# Patient Record
Sex: FEMALE | Race: WHITE | Employment: OTHER | URBAN - METROPOLITAN AREA
[De-identification: names, ages, dates, MRNs, and addresses within clinical notes are randomized per-mention and may not be internally consistent; named-entity substitution may affect disease eponyms.]

---

## 2017-01-05 PROBLEM — N30.00 ACUTE CYSTITIS: Status: ACTIVE | Noted: 2017-01-05

## 2017-01-12 PROBLEM — N30.00 ACUTE CYSTITIS: Status: RESOLVED | Noted: 2017-01-05 | Resolved: 2017-01-12

## 2017-09-21 PROBLEM — N39.46 MIXED STRESS AND URGE URINARY INCONTINENCE: Status: ACTIVE | Noted: 2017-09-21

## 2017-09-21 PROBLEM — M15.9 PRIMARY OSTEOARTHRITIS INVOLVING MULTIPLE JOINTS: Status: ACTIVE | Noted: 2017-09-21

## 2017-11-13 ENCOUNTER — CARE COORDINATION (OUTPATIENT)
Dept: CARE COORDINATION | Age: 82
End: 2017-11-13

## 2017-12-14 PROBLEM — H18.529 ANTERIOR BASEMENT MEMBRANE DYSTROPHY: Status: ACTIVE | Noted: 2017-12-04

## 2017-12-14 PROBLEM — Z96.1 PSEUDOPHAKIA, BOTH EYES: Status: ACTIVE | Noted: 2017-12-04

## 2017-12-14 PROBLEM — H18.519 FUCHS' ENDOTHELIAL DYSTROPHY: Status: ACTIVE | Noted: 2017-12-04

## 2018-01-28 PROBLEM — R09.02 HYPOXIA: Status: ACTIVE | Noted: 2018-01-28

## 2018-01-29 PROBLEM — J96.01 ACUTE RESPIRATORY FAILURE WITH HYPOXIA (HCC): Status: ACTIVE | Noted: 2018-01-28

## 2018-01-31 PROBLEM — J96.01 ACUTE RESPIRATORY FAILURE WITH HYPOXIA (HCC): Status: RESOLVED | Noted: 2018-01-28 | Resolved: 2018-01-31

## 2018-02-06 ENCOUNTER — CARE COORDINATION (OUTPATIENT)
Dept: CASE MANAGEMENT | Age: 83
End: 2018-02-06

## 2018-02-12 ENCOUNTER — CARE COORDINATION (OUTPATIENT)
Dept: CASE MANAGEMENT | Age: 83
End: 2018-02-12

## 2018-02-12 NOTE — CARE COORDINATION
[facility] Transitions Follow Up Call    2018    Patient: Jennifer Peralta  Patient : 1935   MRN: <[id number]>  Reason for Admission: There are no discharge diagnoses documented for the most recent discharge. Discharge Date: 18 RARS: Risk Score: 19.5           Care Transitions Subsequent and Final Call    Subsequent and Final Calls  Care Transitions Interventions  Other Interventions:          Attempted to contact Pt for f/u transitions call. Contact information left to  requesting call back at the earliest convenience. Received call back from [place] who stated she is doing great and back to work now. Stated she does not nee any more transitions calls. She thanked [facility] for call ing to check on her. Luis Blue RN BSN   Care Transitions Coordinator  [phone]       Follow Up  No future appointments.     Luis Blue RN

## 2018-02-17 PROBLEM — J06.9 URI (UPPER RESPIRATORY INFECTION): Status: ACTIVE | Noted: 2018-02-17

## 2018-02-17 PROBLEM — I50.41 ACUTE COMBINED SYSTOLIC AND DIASTOLIC CHF, NYHA CLASS 1 (HCC): Status: ACTIVE | Noted: 2018-02-17

## 2018-02-19 PROBLEM — J06.9 URI (UPPER RESPIRATORY INFECTION): Status: RESOLVED | Noted: 2018-02-17 | Resolved: 2018-02-19

## 2018-02-19 PROBLEM — I50.41 ACUTE COMBINED SYSTOLIC AND DIASTOLIC CHF, NYHA CLASS 1 (HCC): Status: RESOLVED | Noted: 2018-02-17 | Resolved: 2018-02-19

## 2018-02-23 PROBLEM — J98.01 ACUTE BRONCHOSPASM: Status: ACTIVE | Noted: 2018-02-23

## 2018-03-06 ENCOUNTER — CARE COORDINATION (OUTPATIENT)
Dept: CASE MANAGEMENT | Age: 83
End: 2018-03-06

## 2018-03-06 DIAGNOSIS — J98.01 ACUTE BRONCHOSPASM: Primary | ICD-10-CM

## 2018-03-06 PROCEDURE — 1111F DSCHRG MED/CURRENT MED MERGE: CPT | Performed by: FAMILY MEDICINE

## 2018-03-12 ENCOUNTER — TELEPHONE (OUTPATIENT)
Dept: FAMILY MEDICINE CLINIC | Age: 83
End: 2018-03-12
Payer: MEDICARE

## 2018-03-12 DIAGNOSIS — Z79.01 LONG-TERM (CURRENT) USE OF ANTICOAGULANTS: ICD-10-CM

## 2018-03-12 DIAGNOSIS — I48.20 CHRONIC ATRIAL FIBRILLATION (HCC): ICD-10-CM

## 2018-03-12 DIAGNOSIS — E66.01 MORBID OBESITY (HCC): ICD-10-CM

## 2018-03-12 DIAGNOSIS — I11.0 HYPERTENSIVE HEART DISEASE WITH CONGESTIVE HEART FAILURE (HCC): ICD-10-CM

## 2018-03-12 DIAGNOSIS — I25.10 ATHEROSCLEROSIS OF NATIVE CORONARY ARTERY OF NATIVE HEART WITHOUT ANGINA PECTORIS: ICD-10-CM

## 2018-03-12 DIAGNOSIS — J44.0 OBSTRUCTIVE CHRONIC BRONCHITIS WITH ACUTE BRONCHITIS (HCC): Primary | ICD-10-CM

## 2018-03-12 DIAGNOSIS — I27.20 PULMONARY HTN (HCC): ICD-10-CM

## 2018-03-12 DIAGNOSIS — N39.46 MIXED INCONTINENCE URGE AND STRESS (MALE)(FEMALE): ICD-10-CM

## 2018-03-12 DIAGNOSIS — J18.9 UNRESOLVED PNEUMONIA: ICD-10-CM

## 2018-03-12 DIAGNOSIS — Z79.52 LONG TERM CURRENT USE OF SYSTEMIC STEROIDS: ICD-10-CM

## 2018-03-12 DIAGNOSIS — I08.1 DISORDERS OF BOTH MITRAL AND TRICUSPID VALVES: ICD-10-CM

## 2018-03-12 DIAGNOSIS — J96.01 ACUTE RESPIRATORY FAILURE WITH HYPOXIA (HCC): ICD-10-CM

## 2018-03-12 DIAGNOSIS — J44.1 OBSTRUCTIVE CHRONIC BRONCHITIS WITH EXACERBATION (HCC): ICD-10-CM

## 2018-03-12 DIAGNOSIS — I50.30 DIASTOLIC HEART FAILURE, UNSPECIFIED HEART FAILURE CHRONICITY: ICD-10-CM

## 2018-03-12 DIAGNOSIS — M19.90 SENILE ARTHRITIS: ICD-10-CM

## 2018-03-12 DIAGNOSIS — F01.53 VASCULAR DEMENTIA WITH DEPRESSED MOOD: ICD-10-CM

## 2018-03-12 PROCEDURE — G0180 MD CERTIFICATION HHA PATIENT: HCPCS | Performed by: FAMILY MEDICINE

## 2018-03-15 ENCOUNTER — TELEPHONE (OUTPATIENT)
Dept: FAMILY MEDICINE CLINIC | Age: 83
End: 2018-03-15

## 2018-03-15 NOTE — TELEPHONE ENCOUNTER
Call patient with the following instructions. We will use 232 as her baseline weight. Weigh herself every morning  Take whole lasix (40 mg) if weight is 234 or more  Take 1/2 lasix (20mg) if weight is 233 or less. Call if weight is over 237.

## 2018-03-27 ENCOUNTER — CARE COORDINATION (OUTPATIENT)
Dept: CASE MANAGEMENT | Age: 83
End: 2018-03-27

## 2018-04-02 ENCOUNTER — NURSE ONLY (OUTPATIENT)
Dept: FAMILY MEDICINE CLINIC | Age: 83
End: 2018-04-02
Payer: MEDICARE

## 2018-04-02 ENCOUNTER — ANTI-COAG VISIT (OUTPATIENT)
Dept: FAMILY MEDICINE CLINIC | Age: 83
End: 2018-04-02

## 2018-04-02 DIAGNOSIS — Z79.01 ANTICOAGULATED ON COUMADIN: Primary | ICD-10-CM

## 2018-04-02 LAB
INTERNATIONAL NORMALIZATION RATIO, POC: 2.2
PROTHROMBIN TIME, POC: 26.2

## 2018-04-02 PROCEDURE — 85610 PROTHROMBIN TIME: CPT

## 2018-04-02 PROCEDURE — 93793 ANTICOAG MGMT PT WARFARIN: CPT | Performed by: COUNSELOR

## 2018-04-04 ENCOUNTER — CARE COORDINATION (OUTPATIENT)
Dept: CASE MANAGEMENT | Age: 83
End: 2018-04-04

## 2018-04-05 ENCOUNTER — OFFICE VISIT (OUTPATIENT)
Dept: FAMILY MEDICINE CLINIC | Age: 83
End: 2018-04-05
Payer: MEDICARE

## 2018-04-05 ENCOUNTER — HOSPITAL ENCOUNTER (OUTPATIENT)
Age: 83
Discharge: HOME | End: 2018-04-07
Payer: MEDICARE

## 2018-04-05 VITALS
TEMPERATURE: 97.8 F | RESPIRATION RATE: 18 BRPM | HEIGHT: 66 IN | SYSTOLIC BLOOD PRESSURE: 144 MMHG | DIASTOLIC BLOOD PRESSURE: 79 MMHG | BODY MASS INDEX: 38.73 KG/M2 | HEART RATE: 92 BPM | OXYGEN SATURATION: 96 % | WEIGHT: 241 LBS

## 2018-04-05 DIAGNOSIS — Z79.01 CHRONIC ANTICOAGULATION: ICD-10-CM

## 2018-04-05 DIAGNOSIS — R53.1 WEAKNESS GENERALIZED: ICD-10-CM

## 2018-04-05 DIAGNOSIS — I50.42 CHRONIC COMBINED SYSTOLIC AND DIASTOLIC CONGESTIVE HEART FAILURE (HCC): ICD-10-CM

## 2018-04-05 DIAGNOSIS — I10 ESSENTIAL HYPERTENSION: ICD-10-CM

## 2018-04-05 DIAGNOSIS — M15.9 PRIMARY OSTEOARTHRITIS INVOLVING MULTIPLE JOINTS: ICD-10-CM

## 2018-04-05 DIAGNOSIS — R30.0 DYSURIA: ICD-10-CM

## 2018-04-05 DIAGNOSIS — N39.46 MIXED STRESS AND URGE URINARY INCONTINENCE: ICD-10-CM

## 2018-04-05 DIAGNOSIS — M75.42 IMPINGEMENT SYNDROME OF LEFT SHOULDER: ICD-10-CM

## 2018-04-05 DIAGNOSIS — I48.20 CHRONIC ATRIAL FIBRILLATION (HCC): ICD-10-CM

## 2018-04-05 DIAGNOSIS — I25.10 CORONARY ARTERY DISEASE INVOLVING NATIVE CORONARY ARTERY OF NATIVE HEART WITHOUT ANGINA PECTORIS: Primary | ICD-10-CM

## 2018-04-05 DIAGNOSIS — R60.0 BILATERAL LEG EDEMA: ICD-10-CM

## 2018-04-05 DIAGNOSIS — E03.9 HYPOTHYROIDISM, UNSPECIFIED TYPE: ICD-10-CM

## 2018-04-05 DIAGNOSIS — I42.9 CARDIOMYOPATHY, UNSPECIFIED TYPE (HCC): ICD-10-CM

## 2018-04-05 LAB
ANION GAP SERPL CALCULATED.3IONS-SCNC: 15 MMOL/L (ref 7–16)
BACTERIA: NORMAL /HPF
BILIRUBIN URINE: NEGATIVE
BLOOD, URINE: NEGATIVE
BUN BLDV-MCNC: 17 MG/DL (ref 8–23)
CALCIUM SERPL-MCNC: 9.2 MG/DL (ref 8.6–10.2)
CHLORIDE BLD-SCNC: 102 MMOL/L (ref 98–107)
CLARITY: CLEAR
CO2: 27 MMOL/L (ref 22–29)
COLOR: YELLOW
CREAT SERPL-MCNC: 0.7 MG/DL (ref 0.5–1)
CRYSTALS, UA: NORMAL
GFR AFRICAN AMERICAN: >60
GFR NON-AFRICAN AMERICAN: >60 ML/MIN/1.73
GLUCOSE BLD-MCNC: 88 MG/DL (ref 74–109)
GLUCOSE URINE: NEGATIVE MG/DL
KETONES, URINE: NEGATIVE MG/DL
LEUKOCYTE ESTERASE, URINE: NEGATIVE
NITRITE, URINE: NEGATIVE
PH UA: 6.5 (ref 5–9)
POTASSIUM SERPL-SCNC: 4.5 MMOL/L (ref 3.5–5)
PRO-BNP: 1781 PG/ML (ref 0–450)
PROTEIN UA: NORMAL MG/DL
RBC UA: NORMAL /HPF (ref 0–2)
SODIUM BLD-SCNC: 144 MMOL/L (ref 132–146)
SPECIFIC GRAVITY UA: 1.01 (ref 1–1.03)
UROBILINOGEN, URINE: 0.2 E.U./DL
WBC UA: NORMAL /HPF (ref 0–5)

## 2018-04-05 PROCEDURE — G8399 PT W/DXA RESULTS DOCUMENT: HCPCS | Performed by: FAMILY MEDICINE

## 2018-04-05 PROCEDURE — G8417 CALC BMI ABV UP PARAM F/U: HCPCS | Performed by: FAMILY MEDICINE

## 2018-04-05 PROCEDURE — 36415 COLL VENOUS BLD VENIPUNCTURE: CPT | Performed by: FAMILY MEDICINE

## 2018-04-05 PROCEDURE — 4040F PNEUMOC VAC/ADMIN/RCVD: CPT | Performed by: FAMILY MEDICINE

## 2018-04-05 PROCEDURE — G8598 ASA/ANTIPLAT THER USED: HCPCS | Performed by: FAMILY MEDICINE

## 2018-04-05 PROCEDURE — 1090F PRES/ABSN URINE INCON ASSESS: CPT | Performed by: FAMILY MEDICINE

## 2018-04-05 PROCEDURE — 81001 URINALYSIS AUTO W/SCOPE: CPT

## 2018-04-05 PROCEDURE — 83880 ASSAY OF NATRIURETIC PEPTIDE: CPT

## 2018-04-05 PROCEDURE — 87088 URINE BACTERIA CULTURE: CPT

## 2018-04-05 PROCEDURE — 80048 BASIC METABOLIC PNL TOTAL CA: CPT

## 2018-04-05 PROCEDURE — 1036F TOBACCO NON-USER: CPT | Performed by: FAMILY MEDICINE

## 2018-04-05 PROCEDURE — 0509F URINE INCON PLAN DOCD: CPT | Performed by: FAMILY MEDICINE

## 2018-04-05 PROCEDURE — 99212 OFFICE O/P EST SF 10 MIN: CPT | Performed by: FAMILY MEDICINE

## 2018-04-05 PROCEDURE — G8427 DOCREV CUR MEDS BY ELIG CLIN: HCPCS | Performed by: FAMILY MEDICINE

## 2018-04-05 PROCEDURE — 1123F ACP DISCUSS/DSCN MKR DOCD: CPT | Performed by: FAMILY MEDICINE

## 2018-04-05 PROCEDURE — 99214 OFFICE O/P EST MOD 30 MIN: CPT | Performed by: FAMILY MEDICINE

## 2018-04-07 LAB — URINE CULTURE, ROUTINE: NORMAL

## 2018-04-09 ENCOUNTER — TELEPHONE (OUTPATIENT)
Dept: FAMILY MEDICINE CLINIC | Age: 83
End: 2018-04-09

## 2018-04-09 RX ORDER — FAMOTIDINE 20 MG/1
20 TABLET, FILM COATED ORAL DAILY
Qty: 90 TABLET | Refills: 3 | Status: SHIPPED | OUTPATIENT
Start: 2018-04-09 | End: 2018-05-24 | Stop reason: SDUPTHER

## 2018-04-10 ENCOUNTER — CARE COORDINATION (OUTPATIENT)
Dept: CASE MANAGEMENT | Age: 83
End: 2018-04-10

## 2018-04-27 DIAGNOSIS — E03.9 HYPOTHYROIDISM, UNSPECIFIED TYPE: Primary | ICD-10-CM

## 2018-04-27 RX ORDER — LEVOTHYROXINE SODIUM 0.12 MG/1
TABLET ORAL
Qty: 15 TABLET | Refills: 0 | Status: SHIPPED | OUTPATIENT
Start: 2018-04-27 | End: 2018-04-27 | Stop reason: CLARIF

## 2018-04-27 RX ORDER — LEVOTHYROXINE SODIUM 0.12 MG/1
TABLET ORAL
Qty: 15 TABLET | Refills: 0 | Status: SHIPPED | OUTPATIENT
Start: 2018-04-27 | End: 2018-05-24 | Stop reason: SDUPTHER

## 2018-04-30 ENCOUNTER — ANTI-COAG VISIT (OUTPATIENT)
Dept: FAMILY MEDICINE CLINIC | Age: 83
End: 2018-04-30
Payer: MEDICARE

## 2018-04-30 ENCOUNTER — NURSE ONLY (OUTPATIENT)
Dept: FAMILY MEDICINE CLINIC | Age: 83
End: 2018-04-30
Payer: MEDICARE

## 2018-04-30 DIAGNOSIS — I48.20 CHRONIC ATRIAL FIBRILLATION (HCC): ICD-10-CM

## 2018-04-30 DIAGNOSIS — I48.20 CHRONIC ATRIAL FIBRILLATION (HCC): Primary | ICD-10-CM

## 2018-04-30 DIAGNOSIS — Z79.01 ANTICOAGULATED ON COUMADIN: ICD-10-CM

## 2018-04-30 LAB
INTERNATIONAL NORMALIZATION RATIO, POC: 2.2
PROTHROMBIN TIME, POC: 26

## 2018-04-30 PROCEDURE — 85610 PROTHROMBIN TIME: CPT

## 2018-04-30 PROCEDURE — 93793 ANTICOAG MGMT PT WARFARIN: CPT | Performed by: FAMILY MEDICINE

## 2018-05-04 ENCOUNTER — TELEPHONE (OUTPATIENT)
Dept: FAMILY MEDICINE CLINIC | Age: 83
End: 2018-05-04

## 2018-05-04 DIAGNOSIS — M15.9 PRIMARY OSTEOARTHRITIS INVOLVING MULTIPLE JOINTS: Primary | ICD-10-CM

## 2018-05-10 ENCOUNTER — HOSPITAL ENCOUNTER (OUTPATIENT)
Age: 83
Discharge: HOME | End: 2018-05-12
Payer: MEDICARE

## 2018-05-10 ENCOUNTER — TELEPHONE (OUTPATIENT)
Dept: FAMILY MEDICINE CLINIC | Age: 83
End: 2018-05-10

## 2018-05-10 ENCOUNTER — OFFICE VISIT (OUTPATIENT)
Dept: FAMILY MEDICINE CLINIC | Age: 83
End: 2018-05-10
Payer: MEDICARE

## 2018-05-10 VITALS
OXYGEN SATURATION: 97 % | HEART RATE: 77 BPM | BODY MASS INDEX: 37.61 KG/M2 | RESPIRATION RATE: 22 BRPM | HEIGHT: 66 IN | TEMPERATURE: 98.7 F | DIASTOLIC BLOOD PRESSURE: 76 MMHG | WEIGHT: 234 LBS | SYSTOLIC BLOOD PRESSURE: 128 MMHG

## 2018-05-10 DIAGNOSIS — R60.0 LOWER EXTREMITY EDEMA: Primary | ICD-10-CM

## 2018-05-10 DIAGNOSIS — I50.32 CHRONIC DIASTOLIC HEART FAILURE (HCC): ICD-10-CM

## 2018-05-10 DIAGNOSIS — R60.0 LOWER EXTREMITY EDEMA: ICD-10-CM

## 2018-05-10 PROCEDURE — 1090F PRES/ABSN URINE INCON ASSESS: CPT | Performed by: NURSE PRACTITIONER

## 2018-05-10 PROCEDURE — 99214 OFFICE O/P EST MOD 30 MIN: CPT | Performed by: NURSE PRACTITIONER

## 2018-05-10 PROCEDURE — G8417 CALC BMI ABV UP PARAM F/U: HCPCS | Performed by: NURSE PRACTITIONER

## 2018-05-10 PROCEDURE — G8399 PT W/DXA RESULTS DOCUMENT: HCPCS | Performed by: NURSE PRACTITIONER

## 2018-05-10 PROCEDURE — G8427 DOCREV CUR MEDS BY ELIG CLIN: HCPCS | Performed by: NURSE PRACTITIONER

## 2018-05-10 PROCEDURE — 4040F PNEUMOC VAC/ADMIN/RCVD: CPT | Performed by: NURSE PRACTITIONER

## 2018-05-10 PROCEDURE — 83880 ASSAY OF NATRIURETIC PEPTIDE: CPT

## 2018-05-10 PROCEDURE — 80048 BASIC METABOLIC PNL TOTAL CA: CPT

## 2018-05-10 PROCEDURE — G8598 ASA/ANTIPLAT THER USED: HCPCS | Performed by: NURSE PRACTITIONER

## 2018-05-10 PROCEDURE — 36415 COLL VENOUS BLD VENIPUNCTURE: CPT | Performed by: NURSE PRACTITIONER

## 2018-05-10 PROCEDURE — 99212 OFFICE O/P EST SF 10 MIN: CPT | Performed by: NURSE PRACTITIONER

## 2018-05-10 PROCEDURE — 1036F TOBACCO NON-USER: CPT | Performed by: NURSE PRACTITIONER

## 2018-05-10 PROCEDURE — 1123F ACP DISCUSS/DSCN MKR DOCD: CPT | Performed by: NURSE PRACTITIONER

## 2018-05-10 RX ORDER — CHLORAL HYDRATE 500 MG
1000 CAPSULE ORAL DAILY
COMMUNITY
End: 2019-02-08

## 2018-05-10 ASSESSMENT — ENCOUNTER SYMPTOMS
VOMITING: 0
DIARRHEA: 0
WHEEZING: 0
BLURRED VISION: 0
CONSTIPATION: 0
NAUSEA: 0
DOUBLE VISION: 0
COUGH: 1
SHORTNESS OF BREATH: 1

## 2018-05-11 LAB
ANION GAP SERPL CALCULATED.3IONS-SCNC: 14 MMOL/L (ref 7–16)
BUN BLDV-MCNC: 14 MG/DL (ref 8–23)
CALCIUM SERPL-MCNC: 9.5 MG/DL (ref 8.6–10.2)
CHLORIDE BLD-SCNC: 101 MMOL/L (ref 98–107)
CO2: 27 MMOL/L (ref 22–29)
CREAT SERPL-MCNC: 0.8 MG/DL (ref 0.5–1)
GFR AFRICAN AMERICAN: >60
GFR NON-AFRICAN AMERICAN: >60 ML/MIN/1.73
GLUCOSE BLD-MCNC: 90 MG/DL (ref 74–109)
POTASSIUM SERPL-SCNC: 4.2 MMOL/L (ref 3.5–5)
PRO-BNP: 3106 PG/ML (ref 0–450)
SODIUM BLD-SCNC: 142 MMOL/L (ref 132–146)

## 2018-05-22 ENCOUNTER — OFFICE VISIT (OUTPATIENT)
Dept: CARDIOLOGY CLINIC | Age: 83
End: 2018-05-22
Payer: MEDICARE

## 2018-05-22 VITALS
SYSTOLIC BLOOD PRESSURE: 136 MMHG | WEIGHT: 235 LBS | BODY MASS INDEX: 37.77 KG/M2 | HEIGHT: 66 IN | DIASTOLIC BLOOD PRESSURE: 60 MMHG | HEART RATE: 78 BPM

## 2018-05-22 DIAGNOSIS — I50.32 CHRONIC DIASTOLIC CONGESTIVE HEART FAILURE (HCC): ICD-10-CM

## 2018-05-22 DIAGNOSIS — R07.89 OTHER CHEST PAIN: Primary | ICD-10-CM

## 2018-05-22 PROCEDURE — 1036F TOBACCO NON-USER: CPT | Performed by: NURSE PRACTITIONER

## 2018-05-22 PROCEDURE — 1090F PRES/ABSN URINE INCON ASSESS: CPT | Performed by: NURSE PRACTITIONER

## 2018-05-22 PROCEDURE — 4040F PNEUMOC VAC/ADMIN/RCVD: CPT | Performed by: NURSE PRACTITIONER

## 2018-05-22 PROCEDURE — 1123F ACP DISCUSS/DSCN MKR DOCD: CPT | Performed by: NURSE PRACTITIONER

## 2018-05-22 PROCEDURE — G8427 DOCREV CUR MEDS BY ELIG CLIN: HCPCS | Performed by: NURSE PRACTITIONER

## 2018-05-22 PROCEDURE — G8417 CALC BMI ABV UP PARAM F/U: HCPCS | Performed by: NURSE PRACTITIONER

## 2018-05-22 PROCEDURE — G8598 ASA/ANTIPLAT THER USED: HCPCS | Performed by: NURSE PRACTITIONER

## 2018-05-22 PROCEDURE — 93000 ELECTROCARDIOGRAM COMPLETE: CPT | Performed by: INTERNAL MEDICINE

## 2018-05-22 PROCEDURE — G8399 PT W/DXA RESULTS DOCUMENT: HCPCS | Performed by: NURSE PRACTITIONER

## 2018-05-22 PROCEDURE — 99213 OFFICE O/P EST LOW 20 MIN: CPT | Performed by: NURSE PRACTITIONER

## 2018-05-24 DIAGNOSIS — N39.46 MIXED STRESS AND URGE URINARY INCONTINENCE: ICD-10-CM

## 2018-05-24 DIAGNOSIS — J44.1 CHRONIC OBSTRUCTIVE PULMONARY DISEASE WITH ACUTE EXACERBATION (HCC): ICD-10-CM

## 2018-05-24 DIAGNOSIS — E03.9 HYPOTHYROIDISM, UNSPECIFIED TYPE: ICD-10-CM

## 2018-05-24 RX ORDER — WARFARIN SODIUM 7.5 MG/1
7.5 TABLET ORAL DAILY
Qty: 90 TABLET | Refills: 5 | Status: ON HOLD | OUTPATIENT
Start: 2018-05-24 | End: 2019-01-07

## 2018-05-24 RX ORDER — METOPROLOL TARTRATE 50 MG/1
TABLET, FILM COATED ORAL
Qty: 112 TABLET | Refills: 5 | Status: SHIPPED | OUTPATIENT
Start: 2018-05-24 | End: 2018-10-16 | Stop reason: SDUPTHER

## 2018-05-24 RX ORDER — LEVOTHYROXINE SODIUM 0.12 MG/1
TABLET ORAL
Qty: 28 TABLET | Refills: 5 | Status: SHIPPED | OUTPATIENT
Start: 2018-05-24 | End: 2018-11-13 | Stop reason: SDUPTHER

## 2018-05-24 RX ORDER — FAMOTIDINE 20 MG/1
20 TABLET, FILM COATED ORAL DAILY
Qty: 28 TABLET | Refills: 5 | Status: SHIPPED | OUTPATIENT
Start: 2018-05-24 | End: 2018-10-16 | Stop reason: SDUPTHER

## 2018-05-24 RX ORDER — FUROSEMIDE 40 MG/1
40 TABLET ORAL DAILY
Qty: 28 TABLET | Refills: 5 | Status: SHIPPED | OUTPATIENT
Start: 2018-05-24 | End: 2018-10-16 | Stop reason: SDUPTHER

## 2018-05-24 RX ORDER — ACETAMINOPHEN 160 MG
1 TABLET,DISINTEGRATING ORAL DAILY
Qty: 28 CAPSULE | Refills: 5 | Status: SHIPPED | OUTPATIENT
Start: 2018-05-24 | End: 2018-10-17

## 2018-05-24 RX ORDER — VITAMIN B COMPLEX
1 CAPSULE ORAL DAILY
Qty: 28 CAPSULE | Refills: 5 | Status: SHIPPED | OUTPATIENT
Start: 2018-05-24 | End: 2019-04-08 | Stop reason: SDUPTHER

## 2018-05-24 RX ORDER — FLUTICASONE PROPIONATE 50 MCG
1 SPRAY, SUSPENSION (ML) NASAL DAILY
Qty: 1 BOTTLE | Refills: 3 | Status: SHIPPED | OUTPATIENT
Start: 2018-05-24 | End: 2019-01-02

## 2018-05-24 RX ORDER — LISINOPRIL 40 MG/1
40 TABLET ORAL DAILY
Qty: 28 TABLET | Refills: 5 | Status: SHIPPED | OUTPATIENT
Start: 2018-05-24 | End: 2018-10-16 | Stop reason: SDUPTHER

## 2018-05-24 RX ORDER — ALBUTEROL SULFATE 90 UG/1
2 AEROSOL, METERED RESPIRATORY (INHALATION) EVERY 6 HOURS PRN
Qty: 1 INHALER | Refills: 3 | Status: SHIPPED | OUTPATIENT
Start: 2018-05-24 | End: 2019-04-08 | Stop reason: SDUPTHER

## 2018-05-29 ENCOUNTER — NURSE ONLY (OUTPATIENT)
Dept: FAMILY MEDICINE CLINIC | Age: 83
End: 2018-05-29
Payer: MEDICARE

## 2018-05-29 DIAGNOSIS — I48.20 CHRONIC ATRIAL FIBRILLATION (HCC): ICD-10-CM

## 2018-05-29 DIAGNOSIS — Z79.01 ANTICOAGULATED ON COUMADIN: ICD-10-CM

## 2018-05-29 LAB — INTERNATIONAL NORMALIZATION RATIO, POC: 2.3

## 2018-05-29 PROCEDURE — 85610 PROTHROMBIN TIME: CPT | Performed by: FAMILY MEDICINE

## 2018-05-29 PROCEDURE — 93793 ANTICOAG MGMT PT WARFARIN: CPT | Performed by: FAMILY MEDICINE

## 2018-06-14 ENCOUNTER — TELEPHONE (OUTPATIENT)
Dept: FAMILY MEDICINE CLINIC | Age: 83
End: 2018-06-14

## 2018-06-15 NOTE — TELEPHONE ENCOUNTER
Spoke to patient, she states she thinks she forgot to take the Lasix for a couple of days. She states she gained 3 pounds in one day. Pt took an extra half of lasix last night. She states her weight is back down this morning. Advised patient to bring in her medication bottles and pill packs to the office and we can help her get them organized. She states she is coming in for INR on Thursday and will bring them in at that time.

## 2018-06-28 ENCOUNTER — HOSPITAL ENCOUNTER (OUTPATIENT)
Age: 83
Discharge: HOME | End: 2018-06-30
Payer: MEDICARE

## 2018-06-28 ENCOUNTER — OFFICE VISIT (OUTPATIENT)
Dept: FAMILY MEDICINE CLINIC | Age: 83
End: 2018-06-28
Payer: MEDICARE

## 2018-06-28 VITALS
TEMPERATURE: 97.4 F | SYSTOLIC BLOOD PRESSURE: 103 MMHG | WEIGHT: 223.5 LBS | DIASTOLIC BLOOD PRESSURE: 69 MMHG | RESPIRATION RATE: 20 BRPM | HEART RATE: 82 BPM | BODY MASS INDEX: 36.07 KG/M2 | OXYGEN SATURATION: 95 %

## 2018-06-28 DIAGNOSIS — E03.9 HYPOTHYROIDISM, UNSPECIFIED TYPE: ICD-10-CM

## 2018-06-28 DIAGNOSIS — Z91.81 AT HIGH RISK FOR FALLS: ICD-10-CM

## 2018-06-28 DIAGNOSIS — I48.20 CHRONIC ATRIAL FIBRILLATION (HCC): Primary | ICD-10-CM

## 2018-06-28 DIAGNOSIS — I10 ESSENTIAL HYPERTENSION: ICD-10-CM

## 2018-06-28 DIAGNOSIS — Z23 NEED FOR SHINGLES VACCINE: ICD-10-CM

## 2018-06-28 LAB
ANION GAP SERPL CALCULATED.3IONS-SCNC: 17 MMOL/L (ref 7–16)
BUN BLDV-MCNC: 20 MG/DL (ref 8–23)
CALCIUM SERPL-MCNC: 9.6 MG/DL (ref 8.6–10.2)
CHLORIDE BLD-SCNC: 101 MMOL/L (ref 98–107)
CO2: 25 MMOL/L (ref 22–29)
CREAT SERPL-MCNC: 0.9 MG/DL (ref 0.5–1)
GFR AFRICAN AMERICAN: >60
GFR NON-AFRICAN AMERICAN: 60 ML/MIN/1.73
GLUCOSE BLD-MCNC: 78 MG/DL (ref 74–109)
INTERNATIONAL NORMALIZATION RATIO, POC: 3
POTASSIUM SERPL-SCNC: 4.5 MMOL/L (ref 3.5–5)
PROTHROMBIN TIME, POC: 36.4
SODIUM BLD-SCNC: 143 MMOL/L (ref 132–146)
TSH SERPL DL<=0.05 MIU/L-ACNC: 5.4 UIU/ML (ref 0.27–4.2)

## 2018-06-28 PROCEDURE — G8427 DOCREV CUR MEDS BY ELIG CLIN: HCPCS | Performed by: FAMILY MEDICINE

## 2018-06-28 PROCEDURE — 85610 PROTHROMBIN TIME: CPT | Performed by: FAMILY MEDICINE

## 2018-06-28 PROCEDURE — G8598 ASA/ANTIPLAT THER USED: HCPCS | Performed by: FAMILY MEDICINE

## 2018-06-28 PROCEDURE — 99213 OFFICE O/P EST LOW 20 MIN: CPT | Performed by: FAMILY MEDICINE

## 2018-06-28 PROCEDURE — 1036F TOBACCO NON-USER: CPT | Performed by: FAMILY MEDICINE

## 2018-06-28 PROCEDURE — 4040F PNEUMOC VAC/ADMIN/RCVD: CPT | Performed by: FAMILY MEDICINE

## 2018-06-28 PROCEDURE — 36415 COLL VENOUS BLD VENIPUNCTURE: CPT | Performed by: FAMILY MEDICINE

## 2018-06-28 PROCEDURE — 99212 OFFICE O/P EST SF 10 MIN: CPT | Performed by: FAMILY MEDICINE

## 2018-06-28 PROCEDURE — 1123F ACP DISCUSS/DSCN MKR DOCD: CPT | Performed by: FAMILY MEDICINE

## 2018-06-28 PROCEDURE — 80048 BASIC METABOLIC PNL TOTAL CA: CPT

## 2018-06-28 PROCEDURE — 1090F PRES/ABSN URINE INCON ASSESS: CPT | Performed by: FAMILY MEDICINE

## 2018-06-28 PROCEDURE — G8399 PT W/DXA RESULTS DOCUMENT: HCPCS | Performed by: FAMILY MEDICINE

## 2018-06-28 PROCEDURE — G8417 CALC BMI ABV UP PARAM F/U: HCPCS | Performed by: FAMILY MEDICINE

## 2018-06-28 PROCEDURE — 84443 ASSAY THYROID STIM HORMONE: CPT

## 2018-06-28 ASSESSMENT — ENCOUNTER SYMPTOMS
BACK PAIN: 1
COUGH: 0
NAUSEA: 0
SHORTNESS OF BREATH: 1
ABDOMINAL PAIN: 0
VOMITING: 0

## 2018-10-09 ENCOUNTER — ANTI-COAG VISIT (OUTPATIENT)
Dept: FAMILY MEDICINE CLINIC | Age: 83
End: 2018-10-09

## 2018-10-09 ENCOUNTER — OFFICE VISIT (OUTPATIENT)
Dept: FAMILY MEDICINE CLINIC | Age: 83
End: 2018-10-09
Payer: MEDICARE

## 2018-10-09 VITALS
DIASTOLIC BLOOD PRESSURE: 73 MMHG | OXYGEN SATURATION: 97 % | TEMPERATURE: 97.5 F | HEIGHT: 66 IN | HEART RATE: 56 BPM | WEIGHT: 238 LBS | BODY MASS INDEX: 38.25 KG/M2 | SYSTOLIC BLOOD PRESSURE: 126 MMHG

## 2018-10-09 DIAGNOSIS — F33.1 MODERATE EPISODE OF RECURRENT MAJOR DEPRESSIVE DISORDER (HCC): ICD-10-CM

## 2018-10-09 DIAGNOSIS — I48.20 CHRONIC ATRIAL FIBRILLATION (HCC): Primary | ICD-10-CM

## 2018-10-09 DIAGNOSIS — I27.20 PULMONARY HYPERTENSION (HCC): ICD-10-CM

## 2018-10-09 LAB
INTERNATIONAL NORMALIZATION RATIO, POC: 2.5
PROTHROMBIN TIME, POC: 30.4

## 2018-10-09 PROCEDURE — 99212 OFFICE O/P EST SF 10 MIN: CPT | Performed by: FAMILY MEDICINE

## 2018-10-09 PROCEDURE — 1036F TOBACCO NON-USER: CPT | Performed by: FAMILY MEDICINE

## 2018-10-09 PROCEDURE — 99213 OFFICE O/P EST LOW 20 MIN: CPT | Performed by: FAMILY MEDICINE

## 2018-10-09 PROCEDURE — G8427 DOCREV CUR MEDS BY ELIG CLIN: HCPCS | Performed by: FAMILY MEDICINE

## 2018-10-09 PROCEDURE — G8484 FLU IMMUNIZE NO ADMIN: HCPCS | Performed by: FAMILY MEDICINE

## 2018-10-09 PROCEDURE — G8417 CALC BMI ABV UP PARAM F/U: HCPCS | Performed by: FAMILY MEDICINE

## 2018-10-09 PROCEDURE — 1123F ACP DISCUSS/DSCN MKR DOCD: CPT | Performed by: FAMILY MEDICINE

## 2018-10-09 PROCEDURE — 85610 PROTHROMBIN TIME: CPT | Performed by: FAMILY MEDICINE

## 2018-10-09 PROCEDURE — G8598 ASA/ANTIPLAT THER USED: HCPCS | Performed by: FAMILY MEDICINE

## 2018-10-09 PROCEDURE — 4040F PNEUMOC VAC/ADMIN/RCVD: CPT | Performed by: FAMILY MEDICINE

## 2018-10-09 PROCEDURE — 93793 ANTICOAG MGMT PT WARFARIN: CPT | Performed by: FAMILY MEDICINE

## 2018-10-09 PROCEDURE — 1090F PRES/ABSN URINE INCON ASSESS: CPT | Performed by: FAMILY MEDICINE

## 2018-10-09 PROCEDURE — 1101F PT FALLS ASSESS-DOCD LE1/YR: CPT | Performed by: FAMILY MEDICINE

## 2018-10-09 PROCEDURE — G8399 PT W/DXA RESULTS DOCUMENT: HCPCS | Performed by: FAMILY MEDICINE

## 2018-10-09 NOTE — PROGRESS NOTES
Attending Physician Statement    S:   Chief Complaint   Patient presents with    Shortness of Breath     while walking, while laying down as well, been sleeping on chair at home   [facility] Visit for Anticoagulation Management      Dyspnea on exertion. Worse with lying down. Unable to use her nebulizer. Symptoms are not new   Depressed, willing to take medication for it  O: Blood pressure 126/73, pulse 56, temperature 97.5 °F (36.4 °C), temperature source Oral, height 5' 6\" (1.676 m), weight 238 lb (108 kg), SpO2 97 %, not currently breastfeeding. Exam:   Heart - irreg   Lungs - clear   depressed  A: Depression, pulmonary hypertension and COPD  P:  Home health to assist with nebulizer   lexapro   Follow-up as ordered    I have discussed the case, including pertinent history and exam findings with the resident. I agree with the documented assessment and plan.

## 2018-10-10 RX ORDER — ESCITALOPRAM OXALATE 10 MG/1
10 TABLET ORAL DAILY
Qty: 30 TABLET | Refills: 0 | Status: SHIPPED | OUTPATIENT
Start: 2018-10-10 | End: 2018-11-14 | Stop reason: SDUPTHER

## 2018-10-17 RX ORDER — CHOLECALCIFEROL (VITAMIN D3) 50 MCG
TABLET ORAL
Qty: 28 TABLET | Refills: 5 | Status: SHIPPED | OUTPATIENT
Start: 2018-10-17 | End: 2019-04-01

## 2018-10-17 RX ORDER — FUROSEMIDE 40 MG/1
TABLET ORAL
Qty: 28 TABLET | Refills: 5 | Status: ON HOLD | OUTPATIENT
Start: 2018-10-17 | End: 2019-01-07 | Stop reason: HOSPADM

## 2018-10-17 RX ORDER — METOPROLOL TARTRATE 50 MG/1
TABLET, FILM COATED ORAL
Qty: 112 TABLET | Refills: 5 | Status: SHIPPED | OUTPATIENT
Start: 2018-10-17 | End: 2019-02-08 | Stop reason: ALTCHOICE

## 2018-10-17 RX ORDER — FAMOTIDINE 20 MG/1
TABLET, FILM COATED ORAL
Qty: 28 TABLET | Refills: 5 | Status: SHIPPED | OUTPATIENT
Start: 2018-10-17 | End: 2019-04-01 | Stop reason: SDUPTHER

## 2018-10-17 RX ORDER — LISINOPRIL 40 MG/1
TABLET ORAL
Qty: 28 TABLET | Refills: 5 | Status: SHIPPED | OUTPATIENT
Start: 2018-10-17 | End: 2019-01-02

## 2018-10-30 ENCOUNTER — OFFICE VISIT (OUTPATIENT)
Dept: CARDIOLOGY CLINIC | Age: 83
End: 2018-10-30
Payer: MEDICARE

## 2018-10-30 VITALS
HEIGHT: 64 IN | DIASTOLIC BLOOD PRESSURE: 70 MMHG | BODY MASS INDEX: 41.48 KG/M2 | HEART RATE: 86 BPM | WEIGHT: 243 LBS | SYSTOLIC BLOOD PRESSURE: 122 MMHG

## 2018-10-30 DIAGNOSIS — I50.32 CHRONIC DIASTOLIC CONGESTIVE HEART FAILURE (HCC): ICD-10-CM

## 2018-10-30 DIAGNOSIS — I38 VHD (VALVULAR HEART DISEASE): ICD-10-CM

## 2018-10-30 DIAGNOSIS — I48.20 CHRONIC ATRIAL FIBRILLATION (HCC): ICD-10-CM

## 2018-10-30 DIAGNOSIS — R06.09 DOE (DYSPNEA ON EXERTION): ICD-10-CM

## 2018-10-30 DIAGNOSIS — I10 ESSENTIAL HYPERTENSION: ICD-10-CM

## 2018-10-30 DIAGNOSIS — I27.20 PULMONARY HYPERTENSION (HCC): ICD-10-CM

## 2018-10-30 DIAGNOSIS — I25.10 CORONARY ARTERY DISEASE INVOLVING NATIVE CORONARY ARTERY OF NATIVE HEART WITHOUT ANGINA PECTORIS: Primary | ICD-10-CM

## 2018-10-30 DIAGNOSIS — I34.0 NON-RHEUMATIC MITRAL REGURGITATION: ICD-10-CM

## 2018-10-30 DIAGNOSIS — E66.01 MORBID OBESITY WITH BMI OF 40.0-44.9, ADULT (HCC): ICD-10-CM

## 2018-10-30 PROCEDURE — G8399 PT W/DXA RESULTS DOCUMENT: HCPCS | Performed by: INTERNAL MEDICINE

## 2018-10-30 PROCEDURE — G8484 FLU IMMUNIZE NO ADMIN: HCPCS | Performed by: INTERNAL MEDICINE

## 2018-10-30 PROCEDURE — 1101F PT FALLS ASSESS-DOCD LE1/YR: CPT | Performed by: INTERNAL MEDICINE

## 2018-10-30 PROCEDURE — 1123F ACP DISCUSS/DSCN MKR DOCD: CPT | Performed by: INTERNAL MEDICINE

## 2018-10-30 PROCEDURE — 1090F PRES/ABSN URINE INCON ASSESS: CPT | Performed by: INTERNAL MEDICINE

## 2018-10-30 PROCEDURE — 1036F TOBACCO NON-USER: CPT | Performed by: INTERNAL MEDICINE

## 2018-10-30 PROCEDURE — 99214 OFFICE O/P EST MOD 30 MIN: CPT | Performed by: INTERNAL MEDICINE

## 2018-10-30 PROCEDURE — 4040F PNEUMOC VAC/ADMIN/RCVD: CPT | Performed by: INTERNAL MEDICINE

## 2018-10-30 PROCEDURE — G8417 CALC BMI ABV UP PARAM F/U: HCPCS | Performed by: INTERNAL MEDICINE

## 2018-10-30 PROCEDURE — G8427 DOCREV CUR MEDS BY ELIG CLIN: HCPCS | Performed by: INTERNAL MEDICINE

## 2018-10-30 PROCEDURE — G8598 ASA/ANTIPLAT THER USED: HCPCS | Performed by: INTERNAL MEDICINE

## 2018-10-30 PROCEDURE — 93000 ELECTROCARDIOGRAM COMPLETE: CPT | Performed by: INTERNAL MEDICINE

## 2018-10-30 NOTE — PROGRESS NOTES
OFFICE VISIT     PRIMARY CARE PHYSICIAN:      Tamela Ramsay MD       ALLERGIES / SENSITIVITIES:        Allergies   Allergen Reactions    Vicodin [Hydrocodone-Acetaminophen]     Codeine     Penicillins Hives          REVIEWED MEDICATIONS:        Current Outpatient Prescriptions:     famotidine (PEPCID) 20 MG tablet, TAKE ONE TABLET BY MOUTH EVERY DAY, Disp: 28 tablet, Rfl: 5    Cholecalciferol (VITAMIN D) 2000 units TABS tablet, TAKE ONE TABLET BY MOUTH EVERY DAY, Disp: 28 tablet, Rfl: 5    lisinopril (PRINIVIL;ZESTRIL) 40 MG tablet, TAKE ONE TABLET BY MOUTH EVERY DAY, Disp: 28 tablet, Rfl: 5    furosemide (LASIX) 40 MG tablet, TAKE ONE TABLET BY MOUTH EVERY DAY, Disp: 28 tablet, Rfl: 5    metoprolol tartrate (LOPRESSOR) 50 MG tablet, TAKE TWO TABLETS BY MOUTH EVERY MORNING AND TAKE TWO TABLETS BY MOUTH IN THE EVENING, Disp: 112 tablet, Rfl: 5    escitalopram (LEXAPRO) 10 MG tablet, Take 1 tablet by mouth daily Take 1/2 pill nightly for 1st week and then 1 pill nightly after the first week, Disp: 30 tablet, Rfl: 0    NONFORMULARY, , Disp: , Rfl:     warfarin (COUMADIN) 7.5 MG tablet, Take 1 tablet by mouth daily, Disp: 90 tablet, Rfl: 5    levothyroxine (SYNTHROID) 125 MCG tablet, Take 1 tablet by mouth  daily, Disp: 28 tablet, Rfl: 5    b complex vitamins capsule, Take 1 capsule by mouth daily, Disp: 28 capsule, Rfl: 5    albuterol sulfate  (90 Base) MCG/ACT inhaler, Inhale 2 puffs into the lungs every 6 hours as needed for Wheezing, Disp: 1 Inhaler, Rfl: 3    fluticasone (FLONASE) 50 MCG/ACT nasal spray, 1 spray by Nasal route daily, Disp: 1 Bottle, Rfl: 3    Omega-3 Fatty Acids (FISH OIL) 1000 MG CAPS, Take 1,000 mg by mouth 3 times daily, Disp: , Rfl:     Incontinence Supplies MISC, As needed, Disp: 100 each, Rfl: 5    Dextromethorphan-Guaifenesin (MUCINEX DM MAXIMUM STRENGTH)  MG TB12, Take 1 tablet by mouth every 12 hours as needed (cough and congestion), Disp: 28 tablet, Rfl: 0    levalbuterol (XOPENEX) 0.63 MG/3ML nebulization, Take 3 mLs by nebulization every 8 hours as needed for Wheezing or Shortness of Breath, Disp: 280 mL, Rfl: 3      S: REASON FOR VISIT:       Chief Complaint   Patient presents with    Coronary Artery Disease     6 month. Pt states she has had a 5lb weight gain over night, bilateral lower edema. She complains of SOB on slightest exertion. No other cardiac complaints today          History of Present Illness:       Office Visit for follow up of VHD, HTN, CAD,      No hospitalizations or surgeries since last visit   Artis any exertional chest pain   C/o edema, and short of breath - gained ? 5lbs over night   No palpitations, dizzy or syncope. Active at home   No orthopnea   Try to watch diet   Compliant with all medications       Past Medical History:   Diagnosis Date    Anticoagulated on Coumadin     on Coumadin for this Dr. Katie Castro controls    Atrial fibrillation ([facility] [address]     Basal cell carcinoma of neck     CAD (coronary artery disease)     History of luminal irregularities of the coronary artery, stable.  CHF (congestive heart failure) (HCC)     stage I diastolic dysfunction on 5/71/58    Depression 11/16/2010    DJD (degenerative joint disease)     SEVERE IN RIGHT HAND, IN MULTIPLE OTHER JOINTS    GERD (gastroesophageal reflux disease) 11/16/2010    History of cardiovascular stress test 03/18/2014    lexiscan    Hyperparathyroidism ([facility] [address] 11/16/2010    Had parathyroidectomy    Hypertension 11/16/2010    Hypothyroidism 11/16/2010    Mild mitral regurgitation 7/10/14    Osteoarthritis 11/16/2010    Pulmonary hypertension ([facility] [address] 7/10/14    mild    Renal calculi     x3    Tricuspid regurgitation 7/10/14    mild-moderate    Urinary incontinence 11/16/2010            Past Surgical History:   Procedure Laterality Date    CARDIAC CATHETERIZATION  3/22/14    CARPAL TUNNEL RELEASE      Right hand.     CATARACT REMOVAL  6/2005    right, bilateral

## 2018-10-31 ENCOUNTER — TELEPHONE (OUTPATIENT)
Dept: FAMILY MEDICINE CLINIC | Age: 83
End: 2018-10-31
Payer: MEDICARE

## 2018-10-31 DIAGNOSIS — I50.9 ACUTE HEART FAILURE, UNSPECIFIED HEART FAILURE TYPE (HCC): ICD-10-CM

## 2018-10-31 DIAGNOSIS — Z79.01 LONG TERM (CURRENT) USE OF ANTICOAGULANTS: ICD-10-CM

## 2018-10-31 DIAGNOSIS — I27.20 PROGRESSIVE PULMONARY HYPERTENSION (HCC): ICD-10-CM

## 2018-10-31 DIAGNOSIS — Z85.828 PERSONAL HISTORY OF OTHER MALIGNANT NEOPLASM OF SKIN: ICD-10-CM

## 2018-10-31 DIAGNOSIS — I11.0 HYPERTENSIVE HEART DISEASE WITH CONGESTIVE HEART FAILURE, UNSPECIFIED HEART FAILURE TYPE (HCC): ICD-10-CM

## 2018-10-31 DIAGNOSIS — I48.20 CHRONIC ATRIAL FIBRILLATION (HCC): ICD-10-CM

## 2018-10-31 DIAGNOSIS — M19.90 SENILE ARTHRITIS: ICD-10-CM

## 2018-10-31 DIAGNOSIS — F01.53 VASCULAR DEMENTIA WITH DEPRESSED MOOD: ICD-10-CM

## 2018-10-31 DIAGNOSIS — I25.110 ATHEROSCLEROSIS OF NATIVE CORONARY ARTERY WITH UNSTABLE ANGINA PECTORIS, UNSPECIFIED WHETHER NATIVE OR TRANSPLANTED HEART (HCC): Primary | ICD-10-CM

## 2018-10-31 DIAGNOSIS — I08.1 DISORDERS OF BOTH MITRAL AND TRICUSPID VALVES: ICD-10-CM

## 2018-10-31 PROCEDURE — G0180 MD CERTIFICATION HHA PATIENT: HCPCS | Performed by: FAMILY MEDICINE

## 2018-11-05 PROBLEM — J98.01 ACUTE BRONCHOSPASM: Status: RESOLVED | Noted: 2018-02-23 | Resolved: 2018-11-05

## 2018-11-05 PROBLEM — H18.529 ANTERIOR BASEMENT MEMBRANE DYSTROPHY: Status: RESOLVED | Noted: 2017-12-04 | Resolved: 2018-11-05

## 2018-11-13 DIAGNOSIS — E03.9 HYPOTHYROIDISM, UNSPECIFIED TYPE: ICD-10-CM

## 2018-11-13 RX ORDER — LEVOTHYROXINE SODIUM 0.12 MG/1
TABLET ORAL
Qty: 28 TABLET | Refills: 5 | Status: SHIPPED | OUTPATIENT
Start: 2018-11-13 | End: 2019-04-08 | Stop reason: SDUPTHER

## 2018-11-14 DIAGNOSIS — F33.1 MODERATE EPISODE OF RECURRENT MAJOR DEPRESSIVE DISORDER (HCC): ICD-10-CM

## 2018-11-14 RX ORDER — ESCITALOPRAM OXALATE 10 MG/1
10 TABLET ORAL DAILY
Qty: 30 TABLET | Refills: 0 | Status: SHIPPED | OUTPATIENT
Start: 2018-11-14 | End: 2019-01-02

## 2018-11-21 ENCOUNTER — HOSPITAL ENCOUNTER (EMERGENCY)
Age: 83
Discharge: HOME | End: 2018-11-21
Payer: MEDICARE

## 2018-11-21 VITALS
TEMPERATURE: 97.3 F | DIASTOLIC BLOOD PRESSURE: 74 MMHG | SYSTOLIC BLOOD PRESSURE: 131 MMHG | RESPIRATION RATE: 22 BRPM | WEIGHT: 240 LBS | HEART RATE: 91 BPM | BODY MASS INDEX: 41.2 KG/M2 | OXYGEN SATURATION: 94 %

## 2018-11-21 DIAGNOSIS — S81.811A LACERATION OF RIGHT LOWER LEG, INITIAL ENCOUNTER: Primary | ICD-10-CM

## 2018-11-21 PROCEDURE — 99213 OFFICE O/P EST LOW 20 MIN: CPT

## 2018-11-21 PROCEDURE — 12020 TX SUPFC WND DEHSN SMPL CLSR: CPT

## 2018-11-21 PROCEDURE — 6370000000 HC RX 637 (ALT 250 FOR IP): Performed by: PHYSICIAN ASSISTANT

## 2018-11-21 RX ADMIN — Medication 1 EACH: at 17:21

## 2018-11-21 NOTE — ED PROVIDER NOTES
discussed:  Infection    Alternatives discussed:  No treatment  Anesthesia (see MAR for exact dosages): Anesthesia method:  None  Laceration details:     Location: right lower shin. Length (cm):  1.5  Repair type:     Repair type:  Simple  Pre-procedure details:     Preparation:  Patient was prepped and draped in usual sterile fashion  Exploration:     Hemostasis achieved with:  Direct pressure    Wound exploration: wound explored through full range of motion and entire depth of wound probed and visualized      Wound extent: no areolar tissue violation noted, no fascia violation noted, no foreign bodies/material noted, no muscle damage noted, no nerve damage noted, no tendon damage noted, no underlying fracture noted and no vascular damage noted    Treatment:     Area cleansed with:  Saline    Amount of cleaning:  Standard    Irrigation solution:  Sterile saline  Skin repair:     Repair method:  Tissue adhesive and Steri-Strips    Number of Steri-Strips:  3  Approximation:     Approximation:  Close  Post-procedure details:     Dressing:  Non-adherent dressing    Patient tolerance of procedure: Tolerated well, no immediate complications          MDM       --------------------------------------------- PAST HISTORY ---------------------------------------------  Past Medical History:  has a past medical history of Anticoagulated on Coumadin; Atrial fibrillation ([facility] [address] Basal cell carcinoma of neck; CAD (coronary artery disease); CHF (congestive heart failure) ([facility] [address] Depression; DJD (degenerative joint disease); GERD (gastroesophageal reflux disease); History of cardiovascular stress test; Hyperparathyroidism ([address] Hypertension; Hypothyroidism; Mild mitral regurgitation; Osteoarthritis; Pulmonary hypertension ([address] Renal calculi; Tricuspid regurgitation; and Urinary incontinence. Past Surgical History:  has a past surgical history that includes Thyroidectomy (1960);  Kidney stone surgery (1972); Cholecystectomy (1972); Kidney stone surgery (1981); Cataract removal (6/2005); Incontinence surgery (5/2005); Thyroid surgery (5/2005); parathyroidectomy; Knee Arthroplasty (Bilateral, 9/2005); Total shoulder arthroplasty (Left, 2009); fracture surgery (Left); Carpal tunnel release; Finger surgery (7/2011); Total shoulder arthroplasty (Left); Inner ear surgery (2011); Cataract removal with implant (Bilateral); Colonoscopy; and Cardiac catheterization (3/22/14). Social History:  reports that she has never smoked. She has never used smokeless tobacco. She reports that she does not drink alcohol or use drugs. Family History: family history includes Arthritis in her mother; Cancer in her brother and son; Diabetes in her father; Heart Attack in her mother; Heart Disease in her brother, brother, father, mother, and sister; Heart Surgery in her brother; Mental Illness in her father. The patients home medications have been reviewed. Allergies: Vicodin [hydrocodone-acetaminophen]; Codeine; and Penicillins    -------------------------------------------------- RESULTS -------------------------------------------------  No results found for this visit on 11/21/18. No orders to display       ------------------------- NURSING NOTES AND VITALS REVIEWED ---------------------------   The nursing notes within the ED encounter and vital signs as below have been reviewed. /74   Pulse 91   Temp 97.3 °F (36.3 °C) (Oral)   Resp 22   Wt 240 lb (108.9 kg)   SpO2 94%   BMI 41.20 kg/m²   Oxygen Saturation Interpretation: Normal      ------------------------------------------ PROGRESS NOTES ------------------------------------------   I have spoken with the patient and discussed todays results, in addition to providing specific details for the plan of care and counseling regarding the diagnosis and prognosis.   Their questions are answered at this time and they are agreeable with the

## 2018-11-30 ENCOUNTER — HOSPITAL ENCOUNTER (OUTPATIENT)
Dept: CARDIOLOGY | Age: 83
Discharge: HOME | End: 2018-11-30
Payer: MEDICARE

## 2018-11-30 DIAGNOSIS — I50.32 CHRONIC DIASTOLIC CONGESTIVE HEART FAILURE (HCC): ICD-10-CM

## 2018-11-30 DIAGNOSIS — R06.09 DOE (DYSPNEA ON EXERTION): ICD-10-CM

## 2018-11-30 DIAGNOSIS — I27.20 PULMONARY HYPERTENSION (HCC): ICD-10-CM

## 2018-11-30 DIAGNOSIS — I38 VHD (VALVULAR HEART DISEASE): ICD-10-CM

## 2018-11-30 DIAGNOSIS — I48.20 CHRONIC ATRIAL FIBRILLATION (HCC): ICD-10-CM

## 2018-11-30 LAB
LV EF: 50 %
LVEF MODALITY: NORMAL

## 2018-11-30 PROCEDURE — 93306 TTE W/DOPPLER COMPLETE: CPT

## 2018-12-11 ENCOUNTER — CARE COORDINATION (OUTPATIENT)
Dept: CARE COORDINATION | Age: 83
End: 2018-12-11

## 2018-12-20 ENCOUNTER — OFFICE VISIT (OUTPATIENT)
Dept: FAMILY MEDICINE CLINIC | Age: 83
End: 2018-12-20
Payer: MEDICARE

## 2018-12-20 ENCOUNTER — CARE COORDINATION (OUTPATIENT)
Dept: CARE COORDINATION | Age: 83
End: 2018-12-20

## 2018-12-20 ENCOUNTER — HOSPITAL ENCOUNTER (OUTPATIENT)
Age: 83
Discharge: HOME | End: 2018-12-22
Payer: MEDICARE

## 2018-12-20 ENCOUNTER — ANTI-COAG VISIT (OUTPATIENT)
Dept: FAMILY MEDICINE CLINIC | Age: 83
End: 2018-12-20

## 2018-12-20 VITALS
WEIGHT: 237 LBS | RESPIRATION RATE: 18 BRPM | TEMPERATURE: 97.5 F | SYSTOLIC BLOOD PRESSURE: 110 MMHG | BODY MASS INDEX: 38.09 KG/M2 | DIASTOLIC BLOOD PRESSURE: 68 MMHG | HEART RATE: 86 BPM | HEIGHT: 66 IN | OXYGEN SATURATION: 95 %

## 2018-12-20 DIAGNOSIS — I42.9 CARDIOMYOPATHY, UNSPECIFIED TYPE (HCC): ICD-10-CM

## 2018-12-20 DIAGNOSIS — I10 ESSENTIAL HYPERTENSION: ICD-10-CM

## 2018-12-20 DIAGNOSIS — E03.9 HYPOTHYROIDISM, UNSPECIFIED TYPE: ICD-10-CM

## 2018-12-20 DIAGNOSIS — I48.20 CHRONIC ATRIAL FIBRILLATION (HCC): Primary | ICD-10-CM

## 2018-12-20 DIAGNOSIS — M15.9 PRIMARY OSTEOARTHRITIS INVOLVING MULTIPLE JOINTS: ICD-10-CM

## 2018-12-20 DIAGNOSIS — I50.32 CHRONIC DIASTOLIC CONGESTIVE HEART FAILURE (HCC): ICD-10-CM

## 2018-12-20 DIAGNOSIS — R42 DIZZINESS: ICD-10-CM

## 2018-12-20 LAB
ALBUMIN SERPL-MCNC: 4.3 G/DL (ref 3.5–5.2)
ALP BLD-CCNC: 79 U/L (ref 35–104)
ALT SERPL-CCNC: 13 U/L (ref 0–32)
ANION GAP SERPL CALCULATED.3IONS-SCNC: 12 MMOL/L (ref 7–16)
AST SERPL-CCNC: 27 U/L (ref 0–31)
BASOPHILS ABSOLUTE: 0.11 E9/L (ref 0–0.2)
BASOPHILS RELATIVE PERCENT: 3 % (ref 0–2)
BILIRUB SERPL-MCNC: 1.1 MG/DL (ref 0–1.2)
BUN BLDV-MCNC: 20 MG/DL (ref 8–23)
CALCIUM SERPL-MCNC: 9.4 MG/DL (ref 8.6–10.2)
CHLORIDE BLD-SCNC: 102 MMOL/L (ref 98–107)
CO2: 28 MMOL/L (ref 22–29)
CREAT SERPL-MCNC: 0.8 MG/DL (ref 0.5–1)
EOSINOPHILS ABSOLUTE: 0.14 E9/L (ref 0.05–0.5)
EOSINOPHILS RELATIVE PERCENT: 3.8 % (ref 0–6)
GFR AFRICAN AMERICAN: >60
GFR NON-AFRICAN AMERICAN: >60 ML/MIN/1.73
GLUCOSE BLD-MCNC: 87 MG/DL (ref 74–99)
HCT VFR BLD CALC: 42 % (ref 34–48)
HEMOGLOBIN: 13.2 G/DL (ref 11.5–15.5)
IMMATURE GRANULOCYTES #: 0 E9/L
IMMATURE GRANULOCYTES %: 0 % (ref 0–5)
INTERNATIONAL NORMALIZATION RATIO, POC: 2.2
LYMPHOCYTES ABSOLUTE: 0.86 E9/L (ref 1.5–4)
LYMPHOCYTES RELATIVE PERCENT: 23.6 % (ref 20–42)
MCH RBC QN AUTO: 29.5 PG (ref 26–35)
MCHC RBC AUTO-ENTMCNC: 31.4 % (ref 32–34.5)
MCV RBC AUTO: 93.8 FL (ref 80–99.9)
MONOCYTES ABSOLUTE: 0.42 E9/L (ref 0.1–0.95)
MONOCYTES RELATIVE PERCENT: 11.5 % (ref 2–12)
NEUTROPHILS ABSOLUTE: 2.12 E9/L (ref 1.8–7.3)
NEUTROPHILS RELATIVE PERCENT: 58.1 % (ref 43–80)
PDW BLD-RTO: 15 FL (ref 11.5–15)
PLATELET # BLD: 206 E9/L (ref 130–450)
PMV BLD AUTO: 10.7 FL (ref 7–12)
POTASSIUM SERPL-SCNC: 4.4 MMOL/L (ref 3.5–5)
PROTHROMBIN TIME, POC: 26.5
RBC # BLD: 4.48 E12/L (ref 3.5–5.5)
SODIUM BLD-SCNC: 142 MMOL/L (ref 132–146)
TOTAL PROTEIN: 6.6 G/DL (ref 6.4–8.3)
WBC # BLD: 3.7 E9/L (ref 4.5–11.5)

## 2018-12-20 PROCEDURE — G8417 CALC BMI ABV UP PARAM F/U: HCPCS | Performed by: FAMILY MEDICINE

## 2018-12-20 PROCEDURE — 99212 OFFICE O/P EST SF 10 MIN: CPT | Performed by: FAMILY MEDICINE

## 2018-12-20 PROCEDURE — 1090F PRES/ABSN URINE INCON ASSESS: CPT | Performed by: FAMILY MEDICINE

## 2018-12-20 PROCEDURE — G8427 DOCREV CUR MEDS BY ELIG CLIN: HCPCS | Performed by: FAMILY MEDICINE

## 2018-12-20 PROCEDURE — 1123F ACP DISCUSS/DSCN MKR DOCD: CPT | Performed by: FAMILY MEDICINE

## 2018-12-20 PROCEDURE — 4040F PNEUMOC VAC/ADMIN/RCVD: CPT | Performed by: FAMILY MEDICINE

## 2018-12-20 PROCEDURE — 85610 PROTHROMBIN TIME: CPT | Performed by: FAMILY MEDICINE

## 2018-12-20 PROCEDURE — 1036F TOBACCO NON-USER: CPT | Performed by: FAMILY MEDICINE

## 2018-12-20 PROCEDURE — 1101F PT FALLS ASSESS-DOCD LE1/YR: CPT | Performed by: FAMILY MEDICINE

## 2018-12-20 PROCEDURE — 85025 COMPLETE CBC W/AUTO DIFF WBC: CPT

## 2018-12-20 PROCEDURE — 36415 COLL VENOUS BLD VENIPUNCTURE: CPT | Performed by: FAMILY MEDICINE

## 2018-12-20 PROCEDURE — G8598 ASA/ANTIPLAT THER USED: HCPCS | Performed by: FAMILY MEDICINE

## 2018-12-20 PROCEDURE — 80053 COMPREHEN METABOLIC PANEL: CPT

## 2018-12-20 PROCEDURE — G8399 PT W/DXA RESULTS DOCUMENT: HCPCS | Performed by: FAMILY MEDICINE

## 2018-12-20 PROCEDURE — 99214 OFFICE O/P EST MOD 30 MIN: CPT | Performed by: FAMILY MEDICINE

## 2018-12-20 PROCEDURE — G8484 FLU IMMUNIZE NO ADMIN: HCPCS | Performed by: FAMILY MEDICINE

## 2018-12-20 RX ORDER — MECLIZINE HYDROCHLORIDE 25 MG/1
25 TABLET ORAL 3 TIMES DAILY PRN
Qty: 20 TABLET | Refills: 2 | Status: SHIPPED | OUTPATIENT
Start: 2018-12-20 | End: 2019-08-20 | Stop reason: SDUPTHER

## 2018-12-20 ASSESSMENT — PATIENT HEALTH QUESTIONNAIRE - PHQ9
SUM OF ALL RESPONSES TO PHQ QUESTIONS 1-9: 0
SUM OF ALL RESPONSES TO PHQ QUESTIONS 1-9: 0

## 2018-12-20 ASSESSMENT — ENCOUNTER SYMPTOMS: DYSPNEA ASSOCIATED WITH: MINIMAL EXERTION

## 2018-12-20 NOTE — CARE COORDINATION
stable  Patient-reported weight (lb):  237  Weight trend:  stable  Salt intake watch compared to last visit:  stable     ,   COPD Assessment    Does the patient understand envrionmental exposure?:  Yes  Is the patient able to verbalize Rescue vs. Long Acting medications?:  Yes  Does the patient have a nebulizer?:  Yes  Does the patient use a space with inhaled medications?:  No            Symptoms:      Symptom course:  stable  Breathlessness:  minimal exertion  Increase use of rapid acting/rescue inhaled medications?:  No  Change in chronic cough?:  No/At Baseline  Change in sputum?:  No/At Baseline  Self Monitoring - SaO2:  No      and   General Assessment    Do you have any symptoms that are causing concern?:  No

## 2019-01-02 ENCOUNTER — APPOINTMENT (OUTPATIENT)
Dept: GENERAL RADIOLOGY | Age: 84
DRG: 308 | End: 2019-01-02
Payer: MEDICARE

## 2019-01-02 ENCOUNTER — HOSPITAL ENCOUNTER (INPATIENT)
Age: 84
LOS: 5 days | Discharge: HOME | DRG: 308 | End: 2019-01-07
Attending: EMERGENCY MEDICINE | Admitting: HOSPITALIST
Payer: MEDICARE

## 2019-01-02 DIAGNOSIS — I48.91 ATRIAL FIBRILLATION WITH RVR (HCC): Primary | ICD-10-CM

## 2019-01-02 DIAGNOSIS — I50.9 CONGESTIVE HEART FAILURE, UNSPECIFIED HF CHRONICITY, UNSPECIFIED HEART FAILURE TYPE (HCC): ICD-10-CM

## 2019-01-02 DIAGNOSIS — J44.1 COPD EXACERBATION (HCC): ICD-10-CM

## 2019-01-02 LAB
ANION GAP SERPL CALCULATED.3IONS-SCNC: 12 MMOL/L (ref 7–16)
APTT: 33.6 SEC (ref 24.5–35.1)
BASOPHILS ABSOLUTE: 0.06 E9/L (ref 0–0.2)
BASOPHILS RELATIVE PERCENT: 0.9 % (ref 0–2)
BILIRUBIN URINE: NEGATIVE
BLOOD, URINE: NEGATIVE
BUN BLDV-MCNC: 11 MG/DL (ref 8–23)
CALCIUM SERPL-MCNC: 9.2 MG/DL (ref 8.6–10.2)
CHLORIDE BLD-SCNC: 100 MMOL/L (ref 98–107)
CLARITY: CLEAR
CO2: 30 MMOL/L (ref 22–29)
COLOR: NORMAL
CREAT SERPL-MCNC: 0.7 MG/DL (ref 0.5–1)
EKG ATRIAL RATE: 174 BPM
EKG Q-T INTERVAL: 358 MS
EKG QRS DURATION: 92 MS
EKG QTC CALCULATION (BAZETT): 514 MS
EKG R AXIS: 8 DEGREES
EKG T AXIS: -161 DEGREES
EKG VENTRICULAR RATE: 124 BPM
EOSINOPHILS ABSOLUTE: 0.18 E9/L (ref 0.05–0.5)
EOSINOPHILS RELATIVE PERCENT: 2.6 % (ref 0–6)
FILM ARRAY ADENOVIRUS: ABNORMAL
FILM ARRAY BORDETELLA PERTUSSIS: ABNORMAL
FILM ARRAY CHLAMYDOPHILIA PNEUMONIAE: ABNORMAL
FILM ARRAY CORONAVIRUS 229E: ABNORMAL
FILM ARRAY CORONAVIRUS HKU1: ABNORMAL
FILM ARRAY CORONAVIRUS OC43: ABNORMAL
FILM ARRAY INFLUENZA A VIRUS 09H1: ABNORMAL
FILM ARRAY INFLUENZA A VIRUS H1: ABNORMAL
FILM ARRAY INFLUENZA A VIRUS H3: ABNORMAL
FILM ARRAY INFLUENZA A VIRUS: ABNORMAL
FILM ARRAY INFLUENZA B: ABNORMAL
FILM ARRAY METAPNEUMOVIRUS: ABNORMAL
FILM ARRAY MYCOPLASMA PNEUMONIAE: ABNORMAL
FILM ARRAY PARAINFLUENZA VIRUS 1: ABNORMAL
FILM ARRAY PARAINFLUENZA VIRUS 2: ABNORMAL
FILM ARRAY PARAINFLUENZA VIRUS 3: ABNORMAL
FILM ARRAY PARAINFLUENZA VIRUS 4: ABNORMAL
FILM ARRAY RESPIRATORY SYNCITIAL VIRUS: ABNORMAL
FILM ARRAY RHINOVIRUS/ENTEROVIRUS: ABNORMAL
GFR AFRICAN AMERICAN: >60
GFR NON-AFRICAN AMERICAN: >60 ML/MIN/1.73
GLUCOSE BLD-MCNC: 109 MG/DL (ref 74–99)
GLUCOSE URINE: NEGATIVE MG/DL
HCT VFR BLD CALC: 39.8 % (ref 34–48)
HEMOGLOBIN: 12.8 G/DL (ref 11.5–15.5)
IMMATURE GRANULOCYTES #: 0.03 E9/L
IMMATURE GRANULOCYTES %: 0.4 % (ref 0–5)
INFLUENZA A BY PCR: NOT DETECTED
INFLUENZA B BY PCR: NOT DETECTED
INR BLD: 2.2
KETONES, URINE: NEGATIVE MG/DL
LACTIC ACID: 1 MMOL/L (ref 0.5–2.2)
LEUKOCYTE ESTERASE, URINE: NEGATIVE
LYMPHOCYTES ABSOLUTE: 1.08 E9/L (ref 1.5–4)
LYMPHOCYTES RELATIVE PERCENT: 15.4 % (ref 20–42)
MCH RBC QN AUTO: 29.4 PG (ref 26–35)
MCHC RBC AUTO-ENTMCNC: 32.2 % (ref 32–34.5)
MCV RBC AUTO: 91.3 FL (ref 80–99.9)
MONOCYTES ABSOLUTE: 0.8 E9/L (ref 0.1–0.95)
MONOCYTES RELATIVE PERCENT: 11.4 % (ref 2–12)
NEUTROPHILS ABSOLUTE: 4.88 E9/L (ref 1.8–7.3)
NEUTROPHILS RELATIVE PERCENT: 69.3 % (ref 43–80)
NITRITE, URINE: NEGATIVE
ORGANISM: ABNORMAL
PDW BLD-RTO: 14.1 FL (ref 11.5–15)
PH UA: 5 (ref 5–9)
PLATELET # BLD: 231 E9/L (ref 130–450)
PMV BLD AUTO: 10 FL (ref 7–12)
POTASSIUM SERPL-SCNC: 3.5 MMOL/L (ref 3.5–5)
PRO-BNP: 4994 PG/ML (ref 0–450)
PROTEIN UA: NEGATIVE MG/DL
PROTHROMBIN TIME: 25 SEC (ref 9.3–12.4)
RBC # BLD: 4.36 E12/L (ref 3.5–5.5)
SODIUM BLD-SCNC: 142 MMOL/L (ref 132–146)
SPECIFIC GRAVITY UA: <=1.005 (ref 1–1.03)
T4 FREE: 1.2 NG/DL (ref 0.93–1.7)
TROPONIN: <0.01 NG/ML (ref 0–0.03)
TSH SERPL DL<=0.05 MIU/L-ACNC: 2.74 UIU/ML (ref 0.27–4.2)
UROBILINOGEN, URINE: 0.2 E.U./DL
WBC # BLD: 7 E9/L (ref 4.5–11.5)

## 2019-01-02 PROCEDURE — 6370000000 HC RX 637 (ALT 250 FOR IP): Performed by: HOSPITALIST

## 2019-01-02 PROCEDURE — 6370000000 HC RX 637 (ALT 250 FOR IP): Performed by: INTERNAL MEDICINE

## 2019-01-02 PROCEDURE — 6360000002 HC RX W HCPCS: Performed by: HOSPITALIST

## 2019-01-02 PROCEDURE — 87581 M.PNEUMON DNA AMP PROBE: CPT

## 2019-01-02 PROCEDURE — 2500000003 HC RX 250 WO HCPCS: Performed by: EMERGENCY MEDICINE

## 2019-01-02 PROCEDURE — 83880 ASSAY OF NATRIURETIC PEPTIDE: CPT

## 2019-01-02 PROCEDURE — 84443 ASSAY THYROID STIM HORMONE: CPT

## 2019-01-02 PROCEDURE — 87040 BLOOD CULTURE FOR BACTERIA: CPT

## 2019-01-02 PROCEDURE — 87633 RESP VIRUS 12-25 TARGETS: CPT

## 2019-01-02 PROCEDURE — 99222 1ST HOSP IP/OBS MODERATE 55: CPT | Performed by: INTERNAL MEDICINE

## 2019-01-02 PROCEDURE — 84439 ASSAY OF FREE THYROXINE: CPT

## 2019-01-02 PROCEDURE — 85610 PROTHROMBIN TIME: CPT

## 2019-01-02 PROCEDURE — 96375 TX/PRO/DX INJ NEW DRUG ADDON: CPT

## 2019-01-02 PROCEDURE — 87502 INFLUENZA DNA AMP PROBE: CPT

## 2019-01-02 PROCEDURE — 96374 THER/PROPH/DIAG INJ IV PUSH: CPT

## 2019-01-02 PROCEDURE — 85025 COMPLETE CBC W/AUTO DIFF WBC: CPT

## 2019-01-02 PROCEDURE — 83605 ASSAY OF LACTIC ACID: CPT

## 2019-01-02 PROCEDURE — 81003 URINALYSIS AUTO W/O SCOPE: CPT

## 2019-01-02 PROCEDURE — 6360000002 HC RX W HCPCS: Performed by: EMERGENCY MEDICINE

## 2019-01-02 PROCEDURE — 99285 EMERGENCY DEPT VISIT HI MDM: CPT

## 2019-01-02 PROCEDURE — 84484 ASSAY OF TROPONIN QUANT: CPT

## 2019-01-02 PROCEDURE — 71046 X-RAY EXAM CHEST 2 VIEWS: CPT

## 2019-01-02 PROCEDURE — 87798 DETECT AGENT NOS DNA AMP: CPT

## 2019-01-02 PROCEDURE — 85730 THROMBOPLASTIN TIME PARTIAL: CPT

## 2019-01-02 PROCEDURE — 2060000000 HC ICU INTERMEDIATE R&B

## 2019-01-02 PROCEDURE — 2580000003 HC RX 258: Performed by: EMERGENCY MEDICINE

## 2019-01-02 PROCEDURE — 36415 COLL VENOUS BLD VENIPUNCTURE: CPT

## 2019-01-02 PROCEDURE — 94640 AIRWAY INHALATION TREATMENT: CPT

## 2019-01-02 PROCEDURE — 2580000003 HC RX 258: Performed by: HOSPITALIST

## 2019-01-02 PROCEDURE — 80048 BASIC METABOLIC PNL TOTAL CA: CPT

## 2019-01-02 PROCEDURE — 2700000000 HC OXYGEN THERAPY PER DAY

## 2019-01-02 PROCEDURE — 99223 1ST HOSP IP/OBS HIGH 75: CPT | Performed by: HOSPITALIST

## 2019-01-02 PROCEDURE — 87486 CHLMYD PNEUM DNA AMP PROBE: CPT

## 2019-01-02 PROCEDURE — 87088 URINE BACTERIA CULTURE: CPT

## 2019-01-02 PROCEDURE — 93005 ELECTROCARDIOGRAM TRACING: CPT | Performed by: EMERGENCY MEDICINE

## 2019-01-02 RX ORDER — POTASSIUM CHLORIDE 1.5 G/1.77G
40 POWDER, FOR SOLUTION ORAL PRN
Status: DISCONTINUED | OUTPATIENT
Start: 2019-01-02 | End: 2019-01-07 | Stop reason: HOSPADM

## 2019-01-02 RX ORDER — SODIUM CHLORIDE 0.9 % (FLUSH) 0.9 %
10 SYRINGE (ML) INJECTION PRN
Status: DISCONTINUED | OUTPATIENT
Start: 2019-01-02 | End: 2019-01-07 | Stop reason: HOSPADM

## 2019-01-02 RX ORDER — LISINOPRIL 20 MG/1
20 TABLET ORAL DAILY
Status: DISCONTINUED | OUTPATIENT
Start: 2019-01-02 | End: 2019-01-02

## 2019-01-02 RX ORDER — ALBUTEROL SULFATE 2.5 MG/3ML
2.5 SOLUTION RESPIRATORY (INHALATION) ONCE
Status: COMPLETED | OUTPATIENT
Start: 2019-01-02 | End: 2019-01-02

## 2019-01-02 RX ORDER — METOPROLOL TARTRATE 5 MG/5ML
5 INJECTION INTRAVENOUS ONCE
Status: COMPLETED | OUTPATIENT
Start: 2019-01-02 | End: 2019-01-02

## 2019-01-02 RX ORDER — LIDOCAINE HYDROCHLORIDE 10 MG/ML
5 INJECTION, SOLUTION EPIDURAL; INFILTRATION; INTRACAUDAL; PERINEURAL ONCE
Status: COMPLETED | OUTPATIENT
Start: 2019-01-02 | End: 2019-01-02

## 2019-01-02 RX ORDER — IPRATROPIUM BROMIDE AND ALBUTEROL SULFATE 2.5; .5 MG/3ML; MG/3ML
1 SOLUTION RESPIRATORY (INHALATION)
Status: DISCONTINUED | OUTPATIENT
Start: 2019-01-02 | End: 2019-01-07 | Stop reason: HOSPADM

## 2019-01-02 RX ORDER — POTASSIUM CHLORIDE 7.45 MG/ML
10 INJECTION INTRAVENOUS PRN
Status: DISCONTINUED | OUTPATIENT
Start: 2019-01-02 | End: 2019-01-07 | Stop reason: HOSPADM

## 2019-01-02 RX ORDER — SODIUM CHLORIDE 0.9 % (FLUSH) 0.9 %
10 SYRINGE (ML) INJECTION EVERY 12 HOURS SCHEDULED
Status: DISCONTINUED | OUTPATIENT
Start: 2019-01-02 | End: 2019-01-07 | Stop reason: HOSPADM

## 2019-01-02 RX ORDER — LISINOPRIL 10 MG/1
10 TABLET ORAL DAILY
Status: DISCONTINUED | OUTPATIENT
Start: 2019-01-03 | End: 2019-01-07 | Stop reason: HOSPADM

## 2019-01-02 RX ORDER — WARFARIN SODIUM 7.5 MG/1
7.5 TABLET ORAL DAILY
Status: DISCONTINUED | OUTPATIENT
Start: 2019-01-02 | End: 2019-01-06

## 2019-01-02 RX ORDER — POTASSIUM CHLORIDE 20 MEQ/1
40 TABLET, EXTENDED RELEASE ORAL PRN
Status: DISCONTINUED | OUTPATIENT
Start: 2019-01-02 | End: 2019-01-07 | Stop reason: HOSPADM

## 2019-01-02 RX ORDER — DILTIAZEM HYDROCHLORIDE 180 MG/1
180 CAPSULE, COATED, EXTENDED RELEASE ORAL DAILY
Status: DISCONTINUED | OUTPATIENT
Start: 2019-01-02 | End: 2019-01-07 | Stop reason: HOSPADM

## 2019-01-02 RX ORDER — ALBUTEROL SULFATE 90 UG/1
2 AEROSOL, METERED RESPIRATORY (INHALATION) EVERY 6 HOURS PRN
Status: DISCONTINUED | OUTPATIENT
Start: 2019-01-02 | End: 2019-01-07 | Stop reason: HOSPADM

## 2019-01-02 RX ORDER — FUROSEMIDE 10 MG/ML
40 INJECTION INTRAMUSCULAR; INTRAVENOUS 2 TIMES DAILY
Status: DISCONTINUED | OUTPATIENT
Start: 2019-01-02 | End: 2019-01-04

## 2019-01-02 RX ORDER — FAMOTIDINE 20 MG/1
20 TABLET, FILM COATED ORAL DAILY
Status: DISCONTINUED | OUTPATIENT
Start: 2019-01-02 | End: 2019-01-07 | Stop reason: HOSPADM

## 2019-01-02 RX ORDER — METOPROLOL TARTRATE 50 MG/1
100 TABLET, FILM COATED ORAL 2 TIMES DAILY
Status: DISCONTINUED | OUTPATIENT
Start: 2019-01-02 | End: 2019-01-07 | Stop reason: HOSPADM

## 2019-01-02 RX ORDER — CHLORAL HYDRATE 500 MG
1000 CAPSULE ORAL DAILY
Status: DISCONTINUED | OUTPATIENT
Start: 2019-01-02 | End: 2019-01-02 | Stop reason: RX

## 2019-01-02 RX ORDER — METHYLPREDNISOLONE SODIUM SUCCINATE 125 MG/2ML
125 INJECTION, POWDER, LYOPHILIZED, FOR SOLUTION INTRAMUSCULAR; INTRAVENOUS ONCE
Status: COMPLETED | OUTPATIENT
Start: 2019-01-02 | End: 2019-01-02

## 2019-01-02 RX ORDER — FUROSEMIDE 10 MG/ML
40 INJECTION INTRAMUSCULAR; INTRAVENOUS ONCE
Status: COMPLETED | OUTPATIENT
Start: 2019-01-02 | End: 2019-01-02

## 2019-01-02 RX ORDER — MAGNESIUM SULFATE 1 G/100ML
1 INJECTION INTRAVENOUS PRN
Status: DISCONTINUED | OUTPATIENT
Start: 2019-01-02 | End: 2019-01-07 | Stop reason: HOSPADM

## 2019-01-02 RX ORDER — LISINOPRIL 40 MG/1
20 TABLET ORAL DAILY
Status: ON HOLD | COMMUNITY
End: 2019-01-07 | Stop reason: HOSPADM

## 2019-01-02 RX ORDER — METHYLPREDNISOLONE SODIUM SUCCINATE 40 MG/ML
40 INJECTION, POWDER, LYOPHILIZED, FOR SOLUTION INTRAMUSCULAR; INTRAVENOUS EVERY 8 HOURS
Status: COMPLETED | OUTPATIENT
Start: 2019-01-02 | End: 2019-01-03

## 2019-01-02 RX ORDER — ONDANSETRON 2 MG/ML
4 INJECTION INTRAMUSCULAR; INTRAVENOUS EVERY 6 HOURS PRN
Status: DISCONTINUED | OUTPATIENT
Start: 2019-01-02 | End: 2019-01-07 | Stop reason: HOSPADM

## 2019-01-02 RX ORDER — LEVOTHYROXINE SODIUM 0.12 MG/1
125 TABLET ORAL DAILY
Status: DISCONTINUED | OUTPATIENT
Start: 2019-01-02 | End: 2019-01-07 | Stop reason: HOSPADM

## 2019-01-02 RX ORDER — FOLIC ACID/VIT B COMPLEX AND C 400 MCG
1 TABLET ORAL DAILY
Status: DISCONTINUED | OUTPATIENT
Start: 2019-01-02 | End: 2019-01-03 | Stop reason: SDUPTHER

## 2019-01-02 RX ADMIN — ALBUTEROL SULFATE 2.5 MG: 2.5 SOLUTION RESPIRATORY (INHALATION) at 06:32

## 2019-01-02 RX ADMIN — LIDOCAINE HYDROCHLORIDE 5 ML: 10 INJECTION, SOLUTION EPIDURAL; INFILTRATION; INTRACAUDAL; PERINEURAL at 10:39

## 2019-01-02 RX ADMIN — METOPROLOL TARTRATE 100 MG: 50 TABLET ORAL at 20:32

## 2019-01-02 RX ADMIN — LEVOTHYROXINE SODIUM 125 MCG: 0.12 TABLET ORAL at 14:24

## 2019-01-02 RX ADMIN — Medication 10 ML: at 17:59

## 2019-01-02 RX ADMIN — WARFARIN SODIUM 7.5 MG: 7.5 TABLET ORAL at 17:24

## 2019-01-02 RX ADMIN — IPRATROPIUM BROMIDE AND ALBUTEROL SULFATE 1 AMPULE: .5; 3 SOLUTION RESPIRATORY (INHALATION) at 17:55

## 2019-01-02 RX ADMIN — FUROSEMIDE 40 MG: 10 INJECTION, SOLUTION INTRAMUSCULAR; INTRAVENOUS at 18:05

## 2019-01-02 RX ADMIN — Medication 10 ML: at 20:33

## 2019-01-02 RX ADMIN — POTASSIUM CHLORIDE 40 MEQ: 20 TABLET, EXTENDED RELEASE ORAL at 17:24

## 2019-01-02 RX ADMIN — Medication 1 TABLET: at 14:28

## 2019-01-02 RX ADMIN — METOPROLOL TARTRATE 5 MG: 1 INJECTION, SOLUTION INTRAVENOUS at 08:20

## 2019-01-02 RX ADMIN — VITAMIN D, TAB 1000IU (100/BT) 2000 UNITS: 25 TAB at 14:24

## 2019-01-02 RX ADMIN — METHYLPREDNISOLONE SODIUM SUCCINATE 40 MG: 40 INJECTION, POWDER, FOR SOLUTION INTRAMUSCULAR; INTRAVENOUS at 17:59

## 2019-01-02 RX ADMIN — METOPROLOL TARTRATE 100 MG: 25 TABLET, FILM COATED ORAL at 12:44

## 2019-01-02 RX ADMIN — FAMOTIDINE 20 MG: 20 TABLET ORAL at 14:24

## 2019-01-02 RX ADMIN — DILTIAZEM HYDROCHLORIDE 10 MG/HR: 5 INJECTION INTRAVENOUS at 10:55

## 2019-01-02 RX ADMIN — LISINOPRIL 20 MG: 20 TABLET ORAL at 14:24

## 2019-01-02 RX ADMIN — DILTIAZEM HYDROCHLORIDE 180 MG: 180 CAPSULE, COATED, EXTENDED RELEASE ORAL at 16:36

## 2019-01-02 RX ADMIN — DILTIAZEM HYDROCHLORIDE 5 MG/HR: 5 INJECTION INTRAVENOUS at 10:17

## 2019-01-02 RX ADMIN — METHYLPREDNISOLONE SODIUM SUCCINATE 125 MG: 125 INJECTION, POWDER, FOR SOLUTION INTRAMUSCULAR; INTRAVENOUS at 07:10

## 2019-01-02 RX ADMIN — FUROSEMIDE 40 MG: 10 INJECTION, SOLUTION INTRAMUSCULAR; INTRAVENOUS at 08:20

## 2019-01-02 RX ADMIN — LIDOCAINE HYDROCHLORIDE 5 ML: 10 INJECTION, SOLUTION EPIDURAL; INFILTRATION; INTRACAUDAL; PERINEURAL at 07:20

## 2019-01-02 ASSESSMENT — ENCOUNTER SYMPTOMS
TROUBLE SWALLOWING: 0
DIARRHEA: 0
COUGH: 1
SINUS PRESSURE: 0
ABDOMINAL PAIN: 0
CHEST TIGHTNESS: 0
VOMITING: 0
SORE THROAT: 1
SINUS PAIN: 0
SHORTNESS OF BREATH: 1
NAUSEA: 0
RHINORRHEA: 1

## 2019-01-02 ASSESSMENT — PAIN SCALES - GENERAL
PAINLEVEL_OUTOF10: 0
PAINLEVEL_OUTOF10: 8
PAINLEVEL_OUTOF10: 0
PAINLEVEL_OUTOF10: 0

## 2019-01-02 ASSESSMENT — PAIN DESCRIPTION - PAIN TYPE: TYPE: ACUTE PAIN

## 2019-01-02 ASSESSMENT — PAIN DESCRIPTION - ONSET: ONSET: ON-GOING

## 2019-01-02 ASSESSMENT — PAIN DESCRIPTION - LOCATION: LOCATION: BACK;RIB CAGE

## 2019-01-02 ASSESSMENT — PAIN DESCRIPTION - FREQUENCY: FREQUENCY: CONTINUOUS

## 2019-01-02 ASSESSMENT — PAIN DESCRIPTION - DESCRIPTORS: DESCRIPTORS: ACHING

## 2019-01-03 ENCOUNTER — APPOINTMENT (OUTPATIENT)
Dept: CT IMAGING | Age: 84
DRG: 308 | End: 2019-01-03
Payer: MEDICARE

## 2019-01-03 LAB
ALBUMIN SERPL-MCNC: 3.7 G/DL (ref 3.5–5.2)
ALP BLD-CCNC: 89 U/L (ref 35–104)
ALT SERPL-CCNC: 19 U/L (ref 0–32)
ANION GAP SERPL CALCULATED.3IONS-SCNC: 10 MMOL/L (ref 7–16)
ANION GAP SERPL CALCULATED.3IONS-SCNC: 10 MMOL/L (ref 7–16)
AST SERPL-CCNC: 23 U/L (ref 0–31)
BASOPHILS ABSOLUTE: 0.01 E9/L (ref 0–0.2)
BASOPHILS RELATIVE PERCENT: 0.1 % (ref 0–2)
BILIRUB SERPL-MCNC: 0.8 MG/DL (ref 0–1.2)
BUN BLDV-MCNC: 16 MG/DL (ref 8–23)
BUN BLDV-MCNC: 17 MG/DL (ref 8–23)
CALCIUM SERPL-MCNC: 9 MG/DL (ref 8.6–10.2)
CALCIUM SERPL-MCNC: 9 MG/DL (ref 8.6–10.2)
CHLORIDE BLD-SCNC: 98 MMOL/L (ref 98–107)
CHLORIDE BLD-SCNC: 99 MMOL/L (ref 98–107)
CHOLESTEROL, TOTAL: 137 MG/DL (ref 0–199)
CO2: 31 MMOL/L (ref 22–29)
CO2: 32 MMOL/L (ref 22–29)
CREAT SERPL-MCNC: 0.7 MG/DL (ref 0.5–1)
CREAT SERPL-MCNC: 0.7 MG/DL (ref 0.5–1)
EOSINOPHILS ABSOLUTE: 0 E9/L (ref 0.05–0.5)
EOSINOPHILS RELATIVE PERCENT: 0 % (ref 0–6)
GFR AFRICAN AMERICAN: >60
GFR AFRICAN AMERICAN: >60
GFR NON-AFRICAN AMERICAN: >60 ML/MIN/1.73
GFR NON-AFRICAN AMERICAN: >60 ML/MIN/1.73
GLUCOSE BLD-MCNC: 167 MG/DL (ref 74–99)
GLUCOSE BLD-MCNC: 174 MG/DL (ref 74–99)
HCT VFR BLD CALC: 37.7 % (ref 34–48)
HCT VFR BLD CALC: 38.2 % (ref 34–48)
HDLC SERPL-MCNC: 51 MG/DL
HEMOGLOBIN: 12.4 G/DL (ref 11.5–15.5)
HEMOGLOBIN: 12.4 G/DL (ref 11.5–15.5)
IMMATURE GRANULOCYTES #: 0.04 E9/L
IMMATURE GRANULOCYTES %: 0.6 % (ref 0–5)
INR BLD: 2.2
LACTIC ACID: 0.9 MMOL/L (ref 0.5–2.2)
LDL CHOLESTEROL CALCULATED: 76 MG/DL (ref 0–99)
LYMPHOCYTES ABSOLUTE: 0.68 E9/L (ref 1.5–4)
LYMPHOCYTES RELATIVE PERCENT: 9.7 % (ref 20–42)
MAGNESIUM: 2 MG/DL (ref 1.6–2.6)
MCH RBC QN AUTO: 29 PG (ref 26–35)
MCH RBC QN AUTO: 29.5 PG (ref 26–35)
MCHC RBC AUTO-ENTMCNC: 32.5 % (ref 32–34.5)
MCHC RBC AUTO-ENTMCNC: 32.9 % (ref 32–34.5)
MCV RBC AUTO: 89.5 FL (ref 80–99.9)
MCV RBC AUTO: 89.5 FL (ref 80–99.9)
MONOCYTES ABSOLUTE: 0.31 E9/L (ref 0.1–0.95)
MONOCYTES RELATIVE PERCENT: 4.4 % (ref 2–12)
NEUTROPHILS ABSOLUTE: 6 E9/L (ref 1.8–7.3)
NEUTROPHILS RELATIVE PERCENT: 85.2 % (ref 43–80)
PDW BLD-RTO: 14 FL (ref 11.5–15)
PDW BLD-RTO: 14.1 FL (ref 11.5–15)
PLATELET # BLD: 212 E9/L (ref 130–450)
PLATELET # BLD: 231 E9/L (ref 130–450)
PMV BLD AUTO: 9.5 FL (ref 7–12)
PMV BLD AUTO: 9.7 FL (ref 7–12)
POTASSIUM SERPL-SCNC: 3.3 MMOL/L (ref 3.5–5)
POTASSIUM SERPL-SCNC: 3.6 MMOL/L (ref 3.5–5)
PROTHROMBIN TIME: 24.6 SEC (ref 9.3–12.4)
RBC # BLD: 4.21 E12/L (ref 3.5–5.5)
RBC # BLD: 4.27 E12/L (ref 3.5–5.5)
SODIUM BLD-SCNC: 139 MMOL/L (ref 132–146)
SODIUM BLD-SCNC: 141 MMOL/L (ref 132–146)
TOTAL PROTEIN: 6.7 G/DL (ref 6.4–8.3)
TRIGL SERPL-MCNC: 49 MG/DL (ref 0–149)
VLDLC SERPL CALC-MCNC: 10 MG/DL
WBC # BLD: 7 E9/L (ref 4.5–11.5)
WBC # BLD: 7 E9/L (ref 4.5–11.5)

## 2019-01-03 PROCEDURE — 85025 COMPLETE CBC W/AUTO DIFF WBC: CPT

## 2019-01-03 PROCEDURE — G8988 SELF CARE GOAL STATUS: HCPCS

## 2019-01-03 PROCEDURE — 85027 COMPLETE CBC AUTOMATED: CPT

## 2019-01-03 PROCEDURE — 83605 ASSAY OF LACTIC ACID: CPT

## 2019-01-03 PROCEDURE — 70450 CT HEAD/BRAIN W/O DYE: CPT

## 2019-01-03 PROCEDURE — 92610 EVALUATE SWALLOWING FUNCTION: CPT | Performed by: SPEECH-LANGUAGE PATHOLOGIST

## 2019-01-03 PROCEDURE — G8987 SELF CARE CURRENT STATUS: HCPCS

## 2019-01-03 PROCEDURE — 2700000000 HC OXYGEN THERAPY PER DAY

## 2019-01-03 PROCEDURE — 83735 ASSAY OF MAGNESIUM: CPT

## 2019-01-03 PROCEDURE — 36415 COLL VENOUS BLD VENIPUNCTURE: CPT

## 2019-01-03 PROCEDURE — 6370000000 HC RX 637 (ALT 250 FOR IP): Performed by: HOSPITALIST

## 2019-01-03 PROCEDURE — 80048 BASIC METABOLIC PNL TOTAL CA: CPT

## 2019-01-03 PROCEDURE — 80061 LIPID PANEL: CPT

## 2019-01-03 PROCEDURE — 97535 SELF CARE MNGMENT TRAINING: CPT

## 2019-01-03 PROCEDURE — 2060000000 HC ICU INTERMEDIATE R&B

## 2019-01-03 PROCEDURE — 99233 SBSQ HOSP IP/OBS HIGH 50: CPT | Performed by: HOSPITALIST

## 2019-01-03 PROCEDURE — 99233 SBSQ HOSP IP/OBS HIGH 50: CPT | Performed by: INTERNAL MEDICINE

## 2019-01-03 PROCEDURE — 6360000002 HC RX W HCPCS: Performed by: HOSPITALIST

## 2019-01-03 PROCEDURE — 92526 ORAL FUNCTION THERAPY: CPT | Performed by: SPEECH-LANGUAGE PATHOLOGIST

## 2019-01-03 PROCEDURE — 6370000000 HC RX 637 (ALT 250 FOR IP): Performed by: INTERNAL MEDICINE

## 2019-01-03 PROCEDURE — 80053 COMPREHEN METABOLIC PANEL: CPT

## 2019-01-03 PROCEDURE — 85610 PROTHROMBIN TIME: CPT

## 2019-01-03 PROCEDURE — 2580000003 HC RX 258: Performed by: HOSPITALIST

## 2019-01-03 PROCEDURE — 2580000003 HC RX 258: Performed by: NURSE PRACTITIONER

## 2019-01-03 PROCEDURE — 94640 AIRWAY INHALATION TREATMENT: CPT

## 2019-01-03 PROCEDURE — 97165 OT EVAL LOW COMPLEX 30 MIN: CPT

## 2019-01-03 RX ORDER — ERYTHROMYCIN 5 MG/G
OINTMENT OPHTHALMIC NIGHTLY
Status: DISCONTINUED | OUTPATIENT
Start: 2019-01-03 | End: 2019-01-07 | Stop reason: HOSPADM

## 2019-01-03 RX ORDER — POTASSIUM CHLORIDE 1.5 G/1.77G
40 POWDER, FOR SOLUTION ORAL ONCE
Status: COMPLETED | OUTPATIENT
Start: 2019-01-03 | End: 2019-01-03

## 2019-01-03 RX ORDER — ERYTHROMYCIN 5 MG/G
OINTMENT OPHTHALMIC NIGHTLY
Status: DISCONTINUED | OUTPATIENT
Start: 2019-01-03 | End: 2019-01-03 | Stop reason: CLARIF

## 2019-01-03 RX ORDER — VITAMIN C
1 TAB ORAL DAILY
Status: DISCONTINUED | OUTPATIENT
Start: 2019-01-03 | End: 2019-01-07 | Stop reason: HOSPADM

## 2019-01-03 RX ORDER — 0.9 % SODIUM CHLORIDE 0.9 %
500 INTRAVENOUS SOLUTION INTRAVENOUS ONCE
Status: COMPLETED | OUTPATIENT
Start: 2019-01-03 | End: 2019-01-03

## 2019-01-03 RX ADMIN — ERYTHROMYCIN: 5 OINTMENT OPHTHALMIC at 22:16

## 2019-01-03 RX ADMIN — WARFARIN SODIUM 7.5 MG: 7.5 TABLET ORAL at 18:08

## 2019-01-03 RX ADMIN — FUROSEMIDE 40 MG: 10 INJECTION, SOLUTION INTRAMUSCULAR; INTRAVENOUS at 09:41

## 2019-01-03 RX ADMIN — Medication 10 ML: at 22:17

## 2019-01-03 RX ADMIN — FUROSEMIDE 40 MG: 10 INJECTION, SOLUTION INTRAMUSCULAR; INTRAVENOUS at 22:16

## 2019-01-03 RX ADMIN — VITAMIN D, TAB 1000IU (100/BT) 2000 UNITS: 25 TAB at 09:41

## 2019-01-03 RX ADMIN — METOPROLOL TARTRATE 100 MG: 50 TABLET ORAL at 09:41

## 2019-01-03 RX ADMIN — POTASSIUM CHLORIDE 40 MEQ: 1.5 POWDER, FOR SOLUTION ORAL at 18:08

## 2019-01-03 RX ADMIN — FAMOTIDINE 20 MG: 20 TABLET ORAL at 09:41

## 2019-01-03 RX ADMIN — METOPROLOL TARTRATE 100 MG: 50 TABLET ORAL at 22:16

## 2019-01-03 RX ADMIN — SODIUM CHLORIDE 500 ML: 9 INJECTION, SOLUTION INTRAVENOUS at 05:35

## 2019-01-03 RX ADMIN — DILTIAZEM HYDROCHLORIDE 180 MG: 180 CAPSULE, COATED, EXTENDED RELEASE ORAL at 09:41

## 2019-01-03 RX ADMIN — IPRATROPIUM BROMIDE AND ALBUTEROL SULFATE 1 AMPULE: .5; 3 SOLUTION RESPIRATORY (INHALATION) at 05:52

## 2019-01-03 RX ADMIN — IPRATROPIUM BROMIDE AND ALBUTEROL SULFATE 1 AMPULE: .5; 3 SOLUTION RESPIRATORY (INHALATION) at 14:32

## 2019-01-03 RX ADMIN — LISINOPRIL 10 MG: 10 TABLET ORAL at 09:41

## 2019-01-03 RX ADMIN — METHYLPREDNISOLONE SODIUM SUCCINATE 40 MG: 40 INJECTION, POWDER, FOR SOLUTION INTRAMUSCULAR; INTRAVENOUS at 02:38

## 2019-01-03 RX ADMIN — METHYLPREDNISOLONE SODIUM SUCCINATE 40 MG: 40 INJECTION, POWDER, FOR SOLUTION INTRAMUSCULAR; INTRAVENOUS at 09:41

## 2019-01-03 RX ADMIN — IPRATROPIUM BROMIDE AND ALBUTEROL SULFATE 1 AMPULE: .5; 3 SOLUTION RESPIRATORY (INHALATION) at 18:13

## 2019-01-03 RX ADMIN — VITAMIN C 1 TABLET: TAB at 09:41

## 2019-01-03 RX ADMIN — LEVOTHYROXINE SODIUM 125 MCG: 0.12 TABLET ORAL at 09:40

## 2019-01-03 ASSESSMENT — PAIN SCALES - GENERAL
PAINLEVEL_OUTOF10: 0

## 2019-01-04 LAB
ANION GAP SERPL CALCULATED.3IONS-SCNC: 11 MMOL/L (ref 7–16)
BUN BLDV-MCNC: 24 MG/DL (ref 8–23)
CALCIUM SERPL-MCNC: 9.2 MG/DL (ref 8.6–10.2)
CHLORIDE BLD-SCNC: 97 MMOL/L (ref 98–107)
CO2: 31 MMOL/L (ref 22–29)
CREAT SERPL-MCNC: 0.8 MG/DL (ref 0.5–1)
GFR AFRICAN AMERICAN: >60
GFR NON-AFRICAN AMERICAN: >60 ML/MIN/1.73
GLUCOSE BLD-MCNC: 138 MG/DL (ref 74–99)
INR BLD: 1.9
POTASSIUM SERPL-SCNC: 3.9 MMOL/L (ref 3.5–5)
PRO-BNP: 3256 PG/ML (ref 0–450)
PROTHROMBIN TIME: 21.6 SEC (ref 9.3–12.4)
SODIUM BLD-SCNC: 139 MMOL/L (ref 132–146)
URINE CULTURE, ROUTINE: NORMAL

## 2019-01-04 PROCEDURE — 2060000000 HC ICU INTERMEDIATE R&B

## 2019-01-04 PROCEDURE — 83880 ASSAY OF NATRIURETIC PEPTIDE: CPT

## 2019-01-04 PROCEDURE — 2700000000 HC OXYGEN THERAPY PER DAY

## 2019-01-04 PROCEDURE — 6370000000 HC RX 637 (ALT 250 FOR IP): Performed by: HOSPITALIST

## 2019-01-04 PROCEDURE — 99233 SBSQ HOSP IP/OBS HIGH 50: CPT | Performed by: INTERNAL MEDICINE

## 2019-01-04 PROCEDURE — 92526 ORAL FUNCTION THERAPY: CPT | Performed by: SPEECH-LANGUAGE PATHOLOGIST

## 2019-01-04 PROCEDURE — 85610 PROTHROMBIN TIME: CPT

## 2019-01-04 PROCEDURE — 97161 PT EVAL LOW COMPLEX 20 MIN: CPT | Performed by: PHYSICAL THERAPIST

## 2019-01-04 PROCEDURE — 97116 GAIT TRAINING THERAPY: CPT | Performed by: PHYSICAL THERAPIST

## 2019-01-04 PROCEDURE — 2580000003 HC RX 258: Performed by: HOSPITALIST

## 2019-01-04 PROCEDURE — 36415 COLL VENOUS BLD VENIPUNCTURE: CPT

## 2019-01-04 PROCEDURE — 6360000002 HC RX W HCPCS: Performed by: HOSPITALIST

## 2019-01-04 PROCEDURE — 6370000000 HC RX 637 (ALT 250 FOR IP): Performed by: INTERNAL MEDICINE

## 2019-01-04 PROCEDURE — 97530 THERAPEUTIC ACTIVITIES: CPT

## 2019-01-04 PROCEDURE — 80048 BASIC METABOLIC PNL TOTAL CA: CPT

## 2019-01-04 PROCEDURE — 94640 AIRWAY INHALATION TREATMENT: CPT

## 2019-01-04 PROCEDURE — 99233 SBSQ HOSP IP/OBS HIGH 50: CPT | Performed by: HOSPITALIST

## 2019-01-04 RX ORDER — DOXYCYCLINE HYCLATE 100 MG/1
100 CAPSULE ORAL EVERY 12 HOURS SCHEDULED
Status: DISCONTINUED | OUTPATIENT
Start: 2019-01-04 | End: 2019-01-07 | Stop reason: HOSPADM

## 2019-01-04 RX ORDER — BENZONATATE 100 MG/1
100 CAPSULE ORAL 3 TIMES DAILY PRN
Status: DISCONTINUED | OUTPATIENT
Start: 2019-01-04 | End: 2019-01-07 | Stop reason: HOSPADM

## 2019-01-04 RX ORDER — ACETAMINOPHEN 500 MG
1000 TABLET ORAL EVERY 8 HOURS SCHEDULED
Status: DISCONTINUED | OUTPATIENT
Start: 2019-01-04 | End: 2019-01-07 | Stop reason: HOSPADM

## 2019-01-04 RX ORDER — FUROSEMIDE 10 MG/ML
40 INJECTION INTRAMUSCULAR; INTRAVENOUS 3 TIMES DAILY
Status: DISCONTINUED | OUTPATIENT
Start: 2019-01-04 | End: 2019-01-05

## 2019-01-04 RX ORDER — PREDNISONE 20 MG/1
40 TABLET ORAL DAILY
Status: DISCONTINUED | OUTPATIENT
Start: 2019-01-04 | End: 2019-01-07 | Stop reason: HOSPADM

## 2019-01-04 RX ORDER — GUAIFENESIN 600 MG/1
600 TABLET, EXTENDED RELEASE ORAL 2 TIMES DAILY
Status: DISCONTINUED | OUTPATIENT
Start: 2019-01-04 | End: 2019-01-07 | Stop reason: HOSPADM

## 2019-01-04 RX ADMIN — BENZONATATE 100 MG: 100 CAPSULE ORAL at 17:25

## 2019-01-04 RX ADMIN — Medication 10 ML: at 08:38

## 2019-01-04 RX ADMIN — IPRATROPIUM BROMIDE AND ALBUTEROL SULFATE 1 AMPULE: .5; 3 SOLUTION RESPIRATORY (INHALATION) at 17:59

## 2019-01-04 RX ADMIN — Medication 10 ML: at 23:14

## 2019-01-04 RX ADMIN — VITAMIN C 1 TABLET: TAB at 08:37

## 2019-01-04 RX ADMIN — METOPROLOL TARTRATE 100 MG: 50 TABLET ORAL at 08:37

## 2019-01-04 RX ADMIN — ACETAMINOPHEN 1000 MG: 500 TABLET, FILM COATED ORAL at 23:12

## 2019-01-04 RX ADMIN — METOPROLOL TARTRATE 100 MG: 50 TABLET ORAL at 23:12

## 2019-01-04 RX ADMIN — DOXYCYCLINE HYCLATE 100 MG: 100 CAPSULE ORAL at 14:10

## 2019-01-04 RX ADMIN — ACETAMINOPHEN 1000 MG: 500 TABLET, FILM COATED ORAL at 14:10

## 2019-01-04 RX ADMIN — PREDNISONE 40 MG: 20 TABLET ORAL at 14:10

## 2019-01-04 RX ADMIN — FAMOTIDINE 20 MG: 20 TABLET ORAL at 08:37

## 2019-01-04 RX ADMIN — GUAIFENESIN 600 MG: 600 TABLET, EXTENDED RELEASE ORAL at 14:10

## 2019-01-04 RX ADMIN — LEVOTHYROXINE SODIUM 125 MCG: 0.12 TABLET ORAL at 08:37

## 2019-01-04 RX ADMIN — FUROSEMIDE 40 MG: 10 INJECTION, SOLUTION INTRAMUSCULAR; INTRAVENOUS at 14:10

## 2019-01-04 RX ADMIN — VITAMIN D, TAB 1000IU (100/BT) 2000 UNITS: 25 TAB at 08:37

## 2019-01-04 RX ADMIN — WARFARIN SODIUM 7.5 MG: 7.5 TABLET ORAL at 17:25

## 2019-01-04 RX ADMIN — IPRATROPIUM BROMIDE AND ALBUTEROL SULFATE 1 AMPULE: .5; 3 SOLUTION RESPIRATORY (INHALATION) at 06:09

## 2019-01-04 RX ADMIN — DILTIAZEM HYDROCHLORIDE 180 MG: 180 CAPSULE, COATED, EXTENDED RELEASE ORAL at 08:38

## 2019-01-04 RX ADMIN — ERYTHROMYCIN: 5 OINTMENT OPHTHALMIC at 23:11

## 2019-01-04 RX ADMIN — GUAIFENESIN 600 MG: 600 TABLET, EXTENDED RELEASE ORAL at 23:12

## 2019-01-04 RX ADMIN — LISINOPRIL 10 MG: 10 TABLET ORAL at 08:37

## 2019-01-04 RX ADMIN — IPRATROPIUM BROMIDE AND ALBUTEROL SULFATE 1 AMPULE: .5; 3 SOLUTION RESPIRATORY (INHALATION) at 14:05

## 2019-01-04 RX ADMIN — FUROSEMIDE 40 MG: 10 INJECTION, SOLUTION INTRAMUSCULAR; INTRAVENOUS at 08:38

## 2019-01-04 RX ADMIN — FUROSEMIDE 40 MG: 10 INJECTION, SOLUTION INTRAMUSCULAR; INTRAVENOUS at 23:13

## 2019-01-04 RX ADMIN — IPRATROPIUM BROMIDE AND ALBUTEROL SULFATE 1 AMPULE: .5; 3 SOLUTION RESPIRATORY (INHALATION) at 09:38

## 2019-01-04 ASSESSMENT — PAIN DESCRIPTION - PAIN TYPE: TYPE: ACUTE PAIN

## 2019-01-04 ASSESSMENT — PAIN DESCRIPTION - DESCRIPTORS: DESCRIPTORS: ACHING;DISCOMFORT;DULL

## 2019-01-04 ASSESSMENT — PAIN SCALES - GENERAL
PAINLEVEL_OUTOF10: 0
PAINLEVEL_OUTOF10: 4
PAINLEVEL_OUTOF10: 0
PAINLEVEL_OUTOF10: 4
PAINLEVEL_OUTOF10: 0

## 2019-01-04 ASSESSMENT — PAIN DESCRIPTION - LOCATION: LOCATION: RIB CAGE

## 2019-01-05 PROBLEM — I50.33 ACUTE ON CHRONIC DIASTOLIC CONGESTIVE HEART FAILURE (HCC): Status: ACTIVE | Noted: 2018-05-22

## 2019-01-05 LAB
ANION GAP SERPL CALCULATED.3IONS-SCNC: 11 MMOL/L (ref 7–16)
BUN BLDV-MCNC: 24 MG/DL (ref 8–23)
CALCIUM SERPL-MCNC: 8.8 MG/DL (ref 8.6–10.2)
CHLORIDE BLD-SCNC: 96 MMOL/L (ref 98–107)
CO2: 33 MMOL/L (ref 22–29)
CREAT SERPL-MCNC: 0.8 MG/DL (ref 0.5–1)
GFR AFRICAN AMERICAN: >60
GFR NON-AFRICAN AMERICAN: >60 ML/MIN/1.73
GLUCOSE BLD-MCNC: 134 MG/DL (ref 74–99)
INR BLD: 2.5
POTASSIUM SERPL-SCNC: 4 MMOL/L (ref 3.5–5)
PRO-BNP: 2443 PG/ML (ref 0–450)
PROTHROMBIN TIME: 28.4 SEC (ref 9.3–12.4)
SODIUM BLD-SCNC: 140 MMOL/L (ref 132–146)

## 2019-01-05 PROCEDURE — 6370000000 HC RX 637 (ALT 250 FOR IP): Performed by: INTERNAL MEDICINE

## 2019-01-05 PROCEDURE — 99233 SBSQ HOSP IP/OBS HIGH 50: CPT | Performed by: HOSPITALIST

## 2019-01-05 PROCEDURE — 1200000000 HC SEMI PRIVATE

## 2019-01-05 PROCEDURE — 2580000003 HC RX 258: Performed by: HOSPITALIST

## 2019-01-05 PROCEDURE — 36415 COLL VENOUS BLD VENIPUNCTURE: CPT

## 2019-01-05 PROCEDURE — 99232 SBSQ HOSP IP/OBS MODERATE 35: CPT | Performed by: INTERNAL MEDICINE

## 2019-01-05 PROCEDURE — 6370000000 HC RX 637 (ALT 250 FOR IP): Performed by: HOSPITALIST

## 2019-01-05 PROCEDURE — 6360000002 HC RX W HCPCS: Performed by: HOSPITALIST

## 2019-01-05 PROCEDURE — 94640 AIRWAY INHALATION TREATMENT: CPT

## 2019-01-05 PROCEDURE — 85610 PROTHROMBIN TIME: CPT

## 2019-01-05 PROCEDURE — 83880 ASSAY OF NATRIURETIC PEPTIDE: CPT

## 2019-01-05 PROCEDURE — 80048 BASIC METABOLIC PNL TOTAL CA: CPT

## 2019-01-05 RX ORDER — FUROSEMIDE 40 MG/1
40 TABLET ORAL 2 TIMES DAILY
Status: DISCONTINUED | OUTPATIENT
Start: 2019-01-05 | End: 2019-01-07 | Stop reason: HOSPADM

## 2019-01-05 RX ADMIN — VITAMIN D, TAB 1000IU (100/BT) 2000 UNITS: 25 TAB at 09:08

## 2019-01-05 RX ADMIN — FUROSEMIDE 40 MG: 40 TABLET ORAL at 17:37

## 2019-01-05 RX ADMIN — METOPROLOL TARTRATE 100 MG: 50 TABLET ORAL at 20:13

## 2019-01-05 RX ADMIN — METOPROLOL TARTRATE 100 MG: 50 TABLET ORAL at 09:08

## 2019-01-05 RX ADMIN — DOXYCYCLINE HYCLATE 100 MG: 100 CAPSULE ORAL at 09:07

## 2019-01-05 RX ADMIN — GUAIFENESIN 600 MG: 600 TABLET, EXTENDED RELEASE ORAL at 20:13

## 2019-01-05 RX ADMIN — BENZONATATE 100 MG: 100 CAPSULE ORAL at 14:48

## 2019-01-05 RX ADMIN — DILTIAZEM HYDROCHLORIDE 180 MG: 180 CAPSULE, COATED, EXTENDED RELEASE ORAL at 09:08

## 2019-01-05 RX ADMIN — BENZONATATE 100 MG: 100 CAPSULE ORAL at 06:25

## 2019-01-05 RX ADMIN — LISINOPRIL 10 MG: 10 TABLET ORAL at 09:08

## 2019-01-05 RX ADMIN — ACETAMINOPHEN 1000 MG: 500 TABLET, FILM COATED ORAL at 06:22

## 2019-01-05 RX ADMIN — Medication 10 ML: at 20:13

## 2019-01-05 RX ADMIN — ACETAMINOPHEN 1000 MG: 500 TABLET, FILM COATED ORAL at 20:13

## 2019-01-05 RX ADMIN — IPRATROPIUM BROMIDE AND ALBUTEROL SULFATE 1 AMPULE: .5; 3 SOLUTION RESPIRATORY (INHALATION) at 06:02

## 2019-01-05 RX ADMIN — FAMOTIDINE 20 MG: 20 TABLET ORAL at 09:08

## 2019-01-05 RX ADMIN — PREDNISONE 40 MG: 20 TABLET ORAL at 09:07

## 2019-01-05 RX ADMIN — WARFARIN SODIUM 7.5 MG: 7.5 TABLET ORAL at 17:37

## 2019-01-05 RX ADMIN — GUAIFENESIN 600 MG: 600 TABLET, EXTENDED RELEASE ORAL at 09:08

## 2019-01-05 RX ADMIN — Medication 10 ML: at 09:08

## 2019-01-05 RX ADMIN — IPRATROPIUM BROMIDE AND ALBUTEROL SULFATE 1 AMPULE: .5; 3 SOLUTION RESPIRATORY (INHALATION) at 10:00

## 2019-01-05 RX ADMIN — DOXYCYCLINE HYCLATE 100 MG: 100 CAPSULE ORAL at 20:12

## 2019-01-05 RX ADMIN — ACETAMINOPHEN 1000 MG: 500 TABLET, FILM COATED ORAL at 14:48

## 2019-01-05 RX ADMIN — IPRATROPIUM BROMIDE AND ALBUTEROL SULFATE 1 AMPULE: .5; 3 SOLUTION RESPIRATORY (INHALATION) at 14:30

## 2019-01-05 RX ADMIN — IPRATROPIUM BROMIDE AND ALBUTEROL SULFATE 1 AMPULE: .5; 3 SOLUTION RESPIRATORY (INHALATION) at 18:25

## 2019-01-05 RX ADMIN — VITAMIN C 1 TABLET: TAB at 09:07

## 2019-01-05 RX ADMIN — LEVOTHYROXINE SODIUM 125 MCG: 0.12 TABLET ORAL at 09:07

## 2019-01-05 RX ADMIN — FUROSEMIDE 40 MG: 10 INJECTION, SOLUTION INTRAMUSCULAR; INTRAVENOUS at 09:07

## 2019-01-05 RX ADMIN — ERYTHROMYCIN: 5 OINTMENT OPHTHALMIC at 21:08

## 2019-01-05 ASSESSMENT — PAIN SCALES - GENERAL
PAINLEVEL_OUTOF10: 0
PAINLEVEL_OUTOF10: 3
PAINLEVEL_OUTOF10: 0

## 2019-01-06 ENCOUNTER — APPOINTMENT (OUTPATIENT)
Dept: GENERAL RADIOLOGY | Age: 84
DRG: 308 | End: 2019-01-06
Payer: MEDICARE

## 2019-01-06 LAB
ANION GAP SERPL CALCULATED.3IONS-SCNC: 11 MMOL/L (ref 7–16)
BUN BLDV-MCNC: 27 MG/DL (ref 8–23)
CALCIUM SERPL-MCNC: 8.8 MG/DL (ref 8.6–10.2)
CHLORIDE BLD-SCNC: 96 MMOL/L (ref 98–107)
CO2: 33 MMOL/L (ref 22–29)
CREAT SERPL-MCNC: 0.8 MG/DL (ref 0.5–1)
GFR AFRICAN AMERICAN: >60
GFR NON-AFRICAN AMERICAN: >60 ML/MIN/1.73
GLUCOSE BLD-MCNC: 90 MG/DL (ref 74–99)
INR BLD: 3.3
POTASSIUM SERPL-SCNC: 3.3 MMOL/L (ref 3.5–5)
PRO-BNP: 2832 PG/ML (ref 0–450)
PROCALCITONIN: 0.02 NG/ML (ref 0–0.08)
PROTHROMBIN TIME: 37.2 SEC (ref 9.3–12.4)
SODIUM BLD-SCNC: 140 MMOL/L (ref 132–146)

## 2019-01-06 PROCEDURE — 85610 PROTHROMBIN TIME: CPT

## 2019-01-06 PROCEDURE — 80048 BASIC METABOLIC PNL TOTAL CA: CPT

## 2019-01-06 PROCEDURE — 36415 COLL VENOUS BLD VENIPUNCTURE: CPT

## 2019-01-06 PROCEDURE — 6370000000 HC RX 637 (ALT 250 FOR IP): Performed by: HOSPITALIST

## 2019-01-06 PROCEDURE — 6370000000 HC RX 637 (ALT 250 FOR IP): Performed by: INTERNAL MEDICINE

## 2019-01-06 PROCEDURE — 71046 X-RAY EXAM CHEST 2 VIEWS: CPT

## 2019-01-06 PROCEDURE — 1200000000 HC SEMI PRIVATE

## 2019-01-06 PROCEDURE — 99233 SBSQ HOSP IP/OBS HIGH 50: CPT | Performed by: INTERNAL MEDICINE

## 2019-01-06 PROCEDURE — 83880 ASSAY OF NATRIURETIC PEPTIDE: CPT

## 2019-01-06 PROCEDURE — 2580000003 HC RX 258: Performed by: HOSPITALIST

## 2019-01-06 PROCEDURE — 94640 AIRWAY INHALATION TREATMENT: CPT

## 2019-01-06 PROCEDURE — 99232 SBSQ HOSP IP/OBS MODERATE 35: CPT | Performed by: INTERNAL MEDICINE

## 2019-01-06 PROCEDURE — 84145 PROCALCITONIN (PCT): CPT

## 2019-01-06 RX ORDER — POTASSIUM CHLORIDE 20 MEQ/1
20 TABLET, EXTENDED RELEASE ORAL
Status: DISCONTINUED | OUTPATIENT
Start: 2019-01-07 | End: 2019-01-07 | Stop reason: HOSPADM

## 2019-01-06 RX ORDER — WARFARIN SODIUM 5 MG/1
5 TABLET ORAL
Status: COMPLETED | OUTPATIENT
Start: 2019-01-06 | End: 2019-01-06

## 2019-01-06 RX ADMIN — DOXYCYCLINE HYCLATE 100 MG: 100 CAPSULE ORAL at 20:04

## 2019-01-06 RX ADMIN — POTASSIUM CHLORIDE 40 MEQ: 1.5 POWDER, FOR SOLUTION ORAL at 08:51

## 2019-01-06 RX ADMIN — GUAIFENESIN 600 MG: 600 TABLET, EXTENDED RELEASE ORAL at 08:52

## 2019-01-06 RX ADMIN — ACETAMINOPHEN 1000 MG: 500 TABLET, FILM COATED ORAL at 20:04

## 2019-01-06 RX ADMIN — DILTIAZEM HYDROCHLORIDE 180 MG: 180 CAPSULE, COATED, EXTENDED RELEASE ORAL at 08:58

## 2019-01-06 RX ADMIN — WARFARIN SODIUM 5 MG: 5 TABLET ORAL at 16:55

## 2019-01-06 RX ADMIN — ERYTHROMYCIN: 5 OINTMENT OPHTHALMIC at 20:04

## 2019-01-06 RX ADMIN — ACETAMINOPHEN 1000 MG: 500 TABLET, FILM COATED ORAL at 05:41

## 2019-01-06 RX ADMIN — FUROSEMIDE 40 MG: 40 TABLET ORAL at 16:55

## 2019-01-06 RX ADMIN — PREDNISONE 40 MG: 20 TABLET ORAL at 08:51

## 2019-01-06 RX ADMIN — BENZONATATE 100 MG: 100 CAPSULE ORAL at 05:41

## 2019-01-06 RX ADMIN — IPRATROPIUM BROMIDE AND ALBUTEROL SULFATE 1 AMPULE: .5; 3 SOLUTION RESPIRATORY (INHALATION) at 18:46

## 2019-01-06 RX ADMIN — GUAIFENESIN 600 MG: 600 TABLET, EXTENDED RELEASE ORAL at 20:04

## 2019-01-06 RX ADMIN — ACETAMINOPHEN 1000 MG: 500 TABLET, FILM COATED ORAL at 14:31

## 2019-01-06 RX ADMIN — LISINOPRIL 10 MG: 10 TABLET ORAL at 08:52

## 2019-01-06 RX ADMIN — IPRATROPIUM BROMIDE AND ALBUTEROL SULFATE 1 AMPULE: .5; 3 SOLUTION RESPIRATORY (INHALATION) at 09:38

## 2019-01-06 RX ADMIN — FUROSEMIDE 40 MG: 40 TABLET ORAL at 08:52

## 2019-01-06 RX ADMIN — VITAMIN D, TAB 1000IU (100/BT) 2000 UNITS: 25 TAB at 08:52

## 2019-01-06 RX ADMIN — Medication 10 ML: at 20:04

## 2019-01-06 RX ADMIN — LEVOTHYROXINE SODIUM 125 MCG: 0.12 TABLET ORAL at 08:51

## 2019-01-06 RX ADMIN — METOPROLOL TARTRATE 100 MG: 50 TABLET ORAL at 08:51

## 2019-01-06 RX ADMIN — Medication 10 ML: at 08:58

## 2019-01-06 RX ADMIN — IPRATROPIUM BROMIDE AND ALBUTEROL SULFATE 1 AMPULE: .5; 3 SOLUTION RESPIRATORY (INHALATION) at 05:57

## 2019-01-06 RX ADMIN — METOPROLOL TARTRATE 100 MG: 50 TABLET ORAL at 20:04

## 2019-01-06 RX ADMIN — IPRATROPIUM BROMIDE AND ALBUTEROL SULFATE 1 AMPULE: .5; 3 SOLUTION RESPIRATORY (INHALATION) at 13:11

## 2019-01-06 RX ADMIN — FAMOTIDINE 20 MG: 20 TABLET ORAL at 08:52

## 2019-01-06 RX ADMIN — VITAMIN C 1 TABLET: TAB at 08:51

## 2019-01-06 RX ADMIN — DOXYCYCLINE HYCLATE 100 MG: 100 CAPSULE ORAL at 08:58

## 2019-01-06 ASSESSMENT — PAIN DESCRIPTION - PAIN TYPE: TYPE: ACUTE PAIN

## 2019-01-06 ASSESSMENT — PAIN SCALES - GENERAL
PAINLEVEL_OUTOF10: 0
PAINLEVEL_OUTOF10: 6
PAINLEVEL_OUTOF10: 0
PAINLEVEL_OUTOF10: 0
PAINLEVEL_OUTOF10: 7

## 2019-01-06 ASSESSMENT — PAIN DESCRIPTION - DESCRIPTORS: DESCRIPTORS: ACHING

## 2019-01-06 ASSESSMENT — PAIN DESCRIPTION - LOCATION: LOCATION: BACK

## 2019-01-07 VITALS
WEIGHT: 227.3 LBS | TEMPERATURE: 96 F | OXYGEN SATURATION: 97 % | HEART RATE: 86 BPM | SYSTOLIC BLOOD PRESSURE: 170 MMHG | DIASTOLIC BLOOD PRESSURE: 91 MMHG | RESPIRATION RATE: 18 BRPM | HEIGHT: 66 IN | BODY MASS INDEX: 36.53 KG/M2

## 2019-01-07 LAB
ANION GAP SERPL CALCULATED.3IONS-SCNC: 8 MMOL/L (ref 7–16)
BLOOD CULTURE, ROUTINE: NORMAL
BUN BLDV-MCNC: 26 MG/DL (ref 8–23)
CALCIUM SERPL-MCNC: 9 MG/DL (ref 8.6–10.2)
CHLORIDE BLD-SCNC: 95 MMOL/L (ref 98–107)
CO2: 36 MMOL/L (ref 22–29)
CREAT SERPL-MCNC: 0.8 MG/DL (ref 0.5–1)
GFR AFRICAN AMERICAN: >60
GFR NON-AFRICAN AMERICAN: >60 ML/MIN/1.73
GLUCOSE BLD-MCNC: 87 MG/DL (ref 74–99)
INR BLD: 3.6
POTASSIUM SERPL-SCNC: 3.7 MMOL/L (ref 3.5–5)
PROTHROMBIN TIME: 40.8 SEC (ref 9.3–12.4)
SODIUM BLD-SCNC: 139 MMOL/L (ref 132–146)

## 2019-01-07 PROCEDURE — APPSS30 APP SPLIT SHARED TIME 16-30 MINUTES: Performed by: NURSE PRACTITIONER

## 2019-01-07 PROCEDURE — 6370000000 HC RX 637 (ALT 250 FOR IP): Performed by: HOSPITALIST

## 2019-01-07 PROCEDURE — 6370000000 HC RX 637 (ALT 250 FOR IP): Performed by: INTERNAL MEDICINE

## 2019-01-07 PROCEDURE — 99232 SBSQ HOSP IP/OBS MODERATE 35: CPT | Performed by: INTERNAL MEDICINE

## 2019-01-07 PROCEDURE — 85610 PROTHROMBIN TIME: CPT

## 2019-01-07 PROCEDURE — 36415 COLL VENOUS BLD VENIPUNCTURE: CPT

## 2019-01-07 PROCEDURE — 97116 GAIT TRAINING THERAPY: CPT

## 2019-01-07 PROCEDURE — 2580000003 HC RX 258: Performed by: HOSPITALIST

## 2019-01-07 PROCEDURE — 94640 AIRWAY INHALATION TREATMENT: CPT

## 2019-01-07 PROCEDURE — 99239 HOSP IP/OBS DSCHRG MGMT >30: CPT | Performed by: INTERNAL MEDICINE

## 2019-01-07 PROCEDURE — 92526 ORAL FUNCTION THERAPY: CPT | Performed by: SPEECH-LANGUAGE PATHOLOGIST

## 2019-01-07 PROCEDURE — 80048 BASIC METABOLIC PNL TOTAL CA: CPT

## 2019-01-07 RX ORDER — GUAIFENESIN 600 MG/1
600 TABLET, EXTENDED RELEASE ORAL 2 TIMES DAILY
Qty: 30 TABLET | Refills: 0 | Status: SHIPPED | OUTPATIENT
Start: 2019-01-07 | End: 2019-04-03

## 2019-01-07 RX ORDER — LISINOPRIL 10 MG/1
10 TABLET ORAL DAILY
Qty: 30 TABLET | Refills: 3 | Status: SHIPPED | OUTPATIENT
Start: 2019-01-08 | End: 2019-04-08 | Stop reason: SDUPTHER

## 2019-01-07 RX ORDER — WARFARIN SODIUM 7.5 MG/1
7.5 TABLET ORAL DAILY
Qty: 90 TABLET | Refills: 5 | Status: SHIPPED | OUTPATIENT
Start: 2019-01-09 | End: 2019-04-08 | Stop reason: SDUPTHER

## 2019-01-07 RX ORDER — DOXYCYCLINE HYCLATE 100 MG/1
100 CAPSULE ORAL EVERY 12 HOURS SCHEDULED
Qty: 14 CAPSULE | Refills: 0 | Status: SHIPPED | OUTPATIENT
Start: 2019-01-07 | End: 2019-01-14

## 2019-01-07 RX ORDER — ERYTHROMYCIN 5 MG/G
OINTMENT OPHTHALMIC
Qty: 1 TUBE | Refills: 0
Start: 2019-01-07 | End: 2019-01-17

## 2019-01-07 RX ORDER — DILTIAZEM HYDROCHLORIDE 180 MG/1
180 CAPSULE, COATED, EXTENDED RELEASE ORAL DAILY
Qty: 30 CAPSULE | Refills: 0 | Status: SHIPPED | OUTPATIENT
Start: 2019-01-08 | End: 2019-02-06 | Stop reason: SDUPTHER

## 2019-01-07 RX ORDER — POTASSIUM CHLORIDE 20 MEQ/1
20 TABLET, EXTENDED RELEASE ORAL DAILY
Qty: 30 TABLET | Refills: 0 | Status: SHIPPED | OUTPATIENT
Start: 2019-01-07 | End: 2019-01-16 | Stop reason: SDUPTHER

## 2019-01-07 RX ORDER — PREDNISONE 20 MG/1
40 TABLET ORAL DAILY
Qty: 10 TABLET | Refills: 0 | Status: SHIPPED | OUTPATIENT
Start: 2019-01-08 | End: 2019-01-13

## 2019-01-07 RX ORDER — BENZONATATE 100 MG/1
100 CAPSULE ORAL 3 TIMES DAILY PRN
Qty: 30 CAPSULE | Refills: 0 | Status: SHIPPED | OUTPATIENT
Start: 2019-01-07 | End: 2019-01-17

## 2019-01-07 RX ORDER — FUROSEMIDE 40 MG/1
40 TABLET ORAL 2 TIMES DAILY
Qty: 60 TABLET | Refills: 0 | Status: SHIPPED | OUTPATIENT
Start: 2019-01-07 | End: 2019-01-16 | Stop reason: SDUPTHER

## 2019-01-07 RX ADMIN — LISINOPRIL 10 MG: 10 TABLET ORAL at 08:18

## 2019-01-07 RX ADMIN — GUAIFENESIN 600 MG: 600 TABLET, EXTENDED RELEASE ORAL at 08:18

## 2019-01-07 RX ADMIN — DOXYCYCLINE HYCLATE 100 MG: 100 CAPSULE ORAL at 08:25

## 2019-01-07 RX ADMIN — IPRATROPIUM BROMIDE AND ALBUTEROL SULFATE 1 AMPULE: .5; 3 SOLUTION RESPIRATORY (INHALATION) at 06:49

## 2019-01-07 RX ADMIN — PREDNISONE 40 MG: 20 TABLET ORAL at 08:18

## 2019-01-07 RX ADMIN — FAMOTIDINE 20 MG: 20 TABLET ORAL at 08:17

## 2019-01-07 RX ADMIN — VITAMIN C 1 TABLET: TAB at 08:18

## 2019-01-07 RX ADMIN — ACETAMINOPHEN 1000 MG: 500 TABLET, FILM COATED ORAL at 08:25

## 2019-01-07 RX ADMIN — VITAMIN D, TAB 1000IU (100/BT) 2000 UNITS: 25 TAB at 08:25

## 2019-01-07 RX ADMIN — Medication 10 ML: at 08:26

## 2019-01-07 RX ADMIN — LEVOTHYROXINE SODIUM 125 MCG: 0.12 TABLET ORAL at 08:26

## 2019-01-07 RX ADMIN — METOPROLOL TARTRATE 100 MG: 50 TABLET ORAL at 08:18

## 2019-01-07 RX ADMIN — IPRATROPIUM BROMIDE AND ALBUTEROL SULFATE 1 AMPULE: .5; 3 SOLUTION RESPIRATORY (INHALATION) at 10:44

## 2019-01-07 RX ADMIN — ACETAMINOPHEN 1000 MG: 500 TABLET, FILM COATED ORAL at 13:40

## 2019-01-07 RX ADMIN — POTASSIUM CHLORIDE 20 MEQ: 20 TABLET, EXTENDED RELEASE ORAL at 08:19

## 2019-01-07 RX ADMIN — FUROSEMIDE 40 MG: 40 TABLET ORAL at 08:18

## 2019-01-07 RX ADMIN — DILTIAZEM HYDROCHLORIDE 180 MG: 180 CAPSULE, COATED, EXTENDED RELEASE ORAL at 08:26

## 2019-01-07 RX ADMIN — IPRATROPIUM BROMIDE AND ALBUTEROL SULFATE 1 AMPULE: .5; 3 SOLUTION RESPIRATORY (INHALATION) at 13:56

## 2019-01-07 ASSESSMENT — PAIN SCALES - GENERAL
PAINLEVEL_OUTOF10: 0
PAINLEVEL_OUTOF10: 5
PAINLEVEL_OUTOF10: 4

## 2019-01-08 ENCOUNTER — CARE COORDINATION (OUTPATIENT)
Dept: CASE MANAGEMENT | Age: 84
End: 2019-01-08

## 2019-01-08 DIAGNOSIS — I48.91 ATRIAL FIBRILLATION WITH RVR (HCC): Primary | ICD-10-CM

## 2019-01-08 PROCEDURE — 1111F DSCHRG MED/CURRENT MED MERGE: CPT | Performed by: FAMILY MEDICINE

## 2019-01-10 ENCOUNTER — ANTI-COAG VISIT (OUTPATIENT)
Dept: FAMILY MEDICINE CLINIC | Age: 84
End: 2019-01-10

## 2019-01-10 ENCOUNTER — OFFICE VISIT (OUTPATIENT)
Dept: FAMILY MEDICINE CLINIC | Age: 84
End: 2019-01-10
Payer: MEDICARE

## 2019-01-10 VITALS
OXYGEN SATURATION: 96 % | HEIGHT: 66 IN | DIASTOLIC BLOOD PRESSURE: 60 MMHG | TEMPERATURE: 97.8 F | BODY MASS INDEX: 32.78 KG/M2 | HEART RATE: 57 BPM | RESPIRATION RATE: 18 BRPM | WEIGHT: 204 LBS | SYSTOLIC BLOOD PRESSURE: 110 MMHG

## 2019-01-10 DIAGNOSIS — I48.91 ATRIAL FIBRILLATION WITH RVR (HCC): Primary | ICD-10-CM

## 2019-01-10 DIAGNOSIS — I42.9 CARDIOMYOPATHY, UNSPECIFIED TYPE (HCC): ICD-10-CM

## 2019-01-10 DIAGNOSIS — I27.20 PULMONARY HYPERTENSION (HCC): ICD-10-CM

## 2019-01-10 DIAGNOSIS — I50.43 ACUTE ON CHRONIC COMBINED SYSTOLIC AND DIASTOLIC HEART FAILURE (HCC): ICD-10-CM

## 2019-01-10 DIAGNOSIS — I50.32 CHRONIC DIASTOLIC (CONGESTIVE) HEART FAILURE (HCC): ICD-10-CM

## 2019-01-10 LAB
INTERNATIONAL NORMALIZATION RATIO, POC: 1.4
PROTHROMBIN TIME, POC: 49

## 2019-01-10 PROCEDURE — 99212 OFFICE O/P EST SF 10 MIN: CPT | Performed by: FAMILY MEDICINE

## 2019-01-10 PROCEDURE — 99496 TRANSJ CARE MGMT HIGH F2F 7D: CPT | Performed by: FAMILY MEDICINE

## 2019-01-10 PROCEDURE — 1111F DSCHRG MED/CURRENT MED MERGE: CPT | Performed by: FAMILY MEDICINE

## 2019-01-10 PROCEDURE — 85610 PROTHROMBIN TIME: CPT | Performed by: FAMILY MEDICINE

## 2019-01-11 ENCOUNTER — CARE COORDINATION (OUTPATIENT)
Dept: CARE COORDINATION | Age: 84
End: 2019-01-11

## 2019-01-11 ASSESSMENT — ENCOUNTER SYMPTOMS: DYSPNEA ASSOCIATED WITH: MINIMAL EXERTION

## 2019-01-13 ASSESSMENT — ENCOUNTER SYMPTOMS
DIARRHEA: 0
STRIDOR: 0
CONSTIPATION: 0
RHINORRHEA: 1
SINUS PAIN: 0
NAUSEA: 0
PHOTOPHOBIA: 0
SINUS PRESSURE: 0
SORE THROAT: 0
WHEEZING: 0
COUGH: 1
ABDOMINAL PAIN: 0
CHEST TIGHTNESS: 0
ABDOMINAL DISTENTION: 0
SHORTNESS OF BREATH: 1
VOMITING: 0

## 2019-01-16 ENCOUNTER — OFFICE VISIT (OUTPATIENT)
Dept: CARDIOLOGY CLINIC | Age: 84
End: 2019-01-16
Payer: MEDICARE

## 2019-01-16 ENCOUNTER — ANTI-COAG VISIT (OUTPATIENT)
Dept: CARDIOLOGY CLINIC | Age: 84
End: 2019-01-16
Payer: MEDICARE

## 2019-01-16 VITALS
SYSTOLIC BLOOD PRESSURE: 104 MMHG | HEART RATE: 73 BPM | HEIGHT: 66 IN | WEIGHT: 230 LBS | DIASTOLIC BLOOD PRESSURE: 50 MMHG | BODY MASS INDEX: 36.96 KG/M2

## 2019-01-16 DIAGNOSIS — I25.10 CORONARY ARTERY DISEASE INVOLVING NATIVE CORONARY ARTERY OF NATIVE HEART WITHOUT ANGINA PECTORIS: Primary | ICD-10-CM

## 2019-01-16 DIAGNOSIS — I48.20 CHRONIC ATRIAL FIBRILLATION (HCC): ICD-10-CM

## 2019-01-16 DIAGNOSIS — I48.20 CHRONIC ATRIAL FIBRILLATION (HCC): Primary | ICD-10-CM

## 2019-01-16 DIAGNOSIS — I50.30 (HFPEF) HEART FAILURE WITH PRESERVED EJECTION FRACTION (HCC): ICD-10-CM

## 2019-01-16 LAB
INTERNATIONAL NORMALIZATION RATIO, POC: 1.9
PROTHROMBIN TIME, POC: ABNORMAL

## 2019-01-16 PROCEDURE — 85610 PROTHROMBIN TIME: CPT | Performed by: INTERNAL MEDICINE

## 2019-01-16 PROCEDURE — 99214 OFFICE O/P EST MOD 30 MIN: CPT | Performed by: CLINICAL NURSE SPECIALIST

## 2019-01-16 PROCEDURE — 93000 ELECTROCARDIOGRAM COMPLETE: CPT | Performed by: INTERNAL MEDICINE

## 2019-01-16 RX ORDER — POTASSIUM CHLORIDE 20 MEQ/1
20 TABLET, EXTENDED RELEASE ORAL DAILY
Qty: 30 TABLET | Refills: 0 | Status: SHIPPED | OUTPATIENT
Start: 2019-01-16 | End: 2019-02-06 | Stop reason: SDUPTHER

## 2019-01-16 RX ORDER — FUROSEMIDE 40 MG/1
40 TABLET ORAL 2 TIMES DAILY
Qty: 60 TABLET | Refills: 3 | Status: SHIPPED | OUTPATIENT
Start: 2019-01-16 | End: 2019-04-08 | Stop reason: SDUPTHER

## 2019-02-05 ENCOUNTER — CARE COORDINATION (OUTPATIENT)
Dept: FAMILY MEDICINE CLINIC | Age: 84
End: 2019-02-05

## 2019-02-05 ASSESSMENT — ENCOUNTER SYMPTOMS: DYSPNEA ASSOCIATED WITH: EXERTION

## 2019-02-06 DIAGNOSIS — I50.30 (HFPEF) HEART FAILURE WITH PRESERVED EJECTION FRACTION (HCC): ICD-10-CM

## 2019-02-06 RX ORDER — DILTIAZEM HYDROCHLORIDE 180 MG/1
180 CAPSULE, COATED, EXTENDED RELEASE ORAL DAILY
Qty: 30 CAPSULE | Refills: 0 | Status: SHIPPED | OUTPATIENT
Start: 2019-02-06 | End: 2019-02-08 | Stop reason: ALTCHOICE

## 2019-02-06 RX ORDER — POTASSIUM CHLORIDE 20 MEQ/1
20 TABLET, EXTENDED RELEASE ORAL DAILY
Qty: 30 TABLET | Refills: 0 | Status: SHIPPED | OUTPATIENT
Start: 2019-02-06 | End: 2019-03-04 | Stop reason: SDUPTHER

## 2019-02-08 ENCOUNTER — OFFICE VISIT (OUTPATIENT)
Dept: CARDIOLOGY CLINIC | Age: 84
End: 2019-02-08
Payer: MEDICARE

## 2019-02-08 VITALS
HEIGHT: 66 IN | DIASTOLIC BLOOD PRESSURE: 70 MMHG | BODY MASS INDEX: 37.28 KG/M2 | HEART RATE: 104 BPM | SYSTOLIC BLOOD PRESSURE: 122 MMHG | WEIGHT: 232 LBS

## 2019-02-08 DIAGNOSIS — I50.32 CHRONIC DIASTOLIC (CONGESTIVE) HEART FAILURE (HCC): ICD-10-CM

## 2019-02-08 DIAGNOSIS — R42 VERTIGO: ICD-10-CM

## 2019-02-08 DIAGNOSIS — I42.8 NONISCHEMIC CARDIOMYOPATHY (HCC): Primary | ICD-10-CM

## 2019-02-08 DIAGNOSIS — I48.21 PERMANENT ATRIAL FIBRILLATION (HCC): ICD-10-CM

## 2019-02-08 DIAGNOSIS — I10 ESSENTIAL HYPERTENSION: ICD-10-CM

## 2019-02-08 DIAGNOSIS — E78.00 PURE HYPERCHOLESTEROLEMIA: ICD-10-CM

## 2019-02-08 DIAGNOSIS — J44.9 CHRONIC OBSTRUCTIVE PULMONARY DISEASE, UNSPECIFIED COPD TYPE (HCC): ICD-10-CM

## 2019-02-08 DIAGNOSIS — E66.8 MODERATE OBESITY: ICD-10-CM

## 2019-02-08 PROCEDURE — G8484 FLU IMMUNIZE NO ADMIN: HCPCS | Performed by: INTERNAL MEDICINE

## 2019-02-08 PROCEDURE — G8598 ASA/ANTIPLAT THER USED: HCPCS | Performed by: INTERNAL MEDICINE

## 2019-02-08 PROCEDURE — 1036F TOBACCO NON-USER: CPT | Performed by: INTERNAL MEDICINE

## 2019-02-08 PROCEDURE — G8417 CALC BMI ABV UP PARAM F/U: HCPCS | Performed by: INTERNAL MEDICINE

## 2019-02-08 PROCEDURE — 99214 OFFICE O/P EST MOD 30 MIN: CPT | Performed by: INTERNAL MEDICINE

## 2019-02-08 PROCEDURE — 1101F PT FALLS ASSESS-DOCD LE1/YR: CPT | Performed by: INTERNAL MEDICINE

## 2019-02-08 PROCEDURE — 1123F ACP DISCUSS/DSCN MKR DOCD: CPT | Performed by: INTERNAL MEDICINE

## 2019-02-08 PROCEDURE — G8427 DOCREV CUR MEDS BY ELIG CLIN: HCPCS | Performed by: INTERNAL MEDICINE

## 2019-02-08 PROCEDURE — 4040F PNEUMOC VAC/ADMIN/RCVD: CPT | Performed by: INTERNAL MEDICINE

## 2019-02-08 PROCEDURE — 3023F SPIROM DOC REV: CPT | Performed by: INTERNAL MEDICINE

## 2019-02-08 PROCEDURE — 93000 ELECTROCARDIOGRAM COMPLETE: CPT | Performed by: INTERNAL MEDICINE

## 2019-02-08 PROCEDURE — G8399 PT W/DXA RESULTS DOCUMENT: HCPCS | Performed by: INTERNAL MEDICINE

## 2019-02-08 PROCEDURE — G8926 SPIRO NO PERF OR DOC: HCPCS | Performed by: INTERNAL MEDICINE

## 2019-02-08 PROCEDURE — 1090F PRES/ABSN URINE INCON ASSESS: CPT | Performed by: INTERNAL MEDICINE

## 2019-02-08 RX ORDER — METOPROLOL SUCCINATE 50 MG/1
50 TABLET, EXTENDED RELEASE ORAL 2 TIMES DAILY
Qty: 180 TABLET | Refills: 3 | Status: SHIPPED | OUTPATIENT
Start: 2019-02-08 | End: 2019-03-18

## 2019-02-08 RX ORDER — METOPROLOL SUCCINATE 50 MG/1
50 TABLET, EXTENDED RELEASE ORAL 2 TIMES DAILY
Qty: 60 TABLET | Refills: 5 | Status: SHIPPED | OUTPATIENT
Start: 2019-02-08 | End: 2019-02-08 | Stop reason: SDUPTHER

## 2019-02-20 ENCOUNTER — TELEPHONE (OUTPATIENT)
Dept: OTHER | Age: 84
End: 2019-02-20

## 2019-02-25 ENCOUNTER — ANTI-COAG VISIT (OUTPATIENT)
Dept: FAMILY MEDICINE CLINIC | Age: 84
End: 2019-02-25

## 2019-02-25 ENCOUNTER — OFFICE VISIT (OUTPATIENT)
Dept: FAMILY MEDICINE CLINIC | Age: 84
End: 2019-02-25
Payer: MEDICARE

## 2019-02-25 VITALS
OXYGEN SATURATION: 96 % | BODY MASS INDEX: 37.93 KG/M2 | RESPIRATION RATE: 18 BRPM | HEART RATE: 107 BPM | HEIGHT: 66 IN | WEIGHT: 236 LBS | DIASTOLIC BLOOD PRESSURE: 72 MMHG | TEMPERATURE: 97.9 F | SYSTOLIC BLOOD PRESSURE: 138 MMHG

## 2019-02-25 DIAGNOSIS — M15.9 PRIMARY OSTEOARTHRITIS INVOLVING MULTIPLE JOINTS: ICD-10-CM

## 2019-02-25 DIAGNOSIS — I10 ESSENTIAL HYPERTENSION: ICD-10-CM

## 2019-02-25 DIAGNOSIS — J44.9 CHRONIC OBSTRUCTIVE PULMONARY DISEASE, UNSPECIFIED COPD TYPE (HCC): ICD-10-CM

## 2019-02-25 DIAGNOSIS — E03.9 HYPOTHYROIDISM, UNSPECIFIED TYPE: ICD-10-CM

## 2019-02-25 DIAGNOSIS — I48.21 PERMANENT ATRIAL FIBRILLATION (HCC): Primary | ICD-10-CM

## 2019-02-25 DIAGNOSIS — M70.61 GREATER TROCHANTERIC BURSITIS OF RIGHT HIP: ICD-10-CM

## 2019-02-25 DIAGNOSIS — M48.062 SPINAL STENOSIS OF LUMBAR REGION WITH NEUROGENIC CLAUDICATION: ICD-10-CM

## 2019-02-25 DIAGNOSIS — F33.1 MODERATE EPISODE OF RECURRENT MAJOR DEPRESSIVE DISORDER (HCC): ICD-10-CM

## 2019-02-25 LAB
INTERNATIONAL NORMALIZATION RATIO, POC: 4.4
PROTHROMBIN TIME, POC: 52.6

## 2019-02-25 PROCEDURE — G8417 CALC BMI ABV UP PARAM F/U: HCPCS | Performed by: FAMILY MEDICINE

## 2019-02-25 PROCEDURE — 99214 OFFICE O/P EST MOD 30 MIN: CPT | Performed by: FAMILY MEDICINE

## 2019-02-25 PROCEDURE — G8598 ASA/ANTIPLAT THER USED: HCPCS | Performed by: FAMILY MEDICINE

## 2019-02-25 PROCEDURE — 1123F ACP DISCUSS/DSCN MKR DOCD: CPT | Performed by: FAMILY MEDICINE

## 2019-02-25 PROCEDURE — G8926 SPIRO NO PERF OR DOC: HCPCS | Performed by: FAMILY MEDICINE

## 2019-02-25 PROCEDURE — G8427 DOCREV CUR MEDS BY ELIG CLIN: HCPCS | Performed by: FAMILY MEDICINE

## 2019-02-25 PROCEDURE — G8399 PT W/DXA RESULTS DOCUMENT: HCPCS | Performed by: FAMILY MEDICINE

## 2019-02-25 PROCEDURE — 4040F PNEUMOC VAC/ADMIN/RCVD: CPT | Performed by: FAMILY MEDICINE

## 2019-02-25 PROCEDURE — 99212 OFFICE O/P EST SF 10 MIN: CPT | Performed by: FAMILY MEDICINE

## 2019-02-25 PROCEDURE — 93793 ANTICOAG MGMT PT WARFARIN: CPT | Performed by: FAMILY MEDICINE

## 2019-02-25 PROCEDURE — 1101F PT FALLS ASSESS-DOCD LE1/YR: CPT | Performed by: FAMILY MEDICINE

## 2019-02-25 PROCEDURE — G8484 FLU IMMUNIZE NO ADMIN: HCPCS | Performed by: FAMILY MEDICINE

## 2019-02-25 PROCEDURE — 3023F SPIROM DOC REV: CPT | Performed by: FAMILY MEDICINE

## 2019-02-25 PROCEDURE — 1036F TOBACCO NON-USER: CPT | Performed by: FAMILY MEDICINE

## 2019-02-25 PROCEDURE — 1090F PRES/ABSN URINE INCON ASSESS: CPT | Performed by: FAMILY MEDICINE

## 2019-02-25 PROCEDURE — 85610 PROTHROMBIN TIME: CPT | Performed by: FAMILY MEDICINE

## 2019-02-25 RX ORDER — GABAPENTIN 100 MG/1
100 CAPSULE ORAL 3 TIMES DAILY
Qty: 90 CAPSULE | Refills: 2 | Status: SHIPPED | OUTPATIENT
Start: 2019-02-25 | End: 2019-04-03

## 2019-02-27 ENCOUNTER — CARE COORDINATION (OUTPATIENT)
Dept: FAMILY MEDICINE CLINIC | Age: 84
End: 2019-02-27

## 2019-02-28 ENCOUNTER — HOSPITAL ENCOUNTER (EMERGENCY)
Age: 84
Discharge: HOME | End: 2019-02-28
Attending: EMERGENCY MEDICINE
Payer: MEDICARE

## 2019-02-28 ENCOUNTER — APPOINTMENT (OUTPATIENT)
Dept: CT IMAGING | Age: 84
End: 2019-02-28
Payer: MEDICARE

## 2019-02-28 ENCOUNTER — APPOINTMENT (OUTPATIENT)
Dept: GENERAL RADIOLOGY | Age: 84
End: 2019-02-28
Payer: MEDICARE

## 2019-02-28 VITALS
WEIGHT: 236 LBS | OXYGEN SATURATION: 97 % | HEART RATE: 84 BPM | HEIGHT: 66 IN | SYSTOLIC BLOOD PRESSURE: 120 MMHG | RESPIRATION RATE: 14 BRPM | TEMPERATURE: 98 F | DIASTOLIC BLOOD PRESSURE: 80 MMHG | BODY MASS INDEX: 37.93 KG/M2

## 2019-02-28 DIAGNOSIS — S30.1XXA CONTUSION OF ABDOMINAL WALL, INITIAL ENCOUNTER: Primary | ICD-10-CM

## 2019-02-28 LAB
ALBUMIN SERPL-MCNC: 3.7 G/DL (ref 3.5–5.2)
ALP BLD-CCNC: 79 U/L (ref 35–104)
ALT SERPL-CCNC: 16 U/L (ref 0–32)
ANION GAP SERPL CALCULATED.3IONS-SCNC: 9 MMOL/L (ref 7–16)
AST SERPL-CCNC: 19 U/L (ref 0–31)
BASOPHILS ABSOLUTE: 0.08 E9/L (ref 0–0.2)
BASOPHILS RELATIVE PERCENT: 1.9 % (ref 0–2)
BILIRUB SERPL-MCNC: 0.8 MG/DL (ref 0–1.2)
BUN BLDV-MCNC: 23 MG/DL (ref 8–23)
CALCIUM SERPL-MCNC: 9 MG/DL (ref 8.6–10.2)
CHLORIDE BLD-SCNC: 106 MMOL/L (ref 98–107)
CO2: 28 MMOL/L (ref 22–29)
CREAT SERPL-MCNC: 0.9 MG/DL (ref 0.5–1)
EOSINOPHILS ABSOLUTE: 0.16 E9/L (ref 0.05–0.5)
EOSINOPHILS RELATIVE PERCENT: 3.8 % (ref 0–6)
GFR AFRICAN AMERICAN: >60
GFR NON-AFRICAN AMERICAN: 60 ML/MIN/1.73
GLUCOSE BLD-MCNC: 82 MG/DL (ref 74–99)
HCT VFR BLD CALC: 37.2 % (ref 34–48)
HEMOGLOBIN: 11.9 G/DL (ref 11.5–15.5)
IMMATURE GRANULOCYTES #: 0.01 E9/L
IMMATURE GRANULOCYTES %: 0.2 % (ref 0–5)
INR BLD: 2.3
LACTIC ACID: 0.6 MMOL/L (ref 0.5–2.2)
LYMPHOCYTES ABSOLUTE: 0.97 E9/L (ref 1.5–4)
LYMPHOCYTES RELATIVE PERCENT: 22.8 % (ref 20–42)
MCH RBC QN AUTO: 29.9 PG (ref 26–35)
MCHC RBC AUTO-ENTMCNC: 32 % (ref 32–34.5)
MCV RBC AUTO: 93.5 FL (ref 80–99.9)
MONOCYTES ABSOLUTE: 0.5 E9/L (ref 0.1–0.95)
MONOCYTES RELATIVE PERCENT: 11.8 % (ref 2–12)
NEUTROPHILS ABSOLUTE: 2.53 E9/L (ref 1.8–7.3)
NEUTROPHILS RELATIVE PERCENT: 59.5 % (ref 43–80)
PDW BLD-RTO: 16.4 FL (ref 11.5–15)
PLATELET # BLD: 161 E9/L (ref 130–450)
PMV BLD AUTO: 10.7 FL (ref 7–12)
POTASSIUM SERPL-SCNC: 4.1 MMOL/L (ref 3.5–5)
PROTHROMBIN TIME: 25.7 SEC (ref 9.3–12.4)
RBC # BLD: 3.98 E12/L (ref 3.5–5.5)
SODIUM BLD-SCNC: 143 MMOL/L (ref 132–146)
TOTAL PROTEIN: 6.1 G/DL (ref 6.4–8.3)
WBC # BLD: 4.3 E9/L (ref 4.5–11.5)

## 2019-02-28 PROCEDURE — 72125 CT NECK SPINE W/O DYE: CPT

## 2019-02-28 PROCEDURE — 85025 COMPLETE CBC W/AUTO DIFF WBC: CPT

## 2019-02-28 PROCEDURE — 2580000003 HC RX 258: Performed by: RADIOLOGY

## 2019-02-28 PROCEDURE — 80053 COMPREHEN METABOLIC PANEL: CPT

## 2019-02-28 PROCEDURE — 85610 PROTHROMBIN TIME: CPT

## 2019-02-28 PROCEDURE — 6360000004 HC RX CONTRAST MEDICATION: Performed by: RADIOLOGY

## 2019-02-28 PROCEDURE — 74177 CT ABD & PELVIS W/CONTRAST: CPT

## 2019-02-28 PROCEDURE — 36415 COLL VENOUS BLD VENIPUNCTURE: CPT

## 2019-02-28 PROCEDURE — 70450 CT HEAD/BRAIN W/O DYE: CPT

## 2019-02-28 PROCEDURE — 83605 ASSAY OF LACTIC ACID: CPT

## 2019-02-28 PROCEDURE — 99285 EMERGENCY DEPT VISIT HI MDM: CPT

## 2019-02-28 PROCEDURE — 73521 X-RAY EXAM HIPS BI 2 VIEWS: CPT

## 2019-02-28 PROCEDURE — 71260 CT THORAX DX C+: CPT

## 2019-02-28 PROCEDURE — 2580000003 HC RX 258: Performed by: NURSE PRACTITIONER

## 2019-02-28 RX ORDER — 0.9 % SODIUM CHLORIDE 0.9 %
250 INTRAVENOUS SOLUTION INTRAVENOUS ONCE
Status: COMPLETED | OUTPATIENT
Start: 2019-02-28 | End: 2019-02-28

## 2019-02-28 RX ORDER — SODIUM CHLORIDE 0.9 % (FLUSH) 0.9 %
10 SYRINGE (ML) INJECTION
Status: COMPLETED | OUTPATIENT
Start: 2019-02-28 | End: 2019-02-28

## 2019-02-28 RX ADMIN — SODIUM CHLORIDE 250 ML: 9 INJECTION, SOLUTION INTRAVENOUS at 11:55

## 2019-02-28 RX ADMIN — IOPAMIDOL 110 ML: 755 INJECTION, SOLUTION INTRAVENOUS at 13:28

## 2019-02-28 RX ADMIN — Medication 10 ML: at 13:29

## 2019-02-28 ASSESSMENT — PAIN DESCRIPTION - PAIN TYPE: TYPE: ACUTE PAIN

## 2019-02-28 ASSESSMENT — PAIN SCALES - GENERAL: PAINLEVEL_OUTOF10: 10

## 2019-02-28 ASSESSMENT — PAIN DESCRIPTION - DESCRIPTORS: DESCRIPTORS: ACHING

## 2019-02-28 ASSESSMENT — PAIN DESCRIPTION - LOCATION: LOCATION: FLANK

## 2019-02-28 ASSESSMENT — PAIN DESCRIPTION - ORIENTATION: ORIENTATION: LEFT

## 2019-03-01 ENCOUNTER — CARE COORDINATION (OUTPATIENT)
Dept: CARE COORDINATION | Age: 84
End: 2019-03-01

## 2019-03-01 ASSESSMENT — ENCOUNTER SYMPTOMS: DYSPNEA ASSOCIATED WITH: EXERTION

## 2019-03-04 DIAGNOSIS — I50.30 (HFPEF) HEART FAILURE WITH PRESERVED EJECTION FRACTION (HCC): ICD-10-CM

## 2019-03-05 ENCOUNTER — TELEPHONE (OUTPATIENT)
Dept: FAMILY MEDICINE CLINIC | Age: 84
End: 2019-03-05

## 2019-03-05 RX ORDER — POTASSIUM CHLORIDE 20 MEQ/1
TABLET, EXTENDED RELEASE ORAL
Qty: 30 TABLET | Refills: 0 | Status: SHIPPED | OUTPATIENT
Start: 2019-03-05 | End: 2019-04-01 | Stop reason: SDUPTHER

## 2019-03-05 RX ORDER — DILTIAZEM HYDROCHLORIDE 180 MG/1
CAPSULE, COATED, EXTENDED RELEASE ORAL
Qty: 30 CAPSULE | Refills: 0 | Status: SHIPPED | OUTPATIENT
Start: 2019-03-05 | End: 2019-03-18 | Stop reason: ALTCHOICE

## 2019-03-08 ENCOUNTER — ANTI-COAG VISIT (OUTPATIENT)
Dept: FAMILY MEDICINE CLINIC | Age: 84
End: 2019-03-08
Payer: MEDICARE

## 2019-03-08 ENCOUNTER — TELEPHONE (OUTPATIENT)
Dept: FAMILY MEDICINE CLINIC | Age: 84
End: 2019-03-08

## 2019-03-08 DIAGNOSIS — Z79.01 CHRONIC ANTICOAGULATION: ICD-10-CM

## 2019-03-08 LAB — INR BLD: 2.9

## 2019-03-08 PROCEDURE — 93793 ANTICOAG MGMT PT WARFARIN: CPT | Performed by: FAMILY MEDICINE

## 2019-03-08 RX ORDER — LIDOCAINE 50 MG/G
1 PATCH TOPICAL DAILY
Qty: 30 PATCH | Refills: 0 | Status: SHIPPED | OUTPATIENT
Start: 2019-03-08 | End: 2019-09-16 | Stop reason: ALTCHOICE

## 2019-03-15 ENCOUNTER — ANTI-COAG VISIT (OUTPATIENT)
Dept: FAMILY MEDICINE CLINIC | Age: 84
End: 2019-03-15

## 2019-03-15 LAB — INR BLD: 1.5

## 2019-03-15 NOTE — PROGRESS NOTES
Patient called with instruction to take coumadin 7.5 mg 6 days weekly and 3.75 mg (1/2 tab) on Friday. Understanding verbalized. Reports no missed doses in past week. Home care also notified of instruction and to repeat in 1 week.

## 2019-03-18 ENCOUNTER — OFFICE VISIT (OUTPATIENT)
Dept: CARDIOLOGY CLINIC | Age: 84
End: 2019-03-18
Payer: MEDICARE

## 2019-03-18 ENCOUNTER — OFFICE VISIT (OUTPATIENT)
Dept: FAMILY MEDICINE CLINIC | Age: 84
End: 2019-03-18
Payer: MEDICARE

## 2019-03-18 ENCOUNTER — CARE COORDINATION (OUTPATIENT)
Dept: CARE COORDINATION | Age: 84
End: 2019-03-18

## 2019-03-18 VITALS
BODY MASS INDEX: 38.73 KG/M2 | TEMPERATURE: 97.5 F | HEIGHT: 66 IN | HEART RATE: 119 BPM | RESPIRATION RATE: 12 BRPM | DIASTOLIC BLOOD PRESSURE: 60 MMHG | WEIGHT: 241 LBS | OXYGEN SATURATION: 98 % | SYSTOLIC BLOOD PRESSURE: 100 MMHG

## 2019-03-18 VITALS
DIASTOLIC BLOOD PRESSURE: 60 MMHG | SYSTOLIC BLOOD PRESSURE: 110 MMHG | WEIGHT: 240 LBS | BODY MASS INDEX: 38.74 KG/M2 | HEART RATE: 108 BPM

## 2019-03-18 DIAGNOSIS — M19.90 SENILE ARTHRITIS: ICD-10-CM

## 2019-03-18 DIAGNOSIS — I48.91 ATRIAL FIBRILLATION, UNSPECIFIED TYPE (HCC): ICD-10-CM

## 2019-03-18 DIAGNOSIS — I48.21 PERMANENT ATRIAL FIBRILLATION (HCC): ICD-10-CM

## 2019-03-18 DIAGNOSIS — I38 VHD (VALVULAR HEART DISEASE): ICD-10-CM

## 2019-03-18 DIAGNOSIS — Z91.81 AT MAXIMUM RISK FOR FALL: Primary | ICD-10-CM

## 2019-03-18 DIAGNOSIS — I42.8 NONISCHEMIC CARDIOMYOPATHY (HCC): Primary | ICD-10-CM

## 2019-03-18 DIAGNOSIS — M15.9 PRIMARY OSTEOARTHRITIS INVOLVING MULTIPLE JOINTS: ICD-10-CM

## 2019-03-18 LAB
INTERNATIONAL NORMALIZATION RATIO, POC: 1.8
PROTHROMBIN TIME, POC: 21.3

## 2019-03-18 PROCEDURE — 99213 OFFICE O/P EST LOW 20 MIN: CPT | Performed by: NURSE PRACTITIONER

## 2019-03-18 PROCEDURE — G8484 FLU IMMUNIZE NO ADMIN: HCPCS | Performed by: FAMILY MEDICINE

## 2019-03-18 PROCEDURE — 4040F PNEUMOC VAC/ADMIN/RCVD: CPT | Performed by: FAMILY MEDICINE

## 2019-03-18 PROCEDURE — 1036F TOBACCO NON-USER: CPT | Performed by: NURSE PRACTITIONER

## 2019-03-18 PROCEDURE — 4040F PNEUMOC VAC/ADMIN/RCVD: CPT | Performed by: NURSE PRACTITIONER

## 2019-03-18 PROCEDURE — 1123F ACP DISCUSS/DSCN MKR DOCD: CPT | Performed by: FAMILY MEDICINE

## 2019-03-18 PROCEDURE — 1090F PRES/ABSN URINE INCON ASSESS: CPT | Performed by: NURSE PRACTITIONER

## 2019-03-18 PROCEDURE — G8417 CALC BMI ABV UP PARAM F/U: HCPCS | Performed by: NURSE PRACTITIONER

## 2019-03-18 PROCEDURE — G8484 FLU IMMUNIZE NO ADMIN: HCPCS | Performed by: NURSE PRACTITIONER

## 2019-03-18 PROCEDURE — G8598 ASA/ANTIPLAT THER USED: HCPCS | Performed by: FAMILY MEDICINE

## 2019-03-18 PROCEDURE — 85610 PROTHROMBIN TIME: CPT | Performed by: FAMILY MEDICINE

## 2019-03-18 PROCEDURE — G8399 PT W/DXA RESULTS DOCUMENT: HCPCS | Performed by: NURSE PRACTITIONER

## 2019-03-18 PROCEDURE — G8598 ASA/ANTIPLAT THER USED: HCPCS | Performed by: NURSE PRACTITIONER

## 2019-03-18 PROCEDURE — 1090F PRES/ABSN URINE INCON ASSESS: CPT | Performed by: FAMILY MEDICINE

## 2019-03-18 PROCEDURE — G8399 PT W/DXA RESULTS DOCUMENT: HCPCS | Performed by: FAMILY MEDICINE

## 2019-03-18 PROCEDURE — 1123F ACP DISCUSS/DSCN MKR DOCD: CPT | Performed by: NURSE PRACTITIONER

## 2019-03-18 PROCEDURE — 1101F PT FALLS ASSESS-DOCD LE1/YR: CPT | Performed by: NURSE PRACTITIONER

## 2019-03-18 PROCEDURE — 99213 OFFICE O/P EST LOW 20 MIN: CPT | Performed by: FAMILY MEDICINE

## 2019-03-18 PROCEDURE — G8427 DOCREV CUR MEDS BY ELIG CLIN: HCPCS | Performed by: FAMILY MEDICINE

## 2019-03-18 PROCEDURE — 1036F TOBACCO NON-USER: CPT | Performed by: FAMILY MEDICINE

## 2019-03-18 PROCEDURE — 93000 ELECTROCARDIOGRAM COMPLETE: CPT | Performed by: INTERNAL MEDICINE

## 2019-03-18 PROCEDURE — G8427 DOCREV CUR MEDS BY ELIG CLIN: HCPCS | Performed by: NURSE PRACTITIONER

## 2019-03-18 PROCEDURE — 99212 OFFICE O/P EST SF 10 MIN: CPT | Performed by: FAMILY MEDICINE

## 2019-03-18 PROCEDURE — G8417 CALC BMI ABV UP PARAM F/U: HCPCS | Performed by: FAMILY MEDICINE

## 2019-03-18 RX ORDER — ACETAMINOPHEN 650 MG/1
650 SUPPOSITORY RECTAL EVERY 4 HOURS PRN
COMMUNITY
End: 2019-09-26 | Stop reason: ALTCHOICE

## 2019-03-18 RX ORDER — TRAMADOL HYDROCHLORIDE 50 MG/1
50 TABLET ORAL NIGHTLY PRN
Qty: 10 TABLET | Refills: 0 | Status: SHIPPED | OUTPATIENT
Start: 2019-03-18 | End: 2019-04-17

## 2019-03-18 RX ORDER — METOPROLOL SUCCINATE 50 MG/1
75 TABLET, EXTENDED RELEASE ORAL DAILY
Qty: 30 TABLET | Refills: 3 | Status: SHIPPED | OUTPATIENT
Start: 2019-03-18 | End: 2019-03-19

## 2019-03-18 RX ORDER — TRAMADOL HYDROCHLORIDE 50 MG/1
50 TABLET ORAL EVERY 6 HOURS PRN
COMMUNITY
End: 2019-03-18

## 2019-03-18 ASSESSMENT — ENCOUNTER SYMPTOMS: DYSPNEA ASSOCIATED WITH: MINIMAL EXERTION

## 2019-03-19 ENCOUNTER — TELEPHONE (OUTPATIENT)
Dept: CARDIOLOGY CLINIC | Age: 84
End: 2019-03-19

## 2019-03-19 RX ORDER — METOPROLOL SUCCINATE 50 MG/1
50 TABLET, EXTENDED RELEASE ORAL DAILY
Qty: 30 TABLET | Refills: 3 | Status: SHIPPED | OUTPATIENT
Start: 2019-03-19 | End: 2019-04-08 | Stop reason: SDUPTHER

## 2019-03-22 ENCOUNTER — ANTI-COAG VISIT (OUTPATIENT)
Dept: FAMILY MEDICINE CLINIC | Age: 84
End: 2019-03-22

## 2019-03-22 ENCOUNTER — TELEPHONE (OUTPATIENT)
Dept: FAMILY MEDICINE CLINIC | Age: 84
End: 2019-03-22

## 2019-03-22 LAB — INR BLD: 2.1

## 2019-03-29 ENCOUNTER — TELEPHONE (OUTPATIENT)
Dept: FAMILY MEDICINE CLINIC | Age: 84
End: 2019-03-29

## 2019-04-01 DIAGNOSIS — I50.30 (HFPEF) HEART FAILURE WITH PRESERVED EJECTION FRACTION (HCC): ICD-10-CM

## 2019-04-01 RX ORDER — POTASSIUM CHLORIDE 20 MEQ/1
TABLET, EXTENDED RELEASE ORAL
Qty: 30 TABLET | Refills: 0 | Status: SHIPPED | OUTPATIENT
Start: 2019-04-01 | End: 2019-04-08 | Stop reason: SDUPTHER

## 2019-04-01 RX ORDER — CHOLECALCIFEROL (VITAMIN D3) 50 MCG
TABLET ORAL
Qty: 28 TABLET | Refills: 5 | Status: SHIPPED | OUTPATIENT
Start: 2019-04-01 | End: 2019-04-08 | Stop reason: SDUPTHER

## 2019-04-01 RX ORDER — FAMOTIDINE 20 MG/1
TABLET, FILM COATED ORAL
Qty: 28 TABLET | Refills: 5 | Status: SHIPPED | OUTPATIENT
Start: 2019-04-01 | End: 2019-04-08 | Stop reason: SDUPTHER

## 2019-04-03 ENCOUNTER — OFFICE VISIT (OUTPATIENT)
Dept: CARDIOLOGY CLINIC | Age: 84
End: 2019-04-03
Payer: MEDICARE

## 2019-04-03 VITALS
WEIGHT: 236 LBS | BODY MASS INDEX: 37.93 KG/M2 | HEIGHT: 66 IN | DIASTOLIC BLOOD PRESSURE: 80 MMHG | SYSTOLIC BLOOD PRESSURE: 110 MMHG | HEART RATE: 80 BPM

## 2019-04-03 DIAGNOSIS — I42.8 NONISCHEMIC CARDIOMYOPATHY (HCC): Primary | ICD-10-CM

## 2019-04-03 PROCEDURE — G8399 PT W/DXA RESULTS DOCUMENT: HCPCS | Performed by: NURSE PRACTITIONER

## 2019-04-03 PROCEDURE — 1036F TOBACCO NON-USER: CPT | Performed by: NURSE PRACTITIONER

## 2019-04-03 PROCEDURE — 1090F PRES/ABSN URINE INCON ASSESS: CPT | Performed by: NURSE PRACTITIONER

## 2019-04-03 PROCEDURE — 1123F ACP DISCUSS/DSCN MKR DOCD: CPT | Performed by: NURSE PRACTITIONER

## 2019-04-03 PROCEDURE — 99212 OFFICE O/P EST SF 10 MIN: CPT | Performed by: NURSE PRACTITIONER

## 2019-04-03 PROCEDURE — 93000 ELECTROCARDIOGRAM COMPLETE: CPT | Performed by: NURSE PRACTITIONER

## 2019-04-03 PROCEDURE — 4040F PNEUMOC VAC/ADMIN/RCVD: CPT | Performed by: NURSE PRACTITIONER

## 2019-04-03 PROCEDURE — G8427 DOCREV CUR MEDS BY ELIG CLIN: HCPCS | Performed by: NURSE PRACTITIONER

## 2019-04-03 PROCEDURE — G8598 ASA/ANTIPLAT THER USED: HCPCS | Performed by: NURSE PRACTITIONER

## 2019-04-03 PROCEDURE — G8417 CALC BMI ABV UP PARAM F/U: HCPCS | Performed by: NURSE PRACTITIONER

## 2019-04-03 NOTE — PROGRESS NOTES
[facility] PHYSICIAN CARDIOLOGY   OFFICE VISIT        PRIMARY CARE PHYSICIAN:      Luis Welch MD         ALLERGIES / SENSITIVITIES:        Allergies   Allergen Reactions    Codeine Other (See Comments)     Hallucination    Penicillins Hives    Vicodin [Hydrocodone-Acetaminophen] Other (See Comments)     Hallucination            REVIEWED MEDICATIONS:       Current Outpatient Medications   Medication Sig Dispense Refill    famotidine (PEPCID) 20 MG tablet TAKE ONE TABLET BY MOUTH EVERY DAY 28 tablet 5    Cholecalciferol (VITAMIN D) 2000 units TABS tablet TAKE ONE TABLET BY MOUTH EVERY DAY 28 tablet 5    potassium chloride (KLOR-CON M) 20 MEQ extended release tablet TAKE ONE TABLET BY MOUTH EVERY DAY 30 tablet 0    metoprolol succinate (TOPROL XL) 50 MG extended release tablet Take 1 tablet by mouth daily (Patient taking differently: Take 50 mg by mouth 2 times daily ) 30 tablet 3    acetaminophen (TYLENOL) 650 MG suppository Place 650 mg rectally every 4 hours as needed for Fever      traMADol (ULTRAM) 50 MG tablet Take 1 tablet by mouth nightly as needed for Pain for up to 30 days. 10 tablet 0    lidocaine (LIDODERM) 5 % Place 1 patch onto the skin daily 12 hours on, 12 hours off.  30 patch 0    furosemide (LASIX) 40 MG tablet Take 1 tablet by mouth 2 times daily New higher frequency 60 tablet 3    lisinopril (PRINIVIL;ZESTRIL) 10 MG tablet Take 1 tablet by mouth daily New lower dose 30 tablet 3    warfarin (COUMADIN) 7.5 MG tablet Take 1 tablet by mouth daily 90 tablet 5    NONFORMULARY Take 2 capsules by mouth daily URIVAX :For over active bladder      meclizine (ANTIVERT) 25 MG tablet Take 1 tablet by mouth 3 times daily as needed for Dizziness 20 tablet 2    levothyroxine (SYNTHROID) 125 MCG tablet TAKE ONE TABLET BY MOUTH EVERY DAY 28 tablet 5    b complex vitamins capsule Take 1 capsule by mouth daily 28 capsule 5    albuterol sulfate  (90 Base) MCG/ACT inhaler Inhale 2 puffs into the lungs every 6 hours as needed for Wheezing 1 Inhaler 3     No current facility-administered medications for this visit. S: REASON FOR VISIT:      She is followed here by Dr. Fiorella Danielle and has a history of chronic atrial fibrillation and congestive heart failure and nonischemic cardiomyopathy. At her last office visit her heart rate was marginally controlled. She was asked to increase Toprol and she failed to do so. She denies any chest pain. She has chronic dyspnea with exertion and it is no worse than usual.  She cannot tolerate much activity due to chronic back pain. She denies any orthopnea, palpitations, dizziness presyncope and syncope. The swelling of her lower extremities is about. She's had a fall at [employer] last week he did not hit her head and had no injuries       REVIEW OF SYSTEMS:     Constitutional: Admits to fatigue  She denies fevers or chills. HEENT: Positive for dry eyes and difficulty hearing, as she has had problems with her hearing aids. She also complains of sinus drainage. Endocrine: Denies heat or cold intolerance or excessive thirst.  and has had a weight gain of 10 pounds which she thinks is due to eating more    Musculoskeletal: No arthralgias or myalgias   Heme/Lymph: Denies bleeding or unusual bruising. Heart: Lower extremity edema as above. Denies chest discomfort or palpitations. Lungs: Denies exertional dyspnea, orthopnea, PND or cough. GI: Denies abdominal pain, vomiting, diarrhea or dark/bloody stools. : Positive for urinary frequency and urgency. Denies hematuria. Psych: Denies mood changed, anxiety, depression. Neuro: Denies dizziness or syncope, numbness or tingling. CARDIOVASCULAR HISTORY:     1. Obesity   2. Hypertension   3.  Nonischemic Cardiomoypathy/CHF    A. 2-D echocardiogram August 14, 3396 stage I diastolic dysfunction EF 34% mild mitral regurgitation and mild tricuspid regurgitation and mild pulmonic cell carcinoma of neck   3. Depression   4. GERD   5. Hypothyroidism s/p thyroidectomy    6. Hyperparathyroidism s/p parathyroidectomy    7. History of kidney stones    8. Urinary incontinence    9. S/p bladder resuspension   10. S/p bilateral cataract surgery   11. S/p cholecystectomy   12. S/p removal of cyst from right index finger   13. S/p left shoulder replacement       O:  COMPLETE PHYSICAL EXAM:         /80   Pulse 80   Ht 5' 6\" (1.676 m)   Wt 236 lb (107 kg)   BMI 38.09 kg/m²       General:   Well-nourished well-developed female in no acute distress. Head & Neck:  No JVD. No carotid bruits. Thick neck. Mucous membranes moist.   Chest:   No deformities. Symmetrical and nontender. No respiratory distress   Lungs:   Clear to auscultation bilaterally. Heart:   Irregularly irregular rhythm. Grade 2/6 SM. Abdomen:  Soft without organomegaly or masses. Nontender. Extremities:  Mild nonpitting edema of lower extremities. Bilateral varicose veins. Neuro:   Alert & orient x 3, no focal deficits     REVIEW OF DIAGNOSTIC TESTS:      EKG atrial fibrillation with poor R wave progression and nonspecific ST changes, low voltage in precordial leads     ASSESSMENT / DIAGNOSIS:    1. Heart failure with fairly well preserved ejection fraction: She is compensated today   2. History of nonischemic cardiomyopathy with normalization of ejection fraction by echocardiogram in November 2018   4. Chronic atrial fibrillation    5. Chronic Coumadin therapy INR is followed by primary care. 6. Hypertension: well controlled   7. Hypothyroidism: on replacement    8. ? Memory loss    9. Mild mitral regurgitation and mild-to-moderate tricuspid regurgitation and moderate coronary hypertension by 2-D echocardiogram in November 2018 with borderline left ventricular systolic function   10. Obesity. TREATMENT PLAN:   1. Continue current medications 2. Return in 6 months to see Lina Melendez.         The patient's current medication list, allergies, problem list and results of all previously ordered tests were reviewed at today's visit    [address]  [facility] [id number] [phone]

## 2019-04-05 ENCOUNTER — ANTI-COAG VISIT (OUTPATIENT)
Dept: FAMILY MEDICINE CLINIC | Age: 84
End: 2019-04-05
Payer: MEDICARE

## 2019-04-05 DIAGNOSIS — Z79.01 CHRONIC ANTICOAGULATION: ICD-10-CM

## 2019-04-05 DIAGNOSIS — I38 VHD (VALVULAR HEART DISEASE): ICD-10-CM

## 2019-04-05 DIAGNOSIS — I36.1 NON-RHEUMATIC TRICUSPID VALVE INSUFFICIENCY: ICD-10-CM

## 2019-04-05 LAB — INR BLD: 2.4

## 2019-04-05 PROCEDURE — 93793 ANTICOAG MGMT PT WARFARIN: CPT | Performed by: FAMILY MEDICINE

## 2019-04-05 NOTE — PROGRESS NOTES
Message left on [facility] nurse line with coumadin instructions and next anti-coag date. I also left message on patient's voice mail with coumadin instructions and anti-coag date, requested a return call to confirm that she received instructions.

## 2019-04-08 ENCOUNTER — CARE COORDINATION (OUTPATIENT)
Dept: CARE COORDINATION | Age: 84
End: 2019-04-08

## 2019-04-08 DIAGNOSIS — I50.30 (HFPEF) HEART FAILURE WITH PRESERVED EJECTION FRACTION (HCC): ICD-10-CM

## 2019-04-08 DIAGNOSIS — I25.10 CORONARY ARTERY DISEASE INVOLVING NATIVE CORONARY ARTERY OF NATIVE HEART WITHOUT ANGINA PECTORIS: ICD-10-CM

## 2019-04-08 DIAGNOSIS — E03.9 HYPOTHYROIDISM, UNSPECIFIED TYPE: ICD-10-CM

## 2019-04-08 RX ORDER — METOPROLOL SUCCINATE 50 MG/1
50 TABLET, EXTENDED RELEASE ORAL 2 TIMES DAILY
Qty: 180 TABLET | Refills: 1 | Status: SHIPPED | OUTPATIENT
Start: 2019-04-08 | End: 2019-08-19 | Stop reason: SDUPTHER

## 2019-04-08 RX ORDER — FUROSEMIDE 40 MG/1
40 TABLET ORAL 2 TIMES DAILY
Qty: 180 TABLET | Refills: 0 | Status: SHIPPED | OUTPATIENT
Start: 2019-04-08 | End: 2019-05-27 | Stop reason: SDUPTHER

## 2019-04-08 RX ORDER — LISINOPRIL 10 MG/1
10 TABLET ORAL DAILY
Qty: 90 TABLET | Refills: 0 | Status: SHIPPED | OUTPATIENT
Start: 2019-04-08 | End: 2019-07-09 | Stop reason: SDUPTHER

## 2019-04-08 RX ORDER — FAMOTIDINE 20 MG/1
TABLET, FILM COATED ORAL
Qty: 90 TABLET | Refills: 0 | Status: SHIPPED | OUTPATIENT
Start: 2019-04-08 | End: 2019-07-09 | Stop reason: SDUPTHER

## 2019-04-08 RX ORDER — VITAMIN B COMPLEX
1 CAPSULE ORAL DAILY
Qty: 90 CAPSULE | Refills: 1 | Status: SHIPPED
Start: 2019-04-08 | End: 2020-02-25

## 2019-04-08 RX ORDER — ALBUTEROL SULFATE 90 UG/1
2 AEROSOL, METERED RESPIRATORY (INHALATION) EVERY 6 HOURS PRN
Qty: 1 INHALER | Refills: 3 | Status: SHIPPED | OUTPATIENT
Start: 2019-04-08 | End: 2019-10-18 | Stop reason: SDUPTHER

## 2019-04-08 RX ORDER — POTASSIUM CHLORIDE 20 MEQ/1
TABLET, EXTENDED RELEASE ORAL
Qty: 30 TABLET | Refills: 1 | Status: SHIPPED | OUTPATIENT
Start: 2019-04-08 | End: 2019-07-09 | Stop reason: SDUPTHER

## 2019-04-08 RX ORDER — LEVOTHYROXINE SODIUM 0.12 MG/1
TABLET ORAL
Qty: 90 TABLET | Refills: 1 | Status: SHIPPED | OUTPATIENT
Start: 2019-04-08 | End: 2019-08-20 | Stop reason: SDUPTHER

## 2019-04-08 RX ORDER — CHOLECALCIFEROL (VITAMIN D3) 50 MCG
TABLET ORAL
Qty: 90 TABLET | Refills: 1 | Status: SHIPPED | OUTPATIENT
Start: 2019-04-08 | End: 2020-01-20

## 2019-04-08 RX ORDER — WARFARIN SODIUM 7.5 MG/1
7.5 TABLET ORAL DAILY
Qty: 90 TABLET | Refills: 0 | Status: SHIPPED | OUTPATIENT
Start: 2019-04-08 | End: 2019-07-09 | Stop reason: SDUPTHER

## 2019-04-08 ASSESSMENT — ENCOUNTER SYMPTOMS: DYSPNEA ASSOCIATED WITH: EXERTION

## 2019-04-08 NOTE — TELEPHONE ENCOUNTER
Patient is switching to [facility] and is requesting that all her daily meds and her PRN inhaler be sent  With 90 day supplies to them.

## 2019-04-08 NOTE — CARE COORDINATION
myself as directed - Daily and write down weights    Patient Reported Weight 4/8/2019 228 lbs  Barriers: none  Plan for overcoming my barriers: N/A  Confidence: 9/10  Anticipated Goal Completion Date: 2/1/19 *new 5/1/19    Continue to monitor with care coordination              Prior to Admission medications    Medication Sig Start Date End Date Taking? Authorizing Provider   famotidine (PEPCID) 20 MG tablet TAKE ONE TABLET BY MOUTH EVERY DAY 4/1/19   Cuate Dai MD   Cholecalciferol (VITAMIN D) 2000 units TABS tablet TAKE ONE TABLET BY MOUTH EVERY DAY 4/1/19   Cuate Dai MD   potassium chloride (KLOR-CON M) 20 MEQ extended release tablet TAKE ONE TABLET BY MOUTH EVERY DAY 4/1/19   Cuate Dai MD   metoprolol succinate (TOPROL XL) 50 MG extended release tablet Take 1 tablet by mouth daily  Patient taking differently: Take 50 mg by mouth 2 times daily  3/19/19   Valarie Gant MD   acetaminophen (TYLENOL) 650 MG suppository Place 650 mg rectally every 4 hours as needed for Fever    Historical Provider, MD   traMADol (ULTRAM) 50 MG tablet Take 1 tablet by mouth nightly as needed for Pain for up to 30 days.  3/18/19 4/17/19  Ishmael Duarte MD   lidocaine (LIDODERM) 5 % Place 1 patch onto the skin daily 12 hours on, 12 hours off. 3/8/19   Cuate Dai MD   furosemide (LASIX) 40 MG tablet Take 1 tablet by mouth 2 times daily New higher frequency 1/16/19   Darwin Oates APRN - CNS   lisinopril (PRINIVIL;ZESTRIL) 10 MG tablet Take 1 tablet by mouth daily New lower dose 1/8/19   Paulette Kirkland MD   warfarin (COUMADIN) 7.5 MG tablet Take 1 tablet by mouth daily 1/9/19   Paulette Kirkland MD   NONFORMULARY Take 2 capsules by mouth daily URIVAX :For over active bladder    Historical Provider, MD   meclizine (ANTIVERT) 25 MG tablet Take 1 tablet by mouth 3 times daily as needed for Dizziness 12/20/18 12/20/19  Cuate Dai MD   levothyroxine (SYNTHROID) 125 MCG tablet TAKE ONE TABLET BY MOUTH EVERY

## 2019-04-08 NOTE — PROGRESS NOTES
Spoke with Mary Au at [facility], she confirmed that they received coumadin and INR instructions. Spoke with patient, she confirmed correct dose and next date for anti-coag visit. She will call and schedule lab appointment if Jeffrey Jaimes is unable to do the INR.

## 2019-05-09 ENCOUNTER — CARE COORDINATION (OUTPATIENT)
Dept: CARE COORDINATION | Age: 84
End: 2019-05-09

## 2019-05-09 ASSESSMENT — ENCOUNTER SYMPTOMS: DYSPNEA ASSOCIATED WITH: EXERTION

## 2019-05-09 NOTE — CARE COORDINATION
monitor with care coordination              Prior to Admission medications    Medication Sig Start Date End Date Taking?  Authorizing Provider   levothyroxine (SYNTHROID) 125 MCG tablet TAKE ONE TABLET BY MOUTH EVERY DAY 4/8/19   Ad Castellon MD   famotidine (PEPCID) 20 MG tablet TAKE ONE TABLET BY MOUTH EVERY DAY 4/8/19   Ad Castellon MD   furosemide (LASIX) 40 MG tablet Take 1 tablet by mouth 2 times daily New higher frequency 4/8/19   Ad Castellon MD   lisinopril (PRINIVIL;ZESTRIL) 10 MG tablet Take 1 tablet by mouth daily New lower dose 4/8/19   Ad Castellon MD   potassium chloride (KLOR-CON M) 20 MEQ extended release tablet TAKE ONE TABLET BY MOUTH EVERY DAY 4/8/19   Ad Castellon MD   Cholecalciferol (VITAMIN D) 2000 units TABS tablet TAKE ONE TABLET BY MOUTH EVERY DAY 4/8/19   Ad Castellon MD   b complex vitamins capsule Take 1 capsule by mouth daily 4/8/19   Ad Castellon MD   metoprolol succinate (TOPROL XL) 50 MG extended release tablet Take 1 tablet by mouth 2 times daily 4/8/19   Ad Castellon MD   warfarin (COUMADIN) 7.5 MG tablet Take 1 tablet by mouth daily 4/8/19   Ad Castellon MD   albuterol sulfate  (90 Base) MCG/ACT inhaler Inhale 2 puffs into the lungs every 6 hours as needed for Wheezing 4/8/19   Ad Castellon MD   acetaminophen (TYLENOL) 650 MG suppository Place 650 mg rectally every 4 hours as needed for Fever    Historical Provider, MD   lidocaine (LIDODERM) 5 % Place 1 patch onto the skin daily 12 hours on, 12 hours off. 3/8/19   Tom Quiroz MD   NONFORMULARY Take 2 capsules by mouth daily URIVAX :For over active bladder    Historical Provider, MD   meclizine (ANTIVERT) 25 MG tablet Take 1 tablet by mouth 3 times daily as needed for Dizziness 12/20/18 12/20/19  Tom Quiroz MD       Future Appointments   Date Time Provider Hernandez Mcintyre   6/3/2019  2:40 PM Tom Quiroz MD Planom[facility] guadalupe [facility]     ,   Congestive Heart Failure Assessment    Are you

## 2019-06-20 ENCOUNTER — TELEPHONE (OUTPATIENT)
Dept: FAMILY MEDICINE CLINIC | Age: 84
End: 2019-06-20

## 2019-06-20 ENCOUNTER — CARE COORDINATION (OUTPATIENT)
Dept: CARE COORDINATION | Age: 84
End: 2019-06-20

## 2019-06-20 ASSESSMENT — ENCOUNTER SYMPTOMS: DYSPNEA ASSOCIATED WITH: MINIMAL EXERTION

## 2019-06-20 NOTE — TELEPHONE ENCOUNTER
[facility] [place]        Female, 81 y.o., 1935        MRN:  <[id number]>      Weight:  236 lb (107 kg)      Phone:  [phone] (H)      PCP:  Ricardo Hicks MD          Pain Agreement:  Not on File              Allergies    Codeine    Penicillins    Vicodin [Hydrocodone-acetaminophen]        Health Maintenance:  Due        Coverage:  [facility] [address] [facility] MEDICARE COMPLETE      Next Appt:  None      Pt Comm Pref:  Mail [3] MyChart [2] Phone [4]                                                                                      Care Coordination (COPD/CHF follow up)                   You routed conversation to Ricardo Hicks MD 3 hours ago (11:58 AM)              Andrews Mcdaniels RN Sent to [facility] Practice Support A Pool 4 hours ago (11:19 AM)                  Spoke with [place] today for care coordination. She has cancelled her last 3 scheduled appointments and has not had an INR since 4/5/19. She also states she started taking Aloe Vesta and Ginseng tablets. Please have office staff call patient with any advice or instructions. Thank you. Routing comment                 Andrews Mcdaniels RN 4 hours ago (11:03 AM)                      Expand All Collapse All                      Expand widget buttonCollapse widget button        Show:Clear all      ManualTemplateCopied    Added by: Andrews Mcdaniels RN      [facility] for detailscustomization button                                                                                                                        Ambulatory Care Coordination Note    6/20/2019    CM Risk Score: 7    Wei Mortality Risk Score: 26         ACC: Sunshine Smalls, RN         Summary Note:     [facility] spoke with [place] for COPD/CHF follow up. She reports she is feeling OK, but a little fatigued. She is having shortness of breath with activity. She has not been using her rescue inhaler.  ACC will weight myself as directed - Daily and write down weights         Patient Reported Weight 5/9/2019 240 lbs    Barriers: none    Plan for overcoming my barriers: N/A    Confidence: 9/10    Anticipated Goal Completion Date: 2/1/19 *new 5/1/19         Continue to monitor with care coordination                                       Home Medications                                                                                                                                                                                                                                                                                                                                                                                                                                                                                                                                       No future appointments.     ,       Congestive Heart Failure Assessment        Are you currently restricting fluids?:  No Restriction    Do you understand a low sodium diet?:  Yes    Do you understand how to read food labels?:  Yes    How many restaurant meals do you eat per week?:  1-2    Do you salt your food before tasting it?:  No                      Symptoms:           CHF associated angina: Neg, CHF associated dyspnea on exertion: Pos, CHF associated fatigue: Pos, CHF associated leg swelling: Pos, CHF associated orthostatic hypotension: Neg, CHF associated PND: Neg, CHF associated shortness of breath: Neg, CHF associated weakness: Neg            Symptom course:  no change    Patient-reported weight (lb):  244    Weight trend:  stable    Salt intake watch compared to last visit:  stable              ,       COPD Assessment        Does the patient understand envrionmental exposure?:  Yes    Is the patient able to verbalize Rescue vs. Long Acting medications?:  Yes    Does the patient have a nebulizer?:  Yes    Does the patient use a space with inhaled

## 2019-06-20 NOTE — CARE COORDINATION
Ambulatory Care Coordination Note  6/20/2019  CM Risk Score: 7  Wei Mortality Risk Score: 26    ACC: Darin Rutherford, RN    Summary Note:   ACC spoke with [place] for COPD/CHF follow up. She reports she is feeling OK, but a little fatigued. She is having shortness of breath with activity. She has not been using her rescue inhaler. ACC discussed the use of rescue inhalers, she verbalized understanding. She states she is taking her medications as prescribed. She reports she started taking Aloe Vesta and Ginseng. She reports she is using her cane or rollator when ambulating. Noé Ng continue to come weekly to help with minor chores. She does not have a future appointment scheduled. She has not had an INR since 4/5/19. ACC discussed the importance of INR checks with her Coumadin dosing. PLAN  Send message to  to schedule appointment. Follow up in 1 month and review daily weights and appointments with next interaction. Care Coordination Interventions    Program Enrollment:  Complex Care  Referral from Primary Care Provider:  No  Suggested Interventions and Community Resources  Cardiac Rehab:  Not Started (Comment: never started)  Fall Risk Prevention: In [address]:  Completed (Comment: [facility] 3/2/19 - 4/30/19)  Medi Set or Pill Pack:  Completed  Senior Services:  Completed (Comment: Merlin Kingfisher)  Zone Management Tools:  Completed (Comment: COPD/CHF)  Other Services or Interventions:  script for wheeled walker         Goals Addressed                 This Visit's Progress     Medication Management        I will take my medication as directed. I will notify my provider of any problems with medications, like adverse effects or side effects. I will notify my provider for advice before I stop taking any of my medication.   I will notify my provider of over the counter medications I am taking    Barriers: lack of education  Plan for overcoming my barriers: medication education, care coordination  Confidence: 8/10  Anticipated Goal Completion Date: 9/20/19         COMPLETED: Reduce Falls    On track     I will reduce my risk of falls by the following: Remove rugs or use non slip rugs  Install grab bars in bathroom  Use walking aids like cane or walker    Barriers: none  Plan for overcoming my barriers: always use walker  Confidence: 9/10  Anticipated Goal Completion Date: 5/1/19       COMPLETED: Self Monitoring   On track     Daily Weights - I will weight myself as directed - Daily and write down weights    Patient Reported Weight 5/9/2019 240 lbs  Barriers: none  Plan for overcoming my barriers: N/A  Confidence: 9/10  Anticipated Goal Completion Date: 2/1/19 *new 5/1/19    Continue to monitor with care coordination              Prior to Admission medications    Medication Sig Start Date End Date Taking?  Authorizing Provider   furosemide (LASIX) 40 MG tablet TAKE 1 TABLET BY MOUTH  TWICE A DAY 5/28/19  Yes Jayme Youssef MD   levothyroxine (SYNTHROID) 125 MCG tablet TAKE ONE TABLET BY MOUTH EVERY DAY 4/8/19  Yes Briseyda Hurley MD   famotidine (PEPCID) 20 MG tablet TAKE ONE TABLET BY MOUTH EVERY DAY 4/8/19  Yes Briseyda Hurley MD   lisinopril (PRINIVIL;ZESTRIL) 10 MG tablet Take 1 tablet by mouth daily New lower dose 4/8/19  Yes Briseyda Hurley MD   potassium chloride (KLOR-CON M) 20 MEQ extended release tablet TAKE ONE TABLET BY MOUTH EVERY DAY 4/8/19  Yes Briseyda Hurley MD   Cholecalciferol (VITAMIN D) 2000 units TABS tablet TAKE ONE TABLET BY MOUTH EVERY DAY 4/8/19  Yes Briseyda Hurley MD   b complex vitamins capsule Take 1 capsule by mouth daily 4/8/19  Yes Briseyda Hurley MD   metoprolol succinate (TOPROL XL) 50 MG extended release tablet Take 1 tablet by mouth 2 times daily 4/8/19  Yes Briseyda Hurley MD   warfarin (COUMADIN) 7.5 MG tablet Take 1 tablet by mouth daily 4/8/19  Yes Briseyda Hurley MD   albuterol sulfate  (90 Base) MCG/ACT inhaler Inhale symptoms that are causing concern?:  No

## 2019-06-24 ENCOUNTER — TELEPHONE (OUTPATIENT)
Dept: FAMILY MEDICINE CLINIC | Age: 84
End: 2019-06-24

## 2019-06-24 NOTE — TELEPHONE ENCOUNTER
----- Message from Fly Dunbar RN sent at 6/24/2019 11:02 AM EDT -----  Does [place] need an INR drawn? I did not see an appointment scheduled for her.

## 2019-06-26 ENCOUNTER — NURSE ONLY (OUTPATIENT)
Dept: FAMILY MEDICINE CLINIC | Age: 84
End: 2019-06-26
Payer: MEDICARE

## 2019-06-26 ENCOUNTER — ANTI-COAG VISIT (OUTPATIENT)
Dept: FAMILY MEDICINE CLINIC | Age: 84
End: 2019-06-26
Payer: MEDICARE

## 2019-06-26 DIAGNOSIS — Z79.01 CHRONIC ANTICOAGULATION: ICD-10-CM

## 2019-06-26 DIAGNOSIS — I48.21 PERMANENT ATRIAL FIBRILLATION (HCC): ICD-10-CM

## 2019-06-26 LAB
INTERNATIONAL NORMALIZATION RATIO, POC: 2
PROTHROMBIN TIME, POC: 24.2

## 2019-06-26 PROCEDURE — 85610 PROTHROMBIN TIME: CPT | Performed by: FAMILY MEDICINE

## 2019-06-26 PROCEDURE — 93793 ANTICOAG MGMT PT WARFARIN: CPT | Performed by: FAMILY MEDICINE

## 2019-07-05 ENCOUNTER — ANTI-COAG VISIT (OUTPATIENT)
Dept: FAMILY MEDICINE CLINIC | Age: 84
End: 2019-07-05

## 2019-07-05 ENCOUNTER — OFFICE VISIT (OUTPATIENT)
Dept: FAMILY MEDICINE CLINIC | Age: 84
End: 2019-07-05
Payer: MEDICARE

## 2019-07-05 VITALS
SYSTOLIC BLOOD PRESSURE: 109 MMHG | BODY MASS INDEX: 39.7 KG/M2 | HEIGHT: 66 IN | OXYGEN SATURATION: 96 % | WEIGHT: 247 LBS | HEART RATE: 86 BPM | DIASTOLIC BLOOD PRESSURE: 69 MMHG | TEMPERATURE: 97.8 F

## 2019-07-05 DIAGNOSIS — I48.21 PERMANENT ATRIAL FIBRILLATION (HCC): ICD-10-CM

## 2019-07-05 DIAGNOSIS — R35.0 URINARY FREQUENCY: ICD-10-CM

## 2019-07-05 DIAGNOSIS — R53.83 FATIGUE, UNSPECIFIED TYPE: Primary | ICD-10-CM

## 2019-07-05 LAB
BILIRUBIN, POC: NEGATIVE
BLOOD URINE, POC: NORMAL
CLARITY, POC: NORMAL
COLOR, POC: NORMAL
GLUCOSE URINE, POC: NEGATIVE
INTERNATIONAL NORMALIZATION RATIO, POC: 2.6
KETONES, POC: NEGATIVE
LEUKOCYTE EST, POC: NORMAL
NITRITE, POC: NEGATIVE
PH, POC: 7
PROTEIN, POC: NORMAL
PROTHROMBIN TIME, POC: 30.8
SPECIFIC GRAVITY, POC: 1.01
UROBILINOGEN, POC: 1

## 2019-07-05 PROCEDURE — 1036F TOBACCO NON-USER: CPT | Performed by: FAMILY MEDICINE

## 2019-07-05 PROCEDURE — 1123F ACP DISCUSS/DSCN MKR DOCD: CPT | Performed by: FAMILY MEDICINE

## 2019-07-05 PROCEDURE — 93793 ANTICOAG MGMT PT WARFARIN: CPT | Performed by: FAMILY MEDICINE

## 2019-07-05 PROCEDURE — 99213 OFFICE O/P EST LOW 20 MIN: CPT | Performed by: FAMILY MEDICINE

## 2019-07-05 PROCEDURE — 1090F PRES/ABSN URINE INCON ASSESS: CPT | Performed by: FAMILY MEDICINE

## 2019-07-05 PROCEDURE — 85610 PROTHROMBIN TIME: CPT | Performed by: FAMILY MEDICINE

## 2019-07-05 PROCEDURE — 81002 URINALYSIS NONAUTO W/O SCOPE: CPT | Performed by: FAMILY MEDICINE

## 2019-07-05 PROCEDURE — G8598 ASA/ANTIPLAT THER USED: HCPCS | Performed by: FAMILY MEDICINE

## 2019-07-05 PROCEDURE — 4040F PNEUMOC VAC/ADMIN/RCVD: CPT | Performed by: FAMILY MEDICINE

## 2019-07-05 PROCEDURE — 99212 OFFICE O/P EST SF 10 MIN: CPT | Performed by: FAMILY MEDICINE

## 2019-07-05 PROCEDURE — G8427 DOCREV CUR MEDS BY ELIG CLIN: HCPCS | Performed by: FAMILY MEDICINE

## 2019-07-05 PROCEDURE — G8399 PT W/DXA RESULTS DOCUMENT: HCPCS | Performed by: FAMILY MEDICINE

## 2019-07-05 PROCEDURE — G8417 CALC BMI ABV UP PARAM F/U: HCPCS | Performed by: FAMILY MEDICINE

## 2019-07-05 RX ORDER — NEOMYCIN SULFATE, POLYMYXIN B SULFATE AND DEXAMETHASONE 3.5; 10000; 1 MG/ML; [USP'U]/ML; MG/ML
SUSPENSION/ DROPS OPHTHALMIC
Refills: 3 | COMMUNITY
Start: 2019-06-03 | End: 2020-01-20

## 2019-07-05 NOTE — PROGRESS NOTES
Patient is an 44-year-old female who presents for evaluation of increased urination and fatigue. Patient states that she is taking 10 mg of Lasix twice a day with the last dose being slightly before bed. She complains of multiple nighttime awakenings to urinate. She denies fevers chills abdominal pain nausea vomiting and back pain. Although she does endorse some pelvic pain throughout the day. Blood pressure 109/69, pulse 86, temperature 97.8 °F (36.6 °C), temperature source Oral, height 5' 6\" (1.676 m), weight 247 lb (112 kg), SpO2 96 %, not currently breastfeeding. HEENT WNL     Heart irreg irreg    No JVD    Lungs clear    abd non-tender  No CVA  Tenderness        No edema    Pulses intact     UA  To time the Lasix so it wont keep her up at night    Same Synthroid  Same Coumadin    If UA + would treat    Attending Physician Statement  I have discussed the case, including pertinent history and exam findings with the resident. I agree with the documented assessment and plan.
11/16/2010    DJD (degenerative joint disease)     SEVERE IN RIGHT HAND, IN MULTIPLE OTHER JOINTS    GERD (gastroesophageal reflux disease) 11/16/2010    History of cardiovascular stress test 03/18/2014    lexiscan    Hyperparathyroidism ([facility] [address] 11/16/2010    Had parathyroidectomy    Hypertension 11/16/2010    Hypothyroidism 11/16/2010    Mild mitral regurgitation 7/10/14    Osteoarthritis 11/16/2010    Pulmonary hypertension ([address] 7/10/14    mild    Renal calculi     x3    Tricuspid regurgitation 7/10/14    mild-moderate    Urinary incontinence 11/16/2010     Past Surgical History:   Procedure Laterality Date    CARDIAC CATHETERIZATION  3/22/14    CARPAL TUNNEL RELEASE      Right hand.  CATARACT REMOVAL  6/2005    right, bilateral cataract surgery.  CATARACT REMOVAL WITH IMPLANT Bilateral     CHOLECYSTECTOMY  1972    COLONOSCOPY      FINGER SURGERY  7/2011    S/P removal of cyst from right index finger.  FRACTURE SURGERY Left     arm fractured-casted, Bilateral feet fractures-casted.  INCONTINENCE SURGERY  5/2005    Urinary incontinence, S/P bladder suspension.  INNER EAR SURGERY  2011    tube place in right ear   [address]    KIDNEY STONE SURGERY  1981    KNEE ARTHROPLASTY Bilateral 9/2005    PARATHYROIDECTOMY      Three times    SHOULDER ARTHROPLASTY Left 2009    SHOULDER ARTHROPLASTY Left      shoulder total replacement.  THYROID SURGERY  5/2005    recurrent    THYROIDECTOMY  1960     Allergies :    Allergies   Allergen Reactions    Codeine Other (See Comments)     Hallucination    Penicillins Hives    Vicodin [Hydrocodone-Acetaminophen] Other (See Comments)     Hallucination       Medication List :    Current Outpatient Medications   Medication Sig Dispense Refill    neomycin-polymyxin-dexameth (MAXITROL) 3.5-83324-6.1 ophthalmic suspension INT 3 DROPS INTO BOTH EARS BID PRN  3    furosemide (LASIX) 40 MG tablet TAKE 1 TABLET BY MOUTH  TWICE A DAY

## 2019-07-09 DIAGNOSIS — I50.30 (HFPEF) HEART FAILURE WITH PRESERVED EJECTION FRACTION (HCC): ICD-10-CM

## 2019-07-09 RX ORDER — LISINOPRIL 10 MG/1
TABLET ORAL
Qty: 90 TABLET | Refills: 0 | Status: SHIPPED | OUTPATIENT
Start: 2019-07-09 | End: 2019-08-19 | Stop reason: SDUPTHER

## 2019-07-09 RX ORDER — FAMOTIDINE 20 MG/1
TABLET, FILM COATED ORAL
Qty: 90 TABLET | Refills: 0 | Status: SHIPPED | OUTPATIENT
Start: 2019-07-09 | End: 2019-08-19 | Stop reason: SDUPTHER

## 2019-07-09 RX ORDER — WARFARIN SODIUM 7.5 MG/1
7.5 TABLET ORAL DAILY
Qty: 90 TABLET | Refills: 0 | Status: SHIPPED | OUTPATIENT
Start: 2019-07-09 | End: 2019-08-19 | Stop reason: SDUPTHER

## 2019-07-09 RX ORDER — POTASSIUM CHLORIDE 20 MEQ/1
TABLET, EXTENDED RELEASE ORAL
Qty: 90 TABLET | Refills: 3 | Status: SHIPPED
Start: 2019-07-09 | End: 2020-02-25 | Stop reason: SDUPTHER

## 2019-07-17 ENCOUNTER — ANTI-COAG VISIT (OUTPATIENT)
Dept: FAMILY MEDICINE CLINIC | Age: 84
End: 2019-07-17

## 2019-07-17 ENCOUNTER — NURSE ONLY (OUTPATIENT)
Dept: FAMILY MEDICINE CLINIC | Age: 84
End: 2019-07-17
Payer: MEDICARE

## 2019-07-17 DIAGNOSIS — Z79.01 CHRONIC ANTICOAGULATION: ICD-10-CM

## 2019-07-17 DIAGNOSIS — I38 VHD (VALVULAR HEART DISEASE): ICD-10-CM

## 2019-07-17 LAB
INTERNATIONAL NORMALIZATION RATIO, POC: 3.2
PROTHROMBIN TIME, POC: 38.2

## 2019-07-17 PROCEDURE — 93793 ANTICOAG MGMT PT WARFARIN: CPT | Performed by: FAMILY MEDICINE

## 2019-07-17 PROCEDURE — 85610 PROTHROMBIN TIME: CPT | Performed by: FAMILY MEDICINE

## 2019-08-02 ENCOUNTER — CARE COORDINATION (OUTPATIENT)
Dept: CARE COORDINATION | Age: 84
End: 2019-08-02

## 2019-08-02 ASSESSMENT — ENCOUNTER SYMPTOMS: DYSPNEA ASSOCIATED WITH: EXERTION

## 2019-08-02 NOTE — CARE COORDINATION
Ambulatory Care Coordination Note  8/2/2019  CM Risk Score: 7  Charlson 10 Year Mortality Risk Score: 98%     ACC: Issa Rosario RN    Summary Note:   [facility] spoke with [place] for CHF/COPD follow up. She reports she is feeling well. She was without air conditioning for a a short time last month and she was fatigued. She admits to using her rescue inhaler. She states she is taking her medications as prescribed. She continues to weigh herself daily. She has been having her INR checked monthly. Future appointments were reviewed. PLAN  Follow up in 1 month and review daily weights and medications and assess care coordination needs with next interaction. Care Coordination Interventions    Program Enrollment:  Complex Care  Referral from Primary Care Provider:  No  Suggested Interventions and Community Resources  Cardiac Rehab:  Not Started (Comment: never started)  Fall Risk Prevention:  Completed  [address]:  Completed (Comment: [id number] 3/2/19 - 4/30/19)  Medi Set or Pill Pack:  Completed (Comment: prepackaged meds)  Senior Services:  Completed (Comment: [place])  Zone Management Tools:  Completed (Comment: COPD/CHF)  Other Services or Interventions:  script for wheeled walker         Goals Addressed                 This Visit's Progress     Medication Management   On track     I will take my medication as directed. I will notify my provider of any problems with medications, like adverse effects or side effects. I will notify my provider for advice before I stop taking any of my medication. I will notify my provider of over the counter medications I am taking    Barriers: lack of education  Plan for overcoming my barriers: medication education, care coordination  Confidence: 8/10  Anticipated Goal Completion Date: 9/20/19              Prior to Admission medications    Medication Sig Start Date End Date Taking?  Authorizing Provider   famotidine (PEPCID) 20 MG tablet TAKE 1 TABLET

## 2019-08-07 ENCOUNTER — ANTI-COAG VISIT (OUTPATIENT)
Dept: FAMILY MEDICINE CLINIC | Age: 84
End: 2019-08-07
Payer: MEDICARE

## 2019-08-07 ENCOUNTER — NURSE ONLY (OUTPATIENT)
Dept: FAMILY MEDICINE CLINIC | Age: 84
End: 2019-08-07
Payer: MEDICARE

## 2019-08-07 DIAGNOSIS — Z79.01 CHRONIC ANTICOAGULATION: ICD-10-CM

## 2019-08-07 DIAGNOSIS — Z79.01 CHRONIC ANTICOAGULATION: Primary | ICD-10-CM

## 2019-08-07 LAB
INTERNATIONAL NORMALIZATION RATIO, POC: 3.3
PROTHROMBIN TIME, POC: 39.1

## 2019-08-07 PROCEDURE — 85610 PROTHROMBIN TIME: CPT

## 2019-08-07 PROCEDURE — 93793 ANTICOAG MGMT PT WARFARIN: CPT | Performed by: FAMILY MEDICINE

## 2019-08-19 RX ORDER — LISINOPRIL 10 MG/1
TABLET ORAL
Qty: 90 TABLET | Refills: 0 | Status: SHIPPED | OUTPATIENT
Start: 2019-08-19 | End: 2019-08-20 | Stop reason: SDUPTHER

## 2019-08-19 RX ORDER — WARFARIN SODIUM 7.5 MG/1
7.5 TABLET ORAL DAILY
Qty: 90 TABLET | Refills: 0 | Status: SHIPPED | OUTPATIENT
Start: 2019-08-19 | End: 2019-10-16 | Stop reason: SDUPTHER

## 2019-08-19 RX ORDER — METOPROLOL SUCCINATE 50 MG/1
TABLET, EXTENDED RELEASE ORAL
Qty: 180 TABLET | Refills: 1 | Status: SHIPPED | OUTPATIENT
Start: 2019-08-19 | End: 2019-11-01 | Stop reason: SDUPTHER

## 2019-08-19 RX ORDER — FAMOTIDINE 20 MG/1
TABLET, FILM COATED ORAL
Qty: 90 TABLET | Refills: 0 | Status: SHIPPED | OUTPATIENT
Start: 2019-08-19 | End: 2019-10-09 | Stop reason: SDUPTHER

## 2019-08-20 ENCOUNTER — TELEPHONE (OUTPATIENT)
Dept: FAMILY MEDICINE CLINIC | Age: 84
End: 2019-08-20

## 2019-08-20 DIAGNOSIS — R42 DIZZINESS: ICD-10-CM

## 2019-08-20 DIAGNOSIS — E03.9 HYPOTHYROIDISM, UNSPECIFIED TYPE: ICD-10-CM

## 2019-08-20 RX ORDER — LISINOPRIL 10 MG/1
10 TABLET ORAL DAILY
Qty: 30 TABLET | Refills: 11 | Status: SHIPPED | OUTPATIENT
Start: 2019-08-20 | End: 2019-09-03 | Stop reason: SDUPTHER

## 2019-08-20 RX ORDER — MECLIZINE HYDROCHLORIDE 25 MG/1
25 TABLET ORAL 3 TIMES DAILY PRN
Qty: 20 TABLET | Refills: 2 | Status: SHIPPED | OUTPATIENT
Start: 2019-08-20 | End: 2019-09-03 | Stop reason: SDUPTHER

## 2019-08-20 RX ORDER — LEVOTHYROXINE SODIUM 0.12 MG/1
TABLET ORAL
Qty: 90 TABLET | Refills: 1 | Status: SHIPPED
Start: 2019-08-20 | End: 2020-06-30 | Stop reason: SDUPTHER

## 2019-08-21 ENCOUNTER — ANTI-COAG VISIT (OUTPATIENT)
Dept: FAMILY MEDICINE CLINIC | Age: 84
End: 2019-08-21

## 2019-08-21 ENCOUNTER — TELEPHONE (OUTPATIENT)
Dept: FAMILY MEDICINE CLINIC | Age: 84
End: 2019-08-21

## 2019-08-21 ENCOUNTER — NURSE ONLY (OUTPATIENT)
Dept: FAMILY MEDICINE CLINIC | Age: 84
End: 2019-08-21
Payer: MEDICARE

## 2019-08-21 DIAGNOSIS — Z79.01 CHRONIC ANTICOAGULATION: ICD-10-CM

## 2019-08-21 DIAGNOSIS — I38 VHD (VALVULAR HEART DISEASE): Primary | ICD-10-CM

## 2019-08-21 LAB
INTERNATIONAL NORMALIZATION RATIO, POC: 1.9
PROTHROMBIN TIME, POC: 23.2

## 2019-08-21 PROCEDURE — 85610 PROTHROMBIN TIME: CPT | Performed by: FAMILY MEDICINE

## 2019-08-21 PROCEDURE — 93793 ANTICOAG MGMT PT WARFARIN: CPT | Performed by: FAMILY MEDICINE

## 2019-09-03 ENCOUNTER — ANTI-COAG VISIT (OUTPATIENT)
Dept: FAMILY MEDICINE CLINIC | Age: 84
End: 2019-09-03
Payer: MEDICARE

## 2019-09-03 ENCOUNTER — NURSE ONLY (OUTPATIENT)
Dept: FAMILY MEDICINE CLINIC | Age: 84
End: 2019-09-03
Payer: MEDICARE

## 2019-09-03 DIAGNOSIS — Z79.01 CHRONIC ANTICOAGULATION: Primary | ICD-10-CM

## 2019-09-03 DIAGNOSIS — Z79.01 CHRONIC ANTICOAGULATION: ICD-10-CM

## 2019-09-03 DIAGNOSIS — R42 DIZZINESS: ICD-10-CM

## 2019-09-03 DIAGNOSIS — I48.91 ATRIAL FIBRILLATION WITH RVR (HCC): ICD-10-CM

## 2019-09-03 LAB
INTERNATIONAL NORMALIZATION RATIO, POC: 3
PROTHROMBIN TIME, POC: 36.3

## 2019-09-03 PROCEDURE — 85610 PROTHROMBIN TIME: CPT | Performed by: FAMILY MEDICINE

## 2019-09-03 PROCEDURE — 93793 ANTICOAG MGMT PT WARFARIN: CPT | Performed by: FAMILY MEDICINE

## 2019-09-03 RX ORDER — LISINOPRIL 10 MG/1
10 TABLET ORAL DAILY
Qty: 90 TABLET | Refills: 3 | Status: SHIPPED | OUTPATIENT
Start: 2019-09-03 | End: 2019-10-09 | Stop reason: SDUPTHER

## 2019-09-03 RX ORDER — MECLIZINE HYDROCHLORIDE 25 MG/1
25 TABLET ORAL 3 TIMES DAILY PRN
Qty: 100 TABLET | Refills: 5 | Status: SHIPPED | OUTPATIENT
Start: 2019-09-03 | End: 2020-01-20 | Stop reason: ALTCHOICE

## 2019-09-03 NOTE — PROGRESS NOTES
Spoke with patient and advised of same dose, she verbalized understanding. She is unable to come to the office in 2 week to have her inr checked because she is leaving this Thrusday  (9/5/19) to go to [place] and will not be returning until around 9/23/19. Please advise.

## 2019-09-03 NOTE — PROGRESS NOTES
Patient in office today for anti-coag management. POC INR obtained and anti-counter created and routed.

## 2019-09-16 ENCOUNTER — APPOINTMENT (OUTPATIENT)
Dept: GENERAL RADIOLOGY | Age: 84
DRG: 292 | End: 2019-09-16
Payer: MEDICARE

## 2019-09-16 ENCOUNTER — HOSPITAL ENCOUNTER (INPATIENT)
Age: 84
LOS: 4 days | Discharge: HOME HEALTH | DRG: 292 | End: 2019-09-20
Attending: EMERGENCY MEDICINE | Admitting: FAMILY MEDICINE
Payer: MEDICARE

## 2019-09-16 ENCOUNTER — OFFICE VISIT (OUTPATIENT)
Dept: FAMILY MEDICINE CLINIC | Age: 84
DRG: 292 | End: 2019-09-16
Payer: MEDICARE

## 2019-09-16 ENCOUNTER — ANTI-COAG VISIT (OUTPATIENT)
Dept: FAMILY MEDICINE CLINIC | Age: 84
End: 2019-09-16

## 2019-09-16 VITALS
BODY MASS INDEX: 41.48 KG/M2 | TEMPERATURE: 98 F | WEIGHT: 257 LBS | DIASTOLIC BLOOD PRESSURE: 76 MMHG | OXYGEN SATURATION: 96 % | SYSTOLIC BLOOD PRESSURE: 130 MMHG | HEART RATE: 100 BPM

## 2019-09-16 DIAGNOSIS — I42.8 NONISCHEMIC CARDIOMYOPATHY (HCC): ICD-10-CM

## 2019-09-16 DIAGNOSIS — I27.20 PULMONARY HYPERTENSION (HCC): ICD-10-CM

## 2019-09-16 DIAGNOSIS — I10 ESSENTIAL HYPERTENSION: ICD-10-CM

## 2019-09-16 DIAGNOSIS — M15.9 PRIMARY OSTEOARTHRITIS INVOLVING MULTIPLE JOINTS: ICD-10-CM

## 2019-09-16 DIAGNOSIS — I50.32 CHRONIC DIASTOLIC (CONGESTIVE) HEART FAILURE (HCC): ICD-10-CM

## 2019-09-16 DIAGNOSIS — K21.9 GASTROESOPHAGEAL REFLUX DISEASE, ESOPHAGITIS PRESENCE NOT SPECIFIED: Chronic | ICD-10-CM

## 2019-09-16 DIAGNOSIS — I48.21 PERMANENT ATRIAL FIBRILLATION (HCC): Primary | ICD-10-CM

## 2019-09-16 DIAGNOSIS — E78.00 PURE HYPERCHOLESTEROLEMIA: ICD-10-CM

## 2019-09-16 DIAGNOSIS — F33.1 MODERATE EPISODE OF RECURRENT MAJOR DEPRESSIVE DISORDER (HCC): ICD-10-CM

## 2019-09-16 DIAGNOSIS — I50.30 (HFPEF) HEART FAILURE WITH PRESERVED EJECTION FRACTION (HCC): ICD-10-CM

## 2019-09-16 DIAGNOSIS — J44.9 CHRONIC OBSTRUCTIVE PULMONARY DISEASE, UNSPECIFIED COPD TYPE (HCC): ICD-10-CM

## 2019-09-16 DIAGNOSIS — I50.9 CONGESTIVE HEART FAILURE, UNSPECIFIED HF CHRONICITY, UNSPECIFIED HEART FAILURE TYPE (HCC): Primary | ICD-10-CM

## 2019-09-16 DIAGNOSIS — E03.9 HYPOTHYROIDISM, UNSPECIFIED TYPE: ICD-10-CM

## 2019-09-16 DIAGNOSIS — E66.8 MODERATE OBESITY: ICD-10-CM

## 2019-09-16 DIAGNOSIS — I25.10 CORONARY ARTERY DISEASE INVOLVING NATIVE CORONARY ARTERY OF NATIVE HEART WITHOUT ANGINA PECTORIS: ICD-10-CM

## 2019-09-16 DIAGNOSIS — M48.062 SPINAL STENOSIS OF LUMBAR REGION WITH NEUROGENIC CLAUDICATION: ICD-10-CM

## 2019-09-16 PROBLEM — I50.33 ACUTE ON CHRONIC DIASTOLIC CONGESTIVE HEART FAILURE (HCC): Status: ACTIVE | Noted: 2019-09-16

## 2019-09-16 LAB
ALBUMIN SERPL-MCNC: 4.1 G/DL (ref 3.5–5.2)
ALP BLD-CCNC: 73 U/L (ref 35–104)
ALT SERPL-CCNC: 20 U/L (ref 0–32)
ANION GAP SERPL CALCULATED.3IONS-SCNC: 12 MMOL/L (ref 7–16)
AST SERPL-CCNC: 28 U/L (ref 0–31)
BACTERIA: ABNORMAL /HPF
BASOPHILS ABSOLUTE: 0.08 E9/L (ref 0–0.2)
BASOPHILS RELATIVE PERCENT: 1.5 % (ref 0–2)
BILIRUB SERPL-MCNC: 0.8 MG/DL (ref 0–1.2)
BILIRUBIN URINE: NEGATIVE
BLOOD, URINE: ABNORMAL
BUN BLDV-MCNC: 14 MG/DL (ref 8–23)
CALCIUM SERPL-MCNC: 9.2 MG/DL (ref 8.6–10.2)
CASTS: ABNORMAL /LPF
CHLORIDE BLD-SCNC: 106 MMOL/L (ref 98–107)
CLARITY: CLEAR
CO2: 27 MMOL/L (ref 22–29)
COLOR: YELLOW
CREAT SERPL-MCNC: 0.8 MG/DL (ref 0.5–1)
D DIMER: 241 NG/ML DDU
EOSINOPHILS ABSOLUTE: 0.22 E9/L (ref 0.05–0.5)
EOSINOPHILS RELATIVE PERCENT: 4.2 % (ref 0–6)
GFR AFRICAN AMERICAN: >60
GFR NON-AFRICAN AMERICAN: >60 ML/MIN/1.73
GLUCOSE BLD-MCNC: 88 MG/DL (ref 74–99)
GLUCOSE URINE: NEGATIVE MG/DL
HCT VFR BLD CALC: 38.5 % (ref 34–48)
HEMOGLOBIN: 12.2 G/DL (ref 11.5–15.5)
IMMATURE GRANULOCYTES #: 0.01 E9/L
IMMATURE GRANULOCYTES %: 0.2 % (ref 0–5)
INR BLD: 3.2
INTERNATIONAL NORMALIZATION RATIO, POC: 3.2
KETONES, URINE: NEGATIVE MG/DL
LEUKOCYTE ESTERASE, URINE: NEGATIVE
LYMPHOCYTES ABSOLUTE: 0.92 E9/L (ref 1.5–4)
LYMPHOCYTES RELATIVE PERCENT: 17.4 % (ref 20–42)
MCH RBC QN AUTO: 29.9 PG (ref 26–35)
MCHC RBC AUTO-ENTMCNC: 31.7 % (ref 32–34.5)
MCV RBC AUTO: 94.4 FL (ref 80–99.9)
MONOCYTES ABSOLUTE: 0.6 E9/L (ref 0.1–0.95)
MONOCYTES RELATIVE PERCENT: 11.3 % (ref 2–12)
NEUTROPHILS ABSOLUTE: 3.46 E9/L (ref 1.8–7.3)
NEUTROPHILS RELATIVE PERCENT: 65.4 % (ref 43–80)
NITRITE, URINE: NEGATIVE
PDW BLD-RTO: 15.4 FL (ref 11.5–15)
PH UA: 5 (ref 5–9)
PLATELET # BLD: 191 E9/L (ref 130–450)
PMV BLD AUTO: 10.5 FL (ref 7–12)
POTASSIUM REFLEX MAGNESIUM: 4.2 MMOL/L (ref 3.5–5)
PRO-BNP: 2470 PG/ML (ref 0–450)
PROTEIN UA: NEGATIVE MG/DL
PROTHROMBIN TIME, POC: 38
PROTHROMBIN TIME: 37.2 SEC (ref 9.3–12.4)
RBC # BLD: 4.08 E12/L (ref 3.5–5.5)
RBC UA: ABNORMAL /HPF (ref 0–2)
SODIUM BLD-SCNC: 145 MMOL/L (ref 132–146)
SPECIFIC GRAVITY UA: 1.01 (ref 1–1.03)
TOTAL PROTEIN: 7.5 G/DL (ref 6.4–8.3)
TROPONIN: <0.01 NG/ML (ref 0–0.03)
UROBILINOGEN, URINE: 0.2 E.U./DL
WBC # BLD: 5.3 E9/L (ref 4.5–11.5)
WBC UA: ABNORMAL /HPF (ref 0–5)

## 2019-09-16 PROCEDURE — 99214 OFFICE O/P EST MOD 30 MIN: CPT | Performed by: FAMILY MEDICINE

## 2019-09-16 PROCEDURE — 1123F ACP DISCUSS/DSCN MKR DOCD: CPT | Performed by: FAMILY MEDICINE

## 2019-09-16 PROCEDURE — 81001 URINALYSIS AUTO W/SCOPE: CPT

## 2019-09-16 PROCEDURE — 6360000002 HC RX W HCPCS: Performed by: FAMILY MEDICINE

## 2019-09-16 PROCEDURE — 1090F PRES/ABSN URINE INCON ASSESS: CPT | Performed by: FAMILY MEDICINE

## 2019-09-16 PROCEDURE — 94761 N-INVAS EAR/PLS OXIMETRY MLT: CPT

## 2019-09-16 PROCEDURE — 2580000003 HC RX 258: Performed by: FAMILY MEDICINE

## 2019-09-16 PROCEDURE — G8417 CALC BMI ABV UP PARAM F/U: HCPCS | Performed by: FAMILY MEDICINE

## 2019-09-16 PROCEDURE — G8926 SPIRO NO PERF OR DOC: HCPCS | Performed by: FAMILY MEDICINE

## 2019-09-16 PROCEDURE — 3023F SPIROM DOC REV: CPT | Performed by: FAMILY MEDICINE

## 2019-09-16 PROCEDURE — 1036F TOBACCO NON-USER: CPT | Performed by: FAMILY MEDICINE

## 2019-09-16 PROCEDURE — 85378 FIBRIN DEGRADE SEMIQUANT: CPT

## 2019-09-16 PROCEDURE — 99285 EMERGENCY DEPT VISIT HI MDM: CPT

## 2019-09-16 PROCEDURE — 2060000000 HC ICU INTERMEDIATE R&B

## 2019-09-16 PROCEDURE — 85025 COMPLETE CBC W/AUTO DIFF WBC: CPT

## 2019-09-16 PROCEDURE — 6370000000 HC RX 637 (ALT 250 FOR IP): Performed by: FAMILY MEDICINE

## 2019-09-16 PROCEDURE — 83880 ASSAY OF NATRIURETIC PEPTIDE: CPT

## 2019-09-16 PROCEDURE — 80053 COMPREHEN METABOLIC PANEL: CPT

## 2019-09-16 PROCEDURE — 99212 OFFICE O/P EST SF 10 MIN: CPT | Performed by: FAMILY MEDICINE

## 2019-09-16 PROCEDURE — 93005 ELECTROCARDIOGRAM TRACING: CPT | Performed by: NURSE PRACTITIONER

## 2019-09-16 PROCEDURE — 4040F PNEUMOC VAC/ADMIN/RCVD: CPT | Performed by: FAMILY MEDICINE

## 2019-09-16 PROCEDURE — G8598 ASA/ANTIPLAT THER USED: HCPCS | Performed by: FAMILY MEDICINE

## 2019-09-16 PROCEDURE — 84484 ASSAY OF TROPONIN QUANT: CPT

## 2019-09-16 PROCEDURE — 36415 COLL VENOUS BLD VENIPUNCTURE: CPT

## 2019-09-16 PROCEDURE — 71045 X-RAY EXAM CHEST 1 VIEW: CPT

## 2019-09-16 PROCEDURE — G8427 DOCREV CUR MEDS BY ELIG CLIN: HCPCS | Performed by: FAMILY MEDICINE

## 2019-09-16 PROCEDURE — G8399 PT W/DXA RESULTS DOCUMENT: HCPCS | Performed by: FAMILY MEDICINE

## 2019-09-16 PROCEDURE — 85610 PROTHROMBIN TIME: CPT

## 2019-09-16 PROCEDURE — 85610 PROTHROMBIN TIME: CPT | Performed by: FAMILY MEDICINE

## 2019-09-16 RX ORDER — ACETAMINOPHEN 325 MG/1
650 TABLET ORAL EVERY 4 HOURS PRN
Status: DISCONTINUED | OUTPATIENT
Start: 2019-09-16 | End: 2019-09-20 | Stop reason: HOSPADM

## 2019-09-16 RX ORDER — FUROSEMIDE 10 MG/ML
40 INJECTION INTRAMUSCULAR; INTRAVENOUS 2 TIMES DAILY
Status: DISCONTINUED | OUTPATIENT
Start: 2019-09-16 | End: 2019-09-18

## 2019-09-16 RX ORDER — METOPROLOL SUCCINATE 50 MG/1
50 TABLET, EXTENDED RELEASE ORAL 2 TIMES DAILY
Status: DISCONTINUED | OUTPATIENT
Start: 2019-09-16 | End: 2019-09-20 | Stop reason: HOSPADM

## 2019-09-16 RX ORDER — SODIUM CHLORIDE 0.9 % (FLUSH) 0.9 %
10 SYRINGE (ML) INJECTION EVERY 12 HOURS SCHEDULED
Status: DISCONTINUED | OUTPATIENT
Start: 2019-09-16 | End: 2019-09-20 | Stop reason: HOSPADM

## 2019-09-16 RX ORDER — WARFARIN SODIUM 7.5 MG/1
7.5 TABLET ORAL DAILY
Status: DISCONTINUED | OUTPATIENT
Start: 2019-09-17 | End: 2019-09-17

## 2019-09-16 RX ORDER — ONDANSETRON 2 MG/ML
4 INJECTION INTRAMUSCULAR; INTRAVENOUS EVERY 6 HOURS PRN
Status: DISCONTINUED | OUTPATIENT
Start: 2019-09-16 | End: 2019-09-20 | Stop reason: HOSPADM

## 2019-09-16 RX ORDER — SODIUM CHLORIDE 0.9 % (FLUSH) 0.9 %
10 SYRINGE (ML) INJECTION PRN
Status: DISCONTINUED | OUTPATIENT
Start: 2019-09-16 | End: 2019-09-20 | Stop reason: HOSPADM

## 2019-09-16 RX ORDER — FAMOTIDINE 20 MG/1
20 TABLET, FILM COATED ORAL DAILY
Status: DISCONTINUED | OUTPATIENT
Start: 2019-09-17 | End: 2019-09-20 | Stop reason: HOSPADM

## 2019-09-16 RX ORDER — VITAMIN C
1 TAB ORAL DAILY
Status: DISCONTINUED | OUTPATIENT
Start: 2019-09-16 | End: 2019-09-20 | Stop reason: HOSPADM

## 2019-09-16 RX ORDER — POTASSIUM CHLORIDE 20 MEQ/1
20 TABLET, EXTENDED RELEASE ORAL
Status: DISCONTINUED | OUTPATIENT
Start: 2019-09-17 | End: 2019-09-20 | Stop reason: HOSPADM

## 2019-09-16 RX ORDER — LEVOTHYROXINE SODIUM 0.12 MG/1
125 TABLET ORAL DAILY
Status: DISCONTINUED | OUTPATIENT
Start: 2019-09-17 | End: 2019-09-20 | Stop reason: HOSPADM

## 2019-09-16 RX ORDER — LISINOPRIL 10 MG/1
10 TABLET ORAL DAILY
Status: DISCONTINUED | OUTPATIENT
Start: 2019-09-17 | End: 2019-09-20 | Stop reason: HOSPADM

## 2019-09-16 RX ORDER — ALBUTEROL SULFATE 90 UG/1
2 AEROSOL, METERED RESPIRATORY (INHALATION) EVERY 6 HOURS PRN
Status: DISCONTINUED | OUTPATIENT
Start: 2019-09-16 | End: 2019-09-20 | Stop reason: HOSPADM

## 2019-09-16 RX ORDER — CHOLECALCIFEROL (VITAMIN D3) 50 MCG
2000 TABLET ORAL DAILY
Status: DISCONTINUED | OUTPATIENT
Start: 2019-09-16 | End: 2019-09-20 | Stop reason: HOSPADM

## 2019-09-16 RX ADMIN — Medication 10 ML: at 18:32

## 2019-09-16 RX ADMIN — METOPROLOL SUCCINATE 50 MG: 50 TABLET, EXTENDED RELEASE ORAL at 20:39

## 2019-09-16 RX ADMIN — ACETAMINOPHEN 650 MG: 325 TABLET, FILM COATED ORAL at 21:29

## 2019-09-16 RX ADMIN — Medication 2000 UNITS: at 21:26

## 2019-09-16 RX ADMIN — VITAMIN C 1 TABLET: TAB at 21:27

## 2019-09-16 RX ADMIN — FUROSEMIDE 40 MG: 10 INJECTION, SOLUTION INTRAMUSCULAR; INTRAVENOUS at 18:32

## 2019-09-16 RX ADMIN — Medication 10 ML: at 20:40

## 2019-09-16 ASSESSMENT — PAIN SCALES - GENERAL
PAINLEVEL_OUTOF10: 0

## 2019-09-16 NOTE — ED PROVIDER NOTES
D-Dimer, Quantitative   Result Value Ref Range    D-Dimer, Quant 241 ng/mL DDU   Urinalysis, reflex to microscopic   Result Value Ref Range    Color, UA Yellow Straw/Yellow    Clarity, UA Clear Clear    Glucose, Ur Negative Negative mg/dL    Bilirubin Urine Negative Negative    Ketones, Urine Negative Negative mg/dL    Specific Gravity, UA 1.010 1.005 - 1.030    Blood, Urine LARGE (A) Negative    pH, UA 5.0 5.0 - 9.0    Protein, UA Negative Negative mg/dL    Urobilinogen, Urine 0.2 <2.0 E.U./dL    Nitrite, Urine Negative Negative    Leukocyte Esterase, Urine Negative Negative   Protime-INR   Result Value Ref Range    Protime 37.2 (H) 9.3 - 12.4 sec    INR 3.2    Microscopic Urinalysis   Result Value Ref Range    Casts RARE /LPF    WBC, UA 0-1 0 - 5 /HPF    RBC, UA 10-20 (A) 0 - 2 /HPF    Bacteria, UA RARE (A) /HPF     Imaging: All Radiology results interpreted by Radiologist unless otherwise noted. XR CHEST PORTABLE   Final Result   Mild CHF. Prominence of superior mediastinum. Consider surveillance. CT CHEST W CONTRAST    (Results Pending)     EKG #1:  Interpreted by emergency department physician unless otherwise noted. Time:  1522      Rate: 99  Rhythm: Atrial fibrillation. Interpretation: atrial fibrillation, rate 99.     ED Course / Medical Decision Making     Medications   sodium chloride flush 0.9 % injection 10 mL (has no administration in time range)   sodium chloride flush 0.9 % injection 10 mL (has no administration in time range)   furosemide (LASIX) injection 40 mg (40 mg Intravenous Given 9/16/19 1832)   levothyroxine (SYNTHROID) tablet 125 mcg (has no administration in time range)   lisinopril (PRINIVIL;ZESTRIL) tablet 10 mg (has no administration in time range)   metoprolol succinate (TOPROL XL) extended release tablet 50 mg (has no administration in time range)   famotidine (PEPCID) tablet 20 mg (has no administration in time range)   vitamin D tablet 2,000 Units (has no administration in time range)   albuterol sulfate  (90 Base) MCG/ACT inhaler 2 puff (has no administration in time range)   vitamin B and C (TOTAL B-C) 1 tablet (has no administration in time range)   potassium chloride (KLOR-CON M) extended release tablet 20 mEq (has no administration in time range)   warfarin (COUMADIN) tablet 7.5 mg (has no administration in time range)   sodium chloride flush 0.9 % injection 10 mL (has no administration in time range)   sodium chloride flush 0.9 % injection 10 mL (10 mLs Intravenous Given 9/16/19 1832)   magnesium hydroxide (MILK OF MAGNESIA) 400 MG/5ML suspension 30 mL (has no administration in time range)   ondansetron (ZOFRAN) injection 4 mg (has no administration in time range)   acetaminophen (TYLENOL) tablet 650 mg (has no administration in time range)          Consultations:             IP CONSULT TO PRIMARY CARE PROVIDER  IP CONSULT TO PHARMACY    Procedures:   none    MDM: Patient was evaluated by Dr. Vibha Patterson. BNP 2470. Chest x-ray demonstrated mild CHF. Consult placed with Dr. Chanell Long who agreed to admit for further evaluation. She will be transferred to telemetry in stable condition. Counseling:   I have spoken with the patient and discussed todays results, in addition to providing specific details for the plan of care and counseling regarding the diagnosis and prognosis and are agreeable with the plan. Assessment      1. Congestive heart failure, unspecified HF chronicity, unspecified heart failure type [facility])      This patient's ED course included: a personal history and physicial examination  This patient has remained hemodynamically stable during their ED course. Plan   Admit to telemetry. Patient condition is stable. New Medications     Current Discharge Medication List        Electronically signed by ARIEL Hitchcock CNP   DD: 9/16/19  **This report was transcribed using voice recognition software.  Every effort was made to ensure

## 2019-09-16 NOTE — PROGRESS NOTES
[facility]  FAMILY MEDICINE RESIDENCY PROGRAM   OFFICE PROGRESS NOTE  DATE OF VISIT : 2019    Patient : Leopoldo Murphy   Sex : female   Age : 81 y.o.  : 1935   MRN : <[id number]>            Chief Complaint :   Chief Complaint   Patient presents with    Check-Up    Wheezing    Shortness of Breath    Fatigue       HPI:   Leopoldo Murphy comes to clinic today for     F/U of chronic problem(s) and new or recent complaint of increasing dyspnea     Chronic problems reviewed today include:     Hypertension, Hyperlipidemia, Congestive Heart Failure, Chronic kidney disease, Osteoarthritis and Depression    Current status of this/these condition(s):  unstable    Tolerating meds: No    Additional history: Mrs. Ramos Barrios just returned from a trip down [address] by car. Because of urinary incontinence, she stopped her Lasix while on the road and cut it in half while there. Shortly after starting, she became increasingly SOB and felt puffy. She came home yesterday and restarted her usual dose of Lasix, but is afraid to go home because she has been diaphoretic, dyspneic with minimal exertion, and orthopneic. She wanted to come to the ER last night, but once she got up, her breathing improved enough that she waited until today.      Patient Active Problem List   Diagnosis    GERD (gastroesophageal reflux disease)    Depression    Hypothyroidism    Nonischemic cardiomyopathy ([address]    Permanent atrial fibrillation (HCC)    Astigmatism    History of artificial lens replacement    Presbyopia    Tear film insufficiency    Essential hypertension    Pulmonary hypertension (HCC)    Moderate obesity    Tricuspid regurgitation    Vertigo    Greater trochanteric bursitis    Leg length discrepancy    Chronic anticoagulation    Chronic obstructive pulmonary disease (HCC)    Mixed stress and urge urinary incontinence    Primary osteoarthritis involving multiple joints    Fuchs' endothelial dystrophy    Pseudophakia, both eyes    Chronic diastolic (congestive) heart failure (HCC)    Pure hypercholesterolemia    Spinal stenosis of lumbar region with neurogenic claudication    VHD (valvular heart disease)       Past Medical History:   Diagnosis Date    Anticoagulated on Coumadin     on Coumadin for this Dr. Jennifer Trejo controls    Atrial fibrillation ([facility] [address]     Basal cell carcinoma of neck     CAD (coronary artery disease)     History of luminal irregularities of the coronary artery, stable.     CHF (congestive heart failure) ([facility])     stage I diastolic dysfunction on 0/12/94    Depression 11/16/2010    DJD (degenerative joint disease)     SEVERE IN RIGHT HAND, IN MULTIPLE OTHER JOINTS    GERD (gastroesophageal reflux disease) 11/16/2010    History of cardiovascular stress test 03/18/2014    lexiscan    Hyperparathyroidism ([facility] [address] 11/16/2010    Had parathyroidectomy    Hypertension 11/16/2010    Hypothyroidism 11/16/2010    Mild mitral regurgitation 7/10/14    Osteoarthritis 11/16/2010    Pulmonary hypertension ([facility] [address] 7/10/14    mild    Renal calculi     x3    Tricuspid regurgitation 7/10/14    mild-moderate    Urinary incontinence 11/16/2010       Current Outpatient Medications on File Prior to Visit   Medication Sig Dispense Refill    meclizine (ANTIVERT) 25 MG tablet Take 1 tablet by mouth 3 times daily as needed for Dizziness 100 tablet 5    lisinopril (PRINIVIL;ZESTRIL) 10 MG tablet Take 1 tablet by mouth daily 90 tablet 3    levothyroxine (SYNTHROID) 125 MCG tablet TAKE 1 TABLET BY MOUTH  EVERY DAY 90 tablet 1    warfarin (COUMADIN) 7.5 MG tablet TAKE 1 TABLET BY MOUTH  DAILY 90 tablet 0    metoprolol succinate (TOPROL XL) 50 MG extended release tablet TAKE 1 TABLET BY MOUTH TWO  TIMES DAILY 180 tablet 1    famotidine (PEPCID) 20 MG tablet TAKE 1 TABLET BY MOUTH  EVERY DAY 90 tablet 0    potassium chloride (KLOR-CON M) 20 MEQ extended release tablet TAKE 1 TABLET BY MOUTH  EVERY seen today for check-up, wheezing, shortness of breath and fatigue. Diagnoses and all orders for this visit:    Permanent atrial fibrillation ([address]  -     POCT INR    Essential hypertension    Pulmonary hypertension (HCC)    Chronic diastolic (congestive) heart failure (HCC)    Nonischemic cardiomyopathy (HCC)    Chronic obstructive pulmonary disease, unspecified COPD type ([address]    Gastroesophageal reflux disease, esophagitis presence not specified    Hypothyroidism, unspecified type    Primary osteoarthritis involving multiple joints    Pure hypercholesterolemia    Spinal stenosis of lumbar region with neurogenic claudication    Moderate obesity    Moderate episode of recurrent major depressive disorder ([address]        Additional Plan:  After a discussion of the alternatives, she decided to go to the ER for evaluation and possible admission. This is likely CHF from nonadherence to her diuretic, but her  has difficulty assisting her at home and she is frightened that she might not improve with home treatment. RTO in after hospital evaluation or sooner for any persistent, new, or worsening symptoms. Please see Patient Instructions for further counseling and information given. Advised to be adherent to the treatment plans discussed today, and please call with any questions or concerns, letting the office know of any reasons that the plans may not be followed. The risks of untreated conditions include worsening illness, injury, disability, and possibly, death. Please call if symptoms change in any way, worsen, or fail to completely resolve, as this could necessitate a change to treatment plans. Patient and/or caregiver expressed understanding. Indications and proper use of medication(s) reviewed. Potential side-effects and risks of medication(s) also explained. Patient and/or caregiver was instructed to call if any new symptoms develop prior to next visit.     Health risk factors discussed

## 2019-09-17 ENCOUNTER — APPOINTMENT (OUTPATIENT)
Dept: CT IMAGING | Age: 84
DRG: 292 | End: 2019-09-17
Payer: MEDICARE

## 2019-09-17 LAB
ANION GAP SERPL CALCULATED.3IONS-SCNC: 11 MMOL/L (ref 7–16)
BUN BLDV-MCNC: 15 MG/DL (ref 8–23)
CALCIUM SERPL-MCNC: 9.4 MG/DL (ref 8.6–10.2)
CHLORIDE BLD-SCNC: 105 MMOL/L (ref 98–107)
CO2: 27 MMOL/L (ref 22–29)
CREAT SERPL-MCNC: 0.8 MG/DL (ref 0.5–1)
EKG ATRIAL RATE: 63 BPM
EKG Q-T INTERVAL: 380 MS
EKG QRS DURATION: 98 MS
EKG QTC CALCULATION (BAZETT): 487 MS
EKG R AXIS: -13 DEGREES
EKG T AXIS: -36 DEGREES
EKG VENTRICULAR RATE: 99 BPM
FILM ARRAY ADENOVIRUS: NORMAL
FILM ARRAY BORDETELLA PERTUSSIS: NORMAL
FILM ARRAY CHLAMYDOPHILIA PNEUMONIAE: NORMAL
FILM ARRAY CORONAVIRUS 229E: NORMAL
FILM ARRAY CORONAVIRUS HKU1: NORMAL
FILM ARRAY CORONAVIRUS NL63: NORMAL
FILM ARRAY CORONAVIRUS OC43: NORMAL
FILM ARRAY INFLUENZA A VIRUS 09H1: NORMAL
FILM ARRAY INFLUENZA A VIRUS H1: NORMAL
FILM ARRAY INFLUENZA A VIRUS H3: NORMAL
FILM ARRAY INFLUENZA A VIRUS: NORMAL
FILM ARRAY INFLUENZA B: NORMAL
FILM ARRAY METAPNEUMOVIRUS: NORMAL
FILM ARRAY MYCOPLASMA PNEUMONIAE: NORMAL
FILM ARRAY PARAINFLUENZA VIRUS 1: NORMAL
FILM ARRAY PARAINFLUENZA VIRUS 2: NORMAL
FILM ARRAY PARAINFLUENZA VIRUS 3: NORMAL
FILM ARRAY PARAINFLUENZA VIRUS 4: NORMAL
FILM ARRAY RESPIRATORY SYNCITIAL VIRUS: NORMAL
FILM ARRAY RHINOVIRUS/ENTEROVIRUS: NORMAL
GFR AFRICAN AMERICAN: >60
GFR NON-AFRICAN AMERICAN: >60 ML/MIN/1.73
GLUCOSE BLD-MCNC: 93 MG/DL (ref 74–99)
HCT VFR BLD CALC: 37.3 % (ref 34–48)
HEMOGLOBIN: 11.9 G/DL (ref 11.5–15.5)
INR BLD: 3.3
MAGNESIUM: 2 MG/DL (ref 1.6–2.6)
MCH RBC QN AUTO: 29.9 PG (ref 26–35)
MCHC RBC AUTO-ENTMCNC: 31.9 % (ref 32–34.5)
MCV RBC AUTO: 93.7 FL (ref 80–99.9)
PDW BLD-RTO: 14.9 FL (ref 11.5–15)
PLATELET # BLD: 175 E9/L (ref 130–450)
PMV BLD AUTO: 10.2 FL (ref 7–12)
POTASSIUM SERPL-SCNC: 3.8 MMOL/L (ref 3.5–5)
PROTHROMBIN TIME: 37.7 SEC (ref 9.3–12.4)
RBC # BLD: 3.98 E12/L (ref 3.5–5.5)
SODIUM BLD-SCNC: 143 MMOL/L (ref 132–146)
TROPONIN: <0.01 NG/ML (ref 0–0.03)
WBC # BLD: 4.1 E9/L (ref 4.5–11.5)

## 2019-09-17 PROCEDURE — 71260 CT THORAX DX C+: CPT

## 2019-09-17 PROCEDURE — 87581 M.PNEUMON DNA AMP PROBE: CPT

## 2019-09-17 PROCEDURE — 93010 ELECTROCARDIOGRAM REPORT: CPT | Performed by: INTERNAL MEDICINE

## 2019-09-17 PROCEDURE — 6360000002 HC RX W HCPCS: Performed by: FAMILY MEDICINE

## 2019-09-17 PROCEDURE — 6360000004 HC RX CONTRAST MEDICATION: Performed by: RADIOLOGY

## 2019-09-17 PROCEDURE — 87486 CHLMYD PNEUM DNA AMP PROBE: CPT

## 2019-09-17 PROCEDURE — 6370000000 HC RX 637 (ALT 250 FOR IP): Performed by: FAMILY MEDICINE

## 2019-09-17 PROCEDURE — 85610 PROTHROMBIN TIME: CPT

## 2019-09-17 PROCEDURE — 2580000003 HC RX 258: Performed by: RADIOLOGY

## 2019-09-17 PROCEDURE — 2580000003 HC RX 258: Performed by: FAMILY MEDICINE

## 2019-09-17 PROCEDURE — 93005 ELECTROCARDIOGRAM TRACING: CPT | Performed by: FAMILY MEDICINE

## 2019-09-17 PROCEDURE — 83735 ASSAY OF MAGNESIUM: CPT

## 2019-09-17 PROCEDURE — 99222 1ST HOSP IP/OBS MODERATE 55: CPT | Performed by: INTERNAL MEDICINE

## 2019-09-17 PROCEDURE — 85027 COMPLETE CBC AUTOMATED: CPT

## 2019-09-17 PROCEDURE — 87633 RESP VIRUS 12-25 TARGETS: CPT

## 2019-09-17 PROCEDURE — 2060000000 HC ICU INTERMEDIATE R&B

## 2019-09-17 PROCEDURE — 87798 DETECT AGENT NOS DNA AMP: CPT

## 2019-09-17 PROCEDURE — 36415 COLL VENOUS BLD VENIPUNCTURE: CPT

## 2019-09-17 PROCEDURE — 99223 1ST HOSP IP/OBS HIGH 75: CPT | Performed by: FAMILY MEDICINE

## 2019-09-17 PROCEDURE — APPSS60 APP SPLIT SHARED TIME 46-60 MINUTES: Performed by: NURSE PRACTITIONER

## 2019-09-17 PROCEDURE — 80048 BASIC METABOLIC PNL TOTAL CA: CPT

## 2019-09-17 RX ORDER — WARFARIN SODIUM 2.5 MG/1
2.5 TABLET ORAL
Status: COMPLETED | OUTPATIENT
Start: 2019-09-17 | End: 2019-09-17

## 2019-09-17 RX ORDER — SODIUM CHLORIDE 0.9 % (FLUSH) 0.9 %
10 SYRINGE (ML) INJECTION
Status: COMPLETED | OUTPATIENT
Start: 2019-09-17 | End: 2019-09-17

## 2019-09-17 RX ADMIN — Medication 10 ML: at 20:16

## 2019-09-17 RX ADMIN — IOPAMIDOL 90 ML: 755 INJECTION, SOLUTION INTRAVENOUS at 13:33

## 2019-09-17 RX ADMIN — FAMOTIDINE 20 MG: 20 TABLET ORAL at 10:04

## 2019-09-17 RX ADMIN — Medication 10 ML: at 10:05

## 2019-09-17 RX ADMIN — METOPROLOL SUCCINATE 50 MG: 50 TABLET, EXTENDED RELEASE ORAL at 20:15

## 2019-09-17 RX ADMIN — Medication 2000 UNITS: at 10:05

## 2019-09-17 RX ADMIN — Medication 10 ML: at 13:33

## 2019-09-17 RX ADMIN — VITAMIN C 1 TABLET: TAB at 10:05

## 2019-09-17 RX ADMIN — LISINOPRIL 10 MG: 10 TABLET ORAL at 10:05

## 2019-09-17 RX ADMIN — LEVOTHYROXINE SODIUM 125 MCG: 125 TABLET ORAL at 06:41

## 2019-09-17 RX ADMIN — Medication 10 ML: at 18:18

## 2019-09-17 RX ADMIN — FUROSEMIDE 40 MG: 10 INJECTION, SOLUTION INTRAMUSCULAR; INTRAVENOUS at 10:04

## 2019-09-17 RX ADMIN — METOPROLOL SUCCINATE 50 MG: 50 TABLET, EXTENDED RELEASE ORAL at 10:04

## 2019-09-17 RX ADMIN — WARFARIN SODIUM 2.5 MG: 2.5 TABLET ORAL at 18:58

## 2019-09-17 RX ADMIN — ACETAMINOPHEN 650 MG: 325 TABLET, FILM COATED ORAL at 05:45

## 2019-09-17 RX ADMIN — Medication 10 ML: at 20:15

## 2019-09-17 RX ADMIN — FUROSEMIDE 40 MG: 10 INJECTION, SOLUTION INTRAMUSCULAR; INTRAVENOUS at 18:18

## 2019-09-17 RX ADMIN — Medication 10 ML: at 10:10

## 2019-09-17 RX ADMIN — POTASSIUM CHLORIDE 20 MEQ: 20 TABLET, EXTENDED RELEASE ORAL at 10:04

## 2019-09-17 ASSESSMENT — PAIN SCALES - GENERAL
PAINLEVEL_OUTOF10: 0

## 2019-09-17 NOTE — CARE COORDINATION
Spoke with Pt about Discharge Plan. Pt lives at  Home with  of 58 years. Pt was independent prior to admission. Pt has [employer] come clean every other week for a couple of hours. . Pt has used [facility] in the past and would like them again if needed. Pt has a walker, canes, and transport chair. Pt stated that her legs don't work well anymore. PCP is Dr. Irving Shen. [facility] in [place] but usually mails them in. No oxygen at home. Discharge Plan is to return home with  and [facility] if needed. Family to provide transportation.

## 2019-09-17 NOTE — CONSULTS
her to regain her breath. \"  Denies any chest pain currently. She is  on 3 L nasal cannula. PAST MEDICAL AND SURGICAL HISTORY:  1.  Morbid obesity:  BMI of 40 on 09/17/2019.  2.  Hypertension. 3.  Hyperlipidemia:  On statin therapy. 4.  History of nonischemic cardiomyopathy/CHF. a.  Echocardiogram, 08/14/2011, showing stage I diastolic dysfunction,  EF 65%, mild MR, mild TR, mild pulmonic regurgitation. b.  Echocardiogram, 03/18/2014, EF was 25-30% with severe global  hypokinesis, left-to-right interatrial shunt suggestive of possible PFO. Mild MR, mild TR, mild pulmonary hypertension. No evidence of  pericardial effusion. c.  Echocardiogram, 07/10/2014, EF had improved to 50% and mild left  atrial enlargement and mild mitral regurgitation and mild-to-moderate  TR.  d.  Echocardiogram, 11/30/2018 (interpreted by Dr. Reji Wright), showing  borderline global hypokinesis, EF 50%, indeterminate diastolic function,  increased left atrial volume index, intertrial septum was not well  visualized. The right atrium was enlarged. There was mild MR. No  mitral valve prolapse, mild AS, there was trace AR, mild-to-moderate TR  and moderate pulmonary hypertension, RVSP was 50 mmHg. There was no  pericardial effusion. e.  Cardiac catheterization, 03/22/2014, showing normal coronaries with  an ejection fraction of 30%. Full op report is unavailable. 5.  Permanent atrial fibrillation, on chronic Coumadin therapy. a. INR on 07/17/2019 was 3.2. INR on 08/07/2019 was 3.3. INR on  09/16/2019 was 3.2.  6.  Hypothyroidism, on replacement therapy. 7.  Short-term memory loss. 8.  Valvular heart disease:  Mild MR and mild-to-moderate TR on  echocardiogram, 11/22/2018.  9.  GERD. 10.  History of COPD with no home oxygen. 11.  Gait instability, uses a rollator. Denies any frequent falls. 12.  Degenerative disk disease. 13.  History of basal cell carcinoma on the right side of her neck,  status post excision.   14.

## 2019-09-18 ENCOUNTER — APPOINTMENT (OUTPATIENT)
Dept: GENERAL RADIOLOGY | Age: 84
DRG: 292 | End: 2019-09-18
Payer: MEDICARE

## 2019-09-18 LAB
ANION GAP SERPL CALCULATED.3IONS-SCNC: 12 MMOL/L (ref 7–16)
BUN BLDV-MCNC: 18 MG/DL (ref 8–23)
CALCIUM SERPL-MCNC: 9.2 MG/DL (ref 8.6–10.2)
CHLORIDE BLD-SCNC: 98 MMOL/L (ref 98–107)
CO2: 30 MMOL/L (ref 22–29)
CREAT SERPL-MCNC: 0.9 MG/DL (ref 0.5–1)
GFR AFRICAN AMERICAN: >60
GFR NON-AFRICAN AMERICAN: 60 ML/MIN/1.73
GLUCOSE BLD-MCNC: 98 MG/DL (ref 74–99)
INR BLD: 2.3
LV EF: 45 %
LVEF MODALITY: NORMAL
MAGNESIUM: 2 MG/DL (ref 1.6–2.6)
POTASSIUM SERPL-SCNC: 3.9 MMOL/L (ref 3.5–5)
PRO-BNP: 1814 PG/ML (ref 0–450)
PROTHROMBIN TIME: 25.7 SEC (ref 9.3–12.4)
SODIUM BLD-SCNC: 140 MMOL/L (ref 132–146)

## 2019-09-18 PROCEDURE — 99232 SBSQ HOSP IP/OBS MODERATE 35: CPT | Performed by: FAMILY MEDICINE

## 2019-09-18 PROCEDURE — 2060000000 HC ICU INTERMEDIATE R&B

## 2019-09-18 PROCEDURE — 83880 ASSAY OF NATRIURETIC PEPTIDE: CPT

## 2019-09-18 PROCEDURE — 97162 PT EVAL MOD COMPLEX 30 MIN: CPT

## 2019-09-18 PROCEDURE — 6360000002 HC RX W HCPCS: Performed by: FAMILY MEDICINE

## 2019-09-18 PROCEDURE — 36415 COLL VENOUS BLD VENIPUNCTURE: CPT

## 2019-09-18 PROCEDURE — 93306 TTE W/DOPPLER COMPLETE: CPT

## 2019-09-18 PROCEDURE — 99233 SBSQ HOSP IP/OBS HIGH 50: CPT | Performed by: INTERNAL MEDICINE

## 2019-09-18 PROCEDURE — 6370000000 HC RX 637 (ALT 250 FOR IP)

## 2019-09-18 PROCEDURE — 80048 BASIC METABOLIC PNL TOTAL CA: CPT

## 2019-09-18 PROCEDURE — 83735 ASSAY OF MAGNESIUM: CPT

## 2019-09-18 PROCEDURE — 2580000003 HC RX 258: Performed by: FAMILY MEDICINE

## 2019-09-18 PROCEDURE — 6370000000 HC RX 637 (ALT 250 FOR IP): Performed by: FAMILY MEDICINE

## 2019-09-18 PROCEDURE — 97530 THERAPEUTIC ACTIVITIES: CPT

## 2019-09-18 PROCEDURE — 85610 PROTHROMBIN TIME: CPT

## 2019-09-18 PROCEDURE — 71046 X-RAY EXAM CHEST 2 VIEWS: CPT

## 2019-09-18 RX ORDER — WARFARIN SODIUM 7.5 MG/1
7.5 TABLET ORAL
Status: COMPLETED | OUTPATIENT
Start: 2019-09-18 | End: 2019-09-18

## 2019-09-18 RX ORDER — FUROSEMIDE 10 MG/ML
40 INJECTION INTRAMUSCULAR; INTRAVENOUS DAILY
Status: DISCONTINUED | OUTPATIENT
Start: 2019-09-19 | End: 2019-09-20 | Stop reason: HOSPADM

## 2019-09-18 RX ADMIN — WARFARIN SODIUM 7.5 MG: 7.5 TABLET ORAL at 18:20

## 2019-09-18 RX ADMIN — Medication 10 ML: at 10:07

## 2019-09-18 RX ADMIN — LEVOTHYROXINE SODIUM 125 MCG: 125 TABLET ORAL at 06:38

## 2019-09-18 RX ADMIN — LISINOPRIL 10 MG: 10 TABLET ORAL at 10:07

## 2019-09-18 RX ADMIN — FUROSEMIDE 40 MG: 10 INJECTION, SOLUTION INTRAMUSCULAR; INTRAVENOUS at 10:07

## 2019-09-18 RX ADMIN — VITAMIN C 1 TABLET: TAB at 10:07

## 2019-09-18 RX ADMIN — Medication 10 ML: at 20:21

## 2019-09-18 RX ADMIN — Medication 10 ML: at 10:11

## 2019-09-18 RX ADMIN — FAMOTIDINE 20 MG: 20 TABLET ORAL at 10:07

## 2019-09-18 RX ADMIN — METOPROLOL SUCCINATE 50 MG: 50 TABLET, EXTENDED RELEASE ORAL at 10:07

## 2019-09-18 RX ADMIN — ACETAMINOPHEN 650 MG: 325 TABLET, FILM COATED ORAL at 00:20

## 2019-09-18 RX ADMIN — ACETAMINOPHEN 650 MG: 325 TABLET, FILM COATED ORAL at 20:20

## 2019-09-18 RX ADMIN — METOPROLOL SUCCINATE 50 MG: 50 TABLET, EXTENDED RELEASE ORAL at 20:20

## 2019-09-18 RX ADMIN — Medication 2000 UNITS: at 10:07

## 2019-09-18 RX ADMIN — POTASSIUM CHLORIDE 20 MEQ: 20 TABLET, EXTENDED RELEASE ORAL at 10:07

## 2019-09-18 ASSESSMENT — PAIN DESCRIPTION - DESCRIPTORS: DESCRIPTORS: ACHING;DISCOMFORT;SORE

## 2019-09-18 ASSESSMENT — PAIN SCALES - GENERAL
PAINLEVEL_OUTOF10: 0
PAINLEVEL_OUTOF10: 0
PAINLEVEL_OUTOF10: 5
PAINLEVEL_OUTOF10: 4
PAINLEVEL_OUTOF10: 4

## 2019-09-18 ASSESSMENT — PAIN DESCRIPTION - FREQUENCY: FREQUENCY: INTERMITTENT

## 2019-09-18 ASSESSMENT — PAIN - FUNCTIONAL ASSESSMENT: PAIN_FUNCTIONAL_ASSESSMENT: PREVENTS OR INTERFERES SOME ACTIVE ACTIVITIES AND ADLS

## 2019-09-18 ASSESSMENT — PAIN DESCRIPTION - LOCATION: LOCATION: GENERALIZED

## 2019-09-18 ASSESSMENT — PAIN DESCRIPTION - PAIN TYPE: TYPE: CHRONIC PAIN

## 2019-09-18 ASSESSMENT — PAIN DESCRIPTION - PROGRESSION: CLINICAL_PROGRESSION: NOT CHANGED

## 2019-09-19 LAB
ANION GAP SERPL CALCULATED.3IONS-SCNC: 11 MMOL/L (ref 7–16)
BUN BLDV-MCNC: 18 MG/DL (ref 8–23)
CALCIUM SERPL-MCNC: 9.2 MG/DL (ref 8.6–10.2)
CHLORIDE BLD-SCNC: 100 MMOL/L (ref 98–107)
CO2: 30 MMOL/L (ref 22–29)
CREAT SERPL-MCNC: 0.8 MG/DL (ref 0.5–1)
EKG ATRIAL RATE: 93 BPM
EKG Q-T INTERVAL: 436 MS
EKG QRS DURATION: 100 MS
EKG QTC CALCULATION (BAZETT): 490 MS
EKG R AXIS: 16 DEGREES
EKG T AXIS: -9 DEGREES
EKG VENTRICULAR RATE: 76 BPM
GFR AFRICAN AMERICAN: >60
GFR NON-AFRICAN AMERICAN: >60 ML/MIN/1.73
GLUCOSE BLD-MCNC: 96 MG/DL (ref 74–99)
INR BLD: 1.7
MAGNESIUM: 2 MG/DL (ref 1.6–2.6)
POTASSIUM SERPL-SCNC: 3.6 MMOL/L (ref 3.5–5)
PROTHROMBIN TIME: 19.4 SEC (ref 9.3–12.4)
SODIUM BLD-SCNC: 141 MMOL/L (ref 132–146)

## 2019-09-19 PROCEDURE — 99232 SBSQ HOSP IP/OBS MODERATE 35: CPT | Performed by: FAMILY MEDICINE

## 2019-09-19 PROCEDURE — 80048 BASIC METABOLIC PNL TOTAL CA: CPT

## 2019-09-19 PROCEDURE — 99233 SBSQ HOSP IP/OBS HIGH 50: CPT | Performed by: INTERNAL MEDICINE

## 2019-09-19 PROCEDURE — 85610 PROTHROMBIN TIME: CPT

## 2019-09-19 PROCEDURE — 6370000000 HC RX 637 (ALT 250 FOR IP): Performed by: STUDENT IN AN ORGANIZED HEALTH CARE EDUCATION/TRAINING PROGRAM

## 2019-09-19 PROCEDURE — 2580000003 HC RX 258: Performed by: FAMILY MEDICINE

## 2019-09-19 PROCEDURE — 6370000000 HC RX 637 (ALT 250 FOR IP): Performed by: INTERNAL MEDICINE

## 2019-09-19 PROCEDURE — 6360000002 HC RX W HCPCS: Performed by: STUDENT IN AN ORGANIZED HEALTH CARE EDUCATION/TRAINING PROGRAM

## 2019-09-19 PROCEDURE — 93010 ELECTROCARDIOGRAM REPORT: CPT | Performed by: FAMILY MEDICINE

## 2019-09-19 PROCEDURE — 97165 OT EVAL LOW COMPLEX 30 MIN: CPT

## 2019-09-19 PROCEDURE — 6370000000 HC RX 637 (ALT 250 FOR IP): Performed by: FAMILY MEDICINE

## 2019-09-19 PROCEDURE — 2060000000 HC ICU INTERMEDIATE R&B

## 2019-09-19 PROCEDURE — 83735 ASSAY OF MAGNESIUM: CPT

## 2019-09-19 PROCEDURE — 36415 COLL VENOUS BLD VENIPUNCTURE: CPT

## 2019-09-19 PROCEDURE — 6370000000 HC RX 637 (ALT 250 FOR IP)

## 2019-09-19 RX ORDER — SPIRONOLACTONE 25 MG/1
25 TABLET ORAL DAILY
Status: DISCONTINUED | OUTPATIENT
Start: 2019-09-19 | End: 2019-09-20 | Stop reason: HOSPADM

## 2019-09-19 RX ORDER — DOCUSATE SODIUM 100 MG/1
100 CAPSULE, LIQUID FILLED ORAL DAILY
Status: DISCONTINUED | OUTPATIENT
Start: 2019-09-19 | End: 2019-09-20 | Stop reason: HOSPADM

## 2019-09-19 RX ORDER — WARFARIN SODIUM 7.5 MG/1
7.5 TABLET ORAL
Status: COMPLETED | OUTPATIENT
Start: 2019-09-19 | End: 2019-09-19

## 2019-09-19 RX ADMIN — ACETAMINOPHEN 650 MG: 325 TABLET, FILM COATED ORAL at 20:50

## 2019-09-19 RX ADMIN — FUROSEMIDE 40 MG: 10 INJECTION, SOLUTION INTRAMUSCULAR; INTRAVENOUS at 09:42

## 2019-09-19 RX ADMIN — LISINOPRIL 10 MG: 10 TABLET ORAL at 09:41

## 2019-09-19 RX ADMIN — METOPROLOL SUCCINATE 50 MG: 50 TABLET, EXTENDED RELEASE ORAL at 20:50

## 2019-09-19 RX ADMIN — WARFARIN SODIUM 7.5 MG: 7.5 TABLET ORAL at 09:41

## 2019-09-19 RX ADMIN — DOCUSATE SODIUM 100 MG: 100 CAPSULE, LIQUID FILLED ORAL at 20:54

## 2019-09-19 RX ADMIN — Medication 2000 UNITS: at 09:41

## 2019-09-19 RX ADMIN — METOPROLOL SUCCINATE 50 MG: 50 TABLET, EXTENDED RELEASE ORAL at 09:41

## 2019-09-19 RX ADMIN — VITAMIN C 1 TABLET: TAB at 09:41

## 2019-09-19 RX ADMIN — ACETAMINOPHEN 650 MG: 325 TABLET, FILM COATED ORAL at 05:57

## 2019-09-19 RX ADMIN — SPIRONOLACTONE 25 MG: 25 TABLET ORAL at 09:41

## 2019-09-19 RX ADMIN — LEVOTHYROXINE SODIUM 125 MCG: 125 TABLET ORAL at 07:00

## 2019-09-19 RX ADMIN — FAMOTIDINE 20 MG: 20 TABLET ORAL at 09:41

## 2019-09-19 RX ADMIN — Medication 10 ML: at 09:42

## 2019-09-19 RX ADMIN — Medication 10 ML: at 20:50

## 2019-09-19 RX ADMIN — POTASSIUM CHLORIDE 20 MEQ: 20 TABLET, EXTENDED RELEASE ORAL at 09:41

## 2019-09-19 ASSESSMENT — PAIN DESCRIPTION - FREQUENCY: FREQUENCY: INTERMITTENT

## 2019-09-19 ASSESSMENT — PAIN SCALES - GENERAL
PAINLEVEL_OUTOF10: 3
PAINLEVEL_OUTOF10: 0

## 2019-09-19 ASSESSMENT — PAIN DESCRIPTION - ORIENTATION: ORIENTATION: LOWER;MID

## 2019-09-19 ASSESSMENT — PAIN DESCRIPTION - DIRECTION: RADIATING_TOWARDS: LEGS

## 2019-09-19 ASSESSMENT — PAIN - FUNCTIONAL ASSESSMENT: PAIN_FUNCTIONAL_ASSESSMENT: PREVENTS OR INTERFERES SOME ACTIVE ACTIVITIES AND ADLS

## 2019-09-19 ASSESSMENT — PAIN DESCRIPTION - ONSET: ONSET: GRADUAL

## 2019-09-19 ASSESSMENT — PAIN DESCRIPTION - PAIN TYPE: TYPE: CHRONIC PAIN

## 2019-09-19 ASSESSMENT — PAIN DESCRIPTION - DESCRIPTORS: DESCRIPTORS: ACHING;DISCOMFORT

## 2019-09-19 ASSESSMENT — PAIN DESCRIPTION - PROGRESSION: CLINICAL_PROGRESSION: NOT CHANGED

## 2019-09-19 ASSESSMENT — PAIN DESCRIPTION - LOCATION: LOCATION: GENERALIZED

## 2019-09-19 NOTE — PLAN OF CARE
Problem: Risk for Impaired Skin Integrity  Goal: Tissue integrity - skin and mucous membranes  Description  Structural intactness and normal physiological function of skin and  mucous membranes.   Outcome: Met This Shift     Problem: Falls - Risk of:  Goal: Will remain free from falls  Description  Will remain free from falls  Outcome: Met This Shift     Problem: Falls - Risk of:  Goal: Absence of physical injury  Description  Absence of physical injury  Outcome: Met This Shift     Problem: Breathing Pattern - Ineffective:  Goal: Ability to achieve and maintain a regular respiratory rate will improve  Description  Ability to achieve and maintain a regular respiratory rate will improve  Outcome: Met This Shift

## 2019-09-20 VITALS
RESPIRATION RATE: 18 BRPM | DIASTOLIC BLOOD PRESSURE: 64 MMHG | BODY MASS INDEX: 39.39 KG/M2 | HEART RATE: 71 BPM | SYSTOLIC BLOOD PRESSURE: 121 MMHG | OXYGEN SATURATION: 94 % | HEIGHT: 66 IN | WEIGHT: 245.1 LBS | TEMPERATURE: 96.9 F

## 2019-09-20 LAB
AMORPHOUS: ABNORMAL
ANION GAP SERPL CALCULATED.3IONS-SCNC: 15 MMOL/L (ref 7–16)
BACTERIA: ABNORMAL /HPF
BILIRUBIN URINE: ABNORMAL
BLOOD, URINE: ABNORMAL
BUN BLDV-MCNC: 20 MG/DL (ref 8–23)
CALCIUM SERPL-MCNC: 9.4 MG/DL (ref 8.6–10.2)
CHLORIDE BLD-SCNC: 101 MMOL/L (ref 98–107)
CLARITY: ABNORMAL
CO2: 26 MMOL/L (ref 22–29)
COLOR: ABNORMAL
CREAT SERPL-MCNC: 0.8 MG/DL (ref 0.5–1)
CRYSTALS, UA: ABNORMAL
GFR AFRICAN AMERICAN: >60
GFR NON-AFRICAN AMERICAN: >60 ML/MIN/1.73
GLUCOSE BLD-MCNC: 96 MG/DL (ref 74–99)
GLUCOSE URINE: NEGATIVE MG/DL
HCT VFR BLD CALC: 39.7 % (ref 34–48)
HEMOGLOBIN: 13.1 G/DL (ref 11.5–15.5)
INR BLD: 1.8
KETONES, URINE: NEGATIVE MG/DL
LEUKOCYTE ESTERASE, URINE: ABNORMAL
MAGNESIUM: 2.2 MG/DL (ref 1.6–2.6)
MCH RBC QN AUTO: 30.3 PG (ref 26–35)
MCHC RBC AUTO-ENTMCNC: 33 % (ref 32–34.5)
MCV RBC AUTO: 91.7 FL (ref 80–99.9)
NITRITE, URINE: POSITIVE
PDW BLD-RTO: 14.7 FL (ref 11.5–15)
PH UA: 7 (ref 5–9)
PLATELET # BLD: 208 E9/L (ref 130–450)
PMV BLD AUTO: 10 FL (ref 7–12)
POTASSIUM SERPL-SCNC: 3.9 MMOL/L (ref 3.5–5)
PRO-BNP: 1190 PG/ML (ref 0–450)
PROTEIN UA: >=300 MG/DL
PROTHROMBIN TIME: 20.4 SEC (ref 9.3–12.4)
RBC # BLD: 4.33 E12/L (ref 3.5–5.5)
RBC UA: >20 /HPF (ref 0–2)
SODIUM BLD-SCNC: 142 MMOL/L (ref 132–146)
SPECIFIC GRAVITY UA: 1.02 (ref 1–1.03)
UROBILINOGEN, URINE: 2 E.U./DL
WBC # BLD: 7.4 E9/L (ref 4.5–11.5)
WBC UA: ABNORMAL /HPF (ref 0–5)

## 2019-09-20 PROCEDURE — 85027 COMPLETE CBC AUTOMATED: CPT

## 2019-09-20 PROCEDURE — 6370000000 HC RX 637 (ALT 250 FOR IP): Performed by: INTERNAL MEDICINE

## 2019-09-20 PROCEDURE — 2580000003 HC RX 258: Performed by: FAMILY MEDICINE

## 2019-09-20 PROCEDURE — 83880 ASSAY OF NATRIURETIC PEPTIDE: CPT

## 2019-09-20 PROCEDURE — 81001 URINALYSIS AUTO W/SCOPE: CPT

## 2019-09-20 PROCEDURE — 6370000000 HC RX 637 (ALT 250 FOR IP)

## 2019-09-20 PROCEDURE — 36415 COLL VENOUS BLD VENIPUNCTURE: CPT

## 2019-09-20 PROCEDURE — 6360000002 HC RX W HCPCS: Performed by: STUDENT IN AN ORGANIZED HEALTH CARE EDUCATION/TRAINING PROGRAM

## 2019-09-20 PROCEDURE — 6370000000 HC RX 637 (ALT 250 FOR IP): Performed by: FAMILY MEDICINE

## 2019-09-20 PROCEDURE — 99232 SBSQ HOSP IP/OBS MODERATE 35: CPT | Performed by: FAMILY MEDICINE

## 2019-09-20 PROCEDURE — 83735 ASSAY OF MAGNESIUM: CPT

## 2019-09-20 PROCEDURE — 99233 SBSQ HOSP IP/OBS HIGH 50: CPT | Performed by: INTERNAL MEDICINE

## 2019-09-20 PROCEDURE — 80048 BASIC METABOLIC PNL TOTAL CA: CPT

## 2019-09-20 PROCEDURE — 85610 PROTHROMBIN TIME: CPT

## 2019-09-20 RX ORDER — SULFAMETHOXAZOLE AND TRIMETHOPRIM 800; 160 MG/1; MG/1
1 TABLET ORAL 2 TIMES DAILY
Qty: 10 TABLET | Refills: 0 | Status: SHIPPED | OUTPATIENT
Start: 2019-09-20 | End: 2019-09-25 | Stop reason: ALTCHOICE

## 2019-09-20 RX ORDER — WARFARIN SODIUM 7.5 MG/1
7.5 TABLET ORAL
Status: COMPLETED | OUTPATIENT
Start: 2019-09-20 | End: 2019-09-20

## 2019-09-20 RX ORDER — FUROSEMIDE 40 MG/1
40 TABLET ORAL DAILY
Qty: 180 TABLET | Refills: 3 | Status: SHIPPED | OUTPATIENT
Start: 2019-09-20 | End: 2019-10-04 | Stop reason: DRUGHIGH

## 2019-09-20 RX ADMIN — LISINOPRIL 10 MG: 10 TABLET ORAL at 09:07

## 2019-09-20 RX ADMIN — Medication 10 ML: at 09:07

## 2019-09-20 RX ADMIN — VITAMIN C 1 TABLET: TAB at 09:07

## 2019-09-20 RX ADMIN — WARFARIN SODIUM 7.5 MG: 7.5 TABLET ORAL at 12:38

## 2019-09-20 RX ADMIN — POTASSIUM CHLORIDE 20 MEQ: 20 TABLET, EXTENDED RELEASE ORAL at 09:06

## 2019-09-20 RX ADMIN — LEVOTHYROXINE SODIUM 125 MCG: 125 TABLET ORAL at 06:34

## 2019-09-20 RX ADMIN — SPIRONOLACTONE 25 MG: 25 TABLET ORAL at 09:07

## 2019-09-20 RX ADMIN — METOPROLOL SUCCINATE 50 MG: 50 TABLET, EXTENDED RELEASE ORAL at 09:07

## 2019-09-20 RX ADMIN — Medication 2000 UNITS: at 09:07

## 2019-09-20 RX ADMIN — FAMOTIDINE 20 MG: 20 TABLET ORAL at 09:06

## 2019-09-20 RX ADMIN — FUROSEMIDE 40 MG: 10 INJECTION, SOLUTION INTRAMUSCULAR; INTRAVENOUS at 09:07

## 2019-09-20 ASSESSMENT — PAIN SCALES - GENERAL: PAINLEVEL_OUTOF10: 0

## 2019-09-20 NOTE — PROGRESS NOTES
[address] [facility]  Family Medicine Attending    S: 81 y.o. female with CHF, atrial fibrillation on coumadin, HTN, obesity, hypothyroidism, and osteoarthritis who presets with one week of progressive dyspnea, WOO, and severe orthopnea. Symptoms began on recent trip to [place]. Drove there, and stopped her Lasix because she didn't want to have to urinate. She is incontinent and had to wear adult diaper, but this didn't hold her urine well. She took 1/2 dose of Lasix while there, and then stopped again for the drive home. She has been unable to sleep lying down and feels \"bloated and puffy\"  Today, c/o feeling tired and not sleeping well, but difficult to wake up this morning. Also diffuse soreness. O: VS- Blood pressure 132/62, pulse 80, temperature 97 °F (36.1 °C), temperature source Temporal, resp. rate 18, height 5' 6\" (1.676 m), weight 244 lb 6.4 oz (110.9 kg), SpO2 95 %, not currently breastfeeding. Exam is as noted by resident with the following changes, additions or corrections:  Awake, alert, in NAD. Lying down with head elevated only 20-30 degrees  Ht - irreg, but controlled rate. Soft systolic murmur heard today  Lungs- Clear  Ext - trace edema  CXR- some CHF and also widening of superior mediastinum  CT - CHF  Down 14 pounds    Impressions:   Principal Problem:    Acute on chronic diastolic congestive heart failure (HCC)  Active Problems:    Congestive heart failure (HCC)    Hypothyroidism    GERD (gastroesophageal reflux disease)    Nonischemic cardiomyopathy (HCC)    Permanent atrial fibrillation (HCC)    Essential hypertension    Pulmonary hypertension (HCC)    Chronic anticoagulation    Chronic obstructive pulmonary disease (HCC)    Chronic diastolic (congestive) heart failure ([facility] [address]    Pure hypercholesterolemia  Resolved Problems:    * No resolved hospital problems.  *      Plan:   Plan on BENI at discharge   Echo with 45% EF   Discharge soon     Attending Physician Statement  I have
[address] [facility]  Family Medicine Attending    S: 81 y.o. female with CHF, atrial fibrillation on coumadin, HTN, obesity, hypothyroidism, and osteoarthritis who presets with one week of progressive dyspnea, WOO, and severe orthopnea. Symptoms began on recent trip to [place]. Drove there, and stopped her Lasix because she didn't want to have to urinate. She is incontinent and had to wear adult diaper, but this didn't hold her urine well. She took 1/2 dose of Lasix while there, and then stopped again for the drive home. She has been unable to sleep lying down and feels \"bloated and puffy\"  Today, she feels better, although still SOB. O: VS- Blood pressure (!) 144/88, pulse 82, temperature 96.7 °F (35.9 °C), temperature source Temporal, resp. rate 18, height 5' 6\" (1.676 m), weight 250 lb 3.2 oz (113.5 kg), SpO2 98 %, not currently breastfeeding. Exam is as noted by resident with the following changes, additions or corrections:  Awake, alert, in NAD. Lying down with head elevated only 20-30 degrees  Ht - irreg, but controlled rate  Lungs- diffuse expiratory wheezing  Ext - trace edema  CXR- some CHF and also widening of superior mediastinum    Impressions:   Principal Problem:    Acute on chronic diastolic congestive heart failure (HCC)  Active Problems:    Congestive heart failure (HCC)    Hypothyroidism    GERD (gastroesophageal reflux disease)    Nonischemic cardiomyopathy (HCC)    Permanent atrial fibrillation (HCC)    Essential hypertension    Pulmonary hypertension (HCC)    Chronic anticoagulation    Chronic obstructive pulmonary disease (HCC)    Chronic diastolic (congestive) heart failure ([facility] [address]    Pure hypercholesterolemia  Resolved Problems:    * No resolved hospital problems.  *      Plan:   Diurese, consult cardiology - she does have [address] as well   Echo, viral panel   O2 as needed   Adjust coumadin   CT of chest     Attending Physician Statement  I have reviewed the chart and seen the
Contacted Clarissa from Echo for time of pt's echo. They are unsure if they will be able to see her today due to back up from the last few days. Pending discharge.
Messaged Dr. Apolonia Henderson. Patient states she hasn't had a BM 3 days. Requesting stool softener.     8:48 PM  Shy Dewey
Occupational Therapy  OCCUPATIONAL THERAPY INITIAL EVALUATION      Date:2019  Patient Name: Wilfrido Shore  MRN: [id number]  : 1935  Room: [address] [place] [address]        Evaluating OT: Cyndie Palma OTR/L [id number]    AM-PAC Daily Activity Raw Score:     Recommended Adaptive Equipment: TBD     Comments: Based on patient's functional performance as documented below and prior level of function, patient would benefit from continued skilled OT during/following hospital stay in an effort to increase functional safety, independence with ADLS/IADLS, and overall quality of life    Diagnosis:  CHF  PMHx:   Afib, basal, OA  Precautions:  Falls, O2, [place]     Social:    Pt lives with  in a 1 story home with ramp entry. 3 steps/2 rails to enter from side door. Patient reports needing increase assists at home recently - feeling weaker   Pt ambulated with [facility] in home and [employer] in community PTA.   States she works as a  at [employer].  Pt owns [facility], [employer], [employer], and transport chair      Driving: yes  Occupation:  at [employer]     Pain Level: no pain ;   Cognition:  Ox3, alert and conversing     Judgement/safety:  Fair +     Functional Assessment:   Initial Eval Status  Date:  Tx Session   Date  Short Term Goals  Treatment frequency: PRN   Feeding Independent      Grooming Set-up,seated   Mod I    UB Dressing SBA/set-up   Mod I   LB Dressing Mod A   Mod I    Bathing Mod A   Mod I    Toileting        Bed Mobility  Min A  Supine to sit   SBA  Sit-supine      Functional Transfers Min A  Sit-stand from bed  Mod I    Functional Mobility CGA  Side steps next to bed  Patient wanting to return to bed - feeling fatigued   Mod I    Balance Sitting:     Static:  Independent     Standing: Min/CGA      Activity Tolerance Fair -  Good with ADL activity    Visual/  Perceptual Glasses: reading                 Right  hand dominant    Strength ROM Additional Info:    RUE  3+/5  WFL good  and wfl
Pharmacy Consultation Note  (Anticoagulant Dosing and Monitoring)    Initial consult date: 9/16/19  Consulting physician: Dr Robert Ruby    Allergies:  Codeine; Penicillins; and Vicodin [hydrocodone-acetaminophen]    81 y.o. female      Ht Readings from Last 1 Encounters:   09/16/19 5' 6\" (1.676 m)     Wt Readings from Last 1 Encounters:   09/17/19 250 lb 3.2 oz (113.5 kg)         Warfarin Indication Target   INR [place]   Dose  (if applicable) Diet/Feeding Tube   Afib 2 - 3 7.5 mg daily Cardiac/Low Sodium Diet       Vitamin K or Blood product  Administration Date                 Warfarin drug-drug interactions  Start  Stop Home Med? Comments    Levothyroxine 125 mcg PO daily 9/17  Yes                          TSH:    Lab Results   Component Value Date    TSH 2.740 01/02/2019        Hepatic Function Panel:                            Lab Results   Component Value Date    ALKPHOS 73 09/16/2019    ALT 20 09/16/2019    AST 28 09/16/2019    PROT 7.5 09/16/2019    BILITOT 0.8 09/16/2019    BILIDIR 0.3 08/04/2014    IBILI 0.6 08/04/2014    LABALBU 4.1 09/16/2019    LABALBU 4.4 02/14/2012       Date Warfarin Dose INR Heparin or LMWH HBG/  HCT PLT Comment   9/16 -- 3.2 -- 12.2/38.5 191    9/17 2.5 mg 3.3 -- 11.9/37.3 175                                 Assessment:  · 82 yo F admitted 9/16 with SOB and an acute on chronic HF exacerbation   · Pt on chronic anticoagulation with Warfarin for Afib - home dose Warfarin 7.5 mg PO daily  · Pharmacy consulted to dose/manage Warfarin per Dr Robert Ruby  · INR today is 3.3; INR goal 2 - 3    Plan:  · Will order a lower than home dose of Warfarin 2.5 mg PO x1 tonight  · Daily PT/INR until the INR is stable within the therapeutic range    Will continue to follow. Thank you for the consult.     Jose Cordova, PharmD, BCPS, BCCCP  9/17/2019  1:01 PM  Pager: [phone]
0.9 % injection 10 mL  10 mL Intravenous PRN Isis Miles MD        furosemide (LASIX) injection 40 mg  40 mg Intravenous BID Isis Miles MD   40 mg at 09/17/19 1818    levothyroxine (SYNTHROID) tablet 125 mcg  125 mcg Oral Daily Isis Miles MD   125 mcg at 09/18/19 4605    lisinopril (PRINIVIL;ZESTRIL) tablet 10 mg  10 mg Oral Daily Isis Miles MD   10 mg at 09/17/19 1005    metoprolol succinate (TOPROL XL) extended release tablet 50 mg  50 mg Oral BID Isis Miles MD   50 mg at 09/17/19 2015    famotidine (PEPCID) tablet 20 mg  20 mg Oral Daily Isis Miles MD   20 mg at 09/17/19 1004    vitamin D tablet 2,000 Units  2,000 Units Oral Daily Isis Miles MD   2,000 Units at 09/17/19 1005    albuterol sulfate  (90 Base) MCG/ACT inhaler 2 puff  2 puff Inhalation Q6H PRN Isis Miles MD        vitamin B and C (TOTAL B-C) 1 tablet  1 tablet Oral Daily Isis Miles MD   1 tablet at 09/17/19 1005    potassium chloride (KLOR-CON M) extended release tablet 20 mEq  20 mEq Oral Daily with breakfast Isis Miles MD   20 mEq at 09/17/19 1004    sodium chloride flush 0.9 % injection 10 mL  10 mL Intravenous 2 times per day Isis Miles MD   10 mL at 09/17/19 2015    sodium chloride flush 0.9 % injection 10 mL  10 mL Intravenous PRN Isis Miles MD   10 mL at 09/17/19 1818    magnesium hydroxide (MILK OF MAGNESIA) 400 MG/5ML suspension 30 mL  30 mL Oral Daily PRN Isis Miles MD        ondansetron [facility]) injection 4 mg  4 mg Intravenous Q6H PRN Isis Miles MD        acetaminophen (TYLENOL) tablet 650 mg  650 mg Oral Q4H PRN Isis Miles MD   650 mg at 09/18/19 0020       Review of systems:   Heart: as above   Lungs: as above   Eyes: denies changes in vision or discharge. Ears: denies changes in hearing or pain. Nose: denies epistaxis or masses   Throat: denies sore throat or trouble swallowing.    Neuro: denies numbness,
Some wheezing. HEART: Irregularly irregular rhythm, rate controlled, systolic murmur noted, distal pulses intact. GENITOURINARY: Urinary cath in place  EXTREMITIES: Extremities normal, atraumatic, no cyanosis or clubbing. Trace edema bilaterally  NEUROLOGIC: Alert & Oriented x3    Labs:  Na/K/Cl/CO2:  143/3.8/105/27 ( 7330)  BUN/Cr/glu/ALT/AST/amyl/lip:  15/0.8/--/--/--/--/-- ( 2515)  WBC/Hgb/Hct/Plts:  4.1/11.9/37.3/175 ( 012)  estimated creatinine clearance is 68 mL/min (based on SCr of 0.8 mg/dL). Other pertinent labs as noted below    New Imaging:  Xr Chest Portable    Result Date: 2019  Patient MRN:  [id number] : 1935 Age: 81 years Gender: Female Order Date:  2019 3:45 PM EXAM: XR CHEST PORTABLE one image INDICATION:  shortness of breath shortness of breath COMPARISON: 2019 FINDINGS: There is cardiomegaly. There is prominence of the superior mediastinum. There is vascular congestion with the perihilar and bibasilar atelectasis and small pleural effusions. Mild CHF. Prominence of superior mediastinum. Consider surveillance. A/P:  Principal Problem:    Acute on chronic diastolic congestive heart failure (HCC)  Active Problems:    Hypothyroidism    GERD (gastroesophageal reflux disease)    Nonischemic cardiomyopathy (HCC)    Permanent atrial fibrillation (HCC)    Essential hypertension    Pulmonary hypertension (HCC)    Chronic anticoagulation    Chronic obstructive pulmonary disease (HCC)    Chronic diastolic (congestive) heart failure ([facility] [address]    Pure hypercholesterolemia  Resolved Problems:    * No resolved hospital problems.  *    Acute on chronic exacerbation of HFpEF  - Patient presented with increasing shortness of breath x2 weeks  - Patient admits to nonadherence with diuretic therapy at home  - Patient poorly compliant with heart failure diet  - BNP elevated in ER but around baseline, will monitor every other day  - Daily weights and strict I's and O's  - IV
on discharge     Attending Physician Statement  I have reviewed the chart and seen the patient with the resident(s). I personally reviewed images, EKG's and similar tests, if present. I personally reviewed and performed key elements of the history and exam.  I have reviewed and confirmed student and/or resident history and exam with changes as indicated above. I agree with the assessment, plan and orders as documented by the resident. Please refer to the resident and/or student note for additional information.       Carloz Ellis
the INR is stable within the therapeutic range    Will continue to follow.       Eliseo ChapmanD, BCPS 9/20/2019 9:59 AM
times per day Harlan Kenny MD   10 mL at 09/18/19 1011    sodium chloride flush 0.9 % injection 10 mL  10 mL Intravenous PRN Harlan Kenny MD        levothyroxine (SYNTHROID) tablet 125 mcg  125 mcg Oral Daily Harlan Kenny MD   125 mcg at 09/19/19 0700    lisinopril (PRINIVIL;ZESTRIL) tablet 10 mg  10 mg Oral Daily Harlan Kenny MD   10 mg at 09/18/19 1007    metoprolol succinate (TOPROL XL) extended release tablet 50 mg  50 mg Oral BID Harlan Kenny MD   50 mg at 09/18/19 2020    famotidine (PEPCID) tablet 20 mg  20 mg Oral Daily Harlan Kenny MD   20 mg at 09/18/19 1007    vitamin D tablet 2,000 Units  2,000 Units Oral Daily Harlan Kenny MD   2,000 Units at 09/18/19 1007    albuterol sulfate  (90 Base) MCG/ACT inhaler 2 puff  2 puff Inhalation Q6H PRN Harlan Kenny MD        vitamin B and C (TOTAL B-C) 1 tablet  1 tablet Oral Daily Harlan Kenny MD   1 tablet at 09/18/19 1007    potassium chloride (KLOR-CON M) extended release tablet 20 mEq  20 mEq Oral Daily with breakfast Harlan Kenny MD   20 mEq at 09/18/19 1007    sodium chloride flush 0.9 % injection 10 mL  10 mL Intravenous 2 times per day Harlan Kenny MD   10 mL at 09/18/19 2021    sodium chloride flush 0.9 % injection 10 mL  10 mL Intravenous PRN Harlan Kenny MD   10 mL at 09/17/19 1818    magnesium hydroxide (MILK OF MAGNESIA) 400 MG/5ML suspension 30 mL  30 mL Oral Daily PRN Harlan Kenny MD        ondansetron [facility]) injection 4 mg  4 mg Intravenous Q6H PRN Harlan Kenny MD        acetaminophen (TYLENOL) tablet 650 mg  650 mg Oral Q4H PRN Harlan Kenny MD   650 mg at 09/19/19 9781       Review of systems:   Heart: as above   Lungs: as above   Eyes: denies changes in vision or discharge. Ears: denies changes in hearing or pain. Nose: denies epistaxis or masses   Throat: denies sore throat or trouble swallowing. Neuro: denies numbness, tingling, tremors.    Skin:

## 2019-09-21 ENCOUNTER — CARE COORDINATION (OUTPATIENT)
Dept: CASE MANAGEMENT | Age: 84
End: 2019-09-21

## 2019-09-21 DIAGNOSIS — I50.9 CONGESTIVE HEART FAILURE, UNSPECIFIED HF CHRONICITY, UNSPECIFIED HEART FAILURE TYPE (HCC): Primary | ICD-10-CM

## 2019-09-21 PROCEDURE — 1111F DSCHRG MED/CURRENT MED MERGE: CPT | Performed by: FAMILY MEDICINE

## 2019-09-21 NOTE — CARE COORDINATION
chair, wheelchair ramp on front of home  Patient Home Equipment:  Nebulizer  Do you have support at home?:  Partner/Spouse/SO  Do you feel like you have everything you need to keep you well at home?:  Yes  Are you an active caregiver in your home?:  No  Care Transitions Interventions  No Identified Needs       Spoke with [place] for initial care transition call post hospital discharge. She reports that she is doing well and finally got a good nights sleep last night. She reports continued mild shortness of breath with activity but feels it is improving. She is ambulating with 2 canes and states she is just trying to make shorter trips around the house to help her SOB. [place] denies any edema and reports that she weighs herself daily, today her weight was 244lbs. She understands the importance of daily weights and to report a weight gain of 3 pounds over night or 5 pounds in a week to her physician. Patient instructed on low sodium diet, less than 2000 mg or 2g daily. Patient instructed to read labels and foods high in salt including but not limited to frozen meals, restaurant foods, fast foods, lunch meat, smoked meat, cheeses, canned foods. Instructed to rinse canned vegetables before eating. Patient verbalized understanding. Med review completed, 1111F entered. [place] obtained the Bactrim after discharge yesterday. Per chart review of Dr. Maty Santana's discharge summary 9/20/19 stating:   INSTRUCTIONS to follow: after discussing warfarin interaction with pharmacy, patient was instructed to take only half her warfarin dose while on Bactrim for UTI. Message also left at patient's home to let her know to come in on Monday, 9/23/19 for INR check. [facility] and [place] discussed the need to only take half of her Coumadin while on the Bactrim, she verbalized understanding. She stated understanding to have an INR drawn Monday.      [place] stated she has not heard from [facility], she is in agreement for [facility] to contact. Spoke with Mavis in scheduling at [facility], she stated she does not have [place] on the schedule. She advised CTN to speak with Daryle Shaw in intake and transferred CTN. Daryle Fort stated she never received a referral on this patient, per chart review again, CTN is unable to locate a note from IP staff stating that [facility] was notified. Patient name, , orders, and insurance provided to Daryle Fort. Updated [place] on the above, she denies any further needs at this time.        Follow Up  Future Appointments   Date Time Provider Hernandez Mcintyre   2019  9:00 AM Jake Hartman MD [facility]   2019 11:[facility]       Richad Mortimer, RN

## 2019-09-23 ENCOUNTER — CARE COORDINATION (OUTPATIENT)
Dept: CASE MANAGEMENT | Age: 84
End: 2019-09-23

## 2019-09-23 ENCOUNTER — TELEPHONE (OUTPATIENT)
Dept: FAMILY MEDICINE CLINIC | Age: 84
End: 2019-09-23

## 2019-09-23 NOTE — CARE COORDINATION
[facility] Transitions Follow Up Call    2019    Patient: Johnathan Zafar  Patient : 1935   MRN: [id number]  Reason for Admission: CHF,UTI  Discharge Date: 19 RARS: Readmission Risk Score: 12         Spoke with: Johnathan Zafar, patient    Care Transitions Subsequent and Final Call    Subsequent and Final Calls  Do you have any ongoing symptoms?:  No  Do you have any questions related to your medications?:  No  Do you currently have any active services?:  Yes  Are you currently active with any services?:  Home Health  Do you have any needs or concerns that I can assist you with?:  No  Identified Barriers:  None  Care Transitions Interventions  No Identified Needs  Other Interventions: -. Spoke with patient [place]  for final care transition call.  -Patient [facility] will be visiting at 2 pm today and will draw her INR. -CTN confirmed with patient that she is taking half her Coumadin dose as ordered while she is on the Bactrim. -Patient denies any needs, questions, or concerns at this time.  -Care transition to sign off and will route back to [address] [place] [address], as the patient is active with care coordination.      Follow Up  Future Appointments   Date Time Provider Hernandez Mcintyre   2019  9:00 AM Denisse Cespedes MD [facility]   2019 11:00 AM MD Dev Lucas [facility]       Gary Escalera RN

## 2019-09-24 ENCOUNTER — OFFICE VISIT (OUTPATIENT)
Dept: CARDIOLOGY CLINIC | Age: 84
End: 2019-09-24
Payer: MEDICARE

## 2019-09-24 VITALS
DIASTOLIC BLOOD PRESSURE: 70 MMHG | BODY MASS INDEX: 39.29 KG/M2 | HEART RATE: 82 BPM | HEIGHT: 66 IN | SYSTOLIC BLOOD PRESSURE: 112 MMHG | WEIGHT: 244.5 LBS

## 2019-09-24 DIAGNOSIS — I10 ESSENTIAL HYPERTENSION: ICD-10-CM

## 2019-09-24 DIAGNOSIS — Q21.12 PATENT FORAMEN OVALE: ICD-10-CM

## 2019-09-24 DIAGNOSIS — E66.8 MODERATE OBESITY: ICD-10-CM

## 2019-09-24 DIAGNOSIS — J44.9 CHRONIC OBSTRUCTIVE PULMONARY DISEASE, UNSPECIFIED COPD TYPE (HCC): ICD-10-CM

## 2019-09-24 DIAGNOSIS — E78.00 PURE HYPERCHOLESTEROLEMIA: ICD-10-CM

## 2019-09-24 DIAGNOSIS — I48.21 PERMANENT ATRIAL FIBRILLATION (HCC): ICD-10-CM

## 2019-09-24 DIAGNOSIS — I50.32 CHRONIC DIASTOLIC (CONGESTIVE) HEART FAILURE (HCC): ICD-10-CM

## 2019-09-24 DIAGNOSIS — I42.8 NONISCHEMIC CARDIOMYOPATHY (HCC): Primary | ICD-10-CM

## 2019-09-24 PROCEDURE — G8598 ASA/ANTIPLAT THER USED: HCPCS | Performed by: INTERNAL MEDICINE

## 2019-09-24 PROCEDURE — G8417 CALC BMI ABV UP PARAM F/U: HCPCS | Performed by: INTERNAL MEDICINE

## 2019-09-24 PROCEDURE — 4040F PNEUMOC VAC/ADMIN/RCVD: CPT | Performed by: INTERNAL MEDICINE

## 2019-09-24 PROCEDURE — 1111F DSCHRG MED/CURRENT MED MERGE: CPT | Performed by: INTERNAL MEDICINE

## 2019-09-24 PROCEDURE — 1123F ACP DISCUSS/DSCN MKR DOCD: CPT | Performed by: INTERNAL MEDICINE

## 2019-09-24 PROCEDURE — 93000 ELECTROCARDIOGRAM COMPLETE: CPT | Performed by: INTERNAL MEDICINE

## 2019-09-24 PROCEDURE — 99214 OFFICE O/P EST MOD 30 MIN: CPT | Performed by: INTERNAL MEDICINE

## 2019-09-24 PROCEDURE — G8427 DOCREV CUR MEDS BY ELIG CLIN: HCPCS | Performed by: INTERNAL MEDICINE

## 2019-09-24 PROCEDURE — 3023F SPIROM DOC REV: CPT | Performed by: INTERNAL MEDICINE

## 2019-09-24 PROCEDURE — G8399 PT W/DXA RESULTS DOCUMENT: HCPCS | Performed by: INTERNAL MEDICINE

## 2019-09-24 PROCEDURE — 1036F TOBACCO NON-USER: CPT | Performed by: INTERNAL MEDICINE

## 2019-09-24 PROCEDURE — G8926 SPIRO NO PERF OR DOC: HCPCS | Performed by: INTERNAL MEDICINE

## 2019-09-24 PROCEDURE — 1090F PRES/ABSN URINE INCON ASSESS: CPT | Performed by: INTERNAL MEDICINE

## 2019-09-24 NOTE — PROGRESS NOTES
OUTPATIENT CARDIOLOGY FOLLOW-UP    Name: Nicci Kat    Age: 81 y.o. Primary Care Physician: Alexis Baires MD    Date of Service: 9/24/2019    Chief Complaint: Nonischemic cardiomyopathy, permanent atrial fibrillation, chronic diastolic heart failure, chronic obstructive lung disease, patent foramen ovale, hypertension, moderate obesity    Interim History: Since her most recent outpatient assessment, the patient was hospitalized with decompensated diastolic heart failure in part related to her self regulation of her diuretic dosage to reduce the inconvenience while with family. During hospitalization, she was diuresed and has resumed her normal functional capabilities with no significant clinical evidence of volume overload at the time of her assessment. During hospitalization, an echocardiogram demonstrated left ventricular hypertrophy with mild left ventricular systolic dysfunction estimated ejection fraction of 45% with severe left atrial enlargement and mild aortic insufficiency and mitral regurgitation with moderate pulmonary hypertension right ventricular systolic pressures of approximately 55 mmHg as well as that of the incidental finding of a patent foramen ovale. At the time of assessment, in spite of her clinical euvolemic appearance, her weight remains the 10 pounds above that previously documented. She presently remains normotensive. She is experiencing no arrhythmia related symptoms in the face of her permanent atrial fibrillation and is noted no symptoms of a focal neurologic origin or bleeding in the face of chronic oral anticoagulation. Review of Systems: The remainder of a complete multisystem review including consitutional, central nervous, respiratory, circulatory, gastrointestinal, genitourinary, endocrinologic, hematologic, musculoskeletal and psychiatric are negative.     Past Medical History:  Past Medical History:   Diagnosis Date    Anticoagulated on Coumadin     on

## 2019-09-25 PROBLEM — I25.10 CORONARY ARTERIOSCLEROSIS IN NATIVE ARTERY: Status: ACTIVE | Noted: 2019-09-25

## 2019-09-25 RX ORDER — SPIRONOLACTONE 25 MG/1
25 TABLET ORAL DAILY
COMMUNITY
Start: 2019-09-19 | End: 2019-10-04 | Stop reason: ALTCHOICE

## 2019-09-26 ENCOUNTER — ANTI-COAG VISIT (OUTPATIENT)
Dept: FAMILY MEDICINE CLINIC | Age: 84
End: 2019-09-26

## 2019-09-26 ENCOUNTER — HOSPITAL ENCOUNTER (OUTPATIENT)
Age: 84
Discharge: HOME | End: 2019-09-28
Payer: MEDICARE

## 2019-09-26 ENCOUNTER — OFFICE VISIT (OUTPATIENT)
Dept: FAMILY MEDICINE CLINIC | Age: 84
End: 2019-09-26
Payer: MEDICARE

## 2019-09-26 VITALS
TEMPERATURE: 97.7 F | HEIGHT: 66 IN | RESPIRATION RATE: 18 BRPM | DIASTOLIC BLOOD PRESSURE: 87 MMHG | SYSTOLIC BLOOD PRESSURE: 117 MMHG | BODY MASS INDEX: 38.89 KG/M2 | OXYGEN SATURATION: 96 % | HEART RATE: 90 BPM | WEIGHT: 242 LBS

## 2019-09-26 DIAGNOSIS — E03.9 HYPOTHYROIDISM, UNSPECIFIED TYPE: ICD-10-CM

## 2019-09-26 DIAGNOSIS — I50.9 CONGESTIVE HEART FAILURE, UNSPECIFIED HF CHRONICITY, UNSPECIFIED HEART FAILURE TYPE (HCC): ICD-10-CM

## 2019-09-26 DIAGNOSIS — I10 ESSENTIAL HYPERTENSION: ICD-10-CM

## 2019-09-26 DIAGNOSIS — I48.21 PERMANENT ATRIAL FIBRILLATION (HCC): ICD-10-CM

## 2019-09-26 DIAGNOSIS — M48.062 SPINAL STENOSIS OF LUMBAR REGION WITH NEUROGENIC CLAUDICATION: ICD-10-CM

## 2019-09-26 DIAGNOSIS — I48.91 ATRIAL FIBRILLATION WITH RVR (HCC): Primary | ICD-10-CM

## 2019-09-26 DIAGNOSIS — M15.9 PRIMARY OSTEOARTHRITIS INVOLVING MULTIPLE JOINTS: ICD-10-CM

## 2019-09-26 DIAGNOSIS — E66.8 MODERATE OBESITY: ICD-10-CM

## 2019-09-26 LAB
ANION GAP SERPL CALCULATED.3IONS-SCNC: 17 MMOL/L (ref 7–16)
BUN BLDV-MCNC: 35 MG/DL (ref 8–23)
CALCIUM SERPL-MCNC: 10.4 MG/DL (ref 8.6–10.2)
CHLORIDE BLD-SCNC: 97 MMOL/L (ref 98–107)
CO2: 22 MMOL/L (ref 22–29)
CREAT SERPL-MCNC: 1.4 MG/DL (ref 0.5–1)
GFR AFRICAN AMERICAN: 43
GFR NON-AFRICAN AMERICAN: 36 ML/MIN/1.73
GLUCOSE BLD-MCNC: 85 MG/DL (ref 74–99)
INTERNATIONAL NORMALIZATION RATIO, POC: 1.8
MAGNESIUM: 2.4 MG/DL (ref 1.6–2.6)
PHOSPHORUS: 4.2 MG/DL (ref 2.5–4.5)
POTASSIUM SERPL-SCNC: 4.9 MMOL/L (ref 3.5–5)
PROTHROMBIN TIME, POC: 21.8
SODIUM BLD-SCNC: 136 MMOL/L (ref 132–146)

## 2019-09-26 PROCEDURE — 80048 BASIC METABOLIC PNL TOTAL CA: CPT

## 2019-09-26 PROCEDURE — 1123F ACP DISCUSS/DSCN MKR DOCD: CPT | Performed by: FAMILY MEDICINE

## 2019-09-26 PROCEDURE — G8598 ASA/ANTIPLAT THER USED: HCPCS | Performed by: FAMILY MEDICINE

## 2019-09-26 PROCEDURE — G8399 PT W/DXA RESULTS DOCUMENT: HCPCS | Performed by: FAMILY MEDICINE

## 2019-09-26 PROCEDURE — 83735 ASSAY OF MAGNESIUM: CPT

## 2019-09-26 PROCEDURE — 4040F PNEUMOC VAC/ADMIN/RCVD: CPT | Performed by: FAMILY MEDICINE

## 2019-09-26 PROCEDURE — G8417 CALC BMI ABV UP PARAM F/U: HCPCS | Performed by: FAMILY MEDICINE

## 2019-09-26 PROCEDURE — G8427 DOCREV CUR MEDS BY ELIG CLIN: HCPCS | Performed by: FAMILY MEDICINE

## 2019-09-26 PROCEDURE — 36415 COLL VENOUS BLD VENIPUNCTURE: CPT

## 2019-09-26 PROCEDURE — 85610 PROTHROMBIN TIME: CPT | Performed by: FAMILY MEDICINE

## 2019-09-26 PROCEDURE — 1090F PRES/ABSN URINE INCON ASSESS: CPT | Performed by: FAMILY MEDICINE

## 2019-09-26 PROCEDURE — 99212 OFFICE O/P EST SF 10 MIN: CPT | Performed by: FAMILY MEDICINE

## 2019-09-26 PROCEDURE — 84100 ASSAY OF PHOSPHORUS: CPT

## 2019-09-26 PROCEDURE — 1036F TOBACCO NON-USER: CPT | Performed by: FAMILY MEDICINE

## 2019-09-26 PROCEDURE — 36415 COLL VENOUS BLD VENIPUNCTURE: CPT | Performed by: FAMILY MEDICINE

## 2019-09-26 PROCEDURE — 99214 OFFICE O/P EST MOD 30 MIN: CPT | Performed by: FAMILY MEDICINE

## 2019-09-26 PROCEDURE — 1111F DSCHRG MED/CURRENT MED MERGE: CPT | Performed by: FAMILY MEDICINE

## 2019-09-26 RX ORDER — ACETAMINOPHEN 325 MG/1
650 TABLET ORAL EVERY 6 HOURS PRN
COMMUNITY
End: 2021-01-01

## 2019-09-26 NOTE — PROGRESS NOTES
Chronic diastolic (congestive) heart failure (HCC)    Pure hypercholesterolemia    Spinal stenosis of lumbar region with neurogenic claudication    VHD (valvular heart disease)    Acute on chronic diastolic congestive heart failure (HCC)    Patent foramen ovale    Coronary arteriosclerosis in native artery       Past Medical History:   Diagnosis Date    Anticoagulated on Coumadin     on Coumadin for this Dr. Lisa Collins controls    Atrial fibrillation ([facility]     Basal cell carcinoma of neck     CAD (coronary artery disease)     History of luminal irregularities of the coronary artery, stable.     [facility]congestive heart failure) ([facility])     stage I diastolic dysfunction on 1/98/14    Depression 11/16/2010    DJD (degenerative joint disease)     SEVERE IN RIGHT HAND, IN MULTIPLE OTHER JOINTS    GERD (gastroesophageal reflux disease) 11/16/2010    History of cardiovascular stress test 03/18/2014    lexiscan    Hyperparathyroidism ([facility] 11/16/2010    Had parathyroidectomy    Hypertension 11/16/2010    Hypothyroidism 11/16/2010    Mild mitral regurgitation 7/10/14    Osteoarthritis 11/16/2010    Pulmonary hypertension ([facility] 7/10/14    mild    Renal calculi     x3    Tricuspid regurgitation 7/10/14    mild-moderate    Urinary incontinence 11/16/2010       Current Outpatient Medications on File Prior to Visit   Medication Sig Dispense Refill    acetaminophen (TYLENOL) 325 MG tablet Take 650 mg by mouth every 6 hours as needed for Pain or Fever      spironolactone (ALDACTONE) 25 MG tablet Take 25 mg by mouth daily       furosemide (LASIX) 40 MG tablet Take 1 tablet by mouth daily (Patient taking differently: Take 40 mg by mouth 2 times daily ) 180 tablet 3    meclizine (ANTIVERT) 25 MG tablet Take 1 tablet by mouth 3 times daily as needed for Dizziness 100 tablet 5    lisinopril (PRINIVIL;ZESTRIL) 10 MG tablet Take 1 tablet by mouth daily 90 tablet 3    levothyroxine (SYNTHROID) 125 MCG tablet TAKE 1 TABLET BY MOUTH  EVERY DAY 90 tablet 1    warfarin (COUMADIN) 7.5 MG tablet TAKE 1 TABLET BY MOUTH  DAILY (Patient taking differently: Take 7.5 mg by mouth daily Indications: per chart review, instructed to take 1/2 while on the Bactrim x5 days ) 90 tablet 0    metoprolol succinate (TOPROL XL) 50 MG extended release tablet TAKE 1 TABLET BY MOUTH TWO  TIMES DAILY 180 tablet 1    famotidine (PEPCID) 20 MG tablet TAKE 1 TABLET BY MOUTH  EVERY DAY 90 tablet 0    potassium chloride (KLOR-CON M) 20 MEQ extended release tablet TAKE 1 TABLET BY MOUTH  EVERY DAY 90 tablet 3    neomycin-polymyxin-dexameth (MAXITROL) 3.5-39453-3.1 ophthalmic suspension INT 3 DROPS INTO BOTH EARS BID PRN  3    Cholecalciferol (VITAMIN D) 2000 units TABS tablet TAKE ONE TABLET BY MOUTH EVERY DAY 90 tablet 1    b complex vitamins capsule Take 1 capsule by mouth daily 90 capsule 1    albuterol sulfate  (90 Base) MCG/ACT inhaler Inhale 2 puffs into the lungs every 6 hours as needed for Wheezing 1 Inhaler 3     No current facility-administered medications on file prior to visit. Allergies   Allergen Reactions    Codeine Other (See Comments)     Hallucination    Penicillins Hives    Vicodin [Hydrocodone-Acetaminophen] Other (See Comments)     Hallucination         Family History   Problem Relation Age of Onset    Heart Disease Mother     Heart Attack Mother     Arthritis Mother     Heart Disease Father     Mental Illness Father     Diabetes Father     Heart Disease Brother     Cancer Brother     Cancer Son     Heart Disease Sister     Heart Disease Brother     Heart Surgery Brother        Past Surgical History:   Procedure Laterality Date    CARDIAC CATHETERIZATION  3/22/14    CARPAL TUNNEL RELEASE      Right hand.  CATARACT REMOVAL  6/2005    right, bilateral cataract surgery.     CATARACT REMOVAL WITH IMPLANT Bilateral     CHOLECYSTECTOMY  1972    COLONOSCOPY      FINGER SURGERY  7/2011    S/P removal of HF chronicity, unspecified heart failure type ([facility]  -     Magnesium; Future  -     Phosphorus; Future    Essential hypertension  -     Basic Metabolic Panel; Future    Moderate obesity    Permanent atrial fibrillation (HCC)    Hypothyroidism, unspecified type    Spinal stenosis of lumbar region with neurogenic claudication    Primary osteoarthritis involving multiple joints    Other orders  -     zoster recombinant adjuvanted vaccine [facility]) 50 MCG/0.5ML SUSR injection; Inject 0.5 mLs into the muscle once for 1 dose      Additional Plan:  Recommend shingles vaccine. Discussed diet and salt intake, activity. She will continue with all meds, and I will recheck lytes today    RTO in in 2 month(s) or sooner for any persistent, new, or worsening symptoms. Please see Patient Instructions for further counseling and information given. Advised to be adherent to the treatment plans discussed today, and please call with any questions or concerns, letting the office know of any reasons that the plans may not be followed. The risks of untreated conditions include worsening illness, injury, disability, and possibly, death. Please call if symptoms change in any way, worsen, or fail to completely resolve, as this could necessitate a change to treatment plans. Patient and/or caregiver expressed understanding. Indications and proper use of medication(s) reviewed. Potential side-effects and risks of medication(s) also explained. Patient and/or caregiver was instructed to call if any new symptoms develop prior to next visit. Health risk factors discussed and addressed. I have personally reviewed labs and/or imaging (if any).       Signed by : Christina Barragan M.D.

## 2019-09-27 ENCOUNTER — CARE COORDINATION (OUTPATIENT)
Dept: CARE COORDINATION | Age: 84
End: 2019-09-27

## 2019-09-27 DIAGNOSIS — I50.9 CONGESTIVE HEART FAILURE, UNSPECIFIED HF CHRONICITY, UNSPECIFIED HEART FAILURE TYPE (HCC): Primary | ICD-10-CM

## 2019-09-27 ASSESSMENT — ENCOUNTER SYMPTOMS: DYSPNEA ASSOCIATED WITH: MINIMAL EXERTION

## 2019-09-27 NOTE — CARE COORDINATION
medication. I will notify my provider of over the counter medications I am taking    Barriers: lack of education  Plan for overcoming my barriers: medication education, care coordination  Confidence: 8/10  Anticipated Goal Completion Date: 9/20/19 *NEW 11/27/19 9/27/19 medications reviewed and educated on AVS and following most current med list.            Prior to Admission medications    Medication Sig Start Date End Date Taking?  Authorizing Provider   acetaminophen (TYLENOL) 325 MG tablet Take 650 mg by mouth every 6 hours as needed for Pain or Fever   Yes Historical Provider, MD   furosemide (LASIX) 40 MG tablet Take 1 tablet by mouth daily  Patient taking differently: Take 40 mg by mouth 2 times daily  9/20/19  Yes Jackie Santana DO   meclizine (ANTIVERT) 25 MG tablet Take 1 tablet by mouth 3 times daily as needed for Dizziness 9/3/19 9/2/20 Yes Moraima Diallo MD   lisinopril (PRINIVIL;ZESTRIL) 10 MG tablet Take 1 tablet by mouth daily 9/3/19 9/2/20 Yes Moraima Diallo MD   levothyroxine (SYNTHROID) 125 MCG tablet TAKE 1 TABLET BY MOUTH  EVERY DAY 8/20/19  Yes Moraima Diallo MD   warfarin (COUMADIN) 7.5 MG tablet TAKE 1 TABLET BY MOUTH  DAILY  Patient taking differently: Take 7.5 mg by mouth daily Indications: per chart review, instructed to take 1/2 while on the Bactrim x5 days  8/19/19  Yes Moraima Diallo MD   metoprolol succinate (TOPROL XL) 50 MG extended release tablet TAKE 1 TABLET BY MOUTH TWO  TIMES DAILY 8/19/19  Yes Moraima Diallo MD   famotidine (PEPCID) 20 MG tablet TAKE 1 TABLET BY MOUTH  EVERY DAY 8/19/19  Yes Moraima Diallo MD   potassium chloride (KLOR-CON M) 20 MEQ extended release tablet TAKE 1 TABLET BY MOUTH  EVERY DAY 7/9/19  Yes Moraima Diallo MD   neomycin-polymyxin-dexameth (MAXITROL) 3.5-81034-4.1 ophthalmic suspension INT 3 DROPS INTO BOTH EARS BID PRN 6/3/19  Yes Historical Provider, MD   Cholecalciferol (VITAMIN D) 2000 units TABS tablet TAKE ONE TABLET BY MOUTH EVERY DAY

## 2019-09-27 NOTE — LETTER
September 27, 2019    Kale Rodrigues [phone] [address]      Dear [place]: It was a pleasure talking with you. I have included information about low salt diet that we talked about during our conversation. Please review, and if you have any questions or concerns please feel free to discuss with your physician at your next appointment, or you can contact me at [phone]. Keep up the good work and I look forward to talking with you soon! If you have any questions or concerns, please don't hesitate to call.       Sincerely,      Joseph Roldan RN  Care Coordinator  Office: [phone]  Cell:  [phone]

## 2019-09-27 NOTE — TELEPHONE ENCOUNTER
Please call [place]. Her labs indicate that she is getting a little too dry and it is affecting her kidneys. I would like her to take only one lasix daily. Also, she doesn't need to take spironolactone at all, but should get another BMP in one week when she gets her INR.

## 2019-10-03 ENCOUNTER — NURSE ONLY (OUTPATIENT)
Dept: FAMILY MEDICINE CLINIC | Age: 84
End: 2019-10-03
Payer: MEDICARE

## 2019-10-03 ENCOUNTER — HOSPITAL ENCOUNTER (OUTPATIENT)
Age: 84
Discharge: HOME | End: 2019-10-05
Payer: MEDICARE

## 2019-10-03 ENCOUNTER — ANTI-COAG VISIT (OUTPATIENT)
Dept: FAMILY MEDICINE CLINIC | Age: 84
End: 2019-10-03

## 2019-10-03 DIAGNOSIS — I48.21 PERMANENT ATRIAL FIBRILLATION (HCC): ICD-10-CM

## 2019-10-03 DIAGNOSIS — Z79.01 CHRONIC ANTICOAGULATION: ICD-10-CM

## 2019-10-03 DIAGNOSIS — I50.9 CONGESTIVE HEART FAILURE, UNSPECIFIED HF CHRONICITY, UNSPECIFIED HEART FAILURE TYPE (HCC): ICD-10-CM

## 2019-10-03 LAB
ANION GAP SERPL CALCULATED.3IONS-SCNC: 18 MMOL/L (ref 7–16)
BUN BLDV-MCNC: 26 MG/DL (ref 8–23)
CALCIUM SERPL-MCNC: 9.7 MG/DL (ref 8.6–10.2)
CHLORIDE BLD-SCNC: 106 MMOL/L (ref 98–107)
CO2: 19 MMOL/L (ref 22–29)
CREAT SERPL-MCNC: 1.1 MG/DL (ref 0.5–1)
GFR AFRICAN AMERICAN: 57
GFR NON-AFRICAN AMERICAN: 47 ML/MIN/1.73
GLUCOSE BLD-MCNC: 84 MG/DL (ref 74–99)
INTERNATIONAL NORMALIZATION RATIO, POC: 2.7
POTASSIUM SERPL-SCNC: 4.6 MMOL/L (ref 3.5–5)
PROTHROMBIN TIME, POC: 32.7
SODIUM BLD-SCNC: 143 MMOL/L (ref 132–146)

## 2019-10-03 PROCEDURE — 85610 PROTHROMBIN TIME: CPT | Performed by: FAMILY MEDICINE

## 2019-10-03 PROCEDURE — 93793 ANTICOAG MGMT PT WARFARIN: CPT | Performed by: FAMILY MEDICINE

## 2019-10-03 PROCEDURE — 36415 COLL VENOUS BLD VENIPUNCTURE: CPT

## 2019-10-03 PROCEDURE — 80048 BASIC METABOLIC PNL TOTAL CA: CPT

## 2019-10-03 PROCEDURE — 36415 COLL VENOUS BLD VENIPUNCTURE: CPT | Performed by: FAMILY MEDICINE

## 2019-10-04 DIAGNOSIS — I50.32 CHRONIC HEART FAILURE WITH PRESERVED EJECTION FRACTION (HCC): ICD-10-CM

## 2019-10-04 DIAGNOSIS — I50.9 CONGESTIVE HEART FAILURE, UNSPECIFIED HF CHRONICITY, UNSPECIFIED HEART FAILURE TYPE (HCC): Primary | ICD-10-CM

## 2019-10-04 DIAGNOSIS — I25.10 CORONARY ARTERY DISEASE INVOLVING NATIVE CORONARY ARTERY OF NATIVE HEART WITHOUT ANGINA PECTORIS: ICD-10-CM

## 2019-10-04 RX ORDER — FUROSEMIDE 40 MG/1
20 TABLET ORAL DAILY
Qty: 180 TABLET | Refills: 3 | Status: ON HOLD
Start: 2019-10-04 | End: 2019-12-21 | Stop reason: HOSPADM

## 2019-10-08 ENCOUNTER — NURSE ONLY (OUTPATIENT)
Dept: FAMILY MEDICINE CLINIC | Age: 84
End: 2019-10-08
Payer: MEDICARE

## 2019-10-08 ENCOUNTER — HOSPITAL ENCOUNTER (OUTPATIENT)
Age: 84
Discharge: HOME | End: 2019-10-10
Payer: MEDICARE

## 2019-10-08 DIAGNOSIS — I50.9 CONGESTIVE HEART FAILURE, UNSPECIFIED HF CHRONICITY, UNSPECIFIED HEART FAILURE TYPE (HCC): ICD-10-CM

## 2019-10-08 LAB
ANION GAP SERPL CALCULATED.3IONS-SCNC: 11 MMOL/L (ref 7–16)
BUN BLDV-MCNC: 29 MG/DL (ref 8–23)
CALCIUM SERPL-MCNC: 10.5 MG/DL (ref 8.6–10.2)
CHLORIDE BLD-SCNC: 105 MMOL/L (ref 98–107)
CO2: 26 MMOL/L (ref 22–29)
CREAT SERPL-MCNC: 0.9 MG/DL (ref 0.5–1)
GFR AFRICAN AMERICAN: >60
GFR NON-AFRICAN AMERICAN: 60 ML/MIN/1.73
GLUCOSE BLD-MCNC: 83 MG/DL (ref 74–99)
POTASSIUM SERPL-SCNC: 4.8 MMOL/L (ref 3.5–5)
SODIUM BLD-SCNC: 142 MMOL/L (ref 132–146)

## 2019-10-08 PROCEDURE — 80048 BASIC METABOLIC PNL TOTAL CA: CPT

## 2019-10-08 PROCEDURE — 36415 COLL VENOUS BLD VENIPUNCTURE: CPT

## 2019-10-09 RX ORDER — LISINOPRIL 10 MG/1
10 TABLET ORAL DAILY
Qty: 90 TABLET | Refills: 3 | Status: SHIPPED
Start: 2019-10-09 | End: 2020-05-04 | Stop reason: SDUPTHER

## 2019-10-09 RX ORDER — FAMOTIDINE 20 MG/1
TABLET, FILM COATED ORAL
Qty: 90 TABLET | Refills: 0 | Status: SHIPPED | OUTPATIENT
Start: 2019-10-09 | End: 2019-11-01 | Stop reason: SDUPTHER

## 2019-10-10 ENCOUNTER — TELEPHONE (OUTPATIENT)
Dept: FAMILY MEDICINE CLINIC | Age: 84
End: 2019-10-10

## 2019-10-10 RX ORDER — ESCITALOPRAM OXALATE 10 MG/1
10 TABLET ORAL DAILY
Qty: 30 TABLET | Refills: 3 | Status: SHIPPED | OUTPATIENT
Start: 2019-10-10 | End: 2020-01-20

## 2019-10-16 RX ORDER — WARFARIN SODIUM 7.5 MG/1
7.5 TABLET ORAL DAILY
Qty: 90 TABLET | Refills: 3 | Status: SHIPPED | OUTPATIENT
Start: 2019-10-16 | End: 2020-01-14

## 2019-10-17 ENCOUNTER — NURSE ONLY (OUTPATIENT)
Dept: FAMILY MEDICINE CLINIC | Age: 84
End: 2019-10-17
Payer: MEDICARE

## 2019-10-17 ENCOUNTER — ANTI-COAG VISIT (OUTPATIENT)
Dept: FAMILY MEDICINE CLINIC | Age: 84
End: 2019-10-17

## 2019-10-17 DIAGNOSIS — Z79.01 CHRONIC ANTICOAGULATION: ICD-10-CM

## 2019-10-17 DIAGNOSIS — Z79.01 CHRONIC ANTICOAGULATION: Primary | ICD-10-CM

## 2019-10-17 LAB
INTERNATIONAL NORMALIZATION RATIO, POC: 3.8
PROTHROMBIN TIME, POC: 45.8

## 2019-10-17 PROCEDURE — 99211 OFF/OP EST MAY X REQ PHY/QHP: CPT | Performed by: COUNSELOR

## 2019-10-17 PROCEDURE — 85610 PROTHROMBIN TIME: CPT | Performed by: FAMILY MEDICINE

## 2019-10-24 ENCOUNTER — ANTI-COAG VISIT (OUTPATIENT)
Dept: FAMILY MEDICINE CLINIC | Age: 84
End: 2019-10-24
Payer: MEDICARE

## 2019-10-24 ENCOUNTER — NURSE ONLY (OUTPATIENT)
Dept: FAMILY MEDICINE CLINIC | Age: 84
End: 2019-10-24
Payer: MEDICARE

## 2019-10-24 DIAGNOSIS — I48.21 PERMANENT ATRIAL FIBRILLATION (HCC): ICD-10-CM

## 2019-10-24 DIAGNOSIS — I48.91 ATRIAL FIBRILLATION WITH RVR (HCC): Primary | ICD-10-CM

## 2019-10-24 DIAGNOSIS — Z79.01 CHRONIC ANTICOAGULATION: ICD-10-CM

## 2019-10-24 LAB
INTERNATIONAL NORMALIZATION RATIO, POC: 3.7
PROTHROMBIN TIME, POC: 44

## 2019-10-24 PROCEDURE — 85610 PROTHROMBIN TIME: CPT | Performed by: FAMILY MEDICINE

## 2019-10-24 PROCEDURE — 93793 ANTICOAG MGMT PT WARFARIN: CPT | Performed by: FAMILY MEDICINE

## 2019-10-31 ENCOUNTER — ANTI-COAG VISIT (OUTPATIENT)
Dept: FAMILY MEDICINE CLINIC | Age: 84
End: 2019-10-31

## 2019-10-31 ENCOUNTER — OFFICE VISIT (OUTPATIENT)
Dept: FAMILY MEDICINE CLINIC | Age: 84
End: 2019-10-31
Payer: MEDICARE

## 2019-10-31 VITALS
BODY MASS INDEX: 39.37 KG/M2 | SYSTOLIC BLOOD PRESSURE: 122 MMHG | TEMPERATURE: 97.7 F | WEIGHT: 245 LBS | DIASTOLIC BLOOD PRESSURE: 74 MMHG | HEART RATE: 106 BPM | HEIGHT: 66 IN | OXYGEN SATURATION: 97 %

## 2019-10-31 DIAGNOSIS — F33.1 MODERATE EPISODE OF RECURRENT MAJOR DEPRESSIVE DISORDER (HCC): ICD-10-CM

## 2019-10-31 DIAGNOSIS — I50.32 CHRONIC DIASTOLIC (CONGESTIVE) HEART FAILURE (HCC): ICD-10-CM

## 2019-10-31 DIAGNOSIS — I27.20 PULMONARY HYPERTENSION (HCC): ICD-10-CM

## 2019-10-31 DIAGNOSIS — J44.9 CHRONIC OBSTRUCTIVE PULMONARY DISEASE, UNSPECIFIED COPD TYPE (HCC): ICD-10-CM

## 2019-10-31 DIAGNOSIS — I48.21 PERMANENT ATRIAL FIBRILLATION (HCC): ICD-10-CM

## 2019-10-31 DIAGNOSIS — I10 ESSENTIAL HYPERTENSION: ICD-10-CM

## 2019-10-31 DIAGNOSIS — I25.10 CORONARY ARTERIOSCLEROSIS IN NATIVE ARTERY: ICD-10-CM

## 2019-10-31 DIAGNOSIS — E03.9 HYPOTHYROIDISM, UNSPECIFIED TYPE: Primary | ICD-10-CM

## 2019-10-31 DIAGNOSIS — N39.46 MIXED STRESS AND URGE URINARY INCONTINENCE: ICD-10-CM

## 2019-10-31 DIAGNOSIS — Z79.01 CHRONIC ANTICOAGULATION: Primary | ICD-10-CM

## 2019-10-31 PROBLEM — I50.33 ACUTE ON CHRONIC DIASTOLIC CONGESTIVE HEART FAILURE (HCC): Status: RESOLVED | Noted: 2019-09-16 | Resolved: 2019-10-31

## 2019-10-31 LAB
INTERNATIONAL NORMALIZATION RATIO, POC: 1.9
PROTHROMBIN TIME, POC: 22.9

## 2019-10-31 PROCEDURE — 6360000002 HC RX W HCPCS

## 2019-10-31 PROCEDURE — 1036F TOBACCO NON-USER: CPT | Performed by: FAMILY MEDICINE

## 2019-10-31 PROCEDURE — G8482 FLU IMMUNIZE ORDER/ADMIN: HCPCS | Performed by: FAMILY MEDICINE

## 2019-10-31 PROCEDURE — G8598 ASA/ANTIPLAT THER USED: HCPCS | Performed by: FAMILY MEDICINE

## 2019-10-31 PROCEDURE — 99214 OFFICE O/P EST MOD 30 MIN: CPT | Performed by: FAMILY MEDICINE

## 2019-10-31 PROCEDURE — 85610 PROTHROMBIN TIME: CPT | Performed by: FAMILY MEDICINE

## 2019-10-31 PROCEDURE — 99212 OFFICE O/P EST SF 10 MIN: CPT | Performed by: FAMILY MEDICINE

## 2019-10-31 PROCEDURE — 1123F ACP DISCUSS/DSCN MKR DOCD: CPT | Performed by: FAMILY MEDICINE

## 2019-10-31 PROCEDURE — 90653 IIV ADJUVANT VACCINE IM: CPT

## 2019-10-31 PROCEDURE — G8926 SPIRO NO PERF OR DOC: HCPCS | Performed by: FAMILY MEDICINE

## 2019-10-31 PROCEDURE — 3023F SPIROM DOC REV: CPT | Performed by: FAMILY MEDICINE

## 2019-10-31 PROCEDURE — 1090F PRES/ABSN URINE INCON ASSESS: CPT | Performed by: FAMILY MEDICINE

## 2019-10-31 PROCEDURE — 4040F PNEUMOC VAC/ADMIN/RCVD: CPT | Performed by: FAMILY MEDICINE

## 2019-10-31 PROCEDURE — G0008 ADMIN INFLUENZA VIRUS VAC: HCPCS

## 2019-10-31 PROCEDURE — G8417 CALC BMI ABV UP PARAM F/U: HCPCS | Performed by: FAMILY MEDICINE

## 2019-10-31 PROCEDURE — G8427 DOCREV CUR MEDS BY ELIG CLIN: HCPCS | Performed by: FAMILY MEDICINE

## 2019-10-31 PROCEDURE — G8399 PT W/DXA RESULTS DOCUMENT: HCPCS | Performed by: FAMILY MEDICINE

## 2019-11-01 RX ORDER — METOPROLOL SUCCINATE 50 MG/1
TABLET, EXTENDED RELEASE ORAL
Qty: 180 TABLET | Refills: 1 | Status: ON HOLD | OUTPATIENT
Start: 2019-11-01 | End: 2019-12-21 | Stop reason: HOSPADM

## 2019-11-01 RX ORDER — FAMOTIDINE 20 MG/1
TABLET, FILM COATED ORAL
Qty: 90 TABLET | Refills: 0 | Status: SHIPPED | OUTPATIENT
Start: 2019-11-01 | End: 2020-01-20

## 2019-11-08 ENCOUNTER — NURSE ONLY (OUTPATIENT)
Dept: FAMILY MEDICINE CLINIC | Age: 84
End: 2019-11-08
Payer: MEDICARE

## 2019-11-08 ENCOUNTER — ANTI-COAG VISIT (OUTPATIENT)
Dept: FAMILY MEDICINE CLINIC | Age: 84
End: 2019-11-08

## 2019-11-08 DIAGNOSIS — Z79.01 CHRONIC ANTICOAGULATION: Primary | ICD-10-CM

## 2019-11-08 DIAGNOSIS — Z79.01 CHRONIC ANTICOAGULATION: ICD-10-CM

## 2019-11-08 LAB
INTERNATIONAL NORMALIZATION RATIO, POC: 1.5
PROTHROMBIN TIME, POC: 18.6

## 2019-11-08 PROCEDURE — 99211 OFF/OP EST MAY X REQ PHY/QHP: CPT | Performed by: COUNSELOR

## 2019-11-08 PROCEDURE — 85610 PROTHROMBIN TIME: CPT | Performed by: FAMILY MEDICINE

## 2019-11-18 ENCOUNTER — CARE COORDINATION (OUTPATIENT)
Dept: CARE COORDINATION | Age: 84
End: 2019-11-18

## 2019-11-18 SDOH — HEALTH STABILITY: PHYSICAL HEALTH: ON AVERAGE, HOW MANY MINUTES DO YOU ENGAGE IN EXERCISE AT THIS LEVEL?: 0 MIN

## 2019-11-18 SDOH — ECONOMIC STABILITY: FOOD INSECURITY: WITHIN THE PAST 12 MONTHS, YOU WORRIED THAT YOUR FOOD WOULD RUN OUT BEFORE YOU GOT MONEY TO BUY MORE.: SOMETIMES TRUE

## 2019-11-18 SDOH — HEALTH STABILITY: MENTAL HEALTH
STRESS IS WHEN SOMEONE FEELS TENSE, NERVOUS, ANXIOUS, OR CAN'T SLEEP AT NIGHT BECAUSE THEIR MIND IS TROUBLED. HOW STRESSED ARE YOU?: ONLY A LITTLE

## 2019-11-18 SDOH — SOCIAL STABILITY: SOCIAL NETWORK: IN A TYPICAL WEEK, HOW MANY TIMES DO YOU TALK ON THE PHONE WITH FAMILY, FRIENDS, OR NEIGHBORS?: ONCE A WEEK

## 2019-11-18 SDOH — SOCIAL STABILITY: SOCIAL NETWORK: ARE YOU MARRIED, WIDOWED, DIVORCED, SEPARATED, NEVER MARRIED, OR LIVING WITH A PARTNER?: MARRIED

## 2019-11-18 SDOH — SOCIAL STABILITY: SOCIAL NETWORK: HOW OFTEN DO YOU ATTENT MEETINGS OF THE CLUB OR ORGANIZATION YOU BELONG TO?: NEVER

## 2019-11-18 SDOH — HEALTH STABILITY: MENTAL HEALTH: HOW OFTEN DO YOU HAVE A DRINK CONTAINING ALCOHOL?: NEVER

## 2019-11-18 SDOH — ECONOMIC STABILITY: INCOME INSECURITY: HOW HARD IS IT FOR YOU TO PAY FOR THE VERY BASICS LIKE FOOD, HOUSING, MEDICAL CARE, AND HEATING?: SOMEWHAT HARD

## 2019-11-18 SDOH — SOCIAL STABILITY: SOCIAL NETWORK
DO YOU BELONG TO ANY CLUBS OR ORGANIZATIONS SUCH AS CHURCH GROUPS UNIONS, FRATERNAL OR ATHLETIC GROUPS, OR SCHOOL GROUPS?: NO

## 2019-11-18 SDOH — SOCIAL STABILITY: SOCIAL NETWORK: HOW OFTEN DO YOU GET TOGETHER WITH FRIENDS OR RELATIVES?: ONCE A WEEK

## 2019-11-18 SDOH — ECONOMIC STABILITY: TRANSPORTATION INSECURITY
IN THE PAST 12 MONTHS, HAS LACK OF TRANSPORTATION KEPT YOU FROM MEETINGS, WORK, OR FROM GETTING THINGS NEEDED FOR DAILY LIVING?: NO

## 2019-11-18 SDOH — ECONOMIC STABILITY: FOOD INSECURITY: WITHIN THE PAST 12 MONTHS, THE FOOD YOU BOUGHT JUST DIDN'T LAST AND YOU DIDN'T HAVE MONEY TO GET MORE.: SOMETIMES TRUE

## 2019-11-18 SDOH — SOCIAL STABILITY: SOCIAL NETWORK: HOW OFTEN DO YOU ATTEND CHURCH OR RELIGIOUS SERVICES?: 1 TO 4 TIMES PER YEAR

## 2019-11-18 SDOH — HEALTH STABILITY: PHYSICAL HEALTH: ON AVERAGE, HOW MANY DAYS PER WEEK DO YOU ENGAGE IN MODERATE TO STRENUOUS EXERCISE (LIKE A BRISK WALK)?: 0 DAYS

## 2019-11-18 SDOH — ECONOMIC STABILITY: TRANSPORTATION INSECURITY
IN THE PAST 12 MONTHS, HAS THE LACK OF TRANSPORTATION KEPT YOU FROM MEDICAL APPOINTMENTS OR FROM GETTING MEDICATIONS?: YES

## 2019-11-18 ASSESSMENT — ENCOUNTER SYMPTOMS: DYSPNEA ASSOCIATED WITH: MINIMAL EXERTION

## 2019-11-25 ENCOUNTER — OFFICE VISIT (OUTPATIENT)
Dept: FAMILY MEDICINE CLINIC | Age: 84
End: 2019-11-25
Payer: MEDICARE

## 2019-11-25 ENCOUNTER — ANTI-COAG VISIT (OUTPATIENT)
Dept: FAMILY MEDICINE CLINIC | Age: 84
End: 2019-11-25

## 2019-11-25 VITALS
DIASTOLIC BLOOD PRESSURE: 84 MMHG | WEIGHT: 248 LBS | BODY MASS INDEX: 39.86 KG/M2 | TEMPERATURE: 97.5 F | SYSTOLIC BLOOD PRESSURE: 127 MMHG | HEIGHT: 66 IN | OXYGEN SATURATION: 96 % | HEART RATE: 93 BPM

## 2019-11-25 DIAGNOSIS — M15.9 PRIMARY OSTEOARTHRITIS INVOLVING MULTIPLE JOINTS: ICD-10-CM

## 2019-11-25 DIAGNOSIS — I42.8 NONISCHEMIC CARDIOMYOPATHY (HCC): Primary | ICD-10-CM

## 2019-11-25 DIAGNOSIS — I48.21 PERMANENT ATRIAL FIBRILLATION (HCC): ICD-10-CM

## 2019-11-25 DIAGNOSIS — I50.32 CHRONIC DIASTOLIC (CONGESTIVE) HEART FAILURE (HCC): ICD-10-CM

## 2019-11-25 DIAGNOSIS — E66.8 MODERATE OBESITY: ICD-10-CM

## 2019-11-25 DIAGNOSIS — I36.1 NONRHEUMATIC TRICUSPID VALVE REGURGITATION: ICD-10-CM

## 2019-11-25 DIAGNOSIS — I27.20 PULMONARY HYPERTENSION (HCC): ICD-10-CM

## 2019-11-25 DIAGNOSIS — I10 ESSENTIAL HYPERTENSION: ICD-10-CM

## 2019-11-25 DIAGNOSIS — J44.9 CHRONIC OBSTRUCTIVE PULMONARY DISEASE, UNSPECIFIED COPD TYPE (HCC): ICD-10-CM

## 2019-11-25 DIAGNOSIS — F33.1 MODERATE EPISODE OF RECURRENT MAJOR DEPRESSIVE DISORDER (HCC): ICD-10-CM

## 2019-11-25 DIAGNOSIS — K21.9 GASTROESOPHAGEAL REFLUX DISEASE, ESOPHAGITIS PRESENCE NOT SPECIFIED: Chronic | ICD-10-CM

## 2019-11-25 DIAGNOSIS — E78.00 PURE HYPERCHOLESTEROLEMIA: ICD-10-CM

## 2019-11-25 DIAGNOSIS — Z79.01 CHRONIC ANTICOAGULATION: ICD-10-CM

## 2019-11-25 DIAGNOSIS — E03.9 HYPOTHYROIDISM, UNSPECIFIED TYPE: ICD-10-CM

## 2019-11-25 DIAGNOSIS — M48.062 SPINAL STENOSIS OF LUMBAR REGION WITH NEUROGENIC CLAUDICATION: ICD-10-CM

## 2019-11-25 LAB
INTERNATIONAL NORMALIZATION RATIO, POC: 2.6
PROTHROMBIN TIME, POC: 31.8

## 2019-11-25 PROCEDURE — 99213 OFFICE O/P EST LOW 20 MIN: CPT | Performed by: FAMILY MEDICINE

## 2019-11-25 PROCEDURE — 1123F ACP DISCUSS/DSCN MKR DOCD: CPT | Performed by: FAMILY MEDICINE

## 2019-11-25 PROCEDURE — 1036F TOBACCO NON-USER: CPT | Performed by: FAMILY MEDICINE

## 2019-11-25 PROCEDURE — G8598 ASA/ANTIPLAT THER USED: HCPCS | Performed by: FAMILY MEDICINE

## 2019-11-25 PROCEDURE — 85610 PROTHROMBIN TIME: CPT | Performed by: FAMILY MEDICINE

## 2019-11-25 PROCEDURE — 3023F SPIROM DOC REV: CPT | Performed by: FAMILY MEDICINE

## 2019-11-25 PROCEDURE — G8427 DOCREV CUR MEDS BY ELIG CLIN: HCPCS | Performed by: FAMILY MEDICINE

## 2019-11-25 PROCEDURE — G8926 SPIRO NO PERF OR DOC: HCPCS | Performed by: FAMILY MEDICINE

## 2019-11-25 PROCEDURE — G8482 FLU IMMUNIZE ORDER/ADMIN: HCPCS | Performed by: FAMILY MEDICINE

## 2019-11-25 PROCEDURE — 4040F PNEUMOC VAC/ADMIN/RCVD: CPT | Performed by: FAMILY MEDICINE

## 2019-11-25 PROCEDURE — 1090F PRES/ABSN URINE INCON ASSESS: CPT | Performed by: FAMILY MEDICINE

## 2019-11-25 PROCEDURE — G8399 PT W/DXA RESULTS DOCUMENT: HCPCS | Performed by: FAMILY MEDICINE

## 2019-11-25 PROCEDURE — 99212 OFFICE O/P EST SF 10 MIN: CPT | Performed by: FAMILY MEDICINE

## 2019-11-25 PROCEDURE — G8417 CALC BMI ABV UP PARAM F/U: HCPCS | Performed by: FAMILY MEDICINE

## 2019-12-10 ENCOUNTER — ANTI-COAG VISIT (OUTPATIENT)
Dept: FAMILY MEDICINE CLINIC | Age: 84
End: 2019-12-10

## 2019-12-10 ENCOUNTER — NURSE ONLY (OUTPATIENT)
Dept: FAMILY MEDICINE CLINIC | Age: 84
End: 2019-12-10
Payer: MEDICARE

## 2019-12-10 DIAGNOSIS — Z79.01 CHRONIC ANTICOAGULATION: ICD-10-CM

## 2019-12-10 DIAGNOSIS — I48.21 PERMANENT ATRIAL FIBRILLATION (HCC): ICD-10-CM

## 2019-12-10 LAB
INTERNATIONAL NORMALIZATION RATIO, POC: 2.4
PROTHROMBIN TIME, POC: 28.4

## 2019-12-10 PROCEDURE — 85610 PROTHROMBIN TIME: CPT | Performed by: FAMILY MEDICINE

## 2019-12-10 PROCEDURE — 93793 ANTICOAG MGMT PT WARFARIN: CPT | Performed by: FAMILY MEDICINE

## 2019-12-11 ENCOUNTER — HOSPITAL ENCOUNTER (EMERGENCY)
Age: 84
Discharge: LEFT BEFORE DISCHARGE | End: 2019-12-11
Attending: EMERGENCY MEDICINE
Payer: MEDICARE

## 2019-12-11 ENCOUNTER — APPOINTMENT (OUTPATIENT)
Dept: GENERAL RADIOLOGY | Age: 84
End: 2019-12-11
Payer: MEDICARE

## 2019-12-11 ENCOUNTER — HOSPITAL ENCOUNTER (INPATIENT)
Age: 84
LOS: 9 days | Discharge: NURSING, REHAB OR LONG-TERM CARE | DRG: 291 | End: 2019-12-21
Attending: EMERGENCY MEDICINE | Admitting: INTERNAL MEDICINE
Payer: MEDICARE

## 2019-12-11 ENCOUNTER — APPOINTMENT (OUTPATIENT)
Dept: CT IMAGING | Age: 84
DRG: 291 | End: 2019-12-11
Payer: MEDICARE

## 2019-12-11 VITALS
SYSTOLIC BLOOD PRESSURE: 141 MMHG | OXYGEN SATURATION: 97 % | DIASTOLIC BLOOD PRESSURE: 96 MMHG | TEMPERATURE: 98.7 F | RESPIRATION RATE: 20 BRPM | HEART RATE: 82 BPM

## 2019-12-11 DIAGNOSIS — S09.90XA CLOSED HEAD INJURY, INITIAL ENCOUNTER: ICD-10-CM

## 2019-12-11 DIAGNOSIS — R42 DIZZINESS: ICD-10-CM

## 2019-12-11 DIAGNOSIS — I50.9 ACUTE ON CHRONIC CONGESTIVE HEART FAILURE, UNSPECIFIED HEART FAILURE TYPE (HCC): Primary | ICD-10-CM

## 2019-12-11 DIAGNOSIS — S46.911A STRAIN OF RIGHT SHOULDER, INITIAL ENCOUNTER: ICD-10-CM

## 2019-12-11 DIAGNOSIS — R29.6 FALLS FREQUENTLY: ICD-10-CM

## 2019-12-11 LAB
ALBUMIN SERPL-MCNC: 3.8 G/DL (ref 3.5–5.2)
ALP BLD-CCNC: 72 U/L (ref 35–104)
ALT SERPL-CCNC: 14 U/L (ref 0–32)
ANION GAP SERPL CALCULATED.3IONS-SCNC: 10 MMOL/L (ref 7–16)
AST SERPL-CCNC: 22 U/L (ref 0–31)
BILIRUB SERPL-MCNC: 0.8 MG/DL (ref 0–1.2)
BUN BLDV-MCNC: 20 MG/DL (ref 8–23)
CALCIUM SERPL-MCNC: 9.4 MG/DL (ref 8.6–10.2)
CHLORIDE BLD-SCNC: 106 MMOL/L (ref 98–107)
CO2: 26 MMOL/L (ref 22–29)
CREAT SERPL-MCNC: 0.8 MG/DL (ref 0.5–1)
EKG ATRIAL RATE: 93 BPM
EKG Q-T INTERVAL: 384 MS
EKG QRS DURATION: 98 MS
EKG QTC CALCULATION (BAZETT): 440 MS
EKG R AXIS: 39 DEGREES
EKG T AXIS: -178 DEGREES
EKG VENTRICULAR RATE: 79 BPM
GFR AFRICAN AMERICAN: >60
GFR NON-AFRICAN AMERICAN: >60 ML/MIN/1.73
GLUCOSE BLD-MCNC: 126 MG/DL (ref 74–99)
HCT VFR BLD CALC: 39.4 % (ref 34–48)
HEMOGLOBIN: 12.1 G/DL (ref 11.5–15.5)
INR BLD: 2.3
MCH RBC QN AUTO: 29.4 PG (ref 26–35)
MCHC RBC AUTO-ENTMCNC: 30.7 % (ref 32–34.5)
MCV RBC AUTO: 95.9 FL (ref 80–99.9)
PDW BLD-RTO: 14.5 FL (ref 11.5–15)
PLATELET # BLD: 180 E9/L (ref 130–450)
PMV BLD AUTO: 9.9 FL (ref 7–12)
POTASSIUM SERPL-SCNC: 3.5 MMOL/L (ref 3.5–5)
PRO-BNP: 3800 PG/ML (ref 0–450)
PROTHROMBIN TIME: 25.9 SEC (ref 9.3–12.4)
RBC # BLD: 4.11 E12/L (ref 3.5–5.5)
SODIUM BLD-SCNC: 142 MMOL/L (ref 132–146)
TOTAL PROTEIN: 6.4 G/DL (ref 6.4–8.3)
TROPONIN: <0.01 NG/ML (ref 0–0.03)
WBC # BLD: 5.5 E9/L (ref 4.5–11.5)

## 2019-12-11 PROCEDURE — 6360000002 HC RX W HCPCS: Performed by: EMERGENCY MEDICINE

## 2019-12-11 PROCEDURE — 71046 X-RAY EXAM CHEST 2 VIEWS: CPT

## 2019-12-11 PROCEDURE — 2700000000 HC OXYGEN THERAPY PER DAY

## 2019-12-11 PROCEDURE — 36415 COLL VENOUS BLD VENIPUNCTURE: CPT

## 2019-12-11 PROCEDURE — 72128 CT CHEST SPINE W/O DYE: CPT

## 2019-12-11 PROCEDURE — 96374 THER/PROPH/DIAG INJ IV PUSH: CPT

## 2019-12-11 PROCEDURE — 85027 COMPLETE CBC AUTOMATED: CPT

## 2019-12-11 PROCEDURE — 83880 ASSAY OF NATRIURETIC PEPTIDE: CPT

## 2019-12-11 PROCEDURE — 4500000002 HC ER NO CHARGE

## 2019-12-11 PROCEDURE — 80053 COMPREHEN METABOLIC PANEL: CPT

## 2019-12-11 PROCEDURE — 93005 ELECTROCARDIOGRAM TRACING: CPT | Performed by: NURSE PRACTITIONER

## 2019-12-11 PROCEDURE — 70450 CT HEAD/BRAIN W/O DYE: CPT

## 2019-12-11 PROCEDURE — 96375 TX/PRO/DX INJ NEW DRUG ADDON: CPT

## 2019-12-11 PROCEDURE — 84484 ASSAY OF TROPONIN QUANT: CPT

## 2019-12-11 PROCEDURE — 99285 EMERGENCY DEPT VISIT HI MDM: CPT

## 2019-12-11 PROCEDURE — 72125 CT NECK SPINE W/O DYE: CPT

## 2019-12-11 PROCEDURE — 93010 ELECTROCARDIOGRAM REPORT: CPT | Performed by: INTERNAL MEDICINE

## 2019-12-11 PROCEDURE — 85610 PROTHROMBIN TIME: CPT

## 2019-12-11 RX ORDER — MORPHINE SULFATE 5 MG/ML
5 INJECTION, SOLUTION INTRAMUSCULAR; INTRAVENOUS ONCE
Status: COMPLETED | OUTPATIENT
Start: 2019-12-11 | End: 2019-12-11

## 2019-12-11 RX ORDER — ONDANSETRON 2 MG/ML
4 INJECTION INTRAMUSCULAR; INTRAVENOUS ONCE
Status: COMPLETED | OUTPATIENT
Start: 2019-12-11 | End: 2019-12-11

## 2019-12-11 RX ADMIN — ONDANSETRON 4 MG: 2 INJECTION INTRAMUSCULAR; INTRAVENOUS at 23:26

## 2019-12-11 RX ADMIN — MORPHINE SULFATE 5 MG: 5 INJECTION, SOLUTION INTRAMUSCULAR; INTRAVENOUS at 23:26

## 2019-12-11 ASSESSMENT — PAIN DESCRIPTION - LOCATION: LOCATION: BACK;SHOULDER

## 2019-12-11 ASSESSMENT — ENCOUNTER SYMPTOMS
VOMITING: 0
NAUSEA: 0
DIARRHEA: 0
BACK PAIN: 1
COUGH: 0
ABDOMINAL PAIN: 0
SHORTNESS OF BREATH: 0

## 2019-12-11 ASSESSMENT — PAIN DESCRIPTION - ORIENTATION: ORIENTATION: RIGHT

## 2019-12-11 ASSESSMENT — PAIN SCALES - GENERAL: PAINLEVEL_OUTOF10: 7

## 2019-12-12 ENCOUNTER — APPOINTMENT (OUTPATIENT)
Dept: GENERAL RADIOLOGY | Age: 84
DRG: 291 | End: 2019-12-12
Payer: MEDICARE

## 2019-12-12 PROBLEM — Z91.81 HISTORY OF RECENT FALL: Status: ACTIVE | Noted: 2019-12-12
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
## 2019-12-12 PROBLEM — I50.9 ACUTE ON CHRONIC HEART FAILURE (HCC): Status: ACTIVE | Noted: 2019-12-12
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
## 2019-12-12 PROBLEM — R42 DIZZINESS OF UNKNOWN CAUSE: Status: ACTIVE | Noted: 2019-12-12

## 2019-12-12 LAB
ANION GAP SERPL CALCULATED.3IONS-SCNC: 12 MMOL/L (ref 7–16)
BUN BLDV-MCNC: 17 MG/DL (ref 8–23)
CALCIUM SERPL-MCNC: 8.7 MG/DL (ref 8.6–10.2)
CHLORIDE BLD-SCNC: 105 MMOL/L (ref 98–107)
CO2: 28 MMOL/L (ref 22–29)
CREAT SERPL-MCNC: 0.8 MG/DL (ref 0.5–1)
EKG ATRIAL RATE: 86 BPM
EKG Q-T INTERVAL: 354 MS
EKG QRS DURATION: 96 MS
EKG QTC CALCULATION (BAZETT): 461 MS
EKG R AXIS: 9 DEGREES
EKG T AXIS: 173 DEGREES
EKG VENTRICULAR RATE: 102 BPM
GFR AFRICAN AMERICAN: >60
GFR NON-AFRICAN AMERICAN: >60 ML/MIN/1.73
GLUCOSE BLD-MCNC: 115 MG/DL (ref 74–99)
INR BLD: 2
POTASSIUM REFLEX MAGNESIUM: 3.7 MMOL/L (ref 3.5–5)
PROTHROMBIN TIME: 23.4 SEC (ref 9.3–12.4)
SODIUM BLD-SCNC: 145 MMOL/L (ref 132–146)
TSH SERPL DL<=0.05 MIU/L-ACNC: 11.22 UIU/ML (ref 0.27–4.2)

## 2019-12-12 PROCEDURE — 6370000000 HC RX 637 (ALT 250 FOR IP): Performed by: NURSE PRACTITIONER

## 2019-12-12 PROCEDURE — 6360000002 HC RX W HCPCS: Performed by: NURSE PRACTITIONER

## 2019-12-12 PROCEDURE — 6360000002 HC RX W HCPCS: Performed by: EMERGENCY MEDICINE

## 2019-12-12 PROCEDURE — 6370000000 HC RX 637 (ALT 250 FOR IP): Performed by: PHYSICIAN ASSISTANT

## 2019-12-12 PROCEDURE — 97530 THERAPEUTIC ACTIVITIES: CPT

## 2019-12-12 PROCEDURE — 99222 1ST HOSP IP/OBS MODERATE 55: CPT | Performed by: INTERNAL MEDICINE

## 2019-12-12 PROCEDURE — 36415 COLL VENOUS BLD VENIPUNCTURE: CPT

## 2019-12-12 PROCEDURE — 73030 X-RAY EXAM OF SHOULDER: CPT

## 2019-12-12 PROCEDURE — APPSS60 APP SPLIT SHARED TIME 46-60 MINUTES: Performed by: PHYSICIAN ASSISTANT

## 2019-12-12 PROCEDURE — 99223 1ST HOSP IP/OBS HIGH 75: CPT | Performed by: INTERNAL MEDICINE

## 2019-12-12 PROCEDURE — 1200000000 HC SEMI PRIVATE

## 2019-12-12 PROCEDURE — 93005 ELECTROCARDIOGRAM TRACING: CPT | Performed by: EMERGENCY MEDICINE

## 2019-12-12 PROCEDURE — 97530 THERAPEUTIC ACTIVITIES: CPT | Performed by: PHYSICAL THERAPIST

## 2019-12-12 PROCEDURE — 84443 ASSAY THYROID STIM HORMONE: CPT

## 2019-12-12 PROCEDURE — 80048 BASIC METABOLIC PNL TOTAL CA: CPT

## 2019-12-12 PROCEDURE — APPSS45 APP SPLIT SHARED TIME 31-45 MINUTES: Performed by: NURSE PRACTITIONER

## 2019-12-12 PROCEDURE — 96375 TX/PRO/DX INJ NEW DRUG ADDON: CPT

## 2019-12-12 PROCEDURE — 97166 OT EVAL MOD COMPLEX 45 MIN: CPT

## 2019-12-12 PROCEDURE — 51702 INSERT TEMP BLADDER CATH: CPT

## 2019-12-12 PROCEDURE — 93010 ELECTROCARDIOGRAM REPORT: CPT | Performed by: INTERNAL MEDICINE

## 2019-12-12 PROCEDURE — 2700000000 HC OXYGEN THERAPY PER DAY

## 2019-12-12 PROCEDURE — 2580000003 HC RX 258: Performed by: NURSE PRACTITIONER

## 2019-12-12 PROCEDURE — 85610 PROTHROMBIN TIME: CPT

## 2019-12-12 RX ORDER — SODIUM CHLORIDE 0.9 % (FLUSH) 0.9 %
10 SYRINGE (ML) INJECTION PRN
Status: DISCONTINUED | OUTPATIENT
Start: 2019-12-12 | End: 2019-12-21 | Stop reason: HOSPADM

## 2019-12-12 RX ORDER — ALBUTEROL SULFATE 2.5 MG/3ML
2.5 SOLUTION RESPIRATORY (INHALATION) EVERY 4 HOURS PRN
Status: DISCONTINUED | OUTPATIENT
Start: 2019-12-12 | End: 2019-12-21 | Stop reason: HOSPADM

## 2019-12-12 RX ORDER — MECLIZINE HCL 12.5 MG/1
25 TABLET ORAL 3 TIMES DAILY PRN
Status: DISCONTINUED | OUTPATIENT
Start: 2019-12-12 | End: 2019-12-21 | Stop reason: HOSPADM

## 2019-12-12 RX ORDER — POTASSIUM CHLORIDE 20 MEQ/1
20 TABLET, EXTENDED RELEASE ORAL DAILY
Status: DISCONTINUED | OUTPATIENT
Start: 2019-12-12 | End: 2019-12-21 | Stop reason: HOSPADM

## 2019-12-12 RX ORDER — WARFARIN SODIUM 7.5 MG/1
7.5 TABLET ORAL
Status: COMPLETED | OUTPATIENT
Start: 2019-12-12 | End: 2019-12-12

## 2019-12-12 RX ORDER — SODIUM CHLORIDE 0.9 % (FLUSH) 0.9 %
10 SYRINGE (ML) INJECTION EVERY 12 HOURS SCHEDULED
Status: DISCONTINUED | OUTPATIENT
Start: 2019-12-12 | End: 2019-12-21 | Stop reason: HOSPADM

## 2019-12-12 RX ORDER — ESCITALOPRAM OXALATE 10 MG/1
10 TABLET ORAL DAILY
Status: DISCONTINUED | OUTPATIENT
Start: 2019-12-12 | End: 2019-12-21 | Stop reason: HOSPADM

## 2019-12-12 RX ORDER — FUROSEMIDE 20 MG/1
20 TABLET ORAL DAILY
Status: DISCONTINUED | OUTPATIENT
Start: 2019-12-13 | End: 2019-12-13

## 2019-12-12 RX ORDER — FAMOTIDINE 20 MG/1
20 TABLET, FILM COATED ORAL DAILY
Status: DISCONTINUED | OUTPATIENT
Start: 2019-12-12 | End: 2019-12-21 | Stop reason: HOSPADM

## 2019-12-12 RX ORDER — METHYLPREDNISOLONE SODIUM SUCCINATE 40 MG/ML
40 INJECTION, POWDER, LYOPHILIZED, FOR SOLUTION INTRAMUSCULAR; INTRAVENOUS DAILY
Status: DISCONTINUED | OUTPATIENT
Start: 2019-12-12 | End: 2019-12-12

## 2019-12-12 RX ORDER — ALBUTEROL SULFATE 90 UG/1
2 AEROSOL, METERED RESPIRATORY (INHALATION) EVERY 6 HOURS PRN
Status: DISCONTINUED | OUTPATIENT
Start: 2019-12-12 | End: 2019-12-12

## 2019-12-12 RX ORDER — ONDANSETRON 2 MG/ML
4 INJECTION INTRAMUSCULAR; INTRAVENOUS EVERY 6 HOURS PRN
Status: DISCONTINUED | OUTPATIENT
Start: 2019-12-12 | End: 2019-12-21 | Stop reason: HOSPADM

## 2019-12-12 RX ORDER — MULTIVIT WITH MINERALS/LUTEIN
250 TABLET ORAL DAILY
COMMUNITY
End: 2020-10-27 | Stop reason: SDUPTHER

## 2019-12-12 RX ORDER — FUROSEMIDE 20 MG/1
20 TABLET ORAL DAILY
Status: DISCONTINUED | OUTPATIENT
Start: 2019-12-12 | End: 2019-12-12

## 2019-12-12 RX ORDER — FUROSEMIDE 10 MG/ML
40 INJECTION INTRAMUSCULAR; INTRAVENOUS ONCE
Status: COMPLETED | OUTPATIENT
Start: 2019-12-12 | End: 2019-12-12

## 2019-12-12 RX ORDER — LEVOTHYROXINE SODIUM 0.12 MG/1
125 TABLET ORAL DAILY
Status: DISCONTINUED | OUTPATIENT
Start: 2019-12-12 | End: 2019-12-21 | Stop reason: HOSPADM

## 2019-12-12 RX ORDER — METOPROLOL SUCCINATE 50 MG/1
50 TABLET, EXTENDED RELEASE ORAL DAILY
Status: DISCONTINUED | OUTPATIENT
Start: 2019-12-12 | End: 2019-12-14

## 2019-12-12 RX ORDER — LISINOPRIL 10 MG/1
10 TABLET ORAL DAILY
Status: DISCONTINUED | OUTPATIENT
Start: 2019-12-12 | End: 2019-12-21 | Stop reason: HOSPADM

## 2019-12-12 RX ADMIN — METOPROLOL SUCCINATE 50 MG: 50 TABLET, EXTENDED RELEASE ORAL at 08:41

## 2019-12-12 RX ADMIN — ENOXAPARIN SODIUM 40 MG: 40 INJECTION SUBCUTANEOUS at 08:40

## 2019-12-12 RX ADMIN — FUROSEMIDE 40 MG: 10 INJECTION, SOLUTION INTRAMUSCULAR; INTRAVENOUS at 00:22

## 2019-12-12 RX ADMIN — LISINOPRIL 10 MG: 10 TABLET ORAL at 08:40

## 2019-12-12 RX ADMIN — POTASSIUM CHLORIDE 20 MEQ: 20 TABLET, EXTENDED RELEASE ORAL at 10:25

## 2019-12-12 RX ADMIN — Medication 10 ML: at 20:16

## 2019-12-12 RX ADMIN — LEVOTHYROXINE SODIUM 125 MCG: 125 TABLET ORAL at 06:36

## 2019-12-12 RX ADMIN — WARFARIN SODIUM 7.5 MG: 7.5 TABLET ORAL at 20:13

## 2019-12-12 RX ADMIN — FUROSEMIDE 20 MG: 20 TABLET ORAL at 08:41

## 2019-12-12 RX ADMIN — Medication 10 ML: at 08:41

## 2019-12-12 RX ADMIN — ESCITALOPRAM OXALATE 10 MG: 10 TABLET ORAL at 08:40

## 2019-12-12 RX ADMIN — FAMOTIDINE 20 MG: 20 TABLET ORAL at 08:41

## 2019-12-12 ASSESSMENT — PAIN SCALES - GENERAL
PAINLEVEL_OUTOF10: 0
PAINLEVEL_OUTOF10: 0

## 2019-12-12 ASSESSMENT — PAIN DESCRIPTION - PROGRESSION: CLINICAL_PROGRESSION: RESOLVED

## 2019-12-13 LAB
ANION GAP SERPL CALCULATED.3IONS-SCNC: 11 MMOL/L (ref 7–16)
BASOPHILS ABSOLUTE: 0.06 E9/L (ref 0–0.2)
BASOPHILS RELATIVE PERCENT: 1.3 % (ref 0–2)
BUN BLDV-MCNC: 19 MG/DL (ref 8–23)
CALCIUM SERPL-MCNC: 9.2 MG/DL (ref 8.6–10.2)
CHLORIDE BLD-SCNC: 104 MMOL/L (ref 98–107)
CO2: 29 MMOL/L (ref 22–29)
CREAT SERPL-MCNC: 0.9 MG/DL (ref 0.5–1)
EOSINOPHILS ABSOLUTE: 0.46 E9/L (ref 0.05–0.5)
EOSINOPHILS RELATIVE PERCENT: 10.1 % (ref 0–6)
GFR AFRICAN AMERICAN: >60
GFR NON-AFRICAN AMERICAN: 60 ML/MIN/1.73
GLUCOSE BLD-MCNC: 90 MG/DL (ref 74–99)
HCT VFR BLD CALC: 38.2 % (ref 34–48)
HEMOGLOBIN: 11.9 G/DL (ref 11.5–15.5)
IMMATURE GRANULOCYTES #: 0.01 E9/L
IMMATURE GRANULOCYTES %: 0.2 % (ref 0–5)
INR BLD: 1.9
LYMPHOCYTES ABSOLUTE: 0.73 E9/L (ref 1.5–4)
LYMPHOCYTES RELATIVE PERCENT: 16.1 % (ref 20–42)
MAGNESIUM: 2 MG/DL (ref 1.6–2.6)
MCH RBC QN AUTO: 29.8 PG (ref 26–35)
MCHC RBC AUTO-ENTMCNC: 31.2 % (ref 32–34.5)
MCV RBC AUTO: 95.7 FL (ref 80–99.9)
MONOCYTES ABSOLUTE: 0.41 E9/L (ref 0.1–0.95)
MONOCYTES RELATIVE PERCENT: 9 % (ref 2–12)
NEUTROPHILS ABSOLUTE: 2.87 E9/L (ref 1.8–7.3)
NEUTROPHILS RELATIVE PERCENT: 63.3 % (ref 43–80)
PDW BLD-RTO: 14.5 FL (ref 11.5–15)
PLATELET # BLD: 183 E9/L (ref 130–450)
PMV BLD AUTO: 10.1 FL (ref 7–12)
POTASSIUM SERPL-SCNC: 4 MMOL/L (ref 3.5–5)
PROTHROMBIN TIME: 22.3 SEC (ref 9.3–12.4)
RBC # BLD: 3.99 E12/L (ref 3.5–5.5)
SODIUM BLD-SCNC: 144 MMOL/L (ref 132–146)
WBC # BLD: 4.5 E9/L (ref 4.5–11.5)

## 2019-12-13 PROCEDURE — 97110 THERAPEUTIC EXERCISES: CPT

## 2019-12-13 PROCEDURE — 85610 PROTHROMBIN TIME: CPT

## 2019-12-13 PROCEDURE — 83735 ASSAY OF MAGNESIUM: CPT

## 2019-12-13 PROCEDURE — 2700000000 HC OXYGEN THERAPY PER DAY

## 2019-12-13 PROCEDURE — 6370000000 HC RX 637 (ALT 250 FOR IP): Performed by: NURSE PRACTITIONER

## 2019-12-13 PROCEDURE — 1200000000 HC SEMI PRIVATE

## 2019-12-13 PROCEDURE — 97530 THERAPEUTIC ACTIVITIES: CPT

## 2019-12-13 PROCEDURE — 6360000002 HC RX W HCPCS: Performed by: INTERNAL MEDICINE

## 2019-12-13 PROCEDURE — 80048 BASIC METABOLIC PNL TOTAL CA: CPT

## 2019-12-13 PROCEDURE — 6370000000 HC RX 637 (ALT 250 FOR IP): Performed by: PHYSICIAN ASSISTANT

## 2019-12-13 PROCEDURE — 36415 COLL VENOUS BLD VENIPUNCTURE: CPT

## 2019-12-13 PROCEDURE — 2580000003 HC RX 258: Performed by: NURSE PRACTITIONER

## 2019-12-13 PROCEDURE — 99233 SBSQ HOSP IP/OBS HIGH 50: CPT | Performed by: INTERNAL MEDICINE

## 2019-12-13 PROCEDURE — 85025 COMPLETE CBC W/AUTO DIFF WBC: CPT

## 2019-12-13 RX ORDER — FUROSEMIDE 40 MG/1
40 TABLET ORAL DAILY
Status: DISCONTINUED | OUTPATIENT
Start: 2019-12-14 | End: 2019-12-17

## 2019-12-13 RX ORDER — WARFARIN SODIUM 7.5 MG/1
7.5 TABLET ORAL
Status: COMPLETED | OUTPATIENT
Start: 2019-12-13 | End: 2019-12-14

## 2019-12-13 RX ORDER — FUROSEMIDE 10 MG/ML
40 INJECTION INTRAMUSCULAR; INTRAVENOUS ONCE
Status: DISCONTINUED | OUTPATIENT
Start: 2019-12-13 | End: 2019-12-21 | Stop reason: HOSPADM

## 2019-12-13 RX ORDER — METHYLPREDNISOLONE SODIUM SUCCINATE 40 MG/ML
40 INJECTION, POWDER, LYOPHILIZED, FOR SOLUTION INTRAMUSCULAR; INTRAVENOUS EVERY 8 HOURS
Status: DISCONTINUED | OUTPATIENT
Start: 2019-12-13 | End: 2019-12-14

## 2019-12-13 RX ADMIN — ESCITALOPRAM OXALATE 10 MG: 10 TABLET ORAL at 09:09

## 2019-12-13 RX ADMIN — Medication 10 ML: at 21:05

## 2019-12-13 RX ADMIN — POTASSIUM CHLORIDE 20 MEQ: 20 TABLET, EXTENDED RELEASE ORAL at 09:09

## 2019-12-13 RX ADMIN — Medication 10 ML: at 10:25

## 2019-12-13 RX ADMIN — LISINOPRIL 10 MG: 10 TABLET ORAL at 09:09

## 2019-12-13 RX ADMIN — LEVOTHYROXINE SODIUM 125 MCG: 125 TABLET ORAL at 06:41

## 2019-12-13 RX ADMIN — FAMOTIDINE 20 MG: 20 TABLET ORAL at 09:09

## 2019-12-13 RX ADMIN — FUROSEMIDE 20 MG: 20 TABLET ORAL at 09:09

## 2019-12-13 RX ADMIN — METHYLPREDNISOLONE SODIUM SUCCINATE 40 MG: 40 INJECTION, POWDER, FOR SOLUTION INTRAMUSCULAR; INTRAVENOUS at 17:30

## 2019-12-13 RX ADMIN — METHYLPREDNISOLONE SODIUM SUCCINATE 40 MG: 40 INJECTION, POWDER, FOR SOLUTION INTRAMUSCULAR; INTRAVENOUS at 10:25

## 2019-12-13 RX ADMIN — METOPROLOL SUCCINATE 50 MG: 50 TABLET, EXTENDED RELEASE ORAL at 09:09

## 2019-12-13 ASSESSMENT — PAIN SCALES - GENERAL: PAINLEVEL_OUTOF10: 0

## 2019-12-14 LAB
ANION GAP SERPL CALCULATED.3IONS-SCNC: 12 MMOL/L (ref 7–16)
BUN BLDV-MCNC: 14 MG/DL (ref 8–23)
CALCIUM SERPL-MCNC: 9.1 MG/DL (ref 8.6–10.2)
CHLORIDE BLD-SCNC: 104 MMOL/L (ref 98–107)
CO2: 26 MMOL/L (ref 22–29)
CREAT SERPL-MCNC: 0.6 MG/DL (ref 0.5–1)
GFR AFRICAN AMERICAN: >60
GFR NON-AFRICAN AMERICAN: >60 ML/MIN/1.73
GLUCOSE BLD-MCNC: 162 MG/DL (ref 74–99)
INR BLD: 2
MAGNESIUM: 2.1 MG/DL (ref 1.6–2.6)
POTASSIUM SERPL-SCNC: 4 MMOL/L (ref 3.5–5)
PROTHROMBIN TIME: 22.5 SEC (ref 9.3–12.4)
SODIUM BLD-SCNC: 142 MMOL/L (ref 132–146)

## 2019-12-14 PROCEDURE — 6370000000 HC RX 637 (ALT 250 FOR IP): Performed by: PHYSICIAN ASSISTANT

## 2019-12-14 PROCEDURE — 6360000002 HC RX W HCPCS: Performed by: INTERNAL MEDICINE

## 2019-12-14 PROCEDURE — 36415 COLL VENOUS BLD VENIPUNCTURE: CPT

## 2019-12-14 PROCEDURE — 6370000000 HC RX 637 (ALT 250 FOR IP): Performed by: INTERNAL MEDICINE

## 2019-12-14 PROCEDURE — 99233 SBSQ HOSP IP/OBS HIGH 50: CPT | Performed by: INTERNAL MEDICINE

## 2019-12-14 PROCEDURE — 85610 PROTHROMBIN TIME: CPT

## 2019-12-14 PROCEDURE — 2580000003 HC RX 258: Performed by: NURSE PRACTITIONER

## 2019-12-14 PROCEDURE — 80048 BASIC METABOLIC PNL TOTAL CA: CPT

## 2019-12-14 PROCEDURE — 83735 ASSAY OF MAGNESIUM: CPT

## 2019-12-14 PROCEDURE — 99232 SBSQ HOSP IP/OBS MODERATE 35: CPT | Performed by: INTERNAL MEDICINE

## 2019-12-14 PROCEDURE — 2700000000 HC OXYGEN THERAPY PER DAY

## 2019-12-14 PROCEDURE — 1200000000 HC SEMI PRIVATE

## 2019-12-14 PROCEDURE — 6370000000 HC RX 637 (ALT 250 FOR IP): Performed by: NURSE PRACTITIONER

## 2019-12-14 RX ORDER — WARFARIN SODIUM 7.5 MG/1
7.5 TABLET ORAL DAILY
Status: DISCONTINUED | OUTPATIENT
Start: 2019-12-14 | End: 2019-12-21 | Stop reason: HOSPADM

## 2019-12-14 RX ORDER — METOPROLOL SUCCINATE 50 MG/1
50 TABLET, EXTENDED RELEASE ORAL 2 TIMES DAILY
Status: DISCONTINUED | OUTPATIENT
Start: 2019-12-14 | End: 2019-12-16

## 2019-12-14 RX ORDER — DOCUSATE SODIUM 100 MG/1
100 CAPSULE, LIQUID FILLED ORAL DAILY
Status: DISCONTINUED | OUTPATIENT
Start: 2019-12-14 | End: 2019-12-21 | Stop reason: HOSPADM

## 2019-12-14 RX ORDER — PREDNISONE 20 MG/1
40 TABLET ORAL DAILY
Status: DISCONTINUED | OUTPATIENT
Start: 2019-12-15 | End: 2019-12-21 | Stop reason: HOSPADM

## 2019-12-14 RX ADMIN — ESCITALOPRAM OXALATE 10 MG: 10 TABLET ORAL at 10:23

## 2019-12-14 RX ADMIN — Medication 10 ML: at 20:54

## 2019-12-14 RX ADMIN — METOPROLOL SUCCINATE 50 MG: 50 TABLET, EXTENDED RELEASE ORAL at 20:52

## 2019-12-14 RX ADMIN — Medication 10 ML: at 10:24

## 2019-12-14 RX ADMIN — LEVOTHYROXINE SODIUM 125 MCG: 125 TABLET ORAL at 06:56

## 2019-12-14 RX ADMIN — POTASSIUM CHLORIDE 20 MEQ: 20 TABLET, EXTENDED RELEASE ORAL at 10:23

## 2019-12-14 RX ADMIN — FAMOTIDINE 20 MG: 20 TABLET ORAL at 10:23

## 2019-12-14 RX ADMIN — LISINOPRIL 10 MG: 10 TABLET ORAL at 10:23

## 2019-12-14 RX ADMIN — METOPROLOL SUCCINATE 50 MG: 50 TABLET, EXTENDED RELEASE ORAL at 10:23

## 2019-12-14 RX ADMIN — FUROSEMIDE 40 MG: 40 TABLET ORAL at 10:23

## 2019-12-14 RX ADMIN — METHYLPREDNISOLONE SODIUM SUCCINATE 40 MG: 40 INJECTION, POWDER, FOR SOLUTION INTRAMUSCULAR; INTRAVENOUS at 10:23

## 2019-12-14 RX ADMIN — WARFARIN SODIUM 7.5 MG: 7.5 TABLET ORAL at 18:12

## 2019-12-14 RX ADMIN — DOCUSATE SODIUM 100 MG: 100 CAPSULE, LIQUID FILLED ORAL at 22:15

## 2019-12-14 RX ADMIN — WARFARIN SODIUM 7.5 MG: 7.5 TABLET ORAL at 01:45

## 2019-12-14 RX ADMIN — METHYLPREDNISOLONE SODIUM SUCCINATE 40 MG: 40 INJECTION, POWDER, FOR SOLUTION INTRAMUSCULAR; INTRAVENOUS at 01:17

## 2019-12-14 ASSESSMENT — PAIN SCALES - GENERAL
PAINLEVEL_OUTOF10: 0
PAINLEVEL_OUTOF10: 0

## 2019-12-15 LAB
ANION GAP SERPL CALCULATED.3IONS-SCNC: 12 MMOL/L (ref 7–16)
BUN BLDV-MCNC: 25 MG/DL (ref 8–23)
CALCIUM SERPL-MCNC: 9.3 MG/DL (ref 8.6–10.2)
CHLORIDE BLD-SCNC: 102 MMOL/L (ref 98–107)
CO2: 28 MMOL/L (ref 22–29)
CREAT SERPL-MCNC: 0.9 MG/DL (ref 0.5–1)
GFR AFRICAN AMERICAN: >60
GFR NON-AFRICAN AMERICAN: 60 ML/MIN/1.73
GLUCOSE BLD-MCNC: 104 MG/DL (ref 74–99)
INR BLD: 2.1
MAGNESIUM: 2.3 MG/DL (ref 1.6–2.6)
POTASSIUM SERPL-SCNC: 4.3 MMOL/L (ref 3.5–5)
PROTHROMBIN TIME: 23.6 SEC (ref 9.3–12.4)
SODIUM BLD-SCNC: 142 MMOL/L (ref 132–146)

## 2019-12-15 PROCEDURE — 6370000000 HC RX 637 (ALT 250 FOR IP): Performed by: INTERNAL MEDICINE

## 2019-12-15 PROCEDURE — 6360000002 HC RX W HCPCS: Performed by: INTERNAL MEDICINE

## 2019-12-15 PROCEDURE — 36415 COLL VENOUS BLD VENIPUNCTURE: CPT

## 2019-12-15 PROCEDURE — 99233 SBSQ HOSP IP/OBS HIGH 50: CPT | Performed by: INTERNAL MEDICINE

## 2019-12-15 PROCEDURE — 6370000000 HC RX 637 (ALT 250 FOR IP): Performed by: NURSE PRACTITIONER

## 2019-12-15 PROCEDURE — 85610 PROTHROMBIN TIME: CPT

## 2019-12-15 PROCEDURE — 2700000000 HC OXYGEN THERAPY PER DAY

## 2019-12-15 PROCEDURE — 6370000000 HC RX 637 (ALT 250 FOR IP): Performed by: PHYSICIAN ASSISTANT

## 2019-12-15 PROCEDURE — 94640 AIRWAY INHALATION TREATMENT: CPT

## 2019-12-15 PROCEDURE — 83735 ASSAY OF MAGNESIUM: CPT

## 2019-12-15 PROCEDURE — 80048 BASIC METABOLIC PNL TOTAL CA: CPT

## 2019-12-15 PROCEDURE — 2580000003 HC RX 258: Performed by: NURSE PRACTITIONER

## 2019-12-15 PROCEDURE — 1200000000 HC SEMI PRIVATE

## 2019-12-15 RX ORDER — ACETAMINOPHEN 325 MG/1
650 TABLET ORAL EVERY 4 HOURS PRN
Status: DISCONTINUED | OUTPATIENT
Start: 2019-12-15 | End: 2019-12-21 | Stop reason: HOSPADM

## 2019-12-15 RX ADMIN — ALBUTEROL SULFATE 2.5 MG: 2.5 SOLUTION RESPIRATORY (INHALATION) at 06:43

## 2019-12-15 RX ADMIN — WARFARIN SODIUM 7.5 MG: 7.5 TABLET ORAL at 18:50

## 2019-12-15 RX ADMIN — METOPROLOL SUCCINATE 50 MG: 50 TABLET, EXTENDED RELEASE ORAL at 10:12

## 2019-12-15 RX ADMIN — PREDNISONE 40 MG: 20 TABLET ORAL at 10:12

## 2019-12-15 RX ADMIN — LISINOPRIL 10 MG: 10 TABLET ORAL at 10:12

## 2019-12-15 RX ADMIN — FUROSEMIDE 40 MG: 40 TABLET ORAL at 10:12

## 2019-12-15 RX ADMIN — Medication 10 ML: at 10:15

## 2019-12-15 RX ADMIN — ACETAMINOPHEN 650 MG: 325 TABLET, FILM COATED ORAL at 01:22

## 2019-12-15 RX ADMIN — DOCUSATE SODIUM 100 MG: 100 CAPSULE, LIQUID FILLED ORAL at 10:13

## 2019-12-15 RX ADMIN — ESCITALOPRAM OXALATE 10 MG: 10 TABLET ORAL at 10:13

## 2019-12-15 RX ADMIN — LEVOTHYROXINE SODIUM 125 MCG: 125 TABLET ORAL at 06:06

## 2019-12-15 RX ADMIN — POTASSIUM CHLORIDE 20 MEQ: 20 TABLET, EXTENDED RELEASE ORAL at 10:13

## 2019-12-15 RX ADMIN — FAMOTIDINE 20 MG: 20 TABLET ORAL at 10:12

## 2019-12-15 RX ADMIN — METOPROLOL SUCCINATE 50 MG: 50 TABLET, EXTENDED RELEASE ORAL at 20:37

## 2019-12-15 RX ADMIN — Medication 10 ML: at 20:38

## 2019-12-15 ASSESSMENT — PAIN SCALES - GENERAL
PAINLEVEL_OUTOF10: 8
PAINLEVEL_OUTOF10: 0
PAINLEVEL_OUTOF10: 0
PAINLEVEL_OUTOF10: 5

## 2019-12-16 ENCOUNTER — APPOINTMENT (OUTPATIENT)
Dept: GENERAL RADIOLOGY | Age: 84
DRG: 291 | End: 2019-12-16
Payer: MEDICARE

## 2019-12-16 LAB
ANION GAP SERPL CALCULATED.3IONS-SCNC: 10 MMOL/L (ref 7–16)
BUN BLDV-MCNC: 24 MG/DL (ref 8–23)
CALCIUM SERPL-MCNC: 9 MG/DL (ref 8.6–10.2)
CHLORIDE BLD-SCNC: 101 MMOL/L (ref 98–107)
CO2: 29 MMOL/L (ref 22–29)
CREAT SERPL-MCNC: 0.8 MG/DL (ref 0.5–1)
GFR AFRICAN AMERICAN: >60
GFR NON-AFRICAN AMERICAN: >60 ML/MIN/1.73
GLUCOSE BLD-MCNC: 105 MG/DL (ref 74–99)
INR BLD: 2.2
MAGNESIUM: 2.2 MG/DL (ref 1.6–2.6)
POTASSIUM SERPL-SCNC: 4.4 MMOL/L (ref 3.5–5)
PRO-BNP: 3618 PG/ML (ref 0–450)
PROTHROMBIN TIME: 24.9 SEC (ref 9.3–12.4)
SODIUM BLD-SCNC: 140 MMOL/L (ref 132–146)

## 2019-12-16 PROCEDURE — 80048 BASIC METABOLIC PNL TOTAL CA: CPT

## 2019-12-16 PROCEDURE — APPSS30 APP SPLIT SHARED TIME 16-30 MINUTES: Performed by: NURSE PRACTITIONER

## 2019-12-16 PROCEDURE — 6370000000 HC RX 637 (ALT 250 FOR IP): Performed by: INTERNAL MEDICINE

## 2019-12-16 PROCEDURE — 85610 PROTHROMBIN TIME: CPT

## 2019-12-16 PROCEDURE — 97530 THERAPEUTIC ACTIVITIES: CPT

## 2019-12-16 PROCEDURE — 99232 SBSQ HOSP IP/OBS MODERATE 35: CPT | Performed by: INTERNAL MEDICINE

## 2019-12-16 PROCEDURE — 2580000003 HC RX 258: Performed by: NURSE PRACTITIONER

## 2019-12-16 PROCEDURE — 83735 ASSAY OF MAGNESIUM: CPT

## 2019-12-16 PROCEDURE — 6360000002 HC RX W HCPCS: Performed by: INTERNAL MEDICINE

## 2019-12-16 PROCEDURE — 6370000000 HC RX 637 (ALT 250 FOR IP): Performed by: NURSE PRACTITIONER

## 2019-12-16 PROCEDURE — 1200000000 HC SEMI PRIVATE

## 2019-12-16 PROCEDURE — 51798 US URINE CAPACITY MEASURE: CPT

## 2019-12-16 PROCEDURE — 71046 X-RAY EXAM CHEST 2 VIEWS: CPT

## 2019-12-16 PROCEDURE — 2700000000 HC OXYGEN THERAPY PER DAY

## 2019-12-16 PROCEDURE — 97110 THERAPEUTIC EXERCISES: CPT

## 2019-12-16 PROCEDURE — 83880 ASSAY OF NATRIURETIC PEPTIDE: CPT

## 2019-12-16 PROCEDURE — 6370000000 HC RX 637 (ALT 250 FOR IP): Performed by: PHYSICIAN ASSISTANT

## 2019-12-16 PROCEDURE — 99233 SBSQ HOSP IP/OBS HIGH 50: CPT | Performed by: INTERNAL MEDICINE

## 2019-12-16 PROCEDURE — 36415 COLL VENOUS BLD VENIPUNCTURE: CPT

## 2019-12-16 RX ORDER — METOPROLOL SUCCINATE 50 MG/1
50 TABLET, EXTENDED RELEASE ORAL DAILY
Status: DISCONTINUED | OUTPATIENT
Start: 2019-12-16 | End: 2019-12-21 | Stop reason: HOSPADM

## 2019-12-16 RX ORDER — POLYETHYLENE GLYCOL 3350 17 G/17G
17 POWDER, FOR SOLUTION ORAL ONCE
Status: COMPLETED | OUTPATIENT
Start: 2019-12-16 | End: 2019-12-16

## 2019-12-16 RX ORDER — FUROSEMIDE 10 MG/ML
20 INJECTION INTRAMUSCULAR; INTRAVENOUS
Status: COMPLETED | OUTPATIENT
Start: 2019-12-16 | End: 2019-12-17

## 2019-12-16 RX ADMIN — FAMOTIDINE 20 MG: 20 TABLET ORAL at 09:19

## 2019-12-16 RX ADMIN — POTASSIUM CHLORIDE 20 MEQ: 20 TABLET, EXTENDED RELEASE ORAL at 09:19

## 2019-12-16 RX ADMIN — ESCITALOPRAM OXALATE 10 MG: 10 TABLET ORAL at 09:19

## 2019-12-16 RX ADMIN — ACETAMINOPHEN 650 MG: 325 TABLET, FILM COATED ORAL at 06:30

## 2019-12-16 RX ADMIN — FUROSEMIDE 20 MG: 10 INJECTION, SOLUTION INTRAMUSCULAR; INTRAVENOUS at 20:12

## 2019-12-16 RX ADMIN — Medication 10 ML: at 09:19

## 2019-12-16 RX ADMIN — LEVOTHYROXINE SODIUM 125 MCG: 125 TABLET ORAL at 06:30

## 2019-12-16 RX ADMIN — Medication 10 ML: at 20:09

## 2019-12-16 RX ADMIN — DOCUSATE SODIUM 100 MG: 100 CAPSULE, LIQUID FILLED ORAL at 09:19

## 2019-12-16 RX ADMIN — FUROSEMIDE 40 MG: 40 TABLET ORAL at 09:19

## 2019-12-16 RX ADMIN — METOPROLOL SUCCINATE 50 MG: 50 TABLET, EXTENDED RELEASE ORAL at 18:50

## 2019-12-16 RX ADMIN — LISINOPRIL 10 MG: 10 TABLET ORAL at 09:19

## 2019-12-16 RX ADMIN — METOPROLOL SUCCINATE 50 MG: 50 TABLET, EXTENDED RELEASE ORAL at 09:19

## 2019-12-16 RX ADMIN — PREDNISONE 40 MG: 20 TABLET ORAL at 09:19

## 2019-12-16 RX ADMIN — WARFARIN SODIUM 7.5 MG: 7.5 TABLET ORAL at 18:29

## 2019-12-16 RX ADMIN — POLYETHYLENE GLYCOL 3350 17 G: 17 POWDER, FOR SOLUTION ORAL at 13:46

## 2019-12-16 ASSESSMENT — PAIN DESCRIPTION - ORIENTATION: ORIENTATION: LOWER

## 2019-12-16 ASSESSMENT — PAIN DESCRIPTION - LOCATION: LOCATION: BACK

## 2019-12-16 ASSESSMENT — PAIN - FUNCTIONAL ASSESSMENT: PAIN_FUNCTIONAL_ASSESSMENT: PREVENTS OR INTERFERES SOME ACTIVE ACTIVITIES AND ADLS

## 2019-12-16 ASSESSMENT — PAIN SCALES - GENERAL
PAINLEVEL_OUTOF10: 9
PAINLEVEL_OUTOF10: 6

## 2019-12-16 ASSESSMENT — PAIN DESCRIPTION - DESCRIPTORS: DESCRIPTORS: ACHING;DISCOMFORT

## 2019-12-17 LAB
ANION GAP SERPL CALCULATED.3IONS-SCNC: 13 MMOL/L (ref 7–16)
BUN BLDV-MCNC: 24 MG/DL (ref 8–23)
CALCIUM SERPL-MCNC: 9.1 MG/DL (ref 8.6–10.2)
CHLORIDE BLD-SCNC: 100 MMOL/L (ref 98–107)
CO2: 26 MMOL/L (ref 22–29)
CREAT SERPL-MCNC: 0.8 MG/DL (ref 0.5–1)
GFR AFRICAN AMERICAN: >60
GFR NON-AFRICAN AMERICAN: >60 ML/MIN/1.73
GLUCOSE BLD-MCNC: 83 MG/DL (ref 74–99)
INR BLD: 2.3
MAGNESIUM: 2.6 MG/DL (ref 1.6–2.6)
POTASSIUM SERPL-SCNC: 4.6 MMOL/L (ref 3.5–5)
PROTHROMBIN TIME: 26.7 SEC (ref 9.3–12.4)
SODIUM BLD-SCNC: 139 MMOL/L (ref 132–146)

## 2019-12-17 PROCEDURE — 99233 SBSQ HOSP IP/OBS HIGH 50: CPT | Performed by: INTERNAL MEDICINE

## 2019-12-17 PROCEDURE — 6370000000 HC RX 637 (ALT 250 FOR IP): Performed by: INTERNAL MEDICINE

## 2019-12-17 PROCEDURE — 6370000000 HC RX 637 (ALT 250 FOR IP): Performed by: NURSE PRACTITIONER

## 2019-12-17 PROCEDURE — 80048 BASIC METABOLIC PNL TOTAL CA: CPT

## 2019-12-17 PROCEDURE — 83735 ASSAY OF MAGNESIUM: CPT

## 2019-12-17 PROCEDURE — 97110 THERAPEUTIC EXERCISES: CPT

## 2019-12-17 PROCEDURE — 6370000000 HC RX 637 (ALT 250 FOR IP): Performed by: PHYSICIAN ASSISTANT

## 2019-12-17 PROCEDURE — 6360000002 HC RX W HCPCS: Performed by: INTERNAL MEDICINE

## 2019-12-17 PROCEDURE — 1200000000 HC SEMI PRIVATE

## 2019-12-17 PROCEDURE — 2580000003 HC RX 258: Performed by: NURSE PRACTITIONER

## 2019-12-17 PROCEDURE — 36415 COLL VENOUS BLD VENIPUNCTURE: CPT

## 2019-12-17 PROCEDURE — APPSS30 APP SPLIT SHARED TIME 16-30 MINUTES: Performed by: NURSE PRACTITIONER

## 2019-12-17 PROCEDURE — 99232 SBSQ HOSP IP/OBS MODERATE 35: CPT | Performed by: INTERNAL MEDICINE

## 2019-12-17 PROCEDURE — 97530 THERAPEUTIC ACTIVITIES: CPT

## 2019-12-17 PROCEDURE — 85610 PROTHROMBIN TIME: CPT

## 2019-12-17 RX ORDER — FUROSEMIDE 40 MG/1
40 TABLET ORAL DAILY
Status: DISCONTINUED | OUTPATIENT
Start: 2019-12-18 | End: 2019-12-21 | Stop reason: HOSPADM

## 2019-12-17 RX ADMIN — METOPROLOL SUCCINATE 50 MG: 50 TABLET, EXTENDED RELEASE ORAL at 08:44

## 2019-12-17 RX ADMIN — LISINOPRIL 10 MG: 10 TABLET ORAL at 08:45

## 2019-12-17 RX ADMIN — ESCITALOPRAM OXALATE 10 MG: 10 TABLET ORAL at 08:44

## 2019-12-17 RX ADMIN — ACETAMINOPHEN 650 MG: 325 TABLET, FILM COATED ORAL at 09:51

## 2019-12-17 RX ADMIN — WARFARIN SODIUM 7.5 MG: 7.5 TABLET ORAL at 17:28

## 2019-12-17 RX ADMIN — PREDNISONE 40 MG: 20 TABLET ORAL at 08:44

## 2019-12-17 RX ADMIN — FAMOTIDINE 20 MG: 20 TABLET ORAL at 08:44

## 2019-12-17 RX ADMIN — FUROSEMIDE 20 MG: 10 INJECTION, SOLUTION INTRAMUSCULAR; INTRAVENOUS at 08:44

## 2019-12-17 RX ADMIN — LEVOTHYROXINE SODIUM 125 MCG: 125 TABLET ORAL at 05:45

## 2019-12-17 RX ADMIN — POTASSIUM CHLORIDE 20 MEQ: 20 TABLET, EXTENDED RELEASE ORAL at 08:45

## 2019-12-17 RX ADMIN — Medication 10 ML: at 08:45

## 2019-12-17 ASSESSMENT — PAIN DESCRIPTION - PAIN TYPE: TYPE: ACUTE PAIN

## 2019-12-17 ASSESSMENT — PAIN DESCRIPTION - DESCRIPTORS: DESCRIPTORS: ACHING;DISCOMFORT

## 2019-12-17 ASSESSMENT — PAIN DESCRIPTION - ORIENTATION: ORIENTATION: LEFT

## 2019-12-17 ASSESSMENT — PAIN DESCRIPTION - LOCATION: LOCATION: LEG

## 2019-12-17 ASSESSMENT — PAIN SCALES - GENERAL
PAINLEVEL_OUTOF10: 0
PAINLEVEL_OUTOF10: 0
PAINLEVEL_OUTOF10: 8

## 2019-12-17 ASSESSMENT — PAIN - FUNCTIONAL ASSESSMENT: PAIN_FUNCTIONAL_ASSESSMENT: PREVENTS OR INTERFERES SOME ACTIVE ACTIVITIES AND ADLS

## 2019-12-18 LAB
ANION GAP SERPL CALCULATED.3IONS-SCNC: 11 MMOL/L (ref 7–16)
BUN BLDV-MCNC: 25 MG/DL (ref 8–23)
CALCIUM SERPL-MCNC: 9.1 MG/DL (ref 8.6–10.2)
CHLORIDE BLD-SCNC: 100 MMOL/L (ref 98–107)
CO2: 28 MMOL/L (ref 22–29)
CREAT SERPL-MCNC: 0.8 MG/DL (ref 0.5–1)
GFR AFRICAN AMERICAN: >60
GFR NON-AFRICAN AMERICAN: >60 ML/MIN/1.73
GLUCOSE BLD-MCNC: 89 MG/DL (ref 74–99)
HCT VFR BLD CALC: 40.3 % (ref 34–48)
HEMOGLOBIN: 13.1 G/DL (ref 11.5–15.5)
INR BLD: 2.3
MAGNESIUM: 2.5 MG/DL (ref 1.6–2.6)
MCH RBC QN AUTO: 30.5 PG (ref 26–35)
MCHC RBC AUTO-ENTMCNC: 32.5 % (ref 32–34.5)
MCV RBC AUTO: 93.7 FL (ref 80–99.9)
PDW BLD-RTO: 14.6 FL (ref 11.5–15)
PLATELET # BLD: 187 E9/L (ref 130–450)
PMV BLD AUTO: 10.6 FL (ref 7–12)
POTASSIUM SERPL-SCNC: 4 MMOL/L (ref 3.5–5)
PROTHROMBIN TIME: 26.1 SEC (ref 9.3–12.4)
RBC # BLD: 4.3 E12/L (ref 3.5–5.5)
SODIUM BLD-SCNC: 139 MMOL/L (ref 132–146)
WBC # BLD: 7 E9/L (ref 4.5–11.5)

## 2019-12-18 PROCEDURE — 80048 BASIC METABOLIC PNL TOTAL CA: CPT

## 2019-12-18 PROCEDURE — 36415 COLL VENOUS BLD VENIPUNCTURE: CPT

## 2019-12-18 PROCEDURE — 97530 THERAPEUTIC ACTIVITIES: CPT

## 2019-12-18 PROCEDURE — 6370000000 HC RX 637 (ALT 250 FOR IP): Performed by: PHYSICIAN ASSISTANT

## 2019-12-18 PROCEDURE — 99232 SBSQ HOSP IP/OBS MODERATE 35: CPT | Performed by: INTERNAL MEDICINE

## 2019-12-18 PROCEDURE — 1200000000 HC SEMI PRIVATE

## 2019-12-18 PROCEDURE — 2580000003 HC RX 258: Performed by: NURSE PRACTITIONER

## 2019-12-18 PROCEDURE — 85027 COMPLETE CBC AUTOMATED: CPT

## 2019-12-18 PROCEDURE — APPSS30 APP SPLIT SHARED TIME 16-30 MINUTES: Performed by: NURSE PRACTITIONER

## 2019-12-18 PROCEDURE — 6370000000 HC RX 637 (ALT 250 FOR IP): Performed by: NURSE PRACTITIONER

## 2019-12-18 PROCEDURE — 97110 THERAPEUTIC EXERCISES: CPT

## 2019-12-18 PROCEDURE — 6370000000 HC RX 637 (ALT 250 FOR IP): Performed by: INTERNAL MEDICINE

## 2019-12-18 PROCEDURE — 83735 ASSAY OF MAGNESIUM: CPT

## 2019-12-18 PROCEDURE — 99233 SBSQ HOSP IP/OBS HIGH 50: CPT | Performed by: INTERNAL MEDICINE

## 2019-12-18 PROCEDURE — 85610 PROTHROMBIN TIME: CPT

## 2019-12-18 RX ADMIN — DOCUSATE SODIUM 100 MG: 100 CAPSULE, LIQUID FILLED ORAL at 08:56

## 2019-12-18 RX ADMIN — LISINOPRIL 10 MG: 10 TABLET ORAL at 08:56

## 2019-12-18 RX ADMIN — ESCITALOPRAM OXALATE 10 MG: 10 TABLET ORAL at 08:55

## 2019-12-18 RX ADMIN — FAMOTIDINE 20 MG: 20 TABLET ORAL at 08:55

## 2019-12-18 RX ADMIN — FUROSEMIDE 40 MG: 40 TABLET ORAL at 08:56

## 2019-12-18 RX ADMIN — POTASSIUM CHLORIDE 20 MEQ: 20 TABLET, EXTENDED RELEASE ORAL at 08:56

## 2019-12-18 RX ADMIN — METOPROLOL SUCCINATE 50 MG: 50 TABLET, EXTENDED RELEASE ORAL at 08:56

## 2019-12-18 RX ADMIN — PREDNISONE 40 MG: 20 TABLET ORAL at 08:55

## 2019-12-18 RX ADMIN — Medication 10 ML: at 08:55

## 2019-12-18 RX ADMIN — WARFARIN SODIUM 7.5 MG: 7.5 TABLET ORAL at 17:50

## 2019-12-18 RX ADMIN — Medication 10 ML: at 22:25

## 2019-12-18 RX ADMIN — LEVOTHYROXINE SODIUM 125 MCG: 125 TABLET ORAL at 05:42

## 2019-12-18 ASSESSMENT — PAIN SCALES - GENERAL
PAINLEVEL_OUTOF10: 0
PAINLEVEL_OUTOF10: 0

## 2019-12-19 LAB
ANION GAP SERPL CALCULATED.3IONS-SCNC: 9 MMOL/L (ref 7–16)
BUN BLDV-MCNC: 29 MG/DL (ref 8–23)
CALCIUM SERPL-MCNC: 8.9 MG/DL (ref 8.6–10.2)
CHLORIDE BLD-SCNC: 102 MMOL/L (ref 98–107)
CO2: 30 MMOL/L (ref 22–29)
CREAT SERPL-MCNC: 1 MG/DL (ref 0.5–1)
GFR AFRICAN AMERICAN: >60
GFR NON-AFRICAN AMERICAN: 53 ML/MIN/1.73
GLUCOSE BLD-MCNC: 96 MG/DL (ref 74–99)
HCT VFR BLD CALC: 39.7 % (ref 34–48)
HEMOGLOBIN: 12.8 G/DL (ref 11.5–15.5)
INR BLD: 2.3
MAGNESIUM: 2.4 MG/DL (ref 1.6–2.6)
MCH RBC QN AUTO: 30.3 PG (ref 26–35)
MCHC RBC AUTO-ENTMCNC: 32.2 % (ref 32–34.5)
MCV RBC AUTO: 94.1 FL (ref 80–99.9)
PDW BLD-RTO: 14.5 FL (ref 11.5–15)
PLATELET # BLD: 189 E9/L (ref 130–450)
PMV BLD AUTO: 10.4 FL (ref 7–12)
POTASSIUM SERPL-SCNC: 4.2 MMOL/L (ref 3.5–5)
PROTHROMBIN TIME: 25.6 SEC (ref 9.3–12.4)
RBC # BLD: 4.22 E12/L (ref 3.5–5.5)
SODIUM BLD-SCNC: 141 MMOL/L (ref 132–146)
WBC # BLD: 6.9 E9/L (ref 4.5–11.5)

## 2019-12-19 PROCEDURE — 6370000000 HC RX 637 (ALT 250 FOR IP): Performed by: NURSE PRACTITIONER

## 2019-12-19 PROCEDURE — 85027 COMPLETE CBC AUTOMATED: CPT

## 2019-12-19 PROCEDURE — 97535 SELF CARE MNGMENT TRAINING: CPT

## 2019-12-19 PROCEDURE — 6370000000 HC RX 637 (ALT 250 FOR IP): Performed by: PHYSICIAN ASSISTANT

## 2019-12-19 PROCEDURE — 83735 ASSAY OF MAGNESIUM: CPT

## 2019-12-19 PROCEDURE — 97530 THERAPEUTIC ACTIVITIES: CPT

## 2019-12-19 PROCEDURE — 6370000000 HC RX 637 (ALT 250 FOR IP): Performed by: INTERNAL MEDICINE

## 2019-12-19 PROCEDURE — 80048 BASIC METABOLIC PNL TOTAL CA: CPT

## 2019-12-19 PROCEDURE — 2580000003 HC RX 258: Performed by: NURSE PRACTITIONER

## 2019-12-19 PROCEDURE — 99232 SBSQ HOSP IP/OBS MODERATE 35: CPT | Performed by: INTERNAL MEDICINE

## 2019-12-19 PROCEDURE — 85610 PROTHROMBIN TIME: CPT

## 2019-12-19 PROCEDURE — 1200000000 HC SEMI PRIVATE

## 2019-12-19 PROCEDURE — APPSS30 APP SPLIT SHARED TIME 16-30 MINUTES: Performed by: NURSE PRACTITIONER

## 2019-12-19 PROCEDURE — 36415 COLL VENOUS BLD VENIPUNCTURE: CPT

## 2019-12-19 PROCEDURE — 97110 THERAPEUTIC EXERCISES: CPT

## 2019-12-19 RX ADMIN — Medication 10 ML: at 08:57

## 2019-12-19 RX ADMIN — POTASSIUM CHLORIDE 20 MEQ: 20 TABLET, EXTENDED RELEASE ORAL at 08:56

## 2019-12-19 RX ADMIN — FAMOTIDINE 20 MG: 20 TABLET ORAL at 08:56

## 2019-12-19 RX ADMIN — DOCUSATE SODIUM 100 MG: 100 CAPSULE, LIQUID FILLED ORAL at 08:56

## 2019-12-19 RX ADMIN — FUROSEMIDE 40 MG: 40 TABLET ORAL at 09:02

## 2019-12-19 RX ADMIN — WARFARIN SODIUM 7.5 MG: 7.5 TABLET ORAL at 17:24

## 2019-12-19 RX ADMIN — ESCITALOPRAM OXALATE 10 MG: 10 TABLET ORAL at 08:56

## 2019-12-19 RX ADMIN — LISINOPRIL 10 MG: 10 TABLET ORAL at 08:56

## 2019-12-19 RX ADMIN — METOPROLOL SUCCINATE 50 MG: 50 TABLET, EXTENDED RELEASE ORAL at 08:56

## 2019-12-19 RX ADMIN — PREDNISONE 40 MG: 20 TABLET ORAL at 08:56

## 2019-12-19 RX ADMIN — Medication 10 ML: at 21:07

## 2019-12-19 RX ADMIN — LEVOTHYROXINE SODIUM 125 MCG: 125 TABLET ORAL at 05:57

## 2019-12-19 ASSESSMENT — PAIN SCALES - GENERAL
PAINLEVEL_OUTOF10: 0

## 2019-12-19 ASSESSMENT — PAIN DESCRIPTION - PROGRESSION: CLINICAL_PROGRESSION: RESOLVED

## 2019-12-20 LAB
ANION GAP SERPL CALCULATED.3IONS-SCNC: 11 MMOL/L (ref 7–16)
BUN BLDV-MCNC: 27 MG/DL (ref 8–23)
CALCIUM SERPL-MCNC: 9 MG/DL (ref 8.6–10.2)
CHLORIDE BLD-SCNC: 100 MMOL/L (ref 98–107)
CO2: 25 MMOL/L (ref 22–29)
CREAT SERPL-MCNC: 0.9 MG/DL (ref 0.5–1)
GFR AFRICAN AMERICAN: >60
GFR NON-AFRICAN AMERICAN: 60 ML/MIN/1.73
GLUCOSE BLD-MCNC: 87 MG/DL (ref 74–99)
HCT VFR BLD CALC: 42.1 % (ref 34–48)
HEMOGLOBIN: 13.6 G/DL (ref 11.5–15.5)
INR BLD: 2.4
MAGNESIUM: 2.4 MG/DL (ref 1.6–2.6)
MCH RBC QN AUTO: 30.2 PG (ref 26–35)
MCHC RBC AUTO-ENTMCNC: 32.3 % (ref 32–34.5)
MCV RBC AUTO: 93.6 FL (ref 80–99.9)
PDW BLD-RTO: 14.3 FL (ref 11.5–15)
PLATELET # BLD: 201 E9/L (ref 130–450)
PMV BLD AUTO: 10.4 FL (ref 7–12)
POTASSIUM SERPL-SCNC: 4.4 MMOL/L (ref 3.5–5)
PROTHROMBIN TIME: 27.6 SEC (ref 9.3–12.4)
RBC # BLD: 4.5 E12/L (ref 3.5–5.5)
SODIUM BLD-SCNC: 136 MMOL/L (ref 132–146)
WBC # BLD: 7.2 E9/L (ref 4.5–11.5)

## 2019-12-20 PROCEDURE — 6370000000 HC RX 637 (ALT 250 FOR IP): Performed by: PHYSICIAN ASSISTANT

## 2019-12-20 PROCEDURE — 80048 BASIC METABOLIC PNL TOTAL CA: CPT

## 2019-12-20 PROCEDURE — 6370000000 HC RX 637 (ALT 250 FOR IP): Performed by: NURSE PRACTITIONER

## 2019-12-20 PROCEDURE — 6370000000 HC RX 637 (ALT 250 FOR IP): Performed by: INTERNAL MEDICINE

## 2019-12-20 PROCEDURE — 51702 INSERT TEMP BLADDER CATH: CPT

## 2019-12-20 PROCEDURE — 97110 THERAPEUTIC EXERCISES: CPT

## 2019-12-20 PROCEDURE — 97530 THERAPEUTIC ACTIVITIES: CPT

## 2019-12-20 PROCEDURE — 2580000003 HC RX 258: Performed by: NURSE PRACTITIONER

## 2019-12-20 PROCEDURE — 36415 COLL VENOUS BLD VENIPUNCTURE: CPT

## 2019-12-20 PROCEDURE — APPSS30 APP SPLIT SHARED TIME 16-30 MINUTES: Performed by: NURSE PRACTITIONER

## 2019-12-20 PROCEDURE — 83735 ASSAY OF MAGNESIUM: CPT

## 2019-12-20 PROCEDURE — 85027 COMPLETE CBC AUTOMATED: CPT

## 2019-12-20 PROCEDURE — 99231 SBSQ HOSP IP/OBS SF/LOW 25: CPT | Performed by: INTERNAL MEDICINE

## 2019-12-20 PROCEDURE — 99232 SBSQ HOSP IP/OBS MODERATE 35: CPT | Performed by: INTERNAL MEDICINE

## 2019-12-20 PROCEDURE — 85610 PROTHROMBIN TIME: CPT

## 2019-12-20 PROCEDURE — 1200000000 HC SEMI PRIVATE

## 2019-12-20 RX ADMIN — LISINOPRIL 10 MG: 10 TABLET ORAL at 09:18

## 2019-12-20 RX ADMIN — Medication 10 ML: at 21:31

## 2019-12-20 RX ADMIN — ACETAMINOPHEN 650 MG: 325 TABLET, FILM COATED ORAL at 00:20

## 2019-12-20 RX ADMIN — WARFARIN SODIUM 7.5 MG: 7.5 TABLET ORAL at 18:19

## 2019-12-20 RX ADMIN — METOPROLOL SUCCINATE 50 MG: 50 TABLET, EXTENDED RELEASE ORAL at 09:18

## 2019-12-20 RX ADMIN — PREDNISONE 40 MG: 20 TABLET ORAL at 09:18

## 2019-12-20 RX ADMIN — FUROSEMIDE 40 MG: 40 TABLET ORAL at 09:18

## 2019-12-20 RX ADMIN — Medication 10 ML: at 09:19

## 2019-12-20 RX ADMIN — LEVOTHYROXINE SODIUM 125 MCG: 125 TABLET ORAL at 05:28

## 2019-12-20 RX ADMIN — ESCITALOPRAM OXALATE 10 MG: 10 TABLET ORAL at 09:18

## 2019-12-20 RX ADMIN — POTASSIUM CHLORIDE 20 MEQ: 20 TABLET, EXTENDED RELEASE ORAL at 09:18

## 2019-12-20 RX ADMIN — FAMOTIDINE 20 MG: 20 TABLET ORAL at 09:18

## 2019-12-20 ASSESSMENT — PAIN SCALES - GENERAL
PAINLEVEL_OUTOF10: 3
PAINLEVEL_OUTOF10: 0
PAINLEVEL_OUTOF10: 0

## 2019-12-21 VITALS
SYSTOLIC BLOOD PRESSURE: 168 MMHG | RESPIRATION RATE: 18 BRPM | HEART RATE: 81 BPM | WEIGHT: 249.3 LBS | DIASTOLIC BLOOD PRESSURE: 75 MMHG | BODY MASS INDEX: 40.07 KG/M2 | HEIGHT: 66 IN | OXYGEN SATURATION: 96 % | TEMPERATURE: 98 F

## 2019-12-21 LAB
ANION GAP SERPL CALCULATED.3IONS-SCNC: 10 MMOL/L (ref 7–16)
BUN BLDV-MCNC: 24 MG/DL (ref 8–23)
CALCIUM SERPL-MCNC: 8.9 MG/DL (ref 8.6–10.2)
CHLORIDE BLD-SCNC: 101 MMOL/L (ref 98–107)
CO2: 29 MMOL/L (ref 22–29)
CREAT SERPL-MCNC: 0.9 MG/DL (ref 0.5–1)
GFR AFRICAN AMERICAN: >60
GFR NON-AFRICAN AMERICAN: 60 ML/MIN/1.73
GLUCOSE BLD-MCNC: 83 MG/DL (ref 74–99)
HCT VFR BLD CALC: 40 % (ref 34–48)
HEMOGLOBIN: 12.8 G/DL (ref 11.5–15.5)
INR BLD: 2.6
MAGNESIUM: 2.4 MG/DL (ref 1.6–2.6)
MCH RBC QN AUTO: 30.2 PG (ref 26–35)
MCHC RBC AUTO-ENTMCNC: 32 % (ref 32–34.5)
MCV RBC AUTO: 94.3 FL (ref 80–99.9)
PDW BLD-RTO: 14.3 FL (ref 11.5–15)
PLATELET # BLD: 207 E9/L (ref 130–450)
PMV BLD AUTO: 10.7 FL (ref 7–12)
POTASSIUM SERPL-SCNC: 4.4 MMOL/L (ref 3.5–5)
PROTHROMBIN TIME: 29.5 SEC (ref 9.3–12.4)
RBC # BLD: 4.24 E12/L (ref 3.5–5.5)
SODIUM BLD-SCNC: 140 MMOL/L (ref 132–146)
WBC # BLD: 6.7 E9/L (ref 4.5–11.5)

## 2019-12-21 PROCEDURE — 85027 COMPLETE CBC AUTOMATED: CPT

## 2019-12-21 PROCEDURE — 85610 PROTHROMBIN TIME: CPT

## 2019-12-21 PROCEDURE — 80048 BASIC METABOLIC PNL TOTAL CA: CPT

## 2019-12-21 PROCEDURE — 6370000000 HC RX 637 (ALT 250 FOR IP): Performed by: NURSE PRACTITIONER

## 2019-12-21 PROCEDURE — 6370000000 HC RX 637 (ALT 250 FOR IP): Performed by: INTERNAL MEDICINE

## 2019-12-21 PROCEDURE — 83735 ASSAY OF MAGNESIUM: CPT

## 2019-12-21 PROCEDURE — APPSS45 APP SPLIT SHARED TIME 31-45 MINUTES: Performed by: NURSE PRACTITIONER

## 2019-12-21 PROCEDURE — 6370000000 HC RX 637 (ALT 250 FOR IP): Performed by: PHYSICIAN ASSISTANT

## 2019-12-21 PROCEDURE — 36415 COLL VENOUS BLD VENIPUNCTURE: CPT

## 2019-12-21 PROCEDURE — 99239 HOSP IP/OBS DSCHRG MGMT >30: CPT | Performed by: INTERNAL MEDICINE

## 2019-12-21 PROCEDURE — 2580000003 HC RX 258: Performed by: NURSE PRACTITIONER

## 2019-12-21 RX ORDER — METOPROLOL SUCCINATE 50 MG/1
50 TABLET, EXTENDED RELEASE ORAL DAILY
Qty: 30 TABLET | Refills: 0 | DISCHARGE
Start: 2019-12-22 | End: 2020-01-28 | Stop reason: SDUPTHER

## 2019-12-21 RX ORDER — PSEUDOEPHEDRINE HCL 30 MG
100 TABLET ORAL DAILY
DISCHARGE
Start: 2019-12-22 | End: 2020-01-20

## 2019-12-21 RX ORDER — FUROSEMIDE 40 MG/1
40 TABLET ORAL DAILY
Qty: 60 TABLET | Refills: 3 | DISCHARGE
Start: 2019-12-22 | End: 2020-05-04 | Stop reason: SDUPTHER

## 2019-12-21 RX ADMIN — POTASSIUM CHLORIDE 20 MEQ: 20 TABLET, EXTENDED RELEASE ORAL at 08:56

## 2019-12-21 RX ADMIN — LISINOPRIL 10 MG: 10 TABLET ORAL at 08:55

## 2019-12-21 RX ADMIN — PREDNISONE 40 MG: 20 TABLET ORAL at 08:55

## 2019-12-21 RX ADMIN — FAMOTIDINE 20 MG: 20 TABLET ORAL at 08:55

## 2019-12-21 RX ADMIN — Medication 10 ML: at 08:56

## 2019-12-21 RX ADMIN — METOPROLOL SUCCINATE 50 MG: 50 TABLET, EXTENDED RELEASE ORAL at 08:56

## 2019-12-21 RX ADMIN — FUROSEMIDE 40 MG: 40 TABLET ORAL at 08:56

## 2019-12-21 RX ADMIN — LEVOTHYROXINE SODIUM 125 MCG: 125 TABLET ORAL at 06:21

## 2019-12-21 RX ADMIN — ESCITALOPRAM OXALATE 10 MG: 10 TABLET ORAL at 08:55

## 2019-12-21 ASSESSMENT — PAIN SCALES - GENERAL: PAINLEVEL_OUTOF10: 0

## 2019-12-23 ENCOUNTER — CARE COORDINATION (OUTPATIENT)
Dept: CARE COORDINATION | Age: 84
End: 2019-12-23

## 2019-12-23 DIAGNOSIS — R00.1 BRADYCARDIA: Primary | ICD-10-CM

## 2019-12-27 ENCOUNTER — NURSE ONLY (OUTPATIENT)
Dept: CARDIOLOGY CLINIC | Age: 84
End: 2019-12-27

## 2020-01-03 ENCOUNTER — CARE COORDINATION (OUTPATIENT)
Dept: CARE COORDINATION | Age: 85
End: 2020-01-03

## 2020-01-06 ENCOUNTER — HOSPITAL ENCOUNTER (OUTPATIENT)
Dept: OTHER | Age: 85
Setting detail: THERAPIES SERIES
Discharge: HOME | End: 2020-01-06
Payer: MEDICARE

## 2020-01-06 VITALS
RESPIRATION RATE: 18 BRPM | HEART RATE: 87 BPM | SYSTOLIC BLOOD PRESSURE: 132 MMHG | DIASTOLIC BLOOD PRESSURE: 62 MMHG | WEIGHT: 246 LBS | OXYGEN SATURATION: 99 % | BODY MASS INDEX: 39.53 KG/M2 | HEIGHT: 66 IN

## 2020-01-06 PROCEDURE — 99204 OFFICE O/P NEW MOD 45 MIN: CPT

## 2020-01-06 NOTE — PLAN OF CARE
Problem:  Activity:  Goal: Capacity to carry out activities will improve  Description  Capacity to carry out activities will improve  Outcome: Ongoing  Goal: Will verbalize the importance of balancing activity with adequate rest periods  Description  Will verbalize the importance of balancing activity with adequate rest periods  Outcome: Ongoing     Problem: Cardiac:  Goal: Hemodynamic stability will improve  Description  Hemodynamic stability will improve  Outcome: Ongoing  Goal: Ability to maintain an adequate cardiac output will improve  Description  Ability to maintain an adequate cardiac output will improve  Outcome: Ongoing     Problem: Coping:  Goal: Verbalizations of decreased anxiety will decrease  Description  Verbalizations of decreased anxiety will decrease  Outcome: Ongoing     Problem: Fluid Volume:  Goal: Risk for excess fluid volume will decrease  Description  Risk for excess fluid volume will decrease  Outcome: Ongoing  Goal: Maintenance of adequate hydration will improve  Description  Maintenance of adequate hydration will improve  Outcome: Ongoing  Goal: Will show no signs and symptoms of electrolyte imbalance  Description  Will show no signs and symptoms of electrolyte imbalance  Outcome: Ongoing     Problem: Health Behavior:  Goal: Ability to manage health-related needs will improve  Description  Ability to manage health-related needs will improve  Outcome: Ongoing     Problem: Health Behavior:  Goal: Ability to manage health-related needs will improve  Description  Ability to manage health-related needs will improve  Outcome: Ongoing     Problem: Nutritional:  Goal: Maintenance of adequate nutrition will improve  Description  Maintenance of adequate nutrition will improve  Outcome: Ongoing     Problem: Physical Regulation:  Goal: Complications related to the disease process, condition or treatment will be avoided or minimized  Description  Complications related to the disease process,

## 2020-01-06 NOTE — PROGRESS NOTES
Vascular  Peripheral Vascular (WDL): Exceptions to WDL  RLE Edema: +1, Pitting  LLE Edema: Trace    Musculoskeletal  RL Extremity: Weakness, Unsteady  LL Extremity: Weakness, Unsteady
yes

## 2020-01-09 ENCOUNTER — CARE COORDINATION (OUTPATIENT)
Dept: CARE COORDINATION | Age: 85
End: 2020-01-09

## 2020-01-09 ENCOUNTER — TELEPHONE (OUTPATIENT)
Dept: CARDIOLOGY CLINIC | Age: 85
End: 2020-01-09

## 2020-01-09 NOTE — TELEPHONE ENCOUNTER
Patient was scheduled with Chanda Moulton on 1/9/20. However, this appointment was canceled. She was rescheduled with Chanda Moulton on 1/21/20 at 1:30 pm in [facility]. She has an appt with [facility]F [facility] in [facility] on 1/21/20 at 3:00 pm.     Left message with CHF Clinic in [facility] to change appointment to [facility] in [facility] since she is seeing Litseunyumiko Bhardwaj that day. This was OK with CHF at [facility] if [facility] [address] [facility] called back and stated the patient needed to be seen at [address] [place] [address] since she is established there. Spoke to Uri Rios at [address] [facility] in [facility]. Uri Rios will call the patient to change this appointment to a different day than when she is to see Chanda Moulton.

## 2020-01-14 ENCOUNTER — TELEPHONE (OUTPATIENT)
Dept: FAMILY MEDICINE CLINIC | Age: 85
End: 2020-01-14

## 2020-01-14 ENCOUNTER — TELEPHONE (OUTPATIENT)
Dept: CARDIOLOGY CLINIC | Age: 85
End: 2020-01-14

## 2020-01-14 RX ORDER — WARFARIN SODIUM 4 MG/1
8 TABLET ORAL DAILY
Qty: 60 TABLET | Refills: 5 | Status: SHIPPED | OUTPATIENT
Start: 2020-01-14 | End: 2020-05-04 | Stop reason: SDUPTHER

## 2020-01-14 NOTE — TELEPHONE ENCOUNTER
Patient's granddaughter called patient is home from SNF , was on 8 mg coumadin daily but only has 7.5 mg available at home. Home Health is coming tomorrow, please advise.

## 2020-01-15 ENCOUNTER — TELEPHONE (OUTPATIENT)
Dept: CARDIOLOGY | Age: 85
End: 2020-01-15

## 2020-01-15 NOTE — TELEPHONE ENCOUNTER
Called by monitor company to notify me of 9 beat NSVT on patient's event monitor. They were unable to contact patient. Baseline rhythm atrial fibrillation.

## 2020-01-16 ENCOUNTER — CARE COORDINATION (OUTPATIENT)
Dept: CARE COORDINATION | Age: 85
End: 2020-01-16

## 2020-01-16 NOTE — CARE COORDINATION
Received a voice message from Tio Samson at [facility] [address].   -Patient was discharged to home on 1/13/2020.   -Prescriptions were called to [facility] in  [place] [facility] with [facility] to follow.   -Follow up appointment scheduled with PCP; 1/20/2020.

## 2020-01-16 NOTE — CARE COORDINATION
[facility] Update Call    2020    Patient: Tracee Roe Patient : 1935   MRN: [id number]  Reason for Admission: Acute on Chronic Heart Failure  Discharge Date: 19 RARS: Readmission Risk Score: 21       Left voice message for [place] Reasons at [facility] requesting a call back with update. RN CTN contact information provided.

## 2020-01-20 ENCOUNTER — ANTI-COAG VISIT (OUTPATIENT)
Dept: FAMILY MEDICINE CLINIC | Age: 85
End: 2020-01-20

## 2020-01-20 ENCOUNTER — OFFICE VISIT (OUTPATIENT)
Dept: FAMILY MEDICINE CLINIC | Age: 85
End: 2020-01-20
Payer: MEDICARE

## 2020-01-20 ENCOUNTER — HOSPITAL ENCOUNTER (OUTPATIENT)
Age: 85
Discharge: HOME | End: 2020-01-22
Payer: MEDICARE

## 2020-01-20 VITALS
OXYGEN SATURATION: 97 % | DIASTOLIC BLOOD PRESSURE: 81 MMHG | HEART RATE: 98 BPM | TEMPERATURE: 97.9 F | HEIGHT: 66 IN | BODY MASS INDEX: 39.53 KG/M2 | WEIGHT: 246 LBS | SYSTOLIC BLOOD PRESSURE: 124 MMHG

## 2020-01-20 PROBLEM — I50.9 ACUTE ON CHRONIC HEART FAILURE (HCC): Status: RESOLVED | Noted: 2019-12-12 | Resolved: 2020-01-20

## 2020-01-20 PROBLEM — J96.01 ACUTE RESPIRATORY FAILURE WITH HYPOXIA (HCC): Status: RESOLVED | Noted: 2018-01-28 | Resolved: 2020-01-20

## 2020-01-20 LAB
ALBUMIN SERPL-MCNC: 4.2 G/DL (ref 3.5–5.2)
ALP BLD-CCNC: 83 U/L (ref 35–104)
ALT SERPL-CCNC: 11 U/L (ref 0–32)
ANION GAP SERPL CALCULATED.3IONS-SCNC: 13 MMOL/L (ref 7–16)
AST SERPL-CCNC: 22 U/L (ref 0–31)
BASOPHILS ABSOLUTE: 0.05 E9/L (ref 0–0.2)
BASOPHILS RELATIVE PERCENT: 0.9 % (ref 0–2)
BILIRUB SERPL-MCNC: 0.7 MG/DL (ref 0–1.2)
BUN BLDV-MCNC: 16 MG/DL (ref 8–23)
CALCIUM SERPL-MCNC: 9.7 MG/DL (ref 8.6–10.2)
CHLORIDE BLD-SCNC: 102 MMOL/L (ref 98–107)
CO2: 27 MMOL/L (ref 22–29)
CREAT SERPL-MCNC: 0.8 MG/DL (ref 0.5–1)
EOSINOPHILS ABSOLUTE: 0.08 E9/L (ref 0.05–0.5)
EOSINOPHILS RELATIVE PERCENT: 1.4 % (ref 0–6)
GFR AFRICAN AMERICAN: >60
GFR NON-AFRICAN AMERICAN: >60 ML/MIN/1.73
GLUCOSE BLD-MCNC: 86 MG/DL (ref 74–99)
HCT VFR BLD CALC: 38.4 % (ref 34–48)
HEMOGLOBIN: 11.8 G/DL (ref 11.5–15.5)
IMMATURE GRANULOCYTES #: 0.02 E9/L
IMMATURE GRANULOCYTES %: 0.4 % (ref 0–5)
INTERNATIONAL NORMALIZATION RATIO, POC: 2.7
LYMPHOCYTES ABSOLUTE: 0.95 E9/L (ref 1.5–4)
LYMPHOCYTES RELATIVE PERCENT: 17.2 % (ref 20–42)
MCH RBC QN AUTO: 29.1 PG (ref 26–35)
MCHC RBC AUTO-ENTMCNC: 30.7 % (ref 32–34.5)
MCV RBC AUTO: 94.6 FL (ref 80–99.9)
MONOCYTES ABSOLUTE: 0.61 E9/L (ref 0.1–0.95)
MONOCYTES RELATIVE PERCENT: 11 % (ref 2–12)
NEUTROPHILS ABSOLUTE: 3.82 E9/L (ref 1.8–7.3)
NEUTROPHILS RELATIVE PERCENT: 69.1 % (ref 43–80)
PDW BLD-RTO: 14.5 FL (ref 11.5–15)
PLATELET # BLD: 199 E9/L (ref 130–450)
PMV BLD AUTO: 11.3 FL (ref 7–12)
POTASSIUM SERPL-SCNC: 3.6 MMOL/L (ref 3.5–5)
PROTHROMBIN TIME, POC: 31.9
RBC # BLD: 4.06 E12/L (ref 3.5–5.5)
SODIUM BLD-SCNC: 142 MMOL/L (ref 132–146)
TOTAL PROTEIN: 7.1 G/DL (ref 6.4–8.3)
TSH SERPL DL<=0.05 MIU/L-ACNC: 3.84 UIU/ML (ref 0.27–4.2)
WBC # BLD: 5.5 E9/L (ref 4.5–11.5)

## 2020-01-20 PROCEDURE — 1111F DSCHRG MED/CURRENT MED MERGE: CPT | Performed by: FAMILY MEDICINE

## 2020-01-20 PROCEDURE — 84443 ASSAY THYROID STIM HORMONE: CPT

## 2020-01-20 PROCEDURE — 99212 OFFICE O/P EST SF 10 MIN: CPT | Performed by: FAMILY MEDICINE

## 2020-01-20 PROCEDURE — G8482 FLU IMMUNIZE ORDER/ADMIN: HCPCS | Performed by: FAMILY MEDICINE

## 2020-01-20 PROCEDURE — 1036F TOBACCO NON-USER: CPT | Performed by: FAMILY MEDICINE

## 2020-01-20 PROCEDURE — 1090F PRES/ABSN URINE INCON ASSESS: CPT | Performed by: FAMILY MEDICINE

## 2020-01-20 PROCEDURE — G8399 PT W/DXA RESULTS DOCUMENT: HCPCS | Performed by: FAMILY MEDICINE

## 2020-01-20 PROCEDURE — 99214 OFFICE O/P EST MOD 30 MIN: CPT | Performed by: FAMILY MEDICINE

## 2020-01-20 PROCEDURE — G8926 SPIRO NO PERF OR DOC: HCPCS | Performed by: FAMILY MEDICINE

## 2020-01-20 PROCEDURE — 85025 COMPLETE CBC W/AUTO DIFF WBC: CPT

## 2020-01-20 PROCEDURE — 80053 COMPREHEN METABOLIC PANEL: CPT

## 2020-01-20 PROCEDURE — 1123F ACP DISCUSS/DSCN MKR DOCD: CPT | Performed by: FAMILY MEDICINE

## 2020-01-20 PROCEDURE — 36415 COLL VENOUS BLD VENIPUNCTURE: CPT | Performed by: FAMILY MEDICINE

## 2020-01-20 PROCEDURE — G8417 CALC BMI ABV UP PARAM F/U: HCPCS | Performed by: FAMILY MEDICINE

## 2020-01-20 PROCEDURE — G8427 DOCREV CUR MEDS BY ELIG CLIN: HCPCS | Performed by: FAMILY MEDICINE

## 2020-01-20 PROCEDURE — 3023F SPIROM DOC REV: CPT | Performed by: FAMILY MEDICINE

## 2020-01-20 PROCEDURE — 85610 PROTHROMBIN TIME: CPT | Performed by: FAMILY MEDICINE

## 2020-01-20 PROCEDURE — 36415 COLL VENOUS BLD VENIPUNCTURE: CPT

## 2020-01-20 PROCEDURE — 4040F PNEUMOC VAC/ADMIN/RCVD: CPT | Performed by: FAMILY MEDICINE

## 2020-01-20 RX ORDER — ESCITALOPRAM OXALATE 5 MG/1
TABLET ORAL
COMMUNITY
Start: 2020-01-13 | End: 2020-01-20 | Stop reason: SDUPTHER

## 2020-01-20 RX ORDER — ESCITALOPRAM OXALATE 5 MG/1
TABLET ORAL
Qty: 30 TABLET | Refills: 5 | Status: SHIPPED
Start: 2020-01-20 | End: 2020-07-29

## 2020-01-21 ENCOUNTER — HOSPITAL ENCOUNTER (OUTPATIENT)
Dept: OTHER | Age: 85
Setting detail: THERAPIES SERIES
End: 2020-01-21
Payer: MEDICARE

## 2020-01-21 ENCOUNTER — TELEPHONE (OUTPATIENT)
Dept: OTHER | Age: 85
End: 2020-01-21

## 2020-01-28 ENCOUNTER — OFFICE VISIT (OUTPATIENT)
Dept: CARDIOLOGY CLINIC | Age: 85
End: 2020-01-28
Payer: MEDICARE

## 2020-01-28 VITALS
OXYGEN SATURATION: 93 % | HEART RATE: 123 BPM | HEIGHT: 66 IN | WEIGHT: 244.6 LBS | DIASTOLIC BLOOD PRESSURE: 66 MMHG | SYSTOLIC BLOOD PRESSURE: 132 MMHG | RESPIRATION RATE: 26 BRPM | BODY MASS INDEX: 39.31 KG/M2

## 2020-01-28 PROCEDURE — G8417 CALC BMI ABV UP PARAM F/U: HCPCS | Performed by: NURSE PRACTITIONER

## 2020-01-28 PROCEDURE — G8399 PT W/DXA RESULTS DOCUMENT: HCPCS | Performed by: NURSE PRACTITIONER

## 2020-01-28 PROCEDURE — 99214 OFFICE O/P EST MOD 30 MIN: CPT | Performed by: NURSE PRACTITIONER

## 2020-01-28 PROCEDURE — 1036F TOBACCO NON-USER: CPT | Performed by: NURSE PRACTITIONER

## 2020-01-28 PROCEDURE — G8427 DOCREV CUR MEDS BY ELIG CLIN: HCPCS | Performed by: NURSE PRACTITIONER

## 2020-01-28 PROCEDURE — G8926 SPIRO NO PERF OR DOC: HCPCS | Performed by: NURSE PRACTITIONER

## 2020-01-28 PROCEDURE — 3023F SPIROM DOC REV: CPT | Performed by: NURSE PRACTITIONER

## 2020-01-28 PROCEDURE — 93000 ELECTROCARDIOGRAM COMPLETE: CPT | Performed by: INTERNAL MEDICINE

## 2020-01-28 PROCEDURE — 1090F PRES/ABSN URINE INCON ASSESS: CPT | Performed by: NURSE PRACTITIONER

## 2020-01-28 PROCEDURE — 4040F PNEUMOC VAC/ADMIN/RCVD: CPT | Performed by: NURSE PRACTITIONER

## 2020-01-28 PROCEDURE — 1123F ACP DISCUSS/DSCN MKR DOCD: CPT | Performed by: NURSE PRACTITIONER

## 2020-01-28 PROCEDURE — G8482 FLU IMMUNIZE ORDER/ADMIN: HCPCS | Performed by: NURSE PRACTITIONER

## 2020-01-28 RX ORDER — METOPROLOL SUCCINATE 50 MG/1
75 TABLET, EXTENDED RELEASE ORAL DAILY
Qty: 30 TABLET | Refills: 0 | Status: SHIPPED | OUTPATIENT
Start: 2020-01-28 | End: 2020-05-04 | Stop reason: SDUPTHER

## 2020-01-28 RX ORDER — SPIRONOLACTONE 25 MG/1
25 TABLET ORAL DAILY
Qty: 90 TABLET | Refills: 1 | Status: SHIPPED | OUTPATIENT
Start: 2020-01-28 | End: 2020-05-04 | Stop reason: SDUPTHER

## 2020-01-28 NOTE — PROGRESS NOTES
[facility] Pul[facility]pert[facility]  Office Visit         Reason for Visit: Heart failure    Primary Cardiologist: Dr. Tom Garcia        History of Present Illness:     Ms. Reynold Woods is a 81year old  female with a PMHX of HFpEF (recent EF 45%), chronic atrial fibrillation, RV dysfunction, likely untreated RICARDA, hypertension, PFO, HLD, obesity. She was recently hospitalized for heart failure at the end of December and then was in SNF facility up until several weeks ago. She presents today in post acute heart failure hospitalization follow up. She has stable weights with good urinary response to oral lasix, however has noted increased WOO and orthopnea to where she has been sleeping the recliner. She presents today with family member who confirms that she has been complaint with her cardiac medications, her toprol was decreased during hospitalization due to nocturnal pauses. She was set up with 30 days event monitor after discharge, and just recently mailed device back ( report still pending). Today in office her rate is uncontrolled. She reports dyspnea with exertion, shortness of breath, or decline in overall functional capacity. She reports orthopnea, denies PND, nocturnal cough or hemoptysis. She denies abdominal distention or bloating, early satiety, anorexia/change in appetite, unintentional weight loss. She does lower extremity edema. She denies exertional lightheadedness. She denies palpitations, syncope or near syncope. Review of systems is negative for chest pain, pressure, discomfort. When ambulating on an incline, She does not leg claudication. History is negative for neurological symptoms including transient loss of vision, asymmetric weakness, aphasia, dysphasia, numbness, tingling.      Patient Active Problem List    Diagnosis Date Noted    Depression 11/16/2010     Priority: Medium    Hypothyroidism 11/16/2010     Priority: Medium    Closed head injury     Fall at home, initial encounter 12/12/2019    Dizziness of unknown cause 12/12/2019    Coronary arteriosclerosis in native artery 09/25/2019    Patent foramen ovale 09/24/2019    VHD (valvular heart disease) 03/18/2019    Spinal stenosis of lumbar region with neurogenic claudication 02/25/2019    Pure hypercholesterolemia 02/08/2019    Chronic diastolic (congestive) heart failure ([facility] 05/22/2018    Fuchs' endothelial dystrophy 12/04/2017    Pseudophakia, both eyes 12/04/2017    Mixed stress and urge urinary incontinence 09/21/2017    Primary osteoarthritis involving multiple joints 09/21/2017    Chronic obstructive pulmonary disease (HCC)     Chronic anticoagulation     Greater trochanteric bursitis 08/18/2016    Leg length discrepancy 08/18/2016    Dizziness     Tricuspid regurgitation 04/26/2016    Essential hypertension 01/05/2016    Pulmonary hypertension ([facility] 01/05/2016    Class 2 severe obesity due to excess calories with serious comorbidity and body mass index (BMI) of 39.0 to 39.9 in adult [facility]) 01/05/2016    Astigmatism 02/09/2015    History of artificial lens replacement 02/09/2015    Presbyopia 02/09/2015    Tear film insufficiency 02/09/2015    Nonischemic cardiomyopathy ([facility] 03/20/2014    Permanent atrial fibrillation 03/20/2014    GERD (gastroesophageal reflux disease) 11/16/2010     CARDIOVASCULAR HISTORY:    1. Morbid obesity  2. Hypertension  3. Nonischemic Cardiomoypathy  A. 2-D echocardiogram August 14, 9918 stage I diastolic dysfunction EF 23% mild mitral regurgitation and mild tricuspid regurgitation and mild pulmonic regurgitation  B. Lexiscan stress test March 18, 2014: EF 30% no evidence of stress-induced ischemia   C. Cardiac catheterization 2001-minimal plaque (full report not available)  D.  March 22, 2014 normal coronaries with EF 30% (full report not available)  E. Echo March 18, 2014: Left ventricular size is grossly normal.Severe global hypokinesis, EF finger.  FRACTURE SURGERY Left     arm fractured-casted, Bilateral feet fractures-casted.  INCONTINENCE SURGERY  5/2005    Urinary incontinence, S/P bladder suspension.  INNER EAR SURGERY  2011    tube place in right ear   [address]    KIDNEY STONE SURGERY  1981    KNEE ARTHROPLASTY Bilateral 9/2005    PARATHYROIDECTOMY      Three times    SHOULDER ARTHROPLASTY Left 2009    SHOULDER ARTHROPLASTY Left      shoulder total replacement.     THYROID SURGERY  5/2005    recurrent    THYROIDECTOMY  1960             Allergies   Allergen Reactions    Codeine Other (See Comments)     Hallucination    Penicillins Hives    Vicodin [Hydrocodone-Acetaminophen] Other (See Comments)     Hallucination      Adhesive Tape Rash         Outpatient Medications Marked as Taking for the 1/28/20 encounter (Office Visit) with ARIEL Dunn CNP   Medication Sig Dispense Refill    NONFORMULARY daily [place] [facility] Rubarb      metoprolol succinate (TOPROL XL) 50 MG extended release tablet Take 1.5 tablets by mouth daily 30 tablet 0    spironolactone (ALDACTONE) 25 MG tablet Take 1 tablet by mouth daily 90 tablet 1    escitalopram (LEXAPRO) 5 MG tablet TK 1 T PO QD 30 tablet 5    warfarin (COUMADIN) 4 MG tablet Take 2 tablets by mouth daily 60 tablet 5    furosemide (LASIX) 40 MG tablet Take 1 tablet by mouth daily 60 tablet 3    Ascorbic Acid (VITAMIN C) 250 MG tablet Take 250 mg by mouth daily      PROAIR  (90 Base) MCG/ACT inhaler USE 2 PUFFS EVERY 6 HOURS  AS NEEDED FOR WHEEZING 34 g 2    lisinopril (PRINIVIL;ZESTRIL) 10 MG tablet TAKE 1 TABLET BY MOUTH  DAILY 90 tablet 3    acetaminophen (TYLENOL) 325 MG tablet Take 650 mg by mouth every 6 hours as needed for Pain or Fever      levothyroxine (SYNTHROID) 125 MCG tablet TAKE 1 TABLET BY MOUTH  EVERY DAY 90 tablet 1    potassium chloride (KLOR-CON M) 20 MEQ extended release tablet TAKE 1 TABLET BY MOUTH  EVERY DAY 90 tablet 3    b intact. EXTREMITIES: no cyanosis, no clubbing. 1+ bilateral lower extremity edema. Warm and well perfused. All the following diagnostics were personally reviewed and interpreted by me. LAB DATA:       1/20/2020 15:50   Sodium 142   Potassium 3.6   Chloride 102   CO2 27   BUN 16   Creatinine 0.8   Anion Gap 13   GFR Non- >60   GFR African American >60   Glucose 86   Calcium 9.7   Total Protein 7.1   Albumin 4.2   Alk Phos 83   ALT 11   AST 22   Bilirubin 0.7   TSH 3.840   WBC 5.5   RBC 4.06   Hemoglobin Quant 11.8   Hematocrit 38.4   MCV 94.6   MCH 29.1   MCHC 30.7 (L)   MPV 11.3   RDW 14.5   Platelet Count 831       IMAGING:    CXR (12/16/2019)  Findings: The cardiomediastinal silhouette is stable in size and contours. There  is mild perivascular congestion. There are small bilateral pleural effusions. There is no evidence of pneumothorax. Impression:  Stable mild cardiomegaly with pulmonary vascular congestion and small bilateral pleural effusions. CARDIAC TESTING:    [facility] (3/22/2014)  normal coronaries with EF 30%    TTE (9/18/2019, Dr. Diane Delgado)   Summary   Ejection fraction is visually estimated at 45%. Overall ejection fraction mildly decreased. Mild left ventricular concentric hypertrophy noted. Normal right ventricle size with reduced function. TAPSE is 1.4 cm. Left atrial volume index of 57 ml per meters squared BSA. Mild aortic regurgitation is noted. Mild mitral regurgitation is present. Moderate tricuspid regurgitation. Pulmonary hypertension is moderate. RVSP is 54 mmHg. Patent foramen ovale with left-to-right shunt was visualized by color    EKG  Atrial fibrillation with RVR      ASSESSMENT:  1. Acute on chronic HFpEF  2. ACC stage C / NYHA class III  3. Hypervolemic  4. RV dysfunction ( TAPSE 1.4) with moderate TR and evidence for moderate pulmonary hypertension in the setting of left heart disease and untreated sleep apnea. 5. Hypertension  6. Chronic atrial fibrillation - RVR today   7. [address] with coumadin   8. NSVT  9. PFO  10. Questionable nocturnal pauses while admitted to the hospital - BB was decreased and she just finsihed 30 day event monitor with results pending. 11. HLD - on statin therapy   12. COPD  15. Obesity   14. Probable RICARDA - see attached sleepiness scale       PLAN:  1. Increase metoprolol succinate to 75 mg daily - take a dose of 25 mg when you get home today. 2. Increase lasix to 40 mg twice ( 6 hours apart from one another, approximately 9/10 AM and 3 PM) for the next two days     3. Take an extra dose of potassium for the next two days as well    4. Start spironolactone 25 mg daily     5. Go to HF infusion clinic at [facility] within one week - will check blood work at this time as well (BMP, BNP and iron studies). [phone]    6. Referral for sleep apnea     7. Weigh yourself daily    -No fluid restriction     -Diet should sodium restricted to 2 grams    -Again watch your daily weight trends and if you gain water weight please follow below instructions.    -If you gain 3-5 pounds in 2-3 days OR notice that you are retaining fluid in anyway just like you did before then take an extra dose of your water pill (furosemide/Lasix) every day until you lose the weight or feel better.    -If you notice that you have taken more than 3 extra doses in 1 week then please call and let us know. -If at any time you feel that you are retaining fluid, your medications are not working, or you feel ill in anyway, then please call us for either same day appointment or the next day, and for instructions. Our goal is to keep you out of the emergency room and the hospital and we have ways to do it.  You just need to call us in a timely manner.     -If you become sick for other reasons, and notice that you are not urinating as much, the urine is very dark, you have significant diarrhea or vomiting, then please DO NOT take your water pill and CALL US

## 2020-01-28 NOTE — PATIENT INSTRUCTIONS
1. Increase metoprolol succinate to 75 mg daily - take a dose of 25 mg when you get home today. 2. Increase lasix to 40 mg twice ( 6 hours apart from one another, approximately 9/10 AM and 3 PM) for the next two days. 3. Take an extra dose of potassium for the next two days as well    4. Start spironolactone 25 mg daily     5. Go to HF infusion clinic at [address] [place] [address] within one week - will check blood work at this time as well (BMP, BNP and iron studies). [phone]    6. Referral for sleep apnea     7. Weigh yourself daily    -No fluid restriction     -Diet should sodium restricted to 2 grams    -Again watch your daily weight trends and if you gain water weight please follow below instructions.    -If you gain 3-5 pounds in 2-3 days OR notice that you are retaining fluid in anyway just like you did before then take an extra dose of your water pill (furosemide/Lasix) every day until you lose the weight or feel better.    -If you notice that you have taken more than 3 extra doses in 1 week then please call and let us know. -If at any time you feel that you are retaining fluid, your medications are not working, or you feel ill in anyway, then please call us for either same day appointment or the next day, and for instructions. Our goal is to keep you out of the emergency room and the hospital and we have ways to do it. You just need to call us in a timely manner.     -If you become sick for other reasons, and notice that you are not urinating as much, the urine is very dark, you have significant diarrhea or vomiting, then please DO NOT take your water pill and CALL US immediately. 8. Return visit in two weeks    9. Return visit with Dr. Huber Chavez in 2 months.

## 2020-02-07 ENCOUNTER — TELEPHONE (OUTPATIENT)
Dept: SLEEP CENTER | Age: 85
End: 2020-02-07

## 2020-02-14 ENCOUNTER — TELEPHONE (OUTPATIENT)
Dept: FAMILY MEDICINE CLINIC | Age: 85
End: 2020-02-14

## 2020-02-18 ENCOUNTER — TELEPHONE (OUTPATIENT)
Dept: SLEEP CENTER | Age: 85
End: 2020-02-18

## 2020-02-18 ENCOUNTER — TELEPHONE (OUTPATIENT)
Dept: PHARMACY | Facility: CLINIC | Age: 85
End: 2020-02-18

## 2020-02-20 ENCOUNTER — ANTI-COAG VISIT (OUTPATIENT)
Dept: FAMILY MEDICINE CLINIC | Age: 85
End: 2020-02-20

## 2020-02-20 ENCOUNTER — NURSE ONLY (OUTPATIENT)
Dept: FAMILY MEDICINE CLINIC | Age: 85
End: 2020-02-20
Payer: MEDICARE

## 2020-02-20 ENCOUNTER — HOSPITAL ENCOUNTER (OUTPATIENT)
Age: 85
Discharge: HOME | End: 2020-02-22
Payer: MEDICARE

## 2020-02-20 LAB
ANION GAP SERPL CALCULATED.3IONS-SCNC: 17 MMOL/L (ref 7–16)
BUN BLDV-MCNC: 32 MG/DL (ref 8–23)
CALCIUM SERPL-MCNC: 10.4 MG/DL (ref 8.6–10.2)
CHLORIDE BLD-SCNC: 107 MMOL/L (ref 98–107)
CO2: 19 MMOL/L (ref 22–29)
CREAT SERPL-MCNC: 0.9 MG/DL (ref 0.5–1)
FERRITIN: 181 NG/ML
GFR AFRICAN AMERICAN: >60
GFR NON-AFRICAN AMERICAN: 60 ML/MIN/1.73
GLUCOSE BLD-MCNC: 104 MG/DL (ref 74–99)
INTERNATIONAL NORMALIZATION RATIO, POC: 2.5
IRON SATURATION: 23 % (ref 15–50)
IRON: 73 MCG/DL (ref 37–145)
POTASSIUM SERPL-SCNC: 4.8 MMOL/L (ref 3.5–5)
PRO-BNP: 1520 PG/ML (ref 0–450)
PROTHROMBIN TIME, POC: 30
SODIUM BLD-SCNC: 143 MMOL/L (ref 132–146)
TOTAL IRON BINDING CAPACITY: 323 MCG/DL (ref 250–450)
TRANSFERRIN: 264 MG/DL (ref 200–360)

## 2020-02-20 PROCEDURE — 83550 IRON BINDING TEST: CPT

## 2020-02-20 PROCEDURE — 83540 ASSAY OF IRON: CPT

## 2020-02-20 PROCEDURE — 36415 COLL VENOUS BLD VENIPUNCTURE: CPT

## 2020-02-20 PROCEDURE — 85610 PROTHROMBIN TIME: CPT | Performed by: FAMILY MEDICINE

## 2020-02-20 PROCEDURE — 84466 ASSAY OF TRANSFERRIN: CPT

## 2020-02-20 PROCEDURE — 80048 BASIC METABOLIC PNL TOTAL CA: CPT

## 2020-02-20 PROCEDURE — 83880 ASSAY OF NATRIURETIC PEPTIDE: CPT

## 2020-02-20 PROCEDURE — 93793 ANTICOAG MGMT PT WARFARIN: CPT | Performed by: FAMILY MEDICINE

## 2020-02-20 PROCEDURE — 82728 ASSAY OF FERRITIN: CPT

## 2020-02-20 PROCEDURE — 36415 COLL VENOUS BLD VENIPUNCTURE: CPT | Performed by: FAMILY MEDICINE

## 2020-02-20 NOTE — PROGRESS NOTES
Blood work drawn from left arm, tolerated well. Site clean band aid placed. INR obtained and encounter to Dr Ana Kirkland.

## 2020-02-24 ENCOUNTER — TELEPHONE (OUTPATIENT)
Dept: FAMILY MEDICINE CLINIC | Age: 85
End: 2020-02-24

## 2020-02-24 RX ORDER — FAMOTIDINE 20 MG/1
TABLET, FILM COATED ORAL
Qty: 90 TABLET | Refills: 3 | Status: SHIPPED
Start: 2020-02-24 | End: 2020-10-29 | Stop reason: SDUPTHER

## 2020-02-24 RX ORDER — LEVALBUTEROL INHALATION SOLUTION 0.63 MG/3ML
1 SOLUTION RESPIRATORY (INHALATION) EVERY 8 HOURS PRN
Qty: 864 ML | Refills: 3 | Status: SHIPPED
Start: 2020-02-24 | End: 2021-01-01 | Stop reason: SDUPTHER

## 2020-02-24 NOTE — TELEPHONE ENCOUNTER
Patient is requesting medication for  her nebulizer I do njot see anything on her active or Historical  list , please advise.

## 2020-02-25 ENCOUNTER — OFFICE VISIT (OUTPATIENT)
Dept: CARDIOLOGY CLINIC | Age: 85
End: 2020-02-25
Payer: MEDICARE

## 2020-02-25 VITALS
DIASTOLIC BLOOD PRESSURE: 50 MMHG | WEIGHT: 232.2 LBS | OXYGEN SATURATION: 95 % | BODY MASS INDEX: 37.32 KG/M2 | RESPIRATION RATE: 18 BRPM | HEART RATE: 80 BPM | HEIGHT: 66 IN | SYSTOLIC BLOOD PRESSURE: 90 MMHG

## 2020-02-25 PROCEDURE — 1090F PRES/ABSN URINE INCON ASSESS: CPT | Performed by: NURSE PRACTITIONER

## 2020-02-25 PROCEDURE — G8482 FLU IMMUNIZE ORDER/ADMIN: HCPCS | Performed by: NURSE PRACTITIONER

## 2020-02-25 PROCEDURE — G8427 DOCREV CUR MEDS BY ELIG CLIN: HCPCS | Performed by: NURSE PRACTITIONER

## 2020-02-25 PROCEDURE — G8417 CALC BMI ABV UP PARAM F/U: HCPCS | Performed by: NURSE PRACTITIONER

## 2020-02-25 PROCEDURE — 1123F ACP DISCUSS/DSCN MKR DOCD: CPT | Performed by: NURSE PRACTITIONER

## 2020-02-25 PROCEDURE — G8926 SPIRO NO PERF OR DOC: HCPCS | Performed by: NURSE PRACTITIONER

## 2020-02-25 PROCEDURE — 1036F TOBACCO NON-USER: CPT | Performed by: NURSE PRACTITIONER

## 2020-02-25 PROCEDURE — 99213 OFFICE O/P EST LOW 20 MIN: CPT | Performed by: NURSE PRACTITIONER

## 2020-02-25 PROCEDURE — G8399 PT W/DXA RESULTS DOCUMENT: HCPCS | Performed by: NURSE PRACTITIONER

## 2020-02-25 PROCEDURE — 4040F PNEUMOC VAC/ADMIN/RCVD: CPT | Performed by: NURSE PRACTITIONER

## 2020-02-25 PROCEDURE — 93000 ELECTROCARDIOGRAM COMPLETE: CPT | Performed by: INTERNAL MEDICINE

## 2020-02-25 PROCEDURE — 3023F SPIROM DOC REV: CPT | Performed by: NURSE PRACTITIONER

## 2020-02-25 RX ORDER — POTASSIUM CHLORIDE 20 MEQ/1
20 TABLET, EXTENDED RELEASE ORAL DAILY PRN
Qty: 90 TABLET | Refills: 3 | Status: SHIPPED | OUTPATIENT
Start: 2020-02-25 | End: 2020-05-04 | Stop reason: SDUPTHER

## 2020-02-25 RX ORDER — GUAIFENESIN 600 MG/1
1200 TABLET, EXTENDED RELEASE ORAL DAILY PRN
COMMUNITY
End: 2021-01-01

## 2020-02-25 NOTE — PATIENT INSTRUCTIONS
1. Stop taking potassium supplement every day - use only as needed if you take an extra dose of your water pill fro weight gain, shortness of breath or swelling. 2. Get blood work in the next 7 - 10 days ( BMP/BNP)    3. Rescheduled CHF clinic appointment    4. Reschedule sleep study    5. Weigh yourself daily    -Stay Hydrated    -Diet should sodium restricted to 2 grams    -Again watch your daily weight trends and if you gain water weight please follow below instructions.    -If you gain 3-5 pounds in 2-3 days OR notice that you are retaining fluid in anyway just like you did before then take an extra dose of your water pill (furosemide/Lasix) every day until you lose the weight or feel better.     -If you notice that you have taken more than 3 extra doses in 1 week then please call and let us know. -If at any time you feel that you are retaining fluid, your medications are not working, or you feel ill in anyway, then please call us for either same day appointment or the next day, and for instructions. Our goal is to keep you out of the emergency room and the hospital and we have ways to do it. You just need to call us in a timely manner.     -If you become sick for other reasons, and notice that you are not urinating as much, the urine is very dark, you have significant diarrhea or vomiting, then please DO NOT take your water pill and CALL US immediately. 6. Return visit with Dr. Pleas Heimlich as scheduled - sooner if needed.

## 2020-02-25 NOTE — PROGRESS NOTES
Advanced Heart Failure and Pulmonary Hypertension Clinic  Office Visit         Reason for Visit: Heart failure    Primary Cardiologist: Dr. Sandy Alcazar        History of Present Illness:     Ms. Leslie Hurtado is a 81year old  female with a PMHX of HFpEF (recent EF 45%), chronic atrial fibrillation, RV dysfunction, likely untreated RICARDA, hypertension, PFO, HLD, obesity. She presents today in close follow-up due to high risk for readmission, at her last office appointment her Lasix was doubled for several days and her Toprol was dose adjusted. Since her last office appointment she developed URI symptoms prompting presentation to local urgent care, there she was treated with steroids and antibiotics. Due to acute illness she unfortunately had to cancel her sleep study as well as HF infusion clinic evaluation. She did note excellent urinary response to oral Lasix and has lost 12 pounds since her last office visit. She does continue to have some dyspnea on exertion, shortness of breath however in the setting of ongoing cough and congestion. She also has body aches since the development of the above symptoms. However from a heart failure standpoint has improved since her last office appointment. She denies dyspnea with exertion, shortness of breath, or decline in overall functional capacity. She denies orthopnea, PND, nocturnal cough or hemoptysis. She denies abdominal distention or bloating, early satiety, anorexia/change in appetite, unintentional weight loss. She does lower extremity edema. She denies exertional lightheadedness. She denies palpitations, syncope or near syncope. Review of systems is negative for chest pain, pressure, discomfort. When ambulating on an incline, She does not leg claudication. History is negative for neurological symptoms including transient loss of vision, asymmetric weakness, aphasia, dysphasia, numbness, tingling.      Past medical, surgical, family and social pulmonic regurgitation  B. Lexiscan stress test March 18, 2014: EF 30% no evidence of stress-induced ischemia   C. Cardiac catheterization 2001-minimal plaque (full report not available)  D. March 22, 2014 normal coronaries with EF 30% (full report not available)  E. Echo March 18, 2014: Left ventricular size is grossly normal.Severe global hypokinesis, EF estimated about 25-30%. Left atrium is moderately enlarged. Left to right interatrial shunt suggestive of possible patent foramen ovale (PFO). No mitral valve prolapse. Mild mitral regurgitation. There is mild tricuspid regurgitation. Mild pulmonary HTN. No evidence of pericardial effusion. Pericardium appears normal.  E. Echo July 10, 2014: EF 50% and mild left atrial enlargement and mild mitral regurgitation and mild-to-moderate tricuspid regurgitation  F. Echo April 29, 2016: EF 55% and indeterminate diastolic dysfunction with mild mitral regurgitation and mild tricuspid regurgitation and mild aortic regurgitation and mild pulmonic right regurgitation  G. Echo November 30, 2018: Left ventricular size is grossly normal.  Borderline global hypokinesis, EF 50%.  Indeterminate diastolic function.  Left atrium is enlarged.  Increased LA volume index.  Interatrial septum not well visualized but appears intact.  Normal right ventricle structure and function.  Right atrium is Enlarged.  There is mild mitral regurgitation. No mitral valve prolapse. Mild aortic valve sclerosis. There is trace aortic regurgitation. There is mild to moderate tricuspid regurgitation.  Moderate pulmonary hypertension, RVSP 56mmHg.  Normal aortic root size. No evidence of pericardial effusion.  The inferior vena  cava is dilated with reduced respiratory variation.  No intracardiac mass. Compared to prior study from 4/2016 - TR is more. H. 2D echocardiogram September 2019: Summary: Ejection fraction is visually estimated at 45%. Overall ejection fraction mildly decreased.  Mild left ventricular concentric hypertrophy noted. Normal right ventricle size with reduced function. TAPSE is 1.4 cm. Left atrial volume index of 57 ml per meters squared BSA.nMild aortic regurgitation is noted. Mild mitral regurgitation is present. Moderate tricuspid regurgitation. Pulmonary hypertension is moderate. RVSP is 54 mmHg. Patent foramen ovale with left-to-right shunt was visualized by color Doppler. 4. Chronic atrial fibrillation  5. Chronic Coumadin therapy         PAST MEDICAL/SURGICAL HISTORY:  1. DJD/Osteoarthritis   2. Basal cell carcinoma of neck  3. Depression  4. GERD  5. Hypothyroidism s/p thyroidectomy   6. Hyperparathyroidism s/p parathyroidectomy   7. History of kidney stones   8. Urinary incontinence   9. S/p bladder resuspension  10. S/p bilateral cataract surgery  11. S/p cholecystectomy  12. S/p removal of cyst from right index finger  13. S/p left shoulder replacement   14. History of vertigo on chronic meclizine therapy. 15. Vitamin D deficiency.       Past Medical History:   Diagnosis Date    Anticoagulated on Coumadin     on Coumadin for this Dr. Marvin Long controls    Atrial fibrillation ([address]     Basal cell carcinoma of neck     CAD (coronary artery disease)     History of luminal irregularities of the coronary artery, stable.     CHF (congestive heart failure) ([facility])     stage I diastolic dysfunction on 0/95/52    Depression 11/16/2010    DJD (degenerative joint disease)     SEVERE IN RIGHT HAND, IN MULTIPLE OTHER JOINTS    GERD (gastroesophageal reflux disease) 11/16/2010    History of cardiovascular stress test 03/18/2014    lexiscan    Hyperparathyroidism ([address] 11/16/2010    Had parathyroidectomy    Hypertension 11/16/2010    Hypothyroidism 11/16/2010    Mild mitral regurgitation 7/10/14    Osteoarthritis 11/16/2010    Pulmonary hypertension ([address] 7/10/14    mild    Renal calculi     x3    Tricuspid regurgitation 7/10/14    mild-moderate    Urinary incontinence 11/16/2010 Past Surgical History:   Procedure Laterality Date    CARDIAC CATHETERIZATION  3/22/14    CARPAL TUNNEL RELEASE      Right hand.  CATARACT REMOVAL  6/2005    right, bilateral cataract surgery.  CATARACT REMOVAL WITH IMPLANT Bilateral     CHOLECYSTECTOMY  1972    COLONOSCOPY      FINGER SURGERY  7/2011    S/P removal of cyst from right index finger.  FRACTURE SURGERY Left     arm fractured-casted, Bilateral feet fractures-casted.  INCONTINENCE SURGERY  5/2005    Urinary incontinence, S/P bladder suspension.  INNER EAR SURGERY  2011    tube place in right ear   [address]    KIDNEY STONE SURGERY  1981    KNEE ARTHROPLASTY Bilateral 9/2005    PARATHYROIDECTOMY      Three times    SHOULDER ARTHROPLASTY Left 2009    SHOULDER ARTHROPLASTY Left      shoulder total replacement.     THYROID SURGERY  5/2005    recurrent    THYROIDECTOMY  1960       Allergies   Allergen Reactions    Codeine Other (See Comments)     Hallucination    Penicillins Hives    Vicodin [Hydrocodone-Acetaminophen] Other (See Comments)     Hallucination      Adhesive Tape Rash         Outpatient Medications Marked as Taking for the 2/25/20 encounter (Office Visit) with ARIEL Millan CNP   Medication Sig Dispense Refill    metoprolol succinate (TOPROL XL) 50 MG extended release tablet Take 1.5 tablets by mouth daily 30 tablet 0    spironolactone (ALDACTONE) 25 MG tablet Take 1 tablet by mouth daily 90 tablet 1    escitalopram (LEXAPRO) 5 MG tablet TK 1 T PO QD 30 tablet 5    warfarin (COUMADIN) 4 MG tablet Take 2 tablets by mouth daily (Patient taking differently: Take 8 mg by mouth daily Controlled by PCP) 60 tablet 5    furosemide (LASIX) 40 MG tablet Take 1 tablet by mouth daily 60 tablet 3    Ascorbic Acid (VITAMIN C) 250 MG tablet Take 250 mg by mouth daily      PROAIR  (90 Base) MCG/ACT inhaler USE 2 PUFFS EVERY 6 HOURS  AS NEEDED FOR WHEEZING 34 g 2    lisinopril (PRINIVIL;ZESTRIL) 10 MG tablet TAKE 1 TABLET BY MOUTH  DAILY 90 tablet 3    acetaminophen (TYLENOL) 325 MG tablet Take 650 mg by mouth every 6 hours as needed for Pain or Fever      levothyroxine (SYNTHROID) 125 MCG tablet TAKE 1 TABLET BY MOUTH  EVERY DAY 90 tablet 1    potassium chloride (KLOR-CON M) 20 MEQ extended release tablet TAKE 1 TABLET BY MOUTH  EVERY DAY 90 tablet 3       Review of Systems:   Cardiac: As per HPI  General: No fever, chills, rigors  Pulmonary: As per HPI  HEENT: No visual disturbances, difficult swallowing  GI: No nausea, vomiting, abdominal pain  : No dysuria or hematuria  Endocrine: No thyroid disease or diabetes  Musculoskeletal: [place] x 4, no focal motor deficits  Skin: Intact, no rashes  Neuro/Psych: No headache or seizures      Weights: Wt Readings from Last 3 Encounters:   02/25/20 232 lb 3.2 oz (105.3 kg)   01/28/20 244 lb 9.6 oz (110.9 kg)   01/20/20 246 lb (111.6 kg)         Physical Examination:     BP (!) 90/50   Pulse 80   Resp 18   Ht 5' 6\" (1.676 m)   Wt 232 lb 3.2 oz (105.3 kg)   SpO2 95%   BMI 37.48 kg/m²     CONSTITUTIONAL: Alert and oriented times 3, no acute distress and cooperative to examination with proper mood and affect. SKIN: Skin color, texture, turgor normal. No rashes or lesions. LYMPH: no cervical nodes, no inguinal nodes  HEENT: Head is normocephalic, atraumatic. EOMI, PERRLA. NECK: Supple, symmetrical, trachea midline, no adenopathy, thyroid symmetric, not enlarged and no tenderness, skin normal.  CHEST/LUNGS: chest symmetric with normal A/P diameter, normal respiratory rate and rhythm, lungs clear to auscultation without wheezes, rales or rhonchi. No accessory muscle use. Scars None   CARDIOVASCULAR: Heart sounds are normal.  Regular rate and rhythm without murmur, gallop or rub. Normal S1 and S2. . Carotid and femoral pulses 2+/4 and equal bilaterally. ABDOMEN: Normal shape. No and Laparoscopic scar(s) present. Normal bowel sounds.   No

## 2020-02-28 ENCOUNTER — TELEPHONE (OUTPATIENT)
Dept: OTHER | Age: 85
End: 2020-02-28

## 2020-02-28 NOTE — TELEPHONE ENCOUNTER
Pt called in to reschedule CHF appt. Last appt canceled due to hospitalization. appt scheduled for march 10th at 10am.  Pt states she feels \"pretty good\". She denies any shortness of breath or edema. She reports that her weight has been stable as well. I instructed her to call the clinic back if a sooner appt is needed.

## 2020-03-02 ENCOUNTER — TELEPHONE (OUTPATIENT)
Dept: SLEEP CENTER | Age: 85
End: 2020-03-02

## 2020-03-10 ENCOUNTER — HOSPITAL ENCOUNTER (OUTPATIENT)
Dept: SLEEP CENTER | Age: 85
Discharge: HOME | End: 2020-03-10
Payer: MEDICARE

## 2020-03-10 ENCOUNTER — HOSPITAL ENCOUNTER (OUTPATIENT)
Dept: OTHER | Age: 85
Setting detail: THERAPIES SERIES
Discharge: HOME | End: 2020-03-10
Payer: MEDICARE

## 2020-03-10 VITALS
BODY MASS INDEX: 37.77 KG/M2 | RESPIRATION RATE: 18 BRPM | OXYGEN SATURATION: 94 % | DIASTOLIC BLOOD PRESSURE: 68 MMHG | HEIGHT: 66 IN | SYSTOLIC BLOOD PRESSURE: 108 MMHG | WEIGHT: 235 LBS | HEART RATE: 92 BPM

## 2020-03-10 LAB
ANION GAP SERPL CALCULATED.3IONS-SCNC: 14 MMOL/L (ref 7–16)
BUN BLDV-MCNC: 36 MG/DL (ref 8–23)
CALCIUM SERPL-MCNC: 10.5 MG/DL (ref 8.6–10.2)
CHLORIDE BLD-SCNC: 102 MMOL/L (ref 98–107)
CO2: 24 MMOL/L (ref 22–29)
CREAT SERPL-MCNC: 0.9 MG/DL (ref 0.5–1)
GFR AFRICAN AMERICAN: >60
GFR NON-AFRICAN AMERICAN: 60 ML/MIN/1.73
GLUCOSE BLD-MCNC: 117 MG/DL (ref 74–99)
POTASSIUM SERPL-SCNC: 4 MMOL/L (ref 3.5–5)
PRO-BNP: 1266 PG/ML (ref 0–450)
SODIUM BLD-SCNC: 140 MMOL/L (ref 132–146)

## 2020-03-10 PROCEDURE — 95810 POLYSOM 6/> YRS 4/> PARAM: CPT

## 2020-03-10 PROCEDURE — 83880 ASSAY OF NATRIURETIC PEPTIDE: CPT

## 2020-03-10 PROCEDURE — 36415 COLL VENOUS BLD VENIPUNCTURE: CPT

## 2020-03-10 PROCEDURE — 99214 OFFICE O/P EST MOD 30 MIN: CPT

## 2020-03-10 PROCEDURE — 95810 POLYSOM 6/> YRS 4/> PARAM: CPT | Performed by: INTERNAL MEDICINE

## 2020-03-10 PROCEDURE — 80048 BASIC METABOLIC PNL TOTAL CA: CPT

## 2020-03-10 ASSESSMENT — EJECTION FRACTION
EF_VALUE: 45
EF_SOURCE: 2D ECHO

## 2020-03-10 NOTE — PLAN OF CARE
Problem:  Activity:  Goal: Capacity to carry out activities will improve  Description: Capacity to carry out activities will improve  Outcome: Ongoing  Goal: Will verbalize the importance of balancing activity with adequate rest periods  Description: Will verbalize the importance of balancing activity with adequate rest periods  Outcome: Ongoing     Problem: Cardiac:  Goal: Hemodynamic stability will improve  Description: Hemodynamic stability will improve  Outcome: Ongoing  Goal: Ability to maintain an adequate cardiac output will improve  Description: Ability to maintain an adequate cardiac output will improve  Outcome: Ongoing     Problem: Coping:  Goal: Verbalizations of decreased anxiety will decrease  Description: Verbalizations of decreased anxiety will decrease  Outcome: Ongoing     Problem: Fluid Volume:  Goal: Risk for excess fluid volume will decrease  Description: Risk for excess fluid volume will decrease  Outcome: Ongoing  Goal: Maintenance of adequate hydration will improve  Description: Maintenance of adequate hydration will improve  Outcome: Ongoing  Goal: Will show no signs and symptoms of electrolyte imbalance  Description: Will show no signs and symptoms of electrolyte imbalance  Outcome: Ongoing     Problem: Health Behavior:  Goal: Ability to manage health-related needs will improve  Description: Ability to manage health-related needs will improve  Outcome: Ongoing     Problem: Nutritional:  Goal: Maintenance of adequate nutrition will improve  Description: Maintenance of adequate nutrition will improve  Outcome: Ongoing     Problem: Physical Regulation:  Goal: Complications related to the disease process, condition or treatment will be avoided or minimized  Description: Complications related to the disease process, condition or treatment will be avoided or minimized  Outcome: Ongoing     Problem: Respiratory:  Goal: Ability to maintain adequate ventilation will improve  Description: Ability to

## 2020-03-10 NOTE — PROGRESS NOTES
tablet Take 1.5 tablets by mouth daily 1/28/20  Yes ARIEL Heath CNP   spironolactone (ALDACTONE) 25 MG tablet Take 1 tablet by mouth daily 1/28/20  Yes ARIEL Heath CNP   escitalopram (LEXAPRO) 5 MG tablet TK 1 T PO QD 1/20/20  Yes Cyndie Anders MD   warfarin (COUMADIN) 4 MG tablet Take 2 tablets by mouth daily  Patient taking differently: Take 8 mg by mouth daily Controlled by PCP 1/14/20  Yes Cyndie Anders MD   furosemide (LASIX) 40 MG tablet Take 1 tablet by mouth daily 12/22/19  Yes ARIEL Merritt CNP   Ascorbic Acid (VITAMIN C) 250 MG tablet Take 250 mg by mouth daily   Yes Historical Provider, MD   PROAIR  (90 Base) MCG/ACT inhaler USE 2 PUFFS EVERY 6 HOURS  AS NEEDED FOR WHEEZING 10/21/19  Yes Cyndie Anders MD   lisinopril (PRINIVIL;ZESTRIL) 10 MG tablet TAKE 1 TABLET BY MOUTH  DAILY 10/9/19 10/8/20 Yes Cyndie Anders MD   acetaminophen (TYLENOL) 325 MG tablet Take 650 mg by mouth every 6 hours as needed for Pain or Fever   Yes Historical Provider, MD   levothyroxine (SYNTHROID) 125 MCG tablet TAKE 1 TABLET BY MOUTH  EVERY DAY 8/20/19  Yes Cyndie Anders MD        /68   Pulse 92   Resp 18   Ht 5' 6\" (1.676 m)   Wt 235 lb (106.6 kg)   SpO2 94%   BMI 37.93 kg/m²   Wt Readings from Last 5 Encounters:   03/10/20 235 lb (106.6 kg)   02/25/20 232 lb 3.2 oz (105.3 kg)   01/28/20 244 lb 9.6 oz (110.9 kg)   01/20/20 246 lb (111.6 kg)   01/06/20 246 lb (111.6 kg)       No results found.     Lab Results   Component Value Date     02/20/2020    K 4.8 02/20/2020     02/20/2020    CO2 19 (L) 02/20/2020    CREATININE 0.9 02/20/2020    BUN 32 (H) 02/20/2020    PROT 7.1 01/20/2020    INR 2.5 02/20/2020    HCT 38.4 01/20/2020    HGB 11.8 01/20/2020    MG 2.4 12/21/2019     Lab Results   Component Value Date    LABALBU 4.2 01/20/2020    BILITOT 0.7 01/20/2020    ALKPHOS 83 01/20/2020    AST 22 01/20/2020    ALT 11 01/20/2020    BNP 1,607 (H) 03/17/2014    TSH

## 2020-03-12 ENCOUNTER — ANTI-COAG VISIT (OUTPATIENT)
Dept: FAMILY MEDICINE CLINIC | Age: 85
End: 2020-03-12

## 2020-03-12 ENCOUNTER — NURSE ONLY (OUTPATIENT)
Dept: FAMILY MEDICINE CLINIC | Age: 85
End: 2020-03-12
Payer: MEDICARE

## 2020-03-12 LAB
INTERNATIONAL NORMALIZATION RATIO, POC: 4.1
PROTHROMBIN TIME, POC: 4.9

## 2020-03-12 PROCEDURE — 93793 ANTICOAG MGMT PT WARFARIN: CPT | Performed by: FAMILY MEDICINE

## 2020-03-12 PROCEDURE — 85610 PROTHROMBIN TIME: CPT | Performed by: FAMILY MEDICINE

## 2020-03-16 ENCOUNTER — TELEPHONE (OUTPATIENT)
Dept: FAMILY MEDICINE CLINIC | Age: 85
End: 2020-03-16

## 2020-03-16 ENCOUNTER — TELEPHONE (OUTPATIENT)
Dept: OTHER | Age: 85
End: 2020-03-16

## 2020-03-16 NOTE — TELEPHONE ENCOUNTER
Spoke with patient, she will go to [facility] for her INR, please place order. SHe has already been rescheduled for May 14,2020.

## 2020-03-16 NOTE — TELEPHONE ENCOUNTER
I left message asking that she reschedule her upcoming appointment. She should still obtain her INR at a lab, and call if she is ill. I recommend rescheduling for 4-6 weeks from now.

## 2020-03-17 NOTE — PROGRESS NOTES
Letter mailed regarding sleep study results and further cardiac needs to be addressed at upcoming DR Pavel Vazquez apt.

## 2020-03-17 NOTE — PROGRESS NOTES
Sleep study reviewed  During testing one week ago patient was in afib with RVR. How is she feeling? Taking all of her medications as directed? Has appointment with Dr. Gamaliel Bourgeois at the end of the month, if has any worsening symptoms or signs of heart failure please let the office know. Thank you.

## 2020-03-17 NOTE — PROGRESS NOTES
[address] [place] [address]                               SLEEP STUDY REPORT    PATIENT NAME: Erma Gregory                  :        1935  MED REC NO:   [id number]                            ROOM:  ACCOUNT NO:   [de-identified]                           ADMIT DATE: 03/10/2020  PROVIDER:     Robles Friedman MD    DATE OF STUDY:  03/10/2020    FULL-NIGHT POLYSOMNOGRAM REPORT    LOCATION:  [place] [address]. REFERRING PROVIDER:  ARIEL Haro. AGE:  81 years. SEX:  Female. HEIGHT:  5 feet 6 inches. WEIGHT:  244 pounds. BMI:  39.4 kg/m2. NECK CIRCUMFERENCE:  14 inches. SYMPTOMS:  Excessive daytime sleepiness, difficulty with work due to  sleepiness, nocturnal choking/gasping, nocturia, and restless legs. The  [place] Sleepiness Scale was 10/24 (scores above or equal to 10 are  suggestive of hypersomnolence). INDICATIONS:  Suspected obstructive sleep apnea. MEDICAL HISTORY: HFpEF, RV dysfunction, moderate pulmonary  hypertension, chronic atrial fibrillation, hypertension, hyperlipidemia,  COPD, chronic pain, and GERD. MEDICATIONS:  Vitamin D, ProAir, vitamin B complex, Lexapro, Pepcid,  Lasix, Xopenex, Synthroid, lisinopril, meclizine, and metoprolol. DESCRIPTION:  This full-night polysomnogram consisted of EEG, EOG, EMG,  and two-lead ECG monitoring. Oronasal airflow (nasal pressure  transducer and thermistor), chest and abdominal efforts by respiratory  inductive plethysmography or polyvinylidene fluoride sensor, and pulse  oximetry were monitored as well. Hypopneas were scored as at least a  30% reduction in amplitude of the semiquantitative flow signal,  associated with a 4% or greater oxygen desaturation.   Respiratory  effort-related arousals (RERAs) were scored as at least a 30% reduction  in amplitude of the semiquantitative flow signal, associated with an EEG  microarousal.    FINDINGS:  SLEEP CONTINUITY AND SLEEP ARCHITECTURE:  Lights were turned off at  10:16 p.m. and lights were turned on at 4:46 a.m. Total recording time  was 390 minutes and total sleep time was 276 minutes. Sleep efficiency  was mildly reduced at 71%. Sleep onset latency was slightly increased  at 34 minutes and REM latency was increased to 149 minutes. There were  19 awakenings during the study. The duration of wakefulness after sleep  onset was 81 minutes. The amount of N1 sleep was 9% of total sleep time  or 26 minutes. The amount of N2 sleep was 59% of total sleep time or  163 minutes. REM sleep was 14% of total sleep time or 39 minutes. Slow-wave sleep was 18% of total sleep time or 49 minutes. The  microarousal index was slightly increased at 14; arousals are both  spontaneous in nature and related to respiratory events. RESPIRATORY MONITORING:  The study documented 5 obstructive apneas, 0  central apnea, 0 mixed apneas, 10 hypopneas, and 20 respiratory  effort-related arousals (RERAs) during the total recorded sleep time. The overall apnea-hypopnea index (AHI) was 3.3. The overall respiratory  disturbance index (RDI includes RERAs) was 9.4. The non-REM RDI was 9.6  and the REM RDI was 7.8. The patient slept in the supine position for  59% of total sleep time and the supine RDI was 6. The patient slept in  the left lateral position for 32% of total sleep time, and the left  lateral RDI was 14. The patient slept in the right lateral position for  80% of the total sleep time, and right lateral RDI was 13.6. The  average oxyhemoglobin saturation was 92% while awake and 93% while  asleep. The overall 4% oxygen desaturation index during sleep was 2.6. The lowest oxygen saturation during sleep was 88%, very briefly. Oxygen  saturations were less than or equal to 88% for less than 1 minute of the  total sleep time. Snoring was moderate-to-loud.     EEG:  No abnormal epileptiform activity was observed. ECG:  The electrocardiogram documented atrial fibrillation with rapid  ventricular response. The average heart rate during sleep was 103 beats  per minute. PERIODIC LIMB MOVEMENTS:  Rare periodic limb movements were noted. EMG/VIDEO MONITORING:  Loss of REM atonia, bruxism, and parasomnias were  not observed. IMPRESSION:  1. This study does not show evidence of significant sleep-disordered  breathing despite achieving adequate amounts of both supine and REM  sleep. 2.  Atrial fibrillation with rapid ventricular response was noted on ECG  monitoring. RECOMMENDATIONS:  1. Further evaluation of this patient's sleep complaints may be  considered, as this study does not show evidence of significant  sleep-disordered breathing. 2.  Further evaluation of the atrial fibrillation with rapid ventricular  response noted on this study may be indicated, as per the referring  provider. 3.  The patient should be counseled against driving while drowsy. 4.  The patient should be aware that weight gain may increase the risk  for obstructive sleep apnea.         Brianda Tovar MD    D: 03/16/2020 17:57:23       T: 03/17/2020 2:30:04     [email or web address]  Job#: [id number]     Doc#: [id number]    CC:

## 2020-03-25 ENCOUNTER — TELEPHONE (OUTPATIENT)
Dept: ADMINISTRATIVE | Age: 85
End: 2020-03-25

## 2020-03-25 NOTE — TELEPHONE ENCOUNTER
That medicine is for anxiety and depression. Started with 5 mg in January. I would continue her on that for now. She should take one daily.

## 2020-03-25 NOTE — TELEPHONE ENCOUNTER
Phoned patient she will start taking the medication   She will cut the medication in half and take a half QD  Will  the 5 mg at pharmacy.

## 2020-04-17 ENCOUNTER — TELEPHONE (OUTPATIENT)
Dept: OTHER | Age: 85
End: 2020-04-17

## 2020-04-17 NOTE — TELEPHONE ENCOUNTER
Spoke with patient for an update on how she is feeling since CHF clinic is temporarily closed due to current state of emergency. Per patient she is doing well. She denies any s/s of heart failure. She reports medication and dietary compliance. I instructed pt that once the clinic reopens we will call her to reschedule any missed appts and she stated understanding.

## 2020-04-24 ENCOUNTER — NURSE ONLY (OUTPATIENT)
Dept: FAMILY MEDICINE CLINIC | Age: 85
End: 2020-04-24
Payer: MEDICARE

## 2020-04-24 ENCOUNTER — ANTI-COAG VISIT (OUTPATIENT)
Dept: FAMILY MEDICINE CLINIC | Age: 85
End: 2020-04-24

## 2020-04-24 LAB
INTERNATIONAL NORMALIZATION RATIO, POC: 2.3
PROTHROMBIN TIME, POC: 27.6

## 2020-04-24 PROCEDURE — 93793 ANTICOAG MGMT PT WARFARIN: CPT | Performed by: FAMILY MEDICINE

## 2020-04-24 PROCEDURE — 85610 PROTHROMBIN TIME: CPT | Performed by: FAMILY MEDICINE

## 2020-04-24 RX ORDER — MECLIZINE HYDROCHLORIDE 25 MG/1
25 TABLET ORAL 3 TIMES DAILY PRN
Qty: 100 TABLET | Refills: 5 | Status: SHIPPED | OUTPATIENT
Start: 2020-04-24 | End: 2020-10-27 | Stop reason: SDUPTHER

## 2020-05-04 ENCOUNTER — VIRTUAL VISIT (OUTPATIENT)
Dept: FAMILY MEDICINE CLINIC | Age: 85
End: 2020-05-04
Payer: MEDICARE

## 2020-05-04 PROCEDURE — 99442 PR PHYS/QHP TELEPHONE EVALUATION 11-20 MIN: CPT | Performed by: FAMILY MEDICINE

## 2020-05-04 RX ORDER — POTASSIUM CHLORIDE 20 MEQ/1
20 TABLET, EXTENDED RELEASE ORAL DAILY PRN
Qty: 90 TABLET | Refills: 3 | Status: SHIPPED
Start: 2020-05-04 | End: 2020-06-22

## 2020-05-04 RX ORDER — SPIRONOLACTONE 25 MG/1
25 TABLET ORAL DAILY
Qty: 90 TABLET | Refills: 1 | Status: ON HOLD
Start: 2020-05-04 | End: 2020-05-11

## 2020-05-04 RX ORDER — WARFARIN SODIUM 4 MG/1
TABLET ORAL
Qty: 60 TABLET | Refills: 5 | Status: SHIPPED
Start: 2020-05-04 | End: 2020-09-30 | Stop reason: SDUPTHER

## 2020-05-04 RX ORDER — METOPROLOL SUCCINATE 50 MG/1
75 TABLET, EXTENDED RELEASE ORAL DAILY
Qty: 30 TABLET | Refills: 0 | Status: SHIPPED
Start: 2020-05-04 | End: 2020-06-23 | Stop reason: SDUPTHER

## 2020-05-04 RX ORDER — LISINOPRIL 10 MG/1
10 TABLET ORAL DAILY
Qty: 90 TABLET | Refills: 3 | Status: SHIPPED | OUTPATIENT
Start: 2020-05-04 | End: 2020-10-27 | Stop reason: SDUPTHER

## 2020-05-04 RX ORDER — FUROSEMIDE 40 MG/1
40 TABLET ORAL DAILY
Qty: 60 TABLET | Refills: 3 | Status: SHIPPED
Start: 2020-05-04 | End: 2020-09-14 | Stop reason: SDUPTHER

## 2020-05-06 ENCOUNTER — TELEPHONE (OUTPATIENT)
Dept: CARDIOLOGY CLINIC | Age: 85
End: 2020-05-06

## 2020-05-06 NOTE — TELEPHONE ENCOUNTER
We see that you have an appointment scheduled, given the current COVID-19 pandemic, Dr. Wali Tate would like to change that visit to a video visit. In order to schedule this appointment you will need a camera-enabled device (smart phone, tablet or computer) and reliable WiFi. Do you wish to schedule this appointment? Yes    We want to confirm that, for purposes of billing, this is a virtual visit with your provider for which we will submit a claim for reimbursement with your insurance company. You will be responsible for any copays, coinsurance amounts or other amounts not covered by your insurance company. If you do not accept this, unfortunately we will not be able to schedule a virtual visit with the provider. Do you accept?   yes

## 2020-05-10 ENCOUNTER — APPOINTMENT (OUTPATIENT)
Dept: GENERAL RADIOLOGY | Age: 85
End: 2020-05-10
Payer: MEDICARE

## 2020-05-10 ENCOUNTER — HOSPITAL ENCOUNTER (OUTPATIENT)
Age: 85
Setting detail: OBSERVATION
Discharge: HOME | End: 2020-05-12
Attending: EMERGENCY MEDICINE | Admitting: FAMILY MEDICINE
Payer: MEDICARE

## 2020-05-10 LAB
ANION GAP SERPL CALCULATED.3IONS-SCNC: 11 MMOL/L (ref 7–16)
BACTERIA: ABNORMAL /HPF
BASOPHILS ABSOLUTE: 0.09 E9/L (ref 0–0.2)
BASOPHILS RELATIVE PERCENT: 1.7 % (ref 0–2)
BILIRUBIN URINE: NEGATIVE
BLOOD, URINE: ABNORMAL
BUN BLDV-MCNC: 20 MG/DL (ref 8–23)
CALCIUM SERPL-MCNC: 10.4 MG/DL (ref 8.6–10.2)
CHLORIDE BLD-SCNC: 106 MMOL/L (ref 98–107)
CLARITY: CLEAR
CO2: 29 MMOL/L (ref 22–29)
COLOR: YELLOW
CREAT SERPL-MCNC: 0.9 MG/DL (ref 0.5–1)
EOSINOPHILS ABSOLUTE: 0.33 E9/L (ref 0.05–0.5)
EOSINOPHILS RELATIVE PERCENT: 6.3 % (ref 0–6)
GFR AFRICAN AMERICAN: >60
GFR NON-AFRICAN AMERICAN: 60 ML/MIN/1.73
GLUCOSE BLD-MCNC: 101 MG/DL (ref 74–99)
GLUCOSE URINE: NEGATIVE MG/DL
HCT VFR BLD CALC: 40.2 % (ref 34–48)
HEMOGLOBIN: 12.3 G/DL (ref 11.5–15.5)
IMMATURE GRANULOCYTES #: 0.02 E9/L
IMMATURE GRANULOCYTES %: 0.4 % (ref 0–5)
INR BLD: 2.8
KETONES, URINE: NEGATIVE MG/DL
LEUKOCYTE ESTERASE, URINE: ABNORMAL
LYMPHOCYTES ABSOLUTE: 0.95 E9/L (ref 1.5–4)
LYMPHOCYTES RELATIVE PERCENT: 18 % (ref 20–42)
MCH RBC QN AUTO: 29.1 PG (ref 26–35)
MCHC RBC AUTO-ENTMCNC: 30.6 % (ref 32–34.5)
MCV RBC AUTO: 95.3 FL (ref 80–99.9)
MONOCYTES ABSOLUTE: 0.62 E9/L (ref 0.1–0.95)
MONOCYTES RELATIVE PERCENT: 11.8 % (ref 2–12)
NEUTROPHILS ABSOLUTE: 3.26 E9/L (ref 1.8–7.3)
NEUTROPHILS RELATIVE PERCENT: 61.8 % (ref 43–80)
NITRITE, URINE: POSITIVE
PDW BLD-RTO: 16.1 FL (ref 11.5–15)
PH UA: 5.5 (ref 5–9)
PLATELET # BLD: 181 E9/L (ref 130–450)
PMV BLD AUTO: 10.5 FL (ref 7–12)
POTASSIUM REFLEX MAGNESIUM: 4 MMOL/L (ref 3.5–5)
PRO-BNP: 1954 PG/ML (ref 0–450)
PROTEIN UA: ABNORMAL MG/DL
PROTHROMBIN TIME: 31.7 SEC (ref 9.3–12.4)
RBC # BLD: 4.22 E12/L (ref 3.5–5.5)
RBC UA: ABNORMAL /HPF (ref 0–2)
SARS-COV-2, NAAT: NOT DETECTED
SODIUM BLD-SCNC: 146 MMOL/L (ref 132–146)
SPECIFIC GRAVITY UA: >=1.03 (ref 1–1.03)
TROPONIN: <0.01 NG/ML (ref 0–0.03)
UROBILINOGEN, URINE: 0.2 E.U./DL
WBC # BLD: 5.3 E9/L (ref 4.5–11.5)
WBC UA: ABNORMAL /HPF (ref 0–5)

## 2020-05-10 PROCEDURE — 80048 BASIC METABOLIC PNL TOTAL CA: CPT

## 2020-05-10 PROCEDURE — 94760 N-INVAS EAR/PLS OXIMETRY 1: CPT

## 2020-05-10 PROCEDURE — 71045 X-RAY EXAM CHEST 1 VIEW: CPT

## 2020-05-10 PROCEDURE — 87186 SC STD MICRODIL/AGAR DIL: CPT

## 2020-05-10 PROCEDURE — 83880 ASSAY OF NATRIURETIC PEPTIDE: CPT

## 2020-05-10 PROCEDURE — 99285 EMERGENCY DEPT VISIT HI MDM: CPT

## 2020-05-10 PROCEDURE — 93005 ELECTROCARDIOGRAM TRACING: CPT | Performed by: EMERGENCY MEDICINE

## 2020-05-10 PROCEDURE — U0002 COVID-19 LAB TEST NON-CDC: HCPCS

## 2020-05-10 PROCEDURE — 96375 TX/PRO/DX INJ NEW DRUG ADDON: CPT

## 2020-05-10 PROCEDURE — 96365 THER/PROPH/DIAG IV INF INIT: CPT

## 2020-05-10 PROCEDURE — 87088 URINE BACTERIA CULTURE: CPT

## 2020-05-10 PROCEDURE — 84484 ASSAY OF TROPONIN QUANT: CPT

## 2020-05-10 PROCEDURE — 6360000002 HC RX W HCPCS: Performed by: EMERGENCY MEDICINE

## 2020-05-10 PROCEDURE — 2580000003 HC RX 258: Performed by: EMERGENCY MEDICINE

## 2020-05-10 PROCEDURE — 85610 PROTHROMBIN TIME: CPT

## 2020-05-10 PROCEDURE — 81001 URINALYSIS AUTO W/SCOPE: CPT

## 2020-05-10 PROCEDURE — 85025 COMPLETE CBC W/AUTO DIFF WBC: CPT

## 2020-05-10 RX ORDER — IPRATROPIUM BROMIDE AND ALBUTEROL SULFATE 2.5; .5 MG/3ML; MG/3ML
1 SOLUTION RESPIRATORY (INHALATION)
Status: DISPENSED | OUTPATIENT
Start: 2020-05-10 | End: 2020-05-10

## 2020-05-10 RX ORDER — METHYLPREDNISOLONE SODIUM SUCCINATE 125 MG/2ML
125 INJECTION, POWDER, LYOPHILIZED, FOR SOLUTION INTRAMUSCULAR; INTRAVENOUS ONCE
Status: COMPLETED | OUTPATIENT
Start: 2020-05-10 | End: 2020-05-11

## 2020-05-10 RX ORDER — FUROSEMIDE 10 MG/ML
40 INJECTION INTRAMUSCULAR; INTRAVENOUS ONCE
Status: COMPLETED | OUTPATIENT
Start: 2020-05-10 | End: 2020-05-10

## 2020-05-10 RX ADMIN — CEFTRIAXONE SODIUM 1 G: 1 INJECTION, POWDER, FOR SOLUTION INTRAMUSCULAR; INTRAVENOUS at 22:57

## 2020-05-10 RX ADMIN — FUROSEMIDE 40 MG: 10 INJECTION, SOLUTION INTRAMUSCULAR; INTRAVENOUS at 22:56

## 2020-05-10 ASSESSMENT — ENCOUNTER SYMPTOMS
WHEEZING: 0
BACK PAIN: 0
SINUS PRESSURE: 0
DIARRHEA: 0
NAUSEA: 0
ABDOMINAL DISTENTION: 0
VOMITING: 0
SORE THROAT: 0
EYE DISCHARGE: 0
COUGH: 0
EYE PAIN: 0
SHORTNESS OF BREATH: 1
EYE REDNESS: 0

## 2020-05-11 ENCOUNTER — APPOINTMENT (OUTPATIENT)
Dept: CT IMAGING | Age: 85
End: 2020-05-11
Payer: MEDICARE

## 2020-05-11 PROBLEM — I50.9 ACUTE DECOMPENSATED HEART FAILURE (HCC): Status: ACTIVE | Noted: 2020-05-11

## 2020-05-11 LAB
ADENOVIRUS BY PCR: NOT DETECTED
ANION GAP SERPL CALCULATED.3IONS-SCNC: 15 MMOL/L (ref 7–16)
BORDETELLA PARAPERTUSSIS BY PCR: NOT DETECTED
BORDETELLA PERTUSSIS BY PCR: NOT DETECTED
BUN BLDV-MCNC: 20 MG/DL (ref 8–23)
C-REACTIVE PROTEIN: 0.2 MG/DL (ref 0–0.4)
CALCIUM IONIZED: 1.18 MMOL/L (ref 1.15–1.33)
CALCIUM SERPL-MCNC: 9.8 MG/DL (ref 8.6–10.2)
CHLAMYDOPHILIA PNEUMONIAE BY PCR: NOT DETECTED
CHLORIDE BLD-SCNC: 101 MMOL/L (ref 98–107)
CO2: 27 MMOL/L (ref 22–29)
CORONAVIRUS 229E BY PCR: NOT DETECTED
CORONAVIRUS HKU1 BY PCR: NOT DETECTED
CORONAVIRUS NL63 BY PCR: NOT DETECTED
CORONAVIRUS OC43 BY PCR: NOT DETECTED
CREAT SERPL-MCNC: 0.8 MG/DL (ref 0.5–1)
GFR AFRICAN AMERICAN: >60
GFR NON-AFRICAN AMERICAN: >60 ML/MIN/1.73
GLUCOSE BLD-MCNC: 143 MG/DL (ref 74–99)
HUMAN METAPNEUMOVIRUS BY PCR: NOT DETECTED
HUMAN RHINOVIRUS/ENTEROVIRUS BY PCR: NOT DETECTED
INFLUENZA A BY PCR: NOT DETECTED
INFLUENZA B BY PCR: NOT DETECTED
LV EF: 58 %
LVEF MODALITY: NORMAL
MAGNESIUM: 2.1 MG/DL (ref 1.6–2.6)
MYCOPLASMA PNEUMONIAE BY PCR: NOT DETECTED
PARAINFLUENZA VIRUS 1 BY PCR: NOT DETECTED
PARAINFLUENZA VIRUS 2 BY PCR: NOT DETECTED
PARAINFLUENZA VIRUS 3 BY PCR: NOT DETECTED
PARAINFLUENZA VIRUS 4 BY PCR: NOT DETECTED
POTASSIUM SERPL-SCNC: 4.2 MMOL/L (ref 3.5–5)
PROCALCITONIN: 0.03 NG/ML (ref 0–0.08)
RESPIRATORY SYNCYTIAL VIRUS BY PCR: NOT DETECTED
SEDIMENTATION RATE, ERYTHROCYTE: 16 MM/HR (ref 0–20)
SODIUM BLD-SCNC: 143 MMOL/L (ref 132–146)
TROPONIN: <0.01 NG/ML (ref 0–0.03)

## 2020-05-11 PROCEDURE — 6360000002 HC RX W HCPCS: Performed by: STUDENT IN AN ORGANIZED HEALTH CARE EDUCATION/TRAINING PROGRAM

## 2020-05-11 PROCEDURE — 6370000000 HC RX 637 (ALT 250 FOR IP): Performed by: STUDENT IN AN ORGANIZED HEALTH CARE EDUCATION/TRAINING PROGRAM

## 2020-05-11 PROCEDURE — 85651 RBC SED RATE NONAUTOMATED: CPT

## 2020-05-11 PROCEDURE — 84145 PROCALCITONIN (PCT): CPT

## 2020-05-11 PROCEDURE — 96366 THER/PROPH/DIAG IV INF ADDON: CPT

## 2020-05-11 PROCEDURE — 99222 1ST HOSP IP/OBS MODERATE 55: CPT | Performed by: FAMILY MEDICINE

## 2020-05-11 PROCEDURE — G0378 HOSPITAL OBSERVATION PER HR: HCPCS

## 2020-05-11 PROCEDURE — 2580000003 HC RX 258: Performed by: STUDENT IN AN ORGANIZED HEALTH CARE EDUCATION/TRAINING PROGRAM

## 2020-05-11 PROCEDURE — 80048 BASIC METABOLIC PNL TOTAL CA: CPT

## 2020-05-11 PROCEDURE — 84484 ASSAY OF TROPONIN QUANT: CPT

## 2020-05-11 PROCEDURE — 2140000000 HC CCU INTERMEDIATE R&B

## 2020-05-11 PROCEDURE — 2700000000 HC OXYGEN THERAPY PER DAY

## 2020-05-11 PROCEDURE — 96375 TX/PRO/DX INJ NEW DRUG ADDON: CPT

## 2020-05-11 PROCEDURE — 51702 INSERT TEMP BLADDER CATH: CPT

## 2020-05-11 PROCEDURE — 96376 TX/PRO/DX INJ SAME DRUG ADON: CPT

## 2020-05-11 PROCEDURE — 0100U HC RESPIRPTHGN MULT REV TRANS & AMP PRB TECH 21 TRGT: CPT

## 2020-05-11 PROCEDURE — 93306 TTE W/DOPPLER COMPLETE: CPT

## 2020-05-11 PROCEDURE — 71250 CT THORAX DX C-: CPT

## 2020-05-11 PROCEDURE — 83735 ASSAY OF MAGNESIUM: CPT

## 2020-05-11 PROCEDURE — 82330 ASSAY OF CALCIUM: CPT

## 2020-05-11 PROCEDURE — 94640 AIRWAY INHALATION TREATMENT: CPT

## 2020-05-11 PROCEDURE — 36415 COLL VENOUS BLD VENIPUNCTURE: CPT

## 2020-05-11 PROCEDURE — 86140 C-REACTIVE PROTEIN: CPT

## 2020-05-11 PROCEDURE — 6360000002 HC RX W HCPCS: Performed by: EMERGENCY MEDICINE

## 2020-05-11 PROCEDURE — 2500000003 HC RX 250 WO HCPCS: Performed by: STUDENT IN AN ORGANIZED HEALTH CARE EDUCATION/TRAINING PROGRAM

## 2020-05-11 RX ORDER — ERGOCALCIFEROL (VITAMIN D2) 50 MCG
2000 CAPSULE ORAL
COMMUNITY
End: 2020-10-27 | Stop reason: SDUPTHER

## 2020-05-11 RX ORDER — ACETAMINOPHEN 325 MG/1
650 TABLET ORAL EVERY 6 HOURS PRN
Status: DISCONTINUED | OUTPATIENT
Start: 2020-05-11 | End: 2020-05-12 | Stop reason: HOSPADM

## 2020-05-11 RX ORDER — WARFARIN SODIUM 4 MG/1
4 TABLET ORAL
Status: DISCONTINUED | OUTPATIENT
Start: 2020-05-12 | End: 2020-05-12 | Stop reason: HOSPADM

## 2020-05-11 RX ORDER — POLYETHYLENE GLYCOL 3350 17 G/17G
17 POWDER, FOR SOLUTION ORAL DAILY PRN
Status: DISCONTINUED | OUTPATIENT
Start: 2020-05-11 | End: 2020-05-12 | Stop reason: HOSPADM

## 2020-05-11 RX ORDER — POTASSIUM CHLORIDE 20 MEQ/1
20 TABLET, EXTENDED RELEASE ORAL DAILY
Status: DISCONTINUED | OUTPATIENT
Start: 2020-05-11 | End: 2020-05-12 | Stop reason: HOSPADM

## 2020-05-11 RX ORDER — FAMOTIDINE 20 MG/1
20 TABLET, FILM COATED ORAL DAILY
Status: DISCONTINUED | OUTPATIENT
Start: 2020-05-11 | End: 2020-05-12 | Stop reason: HOSPADM

## 2020-05-11 RX ORDER — SODIUM CHLORIDE 0.9 % (FLUSH) 0.9 %
10 SYRINGE (ML) INJECTION PRN
Status: DISCONTINUED | OUTPATIENT
Start: 2020-05-11 | End: 2020-05-12 | Stop reason: HOSPADM

## 2020-05-11 RX ORDER — LEVALBUTEROL INHALATION SOLUTION 0.63 MG/3ML
1 SOLUTION RESPIRATORY (INHALATION)
Status: DISCONTINUED | OUTPATIENT
Start: 2020-05-11 | End: 2020-05-12 | Stop reason: HOSPADM

## 2020-05-11 RX ORDER — ONDANSETRON 2 MG/ML
4 INJECTION INTRAMUSCULAR; INTRAVENOUS EVERY 6 HOURS PRN
Status: DISCONTINUED | OUTPATIENT
Start: 2020-05-11 | End: 2020-05-12 | Stop reason: HOSPADM

## 2020-05-11 RX ORDER — ACETAMINOPHEN 650 MG/1
650 SUPPOSITORY RECTAL EVERY 6 HOURS PRN
Status: DISCONTINUED | OUTPATIENT
Start: 2020-05-11 | End: 2020-05-12 | Stop reason: HOSPADM

## 2020-05-11 RX ORDER — IPRATROPIUM BROMIDE AND ALBUTEROL SULFATE 2.5; .5 MG/3ML; MG/3ML
1 SOLUTION RESPIRATORY (INHALATION)
Status: COMPLETED | OUTPATIENT
Start: 2020-05-11 | End: 2020-05-11

## 2020-05-11 RX ORDER — LISINOPRIL 10 MG/1
10 TABLET ORAL DAILY
Status: DISCONTINUED | OUTPATIENT
Start: 2020-05-11 | End: 2020-05-12 | Stop reason: HOSPADM

## 2020-05-11 RX ORDER — PROMETHAZINE HYDROCHLORIDE 25 MG/1
12.5 TABLET ORAL EVERY 6 HOURS PRN
Status: DISCONTINUED | OUTPATIENT
Start: 2020-05-11 | End: 2020-05-12 | Stop reason: HOSPADM

## 2020-05-11 RX ORDER — SODIUM CHLORIDE 0.9 % (FLUSH) 0.9 %
10 SYRINGE (ML) INJECTION EVERY 12 HOURS SCHEDULED
Status: DISCONTINUED | OUTPATIENT
Start: 2020-05-11 | End: 2020-05-12 | Stop reason: HOSPADM

## 2020-05-11 RX ORDER — BUMETANIDE 0.25 MG/ML
1 INJECTION, SOLUTION INTRAMUSCULAR; INTRAVENOUS 2 TIMES DAILY
Status: DISCONTINUED | OUTPATIENT
Start: 2020-05-11 | End: 2020-05-12

## 2020-05-11 RX ORDER — METHYLPREDNISOLONE SODIUM SUCCINATE 125 MG/2ML
40 INJECTION, POWDER, LYOPHILIZED, FOR SOLUTION INTRAMUSCULAR; INTRAVENOUS EVERY 12 HOURS
Status: DISCONTINUED | OUTPATIENT
Start: 2020-05-11 | End: 2020-05-12 | Stop reason: HOSPADM

## 2020-05-11 RX ORDER — THIAMINE MONONITRATE (VIT B1) 100 MG
100 TABLET ORAL DAILY
Status: ON HOLD | COMMUNITY
End: 2020-05-11

## 2020-05-11 RX ORDER — WARFARIN SODIUM 4 MG/1
8 TABLET ORAL
Status: DISCONTINUED | OUTPATIENT
Start: 2020-05-11 | End: 2020-05-12 | Stop reason: HOSPADM

## 2020-05-11 RX ORDER — BUMETANIDE 0.25 MG/ML
1 INJECTION, SOLUTION INTRAMUSCULAR; INTRAVENOUS 2 TIMES DAILY
Status: DISCONTINUED | OUTPATIENT
Start: 2020-05-11 | End: 2020-05-11 | Stop reason: CLARIF

## 2020-05-11 RX ORDER — LEVALBUTEROL INHALATION SOLUTION 0.63 MG/3ML
1 SOLUTION RESPIRATORY (INHALATION) EVERY 8 HOURS PRN
Status: DISCONTINUED | OUTPATIENT
Start: 2020-05-11 | End: 2020-05-11

## 2020-05-11 RX ORDER — SPIRONOLACTONE 25 MG/1
25 TABLET ORAL DAILY
Status: DISCONTINUED | OUTPATIENT
Start: 2020-05-11 | End: 2020-05-12 | Stop reason: HOSPADM

## 2020-05-11 RX ORDER — VITAMIN B COMPLEX
1 CAPSULE ORAL DAILY
COMMUNITY
End: 2020-10-27 | Stop reason: SDUPTHER

## 2020-05-11 RX ORDER — ARFORMOTEROL TARTRATE 15 UG/2ML
15 SOLUTION RESPIRATORY (INHALATION) 2 TIMES DAILY
Status: DISCONTINUED | OUTPATIENT
Start: 2020-05-11 | End: 2020-05-12 | Stop reason: HOSPADM

## 2020-05-11 RX ORDER — ASCORBIC ACID 500 MG
250 TABLET ORAL DAILY
Status: DISCONTINUED | OUTPATIENT
Start: 2020-05-11 | End: 2020-05-12 | Stop reason: HOSPADM

## 2020-05-11 RX ORDER — LEVOTHYROXINE SODIUM 0.12 MG/1
125 TABLET ORAL DAILY
Status: DISCONTINUED | OUTPATIENT
Start: 2020-05-11 | End: 2020-05-12 | Stop reason: HOSPADM

## 2020-05-11 RX ORDER — ESCITALOPRAM OXALATE 10 MG/1
5 TABLET ORAL NIGHTLY
Status: DISCONTINUED | OUTPATIENT
Start: 2020-05-11 | End: 2020-05-12 | Stop reason: HOSPADM

## 2020-05-11 RX ORDER — BUDESONIDE 0.5 MG/2ML
500 INHALANT ORAL 2 TIMES DAILY
Status: DISCONTINUED | OUTPATIENT
Start: 2020-05-11 | End: 2020-05-12 | Stop reason: HOSPADM

## 2020-05-11 RX ADMIN — BUMETANIDE 1 MG: 0.25 INJECTION INTRAMUSCULAR; INTRAVENOUS at 22:56

## 2020-05-11 RX ADMIN — METOPROLOL SUCCINATE 75 MG: 50 TABLET, EXTENDED RELEASE ORAL at 10:56

## 2020-05-11 RX ADMIN — LISINOPRIL 10 MG: 10 TABLET ORAL at 13:33

## 2020-05-11 RX ADMIN — Medication 250 MG: at 13:33

## 2020-05-11 RX ADMIN — IPRATROPIUM BROMIDE 0.5 MG: 0.5 SOLUTION RESPIRATORY (INHALATION) at 16:35

## 2020-05-11 RX ADMIN — LEVALBUTEROL HYDROCHLORIDE 0.63 MG: 0.63 SOLUTION RESPIRATORY (INHALATION) at 21:04

## 2020-05-11 RX ADMIN — FAMOTIDINE 20 MG: 20 TABLET, FILM COATED ORAL at 10:56

## 2020-05-11 RX ADMIN — LEVALBUTEROL HYDROCHLORIDE 0.63 MG: 0.63 SOLUTION RESPIRATORY (INHALATION) at 16:34

## 2020-05-11 RX ADMIN — ESCITALOPRAM 5 MG: 10 TABLET, FILM COATED ORAL at 21:27

## 2020-05-11 RX ADMIN — SPIRONOLACTONE 25 MG: 25 TABLET ORAL at 10:56

## 2020-05-11 RX ADMIN — SODIUM CHLORIDE, PRESERVATIVE FREE 10 ML: 5 INJECTION INTRAVENOUS at 10:56

## 2020-05-11 RX ADMIN — ARFORMOTEROL TARTRATE 15 MCG: 15 SOLUTION RESPIRATORY (INHALATION) at 21:06

## 2020-05-11 RX ADMIN — BUDESONIDE 500 MCG: 0.5 SUSPENSION RESPIRATORY (INHALATION) at 21:07

## 2020-05-11 RX ADMIN — SODIUM CHLORIDE, PRESERVATIVE FREE 10 ML: 5 INJECTION INTRAVENOUS at 23:27

## 2020-05-11 RX ADMIN — METHYLPREDNISOLONE SODIUM SUCCINATE 40 MG: 125 INJECTION, POWDER, FOR SOLUTION INTRAMUSCULAR; INTRAVENOUS at 23:25

## 2020-05-11 RX ADMIN — IPRATROPIUM BROMIDE 0.5 MG: 0.5 SOLUTION RESPIRATORY (INHALATION) at 21:05

## 2020-05-11 RX ADMIN — BUMETANIDE 1 MG: 0.25 INJECTION INTRAMUSCULAR; INTRAVENOUS at 02:56

## 2020-05-11 RX ADMIN — LEVOTHYROXINE SODIUM 125 MCG: 0.12 TABLET ORAL at 05:32

## 2020-05-11 RX ADMIN — SODIUM CHLORIDE, PRESERVATIVE FREE 10 ML: 5 INJECTION INTRAVENOUS at 21:28

## 2020-05-11 RX ADMIN — POTASSIUM CHLORIDE 20 MEQ: 1500 TABLET, EXTENDED RELEASE ORAL at 10:56

## 2020-05-11 RX ADMIN — METHYLPREDNISOLONE SODIUM SUCCINATE 125 MG: 125 INJECTION, POWDER, FOR SOLUTION INTRAMUSCULAR; INTRAVENOUS at 00:51

## 2020-05-11 RX ADMIN — IPRATROPIUM BROMIDE AND ALBUTEROL SULFATE 1 AMPULE: 2.5; .5 SOLUTION RESPIRATORY (INHALATION) at 01:38

## 2020-05-11 RX ADMIN — IPRATROPIUM BROMIDE AND ALBUTEROL SULFATE 1 AMPULE: 2.5; .5 SOLUTION RESPIRATORY (INHALATION) at 01:18

## 2020-05-11 RX ADMIN — SODIUM CHLORIDE, PRESERVATIVE FREE 10 ML: 5 INJECTION INTRAVENOUS at 02:56

## 2020-05-11 RX ADMIN — METHYLPREDNISOLONE SODIUM SUCCINATE 40 MG: 125 INJECTION, POWDER, FOR SOLUTION INTRAMUSCULAR; INTRAVENOUS at 13:37

## 2020-05-11 RX ADMIN — CEFTRIAXONE SODIUM 1 G: 1 INJECTION, POWDER, FOR SOLUTION INTRAMUSCULAR; INTRAVENOUS at 21:27

## 2020-05-11 RX ADMIN — WARFARIN SODIUM 8 MG: 4 TABLET ORAL at 17:42

## 2020-05-11 RX ADMIN — IPRATROPIUM BROMIDE AND ALBUTEROL SULFATE 1 AMPULE: 2.5; .5 SOLUTION RESPIRATORY (INHALATION) at 01:28

## 2020-05-11 RX ADMIN — BUMETANIDE 1 MG: 0.25 INJECTION INTRAMUSCULAR; INTRAVENOUS at 10:56

## 2020-05-11 ASSESSMENT — ENCOUNTER SYMPTOMS
DIARRHEA: 0
SORE THROAT: 0
VOMITING: 0
RHINORRHEA: 0
COUGH: 1
BLOOD IN STOOL: 0
PHOTOPHOBIA: 0
COLOR CHANGE: 0
CHEST TIGHTNESS: 0
CONSTIPATION: 0
SHORTNESS OF BREATH: 1
ABDOMINAL PAIN: 0
WHEEZING: 1
NAUSEA: 0

## 2020-05-11 ASSESSMENT — VISUAL ACUITY: OU: 1

## 2020-05-11 ASSESSMENT — PAIN SCALES - GENERAL
PAINLEVEL_OUTOF10: 0

## 2020-05-11 NOTE — ED NOTES
Pt walked to bathroom and back. Pt was SOB and wheezing, pt's pulse ox dropped to 92% on RA while walking.      General Amy RN  05/10/20 9411

## 2020-05-11 NOTE — H&P
[address] [facility]  Department of Family Medicine  Family Medicine Residency Program  History and Physical    Patient:  Nikolai Maria 81 y.o. female MRN: [id number]     Date of Service: 5/11/2020      Chief complaint: sob    HPI  The patient is a 81 y.o. female pmhx afib CHF, HTN, Hypothyroid, Pulm HTN. Patient has had the shortness of breath for several days. On 5/4/2020, patient contacted her PCP. At the time she was told to double her Lasix, as well as double her potassium for 3 days. Patient followed the instructions, denies any weight change. However she states that her shortness of breath is still in place, she has a cough, nonproductive. Denies any fevers, chills. She does endorse severe wheezing, and some shortness of breath. She is not oxygen requiring at home, and does not wear CPAP. Denies any sick contacts. States that she has been doing her nebulizers at home 3 times a day however is not helping her at all. Patient also denies any urinary symptoms at this time. Patient follows with Dr. Uri Garcia          ED Course:   Rocephin 1x   Lasix x1  duoneb x3  Steroids bolus  Patient was walked with pulse oximeter, saturating 92% on room air, however had a stop in the middle of walking secondary to increase respiratory efforts and audible wheezing. Past Medical Hx:       Diagnosis Date    Anticoagulated on Coumadin     on Coumadin for this Dr. Joya Osgood controls    Atrial fibrillation ([facility]     Basal cell carcinoma of neck     CAD (coronary artery disease)     History of luminal irregularities of the coronary artery, stable.     CHF (congestive heart failure) (HCC)     stage I diastolic dysfunction on 5/84/85    Depression 11/16/2010    DJD (degenerative joint disease)     SEVERE IN RIGHT HAND, IN MULTIPLE OTHER JOINTS    GERD (gastroesophageal reflux disease) 11/16/2010    History of cardiovascular stress test 03/18/2014    lexiscan    Hyperparathyroidism ([facility] 11/16/2010    Had parathyroidectomy    Hypertension 11/16/2010    Hypothyroidism 11/16/2010    Mild mitral regurgitation 7/10/14    Osteoarthritis 11/16/2010    Pulmonary hypertension ([address] 7/10/14    mild    Renal calculi     x3    Tricuspid regurgitation 7/10/14    mild-moderate    Urinary incontinence 11/16/2010       Past Surgical Hx:        Procedure Laterality Date    CARDIAC CATHETERIZATION  3/22/14    CARPAL TUNNEL RELEASE      Right hand.  CATARACT REMOVAL  6/2005    right, bilateral cataract surgery.  CATARACT REMOVAL WITH IMPLANT Bilateral     CHOLECYSTECTOMY  1972    COLONOSCOPY      FINGER SURGERY  7/2011    S/P removal of cyst from right index finger.  FRACTURE SURGERY Left     arm fractured-casted, Bilateral feet fractures-casted.  INCONTINENCE SURGERY  5/2005    Urinary incontinence, S/P bladder suspension.  INNER EAR SURGERY  2011    tube place in right ear   [address]    KIDNEY STONE SURGERY  1981    KNEE ARTHROPLASTY Bilateral 9/2005    PARATHYROIDECTOMY      Three times    SHOULDER ARTHROPLASTY Left 2009    SHOULDER ARTHROPLASTY Left      shoulder total replacement.  THYROID SURGERY  5/2005    recurrent    THYROIDECTOMY  1960       Medications Prior to Admission:    Prior to Admission medications    Medication Sig Start Date End Date Taking?  Authorizing Provider   lisinopril (PRINIVIL;ZESTRIL) 10 MG tablet Take 1 tablet by mouth daily 5/4/20 5/4/21  Ed Espana MD   potassium chloride (KLOR-CON M) 20 MEQ extended release tablet Take 1 tablet by mouth daily as needed (only take a dose if you use extra diuretic ( lasix / furosemide)) 5/4/20   Ed Espana MD   metoprolol succinate (TOPROL XL) 50 MG extended release tablet Take 1.5 tablets by mouth daily 5/4/20   Ed Espana MD   spironolactone (ALDACTONE) 25 MG tablet Take 1 tablet by mouth daily 5/4/20   Ed Espana MD   warfarin (COUMADIN) 4 MG tablet Take 4 mg on Tuesday and Friday, take 8 mg pain, rhinorrhea and sore throat. Eyes: Negative for photophobia and visual disturbance. Respiratory: Positive for cough, shortness of breath and wheezing. Negative for chest tightness. Cardiovascular: Positive for leg swelling. Negative for chest pain and palpitations. No WOO, no PND, no orthopnea. Gastrointestinal: Negative for abdominal pain, blood in stool, constipation, diarrhea, nausea and vomiting. Endocrine: Negative for cold intolerance, heat intolerance, polydipsia, polyphagia and polyuria. Genitourinary: Negative for difficulty urinating, dysuria, frequency, hematuria and urgency. Musculoskeletal: Negative for arthralgias and myalgias. No changes in ROM of joints. Skin: Negative for color change, rash and wound. Neurological: Negative for dizziness, syncope, facial asymmetry, speech difficulty, weakness, numbness and headaches. Hematological: Negative for adenopathy. Does not bruise/bleed easily. Psychiatric/Behavioral: Negative for dysphoric mood, sleep disturbance and suicidal ideas. The patient is not nervous/anxious. Vitals: BP 94/71   Pulse 95   Temp 97 °F (36.1 °C)   Resp 18   Ht 5' 6\" (1.676 m)   Wt 250 lb (113.4 kg)   SpO2 100%   BMI 40.35 kg/m²   Physical Exam  Constitutional:       General: She is in acute distress. Appearance: Normal appearance. She is obese. HENT:      Head: Normocephalic and atraumatic. Eyes:      General: Vision grossly intact. No visual field deficit or scleral icterus. Right eye: No discharge. Left eye: No discharge. Extraocular Movements: Extraocular movements intact. Conjunctiva/sclera: Conjunctivae normal.      Pupils: Pupils are equal, round, and reactive to light. Neck:      Musculoskeletal: Full passive range of motion without pain, normal range of motion and neck supple. Thyroid: No thyromegaly. Vascular: No carotid bruit or JVD.       Trachea: Trachea normal. Cardiovascular:      Rate and Rhythm: Normal rate. Rhythm regularly irregular. Heart sounds: S1 normal and S2 normal. No murmur. No friction rub. No gallop. Comments: No carotid/femoral/abd bruits  Pulmonary:      Effort: Pulmonary effort is normal. Tachypnea and prolonged expiration present. Breath sounds: Decreased air movement present. Examination of the right-upper field reveals wheezing. Examination of the left-upper field reveals wheezing. Examination of the right-middle field reveals wheezing. Examination of the left-middle field reveals wheezing. Examination of the left-lower field reveals decreased breath sounds. Decreased breath sounds and wheezing present. No rhonchi or rales. Chest:      Chest wall: No deformity, tenderness or crepitus. Comments: Full and symmetric excursion  Abdominal:      General: Abdomen is flat. Bowel sounds are normal. There is no distension. Palpations: Abdomen is soft. There is no hepatomegaly, splenomegaly or mass. Tenderness: There is no abdominal tenderness. Genitourinary:     Comments: Deferred  Musculoskeletal:      Right lower le+ Pitting Edema present. Left lower le+ Pitting Edema present. Comments: No joint swelling, no muscle tenderness. ROM normal to all major joints of extremities. Lymphadenopathy:      Cervical: No cervical adenopathy. Comments: No LAD appreciated. Skin:     General: Skin is warm. Capillary Refill: Capillary refill takes less than 2 seconds. Coloration: Skin is not pale. Findings: No laceration, lesion, rash or wound. Neurological:      General: No focal deficit present. Mental Status: She is alert and oriented to person, place, and time. Cranial Nerves: Cranial nerves are intact. Sensory: Sensation is intact. Motor: Motor function is intact. Coordination: Romberg sign negative.  Finger-Nose-Finger Test and Heel to [facility] nahtalie Test normal.      Gait:

## 2020-05-12 VITALS
TEMPERATURE: 97.7 F | WEIGHT: 235.6 LBS | SYSTOLIC BLOOD PRESSURE: 142 MMHG | RESPIRATION RATE: 24 BRPM | DIASTOLIC BLOOD PRESSURE: 65 MMHG | OXYGEN SATURATION: 96 % | HEART RATE: 67 BPM | HEIGHT: 66 IN | BODY MASS INDEX: 37.86 KG/M2

## 2020-05-12 LAB
ANION GAP SERPL CALCULATED.3IONS-SCNC: 10 MMOL/L (ref 7–16)
ANISOCYTOSIS: ABNORMAL
BASOPHILS ABSOLUTE: 0.01 E9/L (ref 0–0.2)
BASOPHILS RELATIVE PERCENT: 0.1 % (ref 0–2)
BUN BLDV-MCNC: 28 MG/DL (ref 8–23)
CALCIUM SERPL-MCNC: 9.6 MG/DL (ref 8.6–10.2)
CHLORIDE BLD-SCNC: 99 MMOL/L (ref 98–107)
CHOLESTEROL, TOTAL: 151 MG/DL (ref 0–199)
CO2: 33 MMOL/L (ref 22–29)
CREAT SERPL-MCNC: 1 MG/DL (ref 0.5–1)
EKG ATRIAL RATE: 97 BPM
EKG Q-T INTERVAL: 388 MS
EKG QRS DURATION: 98 MS
EKG QTC CALCULATION (BAZETT): 472 MS
EKG R AXIS: 42 DEGREES
EKG T AXIS: 83 DEGREES
EKG VENTRICULAR RATE: 89 BPM
EOSINOPHILS ABSOLUTE: 0 E9/L (ref 0.05–0.5)
EOSINOPHILS RELATIVE PERCENT: 0 % (ref 0–6)
GFR AFRICAN AMERICAN: >60
GFR NON-AFRICAN AMERICAN: 53 ML/MIN/1.73
GLUCOSE BLD-MCNC: 165 MG/DL (ref 74–99)
HCT VFR BLD CALC: 38.5 % (ref 34–48)
HDLC SERPL-MCNC: 65 MG/DL
HEMOGLOBIN: 12.4 G/DL (ref 11.5–15.5)
IMMATURE GRANULOCYTES #: 0.07 E9/L
IMMATURE GRANULOCYTES %: 0.7 % (ref 0–5)
INR BLD: 2.4
LDL CHOLESTEROL CALCULATED: 77 MG/DL (ref 0–99)
LYMPHOCYTES ABSOLUTE: 0.47 E9/L (ref 1.5–4)
LYMPHOCYTES RELATIVE PERCENT: 4.8 % (ref 20–42)
MAGNESIUM: 2.1 MG/DL (ref 1.6–2.6)
MCH RBC QN AUTO: 29.4 PG (ref 26–35)
MCHC RBC AUTO-ENTMCNC: 32.2 % (ref 32–34.5)
MCV RBC AUTO: 91.2 FL (ref 80–99.9)
MONOCYTES ABSOLUTE: 0.4 E9/L (ref 0.1–0.95)
MONOCYTES RELATIVE PERCENT: 4.1 % (ref 2–12)
NEUTROPHILS ABSOLUTE: 8.8 E9/L (ref 1.8–7.3)
NEUTROPHILS RELATIVE PERCENT: 90.3 % (ref 43–80)
OVALOCYTES: ABNORMAL
PDW BLD-RTO: 15.6 FL (ref 11.5–15)
PLATELET # BLD: 179 E9/L (ref 130–450)
PMV BLD AUTO: 10.5 FL (ref 7–12)
POIKILOCYTES: ABNORMAL
POTASSIUM SERPL-SCNC: 3.7 MMOL/L (ref 3.5–5)
PRO-BNP: 2329 PG/ML (ref 0–450)
PROTHROMBIN TIME: 28 SEC (ref 9.3–12.4)
RBC # BLD: 4.22 E12/L (ref 3.5–5.5)
SODIUM BLD-SCNC: 142 MMOL/L (ref 132–146)
TRIGL SERPL-MCNC: 46 MG/DL (ref 0–149)
VLDLC SERPL CALC-MCNC: 9 MG/DL
WBC # BLD: 9.8 E9/L (ref 4.5–11.5)

## 2020-05-12 PROCEDURE — 80061 LIPID PANEL: CPT

## 2020-05-12 PROCEDURE — 6370000000 HC RX 637 (ALT 250 FOR IP): Performed by: STUDENT IN AN ORGANIZED HEALTH CARE EDUCATION/TRAINING PROGRAM

## 2020-05-12 PROCEDURE — 80048 BASIC METABOLIC PNL TOTAL CA: CPT

## 2020-05-12 PROCEDURE — 83880 ASSAY OF NATRIURETIC PEPTIDE: CPT

## 2020-05-12 PROCEDURE — 99217 PR OBSERVATION CARE DISCHARGE MANAGEMENT: CPT | Performed by: FAMILY MEDICINE

## 2020-05-12 PROCEDURE — 2500000003 HC RX 250 WO HCPCS: Performed by: STUDENT IN AN ORGANIZED HEALTH CARE EDUCATION/TRAINING PROGRAM

## 2020-05-12 PROCEDURE — 85610 PROTHROMBIN TIME: CPT

## 2020-05-12 PROCEDURE — G0378 HOSPITAL OBSERVATION PER HR: HCPCS

## 2020-05-12 PROCEDURE — 36415 COLL VENOUS BLD VENIPUNCTURE: CPT

## 2020-05-12 PROCEDURE — 85025 COMPLETE CBC W/AUTO DIFF WBC: CPT

## 2020-05-12 PROCEDURE — 94640 AIRWAY INHALATION TREATMENT: CPT

## 2020-05-12 PROCEDURE — 83735 ASSAY OF MAGNESIUM: CPT

## 2020-05-12 PROCEDURE — 96376 TX/PRO/DX INJ SAME DRUG ADON: CPT

## 2020-05-12 PROCEDURE — 6360000002 HC RX W HCPCS: Performed by: STUDENT IN AN ORGANIZED HEALTH CARE EDUCATION/TRAINING PROGRAM

## 2020-05-12 PROCEDURE — 2580000003 HC RX 258: Performed by: STUDENT IN AN ORGANIZED HEALTH CARE EDUCATION/TRAINING PROGRAM

## 2020-05-12 PROCEDURE — 93010 ELECTROCARDIOGRAM REPORT: CPT | Performed by: INTERNAL MEDICINE

## 2020-05-12 RX ORDER — FUROSEMIDE 40 MG/1
40 TABLET ORAL DAILY
Status: DISCONTINUED | OUTPATIENT
Start: 2020-05-13 | End: 2020-05-12 | Stop reason: HOSPADM

## 2020-05-12 RX ORDER — SPIRONOLACTONE 25 MG/1
25 TABLET ORAL DAILY
Qty: 90 TABLET | Refills: 1 | Status: SHIPPED | OUTPATIENT
Start: 2020-05-12 | End: 2020-10-27

## 2020-05-12 RX ORDER — PREDNISONE 20 MG/1
40 TABLET ORAL DAILY
Qty: 10 TABLET | Refills: 0 | Status: SHIPPED | OUTPATIENT
Start: 2020-05-12 | End: 2020-05-13 | Stop reason: SDUPTHER

## 2020-05-12 RX ORDER — CEPHALEXIN 250 MG/1
250 CAPSULE ORAL EVERY 6 HOURS SCHEDULED
Status: DISCONTINUED | OUTPATIENT
Start: 2020-05-12 | End: 2020-05-12 | Stop reason: HOSPADM

## 2020-05-12 RX ORDER — CEPHALEXIN 250 MG/1
250 CAPSULE ORAL 4 TIMES DAILY
Qty: 20 CAPSULE | Refills: 0 | Status: SHIPPED | OUTPATIENT
Start: 2020-05-12 | End: 2020-05-13

## 2020-05-12 RX ORDER — METOPROLOL SUCCINATE 25 MG/1
75 TABLET, EXTENDED RELEASE ORAL DAILY
Qty: 30 TABLET | Refills: 3 | Status: CANCELLED | OUTPATIENT
Start: 2020-05-13

## 2020-05-12 RX ADMIN — METHYLPREDNISOLONE SODIUM SUCCINATE 40 MG: 125 INJECTION, POWDER, FOR SOLUTION INTRAMUSCULAR; INTRAVENOUS at 13:18

## 2020-05-12 RX ADMIN — ARFORMOTEROL TARTRATE 15 MCG: 15 SOLUTION RESPIRATORY (INHALATION) at 09:00

## 2020-05-12 RX ADMIN — LEVALBUTEROL HYDROCHLORIDE 0.63 MG: 0.63 SOLUTION RESPIRATORY (INHALATION) at 09:01

## 2020-05-12 RX ADMIN — FAMOTIDINE 20 MG: 20 TABLET, FILM COATED ORAL at 09:19

## 2020-05-12 RX ADMIN — LEVALBUTEROL HYDROCHLORIDE 0.63 MG: 0.63 SOLUTION RESPIRATORY (INHALATION) at 16:30

## 2020-05-12 RX ADMIN — LISINOPRIL 10 MG: 10 TABLET ORAL at 09:18

## 2020-05-12 RX ADMIN — BUDESONIDE 500 MCG: 0.5 SUSPENSION RESPIRATORY (INHALATION) at 09:00

## 2020-05-12 RX ADMIN — IPRATROPIUM BROMIDE 0.5 MG: 0.5 SOLUTION RESPIRATORY (INHALATION) at 09:01

## 2020-05-12 RX ADMIN — CEPHALEXIN 250 MG: 250 CAPSULE ORAL at 14:41

## 2020-05-12 RX ADMIN — LEVOTHYROXINE SODIUM 125 MCG: 0.12 TABLET ORAL at 06:13

## 2020-05-12 RX ADMIN — Medication 250 MG: at 09:19

## 2020-05-12 RX ADMIN — SODIUM CHLORIDE, PRESERVATIVE FREE 10 ML: 5 INJECTION INTRAVENOUS at 09:20

## 2020-05-12 RX ADMIN — POTASSIUM CHLORIDE 20 MEQ: 1500 TABLET, EXTENDED RELEASE ORAL at 09:20

## 2020-05-12 RX ADMIN — SPIRONOLACTONE 25 MG: 25 TABLET ORAL at 09:19

## 2020-05-12 RX ADMIN — WARFARIN SODIUM 4 MG: 4 TABLET ORAL at 17:53

## 2020-05-12 RX ADMIN — IPRATROPIUM BROMIDE 0.5 MG: 0.5 SOLUTION RESPIRATORY (INHALATION) at 12:31

## 2020-05-12 RX ADMIN — METOPROLOL SUCCINATE 75 MG: 50 TABLET, EXTENDED RELEASE ORAL at 09:20

## 2020-05-12 RX ADMIN — LEVALBUTEROL HYDROCHLORIDE 0.63 MG: 0.63 SOLUTION RESPIRATORY (INHALATION) at 12:31

## 2020-05-12 RX ADMIN — BUMETANIDE 1 MG: 0.25 INJECTION INTRAMUSCULAR; INTRAVENOUS at 09:19

## 2020-05-12 RX ADMIN — IPRATROPIUM BROMIDE 0.5 MG: 0.5 SOLUTION RESPIRATORY (INHALATION) at 16:30

## 2020-05-12 NOTE — PLAN OF CARE
Problem: OXYGENATION/RESPIRATORY FUNCTION  Goal: Patient will maintain patent airway  5/11/2020 2204 by Beto Landeros RN  Outcome: Met This Shift  5/11/2020 0932 by José Luis Rodríguez RN  Outcome: Ongoing  Goal: Patient will achieve/maintain normal respiratory rate/effort  Description: Respiratory rate and effort will be within normal limits for the patient  Outcome: Met This Shift     Problem: HEMODYNAMIC STATUS  Goal: Patient has stable vital signs and fluid balance  Outcome: Met This Shift     Problem: FLUID AND ELECTROLYTE IMBALANCE  Goal: Fluid and electrolyte balance are achieved/maintained  Outcome: Met This Shift     Problem: ACTIVITY INTOLERANCE/IMPAIRED MOBILITY  Goal: Mobility/activity is maintained at optimum level for patient  Outcome: Met This Shift

## 2020-05-12 NOTE — PROGRESS NOTES
[facility] Inpatient   Resident Progress Note    S:  Hospital day: 1   Brief Synopsis: Darcie Reyes is a 81 y.o. female who presented with increased SOB un remitted by increased home lasix. Echo with EF 55-60% improved from prior. Acute events over night: none      Patient was seen and examined at bedside. Feels her breathing is improved, on room air. No fevers, chills, chest pain, shortness of breath, abdominal pain. Allergy: Codeine; Penicillins; Vicodin [hydrocodone-acetaminophen]; and Adhesive tape      Scheduled Medications:    vitamin C, 250 mg, Oral, Daily    escitalopram, 5 mg, Oral, Nightly    famotidine, 20 mg, Oral, Daily    levothyroxine, 125 mcg, Oral, Daily    lisinopril, 10 mg, Oral, Daily    metoprolol succinate, 75 mg, Oral, Daily    potassium chloride, 20 mEq, Oral, Daily    spironolactone, 25 mg, Oral, Daily    warfarin, 4 mg, Oral, Once per day on Tue Fri    sodium chloride flush, 10 mL, Intravenous, 2 times per day    bumetanide, 1 mg, Intravenous, BID    warfarin, 8 mg, Oral, Once per day on Sun Mon Wed Thu Sat    methylPREDNISolone, 40 mg, Intravenous, Q12H    Arformoterol Tartrate, 15 mcg, Nebulization, BID    budesonide, 500 mcg, Nebulization, BID    levalbuterol, 1 ampule, Nebulization, Q4H While awake    ipratropium, 0.5 mg, Nebulization, 4x daily    cefTRIAXone (ROCEPHIN) IV, 1 g, Intravenous, Q24H     PRN:   sodium chloride flush, acetaminophen **OR** acetaminophen, polyethylene glycol, promethazine **OR** ondansetron, perflutren lipid microspheres      Infusions:         I reviewed the patient's past medical and surgical history, Medications and Allergies. O:  BP (!) 140/80   Pulse 100 Comment:   Temp 97.9 °F (36.6 °C) (Oral)   Resp 19   Ht 5' 6\" (1.676 m)   Wt 248 lb 7.3 oz (112.7 kg)   SpO2 92%   BMI 40.10 kg/m²   24 hour I&O: I/O last 3 completed shifts:   In: 770 [P.O.:720; IV Piggyback:50]  Out: 4000
CLINICAL PHARMACY NOTE: MEDS TO [address] Select Patient?: Yes  Total # of Prescriptions Filled: 3   The following medications were delivered to the patient:  · ALDACTONE 25 MG   · PREDNISONE 20 MG  · KEFLEX 250 MG   Total # of Interventions Completed: 3  Time Spent (min): 30    Additional Documentation:
assessment, plan and orders as documented by the resident. Please refer to the resident and/or student note for additional information.       Reese Clement

## 2020-05-12 NOTE — DISCHARGE SUMMARY
Behavior normal.     Disposition: home       Medication List      START taking these medications    cephALEXin 250 MG capsule  Commonly known as:  KEFLEX  Take 1 capsule by mouth 4 times daily for 5 days     predniSONE 20 MG tablet  Commonly known as:  DELTASONE  Take 2 tablets by mouth daily for 5 days        CONTINUE taking these medications    acetaminophen 325 MG tablet  Commonly known as:  TYLENOL     b complex vitamins capsule     escitalopram 5 MG tablet  Commonly known as:  LEXAPRO  TK 1 T PO QD     famotidine 20 MG tablet  Commonly known as:  PEPCID  Take one tablet by mouth daily     furosemide 40 MG tablet  Commonly known as:  LASIX  Take 1 tablet by mouth daily     guaiFENesin 600 MG extended release tablet  Commonly known as:  MUCINEX     levalbuterol 0.63 MG/3ML nebulization  Commonly known as:  XOPENEX  Take 3 mLs by nebulization every 8 hours as needed for Wheezing DX: COPD J44.9     levothyroxine 125 MCG tablet  Commonly known as:  SYNTHROID  TAKE 1 TABLET BY MOUTH  EVERY DAY     lisinopril 10 MG tablet  Commonly known as:  PRINIVIL;ZESTRIL  Take 1 tablet by mouth daily     meclizine 25 MG tablet  Commonly known as: Antivert  Take 1 tablet by mouth 3 times daily as needed for Dizziness     metoprolol succinate 50 MG extended release tablet  Commonly known as:  TOPROL XL  Take 1.5 tablets by mouth daily     potassium chloride 20 MEQ extended release tablet  Commonly known as:  KLOR-CON M  Take 1 tablet by mouth daily as needed (only take a dose if you use extra diuretic ( lasix / furosemide))     ProAir  (90 Base) MCG/ACT inhaler  Generic drug:  albuterol sulfate HFA  USE 2 PUFFS EVERY 6 HOURS  AS NEEDED FOR WHEEZING     spironolactone 25 MG tablet  Commonly known as:  ALDACTONE  Take 1 tablet by mouth daily     vitamin C 250 MG tablet     Vitamin D (Ergocalciferol) 50 MCG (2000 UT) Caps     warfarin 4 MG tablet  Commonly known as:  Coumadin  Take as directed.  If you are unsure how to take

## 2020-05-13 ENCOUNTER — VIRTUAL VISIT (OUTPATIENT)
Dept: CARDIOLOGY CLINIC | Age: 85
End: 2020-05-13
Payer: MEDICARE

## 2020-05-13 ENCOUNTER — CARE COORDINATION (OUTPATIENT)
Dept: CASE MANAGEMENT | Age: 85
End: 2020-05-13

## 2020-05-13 VITALS — WEIGHT: 235 LBS | HEIGHT: 66 IN | BODY MASS INDEX: 37.77 KG/M2

## 2020-05-13 LAB
ORGANISM: ABNORMAL
URINE CULTURE, ROUTINE: ABNORMAL

## 2020-05-13 PROCEDURE — G8926 SPIRO NO PERF OR DOC: HCPCS | Performed by: INTERNAL MEDICINE

## 2020-05-13 PROCEDURE — 3023F SPIROM DOC REV: CPT | Performed by: INTERNAL MEDICINE

## 2020-05-13 PROCEDURE — 1111F DSCHRG MED/CURRENT MED MERGE: CPT | Performed by: INTERNAL MEDICINE

## 2020-05-13 PROCEDURE — 99215 OFFICE O/P EST HI 40 MIN: CPT | Performed by: INTERNAL MEDICINE

## 2020-05-13 PROCEDURE — 4040F PNEUMOC VAC/ADMIN/RCVD: CPT | Performed by: INTERNAL MEDICINE

## 2020-05-13 PROCEDURE — 1036F TOBACCO NON-USER: CPT | Performed by: INTERNAL MEDICINE

## 2020-05-13 PROCEDURE — 1090F PRES/ABSN URINE INCON ASSESS: CPT | Performed by: INTERNAL MEDICINE

## 2020-05-13 PROCEDURE — G8399 PT W/DXA RESULTS DOCUMENT: HCPCS | Performed by: INTERNAL MEDICINE

## 2020-05-13 PROCEDURE — 1123F ACP DISCUSS/DSCN MKR DOCD: CPT | Performed by: INTERNAL MEDICINE

## 2020-05-13 PROCEDURE — G8427 DOCREV CUR MEDS BY ELIG CLIN: HCPCS | Performed by: INTERNAL MEDICINE

## 2020-05-13 PROCEDURE — G8417 CALC BMI ABV UP PARAM F/U: HCPCS | Performed by: INTERNAL MEDICINE

## 2020-05-13 RX ORDER — SPIRONOLACTONE 25 MG/1
25 TABLET ORAL DAILY
Qty: 90 TABLET | Refills: 1 | Status: CANCELLED | OUTPATIENT
Start: 2020-05-13

## 2020-05-13 RX ORDER — SPIRONOLACTONE 25 MG/1
12.5 TABLET ORAL DAILY
Qty: 30 TABLET | Refills: 3 | Status: SHIPPED
Start: 2020-05-13 | End: 2020-10-23 | Stop reason: SDUPTHER

## 2020-05-13 RX ORDER — PREDNISONE 20 MG/1
40 TABLET ORAL DAILY
Qty: 10 TABLET | Refills: 0 | Status: SHIPPED | OUTPATIENT
Start: 2020-05-13 | End: 2020-05-18

## 2020-05-13 NOTE — CARE COORDINATION
Patient contacted regarding recent discharge and COVID-19 risk   Care Transition Nurse/ Ambulatory Care Manager contacted the patient by telephone to perform post discharge assessment. Verified name and  with patient as identifiers. Patient has following risk factors of: heart failure. CTN/ACM reviewed discharge instructions, medical action plan and red flags related to discharge diagnosis. Reviewed and educated them on any new and changed medications related to discharge diagnosis. Advised obtaining a 90-day supply of all daily and as-needed medications. Education provided regarding infection prevention, and signs and symptoms of COVID-19 and when to seek medical attention with patient who verbalized understanding. Discussed exposure protocols and quarantine from [address] you at higher risk for severe illness  and given an opportunity for questions and concerns. The patient agrees to contact the COVID-19 hotline [phone] or PCP office for questions related to their healthcare. CTN/ACM provided contact information for future reference. From CDC: Are you at higher risk for severe illness?  Wash your hands often.  Avoid close contact (6 feet, which is about two arm lengths) with people who are sick.  Put distance between yourself and other people if COVID-19 is spreading in your community.  Clean and disinfect frequently touched surfaces.  Avoid all cruise travel and non-essential air travel.  Call your healthcare professional if you have concerns about COVID-19 and your underlying condition or if you are sick. For more information on steps you can take to protect yourself, see CDC's How to Alexi for follow-up call in 7-14 days based on severity of symptoms and risk factors. Patient has all her medication. She denies loop program. Denies concerns or issues.  She will continue to f/u with PCP

## 2020-05-13 NOTE — PROGRESS NOTES
member of club or organization: No     Attends meetings of clubs or organizations: Never     Relationship status:     Intimate partner violence     Fear of current or ex partner: Not on file     Emotionally abused: Not on file     Physically abused: Not on file     Forced sexual activity: Not on file   Other Topics Concern    Not on file   Social History Narrative    1 cup coffee daily       Allergies: Allergies   Allergen Reactions    Codeine Other (See Comments)     Hallucination    Penicillins Hives    Vicodin [Hydrocodone-Acetaminophen] Other (See Comments)     Hallucination      Adhesive Tape Rash       Current Medications:  Current Outpatient Medications   Medication Sig Dispense Refill    spironolactone (ALDACTONE) 25 MG tablet Take 0.5 tablets by mouth daily 30 tablet 3    predniSONE (DELTASONE) 20 MG tablet Take 2 tablets by mouth daily for 5 days 10 tablet 0    spironolactone (ALDACTONE) 25 MG tablet Take 1 tablet by mouth daily 90 tablet 1    Vitamin D, Ergocalciferol, 50 MCG (2000 UT) CAPS Take 2,000 Units by mouth      b complex vitamins capsule Take 1 capsule by mouth daily      lisinopril (PRINIVIL;ZESTRIL) 10 MG tablet Take 1 tablet by mouth daily 90 tablet 3    potassium chloride (KLOR-CON M) 20 MEQ extended release tablet Take 1 tablet by mouth daily as needed (only take a dose if you use extra diuretic ( lasix / furosemide)) 90 tablet 3    metoprolol succinate (TOPROL XL) 50 MG extended release tablet Take 1.5 tablets by mouth daily 30 tablet 0    warfarin (COUMADIN) 4 MG tablet Take 4 mg on Tuesday and Friday, take 8 mg on all other days.  60 tablet 5    furosemide (LASIX) 40 MG tablet Take 1 tablet by mouth daily 60 tablet 3    meclizine (ANTIVERT) 25 MG tablet Take 1 tablet by mouth 3 times daily as needed for Dizziness 100 tablet 5    guaiFENesin (MUCINEX) 600 MG extended release tablet Take 1,200 mg by mouth 2 times daily as needed       famotidine (PEPCID) 20 MG 05/12/2020     Lab Results   Component Value Date    TSH 3.840 01/20/2020     No components found for: CHLPL  Lab Results   Component Value Date    TRIG 46 05/12/2020    TRIG 49 01/03/2019    TRIG 68 09/21/2017     Lab Results   Component Value Date    HDL 65 05/12/2020    HDL 51 01/03/2019    HDL 90 09/21/2017     Lab Results   Component Value Date    LDLCALC 77 05/12/2020    [address] 76 01/03/2019    LDLCALC 97 09/21/2017       Cardiac Tests:  ECG: A resting electrocardiogram of May, 2020 demonstrated evidence of atrial fibrillation with a mean ventricular response of approximately 90 bpm with nonspecific ST changes  Chest X-ray: A chest x-ray of May, 2020 demonstrated evidence of borderline cardiomegaly with interstitial infiltrates increased from that previously documented  Last Echocardiogram: An echocardiogram of May, 2020 demonstrates evidence of a normal-sized left ventricular chamber with normal left ventricular systolic function severe left atrial enlargement and no significant valvular pathology. Mild to moderate pulmonary hypertension is present with right ventricular systolic pressure is approximately 45 to 50 mmHg and a patent foramen ovale with bidirectional shunting is present      ASSESSMENT / PLAN: On a clinical basis, the patient presents with recent acute superimposed upon chronic diastolic heart failure likely of a multifactorial nature potentially associated with that of exacerbation of her chronic obstructive lung disease. Presently, I extensively discussed with she and her family recommendations from a cardiovascular standpoint including continuation of recommended 40 mg Lasix dosing along with that of spironolactone 12.5 mg daily with a new prescription for the latter forwarded to her pharmacy. Continued careful monitoring of her volume status as well as that of renal function and electrolytes will be necessary.   I additionally have recommended she or her family contact your office in

## 2020-05-14 ENCOUNTER — TELEPHONE (OUTPATIENT)
Dept: FAMILY MEDICINE CLINIC | Age: 85
End: 2020-05-14

## 2020-05-20 ENCOUNTER — CARE COORDINATION (OUTPATIENT)
Dept: CASE MANAGEMENT | Age: 85
End: 2020-05-20

## 2020-05-28 ENCOUNTER — TELEPHONE (OUTPATIENT)
Dept: FAMILY MEDICINE CLINIC | Age: 85
End: 2020-05-28

## 2020-06-17 ENCOUNTER — ANTI-COAG VISIT (OUTPATIENT)
Dept: FAMILY MEDICINE CLINIC | Age: 85
End: 2020-06-17

## 2020-06-17 LAB — INR BLD: 2.7

## 2020-06-23 RX ORDER — METOPROLOL SUCCINATE 50 MG/1
75 TABLET, EXTENDED RELEASE ORAL DAILY
Qty: 90 TABLET | Refills: 2 | Status: SHIPPED | OUTPATIENT
Start: 2020-06-23 | End: 2020-10-27 | Stop reason: SDUPTHER

## 2020-06-30 RX ORDER — LEVOTHYROXINE SODIUM 0.12 MG/1
125 TABLET ORAL DAILY
Qty: 90 TABLET | Refills: 1 | Status: SHIPPED | OUTPATIENT
Start: 2020-06-30 | End: 2020-10-27 | Stop reason: SDUPTHER

## 2020-07-15 ENCOUNTER — NURSE ONLY (OUTPATIENT)
Dept: FAMILY MEDICINE CLINIC | Age: 85
End: 2020-07-15
Payer: MEDICARE

## 2020-07-15 ENCOUNTER — ANTI-COAG VISIT (OUTPATIENT)
Dept: FAMILY MEDICINE CLINIC | Age: 85
End: 2020-07-15

## 2020-07-15 LAB
INTERNATIONAL NORMALIZATION RATIO, POC: 4.5
PROTHROMBIN TIME, POC: 53.5

## 2020-07-15 PROCEDURE — 93793 ANTICOAG MGMT PT WARFARIN: CPT | Performed by: FAMILY MEDICINE

## 2020-07-15 NOTE — PROGRESS NOTES
Patient advised of coumadin instructions, she correctly read them back and scheduled lab visit for 7/22/2020.

## 2020-07-22 ENCOUNTER — TELEPHONE (OUTPATIENT)
Dept: ADMINISTRATIVE | Age: 85
End: 2020-07-22

## 2020-07-22 NOTE — TELEPHONE ENCOUNTER
Pt left message, she is unable to do a VV for appt tomorrow w/Dr Teresita Maxwell (7/23) -- called pt back, lvm indicating there is no appt scheduled for tomorrow. Let us know if she needs to schedule an appt.

## 2020-07-29 RX ORDER — ESCITALOPRAM OXALATE 5 MG/1
TABLET ORAL
Qty: 30 TABLET | Refills: 5 | Status: SHIPPED | OUTPATIENT
Start: 2020-07-29 | End: 2020-10-27 | Stop reason: SDUPTHER

## 2020-07-30 ENCOUNTER — NURSE ONLY (OUTPATIENT)
Dept: FAMILY MEDICINE CLINIC | Age: 85
End: 2020-07-30
Payer: MEDICARE

## 2020-07-30 ENCOUNTER — ANTI-COAG VISIT (OUTPATIENT)
Dept: FAMILY MEDICINE CLINIC | Age: 85
End: 2020-07-30

## 2020-07-30 DIAGNOSIS — Z86.79 HISTORY OF ATRIAL FIBRILLATION: Primary | Chronic | ICD-10-CM

## 2020-07-30 LAB
INTERNATIONAL NORMALIZATION RATIO, POC: 2.8
PROTHROMBIN TIME, POC: 33.9

## 2020-07-30 PROCEDURE — 85610 PROTHROMBIN TIME: CPT | Performed by: FAMILY MEDICINE

## 2020-07-30 PROCEDURE — 93793 ANTICOAG MGMT PT WARFARIN: CPT | Performed by: FAMILY MEDICINE

## 2020-08-13 ENCOUNTER — HOSPITAL ENCOUNTER (EMERGENCY)
Age: 85
Discharge: HOME | End: 2020-08-13
Attending: EMERGENCY MEDICINE
Payer: MEDICARE

## 2020-08-13 ENCOUNTER — APPOINTMENT (OUTPATIENT)
Dept: CT IMAGING | Age: 85
End: 2020-08-13
Payer: MEDICARE

## 2020-08-13 VITALS
BODY MASS INDEX: 38.9 KG/M2 | SYSTOLIC BLOOD PRESSURE: 138 MMHG | OXYGEN SATURATION: 96 % | TEMPERATURE: 98.8 F | DIASTOLIC BLOOD PRESSURE: 88 MMHG | RESPIRATION RATE: 16 BRPM | WEIGHT: 241 LBS | HEART RATE: 94 BPM

## 2020-08-13 LAB
ALBUMIN SERPL-MCNC: 4 G/DL (ref 3.5–5.2)
ALP BLD-CCNC: 78 U/L (ref 35–104)
ALT SERPL-CCNC: 15 U/L (ref 0–32)
ANION GAP SERPL CALCULATED.3IONS-SCNC: 12 MMOL/L (ref 7–16)
AST SERPL-CCNC: 25 U/L (ref 0–31)
BACTERIA: ABNORMAL /HPF
BASOPHILS ABSOLUTE: 0.07 E9/L (ref 0–0.2)
BASOPHILS RELATIVE PERCENT: 1.3 % (ref 0–2)
BILIRUB SERPL-MCNC: 0.6 MG/DL (ref 0–1.2)
BILIRUBIN URINE: NEGATIVE
BLOOD, URINE: ABNORMAL
BUN BLDV-MCNC: 28 MG/DL (ref 8–23)
CALCIUM SERPL-MCNC: 9.5 MG/DL (ref 8.6–10.2)
CHLORIDE BLD-SCNC: 101 MMOL/L (ref 98–107)
CLARITY: ABNORMAL
CO2: 26 MMOL/L (ref 22–29)
COLOR: YELLOW
CREAT SERPL-MCNC: 0.9 MG/DL (ref 0.5–1)
EOSINOPHILS ABSOLUTE: 0.14 E9/L (ref 0.05–0.5)
EOSINOPHILS RELATIVE PERCENT: 2.5 % (ref 0–6)
GFR AFRICAN AMERICAN: >60
GFR NON-AFRICAN AMERICAN: 60 ML/MIN/1.73
GLUCOSE BLD-MCNC: 118 MG/DL (ref 74–99)
GLUCOSE URINE: NEGATIVE MG/DL
HCT VFR BLD CALC: 40.3 % (ref 34–48)
HEMOGLOBIN: 12.9 G/DL (ref 11.5–15.5)
IMMATURE GRANULOCYTES #: 0.01 E9/L
IMMATURE GRANULOCYTES %: 0.2 % (ref 0–5)
KETONES, URINE: NEGATIVE MG/DL
LACTIC ACID: 0.9 MMOL/L (ref 0.5–2.2)
LEUKOCYTE ESTERASE, URINE: ABNORMAL
LIPASE: 29 U/L (ref 13–60)
LYMPHOCYTES ABSOLUTE: 0.96 E9/L (ref 1.5–4)
LYMPHOCYTES RELATIVE PERCENT: 17.3 % (ref 20–42)
MCH RBC QN AUTO: 30.2 PG (ref 26–35)
MCHC RBC AUTO-ENTMCNC: 32 % (ref 32–34.5)
MCV RBC AUTO: 94.4 FL (ref 80–99.9)
MONOCYTES ABSOLUTE: 0.57 E9/L (ref 0.1–0.95)
MONOCYTES RELATIVE PERCENT: 10.3 % (ref 2–12)
NEUTROPHILS ABSOLUTE: 3.79 E9/L (ref 1.8–7.3)
NEUTROPHILS RELATIVE PERCENT: 68.4 % (ref 43–80)
NITRITE, URINE: NEGATIVE
PDW BLD-RTO: 15 FL (ref 11.5–15)
PH UA: 5 (ref 5–9)
PLATELET # BLD: 171 E9/L (ref 130–450)
PMV BLD AUTO: 10.3 FL (ref 7–12)
POTASSIUM SERPL-SCNC: 4.1 MMOL/L (ref 3.5–5)
PROTEIN UA: ABNORMAL MG/DL
RBC # BLD: 4.27 E12/L (ref 3.5–5.5)
RBC UA: ABNORMAL /HPF (ref 0–2)
SODIUM BLD-SCNC: 139 MMOL/L (ref 132–146)
SPECIFIC GRAVITY UA: >=1.03 (ref 1–1.03)
TOTAL PROTEIN: 7.2 G/DL (ref 6.4–8.3)
UROBILINOGEN, URINE: 0.2 E.U./DL
WBC # BLD: 5.5 E9/L (ref 4.5–11.5)
WBC UA: ABNORMAL /HPF (ref 0–5)

## 2020-08-13 PROCEDURE — 80053 COMPREHEN METABOLIC PANEL: CPT

## 2020-08-13 PROCEDURE — 74176 CT ABD & PELVIS W/O CONTRAST: CPT

## 2020-08-13 PROCEDURE — 2580000003 HC RX 258: Performed by: EMERGENCY MEDICINE

## 2020-08-13 PROCEDURE — 96375 TX/PRO/DX INJ NEW DRUG ADDON: CPT

## 2020-08-13 PROCEDURE — 6360000002 HC RX W HCPCS: Performed by: EMERGENCY MEDICINE

## 2020-08-13 PROCEDURE — P9612 CATHETERIZE FOR URINE SPEC: HCPCS

## 2020-08-13 PROCEDURE — 83690 ASSAY OF LIPASE: CPT

## 2020-08-13 PROCEDURE — 87088 URINE BACTERIA CULTURE: CPT

## 2020-08-13 PROCEDURE — 83605 ASSAY OF LACTIC ACID: CPT

## 2020-08-13 PROCEDURE — 81001 URINALYSIS AUTO W/SCOPE: CPT

## 2020-08-13 PROCEDURE — 96374 THER/PROPH/DIAG INJ IV PUSH: CPT

## 2020-08-13 PROCEDURE — 85025 COMPLETE CBC W/AUTO DIFF WBC: CPT

## 2020-08-13 PROCEDURE — 99283 EMERGENCY DEPT VISIT LOW MDM: CPT

## 2020-08-13 RX ORDER — KETOROLAC TROMETHAMINE 15 MG/ML
15 INJECTION, SOLUTION INTRAMUSCULAR; INTRAVENOUS ONCE
Status: COMPLETED | OUTPATIENT
Start: 2020-08-13 | End: 2020-08-13

## 2020-08-13 RX ORDER — CEFDINIR 300 MG/1
300 CAPSULE ORAL 2 TIMES DAILY
Qty: 14 CAPSULE | Refills: 0 | Status: SHIPPED | OUTPATIENT
Start: 2020-08-13 | End: 2020-08-20

## 2020-08-13 RX ORDER — TRAMADOL HYDROCHLORIDE 50 MG/1
50 TABLET ORAL EVERY 6 HOURS PRN
Qty: 12 TABLET | Refills: 0 | Status: SHIPPED | OUTPATIENT
Start: 2020-08-13 | End: 2020-08-16

## 2020-08-13 RX ORDER — ONDANSETRON 2 MG/ML
4 INJECTION INTRAMUSCULAR; INTRAVENOUS ONCE
Status: COMPLETED | OUTPATIENT
Start: 2020-08-13 | End: 2020-08-13

## 2020-08-13 RX ORDER — FENTANYL CITRATE 50 UG/ML
50 INJECTION, SOLUTION INTRAMUSCULAR; INTRAVENOUS ONCE
Status: COMPLETED | OUTPATIENT
Start: 2020-08-13 | End: 2020-08-13

## 2020-08-13 RX ORDER — ONDANSETRON 4 MG/1
4 TABLET, ORALLY DISINTEGRATING ORAL EVERY 8 HOURS PRN
Qty: 10 TABLET | Refills: 0 | Status: SHIPPED | OUTPATIENT
Start: 2020-08-13 | End: 2020-10-27

## 2020-08-13 RX ADMIN — WATER 2 G: 1 INJECTION INTRAMUSCULAR; INTRAVENOUS; SUBCUTANEOUS at 04:59

## 2020-08-13 RX ADMIN — KETOROLAC TROMETHAMINE 15 MG: 15 INJECTION, SOLUTION INTRAMUSCULAR; INTRAVENOUS at 03:00

## 2020-08-13 RX ADMIN — FENTANYL CITRATE 50 MCG: 50 INJECTION, SOLUTION INTRAMUSCULAR; INTRAVENOUS at 02:59

## 2020-08-13 RX ADMIN — ONDANSETRON HYDROCHLORIDE 4 MG: 2 SOLUTION INTRAMUSCULAR; INTRAVENOUS at 03:00

## 2020-08-13 ASSESSMENT — PAIN SCALES - GENERAL
PAINLEVEL_OUTOF10: 4
PAINLEVEL_OUTOF10: 7
PAINLEVEL_OUTOF10: 7

## 2020-08-13 ASSESSMENT — ENCOUNTER SYMPTOMS
EYE PAIN: 0
EYE DISCHARGE: 0
VOMITING: 0
ABDOMINAL PAIN: 1
WHEEZING: 0
DIARRHEA: 0
SINUS PRESSURE: 0
SORE THROAT: 0
COUGH: 0
EYE REDNESS: 0
NAUSEA: 1
ABDOMINAL DISTENTION: 0
SHORTNESS OF BREATH: 0

## 2020-08-13 NOTE — ED PROVIDER NOTES
Patient is an 82 y/o female who presents to the ED via EMS with abdominal pain. Patient states she had onset of left flank pain approximately 2.5 hours prior to arrival. The pain has been constant and radiates into her left lower abdomen. It is stabbing and currently 8/10. She is nauseated but denies vomiting. She denies any dysuria or gross hematuria. She states the pain is identical to when she had a kidney stone in the past.           Review of Systems   Constitutional: Negative for chills and fever. HENT: Negative for ear pain, sinus pressure and sore throat. Eyes: Negative for pain, discharge and redness. Respiratory: Negative for cough, shortness of breath and wheezing. Cardiovascular: Negative for chest pain. Gastrointestinal: Positive for abdominal pain and nausea. Negative for abdominal distention, diarrhea and vomiting. Genitourinary: Positive for flank pain. Negative for dysuria and frequency. Musculoskeletal: Negative for arthralgias. Skin: Negative for rash and wound. Neurological: Negative for weakness and headaches. Hematological: Negative for adenopathy. All other systems reviewed and are negative. Physical Exam  Vitals signs and nursing note reviewed. Constitutional:       General: She is not in acute distress. Appearance: Normal appearance. HENT:      Head: Normocephalic and atraumatic. Right Ear: External ear normal.      Left Ear: External ear normal.      Nose: Nose normal.      Mouth/Throat:      Mouth: Mucous membranes are moist.   Eyes:      Conjunctiva/sclera: Conjunctivae normal.      Pupils: Pupils are equal, round, and reactive to light. Neck:      Musculoskeletal: Normal range of motion and neck supple. Cardiovascular:      Rate and Rhythm: Normal rate. Rhythm irregularly irregular. Heart sounds: No murmur. Pulmonary:      Effort: Pulmonary effort is normal. No respiratory distress. Breath sounds: Normal breath sounds.  No stridor. No wheezing, rhonchi or rales. Abdominal:      General: Bowel sounds are normal. There is no distension. Palpations: Abdomen is soft. Tenderness: There is abdominal tenderness (Diffuse). There is no guarding. Musculoskeletal: Normal range of motion. General: No swelling. Skin:     General: Skin is warm and dry. Findings: No rash. Neurological:      Mental Status: She is alert and oriented to person, place, and time. Procedures     MDM              --------------------------------------------- PAST HISTORY ---------------------------------------------  Past Medical History:  has a past medical history of Anticoagulated on Coumadin, Atrial fibrillation ([address] Basal cell carcinoma of neck, CAD (coronary artery disease), CHF (congestive heart failure) ([address] Depression, DJD (degenerative joint disease), GERD (gastroesophageal reflux disease), History of cardiovascular stress test, Hyperparathyroidism ([address] Hypertension, Hypothyroidism, Mild mitral regurgitation, Osteoarthritis, Pulmonary hypertension ([address] Renal calculi, Tricuspid regurgitation, and Urinary incontinence. Past Surgical History:  has a past surgical history that includes Thyroidectomy (1960); Kidney stone surgery (1972); Cholecystectomy (1972); Kidney stone surgery (1981); Cataract removal (6/2005); Incontinence surgery (5/2005); Thyroid surgery (5/2005); parathyroidectomy; Knee Arthroplasty (Bilateral, 9/2005); Total shoulder arthroplasty (Left, 2009); fracture surgery (Left); Carpal tunnel release; Finger surgery (7/2011); Total shoulder arthroplasty (Left); Inner ear surgery (2011); Cataract removal with implant (Bilateral); Colonoscopy; and Cardiac catheterization (3/22/14). Social History:  reports that she has never smoked. She has never used smokeless tobacco. She reports that she does not drink alcohol or use drugs.     Family History: family history includes Arthritis in her mother; Cancer in her brother and son; Diabetes in her father; Heart Attack in her mother; Heart Disease in her brother, brother, father, mother, and sister; Heart Surgery in her brother; Mental Illness in her father. The patients home medications have been reviewed.     Allergies: Codeine; Penicillins; Vicodin [hydrocodone-acetaminophen]; and Adhesive tape    -------------------------------------------------- RESULTS -------------------------------------------------  Labs:  Results for orders placed or performed during the hospital encounter of 08/13/20   Urinalysis   Result Value Ref Range    Color, UA Yellow Straw/Yellow    Clarity, UA SLCLOUDY Clear    Glucose, Ur Negative Negative mg/dL    Bilirubin Urine Negative Negative    Ketones, Urine Negative Negative mg/dL    Specific Gravity, UA >=1.030 1.005 - 1.030    Blood, Urine MODERATE (A) Negative    pH, UA 5.0 5.0 - 9.0    Protein, UA TRACE Negative mg/dL    Urobilinogen, Urine 0.2 <2.0 E.U./dL    Nitrite, Urine Negative Negative    Leukocyte Esterase, Urine TRACE (A) Negative   CBC auto differential   Result Value Ref Range    WBC 5.5 4.5 - 11.5 E9/L    RBC 4.27 3.50 - 5.50 E12/L    Hemoglobin 12.9 11.5 - 15.5 g/dL    Hematocrit 40.3 34.0 - 48.0 %    MCV 94.4 80.0 - 99.9 fL    MCH 30.2 26.0 - 35.0 pg    MCHC 32.0 32.0 - 34.5 %    RDW 15.0 11.5 - 15.0 fL    Platelets 284 451 - 199 E9/L    MPV 10.3 7.0 - 12.0 fL    Neutrophils % 68.4 43.0 - 80.0 %    Immature Granulocytes % 0.2 0.0 - 5.0 %    Lymphocytes % 17.3 (L) 20.0 - 42.0 %    Monocytes % 10.3 2.0 - 12.0 %    Eosinophils % 2.5 0.0 - 6.0 %    Basophils % 1.3 0.0 - 2.0 %    Neutrophils Absolute 3.79 1.80 - 7.30 E9/L    Immature Granulocytes # 0.01 E9/L    Lymphocytes Absolute 0.96 (L) 1.50 - 4.00 E9/L    Monocytes Absolute 0.57 0.10 - 0.95 E9/L    Eosinophils Absolute 0.14 0.05 - 0.50 E9/L    Basophils Absolute 0.07 0.00 - 0.20 E9/L   Comprehensive Metabolic Panel   Result Value Ref Range    Sodium 139 132 - 146 mmol/L Potassium 4.1 3.5 - 5.0 mmol/L    Chloride 101 98 - 107 mmol/L    CO2 26 22 - 29 mmol/L    Anion Gap 12 7 - 16 mmol/L    Glucose 118 (H) 74 - 99 mg/dL    BUN 28 (H) 8 - 23 mg/dL    CREATININE 0.9 0.5 - 1.0 mg/dL    GFR Non-African American 60 >=60 mL/min/1.73    GFR African American >60     Calcium 9.5 8.6 - 10.2 mg/dL    Total Protein 7.2 6.4 - 8.3 g/dL    Alb 4.0 3.5 - 5.2 g/dL    Total Bilirubin 0.6 0.0 - 1.2 mg/dL    Alkaline Phosphatase 78 35 - 104 U/L    ALT 15 0 - 32 U/L    AST 25 0 - 31 U/L   Lipase   Result Value Ref Range    Lipase 29 13 - 60 U/L   Lactic Acid, Plasma   Result Value Ref Range    Lactic Acid 0.9 0.5 - 2.2 mmol/L   Microscopic Urinalysis   Result Value Ref Range    WBC, UA 5-10 (A) 0 - 5 /HPF    RBC, UA 2-5 0 - 2 /HPF    Bacteria, UA RARE (A) None Seen /HPF       Radiology:  CT ABDOMEN PELVIS WO CONTRAST Additional Contrast? None   Final Result   1. 5 mm calculus in the left ureter with mild hydroureteronephrosis. 2. Multiple additional nonobstructing calculi bilaterally. 3. Cardiomegaly and bibasilar ground-glass opacities suggest pulmonary edema. ------------------------- NURSING NOTES AND VITALS REVIEWED ---------------------------  Date / Time Roomed:  8/13/2020  2:28 AM  ED Bed Assignment:  03/03    The nursing notes within the ED encounter and vital signs as below have been reviewed. BP (!) 146/92   Pulse 108   Temp 98.1 °F (36.7 °C) (Oral)   Resp 16   Wt 241 lb (109.3 kg)   SpO2 93%   BMI 38.90 kg/m²   Oxygen Saturation Interpretation: Normal      ------------------------------------------ PROGRESS NOTES ------------------------------------------  I have spoken with the patient and discussed todays results, in addition to providing specific details for the plan of care and counseling regarding the diagnosis and prognosis. Their questions are answered at this time and they are agreeable with the plan.  I discussed at length with them reasons for immediate

## 2020-08-13 NOTE — ED NOTES
Bed: 03  Expected date:   Expected time:   Means of arrival:   Comments:  Bernice Gong RN  08/13/20 1919

## 2020-08-14 ENCOUNTER — CARE COORDINATION (OUTPATIENT)
Dept: CARE COORDINATION | Age: 85
End: 2020-08-14

## 2020-08-14 NOTE — CARE COORDINATION
Patient contacted regarding recent discharge and COVID-19 risk. Discussed COVID-19 related testing which was not done at this time. Test results were not done. Patient informed of results, if available? N/A     Care Transition Nurse/ Ambulatory Care Manager contacted the patient by telephone to perform post discharge assessment. Verified name and  with patient as identifiers. Patient has following risk factors of: heart failure and COPD. CTN/ACM reviewed discharge instructions, medical action plan and red flags related to discharge diagnosis. Reviewed and educated them on any new and changed medications related to discharge diagnosis. Advised obtaining a 90-day supply of all daily and as-needed medications. [place] reports she is taking her Omnicef, tramadol and ondansetron as prescribed. MyChart and LOOP program were explained and she declined. ACM left voicemail for Dr Agrawal's office with contact information to schedule follow up appointment. Education provided regarding infection prevention, and signs and symptoms of COVID-19 and when to seek medical attention with patient who verbalized understanding. Discussed exposure protocols and quarantine from [address] you at higher risk for severe illness  and given an opportunity for questions and concerns. The patient agrees to contact the COVID-19 hotline [phone] or PCP office for questions related to their healthcare. CTN/ACM provided contact information for future reference. From CDC: Are you at higher risk for severe illness?  Wash your hands often.  Avoid close contact (6 feet, which is about two arm lengths) with people who are sick.  Put distance between yourself and other people if COVID-19 is spreading in your community.  Clean and disinfect frequently touched surfaces.  Avoid all cruise travel and non-essential air travel.    Call your healthcare professional if you have concerns about COVID-19 and your

## 2020-08-14 NOTE — CARE COORDINATION
AMRITA invited Kellen Keller to care coordination and she is in agreement. She was active with care coordination in the past.  She admits that she has retired. She states she has difficulty ambulating and uses assistive devices. She was in ED 8/13/20 for a kidney stone and is currently on antibiotic therapy. AMRITA attempted to assist with scheduling a follow up visit with Dr Emily Madrid via 3 way call. Due to long wait time AMRITA left detailed voicemail for office to contact Kellen Keller for follow visit. Mary Hall requested that we end our call and complete enrollment at another time. PLAN  Complete enrollment with next interaction. Continue care coordination.

## 2020-08-15 LAB
ORGANISM: ABNORMAL
URINE CULTURE, ROUTINE: ABNORMAL

## 2020-09-14 RX ORDER — FUROSEMIDE 40 MG/1
40 TABLET ORAL DAILY
Qty: 60 TABLET | Refills: 3 | Status: SHIPPED | OUTPATIENT
Start: 2020-09-14 | End: 2020-10-27 | Stop reason: SDUPTHER

## 2020-09-15 ENCOUNTER — TELEPHONE (OUTPATIENT)
Dept: FAMILY MEDICINE CLINIC | Age: 85
End: 2020-09-15

## 2020-09-15 NOTE — TELEPHONE ENCOUNTER
LSW received call from patient. Reported spouse is currently hospitalized at [facility], [place], [place]. Had gone to [facility] 8/15 and 8/16 to celebrate their 63rd wedding anniversary and visit spouse's college friend; spouse fell on 16th when stepping up on the curb at the hotel. Taken to nearest hospital, has a fractured pelvis and tailbone. Discharge plan is for skilled nursing facility (SNF); the hospitial  is trying to get spouse into [facility] but insurance is stalling. Patient wanted to call spouse's insurance company but needs LSW assistance in locating customer service number since doesn't have insurance card (or doesn't remember where put spouse's wallet if she brought it home). Patient also concerned about transferring spouse to SNF if can't take ambulance. Alternate plan is for son in law to drive patient's Marielena Camps to transport. LSW offered to first facilitate conference call with patient to hospital  to confirm where at in the process of approval for SNF. LSW and patient spoke with hospital , Aisha Riddle; confirmed just waiting for insurance approval; had to send in more clinical notes today. Not sure if spouse will qualify for ambulance, ambulette is option but costly. LSW also made conference call with patient to [facility], spoke with Sam Howard in admissions. Confirmed just waiting for insurance company; hospital sent more clinical notes for Sam Howard to send into [employer], normally takes a day or two; hope to have answer by 17th. Will call patient as soon as insurance approves of spouse's admission. Patient reported spouse has said a couple of times that assisted living is sounding better each time something needs done around home. Would consider alternative living but has financial issue with second mortgage.   LSW referred patient to [facility] [place] [address] for assistance with assisted living or other resources. Patient and spouse still receiving home delivered meals from [employer] through [employer], but over income for [employer]. Patient then reported having some problems of her own with getting dizzy when getting up, could be new meds taking for depression, also needs help setting up meds and getting incontinence supplies; has call into daughter who is at work until [address] connected patient with care coordination by contacting RN Care Manager who had attempted to call spouse to enroll in program and will call shortly. RN Care Manager is also going to make referral to care coordination . Patient to reach out to RN Care Manager or PCP if needed. Patient very appreciative of [facility] assistance.

## 2020-09-16 ENCOUNTER — CARE COORDINATION (OUTPATIENT)
Dept: CARE COORDINATION | Age: 85
End: 2020-09-16

## 2020-09-16 ASSESSMENT — ENCOUNTER SYMPTOMS: DYSPNEA ASSOCIATED WITH: MINIMAL EXERTION

## 2020-09-16 NOTE — CARE COORDINATION
Ambulatory Care Coordination Note  9/16/2020  CM Risk Score: 11  Charlson 10 Year Mortality Risk Score: 98%     ACC: Destini Garcia, WALTER    Summary Note:   [facility] spoke with [place] for COPD/CHF follow up and to complete enrollment. She states her  is currently hospitalized with a broken pelvis from a fall. She is living alone but reports she does have a life alert button on her at all times. She states her daughter checks in on her daily. She uses her cane or walker for ambulation, M discussed fall prevention and safety practices. She admits difficulty with daily tasks. She receives prepared meals. Medications were reviewed at length and she admits she has difficulty keeping \"all the pills straight\". She reports she is having difficulty with her medications because of child proof lids. [facility] discussed pre-packaged medications, but [place] states she did not have success with this in the past because her lasix and coumadin doses can change between deliveries. She states she recently had lasix delivered to her home from [email or web address] in [place]. She has difficulty with transportation, she no longer drives and her  is hospitalized. She states she did have [address] in the past for INR draws and help with her medications. [place] states she weighs herself daily and she was 242 lbs on 9/10/20 and has gained steadily to 248 lbs this morning. She reports shortness of breath with activity and she cannot lay flat to sleep. She denies any increase in swelling in her legs. She reports she is sometimes dizzy and has some relief with meclizine, but she feels her dizziness has been more frequent. She denies any wheezing or increase in cough. She denies using her nebulizer but reports she has used her \"puffer\". She does not have a future appointment scheduled and states she does not know when she will have transportation.  [facility] discussed that she needs to have an INR drawn, her last one was 7/30/20, and her last office visit was 5/4/2020. PLAN  LSW referral.  Notify PCP of 6 lb weight gain and need for INR. Continue to follow for care coordination. Care Coordination Interventions    Program Enrollment:  Complex Care  Referral from Primary Care Provider:  No  Suggested Interventions and Community Resources  Fall Risk Prevention: In Process  Disease Specific Clinic:  Not Started (Comment: CHF Clinic)  Home Care Waiver:  Completed (Comment: [employer])  [address]: In Process (Comment: request for med compliance and INR checks)  Meals on Wheels:  Completed (Comment: [employer])  Senior Services:  Not Started (Comment: DHEO needs referred)  Social Work:  In Process (Comment: referral made)  Other Services:  Completed (Comment: [employer])  Transportation Support:  Not Started  Zone Management Tools: In Process         Goals Addressed                 This Visit's Progress     Conditions and Symptoms        I will schedule office visits, as directed by my provider. I will keep my appointment or reschedule if I have to cancel. I will notify my provider of any barriers to my plan of care. I will follow my Zone Management tool to seek urgent or emergent care. I will notify my provider of any symptoms that indicate a worsening of my condition. Barriers: impairment:  hearing, lack of support, overwhelmed by complexity of regimen, and stress  Plan for overcoming my barriers: COPD/CHF zones handouts, care coordination  Confidence: 7/10  Anticipated Goal Completion Date: 12/16/20         Medication Management        I will take my medication as directed. I will notify my provider of any problems with medications, like adverse effects or side effects. I will notify my provider for advice before I stop taking any of my medication.     Barriers: impairment:  hearing, lack of support, and overwhelmed by complexity of regimen  Plan for overcoming my barriers: Care Coordination, LSW referral  Confidence: 8/10  Anticipated Goal Completion Date: 12/16/20            Prior to Admission medications    Medication Sig Start Date End Date Taking? Authorizing Provider   furosemide (LASIX) 40 MG tablet Take 1 tablet by mouth daily 9/14/20  Yes Cindi Pearl MD   ondansetron (ZOFRAN ODT) 4 MG disintegrating tablet Take 1 tablet by mouth every 8 hours as needed for Nausea or Vomiting 8/13/20 8/13/21 Yes Ted Vargas DO   escitalopram (LEXAPRO) 5 MG tablet TAKE 1 TABLET BY MOUTH EVERY DAY 7/29/20  Yes Cindi Pearl MD   levothyroxine (SYNTHROID) 125 MCG tablet Take 1 tablet by mouth Daily 6/30/20  Yes Yamilet Mcgarry DO   metoprolol succinate (TOPROL XL) 50 MG extended release tablet Take 1.5 tablets by mouth daily 6/23/20  Yes aYmilet Mcgarry DO   potassium chloride (KLOR-CON M) 20 MEQ extended release tablet Take 1 tablet by mouth daily 6/22/20  Yes Yamilet Mcgarry DO   spironolactone (ALDACTONE) 25 MG tablet Take 0.5 tablets by mouth daily 5/13/20  Yes Lien Aranda MD   Vitamin D, Ergocalciferol, 50 MCG (2000 UT) CAPS Take 2,000 Units by mouth   Yes Historical Provider, MD   b complex vitamins capsule Take 1 capsule by mouth daily   Yes Historical Provider, MD   lisinopril (PRINIVIL;ZESTRIL) 10 MG tablet Take 1 tablet by mouth daily 5/4/20 5/4/21 Yes Cindi Pearl MD   warfarin (COUMADIN) 4 MG tablet Take 4 mg on Tuesday and Friday, take 8 mg on all other days.  5/4/20  Yes Cindi Pearl MD   meclizine (ANTIVERT) 25 MG tablet Take 1 tablet by mouth 3 times daily as needed for Dizziness 4/24/20 4/24/21 Yes Cindi Pearl MD   famotidine (PEPCID) 20 MG tablet Take one tablet by mouth daily 2/24/20  Yes Cindi Pearl MD   levalbuterol Judith [place]ond) 0.63 MG/3ML nebulization Take 3 mLs by nebulization every 8 hours as needed for Wheezing DX: COPD J44.9 2/24/20  Yes Cindi Pearl MD   Ascorbic Acid (VITAMIN C) 250 MG tablet Take 250 mg by mouth daily   Yes Historical Provider, symptoms that are causing concern?:  No

## 2020-09-23 ENCOUNTER — TELEPHONE (OUTPATIENT)
Dept: FAMILY MEDICINE CLINIC | Age: 85
End: 2020-09-23

## 2020-09-23 NOTE — TELEPHONE ENCOUNTER
LSW called [address] to inquire if they would also provide home care for patient while providing home care for patient's spouse. PCP reported [address] had called C inquiring if PCP would be following patient's spouse upon discharge from facility and write order for his needed home care. LSW discussed patient and RN ACM request for home care for skilled nursing for med management and INR draws, plus PT/OT. PCP will order [address] if they will accept patient. LSW spoke to Vijay Dias [phone], business liaison for [facility]. Clarified patient's spouse was discharged 9/19 from hospital to [facility]; plan is to discharge home 9/26 with home care for nursing, PT/OT SNF with home care. LSW expressed concerns over only 1 week at SNF since patient was having own difficulty with ADL's. Vijay Dias reported after reading spouse fell at a hotel, Vijay Dias called patient to inquire if fall was under spouse's own insurance or if accident claim was filed against hotel insurance. Spouse denied any accident claim filed against [employer]; also reported someone had been staying at the home, (believes it was son-in-law), who worked in [place] so would be able to continue to stay with them and just leave for work from their home. Vijay Dias stated she would speak with SNF regarding LSW concern over spouse's short stay and a safety plan for home due to patient's own difficulty and need for home care. Vijay Dias advised [facility] has provided home care for couples; advised order for patient to be faxed to main intake office [phone].

## 2020-09-23 NOTE — TELEPHONE ENCOUNTER
Please fax home health care order to [facility] [phone], for skilled nursing for med compliance and INR, and PT/OT. Demi plans to provide home health care for spouse upon spouse's discharge form [facility] on 9/26. LSW also provided status update to RN AMRITA, who also made referral to Care Coordination . If home situation warrants in home assessment, PCP could place order for [address]  consult for home assessment and community resources.

## 2020-09-24 ENCOUNTER — CARE COORDINATION (OUTPATIENT)
Dept: CARE COORDINATION | Age: 85
End: 2020-09-24

## 2020-09-24 ASSESSMENT — ENCOUNTER SYMPTOMS: DYSPNEA ASSOCIATED WITH: EXERTION

## 2020-09-24 NOTE — CARE COORDINATION
Ambulatory Care Coordination Note  9/24/2020  CM Risk Score: 7  Charlson 10 Year Mortality Risk Score: 98%     ACC: Mary Rivera, RN    Summary Note:   [facility] spoke with [place] for COPD/CHF follow up. She reports today's weight 241.5 down from 248 on 9/16/20. She denies any increased shortness of breath, and states her breathing has been good. She states she has her medications \"set up\" and is taking them as prescribed. She expressed interest in having all of her medications delivered to her home. She reports she is using her walker or cane at all times. She admits to difficulty with daily tasks. [facility] updated her that a social work referral has been made for her and she should be contacted soon for assessment. [facility] advised her that she needs to schedule an appointment for her INR draw and to be assessed for home care, she states she will call today to schedule. PLAN  Review medications and daily weights with next interaction. Continue to follow for care coordination. Care Coordination Interventions    Program Enrollment:  Complex Care  Referral from Primary Care Provider:  No  Suggested Interventions and Community Resources  Fall Risk Prevention:  Completed  Disease Specific Clinic:  Not Started (Comment: CHF Clinic)  Home Care Waiver:  Completed (Comment: [employer])  [address]: In Process (Comment: request for med compliance and INR checks)  Meals on Wheels:  Completed (Comment: [employer])  Senior Services:  Not Started (Comment: DHEO needs referred)  Social Work:  Completed (Comment: referral made)  Other Services:  Completed (Comment: [employer])  Transportation Support: In Process  Zone Management Tools: In Process (Comment: COPD/CHF)         Goals Addressed                 This Visit's Progress     Conditions and Symptoms   On track     I will schedule office visits, as directed by my provider. I will keep my appointment or reschedule if I have to cancel.   I will notify my provider of any barriers to my plan of care. I will follow my Zone Management tool to seek urgent or emergent care. I will notify my provider of any symptoms that indicate a worsening of my condition. Barriers: impairment:  hearing, lack of support, overwhelmed by complexity of regimen, and stress  Plan for overcoming my barriers: COPD/CHF zones handouts, care coordination  Confidence: 7/10  Anticipated Goal Completion Date: 12/16/20         Medication Management   On track     I will take my medication as directed. I will notify my provider of any problems with medications, like adverse effects or side effects. I will notify my provider for advice before I stop taking any of my medication. Barriers: impairment:  hearing, lack of support, and overwhelmed by complexity of regimen  Plan for overcoming my barriers: Care Coordination, LSW referral  Confidence: 8/10  Anticipated Goal Completion Date: 12/16/20            Prior to Admission medications    Medication Sig Start Date End Date Taking?  Authorizing Provider   furosemide (LASIX) 40 MG tablet Take 1 tablet by mouth daily 9/14/20   Tee Ron MD   ondansetron (ZOFRAN ODT) 4 MG disintegrating tablet Take 1 tablet by mouth every 8 hours as needed for Nausea or Vomiting 8/13/20 8/13/21  Ted Vargas DO   escitalopram (LEXAPRO) 5 MG tablet TAKE 1 TABLET BY MOUTH EVERY DAY 7/29/20   Tee Ron MD   levothyroxine (SYNTHROID) 125 MCG tablet Take 1 tablet by mouth Daily 6/30/20   Kenyetta Goddard DO   metoprolol succinate (TOPROL XL) 50 MG extended release tablet Take 1.5 tablets by mouth daily 6/23/20   Kenyetta Goddard DO   potassium chloride (KLOR-CON M) 20 MEQ extended release tablet Take 1 tablet by mouth daily 6/22/20   Kenyetta Goddard DO   spironolactone (ALDACTONE) 25 MG tablet Take 0.5 tablets by mouth daily 5/13/20   Juan Murphy MD   spironolactone (ALDACTONE) 25 MG tablet Take 1 tablet by mouth daily  Patient not taking: Reported on 9/16/2020 5/12/20   Mena Pena MD   Vitamin D, Ergocalciferol, 50 MCG (2000 UT) CAPS Take 2,000 Units by mouth    Historical Provider, MD   b complex vitamins capsule Take 1 capsule by mouth daily    Historical Provider, MD   lisinopril (PRINIVIL;ZESTRIL) 10 MG tablet Take 1 tablet by mouth daily 5/4/20 5/4/21  Bill Griggs MD   warfarin (COUMADIN) 4 MG tablet Take 4 mg on Tuesday and Friday, take 8 mg on all other days.  5/4/20   Bill Griggs MD   meclizine (ANTIVERT) 25 MG tablet Take 1 tablet by mouth 3 times daily as needed for Dizziness 4/24/20 4/24/21  Bill Griggs MD   guaiFENesin [facility]) 600 MG extended release tablet Take 1,200 mg by mouth 2 times daily as needed     Historical Provider, MD   famotidine (PEPCID) 20 MG tablet Take one tablet by mouth daily 2/24/20   Bill Griggs MD   levalbuterol Niesha Mention) 0.63 MG/3ML nebulization Take 3 mLs by nebulization every 8 hours as needed for Wheezing DX: COPD J44.9 2/24/20   Bill Griggs MD   Ascorbic Acid (VITAMIN C) 250 MG tablet Take 250 mg by mouth daily    Historical Provider, MD   PROAIR  (90 Base) MCG/ACT inhaler USE 2 PUFFS EVERY 6 HOURS  AS NEEDED FOR WHEEZING 10/21/19   Bill Griggs MD   acetaminophen (TYLENOL) 325 MG tablet Take 650 mg by mouth every 6 hours as needed for Pain or Fever    Historical Provider, MD       Future Appointments   Date Time Provider Hernandez Mcintyre   9/30/2020  9:00 AM SE [facility] Ginny Sauce [facility]   10/19/2020  3:20 PM MD Ginny Edmond [facility]     ,   Congestive Heart Failure Assessment    Are you currently restricting fluids?:  No Restriction  Do you understand a low sodium diet?:  Yes  Do you understand how to read food labels?:  Yes  How many restaurant meals do you eat per week?:  1-2  Do you salt your food before tasting it?:  No     Other symptoms causing concern      Symptoms:   CHF associated angina: Neg, CHF associated dyspnea on exertion: Pos, CHF associated fatigue: Neg, CHF associated leg swelling: Neg, CHF associated orthostatic hypotension: Neg, CHF associated PND: Neg, CHF associated shortness of breath: Neg, CHF associated weakness: Pos      Symptom course:  stable  Patient-reported weight (lb):  241.5  Weight trend:  decreasing steadily  Salt intake watch compared to last visit:  stable     ,   COPD Assessment    Does the patient understand envrionmental exposure?:  Yes  Is the patient able to verbalize Rescue vs. Long Acting medications?:  Yes  Does the patient have a nebulizer?:  Yes  Does the patient use a space with inhaled medications?:  No     No patient-reported symptoms         Symptoms:      Symptom course:  stable  Breathlessness:  exertion  Increase use of rapid acting/rescue inhaled medications?:  No  Change in chronic cough?:  No/At Baseline  Change in sputum?:  No/At Baseline  Self Monitoring - SaO2:  No      and   General Assessment    Do you have any symptoms that are causing concern?:  No

## 2020-09-24 NOTE — TELEPHONE ENCOUNTER
Home Health orders require a face to face visit to document need for HHC. I haven't seen patient since May. She would need a visit with me or another physician to document need for [facility] before I can provide an order.

## 2020-09-24 NOTE — TELEPHONE ENCOUNTER
Patient scheduled for face to face  9/30/2020 @ 11 am. She has an appointment in [facility] in the afternoon.

## 2020-09-24 NOTE — TELEPHONE ENCOUNTER
Please schedule with me for face-to face. I could do this at 11 AM or 1 PM on 9/30 if that is when she is coming for INR.

## 2020-09-25 ENCOUNTER — CARE COORDINATION (OUTPATIENT)
Dept: CARE COORDINATION | Age: 85
End: 2020-09-25

## 2020-09-25 NOTE — CARE COORDINATION
Tc to pt for follow up on social work referral. Pt states she does need assistance with transportation to appts because her daughter works 6a-2p and while she has tried to change things to fit her schedule, it is difficult. Pt states for her appts on September 30th, her daughter is going to take the day off to provide transport. Writer provided number for [facility] ([phone]), for their senior transportation and also advised open hours from 8a-5p, M-F. Pt plans to call today to find out their process and how to schedule. Writer will send flyer in the mail so that pt has it for reference. Writer did assessment and pt states she needs assistance with making her bed. States she has a dog that sleeps with her every night and it's can be difficult to make it daily. Pt then stated she has an aide through [employer] that comes to her home every other week for homemaking services but she feels she isn't there long enough. Writer asked if this was a service she pays for or if there is another program that provides the service, which she confirmed she does not pay for the aide services. Pt also states she is having trouble with general cleaning, but that her daughter comes and does a lot of cleaning when she gets off work. Also states she has had some falls and feels she has injured her ribs when she falls. She admits her home is cluttered and she is working on that. Pt states she has had an assessment from the area on aging, however is agreeable to another referral to be sure. Writer to send flyer for transportation to pt and make referral to area on aging.      Reji Bacon  MSW Student

## 2020-09-29 NOTE — CARE COORDINATION
TC to [facility], spoke with [facility] in Intake. States pt had an assessment in February for Passport, however she was not interested at that time because of the amount she would have to pay. [facility] states she is likely getting services from [employer] through [employer] or [employer], but she can't confirm that because [employer] it themselves. [facility] took referral for personal care and homemaker services.  will likely call pt within the week for an over-the-phone assessment. Writer to inform pt.

## 2020-09-29 NOTE — CARE COORDINATION
TC to pt to inform her of referral to [facility]. Writer advised pt that they will call within the week to do an assessment over the phone and to be expecting their call. Pt verbalized understanding.      Vesna Hollis  MSW Student

## 2020-09-30 ENCOUNTER — OFFICE VISIT (OUTPATIENT)
Dept: FAMILY MEDICINE CLINIC | Age: 85
End: 2020-09-30
Payer: MEDICARE

## 2020-09-30 ENCOUNTER — ANTI-COAG VISIT (OUTPATIENT)
Dept: FAMILY MEDICINE CLINIC | Age: 85
End: 2020-09-30

## 2020-09-30 ENCOUNTER — TELEPHONE (OUTPATIENT)
Dept: FAMILY MEDICINE CLINIC | Age: 85
End: 2020-09-30

## 2020-09-30 VITALS
OXYGEN SATURATION: 98 % | BODY MASS INDEX: 38.57 KG/M2 | TEMPERATURE: 97.9 F | WEIGHT: 240 LBS | SYSTOLIC BLOOD PRESSURE: 127 MMHG | HEART RATE: 80 BPM | RESPIRATION RATE: 20 BRPM | DIASTOLIC BLOOD PRESSURE: 82 MMHG | HEIGHT: 66 IN

## 2020-09-30 PROBLEM — R54 AGE-RELATED PHYSICAL DEBILITY: Chronic | Status: ACTIVE | Noted: 2020-09-30
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
## 2020-09-30 PROBLEM — I50.9 ACUTE DECOMPENSATED HEART FAILURE (HCC): Status: RESOLVED | Noted: 2020-05-11 | Resolved: 2020-09-30

## 2020-09-30 PROBLEM — R29.6 FREQUENT FALLS: Chronic | Status: ACTIVE | Noted: 2020-09-30
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
## 2020-09-30 LAB
INR BLD: 1.5
INTERNATIONAL NORMALIZATION RATIO, POC: 1.5
PROTHROMBIN TIME, POC: 17.7

## 2020-09-30 PROCEDURE — 99214 OFFICE O/P EST MOD 30 MIN: CPT | Performed by: FAMILY MEDICINE

## 2020-09-30 PROCEDURE — G8926 SPIRO NO PERF OR DOC: HCPCS | Performed by: FAMILY MEDICINE

## 2020-09-30 PROCEDURE — G8427 DOCREV CUR MEDS BY ELIG CLIN: HCPCS | Performed by: FAMILY MEDICINE

## 2020-09-30 PROCEDURE — 1036F TOBACCO NON-USER: CPT | Performed by: FAMILY MEDICINE

## 2020-09-30 PROCEDURE — 99212 OFFICE O/P EST SF 10 MIN: CPT | Performed by: FAMILY MEDICINE

## 2020-09-30 PROCEDURE — 85610 PROTHROMBIN TIME: CPT | Performed by: FAMILY MEDICINE

## 2020-09-30 PROCEDURE — 1090F PRES/ABSN URINE INCON ASSESS: CPT | Performed by: FAMILY MEDICINE

## 2020-09-30 PROCEDURE — 1123F ACP DISCUSS/DSCN MKR DOCD: CPT | Performed by: FAMILY MEDICINE

## 2020-09-30 PROCEDURE — 4040F PNEUMOC VAC/ADMIN/RCVD: CPT | Performed by: FAMILY MEDICINE

## 2020-09-30 PROCEDURE — 3023F SPIROM DOC REV: CPT | Performed by: FAMILY MEDICINE

## 2020-09-30 PROCEDURE — G8399 PT W/DXA RESULTS DOCUMENT: HCPCS | Performed by: FAMILY MEDICINE

## 2020-09-30 PROCEDURE — G8417 CALC BMI ABV UP PARAM F/U: HCPCS | Performed by: FAMILY MEDICINE

## 2020-09-30 RX ORDER — WARFARIN SODIUM 4 MG/1
TABLET ORAL
Qty: 60 TABLET | Refills: 5 | Status: SHIPPED | OUTPATIENT
Start: 2020-09-30 | End: 2020-10-27 | Stop reason: SDUPTHER

## 2020-09-30 RX ORDER — METOPROLOL SUCCINATE 50 MG/1
TABLET, EXTENDED RELEASE ORAL
COMMUNITY
Start: 2020-06-23 | End: 2020-09-30

## 2020-09-30 NOTE — TELEPHONE ENCOUNTER
Lorna Rivera from [facility] called in and is needing an order for [place]'s inr, she states that it be wrote out as inr twice weekly.   Thank you

## 2020-09-30 NOTE — PATIENT INSTRUCTIONS
Patient Education        Preventing Falls: Care Instructions  Your Care Instructions     Getting around your home safely can be a challenge if you have injuries or health problems that make it easy for you to fall. Loose rugs and furniture in walkways are among the dangers for many older people who have problems walking or who have poor eyesight. People who have conditions such as arthritis, osteoporosis, or dementia also have to be careful not to fall. You can make your home safer with a few simple measures. Follow-up care is a key part of your treatment and safety. Be sure to make and go to all appointments, and call your doctor if you are having problems. It's also a good idea to know your test results and keep a list of the medicines you take. How can you care for yourself at home? Taking care of yourself  · You may get dizzy if you do not drink enough water. To prevent dehydration, drink plenty of fluids, enough so that your urine is light yellow or clear like water. Choose water and other caffeine-free clear liquids. If you have kidney, heart, or liver disease and have to limit fluids, talk with your doctor before you increase the amount of fluids you drink. · Exercise regularly to improve your strength, muscle tone, and balance. Walk if you can. Swimming may be a good choice if you cannot walk easily. · Have your vision and hearing checked each year or any time you notice a change. If you have trouble seeing and hearing, you might not be able to avoid objects and could lose your balance. · Know the side effects of the medicines you take. Ask your doctor or pharmacist whether the medicines you take can affect your balance. Sleeping pills or sedatives can affect your balance. · Limit the amount of alcohol you drink. Alcohol can impair your balance and other senses. · Ask your doctor whether calluses or corns on your feet need to be removed.  If you wear loose-fitting shoes because of calluses or corns, you can lose your balance and fall. · Talk to your doctor if you have numbness in your feet. Preventing falls at home  · Remove raised doorway thresholds, throw rugs, and clutter. Repair loose carpet or raised areas in the floor. · Move furniture and electrical cords to keep them out of walking paths. · Use nonskid floor wax, and wipe up spills right away, especially on ceramic tile floors. · If you use a walker or cane, put rubber tips on it. If you use crutches, clean the bottoms of them regularly with an abrasive pad, such as steel wool. · Keep your house well lit, especially [place], and outside walkways. Use night-lights in areas such as hallways and bathrooms. Add extra light switches or use remote switches (such as switches that go on or off when you clap your hands) to make it easier to turn lights on if you have to get up during the night. · Install sturdy handrails on stairways. · Move items in your cabinets so that the things you use a lot are on the lower shelves (about waist level). · Keep a cordless phone and a flashlight with new batteries by your bed. If possible, put a phone in each of the main rooms of your house, or carry a cell phone in case you fall and cannot reach a phone. Or, you can wear a device around your neck or wrist. You push a button that sends a signal for help. · Wear low-heeled shoes that fit well and give your feet good support. Use footwear with nonskid soles. Check the heels and soles of your shoes for wear. Repair or replace worn heels or soles. · Do not wear socks without shoes on wood floors. · Walk on the grass when the sidewalks are slippery. If you live in an area that gets snow and ice in the winter, sprinkle salt on slippery steps and sidewalks. Preventing falls in the bath  · Install grab bars and nonskid mats inside and outside your shower or tub and near the toilet and sinks. · Use shower chairs and bath benches.   · Use a hand-held shower head that will allow you to sit while showering. · Get into a tub or shower by putting the weaker leg in first. Get out of a tub or shower with your strong side first.  · Repair loose toilet seats and consider installing a raised toilet seat to make getting on and off the toilet easier. · Keep your bathroom door unlocked while you are in the shower. Where can you learn more? Go to [email or web address].[email or web address]. org and sign in to your [employer] account. Enter [phone] in the [employer] box to learn more about \"Preventing Falls: Care Instructions. \"     If you do not have an account, please click on the \"Sign Up Now\" link. Current as of: August 7, 2019               Content Version: 12.5  © 4048-9773 Healthwise, Incorporated. Care instructions adapted under license by [facility] ([facility]). If you have questions about a medical condition or this instruction, always ask your healthcare professional. [place] [address] any warranty or liability for your use of this information. Continue all medications as prescribed. Warfarin dosing:    Take two tablets today (9/30). Take 4 mg (one tablet) on Monday, Wednesday, and Friday. Take 8 mg (two tablets) on all other days. Make sure to take your medicine every day. Recheck INR in 2 weeks.

## 2020-09-30 NOTE — PROGRESS NOTES
[facility]  [facility]IDENCY PROGRAM   OFFICE PROGRESS NOTE  DATE OF VISIT : 2020    Patient : Kayla Correia   Sex : female   Age : 81 y.o.  : 1935   MRN : [id number]            Chief Complaint :   Chief Complaint   Patient presents with    Hypertension    COPD    Atrial Fibrillation    Fall     2020 left rib pain        HPI:   Kayla Correia comes to clinic today for     F/U of chronic problem(s) and new or recent complaint of recurrent falls, debility     Chronic problems reviewed today include:     Hypertension, COPD, Congestive Heart Failure and Osteoarthritis    Current status of this/these condition(s):  unstable    Tolerating meds: Yes    Additional history: [place] comes in today for a follow-up visit for her chronic problems and a new issue that is worsening debility over a long period of time. She has had recurrent falls, most recently last week when she fell and landed on her left hip and side. She did not seek care at that time but has a great deal of pain in her left hip flank and lower left rib cage. This is limited her ability to get in and out of a chair and really to move around at all. Her other medical problems are reasonably stable at this time, but it is unclear whether she is taking her medications accurately. She does use a pillbox for her medications but when I reviewed the list that she brought in on a paper, warfarin was not on it. She admits that she may have missed some doses of warfarin. Her  also fell and broke his pelvis recently. He was in the hospital for a while and then had an BENI stay of about a week. He is now home with her, but is also unable to get up and down easily. He is currently receiving physical therapy 3 times a week at home, and it has been suggested that [place] would benefit from similar treatment. She has no change in her other symptoms.   She continues to have some shortness of breath and occasional chest pains she also does have urinary incontinence, but no real problems with bowel movements. She gets dizzy at times with changes in position, and has a lot of pain in her hips and knees. She has been unable to perform most activities that are required to manage at home, although she does have assistance from a daughter. However, her daughter works during the day, and then must care for both [place] and her  through the evening. [place] states that her daughter is exhausted. During the day, [place] and her  sit in chairs. They have food available in the refrigerator, but neither of them is willing to get out of their chair to walk to the kitchen to get their food. They do get up to go to the bathroom. So they are not eating a proper diet, waiting for the daughter to come home to serve them. They continue to lead live in an older farmhouse, although they realize that they should be moving into assisted living or something similar.      Patient Active Problem List   Diagnosis    Hypertension    GERD (gastroesophageal reflux disease)    Depression    History of atrial fibrillation    Depression    Hypothyroidism    Nonischemic cardiomyopathy (HCC)    Permanent atrial fibrillation    Astigmatism    History of artificial lens replacement    Presbyopia    Tear film insufficiency    Essential hypertension    Pulmonary hypertension (HCC)    Class 2 severe obesity due to excess calories with serious comorbidity and body mass index (BMI) of 39.0 to 39.9 in adult [facility])    Tricuspid regurgitation    Dizziness    Greater trochanteric bursitis    Leg length discrepancy    Chronic anticoagulation    Chronic obstructive pulmonary disease (HCC)    Mixed stress and urge urinary incontinence    Primary osteoarthritis involving multiple joints    Fuchs' endothelial dystrophy    Pseudophakia, both eyes    Chronic diastolic (congestive) heart failure ([facility] [address]    Pure 3/22/14    CARPAL TUNNEL RELEASE      Right hand.  CATARACT REMOVAL  6/2005    right, bilateral cataract surgery.  CATARACT REMOVAL WITH IMPLANT Bilateral     CHOLECYSTECTOMY  1972    COLONOSCOPY      FINGER SURGERY  7/2011    S/P removal of cyst from right index finger.  FRACTURE SURGERY Left     arm fractured-casted, Bilateral feet fractures-casted.  INCONTINENCE SURGERY  5/2005    Urinary incontinence, S/P bladder suspension.  INNER EAR SURGERY  2011    tube place in right ear   [address]    KIDNEY STONE SURGERY  1981    KNEE ARTHROPLASTY Bilateral 9/2005    PARATHYROIDECTOMY      Three times    SHOULDER ARTHROPLASTY Left 2009    SHOULDER ARTHROPLASTY Left      shoulder total replacement.  THYROID SURGERY  5/2005    recurrent    THYROIDECTOMY  1960       Social History     Tobacco Use    Smoking status: Never Smoker    Smokeless tobacco: Never Used   Substance Use Topics    Alcohol use: No     Frequency: Never     Binge frequency: Never    Drug use: No       Review of Systems - Negative except as per HPI    Objective:    VS:  Blood pressure 127/82, pulse 80, temperature 97.9 °F (36.6 °C), temperature source Temporal, resp. rate 20, height 5' 6\" (1.676 m), weight 240 lb (108.9 kg), SpO2 98 %, not currently breastfeeding. General Appearance: Well developed, awake, alert, oriented, no acute distress. In a wheelchair. HEENT: Normocephalic,atraumatic. PERRL, EOM's intact, EAC without erythema or swelling, no pallor or icterus. Neck: Supple, symmetrical, trachea midline. No JVD. Thyroid smooth, not enlarged. Chest wall/Lung: Clear to auscultation bilaterally,  respirations unlabored. No rhonchi/wheezing/rales  Heart[de-identified]  Irregular rate and rhythm, S1and S2 normal, GII/VI ASHLEY. Extremities: There is a healing bruise on her left leg just distal to the knee and on the medial aspect. She has trace pitting edema bilaterally.   Skin: Large ecchymosis in the left flank and just above the left pelvic rim. This is quite tender to touch. Smaller bruise noted over the lower left rib cage in the posterior axillary line also very tender to touch. Musculoskeletal: She was able to rise from the wheelchair with assistance but could stand for only a few minutes. Neurologic:    Alert, oriented. Some confusion in regards to her medication. Psychiatric: Normal affect, slightly depressed mood. Most recent labs and imaging reviewed. Findings include:     INR equals 1.5 today    [place] was seen today for hypertension, copd, atrial fibrillation and fall. Diagnoses and all orders for this visit:    Nonischemic cardiomyopathy ([facility] [address]    Permanent atrial fibrillation    Essential hypertension    Pulmonary hypertension (HCC)    Chronic diastolic (congestive) heart failure (HCC)    Chronic obstructive pulmonary disease, unspecified COPD type ([facility] [address]    Hypothyroidism, unspecified type    Class 2 severe obesity due to excess calories with serious comorbidity and body mass index (BMI) of 39.0 to 39.9 in [facility])    Age-related physical debility    Frequent falls        Additional Plan:  I discussed her home situation, including recommendations for decreasing falls. I rewrote her instructions for warfarin and provided those to her. I did review all of her other medicines and no changes were made to those. She will use ice on her bruises. I spoke with her and granddaughter regarding the possibility of changing her living situation. I encouraged her to push herself to get out of her chair and use walker and cane to get to the kitchen and eat her meals as prepared. I feel that she will benefit greatly from home nursing/PT/OT care to evaluate living conditions, monitor meds, work of strengthening and balance, check INR's, and assure that she is doing the best possible with her current situation.   I will also request social work evaluation to help with available resources or perhaps to set up some type of financial consultation for possible, including changing living arrangements. More than 50% of the face-to-face time with the patient was spent doing counseling and care coordination. The total visit time was 30 minutes. Counseling and care coordination included: exercise, treatment options, medications and side effects, methods to improve adherence and living with chronic disease/pain      RTO in in 3 month(s) or sooner for any persistent, new, or worsening symptoms. Please see Patient Instructions for further counseling and information given. Advised to be adherent to the treatment plans discussed today, and please call with any questions or concerns, letting the office know of any reasons that the plans may not be followed. The risks of untreated conditions include worsening illness, injury, disability, and possibly, death. Please call if symptoms change in any way, worsen, or fail to completely resolve, as this could necessitate a change to treatment plans. Patient and/or caregiver expressed understanding. Indications and proper use of medication(s) reviewed. Potential side-effects and risks of medication(s) also explained. Patient and/or caregiver was instructed to call if any new symptoms develop prior to next visit. Health risk factors discussed and addressed. I have personally reviewed labs and/or imaging (if any).       Signed by : Asad Gotti M.D.

## 2020-10-02 ENCOUNTER — CARE COORDINATION (OUTPATIENT)
Dept: CARE COORDINATION | Age: 85
End: 2020-10-02

## 2020-10-02 NOTE — CARE COORDINATION
Ambulatory Care Coordination Note  10/2/2020  CM Risk Score: 7  Charlson 10 Year Mortality Risk Score: 98%     ACC: Reggie Tapia RN    Summary Note:   ACTERE spoke with [place] for COPD/CHF follow up. She reports that she is every fatigued today. She reports today's weight 247 lb up from 240 lb on 9/30/20. She denies any swelling in her legs, shortness of breath or chest pain. She reports she had an appointment with Dr Vania Cary and she was given samples of Myrbetriq 50 mg nightly to try. She states she is taking her medications as prescribed. She requested that we end our call because she was \"very tired\". PLAN  Notify PCP of 7 lb weight gain. Review daily weights and medicaitons with next interaction. Continue to follow for care coordination. Care Coordination Interventions    Program Enrollment:  Complex Care  Referral from Primary Care Provider:  No  Suggested Interventions and Community Resources  Fall Risk Prevention:  Completed  Disease Specific Clinic:  Not Started (Comment: CHF Clinic)  Home Care Waiver:  Completed (Comment: [employer])  [address]: In Process (Comment: request for med compliance and INR checks)  Meals on Wheels:  Completed (Comment: [employer])  Senior Services:  Not Started (Comment: DHEO needs referred)  Social Work:  Completed (Comment: referral made)  Other Services:  Completed (Comment: [employer])  Transportation Support: In Process  Zone Management Tools: In Process (Comment: COPD/CHF)         Goals Addressed                 This Visit's Progress     Conditions and Symptoms   On track     I will schedule office visits, as directed by my provider. I will keep my appointment or reschedule if I have to cancel. I will notify my provider of any barriers to my plan of care. I will follow my Zone Management tool to seek urgent or emergent care. I will notify my provider of any symptoms that indicate a worsening of my condition.     Barriers: impairment:  hearing, lack of support, overwhelmed by complexity of regimen, and stress  Plan for overcoming my barriers: COPD/CHF zones handouts, care coordination  Confidence: 7/10  Anticipated Goal Completion Date: 12/16/20         Medication Management   On track     I will take my medication as directed. I will notify my provider of any problems with medications, like adverse effects or side effects. I will notify my provider for advice before I stop taking any of my medication. Barriers: impairment:  hearing, lack of support, and overwhelmed by complexity of regimen  Plan for overcoming my barriers: Care Coordination, LSW referral  Confidence: 8/10  Anticipated Goal Completion Date: 12/16/20            Prior to Admission medications    Medication Sig Start Date End Date Taking? Authorizing Provider   warfarin (COUMADIN) 4 MG tablet Take 4 mg Monday, Wednesday, and Friday. Take 8 mg on all other days.  9/30/20   Brent Monzon MD   furosemide (LASIX) 40 MG tablet Take 1 tablet by mouth daily 9/14/20   Brent Monzon MD   ondansetron (ZOFRAN ODT) 4 MG disintegrating tablet Take 1 tablet by mouth every 8 hours as needed for Nausea or Vomiting 8/13/20 8/13/21  Ted Vargas,    escitalopram (LEXAPRO) 5 MG tablet TAKE 1 TABLET BY MOUTH EVERY DAY 7/29/20   Brent Monzon MD   levothyroxine (SYNTHROID) 125 MCG tablet Take 1 tablet by mouth Daily 6/30/20   Natalia Marian, DO   metoprolol succinate (TOPROL XL) 50 MG extended release tablet Take 1.5 tablets by mouth daily 6/23/20   Natalia Punch, DO   potassium chloride (KLOR-CON M) 20 MEQ extended release tablet Take 1 tablet by mouth daily 6/22/20   Natalia Punch, DO   spironolactone (ALDACTONE) 25 MG tablet Take 0.5 tablets by mouth daily 5/13/20   Cyndi Hobbs MD   spironolactone (ALDACTONE) 25 MG tablet Take 1 tablet by mouth daily 5/12/20   Surendra Vargas MD   Vitamin D, Ergocalciferol, 50 MCG (2000 UT) CAPS Take 2,000 Units by mouth    Historical Provider, MD   b complex vitamins capsule Take 1 capsule by mouth daily    Historical Provider, MD   lisinopril (PRINIVIL;ZESTRIL) 10 MG tablet Take 1 tablet by mouth daily 5/4/20 5/4/21  Chevy Ansari MD   meclizine (ANTIVERT) 25 MG tablet Take 1 tablet by mouth 3 times daily as needed for Dizziness 4/24/20 4/24/21  Chevy Ansari MD   guaiFENesin (MUCINEX) 600 MG extended release tablet Take 1,200 mg by mouth 2 times daily as needed     Historical Provider, MD   famotidine (PEPCID) 20 MG tablet Take one tablet by mouth daily 2/24/20   Chevy Ansari MD   levalbuterol [facility]) 0.63 MG/3ML nebulization Take 3 mLs by nebulization every 8 hours as needed for Wheezing DX: COPD J44.9 2/24/20   Chevy Ansari MD   Ascorbic Acid (VITAMIN C) 250 MG tablet Take 250 mg by mouth daily    Historical Provider, MD   PROAIR  (90 Base) MCG/ACT inhaler USE 2 PUFFS EVERY 6 HOURS  AS NEEDED FOR WHEEZING 10/21/19   Chevy Ansari MD   acetaminophen (TYLENOL) 325 MG tablet Take 650 mg by mouth every 6 hours as needed for Pain or Fever    Historical Provider, MD       Future Appointments   Date Time Provider Hernandez Mcintyre   1/4/2021  2:00 PM MD Bladimir Cruz [facility]     ,   Congestive Heart Failure Assessment    Are you currently restricting fluids?:  No Restriction  Do you understand a low sodium diet?:  Yes  Do you understand how to read food labels?:  Yes  How many restaurant meals do you eat per week?:  1-2  Do you salt your food before tasting it?:  No     Increase in weights (more than 3lbs overnight or 5lbs in one week)      Symptoms:   CHF associated angina: Neg, CHF associated dyspnea on exertion: Pos, CHF associated fatigue: Pos, CHF associated leg swelling: Neg, CHF associated orthostatic hypotension: Neg, CHF associated PND: Neg, CHF associated shortness of breath: Neg, CHF associated weakness: Pos      Symptom course:  stable  Patient-reported weight (lb):  247  Weight trend:  increasing

## 2020-10-05 ENCOUNTER — ANTI-COAG VISIT (OUTPATIENT)
Dept: FAMILY MEDICINE CLINIC | Age: 85
End: 2020-10-05
Payer: MEDICARE

## 2020-10-05 LAB — INR BLD: 2

## 2020-10-05 PROCEDURE — 93793 ANTICOAG MGMT PT WARFARIN: CPT | Performed by: FAMILY MEDICINE

## 2020-10-05 NOTE — PROGRESS NOTES
Left message on [id number] mail with corrected dosing and next INR Date . Called patient  To make sure she had the correct dosing. She was able to correctly read back  8 mg coumadin all days except Monday, Wednesdays and fridays when she takes 4 mg of coumadin . Patient advised that Jeffrey Jaimes should recheck INR 10/19/2020. Patient verbalized understanding.

## 2020-10-06 ENCOUNTER — TELEPHONE (OUTPATIENT)
Dept: FAMILY MEDICINE CLINIC | Age: 85
End: 2020-10-06

## 2020-10-06 NOTE — TELEPHONE ENCOUNTER
Pt called in stating she was 250 lb this morning before going to the bathroom them at 11 am she was 249 lb. Per pt she was told to call if she gained any weight. Pt took one Lasix 20 mg today already. Pt also stated she saw Dr. Yoselin Person after her visit with Dr. Abdirizak Connlely and was put on a new medicine but she was not sure what it was.

## 2020-10-07 NOTE — TELEPHONE ENCOUNTER
I called her back. She had not taken any additional Lasix yet; she takes 40 mg once daily. She has had mildly increased dyspnea on exertion but not at rest, and mildly increased swelling. I advised an office visit, but she has limited transportation and is unable to come in. We discussed seeking care urgently in the ED if need be for worsening or new symptoms, and she expressed understanding. In the meantime, we will give a trial of increasing Lasix, which she has done before. She will take an additional Lasix today with a potassium (she only takes this when she takes extra Lasix, she says). She will take that now, and she will call with any change in symptoms. She will call us tomorrow with her weight and to let us know how she is feeling. We may need to give a higher dose for a couple of days. We discussed the importance of trying to avoid excess sodium in the diet; she eats mostly prepared meals that may contain some sodium, such as meatloaf. She expressed understanding. She has urinary urgency, takes Myrbetriq now; we discussed timed voiding on Lasix and taking the second dose at around 2 PM to avoid needing to use the restroom as much overnight. I asked her to please call with any questions, concerns, or symptoms and to seek care urgently if need be. She agreed. Please call her on Thursday, 10/8, to find out how she is feeling. We will likely need to check an INR if possible as well. Thank you!

## 2020-10-07 NOTE — TELEPHONE ENCOUNTER
Pt was called this morning and according to pt at 9 am she was 251 lb. Pt informed me that she took Lasix 40 mg yesterday not 20 mg. Pt was also confused on her coumadin orders so I advised her of  the instructions that were given to her on 10-5-20. I asked pt if she is experiencing any new symptoms what so ever and she stated that she always has SOB but she thinks it is worse now. Pt daughter is the one that brings her to her appointments and it is hard for pt to go any where since she does not drive any more. Pt daughter works till 3 pm everyday therefore coming back in for an appointment is very difficult also since they live kind of far.

## 2020-10-08 NOTE — CARE COORDINATION
TC to pt for final outreach. Pt states she received the information for [facility] [employer] transportation and states that they are \"in a real bind\". Writer asked pt what she meant and she stated that she called the transportation office and either they are available when she isn't or they aren't when she is, however she plans to keep trying to schedule when it's convenient. Writer confirmed it isn't that she couldn't contact anyone at the office, but that the schedules didn't align. Pt confirmed [facility] contacted her for an assessment, however she states that she is meeting with an  to discuss her options (writer assuming this is related to assisted living and her home, which was mentioned in another provider's note) and will reach out to [facility] once that meeting has happened. Pt has received resources needed and there are no further concerns at this time. Outreach complete.      Yovani Jorgensen  MSW Student

## 2020-10-12 ENCOUNTER — TELEPHONE (OUTPATIENT)
Dept: FAMILY MEDICINE CLINIC | Age: 85
End: 2020-10-12

## 2020-10-12 NOTE — TELEPHONE ENCOUNTER
[facility] [facility] called   Re anti coag results    2.2  inr  Patient reports to take   Coumadin 4 mg Monday, Wednesday and fridays   Coumadin 8  Mg all other days    [facility] care nurse Tracy Cook [phone]  Please advise

## 2020-10-13 ENCOUNTER — ANTI-COAG VISIT (OUTPATIENT)
Dept: FAMILY MEDICINE CLINIC | Age: 85
End: 2020-10-13
Payer: MEDICARE

## 2020-10-13 LAB — INR BLD: 2.2

## 2020-10-13 PROCEDURE — 93793 ANTICOAG MGMT PT WARFARIN: CPT | Performed by: FAMILY MEDICINE

## 2020-10-19 ENCOUNTER — ANTI-COAG VISIT (OUTPATIENT)
Dept: FAMILY MEDICINE CLINIC | Age: 85
End: 2020-10-19
Payer: MEDICARE

## 2020-10-19 ENCOUNTER — NURSE ONLY (OUTPATIENT)
Dept: FAMILY MEDICINE CLINIC | Age: 85
End: 2020-10-19
Payer: MEDICARE

## 2020-10-19 LAB
INTERNATIONAL NORMALIZATION RATIO, POC: 2.2
PROTHROMBIN TIME, POC: 26.9

## 2020-10-19 PROCEDURE — 93793 ANTICOAG MGMT PT WARFARIN: CPT | Performed by: FAMILY MEDICINE

## 2020-10-19 PROCEDURE — 85610 PROTHROMBIN TIME: CPT

## 2020-10-20 ENCOUNTER — CARE COORDINATION (OUTPATIENT)
Dept: CARE COORDINATION | Age: 85
End: 2020-10-20

## 2020-10-20 ASSESSMENT — ENCOUNTER SYMPTOMS: DYSPNEA ASSOCIATED WITH: MINIMAL EXERTION

## 2020-10-20 NOTE — CARE COORDINATION
Ambulatory Care Coordination Note  10/20/2020  CM Risk Score: 7  Charlson 10 Year Mortality Risk Score: 98%     ACC: Olive Raza, RN    Summary Note:   AMRITA spoke with [place] for COPD/CHF follow up. She reports she has been gaining weight slowly. She states she took 40 mg of Lasix today. She reports that her legs are swollen and she \"feels puffy\". She denies any increased shortness of breath. She admits she has difficulty with keeping her medications organized. ACM asked if home health care was assisting with her meds and she said no. She is having her INR done and she was having physical therapy which she was told will be ending and she does not feel that she is ready to be released. ACTERE discussed prepackaged medications and she is agreeable. She states she had [facility] in the past.   She states she is meeting with a  this week to [facility] about options\" for their home and future residence. She admits after her last office visit she was very fatigued and does not feel she can \"come all the way in for just an INR\" if her home health care is discontinued. Future appointments were reviewed. PLAN  Make referral to [facility]. Notify PCP of weight gain and concerns about discontinuation of home health care. Review daily weights and medications with next interaction. Continue to follow for care coordination. Care Coordination Interventions    Program Enrollment:  Complex Care  Referral from Primary Care Provider:  No  Suggested Interventions and Community Resources  Fall Risk Prevention:  Completed  Disease Specific Clinic:  Not Started (Comment: CHF Clinic)  Home Care Waiver:  Completed (Comment: [employer])  [address]:  Completed (Comment: request for med compliance and INR checks)  Meals on Wheels:  Completed ([facility] [place]  Medi Set or Pill Pack:   In Process (Comment: referral to [facility])  Occupational Therapy:  Completed  Physical Therapy: TAKE 1 TABLET BY MOUTH EVERY DAY 7/29/20   Jered Hylton MD   levothyroxine (SYNTHROID) 125 MCG tablet Take 1 tablet by mouth Daily 6/30/20   Marge Avila DO   metoprolol succinate (TOPROL XL) 50 MG extended release tablet Take 1.5 tablets by mouth daily 6/23/20   Marge Avila DO   potassium chloride (KLOR-CON M) 20 MEQ extended release tablet Take 1 tablet by mouth daily 6/22/20   Marge Avila DO   spironolactone (ALDACTONE) 25 MG tablet Take 0.5 tablets by mouth daily 5/13/20   Ulysses Aguirre MD   spironolactone (ALDACTONE) 25 MG tablet Take 1 tablet by mouth daily 5/12/20   Edward Magdaleno MD   Vitamin D, Ergocalciferol, 50 MCG (2000 UT) CAPS Take 2,000 Units by mouth    Historical Provider, MD   b complex vitamins capsule Take 1 capsule by mouth daily    Historical Provider, MD   lisinopril (PRINIVIL;ZESTRIL) 10 MG tablet Take 1 tablet by mouth daily 5/4/20 5/4/21  Jered Hylton MD   meclizine (ANTIVERT) 25 MG tablet Take 1 tablet by mouth 3 times daily as needed for Dizziness 4/24/20 4/24/21  Jered Hylton MD   guaiFENesin (MUCINEX) 600 MG extended release tablet Take 1,200 mg by mouth 2 times daily as needed     Historical Provider, MD   famotidine (PEPCID) 20 MG tablet Take one tablet by mouth daily 2/24/20   Jered Hylton MD   levalbuterol [facility]) 0.63 MG/3ML nebulization Take 3 mLs by nebulization every 8 hours as needed for Wheezing DX: COPD J44.9 2/24/20   Jered Hylton MD   Ascorbic Acid (VITAMIN C) 250 MG tablet Take 250 mg by mouth daily    Historical Provider, MD   PROAIR  (90 Base) MCG/ACT inhaler USE 2 PUFFS EVERY 6 HOURS  AS NEEDED FOR WHEEZING 10/21/19   Jered Hylton MD   acetaminophen (TYLENOL) 325 MG tablet Take 650 mg by mouth every 6 hours as needed for Pain or Fever    Historical Provider, MD       Future Appointments   Date Time Provider Hernandez Mcintyre   1/4/2021  2:00 PM MD Katlin Zuñiga [facility]     ,   Congestive Heart Failure Assessment    Are

## 2020-10-23 RX ORDER — SPIRONOLACTONE 25 MG/1
12.5 TABLET ORAL DAILY
Qty: 30 TABLET | Refills: 3 | Status: SHIPPED | OUTPATIENT
Start: 2020-10-23 | End: 2020-10-27 | Stop reason: SDUPTHER

## 2020-10-27 RX ORDER — MECLIZINE HYDROCHLORIDE 25 MG/1
25 TABLET ORAL 3 TIMES DAILY PRN
Qty: 100 TABLET | Refills: 5 | Status: SHIPPED
Start: 2020-10-27 | End: 2021-02-10 | Stop reason: SDUPTHER

## 2020-10-27 RX ORDER — WARFARIN SODIUM 4 MG/1
TABLET ORAL
Qty: 60 TABLET | Refills: 5 | Status: SHIPPED
Start: 2020-10-27 | End: 2021-01-04 | Stop reason: SDUPTHER

## 2020-10-27 RX ORDER — LEVOTHYROXINE SODIUM 0.12 MG/1
125 TABLET ORAL
Qty: 28 TABLET | Refills: 5 | Status: SHIPPED
Start: 2020-10-27 | End: 2021-03-04 | Stop reason: SDUPTHER

## 2020-10-27 RX ORDER — ESCITALOPRAM OXALATE 5 MG/1
5 TABLET ORAL EVERY EVENING
Qty: 28 TABLET | Refills: 5 | Status: SHIPPED
Start: 2020-10-27 | End: 2020-12-14

## 2020-10-27 RX ORDER — POTASSIUM CHLORIDE 20 MEQ/1
20 TABLET, EXTENDED RELEASE ORAL
Qty: 28 TABLET | Refills: 5 | Status: SHIPPED
Start: 2020-10-27 | End: 2021-01-01

## 2020-10-27 RX ORDER — VITAMIN B COMPLEX
1 CAPSULE ORAL
Qty: 28 CAPSULE | Refills: 5 | Status: SHIPPED
Start: 2020-10-27 | End: 2021-01-01

## 2020-10-27 RX ORDER — FUROSEMIDE 40 MG/1
40 TABLET ORAL
Qty: 28 TABLET | Refills: 5 | Status: SHIPPED
Start: 2020-10-27 | End: 2021-01-01 | Stop reason: SDUPTHER

## 2020-10-27 RX ORDER — ERGOCALCIFEROL (VITAMIN D2) 50 MCG
2000 CAPSULE ORAL
Qty: 28 CAPSULE | Refills: 5 | Status: SHIPPED
Start: 2020-10-27 | End: 2021-01-01

## 2020-10-27 RX ORDER — METOPROLOL SUCCINATE 50 MG/1
75 TABLET, EXTENDED RELEASE ORAL EVERY EVENING
Qty: 42 TABLET | Refills: 5 | Status: SHIPPED
Start: 2020-10-27 | End: 2021-03-16

## 2020-10-27 RX ORDER — SPIRONOLACTONE 25 MG/1
12.5 TABLET ORAL EVERY EVENING
Qty: 28 TABLET | Refills: 5 | Status: SHIPPED
Start: 2020-10-27 | End: 2020-10-28 | Stop reason: SDUPTHER

## 2020-10-27 RX ORDER — LISINOPRIL 10 MG/1
10 TABLET ORAL EVERY EVENING
Qty: 28 TABLET | Refills: 5 | Status: SHIPPED
Start: 2020-10-27 | End: 2021-03-16

## 2020-10-27 RX ORDER — MULTIVIT WITH MINERALS/LUTEIN
250 TABLET ORAL
Qty: 28 TABLET | Refills: 5 | Status: SHIPPED
Start: 2020-10-27 | End: 2021-01-01

## 2020-10-28 RX ORDER — SPIRONOLACTONE 25 MG/1
12.5 TABLET ORAL EVERY EVENING
Qty: 45 TABLET | Refills: 5 | Status: SHIPPED
Start: 2020-10-28 | End: 2021-01-01 | Stop reason: SDUPTHER

## 2020-10-29 RX ORDER — FAMOTIDINE 20 MG/1
TABLET, FILM COATED ORAL
Qty: 90 TABLET | Refills: 3 | Status: SHIPPED
Start: 2020-10-29 | End: 2020-12-14 | Stop reason: SDUPTHER

## 2020-10-29 NOTE — TELEPHONE ENCOUNTER
Last Appointment:  9/30/2020  Future Appointments   Date Time Provider Hernandez Mcintyre   1/4/2021  2:00 PM [facility]

## 2020-11-03 PROBLEM — R42 DIZZINESS: Status: RESOLVED | Noted: 2020-11-03 | Resolved: 2020-11-03

## 2020-11-04 ENCOUNTER — CARE COORDINATION (OUTPATIENT)
Dept: CARE COORDINATION | Age: 85
End: 2020-11-04

## 2020-11-04 ENCOUNTER — TELEPHONE (OUTPATIENT)
Dept: FAMILY MEDICINE CLINIC | Age: 85
End: 2020-11-04

## 2020-11-04 ENCOUNTER — ANTI-COAG VISIT (OUTPATIENT)
Dept: FAMILY MEDICINE CLINIC | Age: 85
End: 2020-11-04
Payer: MEDICARE

## 2020-11-04 LAB — INR BLD: 1.7

## 2020-11-04 PROCEDURE — 93793 ANTICOAG MGMT PT WARFARIN: CPT | Performed by: FAMILY MEDICINE

## 2020-11-04 ASSESSMENT — ENCOUNTER SYMPTOMS: DYSPNEA ASSOCIATED WITH: MINIMAL EXERTION

## 2020-11-04 NOTE — CARE COORDINATION
Therapy:  Completed  Senior Services: In Process (Comment: DHEO awaiting assessment)  Social Work:  Completed  Other Services:  Completed (Comment: Life Alert)  Transportation Support:  Completed  Zone Management Tools:  Completed (Comment: COPD/CHF)         Goals Addressed                 This Visit's Progress     Conditions and Symptoms   On track     I will schedule office visits, as directed by my provider. I will keep my appointment or reschedule if I have to cancel. I will notify my provider of any barriers to my plan of care. I will follow my Zone Management tool to seek urgent or emergent care. I will notify my provider of any symptoms that indicate a worsening of my condition. Barriers: impairment:  hearing, lack of support, overwhelmed by complexity of regimen, and stress  Plan for overcoming my barriers: COPD/CHF zones handouts, care coordination  Confidence: 7/10  Anticipated Goal Completion Date: 12/16/20         Medication Management   Improving     I will take my medication as directed. I will notify my provider of any problems with medications, like adverse effects or side effects. I will notify my provider for advice before I stop taking any of my medication. Barriers: impairment:  hearing, lack of support, and overwhelmed by complexity of regimen  Plan for overcoming my barriers: Care Coordination, W referral  Confidence: 8/10  Anticipated Goal Completion Date: 12/16/20            Prior to Admission medications    Medication Sig Start Date End Date Taking?  Authorizing Provider   famotidine (PEPCID) 20 MG tablet Take one tablet by mouth daily 10/29/20   Kyle Jansen MD   spironolactone (ALDACTONE) 25 MG tablet Take 0.5 tablets by mouth every evening 10/28/20   Gabriel Roland MD   levothyroxine (SYNTHROID) 125 MCG tablet Take 1 tablet by mouth every morning (before breakfast) 10/27/20   Kyle Jansen MD   potassium chloride (KLOR-CON M) 20 MEQ extended release tablet Take 1 tablet by mouth every morning (before breakfast) 10/27/20   Sarina Villafana MD   metoprolol succinate (TOPROL XL) 50 MG extended release tablet Take 1.5 tablets by mouth every evening 10/27/20   Sarina Villafana MD   furosemide (LASIX) 40 MG tablet Take 1 tablet by mouth every morning (before breakfast) 10/27/20   Sarina Villafana MD   lisinopril (PRINIVIL;ZESTRIL) 10 MG tablet Take 1 tablet by mouth every evening 10/27/20 4/13/21  Sarina Villafana MD   escitalopram (LEXAPRO) 5 MG tablet Take 1 tablet by mouth every evening 10/27/20   Sarina Villafana MD   Vitamin D, Ergocalciferol, 50 MCG (2000 UT) CAPS Take 2,000 Units by mouth every morning (before breakfast) 10/27/20   Sarina Villafana MD   b complex vitamins capsule Take 1 capsule by mouth every morning (before breakfast) 10/27/20   Sarina Villafana MD   Ascorbic Acid (VITAMIN C) 250 MG tablet Take 1 tablet by mouth every morning (before breakfast) 10/27/20   Sarina Villafana MD   warfarin (COUMADIN) 4 MG tablet Take 4 mg Monday, Wednesday, and Friday. Take 8 mg on all other days.  10/27/20   Sarina Villafana MD   meclizine (ANTIVERT) 25 MG tablet Take 1 tablet by mouth 3 times daily as needed for Dizziness 10/27/20 10/27/21  Sarina Villafana MD   guaiFENesin (MUCINEX) 600 MG extended release tablet Take 1,200 mg by mouth 2 times daily as needed     Historical Provider, MD   levalbuterol Patrici Roys) 0.63 MG/3ML nebulization Take 3 mLs by nebulization every 8 hours as needed for Wheezing DX: COPD J44.9 2/24/20   Sarina Villafana MD   PROAIR  (85 Base) MCG/ACT inhaler USE 2 PUFFS EVERY 6 HOURS  AS NEEDED FOR WHEEZING 10/21/19   Sarina Villafana MD   acetaminophen (TYLENOL) 325 MG tablet Take 650 mg by mouth every 6 hours as needed for Pain or Fever    Historical Provider, MD       Future Appointments   Date Time Provider Hernandez Mcintyre   11/11/2020 10:00 AM [place]   1/4/2021  2:00 PM MD Romeo [facility]     , Congestive Heart Failure Assessment    Are you currently restricting fluids?:  No Restriction  Do you understand a low sodium diet?:  Yes  Do you understand how to read food labels?:  Yes  How many restaurant meals do you eat per week?:  0  Do you salt your food before tasting it?:  No         Symptoms:   CHF associated angina: Neg, CHF associated dyspnea on exertion: Pos, CHF associated fatigue: Neg, CHF associated leg swelling: Neg, CHF associated orthostatic hypotension: Neg, CHF associated PND: Neg, CHF associated shortness of breath: Neg, CHF associated weakness: Pos      Symptom course:  stable  Patient-reported weight (lb):  248  Weight trend:  increasing steadily  Salt intake watch compared to last visit:  stable     ,   COPD Assessment    Does the patient understand envrionmental exposure?:  Yes  Is the patient able to verbalize Rescue vs. Long Acting medications?:  Yes  Does the patient have a nebulizer?:  Yes  Does the patient use a space with inhaled medications?:  No            Symptoms:      Symptom course:  stable  Breathlessness:  minimal exertion  Increase use of rapid acting/rescue inhaled medications?:  No  Change in chronic cough?:  No/At Baseline  Change in sputum?:  No/At Baseline  Self Monitoring - SaO2:  No      and   General Assessment    Do you have any symptoms that are causing concern?:  Yes  Progression since Onset:  Gradually Improving  Reported Symptoms:  Pain (Comment: back and neck)

## 2020-11-04 NOTE — TELEPHONE ENCOUNTER
Patient was discharged today for [place] [address], they are trying to obtain an at home INR  Self check machine for her to use. I had  To schedule her for a lab visit as they are no longer going out for inr checks and the hemosense will not be there  For 11/11.

## 2020-11-04 NOTE — PROGRESS NOTES
Patient advised, she write down instructions and correctly read it back. She is discharged from home care so schedule lab appointment for 11/11/2020.

## 2020-11-04 NOTE — TELEPHONE ENCOUNTER
Patient [place] [facility] [place]. Thank you so much!

## 2020-11-04 NOTE — TELEPHONE ENCOUNTER
Received a note from the care coordinator as well. Ms. Bishop Woodruff could possibly have labs drawn at the [place] site. I placed an order for PT-INR standing in Epic, which should be able to be accessed at any lab. Please be sure to have the INR drawn as advised. Thank you!

## 2020-11-05 NOTE — CARE COORDINATION
Spoke with [place] to notify her of the INR standing order and that she can go to [facility] on 11/11/20 to have an INR drawn. She verbalized understanding.

## 2020-11-17 ENCOUNTER — TELEPHONE (OUTPATIENT)
Dept: FAMILY MEDICINE CLINIC | Age: 85
End: 2020-11-17

## 2020-11-17 NOTE — TELEPHONE ENCOUNTER
Elizabeth Montiel from [employer] called in and is asking if you would like them to get an INR from [place] this week, she states that they had to go back with nursing for [place] do her medication compliance. Please call her at [phone] if you would like the INR.   Thank you

## 2020-11-17 NOTE — TELEPHONE ENCOUNTER
Message left on [facility] voicemail that it is ok to reinstate nursing visits for medication compliance and INR checks. Requested a return call for any question and concerns.

## 2020-11-17 NOTE — TELEPHONE ENCOUNTER
Patient called left message on nurse line requesting an order Reji Almonte a nurse to come from [facility] to check her INR. Patient states that she has not been feeling well enough to leave the house to go to the lab for a lab draw., please advise.

## 2020-11-20 ENCOUNTER — ANTI-COAG VISIT (OUTPATIENT)
Dept: FAMILY MEDICINE CLINIC | Age: 85
End: 2020-11-20
Payer: MEDICARE

## 2020-11-20 LAB — INR BLD: 1.7

## 2020-11-20 PROCEDURE — 93793 ANTICOAG MGMT PT WARFARIN: CPT | Performed by: FAMILY MEDICINE

## 2020-12-02 ENCOUNTER — ANTI-COAG VISIT (OUTPATIENT)
Dept: FAMILY MEDICINE CLINIC | Age: 85
End: 2020-12-02

## 2020-12-02 LAB — INR BLD: 2.5

## 2020-12-02 NOTE — PROGRESS NOTES
Home care notified to continue 4 mg on Wed and 8 mg all other days. Repeat INR in 2 weeks.  Understanding verbalized

## 2020-12-08 ENCOUNTER — CARE COORDINATION (OUTPATIENT)
Dept: CARE COORDINATION | Age: 85
End: 2020-12-08

## 2020-12-08 ASSESSMENT — ENCOUNTER SYMPTOMS: DYSPNEA ASSOCIATED WITH: MINIMAL EXERTION

## 2020-12-08 NOTE — CARE COORDINATION
Ambulatory Care Coordination Note  12/8/2020  CM Risk Score: 7  Charlson 10 Year Mortality Risk Score: 98%     ACC: Tory Talley RN     Summary Note:   AMRITA spoke with [place] for CHF/COPD follow up. She is complaining of blurred vision and eye discomfort. She states about a week ago she was attempting to apply eye drops in the middle of the night and did not verify what drops she had, and she put contact solution in her eyes. She states since then she has had the blurred vision and eye discomfort. She states her eyes feel \"grainy\". TERE discussed her contacting her opthalmologist for an appointment, she states she has gone to [facility] in the past and will call to schedule. She reports her last INR 2.5 and that she is taking an increased dose of coumadin and only has one tablet left and she is to take 2 this evening. HAOTERE contacted [facility] via 3 way call with [place] and she was instructed by the pharmacy to contact her local pharmacy [facility] for an interim prescription. [facility] then contacted [facility] via 3 way call with [place] and she was told she has a prescription for coumadin 4 mg tablets and it will be delivered to her today. [place] reports a nurse has been taking her INR, but an INR machine was brought to her house by another nurse and she does not feel confident in using it. She states she has not had any home physical therapy. [facility] discussed contacting [place]'s daughter Bernice Savage to discuss her transportation and she is in agreement. Future appointments were reviewed. PLAN  Contact Nicolasa regarding [place]'s appointments and transportation. Review daily weights and medications with next interaction. Continue to follow for care coordination.         Care Coordination Interventions    Program Enrollment:  Complex Care  Referral from Primary Care Provider:  No  Suggested Interventions and Community Resources  Fall Risk Prevention:  Completed  Disease Specific Robb Maldonado MD   acetaminophen (TYLENOL) 325 MG tablet Take 650 mg by mouth every 6 hours as needed for Pain or Fever    Historical Provider, MD       Future Appointments   Date Time Provider Hernandez Miguelina   1/4/2021  2:00 PM MD Clot Felix [facility]     ,   Congestive Heart Failure Assessment    Are you currently restricting fluids?:  No Restriction  Do you understand a low sodium diet?:  Yes  Do you understand how to read food labels?:  Yes  How many restaurant meals do you eat per week?:  0  Do you salt your food before tasting it?:  No         Symptoms:         ,   COPD Assessment    Does the patient understand envrionmental exposure?:  Yes  Is the patient able to verbalize Rescue vs. Long Acting medications?:  Yes  Does the patient have a nebulizer?:  Yes  Does the patient use a space with inhaled medications?:  No            Symptoms:      Symptom course:  stable  Breathlessness:  minimal exertion  Increase use of rapid acting/rescue inhaled medications?:  No  Change in chronic cough?:  No/At Baseline  Change in sputum?:  No/At Baseline  Self Monitoring - SaO2:  No      and   General Assessment    Do you have any symptoms that are causing concern?:  No

## 2020-12-14 ENCOUNTER — OFFICE VISIT (OUTPATIENT)
Dept: FAMILY MEDICINE CLINIC | Age: 85
End: 2020-12-14
Payer: MEDICARE

## 2020-12-14 VITALS
TEMPERATURE: 97.7 F | OXYGEN SATURATION: 97 % | SYSTOLIC BLOOD PRESSURE: 130 MMHG | DIASTOLIC BLOOD PRESSURE: 73 MMHG | WEIGHT: 242 LBS | HEIGHT: 66 IN | RESPIRATION RATE: 20 BRPM | HEART RATE: 90 BPM | BODY MASS INDEX: 38.89 KG/M2

## 2020-12-14 PROCEDURE — 1090F PRES/ABSN URINE INCON ASSESS: CPT | Performed by: FAMILY MEDICINE

## 2020-12-14 PROCEDURE — 99214 OFFICE O/P EST MOD 30 MIN: CPT | Performed by: FAMILY MEDICINE

## 2020-12-14 PROCEDURE — G0008 ADMIN INFLUENZA VIRUS VAC: HCPCS

## 2020-12-14 PROCEDURE — 99212 OFFICE O/P EST SF 10 MIN: CPT | Performed by: FAMILY MEDICINE

## 2020-12-14 PROCEDURE — G8484 FLU IMMUNIZE NO ADMIN: HCPCS | Performed by: FAMILY MEDICINE

## 2020-12-14 PROCEDURE — 1123F ACP DISCUSS/DSCN MKR DOCD: CPT | Performed by: FAMILY MEDICINE

## 2020-12-14 PROCEDURE — 90694 VACC AIIV4 NO PRSRV 0.5ML IM: CPT

## 2020-12-14 PROCEDURE — G8427 DOCREV CUR MEDS BY ELIG CLIN: HCPCS | Performed by: FAMILY MEDICINE

## 2020-12-14 PROCEDURE — 4040F PNEUMOC VAC/ADMIN/RCVD: CPT | Performed by: FAMILY MEDICINE

## 2020-12-14 PROCEDURE — 1036F TOBACCO NON-USER: CPT | Performed by: FAMILY MEDICINE

## 2020-12-14 PROCEDURE — G8417 CALC BMI ABV UP PARAM F/U: HCPCS | Performed by: FAMILY MEDICINE

## 2020-12-14 PROCEDURE — G8399 PT W/DXA RESULTS DOCUMENT: HCPCS | Performed by: FAMILY MEDICINE

## 2020-12-14 PROCEDURE — 6360000002 HC RX W HCPCS

## 2020-12-14 RX ORDER — PREDNISOLONE ACETATE 10 MG/ML
SUSPENSION/ DROPS OPHTHALMIC
COMMUNITY
Start: 2020-12-11 | End: 2021-01-01

## 2020-12-14 RX ORDER — ESCITALOPRAM OXALATE 10 MG/1
10 TABLET ORAL DAILY
Qty: 30 TABLET | Refills: 3 | Status: SHIPPED
Start: 2020-12-14 | End: 2021-01-01

## 2020-12-14 RX ORDER — FAMOTIDINE 20 MG/1
TABLET, FILM COATED ORAL
Qty: 90 TABLET | Refills: 3 | Status: SHIPPED
Start: 2020-12-14 | End: 2021-01-01 | Stop reason: SDUPTHER

## 2020-12-14 NOTE — PROGRESS NOTES
Vaccine Information Sheet, \"Influenza - Inactivated\"  given to [place], or parent/legal guardian of  [place] and verbalized understanding. Patient responses:    Have you ever had a reaction to a flu vaccine? No  Do you have any current illness? No  Have you ever had Guillian Norton Syndrome? No  Do you have a serious allergy to any of the follow: Neomycin, Polymyxin, Thimerosal, eggs or egg products? No    Flu vaccine given per order. Please see immunization tab. Risks and benefits explained. Current VIS given.

## 2020-12-14 NOTE — PROGRESS NOTES
[facility]  FAMILY MEDICINE RESIDENCY PROGRAM   OFFICE PROGRESS NOTE  DATE OF VISIT : 2020    Patient : Sabas Valle   Sex : female   Age : 81 y.o.  : 1935   MRN : [id number]            Chief Complaint :   Chief Complaint   Patient presents with    Hearing Problem    Depression     wants to adjust medication     Loss of Vision       HPI:   Sabas Valle comes to clinic today for     F/U of chronic problem(s) and new or recent complaint of Worsening fatigue and some dyspnea on exertion. Chronic problems reviewed today include:     Hypertension, Obesity, Coronary artery disease, Congestive Heart Failure, Osteoarthritis, Hypothyroidism, Depression and Age-related debility    Current status of this/these condition(s):  stable    Tolerating meds: Yes    Additional history: Patient comes in today for a review of her chronic health problems. In addition to the ones noted above, she notes some increased problems with insomnia, anxiety, and depression. She is also having increasing hearing difficulties and troubles as well with her eyes. He does have an appointment with her eye doctor tomorrow, and is planning on going to get a new hearing aid since the one that she has is broken. Many of her complaints seem to be related to some social difficulties. Her and her  still live [place] an old farm house which is very difficult for them to take care of or even to negotiate without falling. She states that her  refuses to leave even though she would prefer to live closer to help and with less to keep up. Their daughter is currently living with them, but works a full-time job, and [place] is concerned that the daughter is getting burned out. Her  has multiple medical problems, but refuses to consider moving. He also has refused to take adequate care of himself or to make compromises in the care at home.   [place] tells me that she is exhausted and out of breath by the time she gets up and dressed and to the kitchen. She may then sit there without feeling the energy to get up and get herself a cup of coffee. Both she and her  will refuse to get up and get meals from the kitchen, even though they have these delivered. They both wait for their daughter to get home to clean the house and to feed them. Her  still drives, although [place] has serious concerns about this. Both she and her  are very hard of hearing. [place]'s hearing aid is broken, and her  refuses to wear his. They have spoken to  and social workers about their living situation, but finances and [place]'s unwillingness to move have made it nearly impossible for them to improve their situation. [place] notes that she does have increasing difficulty with memory. She also complains of being moderately depressed and has ongoing anxiety as well. It is difficult to say whether this anxiety and depression may be the cause of her worsening memory, or whether she actually has some cognition problems unrelated to her emotional ones. She does not note chest pain or palpitations, but does get short of breath very easily with activities and does have chronic urinary incontinence. She does see urology for that, and believes that the most recent medication showed some improvement. Her other symptoms are as noted above.   Patient Active Problem List   Diagnosis    GERD (gastroesophageal reflux disease)    History of atrial fibrillation    Depression    Hypothyroidism    Nonischemic cardiomyopathy (HCC)    Permanent atrial fibrillation (HCC)    Astigmatism    History of artificial lens replacement    Presbyopia    Tear film insufficiency    Essential hypertension    Pulmonary hypertension (HCC)    Class 2 severe obesity due to excess calories with serious comorbidity and body mass index (BMI) of 39.0 to 39.9 in adult [facility])    Tricuspid regurgitation    Greater trochanteric bursitis    Leg length discrepancy    Chronic anticoagulation    Chronic obstructive pulmonary disease (HCC)    Mixed stress and urge urinary incontinence    Primary osteoarthritis involving multiple joints    Fuchs' endothelial dystrophy    Pseudophakia, both eyes    Chronic diastolic (congestive) heart failure (HCC)    Pure hypercholesterolemia    Spinal stenosis of lumbar region with neurogenic claudication    VHD (valvular heart disease)    Patent foramen ovale    Coronary arteriosclerosis in native artery    Dizziness of unknown cause    Age-related physical debility    Frequent falls       Past Medical History:   Diagnosis Date    Anticoagulated on Coumadin     on Coumadin for this Dr. Cruz Quiroz controls    Atrial fibrillation ([facility] [address]     Basal cell carcinoma of neck     CAD (coronary artery disease)     History of luminal irregularities of the coronary artery, stable.     CHF (congestive heart failure) ([facility])     stage I diastolic dysfunction on 3/40/23    Depression 11/16/2010    DJD (degenerative joint disease)     SEVERE IN RIGHT HAND, IN MULTIPLE OTHER JOINTS    GERD (gastroesophageal reflux disease) 11/16/2010    History of cardiovascular stress test 03/18/2014    lexiscan    Hyperparathyroidism ([facility] [address] 11/16/2010    Had parathyroidectomy    Hypertension 11/16/2010    Hypothyroidism 11/16/2010    Mild mitral regurgitation 7/10/14    Osteoarthritis 11/16/2010    Pulmonary hypertension ([address] 7/10/14    mild    Renal calculi     x3    Tricuspid regurgitation 7/10/14    mild-moderate    Urinary incontinence 11/16/2010       Current Outpatient Medications on File Prior to Visit   Medication Sig Dispense Refill    prednisoLONE acetate (PRED FORTE) 1 % ophthalmic suspension INSTILL 1 DROP IN BOTH EYES FOUR TIMES DAILY      spironolactone (ALDACTONE) 25 MG tablet Take 0.5 tablets by mouth every evening 45 tablet 5    levothyroxine (SYNTHROID) 125 MCG tablet Take 1 tablet by mouth every morning (before breakfast) 28 tablet 5    potassium chloride (KLOR-CON M) 20 MEQ extended release tablet Take 1 tablet by mouth every morning (before breakfast) 28 tablet 5    metoprolol succinate (TOPROL XL) 50 MG extended release tablet Take 1.5 tablets by mouth every evening 42 tablet 5    lisinopril (PRINIVIL;ZESTRIL) 10 MG tablet Take 1 tablet by mouth every evening 28 tablet 5    Vitamin D, Ergocalciferol, 50 MCG (2000 UT) CAPS Take 2,000 Units by mouth every morning (before breakfast) 28 capsule 5    b complex vitamins capsule Take 1 capsule by mouth every morning (before breakfast) 28 capsule 5    Ascorbic Acid (VITAMIN C) 250 MG tablet Take 1 tablet by mouth every morning (before breakfast) 28 tablet 5    warfarin (COUMADIN) 4 MG tablet Take 4 mg Monday, Wednesday, and Friday. Take 8 mg on all other days. 60 tablet 5    meclizine (ANTIVERT) 25 MG tablet Take 1 tablet by mouth 3 times daily as needed for Dizziness 100 tablet 5    guaiFENesin (MUCINEX) 600 MG extended release tablet Take 1,200 mg by mouth 2 times daily as needed       levalbuterol (XOPENEX) 0.63 MG/3ML nebulization Take 3 mLs by nebulization every 8 hours as needed for Wheezing DX: COPD J44.9 864 mL 3    PROAIR  (90 Base) MCG/ACT inhaler USE 2 PUFFS EVERY 6 HOURS  AS NEEDED FOR WHEEZING 34 g 2    acetaminophen (TYLENOL) 325 MG tablet Take 650 mg by mouth every 6 hours as needed for Pain or Fever      furosemide (LASIX) 40 MG tablet Take 1 tablet by mouth every morning (before breakfast) (Patient not taking: Reported on 12/14/2020) 28 tablet 5     No current facility-administered medications on file prior to visit.         Allergies   Allergen Reactions    Codeine Other (See Comments)     Hallucination    Penicillins Hives    Vicodin [Hydrocodone-Acetaminophen] Other (See Comments)     Hallucination      Adhesive Tape Rash       Family History   Problem Relation Age of Onset    Heart Disease Mother    Ivy Heart Attack Mother     Arthritis Mother     Heart Disease Father     Mental Illness Father     Diabetes Father     Heart Disease Brother     Cancer Brother     Cancer Son     Heart Disease Sister     Heart Disease Brother     Heart Surgery Brother        Past Surgical History:   Procedure Laterality Date    CARDIAC CATHETERIZATION  3/22/14    CARPAL TUNNEL RELEASE      Right hand.  CATARACT REMOVAL  6/2005    right, bilateral cataract surgery.  CATARACT REMOVAL WITH IMPLANT Bilateral     CHOLECYSTECTOMY  1972    COLONOSCOPY      FINGER SURGERY  7/2011    S/P removal of cyst from right index finger.  FRACTURE SURGERY Left     arm fractured-casted, Bilateral feet fractures-casted.  INCONTINENCE SURGERY  5/2005    Urinary incontinence, S/P bladder suspension.  INNER EAR SURGERY  2011    tube place in right ear   [address]    KIDNEY STONE SURGERY  1981    KNEE ARTHROPLASTY Bilateral 9/2005    PARATHYROIDECTOMY      Three times    SHOULDER ARTHROPLASTY Left 2009    SHOULDER ARTHROPLASTY Left      shoulder total replacement.  THYROID SURGERY  5/2005    recurrent    THYROIDECTOMY  1960       Social History     Tobacco Use    Smoking status: Never Smoker    Smokeless tobacco: Never Used   Substance Use Topics    Alcohol use: No     Frequency: Never     Binge frequency: Never    Drug use: No       Review of Systems - Negative except as per HPI    Objective:    VS:  Blood pressure 130/73, pulse 90, temperature 97.7 °F (36.5 °C), temperature source Temporal, resp. rate 20, height 5' 6\" (1.676 m), weight 242 lb (109.8 kg), SpO2 97 %, not currently breastfeeding. General Appearance: Well developed, awake, alert, oriented, no acute distress  HEENT: Normocephalic,atraumatic. Neck: Supple, symmetrical, trachea midline. No JVD. Thyroid smooth, not enlarged. Chest wall/Lung: Clear to auscultation bilaterally,  respirations unlabored.  No rhonchi/wheezing/rales  Heart[de-identified]  Irregular rate and rhythm, S1and S2 normal, no murmur, rub or gallop. Extremities:  Extremities normal, atraumatic, no cyanosis. no edema. Neurologic:    Alert, oriented. Noted to be in a wheelchair, but able to stand and take several steps. Psychiatric: has a normal mood and affect. Behavior is normal.     Most recent labs and imaging reviewed. Findings include:     INR is are reviewed. [place] was seen today for hearing problem, depression and loss of vision. Diagnoses and all orders for this visit:    Need for influenza vaccination  -     INFLUENZA, QUADV, ADJUVANTED, 72 YRS =, IM, PF, PREFILL SYR, 0.5ML (FLUAD)    Permanent atrial fibrillation (HCC)    Hypothyroidism, unspecified type    Chronic anticoagulation    Essential hypertension    Chronic diastolic (congestive) heart failure (HCC)    Class 2 severe obesity due to excess calories with serious comorbidity and body mass index (BMI) of 39.0 to 39.9 in adult [facility])    Age-related physical debility    Primary osteoarthritis involving multiple joints    Other orders  -     famotidine (PEPCID) 20 MG tablet; Take one tablet by mouth daily  -     escitalopram (LEXAPRO) 10 MG tablet; Take 1 tablet by mouth daily        Additional Plan: She will remain on the same dose of her Coumadin as detailed in her anticoagulation flowsheet. I am going to refill her famotidine as she is noticed some increased heartburn since stopping it. I will increase her escitalopram to 10 mg dose daily. She did get the flu shot today. I spent a great deal of time today talking to her about her various problems. She will follow up with the eye doctor and get her hearing aids fixed. She will also follow-up with urology and perhaps continue on the most recent medication which seems to have helped. I encouraged her to push herself to at least get up and get herself meals.   I explained that she may have to do activities in short spurts to allow for her fatigue and dyspnea to resolve. I suggested that she enlist the aid of her daughter to push her  to take care of himself. I have no real answers in regards to her housing situation. I would consider performing a memory function testing on her at the next visit. It is difficult to say whether this is true memory loss, or whether it is result of her worsening depression and anxiety. RTO in in 4 month(s) or sooner for any persistent, new, or worsening symptoms. Please see Patient Instructions for further counseling and information given. Advised to be adherent to the treatment plans discussed today, and please call with any questions or concerns, letting the office know of any reasons that the plans may not be followed. The risks of untreated conditions include worsening illness, injury, disability, and possibly, death. Please call if symptoms change in any way, worsen, or fail to completely resolve, as this could necessitate a change to treatment plans. Patient and/or caregiver expressed understanding. Indications and proper use of medication(s) reviewed. Potential side-effects and risks of medication(s) also explained. Patient and/or caregiver was instructed to call if any new symptoms develop prior to next visit. Health risk factors discussed and addressed. I have personally reviewed labs and/or imaging (if any).       Signed by : Helga Hazel M.D.

## 2020-12-30 ENCOUNTER — TELEPHONE (OUTPATIENT)
Dept: FAMILY MEDICINE CLINIC | Age: 85
End: 2020-12-30

## 2020-12-30 NOTE — TELEPHONE ENCOUNTER
If she is more swollen with her weight gain, she can take her Lasix twice a day for 2-3 days and continue to weigh herself. If she is having more trouble with breathing or continue to gain weight, she needs to go to ER. She can take coumadin at the same dosing schedule and call with her INR next week. She should be watching her diet, especially salty foods or added salt. Try not to cheat over the holidays.

## 2020-12-30 NOTE — TELEPHONE ENCOUNTER
[place] called in and is stating that she took her INR yesterday and it is at a 3.2, I do not see that we have received that notification as of yet. She also said that she has gained 10 lbs since Christmas. She states that she will recheck her INR on Monday, also.   Please advise  Thank you

## 2020-12-30 NOTE — TELEPHONE ENCOUNTER
Pt called back and I informed her of doctors note and instructions  Pt verbalized understanding and will call us with weight and INR next week

## 2021-01-01 ENCOUNTER — TELEPHONE (OUTPATIENT)
Dept: FAMILY MEDICINE CLINIC | Age: 86
End: 2021-01-01

## 2021-01-01 ENCOUNTER — CARE COORDINATION (OUTPATIENT)
Dept: CASE MANAGEMENT | Age: 86
End: 2021-01-01

## 2021-01-01 ENCOUNTER — ANTI-COAG VISIT (OUTPATIENT)
Dept: FAMILY MEDICINE CLINIC | Age: 86
End: 2021-01-01

## 2021-01-01 ENCOUNTER — CARE COORDINATION (OUTPATIENT)
Dept: CARE COORDINATION | Age: 86
End: 2021-01-01

## 2021-01-01 ENCOUNTER — ANTI-COAG VISIT (OUTPATIENT)
Dept: FAMILY MEDICINE CLINIC | Age: 86
End: 2021-01-01
Payer: MEDICARE

## 2021-01-01 ENCOUNTER — HOSPITAL ENCOUNTER (INPATIENT)
Age: 86
LOS: 3 days | Discharge: OTHER INSTITUTION | DRG: 292 | End: 2021-07-09
Attending: EMERGENCY MEDICINE | Admitting: FAMILY MEDICINE
Payer: MEDICARE

## 2021-01-01 ENCOUNTER — NURSE ONLY (OUTPATIENT)
Dept: FAMILY MEDICINE CLINIC | Age: 86
End: 2021-01-01
Payer: MEDICARE

## 2021-01-01 ENCOUNTER — APPOINTMENT (OUTPATIENT)
Dept: GENERAL RADIOLOGY | Age: 86
End: 2021-01-01
Payer: MEDICARE

## 2021-01-01 ENCOUNTER — OFFICE VISIT (OUTPATIENT)
Dept: FAMILY MEDICINE CLINIC | Age: 86
End: 2021-01-01
Payer: MEDICARE

## 2021-01-01 ENCOUNTER — APPOINTMENT (OUTPATIENT)
Dept: GENERAL RADIOLOGY | Age: 86
DRG: 292 | End: 2021-01-01
Payer: MEDICARE

## 2021-01-01 ENCOUNTER — APPOINTMENT (OUTPATIENT)
Dept: CT IMAGING | Age: 86
End: 2021-01-01
Payer: MEDICARE

## 2021-01-01 ENCOUNTER — OFFICE VISIT (OUTPATIENT)
Dept: CARDIOLOGY CLINIC | Age: 86
End: 2021-01-01
Payer: MEDICARE

## 2021-01-01 ENCOUNTER — TELEPHONE (OUTPATIENT)
Dept: CARDIOLOGY CLINIC | Age: 86
End: 2021-01-01

## 2021-01-01 ENCOUNTER — HOSPITAL ENCOUNTER (OUTPATIENT)
Age: 86
Setting detail: OBSERVATION
Discharge: HOME | End: 2021-11-29
Attending: EMERGENCY MEDICINE
Payer: MEDICARE

## 2021-01-01 ENCOUNTER — NURSE ONLY (OUTPATIENT)
Dept: CARDIOLOGY CLINIC | Age: 86
End: 2021-01-01

## 2021-01-01 VITALS
OXYGEN SATURATION: 98 % | BODY MASS INDEX: 38.63 KG/M2 | HEIGHT: 66 IN | SYSTOLIC BLOOD PRESSURE: 100 MMHG | HEART RATE: 75 BPM | DIASTOLIC BLOOD PRESSURE: 60 MMHG | RESPIRATION RATE: 16 BRPM | WEIGHT: 240.4 LBS

## 2021-01-01 VITALS
DIASTOLIC BLOOD PRESSURE: 77 MMHG | TEMPERATURE: 97.7 F | RESPIRATION RATE: 18 BRPM | HEART RATE: 59 BPM | OXYGEN SATURATION: 98 % | HEIGHT: 66 IN | WEIGHT: 249 LBS | SYSTOLIC BLOOD PRESSURE: 118 MMHG | BODY MASS INDEX: 40.02 KG/M2

## 2021-01-01 VITALS
BODY MASS INDEX: 41.51 KG/M2 | SYSTOLIC BLOOD PRESSURE: 128 MMHG | RESPIRATION RATE: 18 BRPM | TEMPERATURE: 96.8 F | HEART RATE: 107 BPM | DIASTOLIC BLOOD PRESSURE: 90 MMHG | WEIGHT: 258.3 LBS | OXYGEN SATURATION: 95 % | HEIGHT: 66 IN

## 2021-01-01 VITALS
BODY MASS INDEX: 39.22 KG/M2 | HEART RATE: 74 BPM | DIASTOLIC BLOOD PRESSURE: 75 MMHG | OXYGEN SATURATION: 95 % | WEIGHT: 243 LBS | SYSTOLIC BLOOD PRESSURE: 127 MMHG | TEMPERATURE: 97.2 F

## 2021-01-01 VITALS
DIASTOLIC BLOOD PRESSURE: 68 MMHG | OXYGEN SATURATION: 94 % | HEART RATE: 68 BPM | WEIGHT: 245.2 LBS | HEIGHT: 66 IN | TEMPERATURE: 97.6 F | SYSTOLIC BLOOD PRESSURE: 107 MMHG | BODY MASS INDEX: 39.41 KG/M2

## 2021-01-01 VITALS
OXYGEN SATURATION: 95 % | TEMPERATURE: 98 F | SYSTOLIC BLOOD PRESSURE: 164 MMHG | HEART RATE: 89 BPM | DIASTOLIC BLOOD PRESSURE: 97 MMHG | RESPIRATION RATE: 20 BRPM

## 2021-01-01 DIAGNOSIS — R26.2 AMBULATORY DYSFUNCTION: ICD-10-CM

## 2021-01-01 DIAGNOSIS — I48.21 PERMANENT ATRIAL FIBRILLATION (HCC): ICD-10-CM

## 2021-01-01 DIAGNOSIS — R29.6 FREQUENT FALLS: Chronic | ICD-10-CM

## 2021-01-01 DIAGNOSIS — E03.9 HYPOTHYROIDISM, UNSPECIFIED TYPE: ICD-10-CM

## 2021-01-01 DIAGNOSIS — G30.9 ALZHEIMER'S DEMENTIA WITHOUT BEHAVIORAL DISTURBANCE, UNSPECIFIED TIMING OF DEMENTIA ONSET: Primary | ICD-10-CM

## 2021-01-01 DIAGNOSIS — F33.1 MODERATE EPISODE OF RECURRENT MAJOR DEPRESSIVE DISORDER (HCC): ICD-10-CM

## 2021-01-01 DIAGNOSIS — E78.00 PURE HYPERCHOLESTEROLEMIA: ICD-10-CM

## 2021-01-01 DIAGNOSIS — R29.6 FREQUENT FALLS: Primary | Chronic | ICD-10-CM

## 2021-01-01 DIAGNOSIS — I10 ESSENTIAL HYPERTENSION: ICD-10-CM

## 2021-01-01 DIAGNOSIS — F02.80 ALZHEIMER'S DEMENTIA WITHOUT BEHAVIORAL DISTURBANCE, UNSPECIFIED TIMING OF DEMENTIA ONSET: Primary | ICD-10-CM

## 2021-01-01 DIAGNOSIS — Z79.01 CHRONIC ANTICOAGULATION: ICD-10-CM

## 2021-01-01 DIAGNOSIS — I50.32 CHRONIC DIASTOLIC (CONGESTIVE) HEART FAILURE (HCC): ICD-10-CM

## 2021-01-01 DIAGNOSIS — I48.21 PERMANENT ATRIAL FIBRILLATION (HCC): Primary | ICD-10-CM

## 2021-01-01 DIAGNOSIS — G30.9 ALZHEIMER'S DEMENTIA WITHOUT BEHAVIORAL DISTURBANCE, UNSPECIFIED TIMING OF DEMENTIA ONSET: ICD-10-CM

## 2021-01-01 DIAGNOSIS — I50.30 HEART FAILURE WITH PRESERVED EJECTION FRACTION, UNSPECIFIED HF CHRONICITY (HCC): ICD-10-CM

## 2021-01-01 DIAGNOSIS — I50.9 CONGESTIVE HEART FAILURE, UNSPECIFIED HF CHRONICITY, UNSPECIFIED HEART FAILURE TYPE (HCC): Primary | ICD-10-CM

## 2021-01-01 DIAGNOSIS — N39.46 MIXED STRESS AND URGE URINARY INCONTINENCE: ICD-10-CM

## 2021-01-01 DIAGNOSIS — R06.02 SHORTNESS OF BREATH: ICD-10-CM

## 2021-01-01 DIAGNOSIS — R07.89 CHEST WALL PAIN: ICD-10-CM

## 2021-01-01 DIAGNOSIS — I25.10 CORONARY ARTERIOSCLEROSIS IN NATIVE ARTERY: ICD-10-CM

## 2021-01-01 DIAGNOSIS — S09.90XA INJURY OF HEAD, INITIAL ENCOUNTER: Primary | ICD-10-CM

## 2021-01-01 DIAGNOSIS — M25.561 ACUTE PAIN OF BOTH KNEES: ICD-10-CM

## 2021-01-01 DIAGNOSIS — F02.80 ALZHEIMER'S DEMENTIA WITHOUT BEHAVIORAL DISTURBANCE, UNSPECIFIED TIMING OF DEMENTIA ONSET: ICD-10-CM

## 2021-01-01 DIAGNOSIS — I50.30 HEART FAILURE WITH PRESERVED EJECTION FRACTION, UNSPECIFIED HF CHRONICITY (HCC): Primary | ICD-10-CM

## 2021-01-01 DIAGNOSIS — E66.01 CLASS 2 SEVERE OBESITY DUE TO EXCESS CALORIES WITH SERIOUS COMORBIDITY AND BODY MASS INDEX (BMI) OF 39.0 TO 39.9 IN ADULT (HCC): ICD-10-CM

## 2021-01-01 DIAGNOSIS — M25.562 ACUTE PAIN OF BOTH KNEES: ICD-10-CM

## 2021-01-01 DIAGNOSIS — M48.062 SPINAL STENOSIS OF LUMBAR REGION WITH NEUROGENIC CLAUDICATION: ICD-10-CM

## 2021-01-01 LAB
ALBUMIN SERPL-MCNC: 4 G/DL (ref 3.5–5.2)
ALBUMIN SERPL-MCNC: 4 G/DL (ref 3.5–5.2)
ALBUMIN SERPL-MCNC: 4.1 G/DL (ref 3.5–5.2)
ALP BLD-CCNC: 77 U/L (ref 35–104)
ALP BLD-CCNC: 80 U/L (ref 35–104)
ALP BLD-CCNC: 81 U/L (ref 35–104)
ALT SERPL-CCNC: 13 U/L (ref 0–32)
ALT SERPL-CCNC: 16 U/L (ref 0–32)
ALT SERPL-CCNC: 19 U/L (ref 0–32)
ANION GAP SERPL CALCULATED.3IONS-SCNC: 11 MMOL/L (ref 7–16)
ANION GAP SERPL CALCULATED.3IONS-SCNC: 12 MMOL/L (ref 7–16)
ANION GAP SERPL CALCULATED.3IONS-SCNC: 12 MMOL/L (ref 7–16)
ANION GAP SERPL CALCULATED.3IONS-SCNC: 13 MMOL/L (ref 7–16)
ANION GAP SERPL CALCULATED.3IONS-SCNC: 15 MMOL/L (ref 7–16)
ANION GAP SERPL CALCULATED.3IONS-SCNC: 16 MMOL/L (ref 7–16)
ANION GAP SERPL CALCULATED.3IONS-SCNC: 4 MMOL/L (ref 7–16)
ANION GAP SERPL CALCULATED.3IONS-SCNC: 8 MMOL/L (ref 7–16)
AST SERPL-CCNC: 25 U/L (ref 0–31)
AST SERPL-CCNC: 25 U/L (ref 0–31)
AST SERPL-CCNC: 26 U/L (ref 0–31)
BASOPHILS ABSOLUTE: 0.06 E9/L (ref 0–0.2)
BASOPHILS ABSOLUTE: 0.07 E9/L (ref 0–0.2)
BASOPHILS ABSOLUTE: 0.07 E9/L (ref 0–0.2)
BASOPHILS ABSOLUTE: 0.08 E9/L (ref 0–0.2)
BASOPHILS RELATIVE PERCENT: 1.2 % (ref 0–2)
BASOPHILS RELATIVE PERCENT: 1.4 % (ref 0–2)
BASOPHILS RELATIVE PERCENT: 1.4 % (ref 0–2)
BASOPHILS RELATIVE PERCENT: 1.5 % (ref 0–2)
BASOPHILS RELATIVE PERCENT: 1.6 % (ref 0–2)
BASOPHILS RELATIVE PERCENT: 1.7 % (ref 0–2)
BILIRUB SERPL-MCNC: 0.6 MG/DL (ref 0–1.2)
BILIRUB SERPL-MCNC: 0.7 MG/DL (ref 0–1.2)
BILIRUB SERPL-MCNC: 1 MG/DL (ref 0–1.2)
BUN BLDV-MCNC: 12 MG/DL (ref 6–23)
BUN BLDV-MCNC: 13 MG/DL (ref 6–23)
BUN BLDV-MCNC: 14 MG/DL (ref 6–23)
BUN BLDV-MCNC: 15 MG/DL (ref 6–23)
BUN BLDV-MCNC: 17 MG/DL (ref 6–23)
BUN BLDV-MCNC: 24 MG/DL (ref 6–23)
BUN BLDV-MCNC: 28 MG/DL (ref 6–23)
BUN BLDV-MCNC: 33 MG/DL (ref 6–23)
CALCIUM SERPL-MCNC: 8.5 MG/DL (ref 8.6–10.2)
CALCIUM SERPL-MCNC: 8.8 MG/DL (ref 8.6–10.2)
CALCIUM SERPL-MCNC: 8.9 MG/DL (ref 8.6–10.2)
CALCIUM SERPL-MCNC: 9.1 MG/DL (ref 8.6–10.2)
CALCIUM SERPL-MCNC: 9.4 MG/DL (ref 8.6–10.2)
CALCIUM SERPL-MCNC: 9.4 MG/DL (ref 8.6–10.2)
CALCIUM SERPL-MCNC: 9.7 MG/DL (ref 8.6–10.2)
CALCIUM SERPL-MCNC: 9.9 MG/DL (ref 8.6–10.2)
CHLORIDE BLD-SCNC: 100 MMOL/L (ref 98–107)
CHLORIDE BLD-SCNC: 101 MMOL/L (ref 98–107)
CHLORIDE BLD-SCNC: 101 MMOL/L (ref 98–107)
CHLORIDE BLD-SCNC: 102 MMOL/L (ref 98–107)
CHLORIDE BLD-SCNC: 102 MMOL/L (ref 98–107)
CHLORIDE BLD-SCNC: 103 MMOL/L (ref 98–107)
CHLORIDE BLD-SCNC: 105 MMOL/L (ref 98–107)
CHLORIDE BLD-SCNC: 107 MMOL/L (ref 98–107)
CHOLESTEROL, TOTAL: 165 MG/DL (ref 0–199)
CO2: 22 MMOL/L (ref 22–29)
CO2: 23 MMOL/L (ref 22–29)
CO2: 25 MMOL/L (ref 22–29)
CO2: 28 MMOL/L (ref 22–29)
CO2: 29 MMOL/L (ref 22–29)
CO2: 31 MMOL/L (ref 22–29)
CO2: 32 MMOL/L (ref 22–29)
CO2: 34 MMOL/L (ref 22–29)
CREAT SERPL-MCNC: 0.7 MG/DL (ref 0.5–1)
CREAT SERPL-MCNC: 0.8 MG/DL (ref 0.5–1)
CREAT SERPL-MCNC: 0.9 MG/DL (ref 0.5–1)
CREAT SERPL-MCNC: 1 MG/DL (ref 0.5–1)
EKG ATRIAL RATE: 101 BPM
EKG ATRIAL RATE: 104 BPM
EKG Q-T INTERVAL: 384 MS
EKG Q-T INTERVAL: 388 MS
EKG QRS DURATION: 100 MS
EKG QRS DURATION: 102 MS
EKG QTC CALCULATION (BAZETT): 485 MS
EKG QTC CALCULATION (BAZETT): 495 MS
EKG R AXIS: 20 DEGREES
EKG R AXIS: 27 DEGREES
EKG T AXIS: -62 DEGREES
EKG T AXIS: 48 DEGREES
EKG VENTRICULAR RATE: 100 BPM
EKG VENTRICULAR RATE: 94 BPM
EOSINOPHILS ABSOLUTE: 0.15 E9/L (ref 0.05–0.5)
EOSINOPHILS ABSOLUTE: 0.22 E9/L (ref 0.05–0.5)
EOSINOPHILS ABSOLUTE: 0.25 E9/L (ref 0.05–0.5)
EOSINOPHILS ABSOLUTE: 0.26 E9/L (ref 0.05–0.5)
EOSINOPHILS ABSOLUTE: 0.29 E9/L (ref 0.05–0.5)
EOSINOPHILS ABSOLUTE: 0.29 E9/L (ref 0.05–0.5)
EOSINOPHILS RELATIVE PERCENT: 2.6 % (ref 0–6)
EOSINOPHILS RELATIVE PERCENT: 3.9 % (ref 0–6)
EOSINOPHILS RELATIVE PERCENT: 5 % (ref 0–6)
EOSINOPHILS RELATIVE PERCENT: 5.9 % (ref 0–6)
EOSINOPHILS RELATIVE PERCENT: 6 % (ref 0–6)
EOSINOPHILS RELATIVE PERCENT: 6.4 % (ref 0–6)
GFR AFRICAN AMERICAN: >60
GFR NON-AFRICAN AMERICAN: 53 ML/MIN/1.73
GFR NON-AFRICAN AMERICAN: 59 ML/MIN/1.73
GFR NON-AFRICAN AMERICAN: >60 ML/MIN/1.73
GLUCOSE BLD-MCNC: 140 MG/DL (ref 74–99)
GLUCOSE BLD-MCNC: 140 MG/DL (ref 74–99)
GLUCOSE BLD-MCNC: 66 MG/DL (ref 74–99)
GLUCOSE BLD-MCNC: 91 MG/DL (ref 74–99)
GLUCOSE BLD-MCNC: 92 MG/DL (ref 74–99)
GLUCOSE BLD-MCNC: 95 MG/DL (ref 74–99)
GLUCOSE BLD-MCNC: 95 MG/DL (ref 74–99)
GLUCOSE BLD-MCNC: 99 MG/DL (ref 74–99)
HCT VFR BLD CALC: 34.2 % (ref 34–48)
HCT VFR BLD CALC: 36.3 % (ref 34–48)
HCT VFR BLD CALC: 36.7 % (ref 34–48)
HCT VFR BLD CALC: 38.2 % (ref 34–48)
HCT VFR BLD CALC: 41.3 % (ref 34–48)
HCT VFR BLD CALC: 42.6 % (ref 34–48)
HDLC SERPL-MCNC: 56 MG/DL
HEMOGLOBIN: 11 G/DL (ref 11.5–15.5)
HEMOGLOBIN: 11.8 G/DL (ref 11.5–15.5)
HEMOGLOBIN: 11.9 G/DL (ref 11.5–15.5)
HEMOGLOBIN: 12.2 G/DL (ref 11.5–15.5)
HEMOGLOBIN: 13 G/DL (ref 11.5–15.5)
HEMOGLOBIN: 13.7 G/DL (ref 11.5–15.5)
IMMATURE GRANULOCYTES #: 0.01 E9/L
IMMATURE GRANULOCYTES #: 0.02 E9/L
IMMATURE GRANULOCYTES %: 0.2 % (ref 0–5)
IMMATURE GRANULOCYTES %: 0.3 % (ref 0–5)
IMMATURE GRANULOCYTES %: 0.4 % (ref 0–5)
INR BLD: 1.1
INR BLD: 1.3
INR BLD: 1.4
INR BLD: 1.6
INR BLD: 1.7
INR BLD: 1.7
INR BLD: 1.8
INR BLD: 1.9
INR BLD: 1.9
INR BLD: 2
INR BLD: 2.2
INR BLD: 2.4
INR BLD: 2.5
INR BLD: 2.6
INR BLD: 2.6
INR BLD: 2.8
INR BLD: 3
INR BLD: 3
INR BLD: 3.1
INR BLD: 3.2
INR BLD: 3.4
INR BLD: 4.1
INTERNATIONAL NORMALIZATION RATIO, POC: 1.2
INTERNATIONAL NORMALIZATION RATIO, POC: 1.3
INTERNATIONAL NORMALIZATION RATIO, POC: 2.1
INTERNATIONAL NORMALIZATION RATIO, POC: 2.1
INTERNATIONAL NORMALIZATION RATIO, POC: 2.6
LDL CHOLESTEROL CALCULATED: 93 MG/DL (ref 0–99)
LYMPHOCYTES ABSOLUTE: 0.64 E9/L (ref 1.5–4)
LYMPHOCYTES ABSOLUTE: 0.66 E9/L (ref 1.5–4)
LYMPHOCYTES ABSOLUTE: 0.88 E9/L (ref 1.5–4)
LYMPHOCYTES ABSOLUTE: 0.9 E9/L (ref 1.5–4)
LYMPHOCYTES ABSOLUTE: 0.96 E9/L (ref 1.5–4)
LYMPHOCYTES ABSOLUTE: 1.01 E9/L (ref 1.5–4)
LYMPHOCYTES RELATIVE PERCENT: 13.7 % (ref 20–42)
LYMPHOCYTES RELATIVE PERCENT: 15.1 % (ref 20–42)
LYMPHOCYTES RELATIVE PERCENT: 15.8 % (ref 20–42)
LYMPHOCYTES RELATIVE PERCENT: 16.3 % (ref 20–42)
LYMPHOCYTES RELATIVE PERCENT: 19.6 % (ref 20–42)
LYMPHOCYTES RELATIVE PERCENT: 19.7 % (ref 20–42)
MAGNESIUM: 2 MG/DL (ref 1.6–2.6)
MAGNESIUM: 2.2 MG/DL (ref 1.6–2.6)
MCH RBC QN AUTO: 29.4 PG (ref 26–35)
MCH RBC QN AUTO: 29.5 PG (ref 26–35)
MCH RBC QN AUTO: 29.6 PG (ref 26–35)
MCH RBC QN AUTO: 29.8 PG (ref 26–35)
MCH RBC QN AUTO: 29.9 PG (ref 26–35)
MCH RBC QN AUTO: 29.9 PG (ref 26–35)
MCHC RBC AUTO-ENTMCNC: 31.5 % (ref 32–34.5)
MCHC RBC AUTO-ENTMCNC: 31.9 % (ref 32–34.5)
MCHC RBC AUTO-ENTMCNC: 32.2 % (ref 32–34.5)
MCHC RBC AUTO-ENTMCNC: 32.2 % (ref 32–34.5)
MCHC RBC AUTO-ENTMCNC: 32.4 % (ref 32–34.5)
MCHC RBC AUTO-ENTMCNC: 32.5 % (ref 32–34.5)
MCV RBC AUTO: 91.6 FL (ref 80–99.9)
MCV RBC AUTO: 91.8 FL (ref 80–99.9)
MCV RBC AUTO: 91.9 FL (ref 80–99.9)
MCV RBC AUTO: 92.1 FL (ref 80–99.9)
MCV RBC AUTO: 93.4 FL (ref 80–99.9)
MCV RBC AUTO: 93.6 FL (ref 80–99.9)
MONOCYTES ABSOLUTE: 0.44 E9/L (ref 0.1–0.95)
MONOCYTES ABSOLUTE: 0.46 E9/L (ref 0.1–0.95)
MONOCYTES ABSOLUTE: 0.49 E9/L (ref 0.1–0.95)
MONOCYTES ABSOLUTE: 0.59 E9/L (ref 0.1–0.95)
MONOCYTES ABSOLUTE: 0.6 E9/L (ref 0.1–0.95)
MONOCYTES ABSOLUTE: 0.66 E9/L (ref 0.1–0.95)
MONOCYTES RELATIVE PERCENT: 10.4 % (ref 2–12)
MONOCYTES RELATIVE PERCENT: 11.6 % (ref 2–12)
MONOCYTES RELATIVE PERCENT: 11.7 % (ref 2–12)
MONOCYTES RELATIVE PERCENT: 13.5 % (ref 2–12)
MONOCYTES RELATIVE PERCENT: 8.4 % (ref 2–12)
MONOCYTES RELATIVE PERCENT: 9.1 % (ref 2–12)
NEUTROPHILS ABSOLUTE: 2.51 E9/L (ref 1.8–7.3)
NEUTROPHILS ABSOLUTE: 2.87 E9/L (ref 1.8–7.3)
NEUTROPHILS ABSOLUTE: 3.21 E9/L (ref 1.8–7.3)
NEUTROPHILS ABSOLUTE: 3.35 E9/L (ref 1.8–7.3)
NEUTROPHILS ABSOLUTE: 3.89 E9/L (ref 1.8–7.3)
NEUTROPHILS ABSOLUTE: 4.22 E9/L (ref 1.8–7.3)
NEUTROPHILS RELATIVE PERCENT: 58.9 % (ref 43–80)
NEUTROPHILS RELATIVE PERCENT: 62.2 % (ref 43–80)
NEUTROPHILS RELATIVE PERCENT: 63.8 % (ref 43–80)
NEUTROPHILS RELATIVE PERCENT: 68.3 % (ref 43–80)
NEUTROPHILS RELATIVE PERCENT: 69.3 % (ref 43–80)
NEUTROPHILS RELATIVE PERCENT: 72.5 % (ref 43–80)
PDW BLD-RTO: 14.1 FL (ref 11.5–15)
PDW BLD-RTO: 14.4 FL (ref 11.5–15)
PDW BLD-RTO: 15 FL (ref 11.5–15)
PDW BLD-RTO: 15 FL (ref 11.5–15)
PDW BLD-RTO: 15.1 FL (ref 11.5–15)
PDW BLD-RTO: 15.1 FL (ref 11.5–15)
PLATELET # BLD: 153 E9/L (ref 130–450)
PLATELET # BLD: 153 E9/L (ref 130–450)
PLATELET # BLD: 166 E9/L (ref 130–450)
PLATELET # BLD: 171 E9/L (ref 130–450)
PLATELET # BLD: 197 E9/L (ref 130–450)
PLATELET # BLD: 225 E9/L (ref 130–450)
PMV BLD AUTO: 10.2 FL (ref 7–12)
PMV BLD AUTO: 10.2 FL (ref 7–12)
PMV BLD AUTO: 10.4 FL (ref 7–12)
PMV BLD AUTO: 11 FL (ref 7–12)
PMV BLD AUTO: 9.8 FL (ref 7–12)
PMV BLD AUTO: 9.9 FL (ref 7–12)
POTASSIUM REFLEX MAGNESIUM: 2.9 MMOL/L (ref 3.5–5)
POTASSIUM REFLEX MAGNESIUM: 3.9 MMOL/L (ref 3.5–5)
POTASSIUM REFLEX MAGNESIUM: 4 MMOL/L (ref 3.5–5)
POTASSIUM SERPL-SCNC: 3.3 MMOL/L (ref 3.5–5)
POTASSIUM SERPL-SCNC: 3.8 MMOL/L (ref 3.5–5)
POTASSIUM SERPL-SCNC: 4.1 MMOL/L (ref 3.5–5)
POTASSIUM SERPL-SCNC: 4.5 MMOL/L (ref 3.5–5)
POTASSIUM SERPL-SCNC: 5.2 MMOL/L (ref 3.5–5)
PRO-BNP: 1995 PG/ML (ref 0–450)
PRO-BNP: 2764 PG/ML (ref 0–450)
PROTHROMBIN TIME, POC: 14.6
PROTHROMBIN TIME, POC: 15
PROTHROMBIN TIME, POC: 25.4
PROTHROMBIN TIME, POC: 25.6
PROTHROMBIN TIME, POC: 30.8
PROTHROMBIN TIME: 24.4 SEC (ref 9.3–12.4)
PROTHROMBIN TIME: 28 SEC (ref 9.3–12.4)
PROTHROMBIN TIME: 30.8 SEC (ref 9.3–12.4)
PROTHROMBIN TIME: 32.3 SEC (ref 9.3–12.4)
PROTHROMBIN TIME: 33.8 SEC (ref 9.3–12.4)
PROTHROMBIN TIME: 37.5 SEC (ref 9.3–12.4)
RBC # BLD: 3.72 E12/L (ref 3.5–5.5)
RBC # BLD: 3.94 E12/L (ref 3.5–5.5)
RBC # BLD: 4 E12/L (ref 3.5–5.5)
RBC # BLD: 4.08 E12/L (ref 3.5–5.5)
RBC # BLD: 4.42 E12/L (ref 3.5–5.5)
RBC # BLD: 4.65 E12/L (ref 3.5–5.5)
SARS-COV-2, NAAT: NOT DETECTED
SODIUM BLD-SCNC: 138 MMOL/L (ref 132–146)
SODIUM BLD-SCNC: 140 MMOL/L (ref 132–146)
SODIUM BLD-SCNC: 141 MMOL/L (ref 132–146)
SODIUM BLD-SCNC: 142 MMOL/L (ref 132–146)
SODIUM BLD-SCNC: 143 MMOL/L (ref 132–146)
SODIUM BLD-SCNC: 144 MMOL/L (ref 132–146)
TOTAL PROTEIN: 6.5 G/DL (ref 6.4–8.3)
TOTAL PROTEIN: 6.9 G/DL (ref 6.4–8.3)
TOTAL PROTEIN: 7.4 G/DL (ref 6.4–8.3)
TRIGL SERPL-MCNC: 80 MG/DL (ref 0–149)
TROPONIN, HIGH SENSITIVITY: 17 NG/L (ref 0–9)
TROPONIN, HIGH SENSITIVITY: 21 NG/L (ref 0–9)
TSH SERPL DL<=0.05 MIU/L-ACNC: 1.68 UIU/ML (ref 0.27–4.2)
TSH SERPL DL<=0.05 MIU/L-ACNC: 2.63 UIU/ML (ref 0.27–4.2)
TSH SERPL DL<=0.05 MIU/L-ACNC: 3.64 UIU/ML (ref 0.27–4.2)
VLDLC SERPL CALC-MCNC: 16 MG/DL
WBC # BLD: 3.9 E9/L (ref 4.5–11.5)
WBC # BLD: 4.8 E9/L (ref 4.5–11.5)
WBC # BLD: 4.9 E9/L (ref 4.5–11.5)
WBC # BLD: 5.2 E9/L (ref 4.5–11.5)
WBC # BLD: 5.7 E9/L (ref 4.5–11.5)
WBC # BLD: 5.8 E9/L (ref 4.5–11.5)

## 2021-01-01 PROCEDURE — 4040F PNEUMOC VAC/ADMIN/RCVD: CPT | Performed by: FAMILY MEDICINE

## 2021-01-01 PROCEDURE — 1036F TOBACCO NON-USER: CPT | Performed by: FAMILY MEDICINE

## 2021-01-01 PROCEDURE — 93793 ANTICOAG MGMT PT WARFARIN: CPT | Performed by: FAMILY MEDICINE

## 2021-01-01 PROCEDURE — 85610 PROTHROMBIN TIME: CPT | Performed by: FAMILY MEDICINE

## 2021-01-01 PROCEDURE — 97535 SELF CARE MNGMENT TRAINING: CPT

## 2021-01-01 PROCEDURE — 97530 THERAPEUTIC ACTIVITIES: CPT

## 2021-01-01 PROCEDURE — 96374 THER/PROPH/DIAG INJ IV PUSH: CPT

## 2021-01-01 PROCEDURE — 84443 ASSAY THYROID STIM HORMONE: CPT

## 2021-01-01 PROCEDURE — 2060000000 HC ICU INTERMEDIATE R&B

## 2021-01-01 PROCEDURE — 73564 X-RAY EXAM KNEE 4 OR MORE: CPT

## 2021-01-01 PROCEDURE — 85610 PROTHROMBIN TIME: CPT

## 2021-01-01 PROCEDURE — 93005 ELECTROCARDIOGRAM TRACING: CPT | Performed by: EMERGENCY MEDICINE

## 2021-01-01 PROCEDURE — 97161 PT EVAL LOW COMPLEX 20 MIN: CPT | Performed by: PHYSICAL THERAPIST

## 2021-01-01 PROCEDURE — 71046 X-RAY EXAM CHEST 2 VIEWS: CPT

## 2021-01-01 PROCEDURE — 99232 SBSQ HOSP IP/OBS MODERATE 35: CPT | Performed by: FAMILY MEDICINE

## 2021-01-01 PROCEDURE — 36415 COLL VENOUS BLD VENIPUNCTURE: CPT

## 2021-01-01 PROCEDURE — 80053 COMPREHEN METABOLIC PANEL: CPT

## 2021-01-01 PROCEDURE — 85025 COMPLETE CBC W/AUTO DIFF WBC: CPT

## 2021-01-01 PROCEDURE — 96375 TX/PRO/DX INJ NEW DRUG ADDON: CPT

## 2021-01-01 PROCEDURE — 99223 1ST HOSP IP/OBS HIGH 75: CPT | Performed by: FAMILY MEDICINE

## 2021-01-01 PROCEDURE — G8399 PT W/DXA RESULTS DOCUMENT: HCPCS | Performed by: FAMILY MEDICINE

## 2021-01-01 PROCEDURE — 1111F DSCHRG MED/CURRENT MED MERGE: CPT | Performed by: NURSE PRACTITIONER

## 2021-01-01 PROCEDURE — 6370000000 HC RX 637 (ALT 250 FOR IP): Performed by: FAMILY MEDICINE

## 2021-01-01 PROCEDURE — 0509F URINE INCON PLAN DOCD: CPT | Performed by: FAMILY MEDICINE

## 2021-01-01 PROCEDURE — G8417 CALC BMI ABV UP PARAM F/U: HCPCS | Performed by: NURSE PRACTITIONER

## 2021-01-01 PROCEDURE — 99284 EMERGENCY DEPT VISIT MOD MDM: CPT

## 2021-01-01 PROCEDURE — 94640 AIRWAY INHALATION TREATMENT: CPT

## 2021-01-01 PROCEDURE — 80048 BASIC METABOLIC PNL TOTAL CA: CPT

## 2021-01-01 PROCEDURE — 6370000000 HC RX 637 (ALT 250 FOR IP): Performed by: STUDENT IN AN ORGANIZED HEALTH CARE EDUCATION/TRAINING PROGRAM

## 2021-01-01 PROCEDURE — 1090F PRES/ABSN URINE INCON ASSESS: CPT | Performed by: FAMILY MEDICINE

## 2021-01-01 PROCEDURE — 6360000002 HC RX W HCPCS: Performed by: FAMILY MEDICINE

## 2021-01-01 PROCEDURE — 2580000003 HC RX 258: Performed by: FAMILY MEDICINE

## 2021-01-01 PROCEDURE — 6360000002 HC RX W HCPCS: Performed by: EMERGENCY MEDICINE

## 2021-01-01 PROCEDURE — 99213 OFFICE O/P EST LOW 20 MIN: CPT | Performed by: NURSE PRACTITIONER

## 2021-01-01 PROCEDURE — 1090F PRES/ABSN URINE INCON ASSESS: CPT | Performed by: NURSE PRACTITIONER

## 2021-01-01 PROCEDURE — 72131 CT LUMBAR SPINE W/O DYE: CPT

## 2021-01-01 PROCEDURE — 83735 ASSAY OF MAGNESIUM: CPT

## 2021-01-01 PROCEDURE — G0378 HOSPITAL OBSERVATION PER HR: HCPCS

## 2021-01-01 PROCEDURE — 1036F TOBACCO NON-USER: CPT | Performed by: NURSE PRACTITIONER

## 2021-01-01 PROCEDURE — 96376 TX/PRO/DX INJ SAME DRUG ADON: CPT

## 2021-01-01 PROCEDURE — 99212 OFFICE O/P EST SF 10 MIN: CPT | Performed by: FAMILY MEDICINE

## 2021-01-01 PROCEDURE — 93010 ELECTROCARDIOGRAM REPORT: CPT | Performed by: INTERNAL MEDICINE

## 2021-01-01 PROCEDURE — 2580000003 HC RX 258: Performed by: STUDENT IN AN ORGANIZED HEALTH CARE EDUCATION/TRAINING PROGRAM

## 2021-01-01 PROCEDURE — 36415 COLL VENOUS BLD VENIPUNCTURE: CPT | Performed by: FAMILY MEDICINE

## 2021-01-01 PROCEDURE — G8428 CUR MEDS NOT DOCUMENT: HCPCS | Performed by: FAMILY MEDICINE

## 2021-01-01 PROCEDURE — 72125 CT NECK SPINE W/O DYE: CPT

## 2021-01-01 PROCEDURE — 93005 ELECTROCARDIOGRAM TRACING: CPT | Performed by: NURSE PRACTITIONER

## 2021-01-01 PROCEDURE — 71250 CT THORAX DX C-: CPT

## 2021-01-01 PROCEDURE — 93000 ELECTROCARDIOGRAM COMPLETE: CPT | Performed by: INTERNAL MEDICINE

## 2021-01-01 PROCEDURE — 6360000002 HC RX W HCPCS: Performed by: INTERNAL MEDICINE

## 2021-01-01 PROCEDURE — 84484 ASSAY OF TROPONIN QUANT: CPT

## 2021-01-01 PROCEDURE — 70450 CT HEAD/BRAIN W/O DYE: CPT

## 2021-01-01 PROCEDURE — 1123F ACP DISCUSS/DSCN MKR DOCD: CPT | Performed by: FAMILY MEDICINE

## 2021-01-01 PROCEDURE — 99239 HOSP IP/OBS DSCHRG MGMT >30: CPT | Performed by: FAMILY MEDICINE

## 2021-01-01 PROCEDURE — G8427 DOCREV CUR MEDS BY ELIG CLIN: HCPCS | Performed by: NURSE PRACTITIONER

## 2021-01-01 PROCEDURE — 1123F ACP DISCUSS/DSCN MKR DOCD: CPT | Performed by: NURSE PRACTITIONER

## 2021-01-01 PROCEDURE — 2580000003 HC RX 258: Performed by: EMERGENCY MEDICINE

## 2021-01-01 PROCEDURE — 99236 HOSP IP/OBS SAME DATE HI 85: CPT | Performed by: FAMILY MEDICINE

## 2021-01-01 PROCEDURE — 4040F PNEUMOC VAC/ADMIN/RCVD: CPT | Performed by: NURSE PRACTITIONER

## 2021-01-01 PROCEDURE — 99214 OFFICE O/P EST MOD 30 MIN: CPT | Performed by: FAMILY MEDICINE

## 2021-01-01 PROCEDURE — 97165 OT EVAL LOW COMPLEX 30 MIN: CPT

## 2021-01-01 PROCEDURE — 97166 OT EVAL MOD COMPLEX 45 MIN: CPT

## 2021-01-01 PROCEDURE — 87635 SARS-COV-2 COVID-19 AMP PRB: CPT

## 2021-01-01 PROCEDURE — 83880 ASSAY OF NATRIURETIC PEPTIDE: CPT

## 2021-01-01 PROCEDURE — G8417 CALC BMI ABV UP PARAM F/U: HCPCS | Performed by: FAMILY MEDICINE

## 2021-01-01 PROCEDURE — 97161 PT EVAL LOW COMPLEX 20 MIN: CPT

## 2021-01-01 PROCEDURE — G8484 FLU IMMUNIZE NO ADMIN: HCPCS | Performed by: FAMILY MEDICINE

## 2021-01-01 PROCEDURE — APPSS45 APP SPLIT SHARED TIME 31-45 MINUTES: Performed by: NURSE PRACTITIONER

## 2021-01-01 PROCEDURE — G8427 DOCREV CUR MEDS BY ELIG CLIN: HCPCS | Performed by: FAMILY MEDICINE

## 2021-01-01 PROCEDURE — 99223 1ST HOSP IP/OBS HIGH 75: CPT | Performed by: INTERNAL MEDICINE

## 2021-01-01 PROCEDURE — G8399 PT W/DXA RESULTS DOCUMENT: HCPCS | Performed by: NURSE PRACTITIONER

## 2021-01-01 RX ORDER — GUAIFENESIN 400 MG/1
400 TABLET ORAL 3 TIMES DAILY
Status: DISCONTINUED | OUTPATIENT
Start: 2021-01-01 | End: 2021-01-01 | Stop reason: HOSPADM

## 2021-01-01 RX ORDER — POTASSIUM CHLORIDE 20 MEQ/1
20 TABLET, EXTENDED RELEASE ORAL 2 TIMES DAILY WITH MEALS
Status: DISCONTINUED | OUTPATIENT
Start: 2021-01-01 | End: 2021-01-01

## 2021-01-01 RX ORDER — LEVALBUTEROL INHALATION SOLUTION 0.63 MG/3ML
1 SOLUTION RESPIRATORY (INHALATION) EVERY 8 HOURS PRN
Qty: 864 ML | Refills: 3 | Status: SHIPPED | OUTPATIENT
Start: 2021-01-01 | End: 2021-01-01

## 2021-01-01 RX ORDER — POTASSIUM CHLORIDE 20 MEQ/1
40 TABLET, EXTENDED RELEASE ORAL
Status: DISCONTINUED | OUTPATIENT
Start: 2021-01-01 | End: 2021-01-01 | Stop reason: HOSPADM

## 2021-01-01 RX ORDER — VITAMIN B COMPLEX
1 CAPSULE ORAL DAILY
COMMUNITY
End: 2021-01-01

## 2021-01-01 RX ORDER — ESCITALOPRAM OXALATE 10 MG/1
10 TABLET ORAL DAILY
COMMUNITY
End: 2021-01-01

## 2021-01-01 RX ORDER — LISINOPRIL 5 MG/1
5 TABLET ORAL DAILY
COMMUNITY
End: 2021-01-01 | Stop reason: HOSPADM

## 2021-01-01 RX ORDER — FAMOTIDINE 20 MG/1
20 TABLET, FILM COATED ORAL DAILY
Status: DISCONTINUED | OUTPATIENT
Start: 2021-01-01 | End: 2021-01-01 | Stop reason: HOSPADM

## 2021-01-01 RX ORDER — MULTIVIT WITH MINERALS/LUTEIN
TABLET ORAL
Qty: 30 TABLET | Refills: 5 | Status: SHIPPED
Start: 2021-01-01 | End: 2022-01-01

## 2021-01-01 RX ORDER — ESCITALOPRAM OXALATE 10 MG/1
10 TABLET ORAL DAILY
Status: DISCONTINUED | OUTPATIENT
Start: 2021-01-01 | End: 2021-01-01 | Stop reason: HOSPADM

## 2021-01-01 RX ORDER — FUROSEMIDE 40 MG/1
40 TABLET ORAL
Status: DISCONTINUED | OUTPATIENT
Start: 2021-01-01 | End: 2021-01-01

## 2021-01-01 RX ORDER — MULTIVITAMIN/IRON/FOLIC ACID 18MG-0.4MG
TABLET ORAL
COMMUNITY
Start: 2021-01-01 | End: 2021-01-01 | Stop reason: SDUPTHER

## 2021-01-01 RX ORDER — NEBULIZER ACCESSORIES
1 KIT MISCELLANEOUS DAILY PRN
Qty: 1 KIT | Refills: 0 | DISCHARGE
Start: 2021-01-01 | End: 2021-01-01

## 2021-01-01 RX ORDER — BISACODYL 10 MG
10 SUPPOSITORY, RECTAL RECTAL DAILY PRN
COMMUNITY
End: 2021-01-01 | Stop reason: SDUPTHER

## 2021-01-01 RX ORDER — MULTIVITAMIN/IRON/FOLIC ACID 18MG-0.4MG
TABLET ORAL
Qty: 30 TABLET | Refills: 5 | Status: SHIPPED
Start: 2021-01-01 | End: 2022-01-01

## 2021-01-01 RX ORDER — LEVOTHYROXINE SODIUM 0.12 MG/1
125 TABLET ORAL
Qty: 24 TABLET | Refills: 2 | Status: SHIPPED | OUTPATIENT
Start: 2021-01-01 | Stop reason: SDUPTHER

## 2021-01-01 RX ORDER — GUAIFENESIN 600 MG/1
1200 TABLET, EXTENDED RELEASE ORAL DAILY PRN
Status: DISCONTINUED | OUTPATIENT
Start: 2021-01-01 | End: 2021-01-01 | Stop reason: CLARIF

## 2021-01-01 RX ORDER — ACETAMINOPHEN 325 MG/1
650 TABLET ORAL EVERY 6 HOURS PRN
Qty: 120 TABLET | Refills: 5 | Status: SHIPPED
Start: 2021-01-01 | End: 2022-01-01

## 2021-01-01 RX ORDER — SPIRONOLACTONE 25 MG/1
25 TABLET ORAL DAILY
Qty: 90 TABLET | Refills: 1 | Status: SHIPPED
Start: 2021-01-01 | End: 2021-01-01

## 2021-01-01 RX ORDER — WARFARIN SODIUM 4 MG/1
TABLET ORAL
Qty: 60 TABLET | Refills: 5 | Status: ON HOLD
Start: 2021-01-01 | End: 2021-01-01 | Stop reason: HOSPADM

## 2021-01-01 RX ORDER — ACETAMINOPHEN 325 MG/1
650 TABLET ORAL ONCE
Status: DISCONTINUED | OUTPATIENT
Start: 2021-01-01 | End: 2021-01-01 | Stop reason: HOSPADM

## 2021-01-01 RX ORDER — FUROSEMIDE 40 MG/1
40 TABLET ORAL DAILY
COMMUNITY
End: 2021-01-01 | Stop reason: SDUPTHER

## 2021-01-01 RX ORDER — ONDANSETRON 4 MG/1
4 TABLET, ORALLY DISINTEGRATING ORAL EVERY 8 HOURS PRN
Status: DISCONTINUED | OUTPATIENT
Start: 2021-01-01 | End: 2021-01-01 | Stop reason: HOSPADM

## 2021-01-01 RX ORDER — PREDNISOLONE ACETATE 10 MG/ML
1 SUSPENSION/ DROPS OPHTHALMIC EVERY 6 HOURS SCHEDULED
Status: DISCONTINUED | OUTPATIENT
Start: 2021-01-01 | End: 2021-01-01 | Stop reason: HOSPADM

## 2021-01-01 RX ORDER — SPIRONOLACTONE 25 MG/1
25 TABLET ORAL DAILY
Status: DISCONTINUED | OUTPATIENT
Start: 2021-01-01 | End: 2021-01-01 | Stop reason: HOSPADM

## 2021-01-01 RX ORDER — ACETAMINOPHEN 325 MG/1
650 TABLET ORAL EVERY 6 HOURS PRN
Status: DISCONTINUED | OUTPATIENT
Start: 2021-01-01 | End: 2021-01-01 | Stop reason: HOSPADM

## 2021-01-01 RX ORDER — SODIUM CHLORIDE 9 MG/ML
25 INJECTION, SOLUTION INTRAVENOUS PRN
Status: DISCONTINUED | OUTPATIENT
Start: 2021-01-01 | End: 2021-01-01 | Stop reason: HOSPADM

## 2021-01-01 RX ORDER — LISINOPRIL 10 MG/1
10 TABLET ORAL EVERY EVENING
Status: DISCONTINUED | OUTPATIENT
Start: 2021-01-01 | End: 2021-01-01 | Stop reason: HOSPADM

## 2021-01-01 RX ORDER — METOPROLOL SUCCINATE 25 MG/1
TABLET, EXTENDED RELEASE ORAL
COMMUNITY
Start: 2021-05-05 | End: 2021-01-01

## 2021-01-01 RX ORDER — CHOLECALCIFEROL (VITAMIN D3) 125 MCG
2000 CAPSULE ORAL DAILY
COMMUNITY
Start: 2021-01-01 | End: 2021-01-01

## 2021-01-01 RX ORDER — WARFARIN SODIUM 5 MG/1
10 TABLET ORAL NIGHTLY
Qty: 30 TABLET | Refills: 5 | Status: SHIPPED
Start: 2021-01-01 | End: 2022-01-01

## 2021-01-01 RX ORDER — FAMOTIDINE 20 MG/1
10 TABLET, FILM COATED ORAL DAILY
Status: DISCONTINUED | OUTPATIENT
Start: 2021-01-01 | End: 2021-01-01 | Stop reason: HOSPADM

## 2021-01-01 RX ORDER — WARFARIN SODIUM 5 MG/1
TABLET ORAL
Qty: 60 TABLET | Refills: 2 | Status: SHIPPED
Start: 2021-01-01 | End: 2021-01-01

## 2021-01-01 RX ORDER — WARFARIN SODIUM 4 MG/1
8 TABLET ORAL DAILY
Status: DISCONTINUED | OUTPATIENT
Start: 2021-01-01 | End: 2021-01-01

## 2021-01-01 RX ORDER — SODIUM CHLORIDE 0.9 % (FLUSH) 0.9 %
5-40 SYRINGE (ML) INJECTION EVERY 12 HOURS SCHEDULED
Status: DISCONTINUED | OUTPATIENT
Start: 2021-01-01 | End: 2021-01-01 | Stop reason: HOSPADM

## 2021-01-01 RX ORDER — ONDANSETRON 2 MG/ML
4 INJECTION INTRAMUSCULAR; INTRAVENOUS EVERY 6 HOURS PRN
Status: DISCONTINUED | OUTPATIENT
Start: 2021-01-01 | End: 2021-01-01 | Stop reason: HOSPADM

## 2021-01-01 RX ORDER — PREDNISOLONE ACETATE 10 MG/ML
1 SUSPENSION/ DROPS OPHTHALMIC
Status: DISCONTINUED | OUTPATIENT
Start: 2021-01-01 | End: 2021-01-01 | Stop reason: HOSPADM

## 2021-01-01 RX ORDER — FUROSEMIDE 40 MG/1
40 TABLET ORAL
Qty: 28 TABLET | Refills: 5 | Status: SHIPPED
Start: 2021-01-01 | End: 2021-01-01

## 2021-01-01 RX ORDER — VITAMIN B COMPLEX
TABLET ORAL
COMMUNITY
Start: 2021-01-01 | End: 2021-01-01

## 2021-01-01 RX ORDER — GUAIFENESIN 600 MG/1
1200 TABLET, EXTENDED RELEASE ORAL DAILY PRN
COMMUNITY
End: 2022-01-01 | Stop reason: ALTCHOICE

## 2021-01-01 RX ORDER — WARFARIN SODIUM 5 MG/1
10 TABLET ORAL NIGHTLY
COMMUNITY
End: 2021-01-01 | Stop reason: SDUPTHER

## 2021-01-01 RX ORDER — FAMOTIDINE 20 MG/1
TABLET, FILM COATED ORAL
Qty: 90 TABLET | Refills: 3 | Status: SHIPPED
Start: 2021-01-01 | End: 2021-01-01

## 2021-01-01 RX ORDER — SPIRONOLACTONE 25 MG/1
25 TABLET ORAL DAILY
Qty: 30 TABLET | Refills: 6 | Status: SHIPPED
Start: 2021-01-01 | End: 2021-01-01 | Stop reason: SDUPTHER

## 2021-01-01 RX ORDER — MULTIVIT WITH MINERALS/LUTEIN
TABLET ORAL
COMMUNITY
Start: 2021-01-01 | End: 2021-01-01

## 2021-01-01 RX ORDER — ESCITALOPRAM OXALATE 10 MG/1
10 TABLET ORAL DAILY
COMMUNITY
End: 2021-01-01 | Stop reason: SDUPTHER

## 2021-01-01 RX ORDER — SPIRONOLACTONE 25 MG/1
25 TABLET ORAL DAILY
Qty: 30 TABLET | Refills: 6 | Status: SHIPPED
Start: 2021-01-01 | End: 2022-01-01 | Stop reason: ALTCHOICE

## 2021-01-01 RX ORDER — POTASSIUM CHLORIDE 20 MEQ/1
20 TABLET, EXTENDED RELEASE ORAL ONCE
Status: COMPLETED | OUTPATIENT
Start: 2021-01-01 | End: 2021-01-01

## 2021-01-01 RX ORDER — FUROSEMIDE 40 MG/1
40 TABLET ORAL DAILY
Status: DISCONTINUED | OUTPATIENT
Start: 2021-01-01 | End: 2021-01-01 | Stop reason: HOSPADM

## 2021-01-01 RX ORDER — ASCORBIC ACID 500 MG
250 TABLET ORAL
Status: DISCONTINUED | OUTPATIENT
Start: 2021-01-01 | End: 2021-01-01 | Stop reason: HOSPADM

## 2021-01-01 RX ORDER — SODIUM CHLORIDE 0.9 % (FLUSH) 0.9 %
5-40 SYRINGE (ML) INJECTION PRN
Status: DISCONTINUED | OUTPATIENT
Start: 2021-01-01 | End: 2021-01-01 | Stop reason: HOSPADM

## 2021-01-01 RX ORDER — METOPROLOL SUCCINATE 50 MG/1
50 TABLET, EXTENDED RELEASE ORAL NIGHTLY
Qty: 30 TABLET | Refills: 5 | Status: ON HOLD
Start: 2021-01-01 | End: 2022-01-01 | Stop reason: HOSPADM

## 2021-01-01 RX ORDER — ACETAMINOPHEN 325 MG/1
650 TABLET ORAL EVERY 6 HOURS PRN
Qty: 120 TABLET | DISCHARGE
Start: 2021-01-01 | End: 2021-01-01 | Stop reason: SDUPTHER

## 2021-01-01 RX ORDER — METOPROLOL SUCCINATE 25 MG/1
75 TABLET, EXTENDED RELEASE ORAL EVERY EVENING
Status: DISCONTINUED | OUTPATIENT
Start: 2021-01-01 | End: 2021-01-01 | Stop reason: HOSPADM

## 2021-01-01 RX ORDER — IPRATROPIUM BROMIDE AND ALBUTEROL SULFATE 2.5; .5 MG/3ML; MG/3ML
1 SOLUTION RESPIRATORY (INHALATION)
Status: DISCONTINUED | OUTPATIENT
Start: 2021-01-01 | End: 2021-01-01 | Stop reason: HOSPADM

## 2021-01-01 RX ORDER — POLYETHYLENE GLYCOL 3350 17 G/17G
17 POWDER, FOR SOLUTION ORAL DAILY PRN
Status: DISCONTINUED | OUTPATIENT
Start: 2021-01-01 | End: 2021-01-01 | Stop reason: HOSPADM

## 2021-01-01 RX ORDER — NITROFURANTOIN 25; 75 MG/1; MG/1
100 CAPSULE ORAL 2 TIMES DAILY
Qty: 14 CAPSULE | Refills: 0 | Status: SHIPPED | OUTPATIENT
Start: 2021-01-01 | End: 2021-01-01

## 2021-01-01 RX ORDER — METOPROLOL SUCCINATE 50 MG/1
50 TABLET, EXTENDED RELEASE ORAL NIGHTLY
COMMUNITY
End: 2021-01-01 | Stop reason: SDUPTHER

## 2021-01-01 RX ORDER — WARFARIN SODIUM 4 MG/1
TABLET ORAL
Qty: 35 TABLET | Refills: 2 | Status: SHIPPED | OUTPATIENT
Start: 2021-01-01 | End: 2021-01-01

## 2021-01-01 RX ORDER — METOPROLOL SUCCINATE 50 MG/1
TABLET, EXTENDED RELEASE ORAL
Qty: 42 TABLET | Refills: 3 | OUTPATIENT
Start: 2021-01-01

## 2021-01-01 RX ORDER — LEVOTHYROXINE SODIUM 0.12 MG/1
125 TABLET ORAL
Qty: 30 TABLET | Refills: 5 | Status: SHIPPED
Start: 2021-01-01 | End: 2021-01-01

## 2021-01-01 RX ORDER — MULTIVIT WITH MINERALS/LUTEIN
TABLET ORAL
COMMUNITY
Start: 2021-01-01 | End: 2021-01-01 | Stop reason: SDUPTHER

## 2021-01-01 RX ORDER — WARFARIN SODIUM 4 MG/1
8 TABLET ORAL
Status: COMPLETED | OUTPATIENT
Start: 2021-01-01 | End: 2021-01-01

## 2021-01-01 RX ORDER — CHOLECALCIFEROL (VITAMIN D3) 50 MCG
2000 TABLET ORAL
Status: DISCONTINUED | OUTPATIENT
Start: 2021-01-01 | End: 2021-01-01 | Stop reason: HOSPADM

## 2021-01-01 RX ORDER — SPIRONOLACTONE 25 MG/1
12.5 TABLET ORAL EVERY EVENING
Status: DISCONTINUED | OUTPATIENT
Start: 2021-01-01 | End: 2021-01-01 | Stop reason: HOSPADM

## 2021-01-01 RX ORDER — LISINOPRIL 5 MG/1
5 TABLET ORAL DAILY
Qty: 90 TABLET | Refills: 1 | Status: SHIPPED
Start: 2021-01-01 | End: 2021-01-01 | Stop reason: SDUPTHER

## 2021-01-01 RX ORDER — WARFARIN SODIUM 5 MG/1
10 TABLET ORAL DAILY
Qty: 60 TABLET | Refills: 3 | Status: SHIPPED
Start: 2021-01-01 | End: 2021-01-01

## 2021-01-01 RX ORDER — LISINOPRIL 10 MG/1
5 TABLET ORAL DAILY
Status: DISCONTINUED | OUTPATIENT
Start: 2021-01-01 | End: 2021-01-01 | Stop reason: HOSPADM

## 2021-01-01 RX ORDER — ALBUTEROL SULFATE 90 UG/1
2 AEROSOL, METERED RESPIRATORY (INHALATION) EVERY 6 HOURS PRN
Qty: 18 G | Refills: 5 | Status: SHIPPED
Start: 2021-01-01 | End: 2022-01-01 | Stop reason: ALTCHOICE

## 2021-01-01 RX ORDER — METOPROLOL SUCCINATE 25 MG/1
75 TABLET, EXTENDED RELEASE ORAL DAILY
Status: DISCONTINUED | OUTPATIENT
Start: 2021-01-01 | End: 2021-01-01 | Stop reason: HOSPADM

## 2021-01-01 RX ORDER — ALBUTEROL SULFATE 2.5 MG/3ML
2.5 SOLUTION RESPIRATORY (INHALATION) EVERY 6 HOURS PRN
COMMUNITY
End: 2021-01-01 | Stop reason: SDUPTHER

## 2021-01-01 RX ORDER — SODIUM CHLORIDE 9 MG/ML
INJECTION, SOLUTION INTRAVENOUS CONTINUOUS
Status: DISCONTINUED | OUTPATIENT
Start: 2021-01-01 | End: 2021-01-01 | Stop reason: HOSPADM

## 2021-01-01 RX ORDER — MECLIZINE HYDROCHLORIDE 25 MG/1
25 TABLET ORAL 3 TIMES DAILY PRN
Qty: 30 TABLET | Refills: 2 | Status: SHIPPED
Start: 2021-01-01 | End: 2022-01-01

## 2021-01-01 RX ORDER — FAMOTIDINE 20 MG/1
20 TABLET, FILM COATED ORAL 2 TIMES DAILY
Qty: 60 TABLET | Refills: 5 | Status: SHIPPED | OUTPATIENT
Start: 2021-01-01

## 2021-01-01 RX ORDER — MECLIZINE HCL 12.5 MG/1
25 TABLET ORAL 3 TIMES DAILY PRN
Status: DISCONTINUED | OUTPATIENT
Start: 2021-01-01 | End: 2021-01-01 | Stop reason: HOSPADM

## 2021-01-01 RX ORDER — ESCITALOPRAM OXALATE 10 MG/1
10 TABLET ORAL DAILY
Qty: 30 TABLET | Refills: 5 | Status: SHIPPED | OUTPATIENT
Start: 2021-01-01

## 2021-01-01 RX ORDER — POTASSIUM CHLORIDE 20 MEQ/1
20 TABLET, EXTENDED RELEASE ORAL
Status: DISCONTINUED | OUTPATIENT
Start: 2021-01-01 | End: 2021-01-01

## 2021-01-01 RX ORDER — SPIRONOLACTONE 25 MG/1
12.5 TABLET ORAL EVERY EVENING
Qty: 45 TABLET | Refills: 5 | Status: SHIPPED | OUTPATIENT
Start: 2021-01-01 | End: 2021-01-01 | Stop reason: DRUGHIGH

## 2021-01-01 RX ORDER — WARFARIN SODIUM 10 MG/1
10 TABLET ORAL DAILY
Status: DISCONTINUED | OUTPATIENT
Start: 2021-01-01 | End: 2021-01-01 | Stop reason: HOSPADM

## 2021-01-01 RX ORDER — LEVOTHYROXINE SODIUM 0.12 MG/1
125 TABLET ORAL DAILY
COMMUNITY
End: 2021-01-01 | Stop reason: SDUPTHER

## 2021-01-01 RX ORDER — POTASSIUM CHLORIDE 7.45 MG/ML
10 INJECTION INTRAVENOUS
Status: CANCELLED | OUTPATIENT
Start: 2021-01-01

## 2021-01-01 RX ORDER — POTASSIUM CHLORIDE 7.45 MG/ML
10 INJECTION INTRAVENOUS
Status: DISCONTINUED | OUTPATIENT
Start: 2021-01-01 | End: 2021-01-01

## 2021-01-01 RX ORDER — LISINOPRIL 5 MG/1
5 TABLET ORAL DAILY
Qty: 90 TABLET | Refills: 1 | Status: ON HOLD
Start: 2021-01-01 | End: 2022-01-01 | Stop reason: HOSPADM

## 2021-01-01 RX ORDER — ALBUTEROL SULFATE 2.5 MG/3ML
2.5 SOLUTION RESPIRATORY (INHALATION) EVERY 6 HOURS PRN
Qty: 120 EACH | Refills: 5 | Status: SHIPPED | OUTPATIENT
Start: 2021-01-01

## 2021-01-01 RX ORDER — BISACODYL 10 MG
10 SUPPOSITORY, RECTAL RECTAL DAILY PRN
Qty: 30 SUPPOSITORY | Refills: 2 | Status: SHIPPED
Start: 2021-01-01 | End: 2022-01-01

## 2021-01-01 RX ORDER — LEVOTHYROXINE SODIUM 0.12 MG/1
125 TABLET ORAL DAILY
Qty: 30 TABLET | Refills: 5 | Status: SHIPPED
Start: 2021-01-01 | End: 2022-01-01

## 2021-01-01 RX ORDER — METOCLOPRAMIDE HYDROCHLORIDE 5 MG/ML
10 INJECTION INTRAMUSCULAR; INTRAVENOUS ONCE
Status: COMPLETED | OUTPATIENT
Start: 2021-01-01 | End: 2021-01-01

## 2021-01-01 RX ORDER — FAMOTIDINE 20 MG/1
20 TABLET, FILM COATED ORAL 2 TIMES DAILY
COMMUNITY
End: 2021-01-01 | Stop reason: SDUPTHER

## 2021-01-01 RX ORDER — METOPROLOL SUCCINATE 50 MG/1
TABLET, EXTENDED RELEASE ORAL
Qty: 42 TABLET | Refills: 3 | Status: SHIPPED | OUTPATIENT
Start: 2021-01-01 | End: 2021-01-01 | Stop reason: DRUGHIGH

## 2021-01-01 RX ORDER — ESCITALOPRAM OXALATE 10 MG/1
TABLET ORAL
Qty: 28 TABLET | Refills: 3 | Status: SHIPPED
Start: 2021-01-01 | End: 2021-01-01

## 2021-01-01 RX ORDER — FUROSEMIDE 10 MG/ML
20 INJECTION INTRAMUSCULAR; INTRAVENOUS ONCE
Status: COMPLETED | OUTPATIENT
Start: 2021-01-01 | End: 2021-01-01

## 2021-01-01 RX ORDER — FENTANYL CITRATE 50 UG/ML
25 INJECTION, SOLUTION INTRAMUSCULAR; INTRAVENOUS ONCE
Status: COMPLETED | OUTPATIENT
Start: 2021-01-01 | End: 2021-01-01

## 2021-01-01 RX ORDER — BISACODYL 10 MG
10 SUPPOSITORY, RECTAL RECTAL DAILY PRN
Status: DISCONTINUED | OUTPATIENT
Start: 2021-01-01 | End: 2021-01-01 | Stop reason: HOSPADM

## 2021-01-01 RX ORDER — FUROSEMIDE 10 MG/ML
40 INJECTION INTRAMUSCULAR; INTRAVENOUS ONCE
Status: COMPLETED | OUTPATIENT
Start: 2021-01-01 | End: 2021-01-01

## 2021-01-01 RX ORDER — ALBUTEROL SULFATE 2.5 MG/3ML
2.5 SOLUTION RESPIRATORY (INHALATION) EVERY 6 HOURS PRN
Status: DISCONTINUED | OUTPATIENT
Start: 2021-01-01 | End: 2021-01-01 | Stop reason: HOSPADM

## 2021-01-01 RX ORDER — POTASSIUM CHLORIDE 20 MEQ/1
40 TABLET, EXTENDED RELEASE ORAL DAILY
Qty: 180 TABLET | Refills: 1 | DISCHARGE
Start: 2021-01-01 | End: 2021-01-01 | Stop reason: ALTCHOICE

## 2021-01-01 RX ORDER — MECLIZINE HYDROCHLORIDE 25 MG/1
25 TABLET ORAL 3 TIMES DAILY PRN
COMMUNITY
End: 2021-01-01 | Stop reason: SDUPTHER

## 2021-01-01 RX ORDER — FUROSEMIDE 10 MG/ML
40 INJECTION INTRAMUSCULAR; INTRAVENOUS 2 TIMES DAILY
Status: DISCONTINUED | OUTPATIENT
Start: 2021-01-01 | End: 2021-01-01

## 2021-01-01 RX ORDER — WARFARIN SODIUM 4 MG/1
TABLET ORAL
Qty: 35 TABLET | Refills: 0 | DISCHARGE
Start: 2021-01-01 | End: 2021-01-01 | Stop reason: SDUPTHER

## 2021-01-01 RX ORDER — FUROSEMIDE 40 MG/1
40 TABLET ORAL DAILY
Qty: 60 TABLET | Refills: 2 | Status: ON HOLD
Start: 2021-01-01 | End: 2022-01-01 | Stop reason: HOSPADM

## 2021-01-01 RX ORDER — POTASSIUM CHLORIDE 20 MEQ/1
TABLET, EXTENDED RELEASE ORAL
Qty: 28 TABLET | Refills: 5 | Status: ON HOLD
Start: 2021-01-01 | End: 2021-01-01 | Stop reason: HOSPADM

## 2021-01-01 RX ORDER — ESCITALOPRAM OXALATE 10 MG/1
10 TABLET ORAL DAILY
Status: DISCONTINUED | OUTPATIENT
Start: 2021-01-01 | End: 2021-01-01

## 2021-01-01 RX ORDER — WARFARIN SODIUM 4 MG/1
4 TABLET ORAL
Status: DISCONTINUED | OUTPATIENT
Start: 2021-01-01 | End: 2021-01-01

## 2021-01-01 RX ORDER — ALBUTEROL SULFATE 90 UG/1
2 AEROSOL, METERED RESPIRATORY (INHALATION) EVERY 6 HOURS PRN
COMMUNITY
End: 2021-01-01 | Stop reason: SDUPTHER

## 2021-01-01 RX ORDER — FUROSEMIDE 20 MG/1
40 TABLET ORAL
Status: DISCONTINUED | OUTPATIENT
Start: 2021-01-01 | End: 2021-01-01 | Stop reason: HOSPADM

## 2021-01-01 RX ORDER — LEVOTHYROXINE SODIUM 0.12 MG/1
125 TABLET ORAL
Status: DISCONTINUED | OUTPATIENT
Start: 2021-01-01 | End: 2021-01-01 | Stop reason: HOSPADM

## 2021-01-01 RX ORDER — ACETAMINOPHEN 650 MG/1
650 SUPPOSITORY RECTAL EVERY 6 HOURS PRN
Status: DISCONTINUED | OUTPATIENT
Start: 2021-01-01 | End: 2021-01-01 | Stop reason: HOSPADM

## 2021-01-01 RX ORDER — LEVALBUTEROL INHALATION SOLUTION 0.63 MG/3ML
1 SOLUTION RESPIRATORY (INHALATION) EVERY 8 HOURS PRN
COMMUNITY
End: 2021-01-01

## 2021-01-01 RX ADMIN — PREDNISOLONE ACETATE 1 DROP: 10 SUSPENSION/ DROPS OPHTHALMIC at 09:50

## 2021-01-01 RX ADMIN — GUAIFENESIN 400 MG: 400 TABLET ORAL at 09:55

## 2021-01-01 RX ADMIN — POTASSIUM CHLORIDE 40 MEQ: 1500 TABLET, EXTENDED RELEASE ORAL at 18:06

## 2021-01-01 RX ADMIN — PREDNISOLONE ACETATE 1 DROP: 10 SUSPENSION/ DROPS OPHTHALMIC at 17:06

## 2021-01-01 RX ADMIN — SPIRONOLACTONE 25 MG: 25 TABLET ORAL at 13:26

## 2021-01-01 RX ADMIN — WARFARIN SODIUM 8 MG: 4 TABLET ORAL at 17:05

## 2021-01-01 RX ADMIN — PREDNISOLONE ACETATE 1 DROP: 10 SUSPENSION/ DROPS OPHTHALMIC at 21:08

## 2021-01-01 RX ADMIN — ACETAMINOPHEN 650 MG: 325 TABLET ORAL at 01:06

## 2021-01-01 RX ADMIN — PREDNISOLONE ACETATE 1 DROP: 10 SUSPENSION/ DROPS OPHTHALMIC at 09:56

## 2021-01-01 RX ADMIN — PREDNISOLONE ACETATE 1 DROP: 10 SUSPENSION/ DROPS OPHTHALMIC at 06:39

## 2021-01-01 RX ADMIN — Medication 2000 UNITS: at 05:10

## 2021-01-01 RX ADMIN — METOPROLOL SUCCINATE 75 MG: 25 TABLET, EXTENDED RELEASE ORAL at 21:03

## 2021-01-01 RX ADMIN — GUAIFENESIN 400 MG: 400 TABLET ORAL at 21:08

## 2021-01-01 RX ADMIN — IPRATROPIUM BROMIDE AND ALBUTEROL SULFATE 1 AMPULE: .5; 3 SOLUTION RESPIRATORY (INHALATION) at 13:02

## 2021-01-01 RX ADMIN — ESCITALOPRAM 10 MG: 10 TABLET, FILM COATED ORAL at 09:33

## 2021-01-01 RX ADMIN — FUROSEMIDE 20 MG: 10 INJECTION, SOLUTION INTRAMUSCULAR; INTRAVENOUS at 21:36

## 2021-01-01 RX ADMIN — SPIRONOLACTONE 12.5 MG: 25 TABLET ORAL at 18:06

## 2021-01-01 RX ADMIN — Medication 10 ML: at 09:32

## 2021-01-01 RX ADMIN — POTASSIUM CHLORIDE 20 MEQ: 1500 TABLET, EXTENDED RELEASE ORAL at 11:16

## 2021-01-01 RX ADMIN — PREDNISOLONE ACETATE 1 DROP: 10 SUSPENSION/ DROPS OPHTHALMIC at 17:01

## 2021-01-01 RX ADMIN — Medication 2000 UNITS: at 09:33

## 2021-01-01 RX ADMIN — SPIRONOLACTONE 12.5 MG: 25 TABLET ORAL at 17:04

## 2021-01-01 RX ADMIN — FUROSEMIDE 40 MG: 10 INJECTION, SOLUTION INTRAMUSCULAR; INTRAVENOUS at 06:39

## 2021-01-01 RX ADMIN — LISINOPRIL 10 MG: 10 TABLET ORAL at 17:05

## 2021-01-01 RX ADMIN — POTASSIUM CHLORIDE 40 MEQ: 1500 TABLET, EXTENDED RELEASE ORAL at 17:02

## 2021-01-01 RX ADMIN — PREDNISOLONE ACETATE 1 DROP: 10 SUSPENSION/ DROPS OPHTHALMIC at 21:04

## 2021-01-01 RX ADMIN — Medication 10 ML: at 09:56

## 2021-01-01 RX ADMIN — METOCLOPRAMIDE HYDROCHLORIDE 10 MG: 5 INJECTION INTRAMUSCULAR; INTRAVENOUS at 01:42

## 2021-01-01 RX ADMIN — Medication 10 ML: at 21:09

## 2021-01-01 RX ADMIN — LISINOPRIL 10 MG: 10 TABLET ORAL at 18:06

## 2021-01-01 RX ADMIN — GUAIFENESIN 400 MG: 400 TABLET ORAL at 21:03

## 2021-01-01 RX ADMIN — PREDNISOLONE ACETATE 1 DROP: 10 SUSPENSION/ DROPS OPHTHALMIC at 13:07

## 2021-01-01 RX ADMIN — PREDNISOLONE ACETATE 1 DROP: 10 SUSPENSION/ DROPS OPHTHALMIC at 18:09

## 2021-01-01 RX ADMIN — LEVOTHYROXINE SODIUM 125 MCG: 0.03 TABLET ORAL at 06:22

## 2021-01-01 RX ADMIN — FENTANYL CITRATE 25 MCG: 50 INJECTION INTRAMUSCULAR; INTRAVENOUS at 01:44

## 2021-01-01 RX ADMIN — GUAIFENESIN 400 MG: 400 TABLET ORAL at 14:33

## 2021-01-01 RX ADMIN — GUAIFENESIN 400 MG: 400 TABLET ORAL at 09:50

## 2021-01-01 RX ADMIN — Medication 40 ML: at 21:14

## 2021-01-01 RX ADMIN — FUROSEMIDE 40 MG: 10 INJECTION, SOLUTION INTRAVENOUS at 05:10

## 2021-01-01 RX ADMIN — GUAIFENESIN 400 MG: 400 TABLET ORAL at 13:05

## 2021-01-01 RX ADMIN — POTASSIUM CHLORIDE 40 MEQ: 1500 TABLET, EXTENDED RELEASE ORAL at 17:05

## 2021-01-01 RX ADMIN — IPRATROPIUM BROMIDE AND ALBUTEROL SULFATE 1 AMPULE: .5; 3 SOLUTION RESPIRATORY (INHALATION) at 19:17

## 2021-01-01 RX ADMIN — Medication 2000 UNITS: at 06:39

## 2021-01-01 RX ADMIN — LEVOTHYROXINE SODIUM 125 MCG: 0.12 TABLET ORAL at 06:39

## 2021-01-01 RX ADMIN — FAMOTIDINE 10 MG: 20 TABLET ORAL at 13:26

## 2021-01-01 RX ADMIN — PREDNISOLONE ACETATE 1 DROP: 10 SUSPENSION/ DROPS OPHTHALMIC at 14:33

## 2021-01-01 RX ADMIN — POTASSIUM CHLORIDE 40 MEQ: 1500 TABLET, EXTENDED RELEASE ORAL at 08:00

## 2021-01-01 RX ADMIN — FUROSEMIDE 40 MG: 10 INJECTION, SOLUTION INTRAMUSCULAR; INTRAVENOUS at 06:22

## 2021-01-01 RX ADMIN — LEVOTHYROXINE SODIUM 125 MCG: 0.03 TABLET ORAL at 05:10

## 2021-01-01 RX ADMIN — IPRATROPIUM BROMIDE AND ALBUTEROL SULFATE 1 AMPULE: .5; 3 SOLUTION RESPIRATORY (INHALATION) at 16:16

## 2021-01-01 RX ADMIN — SODIUM CHLORIDE: 9 INJECTION, SOLUTION INTRAVENOUS at 01:46

## 2021-01-01 RX ADMIN — Medication 2000 UNITS: at 06:22

## 2021-01-01 RX ADMIN — FAMOTIDINE 20 MG: 20 TABLET, FILM COATED ORAL at 09:33

## 2021-01-01 RX ADMIN — IPRATROPIUM BROMIDE AND ALBUTEROL SULFATE 1 AMPULE: .5; 3 SOLUTION RESPIRATORY (INHALATION) at 20:23

## 2021-01-01 RX ADMIN — FUROSEMIDE 40 MG: 10 INJECTION, SOLUTION INTRAMUSCULAR; INTRAVENOUS at 18:06

## 2021-01-01 RX ADMIN — SODIUM CHLORIDE, PRESERVATIVE FREE 10 ML: 5 INJECTION INTRAVENOUS at 13:27

## 2021-01-01 RX ADMIN — DICLOFENAC SODIUM 2 G: 10 GEL TOPICAL at 21:03

## 2021-01-01 RX ADMIN — IPRATROPIUM BROMIDE AND ALBUTEROL SULFATE 1 AMPULE: .5; 3 SOLUTION RESPIRATORY (INHALATION) at 15:55

## 2021-01-01 RX ADMIN — Medication 5 ML: at 23:21

## 2021-01-01 RX ADMIN — WARFARIN SODIUM 8 MG: 4 TABLET ORAL at 17:02

## 2021-01-01 RX ADMIN — FAMOTIDINE 20 MG: 20 TABLET, FILM COATED ORAL at 09:51

## 2021-01-01 RX ADMIN — PREDNISOLONE ACETATE 1 DROP: 10 SUSPENSION/ DROPS OPHTHALMIC at 09:32

## 2021-01-01 RX ADMIN — ACETAMINOPHEN 650 MG: 325 TABLET ORAL at 00:30

## 2021-01-01 RX ADMIN — FAMOTIDINE 20 MG: 20 TABLET, FILM COATED ORAL at 09:55

## 2021-01-01 RX ADMIN — LISINOPRIL 10 MG: 10 TABLET ORAL at 17:02

## 2021-01-01 RX ADMIN — LISINOPRIL 10 MG: 10 TABLET ORAL at 01:06

## 2021-01-01 RX ADMIN — POTASSIUM CHLORIDE 40 MEQ: 1500 TABLET, EXTENDED RELEASE ORAL at 12:05

## 2021-01-01 RX ADMIN — POTASSIUM CHLORIDE 20 MEQ: 1500 TABLET, EXTENDED RELEASE ORAL at 09:32

## 2021-01-01 RX ADMIN — POTASSIUM CHLORIDE 40 MEQ: 1500 TABLET, EXTENDED RELEASE ORAL at 09:55

## 2021-01-01 RX ADMIN — DICLOFENAC SODIUM 2 G: 10 GEL TOPICAL at 00:30

## 2021-01-01 RX ADMIN — Medication 10 ML: at 09:50

## 2021-01-01 RX ADMIN — METOPROLOL SUCCINATE 75 MG: 25 TABLET, EXTENDED RELEASE ORAL at 21:08

## 2021-01-01 RX ADMIN — SPIRONOLACTONE 12.5 MG: 25 TABLET ORAL at 17:03

## 2021-01-01 RX ADMIN — GUAIFENESIN 400 MG: 400 TABLET ORAL at 09:32

## 2021-01-01 RX ADMIN — ACETAMINOPHEN 650 MG: 325 TABLET ORAL at 00:38

## 2021-01-01 RX ADMIN — LISINOPRIL 5 MG: 10 TABLET ORAL at 13:27

## 2021-01-01 RX ADMIN — ESCITALOPRAM 10 MG: 10 TABLET, FILM COATED ORAL at 13:26

## 2021-01-01 RX ADMIN — SODIUM CHLORIDE: 9 INJECTION, SOLUTION INTRAVENOUS at 13:28

## 2021-01-01 RX ADMIN — FUROSEMIDE 40 MG: 10 INJECTION, SOLUTION INTRAMUSCULAR; INTRAVENOUS at 17:01

## 2021-01-01 RX ADMIN — PREDNISOLONE ACETATE 1 DROP: 10 SUSPENSION/ DROPS OPHTHALMIC at 13:00

## 2021-01-01 RX ADMIN — PREDNISOLONE ACETATE 1 DROP: 10 SUSPENSION/ DROPS OPHTHALMIC at 06:22

## 2021-01-01 RX ADMIN — ONDANSETRON 4 MG: 2 INJECTION INTRAMUSCULAR; INTRAVENOUS at 01:44

## 2021-01-01 SDOH — ECONOMIC STABILITY: FOOD INSECURITY: WITHIN THE PAST 12 MONTHS, THE FOOD YOU BOUGHT JUST DIDN'T LAST AND YOU DIDN'T HAVE MONEY TO GET MORE.: NEVER TRUE

## 2021-01-01 SDOH — HEALTH STABILITY: PHYSICAL HEALTH: ON AVERAGE, HOW MANY DAYS PER WEEK DO YOU ENGAGE IN MODERATE TO STRENUOUS EXERCISE (LIKE A BRISK WALK)?: 0 DAYS

## 2021-01-01 SDOH — HEALTH STABILITY: PHYSICAL HEALTH: ON AVERAGE, HOW MANY MINUTES DO YOU ENGAGE IN EXERCISE AT THIS LEVEL?: 0 MIN

## 2021-01-01 SDOH — ECONOMIC STABILITY: FOOD INSECURITY: WITHIN THE PAST 12 MONTHS, YOU WORRIED THAT YOUR FOOD WOULD RUN OUT BEFORE YOU GOT MONEY TO BUY MORE.: NEVER TRUE

## 2021-01-01 ASSESSMENT — SOCIAL DETERMINANTS OF HEALTH (SDOH)
IN A TYPICAL WEEK, HOW MANY TIMES DO YOU TALK ON THE PHONE WITH FAMILY, FRIENDS, OR NEIGHBORS?: MORE THAN THREE TIMES A WEEK
DO YOU BELONG TO ANY CLUBS OR ORGANIZATIONS SUCH AS CHURCH GROUPS UNIONS, FRATERNAL OR ATHLETIC GROUPS, OR SCHOOL GROUPS?: NO
HOW HARD IS IT FOR YOU TO PAY FOR THE VERY BASICS LIKE FOOD, HOUSING, MEDICAL CARE, AND HEATING?: SOMEWHAT HARD
HOW OFTEN DO YOU ATTENT MEETINGS OF THE CLUB OR ORGANIZATION YOU BELONG TO?: NEVER
HOW OFTEN DO YOU GET TOGETHER WITH FRIENDS OR RELATIVES?: ONCE A WEEK
HOW OFTEN DO YOU ATTEND CHURCH OR RELIGIOUS SERVICES?: 1 TO 4 TIMES PER YEAR

## 2021-01-01 ASSESSMENT — PAIN SCALES - GENERAL
PAINLEVEL_OUTOF10: 5
PAINLEVEL_OUTOF10: 0
PAINLEVEL_OUTOF10: 10
PAINLEVEL_OUTOF10: 0
PAINLEVEL_OUTOF10: 8
PAINLEVEL_OUTOF10: 4

## 2021-01-01 ASSESSMENT — ENCOUNTER SYMPTOMS
DYSPNEA ASSOCIATED WITH: MINIMAL EXERTION
DYSPNEA ASSOCIATED WITH: MINIMAL EXERTION

## 2021-01-01 ASSESSMENT — LIFESTYLE VARIABLES: HOW OFTEN DO YOU HAVE A DRINK CONTAINING ALCOHOL: NEVER

## 2021-01-04 ENCOUNTER — ANTI-COAG VISIT (OUTPATIENT)
Dept: FAMILY MEDICINE CLINIC | Age: 86
End: 2021-01-04

## 2021-01-04 ENCOUNTER — OFFICE VISIT (OUTPATIENT)
Dept: FAMILY MEDICINE CLINIC | Age: 86
End: 2021-01-04
Payer: MEDICARE

## 2021-01-04 VITALS
TEMPERATURE: 97.8 F | SYSTOLIC BLOOD PRESSURE: 148 MMHG | OXYGEN SATURATION: 96 % | DIASTOLIC BLOOD PRESSURE: 93 MMHG | BODY MASS INDEX: 40.34 KG/M2 | RESPIRATION RATE: 22 BRPM | HEART RATE: 95 BPM | HEIGHT: 66 IN | WEIGHT: 251 LBS

## 2021-01-04 DIAGNOSIS — J44.9 CHRONIC OBSTRUCTIVE PULMONARY DISEASE, UNSPECIFIED COPD TYPE (HCC): ICD-10-CM

## 2021-01-04 DIAGNOSIS — E66.01 CLASS 2 SEVERE OBESITY DUE TO EXCESS CALORIES WITH SERIOUS COMORBIDITY AND BODY MASS INDEX (BMI) OF 39.0 TO 39.9 IN ADULT (HCC): ICD-10-CM

## 2021-01-04 DIAGNOSIS — E78.00 PURE HYPERCHOLESTEROLEMIA: ICD-10-CM

## 2021-01-04 DIAGNOSIS — I10 ESSENTIAL HYPERTENSION: ICD-10-CM

## 2021-01-04 DIAGNOSIS — I48.21 PERMANENT ATRIAL FIBRILLATION (HCC): Primary | ICD-10-CM

## 2021-01-04 DIAGNOSIS — I50.32 CHRONIC DIASTOLIC (CONGESTIVE) HEART FAILURE (HCC): ICD-10-CM

## 2021-01-04 DIAGNOSIS — I42.8 NONISCHEMIC CARDIOMYOPATHY (HCC): ICD-10-CM

## 2021-01-04 DIAGNOSIS — E03.9 HYPOTHYROIDISM, UNSPECIFIED TYPE: ICD-10-CM

## 2021-01-04 DIAGNOSIS — M15.9 PRIMARY OSTEOARTHRITIS INVOLVING MULTIPLE JOINTS: ICD-10-CM

## 2021-01-04 LAB
BASOPHILS ABSOLUTE: 0.07 E9/L (ref 0–0.2)
BASOPHILS RELATIVE PERCENT: 1.4 % (ref 0–2)
EOSINOPHILS ABSOLUTE: 0.16 E9/L (ref 0.05–0.5)
EOSINOPHILS RELATIVE PERCENT: 3.1 % (ref 0–6)
HCT VFR BLD CALC: 41.5 % (ref 34–48)
HEMOGLOBIN: 13.1 G/DL (ref 11.5–15.5)
IMMATURE GRANULOCYTES #: 0 E9/L
IMMATURE GRANULOCYTES %: 0 % (ref 0–5)
INTERNATIONAL NORMALIZATION RATIO, POC: 3.4
LYMPHOCYTES ABSOLUTE: 0.96 E9/L (ref 1.5–4)
LYMPHOCYTES RELATIVE PERCENT: 18.6 % (ref 20–42)
MCH RBC QN AUTO: 29.4 PG (ref 26–35)
MCHC RBC AUTO-ENTMCNC: 31.6 % (ref 32–34.5)
MCV RBC AUTO: 93.3 FL (ref 80–99.9)
MONOCYTES ABSOLUTE: 0.46 E9/L (ref 0.1–0.95)
MONOCYTES RELATIVE PERCENT: 8.9 % (ref 2–12)
NEUTROPHILS ABSOLUTE: 3.5 E9/L (ref 1.8–7.3)
NEUTROPHILS RELATIVE PERCENT: 68 % (ref 43–80)
PDW BLD-RTO: 15 FL (ref 11.5–15)
PLATELET # BLD: 198 E9/L (ref 130–450)
PMV BLD AUTO: 10.7 FL (ref 7–12)
PROTHROMBIN TIME, POC: 40.9
RBC # BLD: 4.45 E12/L (ref 3.5–5.5)
WBC # BLD: 5.2 E9/L (ref 4.5–11.5)

## 2021-01-04 PROCEDURE — G8484 FLU IMMUNIZE NO ADMIN: HCPCS | Performed by: FAMILY MEDICINE

## 2021-01-04 PROCEDURE — G8417 CALC BMI ABV UP PARAM F/U: HCPCS | Performed by: FAMILY MEDICINE

## 2021-01-04 PROCEDURE — 1036F TOBACCO NON-USER: CPT | Performed by: FAMILY MEDICINE

## 2021-01-04 PROCEDURE — G8427 DOCREV CUR MEDS BY ELIG CLIN: HCPCS | Performed by: FAMILY MEDICINE

## 2021-01-04 PROCEDURE — 1123F ACP DISCUSS/DSCN MKR DOCD: CPT | Performed by: FAMILY MEDICINE

## 2021-01-04 PROCEDURE — 36415 COLL VENOUS BLD VENIPUNCTURE: CPT | Performed by: FAMILY MEDICINE

## 2021-01-04 PROCEDURE — 1090F PRES/ABSN URINE INCON ASSESS: CPT | Performed by: FAMILY MEDICINE

## 2021-01-04 PROCEDURE — G8926 SPIRO NO PERF OR DOC: HCPCS | Performed by: FAMILY MEDICINE

## 2021-01-04 PROCEDURE — 99214 OFFICE O/P EST MOD 30 MIN: CPT | Performed by: FAMILY MEDICINE

## 2021-01-04 PROCEDURE — 3023F SPIROM DOC REV: CPT | Performed by: FAMILY MEDICINE

## 2021-01-04 PROCEDURE — 99212 OFFICE O/P EST SF 10 MIN: CPT | Performed by: FAMILY MEDICINE

## 2021-01-04 PROCEDURE — G8399 PT W/DXA RESULTS DOCUMENT: HCPCS | Performed by: FAMILY MEDICINE

## 2021-01-04 PROCEDURE — 85610 PROTHROMBIN TIME: CPT | Performed by: FAMILY MEDICINE

## 2021-01-04 PROCEDURE — 4040F PNEUMOC VAC/ADMIN/RCVD: CPT | Performed by: FAMILY MEDICINE

## 2021-01-04 RX ORDER — WARFARIN SODIUM 4 MG/1
TABLET ORAL
Qty: 60 TABLET | Refills: 5 | Status: SHIPPED
Start: 2021-01-04 | End: 2021-01-25 | Stop reason: DRUGHIGH

## 2021-01-05 LAB
ALBUMIN SERPL-MCNC: 4.3 G/DL (ref 3.5–5.2)
ALP BLD-CCNC: 85 U/L (ref 35–104)
ALT SERPL-CCNC: 16 U/L (ref 0–32)
ANION GAP SERPL CALCULATED.3IONS-SCNC: 11 MMOL/L (ref 7–16)
AST SERPL-CCNC: 27 U/L (ref 0–31)
BILIRUB SERPL-MCNC: 0.7 MG/DL (ref 0–1.2)
BUN BLDV-MCNC: 20 MG/DL (ref 8–23)
CALCIUM SERPL-MCNC: 9.3 MG/DL (ref 8.6–10.2)
CHLORIDE BLD-SCNC: 104 MMOL/L (ref 98–107)
CO2: 27 MMOL/L (ref 22–29)
CREAT SERPL-MCNC: 1 MG/DL (ref 0.5–1)
GFR AFRICAN AMERICAN: >60
GFR NON-AFRICAN AMERICAN: 53 ML/MIN/1.73
GLUCOSE BLD-MCNC: 86 MG/DL (ref 74–99)
POTASSIUM SERPL-SCNC: 4.4 MMOL/L (ref 3.5–5)
SODIUM BLD-SCNC: 142 MMOL/L (ref 132–146)
TOTAL PROTEIN: 7.6 G/DL (ref 6.4–8.3)
TSH SERPL DL<=0.05 MIU/L-ACNC: 5.61 UIU/ML (ref 0.27–4.2)

## 2021-01-11 ENCOUNTER — ANTI-COAG VISIT (OUTPATIENT)
Dept: FAMILY MEDICINE CLINIC | Age: 86
End: 2021-01-11

## 2021-01-11 ENCOUNTER — OFFICE VISIT (OUTPATIENT)
Dept: FAMILY MEDICINE CLINIC | Age: 86
End: 2021-01-11
Payer: MEDICARE

## 2021-01-11 VITALS
BODY MASS INDEX: 40.51 KG/M2 | SYSTOLIC BLOOD PRESSURE: 128 MMHG | DIASTOLIC BLOOD PRESSURE: 75 MMHG | TEMPERATURE: 97.9 F | HEART RATE: 92 BPM | OXYGEN SATURATION: 97 % | HEIGHT: 66 IN

## 2021-01-11 DIAGNOSIS — S80.02XA CONTUSION OF LEFT KNEE AND LOWER LEG, INITIAL ENCOUNTER: ICD-10-CM

## 2021-01-11 DIAGNOSIS — S80.12XA CONTUSION OF LEFT KNEE AND LOWER LEG, INITIAL ENCOUNTER: ICD-10-CM

## 2021-01-11 DIAGNOSIS — I48.21 PERMANENT ATRIAL FIBRILLATION (HCC): Primary | ICD-10-CM

## 2021-01-11 DIAGNOSIS — S93.521A SPRAIN OF METATARSOPHALANGEAL JOINT OF RIGHT GREAT TOE, INITIAL ENCOUNTER: ICD-10-CM

## 2021-01-11 LAB
INTERNATIONAL NORMALIZATION RATIO, POC: 2.2
PROTHROMBIN TIME, POC: 26.3

## 2021-01-11 PROCEDURE — 99212 OFFICE O/P EST SF 10 MIN: CPT | Performed by: FAMILY MEDICINE

## 2021-01-11 PROCEDURE — 99214 OFFICE O/P EST MOD 30 MIN: CPT | Performed by: FAMILY MEDICINE

## 2021-01-11 PROCEDURE — 4040F PNEUMOC VAC/ADMIN/RCVD: CPT | Performed by: FAMILY MEDICINE

## 2021-01-11 PROCEDURE — 1090F PRES/ABSN URINE INCON ASSESS: CPT | Performed by: FAMILY MEDICINE

## 2021-01-11 PROCEDURE — G8417 CALC BMI ABV UP PARAM F/U: HCPCS | Performed by: FAMILY MEDICINE

## 2021-01-11 PROCEDURE — 85610 PROTHROMBIN TIME: CPT | Performed by: FAMILY MEDICINE

## 2021-01-11 PROCEDURE — G8399 PT W/DXA RESULTS DOCUMENT: HCPCS | Performed by: FAMILY MEDICINE

## 2021-01-11 PROCEDURE — 1036F TOBACCO NON-USER: CPT | Performed by: FAMILY MEDICINE

## 2021-01-11 PROCEDURE — G8484 FLU IMMUNIZE NO ADMIN: HCPCS | Performed by: FAMILY MEDICINE

## 2021-01-11 PROCEDURE — 1123F ACP DISCUSS/DSCN MKR DOCD: CPT | Performed by: FAMILY MEDICINE

## 2021-01-11 PROCEDURE — G8427 DOCREV CUR MEDS BY ELIG CLIN: HCPCS | Performed by: FAMILY MEDICINE

## 2021-01-11 NOTE — PROGRESS NOTES
[facility]  FAMILY MEDICINE RESIDENCY PROGRAM   OFFICE PROGRESS NOTE  DATE OF VISIT : 1/11/2021         Chief Complaint :   Chief Complaint   Patient presents with    Fall     pt fell last night going up the stairs    Other     pt states she cannot barely see or hear       HPI:   [place] comes to clinic today for evaluation of pain secondary to a fall. Additional history: [place] tells me that she fell yesterday evening. She was entering the house, and tripped on 1 step. She landed half on the steps and half on the porch. She has 3 steps to get into the house I was trying to assist her  and caring in packages. He was unable to lift her up, but a neighbor came over and helped her to get up and get back into the house. She had a great deal of pain and swelling, but was able to sleep with the help of some Tylenol. She did wake up in the middle the night in pain again. She tells me that she was able to get up and get herself into the bathroom to urinate several times through the night. Today she complains of pain in the left knee area as well as left lower leg, and finds that she has difficulty moving her left leg because of some weakness. She states that the left hip will not do what she wants it to do. The swelling has decreased but is still present. She also describes pain in the right foot, located mostly over the right great toe. She tells me that she is able to stand and take a few steps with a great deal of pain. She walks on her lateral right foot because it hurts too much to bear weight on the great toe, and her left leg is difficult for her to lift. Objective:    VS:  Blood pressure 128/75, pulse 92, temperature 97.9 °F (36.6 °C), temperature source Temporal, height 5' 6\" (1.676 m), SpO2 97 %, not currently breastfeeding. General Appearance: Well developed, awake, alert, oriented, no acute distress.   Sitting in wheelchair in the examination room  Extremities:   Right lower extremity is normal except for the foot. She has mild ecchymosis over the medial aspect of the right foot and plantar arch, with marked tenderness with palpation over the M TP joint of the great toe. She does have range of motion of the right great toe but this is quite painful. The left leg shows erythema and discoloration from mid tibia to the knee. There is significant swelling just distal and lateral to the knee bruising, and abrasion. There is no obvious bleeding or broken skin. She is markedly tender over the proximal tibia the knee especially laterally and posteriorly. She is able to elevate the entire left leg without significant pain she has no real tenderness in the left groin or lateral hip and I am able to internally and externally rotate the hip without increasing her pain. Range of motion of the left knee is limited by pain but I am able to put her to at least 45 degrees of flexion with no difficulty in 90 degrees of flexion with slight pain she is able to fully extend the left knee. She has rather diffuse tenderness along the tibia, but no point tenderness. She was able to stand while she put her coat on. Neurologic:    Alert, oriented. No focal neurologic deficits noted. Psychiatric: has a normal mood and affect. Behavior is normal.     I independently reviewed the following information:  none    1. Permanent atrial fibrillation (HCC)  -     POCT INR  2. Contusion of left knee and lower leg, initial encounter  3. Sprain of metatarsophalangeal joint of right great toe, initial encounter      Additional Plan: While I think it is unlikely that she sustained any fractures, I told her that I could not tell for sure without x-rays. She is reluctant to get x-rays, and would like to wait another day or 2. She is going to use Tylenol for pain relief and try ice as well.   I told her that she should get up out of bed and walk with a walker for short distance. If she is unable to do this, she will contact me. Her INR today was 2.2, and I am going to have her continue the dose that she is supposed to be on. I reviewed this with her, and printed out the correct dosing schedule for her. I have talked with her and her  on multiple occasions about making changes to their living arrangements. They have a difficult time accepting this, both because of long-term custom as well as financial reasons. I have warned them that they may have little choice if they continue to have these falls. I have sent them both to physical therapy in an attempt to strengthen the muscles, and have suggested that they do at least some walking for short distances in their house with assistance. RTO in in 3 month(s) or sooner for any persistent, new, or worsening symptoms. Please see Patient Instructions for further counseling and information given. Advised to be adherent to the treatment plans discussed today, and please call with any questions or concerns, letting the office know of any reasons that the plans may not be followed. The risks of untreated conditions include worsening illness, injury, disability, and possibly, death. Please call if symptoms change in any way, worsen, or fail to completely resolve, as this could necessitate a change to treatment plans. Patient and/or caregiver expressed understanding. Indications and proper use of medication(s) reviewed. Potential side-effects and risks of medication(s) also explained. Patient and/or caregiver was instructed to call if any new symptoms develop prior to next visit. Health risk factors discussed and addressed.     Signed by : Nadir Huang M.D.

## 2021-01-11 NOTE — PROGRESS NOTES
[facility]  FAMILY MEDICINE RESIDENCY PROGRAM   OFFICE PROGRESS NOTE  DATE OF VISIT : 1/11/2021         Chief Complaint : No chief complaint on file. HPI:   [place] comes to clinic today for     {Blank multiple:51947::\"F/U of chronic problem(s)\",\"new or recent complaint of ***\"}     Chronic problems reviewed today include:     {Chronic disease RK:11317}    Current status of this/these condition(s):  {stable/unstable:54444}    Tolerating meds: {YES / LO:39538}    Additional history: ***     Objective:    VS:  not currently breastfeeding. General Appearance: Well developed, awake, alert, oriented, no acute distress  HEENT: Normocephalic,atraumatic. PERRL, EOM's intact, EAC without erythema or swelling, no pallor or icterus. Neck: Supple, symmetrical, trachea midline. No JVD. Thyroid smooth, not enlarged. Chest wall/Lung: Clear to auscultation bilaterally,  respirations unlabored. No rhonchi/wheezing/rales  Heart::  {Blank single:19197::\"Irregular\",\"Regular\"} rate and rhythm, S1and S2 normal, {Blank single:23837::\"+murmur\",\"+***murmur\",\"+ASHLEY\",\"GII/VI ASHLEY\",\"no murmur\",\"no murmur, rub or gallop\"}. Abdomen: Soft, nontender. Normal BS, no hepatosplenomegaly or mass  Extremities:  Extremities normal, atraumatic, no cyanosis. *** edema. Skin: Skin color, texture, turgor normal, no rashes or lesions  Musculoskeletal: ROM grossly normal in all joints of extremities, no obvious joint swelling. Neurologic:    Alert, oriented. Normal gait and coordination   No focal neurologic deficits noted. Psychiatric: has a normal mood and affect. Behavior is normal.     I independently reviewed the following information:  {Outside review:15323}    {There are no diagnoses linked to this encounter.  (Refresh or delete this SmartLink)}    Additional Plan:  ***    {Time Documentation Optional:762813008} RTO in {follow up:40292::\"prn\"} or sooner for any persistent, new, or worsening symptoms. Please see Patient Instructions for further counseling and information given. Advised to be adherent to the treatment plans discussed today, and please call with any questions or concerns, letting the office know of any reasons that the plans may not be followed. The risks of untreated conditions include worsening illness, injury, disability, and possibly, death. Please call if symptoms change in any way, worsen, or fail to completely resolve, as this could necessitate a change to treatment plans. Patient and/or caregiver expressed understanding. Indications and proper use of medication(s) reviewed. Potential side-effects and risks of medication(s) also explained. Patient and/or caregiver was instructed to call if any new symptoms develop prior to next visit. Health risk factors discussed and addressed.     Signed by : Manolo Braga M.D.

## 2021-01-19 ENCOUNTER — ANTI-COAG VISIT (OUTPATIENT)
Dept: FAMILY MEDICINE CLINIC | Age: 86
End: 2021-01-19
Payer: MEDICARE

## 2021-01-19 ENCOUNTER — TELEPHONE (OUTPATIENT)
Dept: FAMILY MEDICINE CLINIC | Age: 86
End: 2021-01-19

## 2021-01-19 DIAGNOSIS — I48.21 PERMANENT ATRIAL FIBRILLATION (HCC): ICD-10-CM

## 2021-01-19 LAB — INR BLD: 1.3

## 2021-01-19 PROCEDURE — 93793 ANTICOAG MGMT PT WARFARIN: CPT | Performed by: FAMILY MEDICINE

## 2021-01-19 NOTE — TELEPHONE ENCOUNTER
Nurse Practitioner had a visit with patient in her home today. Patient had a fall 1/15/21 and was in [facility] ER . Left leg is very swollen with unilateral redness and a hematoma on the shin. . I made a follow-up appointment for 1/25/21. Patient reported to Deborah Alvarez increased SOB with walking and that she did start her Lasix. Deborah Alvarez is putting in a Social needs referral. She sees patient yearly.

## 2021-01-25 ENCOUNTER — OFFICE VISIT (OUTPATIENT)
Dept: FAMILY MEDICINE CLINIC | Age: 86
End: 2021-01-25
Payer: MEDICARE

## 2021-01-25 ENCOUNTER — IMMUNIZATION (OUTPATIENT)
Dept: PRIMARY CARE CLINIC | Age: 86
End: 2021-01-25
Payer: MEDICARE

## 2021-01-25 VITALS
WEIGHT: 248 LBS | HEART RATE: 82 BPM | HEIGHT: 66 IN | RESPIRATION RATE: 18 BRPM | DIASTOLIC BLOOD PRESSURE: 77 MMHG | OXYGEN SATURATION: 97 % | SYSTOLIC BLOOD PRESSURE: 152 MMHG | BODY MASS INDEX: 39.86 KG/M2 | TEMPERATURE: 98.2 F

## 2021-01-25 DIAGNOSIS — I48.21 PERMANENT ATRIAL FIBRILLATION (HCC): Primary | ICD-10-CM

## 2021-01-25 DIAGNOSIS — E03.9 HYPOTHYROIDISM, UNSPECIFIED TYPE: ICD-10-CM

## 2021-01-25 DIAGNOSIS — Z79.01 CHRONIC ANTICOAGULATION: ICD-10-CM

## 2021-01-25 DIAGNOSIS — I50.32 CHRONIC DIASTOLIC (CONGESTIVE) HEART FAILURE (HCC): ICD-10-CM

## 2021-01-25 DIAGNOSIS — E78.00 PURE HYPERCHOLESTEROLEMIA: ICD-10-CM

## 2021-01-25 DIAGNOSIS — I42.8 NONISCHEMIC CARDIOMYOPATHY (HCC): ICD-10-CM

## 2021-01-25 DIAGNOSIS — J44.9 CHRONIC OBSTRUCTIVE PULMONARY DISEASE, UNSPECIFIED COPD TYPE (HCC): ICD-10-CM

## 2021-01-25 DIAGNOSIS — R29.6 FREQUENT FALLS: Chronic | ICD-10-CM

## 2021-01-25 DIAGNOSIS — I10 ESSENTIAL HYPERTENSION: ICD-10-CM

## 2021-01-25 PROCEDURE — 91300 COVID-19, PFIZER VACCINE 30MCG/0.3ML DOSE: CPT | Performed by: PHYSICIAN ASSISTANT

## 2021-01-25 PROCEDURE — G8926 SPIRO NO PERF OR DOC: HCPCS | Performed by: FAMILY MEDICINE

## 2021-01-25 PROCEDURE — G8417 CALC BMI ABV UP PARAM F/U: HCPCS | Performed by: FAMILY MEDICINE

## 2021-01-25 PROCEDURE — G8427 DOCREV CUR MEDS BY ELIG CLIN: HCPCS | Performed by: FAMILY MEDICINE

## 2021-01-25 PROCEDURE — 99212 OFFICE O/P EST SF 10 MIN: CPT | Performed by: FAMILY MEDICINE

## 2021-01-25 PROCEDURE — 1123F ACP DISCUSS/DSCN MKR DOCD: CPT | Performed by: FAMILY MEDICINE

## 2021-01-25 PROCEDURE — 99213 OFFICE O/P EST LOW 20 MIN: CPT | Performed by: FAMILY MEDICINE

## 2021-01-25 PROCEDURE — 1090F PRES/ABSN URINE INCON ASSESS: CPT | Performed by: FAMILY MEDICINE

## 2021-01-25 PROCEDURE — G8484 FLU IMMUNIZE NO ADMIN: HCPCS | Performed by: FAMILY MEDICINE

## 2021-01-25 PROCEDURE — 1036F TOBACCO NON-USER: CPT | Performed by: FAMILY MEDICINE

## 2021-01-25 PROCEDURE — 0001A COVID-19, PFIZER VACCINE 30MCG/0.3ML DOSE: CPT | Performed by: PHYSICIAN ASSISTANT

## 2021-01-25 PROCEDURE — G8399 PT W/DXA RESULTS DOCUMENT: HCPCS | Performed by: FAMILY MEDICINE

## 2021-01-25 PROCEDURE — 4040F PNEUMOC VAC/ADMIN/RCVD: CPT | Performed by: FAMILY MEDICINE

## 2021-01-25 PROCEDURE — 3023F SPIROM DOC REV: CPT | Performed by: FAMILY MEDICINE

## 2021-01-25 RX ORDER — WARFARIN SODIUM 4 MG/1
TABLET ORAL
Qty: 60 TABLET | Refills: 5 | Status: SHIPPED
Start: 2021-01-25 | End: 2021-01-01 | Stop reason: SDUPTHER

## 2021-01-25 NOTE — PROGRESS NOTES
GUANAKO RAMIREZ [facility]  FAMILY MEDICINE RESIDENCY PROGRAM   OFFICE PROGRESS NOTE  DATE OF VISIT : 2021    Patient : Mercedes Loza   Sex : female   Age : 81 y.o.  : 1935   MRN : [id number]            Chief Complaint :   Chief Complaint   Patient presents with    ED Follow-up    Mass     left leg from fall       HPI:   Mercedes Loza comes to clinic today for follow-up of her recent fall. She apparently did fall one other time with similar type of injuries. She does find that her swelling of her left leg is markedly improved, but notes some erythema in the distal left leg. This is mildly warm but not particularly tender. However, she does have some tenderness and swelling of the posterior leg bilaterally. She has had no increase in her shortness of breath, palpitations, or chest pain. She notes that she does get out of breath with fairly short distances, but this is not changed significantly recently, nor does she have any acute chest pain.       Patient Active Problem List   Diagnosis    GERD (gastroesophageal reflux disease)    History of atrial fibrillation    Depression    Hypothyroidism    Nonischemic cardiomyopathy (HCC)    Permanent atrial fibrillation (HCC)    Astigmatism    History of artificial lens replacement    Presbyopia    Tear film insufficiency    Essential hypertension    Pulmonary hypertension (HCC)    Class 2 severe obesity due to excess calories with serious comorbidity and body mass index (BMI) of 39.0 to 39.9 in adult [facility])    Tricuspid regurgitation    Greater trochanteric bursitis    Leg length discrepancy    Chronic anticoagulation    Chronic obstructive pulmonary disease (HCC)    Mixed stress and urge urinary incontinence    Primary osteoarthritis involving multiple joints    Fuchs' endothelial dystrophy    Pseudophakia, both eyes    Chronic diastolic (congestive) heart failure ([address]    Pure hypercholesterolemia    Spinal stenosis of lumbar region with neurogenic claudication    VHD (valvular heart disease)    Patent foramen ovale    Coronary arteriosclerosis in native artery    Dizziness of unknown cause    Age-related physical debility    Frequent falls       Past Medical History:   Diagnosis Date    Anticoagulated on Coumadin     on Coumadin for this Dr. Kenyon Cunha controls    Atrial fibrillation [facility] [facility]     Basal cell carcinoma of neck     CAD (coronary artery disease)     History of luminal irregularities of the coronary artery, stable.     CHF (congestive heart failure) (HCC)     stage I diastolic dysfunction on 4/38/56    Depression 11/16/2010    DJD (degenerative joint disease)     SEVERE IN RIGHT HAND, IN MULTIPLE OTHER JOINTS    GERD (gastroesophageal reflux disease) 11/16/2010    History of cardiovascular stress test 03/18/2014    lexiscan    Hyperparathyroidism ([facility] [address] 11/16/2010    Had parathyroidectomy    Hypertension 11/16/2010    Hypothyroidism 11/16/2010    Mild mitral regurgitation 7/10/14    Osteoarthritis 11/16/2010    Pulmonary hypertension ([facility] [address] [facility] 7/10/14    mild    Renal calculi     x3    Tricuspid regurgitation 7/10/14    mild-moderate    Urinary incontinence 11/16/2010       Current Outpatient Medications on File Prior to Visit   Medication Sig Dispense Refill    prednisoLONE acetate (PRED FORTE) 1 % ophthalmic suspension INSTILL 1 DROP IN BOTH EYES FOUR TIMES DAILY      famotidine (PEPCID) 20 MG tablet Take one tablet by mouth daily 90 tablet 3    escitalopram (LEXAPRO) 10 MG tablet Take 1 tablet by mouth daily 30 tablet 3    spironolactone (ALDACTONE) 25 MG tablet Take 0.5 tablets by mouth every evening 45 tablet 5    levothyroxine (SYNTHROID) 125 MCG tablet Take 1 tablet by mouth every morning (before breakfast) 28 tablet 5    potassium chloride (KLOR-CON M) 20 MEQ extended release tablet Take 1 tablet by mouth every morning (before breakfast) 28 tablet 5    metoprolol succinate (TOPROL XL) 50 MG extended release tablet Take 1.5 tablets by mouth every evening 42 tablet 5    furosemide (LASIX) 40 MG tablet Take 1 tablet by mouth every morning (before breakfast) 28 tablet 5    lisinopril (PRINIVIL;ZESTRIL) 10 MG tablet Take 1 tablet by mouth every evening 28 tablet 5    meclizine (ANTIVERT) 25 MG tablet Take 1 tablet by mouth 3 times daily as needed for Dizziness 100 tablet 5    guaiFENesin (MUCINEX) 600 MG extended release tablet Take 1,200 mg by mouth 2 times daily as needed       PROAIR  (90 Base) MCG/ACT inhaler USE 2 PUFFS EVERY 6 HOURS  AS NEEDED FOR WHEEZING 34 g 2    acetaminophen (TYLENOL) 325 MG tablet Take 650 mg by mouth every 6 hours as needed for Pain or Fever      Vitamin D, Ergocalciferol, 50 MCG (2000 UT) CAPS Take 2,000 Units by mouth every morning (before breakfast) (Patient not taking: Reported on 1/25/2021) 28 capsule 5    b complex vitamins capsule Take 1 capsule by mouth every morning (before breakfast) (Patient not taking: Reported on 1/25/2021) 28 capsule 5    Ascorbic Acid (VITAMIN C) 250 MG tablet Take 1 tablet by mouth every morning (before breakfast) (Patient not taking: Reported on 1/25/2021) 28 tablet 5    levalbuterol (XOPENEX) 0.63 MG/3ML nebulization Take 3 mLs by nebulization every 8 hours as needed for Wheezing DX: COPD J44.9 864 mL 3     No current facility-administered medications on file prior to visit.         Allergies   Allergen Reactions    Codeine Other (See Comments)     Hallucination    Penicillins Hives    Vicodin [Hydrocodone-Acetaminophen] Other (See Comments)     Hallucination      Adhesive Tape Rash       Family History   Problem Relation Age of Onset    Heart Disease Mother     Heart Attack Mother     Arthritis Mother     Heart Disease Father     Mental Illness Father     Diabetes Father     Heart Disease Brother     Cancer Brother     Cancer Son     Heart Disease Sister     Heart Disease Brother     Heart Surgery Brother Past Surgical History:   Procedure Laterality Date    CARDIAC CATHETERIZATION  3/22/14    CARPAL TUNNEL RELEASE      Right hand.  CATARACT REMOVAL  6/2005    right, bilateral cataract surgery.  CATARACT REMOVAL WITH IMPLANT Bilateral     CHOLECYSTECTOMY  1972    COLONOSCOPY      FINGER SURGERY  7/2011    S/P removal of cyst from right index finger.  FRACTURE SURGERY Left     arm fractured-casted, Bilateral feet fractures-casted.  INCONTINENCE SURGERY  5/2005    Urinary incontinence, S/P bladder suspension.  INNER EAR SURGERY  2011    tube place in right ear   [address]    KIDNEY STONE SURGERY  1981    KNEE ARTHROPLASTY Bilateral 9/2005    PARATHYROIDECTOMY      Three times    SHOULDER ARTHROPLASTY Left 2009    SHOULDER ARTHROPLASTY Left      shoulder total replacement.  THYROID SURGERY  5/2005    recurrent    THYROIDECTOMY  1960       Social History     Tobacco Use    Smoking status: Never Smoker    Smokeless tobacco: Never Used   Substance Use Topics    Alcohol use: No     Frequency: Never     Binge frequency: Never    Drug use: No       ROS: Negative except as stated in HPI. Objective:    VS:  Blood pressure (!) 152/77, pulse 82, temperature 98.2 °F (36.8 °C), temperature source Temporal, resp. rate 18, height 5' 6\" (1.676 m), weight 248 lb (112.5 kg), SpO2 97 %, not currently breastfeeding. Physical Exam  Constitutional:       General: She is not in acute distress. Appearance: She is obese. Cardiovascular:      Rate and Rhythm: Rhythm irregular. Heart sounds: Murmur present. Pulmonary:      Effort: Pulmonary effort is normal.      Breath sounds: Normal breath sounds. No rales. Musculoskeletal:      Comments: Both lower extremities show tense swelling with only 1+ pitting edema. Left lower extremity shows an isolated area of swelling laterally and distally to the patella. This is not erythematous and only mildly tender.   It is smaller than previously noted hematoma. Both lower extremities demonstrate some swelling posteriorly as well as marked tenderness along the popliteal area and calf. It is difficult to say that this tenderness is any worse than palpation of anywhere in the lower legs. Neurological:      Mental Status: She is alert. Most recent labs and imaging reviewed. Findings include:     INR is 1.8 today    [place] was seen today for ed follow-up and mass. Diagnoses and all orders for this visit:    Permanent atrial fibrillation ([facility]    Essential hypertension    Nonischemic cardiomyopathy (HCC)    Chronic diastolic (congestive) heart failure (HCC)    Chronic obstructive pulmonary disease, unspecified COPD type ([facility]    Hypothyroidism, unspecified type    Frequent falls    Chronic anticoagulation    Pure hypercholesterolemia    Other orders  -     warfarin (COUMADIN) 4 MG tablet; Take 4 mg Wednesday and Saturday. Take 8 mg on all other days. Additional Plan: I think that her hematoma is slowly resolving, and I am therefore not going to make any attempt to open this up. My concern is that she may have developed DVT in 1 or both legs. With her frequent falls, edema, and sedentary lifestyle, she would certainly be at high risk. She is on Coumadin, and a but has been subtherapeutic frequently. I attempted to get her to tell me her Coumadin dosing today. Once again, she gave me the wrong dose. She continues to use a dosing schedule that we changed more than a month ago. We have made repeated attempts to clarify this for her. She has been able to repeat back the correct dosing schedule when she is in the office. We also given her numerous written instructions. She states that she loses these by the time she gets home.   I have made multiple suggestions for ways for her to remember, including posting the instructions on the refrigerator, having someone place the tablets in a pillbox for her, and having someone else administer her medications. Her daughter does come and check on her and could easily do this. Today I did order a new prescription of her Coumadin for her with the current dosing schedule listed. I suspect that she falls back on the prescription statement on the bottle, and this is why she is constantly taking the wrong amount. I warned her about the potential for increased chest pain or shortness of breath, and of asked her to seek immediate care should this occur. Because she is so close to the therapeutic level, I am not going to admit her or do any further testing on her today. We will check an INR next week. She is going to have the Covid vaccine today. RTO in in 1 month(s) or sooner for any persistent, new, or worsening symptoms. Please see Patient Instructions for further counseling and information given. Advised to be adherent to the treatment plans discussed today, and please call with any questions or concerns, letting the office know of any reasons that the plans may not be followed. The risks of untreated conditions include worsening illness, injury, disability, and possibly, death. Please call if symptoms change in any way, worsen, or fail to completely resolve, as this could necessitate a change to treatment plans. Patient and/or caregiver expressed understanding. Indications and proper use of medication(s) reviewed. Potential side-effects and risks of medication(s) also explained. Patient and/or caregiver was instructed to call if any new symptoms develop prior to next visit. Health risk factors discussed and addressed. I have personally reviewed labs and/or imaging (if any).       Signed by : Adam Molina M.D.

## 2021-01-28 ENCOUNTER — CARE COORDINATION (OUTPATIENT)
Dept: CARE COORDINATION | Age: 86
End: 2021-01-28

## 2021-01-28 NOTE — CARE COORDINATION
Attempted to reach patient by telephone. Unable to leave voicemail. Will attempt to reach patient again.

## 2021-02-03 ENCOUNTER — TELEPHONE (OUTPATIENT)
Dept: FAMILY MEDICINE CLINIC | Age: 86
End: 2021-02-03

## 2021-02-08 ENCOUNTER — ANTI-COAG VISIT (OUTPATIENT)
Dept: FAMILY MEDICINE CLINIC | Age: 86
End: 2021-02-08

## 2021-02-08 LAB — INR BLD: 1.8

## 2021-02-08 NOTE — PROGRESS NOTES
Phoned patient she would like an order for and pt/inr so she could got get it done at a office close to her.   Patient stated that it is a [facility] office where she got it done in the past.  Please advise  Thank you April

## 2021-02-09 ENCOUNTER — ANTI-COAG VISIT (OUTPATIENT)
Dept: FAMILY MEDICINE CLINIC | Age: 86
End: 2021-02-09

## 2021-02-09 ENCOUNTER — NURSE ONLY (OUTPATIENT)
Dept: FAMILY MEDICINE CLINIC | Age: 86
End: 2021-02-09
Payer: MEDICARE

## 2021-02-09 ENCOUNTER — APPOINTMENT (OUTPATIENT)
Dept: GENERAL RADIOLOGY | Age: 86
End: 2021-02-09
Payer: MEDICARE

## 2021-02-09 ENCOUNTER — APPOINTMENT (OUTPATIENT)
Dept: CT IMAGING | Age: 86
End: 2021-02-09
Payer: MEDICARE

## 2021-02-09 ENCOUNTER — HOSPITAL ENCOUNTER (EMERGENCY)
Age: 86
Discharge: HOME | End: 2021-02-09
Attending: EMERGENCY MEDICINE
Payer: MEDICARE

## 2021-02-09 VITALS
BODY MASS INDEX: 39.86 KG/M2 | HEIGHT: 66 IN | OXYGEN SATURATION: 98 % | TEMPERATURE: 98.3 F | WEIGHT: 248 LBS | DIASTOLIC BLOOD PRESSURE: 70 MMHG | RESPIRATION RATE: 16 BRPM | SYSTOLIC BLOOD PRESSURE: 142 MMHG | HEART RATE: 70 BPM

## 2021-02-09 DIAGNOSIS — R53.83 OTHER FATIGUE: Primary | ICD-10-CM

## 2021-02-09 DIAGNOSIS — I48.91 ATRIAL FIBRILLATION WITH RVR (HCC): ICD-10-CM

## 2021-02-09 DIAGNOSIS — M25.561 ACUTE PAIN OF BOTH KNEES: ICD-10-CM

## 2021-02-09 DIAGNOSIS — R53.83 FATIGUE, UNSPECIFIED TYPE: ICD-10-CM

## 2021-02-09 DIAGNOSIS — I48.21 PERMANENT ATRIAL FIBRILLATION (HCC): Primary | ICD-10-CM

## 2021-02-09 DIAGNOSIS — M25.562 ACUTE PAIN OF BOTH KNEES: ICD-10-CM

## 2021-02-09 LAB
ANION GAP SERPL CALCULATED.3IONS-SCNC: 7 MMOL/L (ref 7–16)
BACTERIA: NORMAL /HPF
BASOPHILS ABSOLUTE: 0.07 E9/L (ref 0–0.2)
BASOPHILS RELATIVE PERCENT: 1.5 % (ref 0–2)
BILIRUBIN URINE: NEGATIVE
BLOOD, URINE: ABNORMAL
BUN BLDV-MCNC: 29 MG/DL (ref 8–23)
CALCIUM SERPL-MCNC: 8.9 MG/DL (ref 8.6–10.2)
CHLORIDE BLD-SCNC: 105 MMOL/L (ref 98–107)
CLARITY: CLEAR
CO2: 28 MMOL/L (ref 22–29)
COLOR: ABNORMAL
CREAT SERPL-MCNC: 1 MG/DL (ref 0.5–1)
EKG ATRIAL RATE: 77 BPM
EKG Q-T INTERVAL: 414 MS
EKG QRS DURATION: 100 MS
EKG QTC CALCULATION (BAZETT): 474 MS
EKG R AXIS: 30 DEGREES
EKG T AXIS: 41 DEGREES
EKG VENTRICULAR RATE: 79 BPM
EOSINOPHILS ABSOLUTE: 0.14 E9/L (ref 0.05–0.5)
EOSINOPHILS RELATIVE PERCENT: 3.1 % (ref 0–6)
GFR AFRICAN AMERICAN: >60
GFR NON-AFRICAN AMERICAN: 53 ML/MIN/1.73
GLUCOSE BLD-MCNC: 86 MG/DL (ref 74–99)
GLUCOSE URINE: NEGATIVE MG/DL
HCT VFR BLD CALC: 39.9 % (ref 34–48)
HEMOGLOBIN: 13 G/DL (ref 11.5–15.5)
IMMATURE GRANULOCYTES #: 0.01 E9/L
IMMATURE GRANULOCYTES %: 0.2 % (ref 0–5)
INR BLD: 2
INTERNATIONAL NORMALIZATION RATIO, POC: 1.9
KETONES, URINE: NEGATIVE MG/DL
LACTIC ACID: 0.8 MMOL/L (ref 0.5–2.2)
LEUKOCYTE ESTERASE, URINE: ABNORMAL
LYMPHOCYTES ABSOLUTE: 0.9 E9/L (ref 1.5–4)
LYMPHOCYTES RELATIVE PERCENT: 19.6 % (ref 20–42)
MCH RBC QN AUTO: 30 PG (ref 26–35)
MCHC RBC AUTO-ENTMCNC: 32.6 % (ref 32–34.5)
MCV RBC AUTO: 91.9 FL (ref 80–99.9)
MONOCYTES ABSOLUTE: 0.48 E9/L (ref 0.1–0.95)
MONOCYTES RELATIVE PERCENT: 10.5 % (ref 2–12)
NEUTROPHILS ABSOLUTE: 2.99 E9/L (ref 1.8–7.3)
NEUTROPHILS RELATIVE PERCENT: 65.1 % (ref 43–80)
NITRITE, URINE: NEGATIVE
PDW BLD-RTO: 15.2 FL (ref 11.5–15)
PH UA: 6 (ref 5–9)
PLATELET # BLD: 225 E9/L (ref 130–450)
PMV BLD AUTO: 12.1 FL (ref 7–12)
POTASSIUM REFLEX MAGNESIUM: 4.4 MMOL/L (ref 3.5–5)
PRO-BNP: 2071 PG/ML (ref 0–450)
PROTEIN UA: NEGATIVE MG/DL
PROTHROMBIN TIME, POC: 23
PROTHROMBIN TIME: 23.1 SEC (ref 9.3–12.4)
RBC # BLD: 4.34 E12/L (ref 3.5–5.5)
RBC UA: NORMAL /HPF (ref 0–2)
REASON FOR REJECTION: NORMAL
REJECTED TEST: NORMAL
SODIUM BLD-SCNC: 140 MMOL/L (ref 132–146)
SPECIFIC GRAVITY UA: 1.02 (ref 1–1.03)
TROPONIN: <0.01 NG/ML (ref 0–0.03)
UROBILINOGEN, URINE: 0.2 E.U./DL
WBC # BLD: 4.6 E9/L (ref 4.5–11.5)
WBC UA: NORMAL /HPF (ref 0–5)

## 2021-02-09 PROCEDURE — 93005 ELECTROCARDIOGRAM TRACING: CPT | Performed by: STUDENT IN AN ORGANIZED HEALTH CARE EDUCATION/TRAINING PROGRAM

## 2021-02-09 PROCEDURE — 85025 COMPLETE CBC W/AUTO DIFF WBC: CPT

## 2021-02-09 PROCEDURE — 83605 ASSAY OF LACTIC ACID: CPT

## 2021-02-09 PROCEDURE — 80048 BASIC METABOLIC PNL TOTAL CA: CPT

## 2021-02-09 PROCEDURE — 70450 CT HEAD/BRAIN W/O DYE: CPT

## 2021-02-09 PROCEDURE — 73521 X-RAY EXAM HIPS BI 2 VIEWS: CPT

## 2021-02-09 PROCEDURE — 85610 PROTHROMBIN TIME: CPT

## 2021-02-09 PROCEDURE — 99285 EMERGENCY DEPT VISIT HI MDM: CPT

## 2021-02-09 PROCEDURE — 71045 X-RAY EXAM CHEST 1 VIEW: CPT

## 2021-02-09 PROCEDURE — 73562 X-RAY EXAM OF KNEE 3: CPT

## 2021-02-09 PROCEDURE — 2580000003 HC RX 258: Performed by: STUDENT IN AN ORGANIZED HEALTH CARE EDUCATION/TRAINING PROGRAM

## 2021-02-09 PROCEDURE — 72125 CT NECK SPINE W/O DYE: CPT

## 2021-02-09 PROCEDURE — 73560 X-RAY EXAM OF KNEE 1 OR 2: CPT

## 2021-02-09 PROCEDURE — 93010 ELECTROCARDIOGRAM REPORT: CPT | Performed by: INTERNAL MEDICINE

## 2021-02-09 PROCEDURE — 81001 URINALYSIS AUTO W/SCOPE: CPT

## 2021-02-09 PROCEDURE — 83880 ASSAY OF NATRIURETIC PEPTIDE: CPT

## 2021-02-09 PROCEDURE — 84484 ASSAY OF TROPONIN QUANT: CPT

## 2021-02-09 RX ORDER — 0.9 % SODIUM CHLORIDE 0.9 %
500 INTRAVENOUS SOLUTION INTRAVENOUS ONCE
Status: COMPLETED | OUTPATIENT
Start: 2021-02-09 | End: 2021-02-09

## 2021-02-09 RX ADMIN — SODIUM CHLORIDE 500 ML: 9 INJECTION, SOLUTION INTRAVENOUS at 13:47

## 2021-02-09 ASSESSMENT — ENCOUNTER SYMPTOMS
NAUSEA: 0
EYE REDNESS: 0
SINUS PRESSURE: 0
SHORTNESS OF BREATH: 0
WHEEZING: 0
DIARRHEA: 0
COUGH: 0
EYE PAIN: 0
EYE DISCHARGE: 0
VOMITING: 0
SORE THROAT: 0
ABDOMINAL DISTENTION: 0
BACK PAIN: 0

## 2021-02-09 NOTE — ED PROVIDER NOTES
Chief Complaint   Patient presents with    Fatigue     pt was at the doctor's office today to get blood work; they sent her here for lethargy. Pt fell 2 days ago and is on Coumadin. Patient is 70-year-old female presents today from PCPs office for concern of fatigue. Per patient she had a ground-level fall several days ago. Patient is on Coumadin. She states she fell forward hitting her legs and is complaining of bilateral knee pain. Pain is located diffusely in the bilateral knees, described as aching, moderate in severity. Worse with bearing weight. The pain is nonradiating. No alleviating factors. No treatment prior to arrival.   She is alert and oriented, however appears to be slightly slow in responding to questions. She does follow commands, moves all extremities. She denies headache, blurry vision, chest pain or shortness of breath. She states she just feels fatigued. The history is provided by the patient. No  was used. Review of Systems   Constitutional: Positive for fatigue. Negative for chills and fever. HENT: Negative for ear pain, sinus pressure and sore throat. Eyes: Negative for pain, discharge and redness. Respiratory: Negative for cough, shortness of breath and wheezing. Cardiovascular: Negative for chest pain. Gastrointestinal: Negative for abdominal distention, diarrhea, nausea and vomiting. Genitourinary: Negative for dysuria and frequency. Musculoskeletal: Positive for arthralgias. Negative for back pain. Skin: Negative for rash and wound. Neurological: Negative for dizziness, syncope, weakness, numbness and headaches. Hematological: Negative for adenopathy. Psychiatric/Behavioral: Negative for confusion. All other systems reviewed and are negative. Physical Exam  Vitals signs and nursing note reviewed. Constitutional:       General: She is not in acute distress. Appearance: Normal appearance.  She is well-developed. She is not ill-appearing. HENT:      Head: Normocephalic and atraumatic. Mouth/Throat:      Mouth: Mucous membranes are moist.   Eyes:      Pupils: Pupils are equal, round, and reactive to light. Neck:      Musculoskeletal: Normal range of motion and neck supple. No neck rigidity or muscular tenderness. Cardiovascular:      Rate and Rhythm: Normal rate and regular rhythm. Pulses: Normal pulses. Pulmonary:      Effort: Pulmonary effort is normal. No respiratory distress. Breath sounds: Normal breath sounds. No wheezing or rales. Abdominal:      General: Bowel sounds are normal.      Palpations: Abdomen is soft. Tenderness: There is no abdominal tenderness. There is no guarding or rebound. Musculoskeletal:         General: Tenderness present. Comments: Tenderness of patient of the knees bilaterally, compartments of the bilateral lower extremities are soft, there is 2+ dorsalis pedis and posterior tibial pulse present. Skin:     General: Skin is warm and dry. Neurological:      Mental Status: She is alert and oriented to person, place, and time. Cranial Nerves: No cranial nerve deficit.       Coordination: Coordination normal.   Psychiatric:         Mood and Affect: Mood normal.         Behavior: Behavior normal.          Procedures     Labs Reviewed   CBC WITH AUTO DIFFERENTIAL - Abnormal; Notable for the following components:       Result Value    RDW 15.2 (*)     MPV 12.1 (*)     Lymphocytes % 19.6 (*)     Lymphocytes Absolute 0.90 (*)     All other components within normal limits   URINALYSIS - Abnormal; Notable for the following components:    Blood, Urine SMALL (*)     Leukocyte Esterase, Urine TRACE (*)     All other components within normal limits   PROTIME-INR - Abnormal; Notable for the following components:    Protime 23.1 (*)     All other components within normal limits    Narrative:     CALL  [facility]  H34 tel. ,  Rejected Test Name/Called to:miles manuel mendoza, 02/09/2021 13:39, by [facility] TO MG FOR LOW K - Abnormal; Notable for the following components:    BUN 29 (*)     All other components within normal limits   BRAIN NATRIURETIC PEPTIDE - Abnormal; Notable for the following components:    Pro-BNP 2,071 (*)     All other components within normal limits   LACTIC ACID, PLASMA    Narrative:     CALL  [facility]  H34 tel. ,  Rejected Test Name/Called to:miles jackson rn, 02/09/2021 13:39, by [address]    Narrative:     CALL  [facility]  H34 tel. ,  Rejected Test Name/Called to:miles jackson rn, 02/09/2021 13:39, by [facility]   TROPONIN   MICROSCOPIC URINALYSIS   POCT GLUCOSE     CT Head WO Contrast   Final Result   1.   1.  There is no acute intracranial abnormality. Specifically, there is   no intracranial hemorrhage. 2. Atrophy and periventricular leukomalacia,      CT CERVICAL SPINE WO CONTRAST   Final Result   1. There is no acute compression fracture or subluxation of the cervical   spine. 2. Advanced multilevel degenerative disc and degenerative joint disease. XR KNEE RIGHT (1-2 VIEWS)   Final Result   No acute osseous abnormality is identified      XR KNEE LEFT (1-2 VIEWS)   Final Result   No acute osseous abnormality is identified      XR CHEST PORTABLE   Final Result   No interval change      XR HIP BILATERAL W AP PELVIS (2 VIEWS)   Final Result   No acute process        EKG #1:  I personally interpreted this EKG  Time:  1259    Rate: 79  Rhythm: Atrial fibrillation. Interpretation: Atrial fibrillation. MDM  Number of Diagnoses or Management Options  Other fatigue  Diagnosis management comments: Is a 41-year-old female presents today from PCPs office for concerns of fatigue and confusion. On presentation patient alert and oriented x3, no acute distress. Complete lab work and imaging was obtained.   Labs are within normal limits, troponin is not elevated, UA shows no evidence UTI, electrolytes are normal.  CT of the head shows no acute intracranial abnormality. Chest x-ray shows no evidence of pneumonia. Patient has no infectious etiology. Vitals are stable. Patient is alert and oriented x3, no acute distress, normal neurologic exam.  Patient is wanting be discharged home she lives with her  and states she feels fine. Return precautions were given. Amount and/or Complexity of Data Reviewed  Clinical lab tests: reviewed  Tests in the radiology section of CPT®: reviewed  Tests in the medicine section of CPT®: reviewed                --------------------------------------------- PAST HISTORY ---------------------------------------------  Past Medical History:  has a past medical history of Anticoagulated on Coumadin, Atrial fibrillation ([facility] [address] Basal cell carcinoma of neck, CAD (coronary artery disease), CHF (congestive heart failure) ([address] Depression, DJD (degenerative joint disease), GERD (gastroesophageal reflux disease), History of cardiovascular stress test, Hyperparathyroidism ([address] Hypertension, Hypothyroidism, Mild mitral regurgitation, Osteoarthritis, Pulmonary hypertension ([address] Renal calculi, Tricuspid regurgitation, and Urinary incontinence. Past Surgical History:  has a past surgical history that includes Thyroidectomy (1960); Kidney stone surgery (1972); Cholecystectomy (1972); Kidney stone surgery (1981); Cataract removal (6/2005); Incontinence surgery (5/2005); Thyroid surgery (5/2005); parathyroidectomy; Knee Arthroplasty (Bilateral, 9/2005); Total shoulder arthroplasty (Left, 2009); fracture surgery (Left); Carpal tunnel release; Finger surgery (7/2011); Total shoulder arthroplasty (Left); Inner ear surgery (2011); Cataract removal with implant (Bilateral); Colonoscopy; and Cardiac catheterization (3/22/14). Social History:  reports that she has never smoked. She has never used smokeless tobacco. She reports that she does not drink alcohol or use drugs.     Family History: family history includes Arthritis in her mother; Cancer in her brother and son; Diabetes in her father; Heart Attack in her mother; Heart Disease in her brother, brother, father, mother, and sister; Heart Surgery in her brother; Mental Illness in her father. The patients home medications have been reviewed.     Allergies: Codeine, Penicillins, Vicodin [hydrocodone-acetaminophen], and Adhesive tape    -------------------------------------------------- RESULTS -------------------------------------------------  Labs:  Results for orders placed or performed during the hospital encounter of 02/09/21   CBC Auto Differential   Result Value Ref Range    WBC 4.6 4.5 - 11.5 E9/L    RBC 4.34 3.50 - 5.50 E12/L    Hemoglobin 13.0 11.5 - 15.5 g/dL    Hematocrit 39.9 34.0 - 48.0 %    MCV 91.9 80.0 - 99.9 fL    MCH 30.0 26.0 - 35.0 pg    MCHC 32.6 32.0 - 34.5 %    RDW 15.2 (H) 11.5 - 15.0 fL    Platelets 050 630 - 978 E9/L    MPV 12.1 (H) 7.0 - 12.0 fL    Neutrophils % 65.1 43.0 - 80.0 %    Immature Granulocytes % 0.2 0.0 - 5.0 %    Lymphocytes % 19.6 (L) 20.0 - 42.0 %    Monocytes % 10.5 2.0 - 12.0 %    Eosinophils % 3.1 0.0 - 6.0 %    Basophils % 1.5 0.0 - 2.0 %    Neutrophils Absolute 2.99 1.80 - 7.30 E9/L    Immature Granulocytes # 0.01 E9/L    Lymphocytes Absolute 0.90 (L) 1.50 - 4.00 E9/L    Monocytes Absolute 0.48 0.10 - 0.95 E9/L    Eosinophils Absolute 0.14 0.05 - 0.50 E9/L    Basophils Absolute 0.07 0.00 - 0.20 E9/L   Urinalysis, reflex to microscopic   Result Value Ref Range    Color, UA Straw Straw/Yellow    Clarity, UA Clear Clear    Glucose, Ur Negative Negative mg/dL    Bilirubin Urine Negative Negative    Ketones, Urine Negative Negative mg/dL    Specific Gravity, UA 1.020 1.005 - 1.030    Blood, Urine SMALL (A) Negative    pH, UA 6.0 5.0 - 9.0    Protein, UA Negative Negative mg/dL    Urobilinogen, Urine 0.2 <2.0 E.U./dL    Nitrite, Urine Negative Negative    Leukocyte Esterase, Urine TRACE (A) Negative   Lactic Acid, Plasma   Result Value Ref Range    Lactic Acid 0.8 0.5 - 2.2 mmol/L   Protime-INR   Result Value Ref Range    Protime 23.1 (H) 9.3 - 12.4 sec    INR 2.0    SPECIMEN REJECTION   Result Value Ref Range    Rejected Test bmp bnp trop     Reason for Rejection see below    Basic Metabolic Panel w/ Reflex to MG   Result Value Ref Range    Sodium 140 132 - 146 mmol/L    Potassium reflex Magnesium 4.4 3.5 - 5.0 mmol/L    Chloride 105 98 - 107 mmol/L    CO2 28 22 - 29 mmol/L    Anion Gap 7 7 - 16 mmol/L    Glucose 86 74 - 99 mg/dL    BUN 29 (H) 8 - 23 mg/dL    CREATININE 1.0 0.5 - 1.0 mg/dL    GFR Non-African American 53 >=60 mL/min/1.73    GFR African American >60     Calcium 8.9 8.6 - 10.2 mg/dL   Brain Natriuretic Peptide   Result Value Ref Range    Pro-BNP 2,071 (H) 0 - 450 pg/mL   Troponin   Result Value Ref Range    Troponin <0.01 0.00 - 0.03 ng/mL   Microscopic Urinalysis   Result Value Ref Range    WBC, UA 1-3 0 - 5 /HPF    RBC, UA 0-1 0 - 2 /HPF    Bacteria, UA NONE SEEN None Seen /HPF   EKG 12 Lead   Result Value Ref Range    Ventricular Rate 79 BPM    Atrial Rate 77 BPM    QRS Duration 100 ms    Q-T Interval 414 ms    QTc Calculation (Bazett) 474 ms    R Axis 30 degrees    T Axis 41 degrees       Radiology:  CT Head WO Contrast   Final Result   1.   1.  There is no acute intracranial abnormality. Specifically, there is   no intracranial hemorrhage. 2. Atrophy and periventricular leukomalacia,      CT CERVICAL SPINE WO CONTRAST   Final Result   1. There is no acute compression fracture or subluxation of the cervical   spine. 2. Advanced multilevel degenerative disc and degenerative joint disease.       XR KNEE RIGHT (1-2 VIEWS)   Final Result   No acute osseous abnormality is identified      XR KNEE LEFT (1-2 VIEWS)   Final Result   No acute osseous abnormality is identified      XR CHEST PORTABLE   Final Result   No interval change      XR HIP BILATERAL W AP PELVIS (2 VIEWS)   Final Result   No acute process          ------------------------- NURSING NOTES AND VITALS REVIEWED ---------------------------  Date / Time Roomed:  2/9/2021 12:53 PM  ED Bed Assignment:  QUE TYSON/RINA    The nursing notes within the ED encounter and vital signs as below have been reviewed. BP (!) 142/70   Pulse 70   Temp 98.3 °F (36.8 °C)   Resp 16   Ht 5' 6\" (1.676 m)   Wt 248 lb (112.5 kg)   SpO2 98%   BMI 40.03 kg/m²   Oxygen Saturation Interpretation: Normal      ------------------------------------------ PROGRESS NOTES ------------------------------------------  I have spoken with the patient and discussed todays results, in addition to providing specific details for the plan of care and counseling regarding the diagnosis and prognosis. Their questions are answered at this time and they are agreeable with the plan. I discussed at length with them reasons for immediate return here for re evaluation. They will followup with primary care by calling their office tomorrow. --------------------------------- ADDITIONAL PROVIDER NOTES ---------------------------------  At this time the patient is without objective evidence of an acute process requiring hospitalization or inpatient management. They have remained hemodynamically stable throughout their entire ED visit and are stable for discharge with outpatient follow-up. The plan has been discussed in detail and they are aware of the specific conditions for emergent return, as well as the importance of follow-up. Discharge Medication List as of 2/9/2021  4:00 PM          Diagnosis:  1. Other fatigue    2. Fatigue, unspecified type    3. Acute pain of both knees        Disposition:  Patient's disposition: Discharge to home  Patient's condition is stable.             Radha Wilks DO  Resident  02/10/21 1002    ATTENDING PROVIDER ATTESTATION:     Jessica Rodrigocarline presented to the emergency department for evaluation of Fatigue (pt was at the doctor's office today to get blood work; they sent her here for lethargy. Pt fell 2 days ago and is on Coumadin. )    I have reviewed and discussed the case, including pertinent history (medical, surgical, family and social) and exam findings with the Resident and the Nurse assigned to [place]. I have personally performed and/or participated in the history, exam, medical decision making, and procedures and agree with all pertinent clinical information. I have reviewed my findings and recommendations with [place] and members of family present at the time of disposition. I, Dr. Jae Anna am the primary physician of record for this patient. MDM: The patient is 81 y.o. female  with a past medical history of       Diagnosis Date    Anticoagulated on Coumadin     on Coumadin for this Dr. Randy Tanner controls    Atrial fibrillation ([facility]     Basal cell carcinoma of neck     CAD (coronary artery disease)     History of luminal irregularities of the coronary artery, stable.     CHF (congestive heart failure) (HCC)     stage I diastolic dysfunction on 6/60/20    Depression 11/16/2010    DJD (degenerative joint disease)     SEVERE IN RIGHT HAND, IN MULTIPLE OTHER JOINTS    GERD (gastroesophageal reflux disease) 11/16/2010    History of cardiovascular stress test 03/18/2014    lexiscan    Hyperparathyroidism ([address] 11/16/2010    Had parathyroidectomy    Hypertension 11/16/2010    Hypothyroidism 11/16/2010    Mild mitral regurgitation 7/10/14    Osteoarthritis 11/16/2010    Pulmonary hypertension ([address] 7/10/14    mild    Renal calculi     x3    Tricuspid regurgitation 7/10/14    mild-moderate    Urinary incontinence 11/16/2010     presenting to the emergency department with a chief complaint of bilateral knee pain and altered mental status differential diagnosis includes but not limited to, intracranial hemorrhage, electrolyte derangement, dehydration the patient did have labs and imaging which were reviewed. The patient has CBC which was unremarkable, patient BMP fairly unremarkable, troponin negative, CT head without any acute intracranial process. Bilateral hip x-ray unremarkable, chest x-ray negative, fracture seen on imaging of the bilateral knees the patient was treated symptomatically. She is at her baseline. Patient be discharged and follow-up outpatient. My findings/plan: The primary encounter diagnosis was Other fatigue. Diagnoses of Fatigue, unspecified type and Acute pain of both knees were also pertinent to this visit.   Discharge Medication List as of 2/9/2021  4:00 PM        Chelsey Lala, [address], DO  02/13/21 1129

## 2021-02-10 ENCOUNTER — CARE COORDINATION (OUTPATIENT)
Dept: CARE COORDINATION | Age: 86
End: 2021-02-10

## 2021-02-10 DIAGNOSIS — R42 DIZZINESS: ICD-10-CM

## 2021-02-10 RX ORDER — MECLIZINE HYDROCHLORIDE 25 MG/1
25 TABLET ORAL 3 TIMES DAILY PRN
Qty: 100 TABLET | Refills: 5 | Status: SHIPPED
Start: 2021-02-10 | End: 2021-01-01

## 2021-02-10 NOTE — TELEPHONE ENCOUNTER
Patient called and was asking about a refill on her Meclizine but stated that you said that you would get back to her on the correct dose? Please advise. I pended the one that is active on her chart.

## 2021-02-10 NOTE — CARE COORDINATION
AMRITA spoke with [place] for COPD/CHF and ED follow up. She reports she continues to be fatigued but is alert to self, time and place. She states she did take her medications today and reports a friend is with her to \"help me out\" today. [place] requested to complete our call at a later time, [facility] asked permission to contact her daughter Jl Barraza and [place] is in agreement. PLAN  Contact daughter Jl Barraza. Continue care coordination.

## 2021-02-15 ENCOUNTER — IMMUNIZATION (OUTPATIENT)
Dept: PRIMARY CARE CLINIC | Age: 86
End: 2021-02-15
Payer: MEDICARE

## 2021-02-15 PROCEDURE — 0002A COVID-19, PFIZER VACCINE 30MCG/0.3ML DOSE: CPT | Performed by: NURSE PRACTITIONER

## 2021-02-15 PROCEDURE — 91300 COVID-19, PFIZER VACCINE 30MCG/0.3ML DOSE: CPT | Performed by: NURSE PRACTITIONER

## 2021-02-16 ENCOUNTER — ANTI-COAG VISIT (OUTPATIENT)
Dept: FAMILY MEDICINE CLINIC | Age: 86
End: 2021-02-16
Payer: MEDICARE

## 2021-02-16 DIAGNOSIS — I48.21 PERMANENT ATRIAL FIBRILLATION (HCC): ICD-10-CM

## 2021-02-16 LAB — INR BLD: 2

## 2021-02-16 PROCEDURE — 93793 ANTICOAG MGMT PT WARFARIN: CPT | Performed by: FAMILY MEDICINE

## 2021-02-16 NOTE — CARE COORDINATION
Attempted to reach family by telephone. Left HIPAA compliant message requesting a return call. Will attempt to reach patient again.

## 2021-02-16 NOTE — CARE COORDINATION
Giselle Johnston (daughter) returned call to Elisabeth Kahn. She reports [place] is not able to care for herself. She states [place] was testing her INR at home, and she is not sure why but [place] was not testing properly. She reports she works full time but tries to check on her parents daily. She admits it may be time for her parents to consider assisted living or a similar facility. She states she has discussed this with her father and that her parents would live at the same facility. She states her mother can no longer care for herself and she has Brian Burris or a friend provide private services. [facility] discussed a referral for social work and she is in agreement. PLAN  Consult social work for assistance with options available for assisted living. Continue to follow for care coordination.

## 2021-02-17 ENCOUNTER — CARE COORDINATION (OUTPATIENT)
Dept: CARE COORDINATION | Age: 86
End: 2021-02-17

## 2021-02-17 NOTE — CARE COORDINATION
Kristen Garnica from [employer] contacted [facility] because [place] filled her levothyroxine and potassium at [facility] and she is active with prepackaged meds. [email or web address] was unable to refill the two scripts for her pre-packaged meds, because they were filled at [facility]. These medications will not be placed in her future packages through approximately May because she received a 90 day supply of both. [facility] contacted [place] and she states she was worried she was going to run out of these medications and had them filled through [facility]. [facility] discussed that because she had filled these scripts they cannot be filled by [facility] for her prepackaged medications. [facility] again reviewed the pre-packaged medication program and that the purpose was to keep her medications regulated and to keep her from running out of her prescriptions, and that only PRN and acute medications should be obtained through another pharmacy.

## 2021-02-22 ENCOUNTER — CARE COORDINATION (OUTPATIENT)
Dept: CARE COORDINATION | Age: 86
End: 2021-02-22

## 2021-02-22 NOTE — CARE COORDINATION
[facility] was requested to speak with [place]'s daughter regarding WINNIE's. [place] and family are asking for information regarding jail's and services for [place] and . [facility] made outreach attempt to [place]'s daughter, Leslie Berrios. There was no answer.  left with name/role and contact information to reach [facility] back. [facility] will follow with another outreach attempt later in the week.

## 2021-02-25 ENCOUNTER — CARE COORDINATION (OUTPATIENT)
Dept: CARE COORDINATION | Age: 86
End: 2021-02-25

## 2021-02-25 NOTE — CARE COORDINATION
[facility] made a 2nd outreach attempt to contact [place]'s daughter regarding WINNIE information. The phone went directly to . [facility] left a  with call back number for return call.

## 2021-02-26 ENCOUNTER — ANTI-COAG VISIT (OUTPATIENT)
Dept: FAMILY MEDICINE CLINIC | Age: 86
End: 2021-02-26
Payer: MEDICARE

## 2021-02-26 DIAGNOSIS — Z79.01 CHRONIC ANTICOAGULATION: ICD-10-CM

## 2021-02-26 LAB — INR BLD: 1.3

## 2021-02-26 PROCEDURE — 93793 ANTICOAG MGMT PT WARFARIN: CPT | Performed by: FAMILY MEDICINE

## 2021-02-26 NOTE — PROGRESS NOTES
Patient advised and verbalized understanding. She thought that it was called in to us by the technician helping her she called in because she did not hear from us. I asked [place] how how pill box is set up for her coumadin per patient 2 tablets Sunday through Friday and 1 tablet on Saturday. Patient will be taking her INR every Tuesday and will call it in herself. She states that she missed no doses.

## 2021-03-02 ENCOUNTER — ANTI-COAG VISIT (OUTPATIENT)
Dept: FAMILY MEDICINE CLINIC | Age: 86
End: 2021-03-02
Payer: MEDICARE

## 2021-03-02 DIAGNOSIS — Z79.01 CHRONIC ANTICOAGULATION: ICD-10-CM

## 2021-03-02 DIAGNOSIS — I48.21 PERMANENT ATRIAL FIBRILLATION (HCC): ICD-10-CM

## 2021-03-02 LAB — INR BLD: 1.4

## 2021-03-02 PROCEDURE — 93793 ANTICOAG MGMT PT WARFARIN: CPT | Performed by: FAMILY MEDICINE

## 2021-03-04 ENCOUNTER — CARE COORDINATION (OUTPATIENT)
Dept: CARE COORDINATION | Age: 86
End: 2021-03-04

## 2021-03-04 DIAGNOSIS — E03.9 HYPOTHYROIDISM, UNSPECIFIED TYPE: ICD-10-CM

## 2021-03-04 RX ORDER — LEVOTHYROXINE SODIUM 0.12 MG/1
125 TABLET ORAL
Qty: 28 TABLET | Refills: 5 | Status: SHIPPED
Start: 2021-03-04 | End: 2021-03-26 | Stop reason: SDUPTHER

## 2021-03-04 NOTE — CARE COORDINATION
[facility] has not been able to reach [place]'s daughter, as requested, to help with information on WINNIE. [facility] has left 3 VM's with call back numbers and no return call received. Today [facility] left a final call VM for the daughter with information for an agency that is free and can help with finding an WINNIE. Left website [email or web address] for the daughter to look into. [facility] also left name and call back number for this [facility], again. [facility] will not continue to outreach to family at this time. Please refer to [facility] again if other needs arise. Thank you.

## 2021-03-04 NOTE — TELEPHONE ENCOUNTER
Last Appointment:  1/25/2021  Future Appointments   Date Time Provider Hernandez Mcintyre   4/19/2021  3:20 PM Arville Barton, MD Janan Dandy [facility]

## 2021-03-09 ENCOUNTER — ANTI-COAG VISIT (OUTPATIENT)
Dept: FAMILY MEDICINE CLINIC | Age: 86
End: 2021-03-09

## 2021-03-09 ENCOUNTER — TELEPHONE (OUTPATIENT)
Dept: FAMILY MEDICINE CLINIC | Age: 86
End: 2021-03-09

## 2021-03-09 LAB — INR BLD: 1.9

## 2021-03-09 NOTE — TELEPHONE ENCOUNTER
LSW received message from MA that patient called to speak with LSW about changing prepackaged meds to another pharmacy in [place], [place].   LSW called patient. Patient reported INR was 1.9; LSW advised was returning patient call about prepacked medications; patient did not report INR to MA during call to office; LSW relayed INR to WALTER Costello. Patient reported the caregivers suggested another prepacked med system from a pharmacy in [place], due to patient having difficulty at times opening sticky paper top (resealable) on the [address] pod system from [facility] at the [address] [place] [address]. LSW advised patient the other prepackaged med system, Carefill, from [facility] in [place], was dispensed as a single cellophane strip of pill packets. Patient stated address on prepackaged sheet was [place] and that [place] would be more convenient for her. LSW explained [facility] pharmacy had [place] and [facility] location, but location not an issue as both suppliers shipped the prepackaged meds to patient. Patient complained there was no date other than start date at top of card, but then stated could write the date on the paper tops labeled for each day of the week. Patient reported 2 out of the 4 pods per day are empty because only take at AM and PM times. Patient has to repackage escitalopram (Lexapro) from evening pod to morning pod because feels better if takes in AM, PCP reportedly said it was okay to take in the morning. Patient reported 3 other medications not packaged:  Furosemide, Famotidine and Coumadin. Patient reported the Furosemide is new, LSW advised new meds would be packaged when time for pharmacy to refill, any over the counter meds could be packaged if purchased from the pharmacy that prepackaged the meds, and reminded patient of previous discussion with clinical staff that Coumadin could not be prepackaged because dose was adjusted according to INR.  Patient then reported had mixed up the cards labeled Week 1, 2,3,4 by using one out of the order but has the correct remaining amount of cards. LSW suggested patient look over sample Carefill prepackaged med system next time at the doctors to see if system would work better for her. Patient stated that was a good idea and would keep the PreScript-Ease for now. Patient reported patient and spouse are thinking about moving into an FDC, but don't know where to start and would like help. LSW advised [facility] would be the  to assist patient. Patient reported she believes someone had tried calling daughter, but called during work hours when daughter couldn't take calls; patient requested calls to daughter be made after 3pm.  LSW advised would [facility] and reminded patient to call [facility] if needed.

## 2021-03-09 NOTE — PROGRESS NOTES
Pt was called and advised of coumadin orders today  Pt verbally understood and read orders right back to me  Pt will have someone come in a week to help her due her INR

## 2021-03-12 ENCOUNTER — CARE COORDINATION (OUTPATIENT)
Dept: CARE COORDINATION | Age: 86
End: 2021-03-12

## 2021-03-12 NOTE — CARE COORDINATION
[facility] made call to pt's daughter Sheldon Rahman, unable to reach, left message introducing self, explained reason for call and requested call back to this [facility] with contact info provided.     Michael RASHID, [facility]   Care Coordinator  [phone]

## 2021-03-12 NOTE — CARE COORDINATION
Ambulatory Care Coordination Note  3/12/2021  CM Risk Score: 7  Charlson 10 Year Mortality Risk Score: 98%     ACC: Gómez Partida, WALTER    Summary Note:   AMRITA spoke with [place] for COPD/CHF follow up. She reports she is feeling tired today and gets up several times through the night to urinate. She reports her weight 251 up from 248 and she took an additional lasix. She denies any increased salt in her diet. AMRITA discussed meal service and she states she had [facility] [employer] meals in the past but did not like the quality. She is agreeable to delivered meals if provided by another source. She complained of difficulty opening her prepackaged meds and states it is because of her neuropathy in her hands. She admits the INR  has been assisting her with her INR. She reports she is taking 8mg of coumadin until her next INR next week. AMRITA discussed [place] and her  moving to an assisted living facility and she is in agreement but is hesitant because of her large pet dog and concern for his well being. She also stated they would like to go to a facility near [place] because that is where her daughter lives. Future appointments were reviewed. PLAN  Consult  for assistance with assisted living facilities. Review medications and daily weights with next interaction. Continue to follow for care coordination.       Care Coordination Interventions    Program Enrollment: Complex Care  Referral from Primary Care Provider: No  Suggested Interventions and Community Resources  Fall Risk Prevention: Completed  Disease Specific Clinic: Declined (Comment: CHF Clinic)  Home Care Waiver: Completed (Comment: [facility])  [address]: Completed (Comment: request for med compliance and INR checks)  Meals on Wheels: Declined (Comment: [facility] [employer] stopped)  [employer] or Pill Pack: Completed (Comment: [employer])  Occupational Therapy: Completed  Physical Therapy: Completed  Senior Services: Completed (Comment: DHEO awaiting assessment)  Social Work: In Process (Comment: Assisted Living Choices)  Other Services: Completed (Comment: Life Alert)  Transportation Support: Completed  Zone Management Tools: Completed (Comment: COPD/CHF)         Goals Addressed                 This Visit's Progress     Community Resource Goal        I will research and work towards moving to an assisted living facility. Barriers: lack of motivation, stress, lack of education, and large pet dog  Plan for overcoming my barriers: social work and care coordination  Confidence: 7/10  Anticipated Goal Completion Date: 6/12/21       COMPLETED: Conditions and Symptoms   On track     I will schedule office visits, as directed by my provider. I will keep my appointment or reschedule if I have to cancel. I will notify my provider of any barriers to my plan of care. I will follow my Zone Management tool to seek urgent or emergent care. I will notify my provider of any symptoms that indicate a worsening of my condition. Barriers: impairment:  hearing, lack of support, overwhelmed by complexity of regimen, and stress  Plan for overcoming my barriers: COPD/CHF zones handouts, care coordination  Confidence: 7/10  Anticipated Goal Completion Date: 12/16/20         COMPLETED: Medication Management   No change     I will take my medication as directed. I will notify my provider of any problems with medications, like adverse effects or side effects. I will notify my provider for advice before I stop taking any of my medication. Barriers: impairment:  hearing, lack of support, and overwhelmed by complexity of regimen  Plan for overcoming my barriers: Care Coordination, LSW referral  Confidence: 8/10  Anticipated Goal Completion Date: 12/16/20            Prior to Admission medications    Medication Sig Start Date End Date Taking?  Authorizing Provider   levothyroxine (SYNTHROID) 125 MCG tablet Take 1 tablet by mouth every morning (before breakfast) 3/4/21   Cirilo Walker MD   meclizine (ANTIVERT) 25 MG tablet Take 1 tablet by mouth 3 times daily as needed for Dizziness 2/10/21 2/10/22  Cirilo Walker MD   warfarin (COUMADIN) 4 MG tablet Take 4 mg Wednesday and Saturday. Take 8 mg on all other days.  21   Cirilo Walker MD   prednisoLONE acetate (PRED FORTE) 1 % ophthalmic suspension INSTILL 1 DROP IN BOTH EYES FOUR TIMES DAILY 20   Historical Provider, MD   famotidine (PEPCID) 20 MG tablet Take one tablet by mouth daily 20   Cirilo Walker MD   escitalopram (LEXAPRO) 10 MG tablet Take 1 tablet by mouth daily 20   Cirilo Walker MD   spironolactone (ALDACTONE) 25 MG tablet Take 0.5 tablets by mouth every evening 10/28/20   Pamela Adams MD   potassium chloride (KLOR-CON M) 20 MEQ extended release tablet Take 1 tablet by mouth every morning (before breakfast) 10/27/20   Cirilo Walker MD   metoprolol succinate (TOPROL XL) 50 MG extended release tablet Take 1.5 tablets by mouth every evening 10/27/20   Cirilo Walker MD   furosemide (LASIX) 40 MG tablet Take 1 tablet by mouth every morning (before breakfast) 10/27/20   Cirilo Walker MD   lisinopril (PRINIVIL;ZESTRIL) 10 MG tablet Take 1 tablet by mouth every evening 10/27/20 4/13/21  Cirilo Walker MD   Vitamin D, Ergocalciferol, 50 MCG (2000 UT) CAPS Take 2,000 Units by mouth every morning (before breakfast)  Patient not taking: Reported on 2021 10/27/20   Cirilo Walker MD   b complex vitamins capsule Take 1 capsule by mouth every morning (before breakfast)  Patient not taking: Reported on 2021 10/27/20   Cirilo Walker MD   Ascorbic Acid (VITAMIN C) 250 MG tablet Take 1 tablet by mouth every morning (before breakfast)  Patient not taking: Reported on 2021 10/27/20   Cirilo Walker MD   guaiFENesin (MUCINEX) 600 MG extended release tablet Take 1,200 mg by mouth 2 times daily as needed     Historical Provider, MD   [facility]buterol [facility]) 0.63 MG/3ML nebulization Take 3 mLs by nebulization every 8 hours as needed for Wheezing DX: COPD J44.9 2/24/20   Mary Harding MD   PROAIR  (55 Base) MCG/ACT inhaler USE 2 PUFFS EVERY 6 HOURS  AS NEEDED FOR WHEEZING 10/21/19   Mary Harding MD   acetaminophen (TYLENOL) 325 MG tablet Take 650 mg by mouth every 6 hours as needed for Pain or Fever    Historical Provider, MD       Future Appointments   Date Time Provider Hernandez Sylvesteri   4/19/2021  3:20 PM MD Lotus Rajan [facility]     ,   Congestive Heart Failure Assessment    Are you currently restricting fluids?: No Restriction  Do you understand a low sodium diet?: Yes  Do you understand how to read food labels?: Yes  How many restaurant meals do you eat per week?: 0  Do you salt your food before tasting it?: No         Symptoms:  CHF associated dyspnea on exertion: Pos      Symptom course: stable  Patient-reported weight (lb): 251  Weight trend: increasing steadily     ,   COPD Assessment    Does the patient understand envrionmental exposure?: Yes  Is the patient able to verbalize Rescue vs. Long Acting medications?: Yes  Does the patient have a nebulizer?: Yes  Does the patient use a space with inhaled medications?: No     No patient-reported symptoms         Symptoms:         and   General Assessment    Do you have any symptoms that are causing concern?: Yes  Progression since Onset: Intermittent - Waxing/Waning  Reported Symptoms: Pain (Comment: left)

## 2021-03-16 ENCOUNTER — CARE COORDINATION (OUTPATIENT)
Dept: CARE COORDINATION | Age: 86
End: 2021-03-16

## 2021-03-16 RX ORDER — LISINOPRIL 10 MG/1
TABLET ORAL
Qty: 28 TABLET | Refills: 5 | Status: SHIPPED
Start: 2021-03-16 | End: 2021-01-01

## 2021-03-16 RX ORDER — METOPROLOL SUCCINATE 50 MG/1
TABLET, EXTENDED RELEASE ORAL
Qty: 42 TABLET | Refills: 5 | Status: SHIPPED
Start: 2021-03-16 | End: 2021-01-01 | Stop reason: SDUPTHER

## 2021-03-16 NOTE — CARE COORDINATION
[facility] made call to pt's daughter Kamron Duarte, unable to reach, left detailed message with purpose of call, requesting call back to this [facility]. [facility] provided resource of website: [email or web address] and explained this will be [facility] final outreach. [facility] received missed call from daughter Kamron Duarte. [facility] returned call to dtr Kamron Duarte. [facility] explained purpose of call regarding SW referral. Kamron Duarte reports she is not sure if her parents want to go to residential but interested in information. [facility] informed her of [email or web address]. Kamron Duarte reports they are having a problem finding a WINNIE they can afford, and they also have a large dog. Kamron Duarte reports they would have to turn their house over to the bank. Kamron Duarte reports she has been assisting her parents after work each day (works until [employer]). Kamron Duarte reports they would need to go to an WINNIE by next fall. Kamron Duarte reports they currently have [employer] coming into the home assisting pt with showering and housework. Kamron Duarte reports pt also pays a lady who lives close to assist in the home. Kamron Duarte reports she has a list of ALFs and her parents would be interested in one in her area [facility]). [facility] encouraged Kamron Duarte to call residential to ask any questions they may have and explained they may offer virtual tours or in person tours. Kamron Duarte reports meals are not a problem at this time as she assists her parents with meals as well as the lady that lives close. Kamron Duarte reports her mother used to get MOW but meals would be wasted. Kamron Duarte is not interested in home delivered meals for her pt at this time. [facility] offered to make referral to [facility] to assist with WINNIE or see if pt qualifies for any in home assistance,Nicolasa is agreeable. [facility] to make referral and f/u with Kamron Duarte.     [facility] made call to [facility] made referral.     Niurka RASHID, [place]   Care Coordinator  [phone]

## 2021-03-16 NOTE — TELEPHONE ENCOUNTER
Last Appointment:  1/25/2021  Future Appointments   Date Time Provider Hernandez Mcintyre   4/19/2021  3:20 PM Henderson Lew, MD [facility]

## 2021-03-17 ENCOUNTER — ANTI-COAG VISIT (OUTPATIENT)
Dept: FAMILY MEDICINE CLINIC | Age: 86
End: 2021-03-17

## 2021-03-17 LAB
INR BLD: 2.1
INR BLD: 4.2

## 2021-03-17 NOTE — CARE COORDINATION
[facility] opened encounter by mistake.     Bethel RASHID, [place]   Care Coordinator  [phone]

## 2021-03-18 NOTE — PROGRESS NOTES
Spoke with patient. Yesterday she took her INR twice the first time 4.2 2 hours later 2.1. Advised  Dr. Starr Julien  He changed instructions to stay on 8 mg daily and recheck in 1 week. Patient advised and she verbalized understanding and correctly read back 8 mg daily  And will recheck INR Tuesday 3/23/21 and call in result.

## 2021-03-19 ENCOUNTER — CARE COORDINATION (OUTPATIENT)
Dept: CARE COORDINATION | Age: 86
End: 2021-03-19

## 2021-03-19 NOTE — CARE COORDINATION
[facility] made f/u call to pt's daughter Lori Pate, unable to reach, left detailed message with purpose of call, requesting call back to this [facility] with contact info provided. [facility] explained in message this is [facility] final outreach.     Dayne RASHID, [facility]   Care Coordinator  [phone]

## 2021-03-23 ENCOUNTER — ANTI-COAG VISIT (OUTPATIENT)
Dept: FAMILY MEDICINE CLINIC | Age: 86
End: 2021-03-23
Payer: MEDICARE

## 2021-03-23 DIAGNOSIS — I48.21 PERMANENT ATRIAL FIBRILLATION (HCC): ICD-10-CM

## 2021-03-23 LAB — INR BLD: 2.2

## 2021-03-23 PROCEDURE — 93793 ANTICOAG MGMT PT WARFARIN: CPT | Performed by: FAMILY MEDICINE

## 2021-03-26 ENCOUNTER — TELEPHONE (OUTPATIENT)
Dept: FAMILY MEDICINE CLINIC | Age: 86
End: 2021-03-26

## 2021-03-26 ENCOUNTER — CARE COORDINATION (OUTPATIENT)
Dept: CARE COORDINATION | Age: 86
End: 2021-03-26

## 2021-03-26 DIAGNOSIS — E03.9 HYPOTHYROIDISM, UNSPECIFIED TYPE: ICD-10-CM

## 2021-03-26 RX ORDER — LEVOTHYROXINE SODIUM 0.12 MG/1
125 TABLET ORAL
Qty: 24 TABLET | Refills: 0 | Status: SHIPPED
Start: 2021-03-26 | End: 2021-01-01 | Stop reason: SDUPTHER

## 2021-03-26 NOTE — CARE COORDINATION
AMRITA received call from PCP office (César Rehman) stating [place] called in to office requesting a refill on her levothyroxine, but she receives prepackaged medications. [facility] contacted [facility] and spoke with Leelee White, who informed [facility] that [place] had obtained a script from another pharmacy, and her levothyroxine was not in her current prepackaged medications and it would not be returned to her packaging until 4/19/21. HAO contacted PCP office (César Rehman) and updated them that [place] will need a script for 24 days with no refills for the levothyroxine.

## 2021-03-26 NOTE — TELEPHONE ENCOUNTER
Pt called in requesting her pcp to call her pharmacy so she can get an emergency script for 24 pills of Levothyroxine. NO REFILLS PLEASE. .. pt is to get a new pre package from [employer] on 4-19-21 which will include Levothyroxine for April. Pt just got three weeks supply of pre packaged meds with no Levothyroxine. The reason why Levothyroxine was not in the pre packaged meds was bacause the pt went to a local pharmacy and used an old bottle to refill the Levothyroxine. Medication needs to be refilled today if possible since pt has none after today.

## 2021-04-13 LAB — INR BLD: 1.3

## 2021-04-14 ENCOUNTER — ANTI-COAG VISIT (OUTPATIENT)
Dept: FAMILY MEDICINE CLINIC | Age: 86
End: 2021-04-14
Payer: MEDICARE

## 2021-04-14 DIAGNOSIS — I48.21 PERMANENT ATRIAL FIBRILLATION (HCC): ICD-10-CM

## 2021-04-14 PROCEDURE — 93793 ANTICOAG MGMT PT WARFARIN: CPT | Performed by: FAMILY MEDICINE

## 2021-04-15 ENCOUNTER — APPOINTMENT (OUTPATIENT)
Dept: GENERAL RADIOLOGY | Age: 86
End: 2021-04-15
Payer: MEDICARE

## 2021-04-15 ENCOUNTER — HOSPITAL ENCOUNTER (EMERGENCY)
Age: 86
Discharge: HOME | End: 2021-04-15
Attending: EMERGENCY MEDICINE
Payer: MEDICARE

## 2021-04-15 ENCOUNTER — TELEPHONE (OUTPATIENT)
Dept: FAMILY MEDICINE CLINIC | Age: 86
End: 2021-04-15

## 2021-04-15 VITALS
OXYGEN SATURATION: 94 % | RESPIRATION RATE: 22 BRPM | HEART RATE: 93 BPM | SYSTOLIC BLOOD PRESSURE: 145 MMHG | TEMPERATURE: 98 F | DIASTOLIC BLOOD PRESSURE: 80 MMHG

## 2021-04-15 DIAGNOSIS — R09.89 PULMONARY VASCULAR CONGESTION: ICD-10-CM

## 2021-04-15 DIAGNOSIS — R06.09 DYSPNEA ON EXERTION: Primary | ICD-10-CM

## 2021-04-15 LAB
ALBUMIN SERPL-MCNC: 4.1 G/DL (ref 3.5–5.2)
ALP BLD-CCNC: 98 U/L (ref 35–104)
ALT SERPL-CCNC: 16 U/L (ref 0–32)
ANION GAP SERPL CALCULATED.3IONS-SCNC: 8 MMOL/L (ref 7–16)
APTT: 28.4 SEC (ref 24.5–35.1)
AST SERPL-CCNC: 22 U/L (ref 0–31)
BASOPHILS ABSOLUTE: 0.03 E9/L (ref 0–0.2)
BASOPHILS RELATIVE PERCENT: 0.6 % (ref 0–2)
BILIRUB SERPL-MCNC: 0.7 MG/DL (ref 0–1.2)
BUN BLDV-MCNC: 21 MG/DL (ref 8–23)
CALCIUM SERPL-MCNC: 9.5 MG/DL (ref 8.6–10.2)
CHLORIDE BLD-SCNC: 103 MMOL/L (ref 98–107)
CO2: 27 MMOL/L (ref 22–29)
CREAT SERPL-MCNC: 0.8 MG/DL (ref 0.5–1)
EOSINOPHILS ABSOLUTE: 0.11 E9/L (ref 0.05–0.5)
EOSINOPHILS RELATIVE PERCENT: 2.2 % (ref 0–6)
GFR AFRICAN AMERICAN: >60
GFR NON-AFRICAN AMERICAN: >60 ML/MIN/1.73
GLUCOSE BLD-MCNC: 89 MG/DL (ref 74–99)
HCT VFR BLD CALC: 38.1 % (ref 34–48)
HEMOGLOBIN: 12.1 G/DL (ref 11.5–15.5)
IMMATURE GRANULOCYTES #: 0.02 E9/L
IMMATURE GRANULOCYTES %: 0.4 % (ref 0–5)
INR BLD: 1.7
LACTIC ACID, SEPSIS: 0.8 MMOL/L (ref 0.5–1.9)
LYMPHOCYTES ABSOLUTE: 0.88 E9/L (ref 1.5–4)
LYMPHOCYTES RELATIVE PERCENT: 17.7 % (ref 20–42)
MCH RBC QN AUTO: 29.9 PG (ref 26–35)
MCHC RBC AUTO-ENTMCNC: 31.8 % (ref 32–34.5)
MCV RBC AUTO: 94.1 FL (ref 80–99.9)
MONOCYTES ABSOLUTE: 0.51 E9/L (ref 0.1–0.95)
MONOCYTES RELATIVE PERCENT: 10.2 % (ref 2–12)
NEUTROPHILS ABSOLUTE: 3.43 E9/L (ref 1.8–7.3)
NEUTROPHILS RELATIVE PERCENT: 68.9 % (ref 43–80)
PDW BLD-RTO: 15 FL (ref 11.5–15)
PLATELET # BLD: 165 E9/L (ref 130–450)
PMV BLD AUTO: 9.9 FL (ref 7–12)
POTASSIUM REFLEX MAGNESIUM: 4.4 MMOL/L (ref 3.5–5)
PRO-BNP: 2315 PG/ML (ref 0–450)
PROTHROMBIN TIME: 19.5 SEC (ref 9.3–12.4)
RBC # BLD: 4.05 E12/L (ref 3.5–5.5)
SODIUM BLD-SCNC: 138 MMOL/L (ref 132–146)
TOTAL PROTEIN: 6.9 G/DL (ref 6.4–8.3)
TROPONIN: <0.01 NG/ML (ref 0–0.03)
WBC # BLD: 5 E9/L (ref 4.5–11.5)

## 2021-04-15 PROCEDURE — 93005 ELECTROCARDIOGRAM TRACING: CPT | Performed by: EMERGENCY MEDICINE

## 2021-04-15 PROCEDURE — 80053 COMPREHEN METABOLIC PANEL: CPT

## 2021-04-15 PROCEDURE — 85610 PROTHROMBIN TIME: CPT

## 2021-04-15 PROCEDURE — 83880 ASSAY OF NATRIURETIC PEPTIDE: CPT

## 2021-04-15 PROCEDURE — 85025 COMPLETE CBC W/AUTO DIFF WBC: CPT

## 2021-04-15 PROCEDURE — 99284 EMERGENCY DEPT VISIT MOD MDM: CPT

## 2021-04-15 PROCEDURE — 94644 CONT INHLJ TX 1ST HOUR: CPT

## 2021-04-15 PROCEDURE — 6370000000 HC RX 637 (ALT 250 FOR IP): Performed by: EMERGENCY MEDICINE

## 2021-04-15 PROCEDURE — 87040 BLOOD CULTURE FOR BACTERIA: CPT

## 2021-04-15 PROCEDURE — 71045 X-RAY EXAM CHEST 1 VIEW: CPT

## 2021-04-15 PROCEDURE — 84484 ASSAY OF TROPONIN QUANT: CPT

## 2021-04-15 PROCEDURE — 83605 ASSAY OF LACTIC ACID: CPT

## 2021-04-15 PROCEDURE — 85730 THROMBOPLASTIN TIME PARTIAL: CPT

## 2021-04-15 RX ORDER — IPRATROPIUM BROMIDE AND ALBUTEROL SULFATE 2.5; .5 MG/3ML; MG/3ML
3 SOLUTION RESPIRATORY (INHALATION) ONCE
Status: COMPLETED | OUTPATIENT
Start: 2021-04-15 | End: 2021-04-15

## 2021-04-15 RX ADMIN — IPRATROPIUM BROMIDE AND ALBUTEROL SULFATE 3 AMPULE: .5; 3 SOLUTION RESPIRATORY (INHALATION) at 18:10

## 2021-04-15 ASSESSMENT — ENCOUNTER SYMPTOMS
TACHYPNEA: 1
DIARRHEA: 0
COUGH: 1
BLOOD IN STOOL: 0
ABDOMINAL PAIN: 0
RHINORRHEA: 0
BACK PAIN: 0
VOMITING: 0
NAUSEA: 0
COLOR CHANGE: 0
SHORTNESS OF BREATH: 1

## 2021-04-15 NOTE — TELEPHONE ENCOUNTER
Spoke with patient's daughter regarding worsening shortness of breath and visible increased work of breathing. Does have CHF. Advised that she go to ED.     Electronically signed by Heather Tejada MD on 4/15/21 at 4:40 PM EDT

## 2021-04-15 NOTE — ED PROVIDER NOTES
department physician    Rhythm: atrial fibrillation - controlled  Rate: normal  Axis: normal  Ectopy: premature ventricular contractions (infrequent)  Conduction: normal  ST Segments: no acute change  T Waves: no acute change  Q Waves: III    Clinical Impression: atrial fibrillation (chronic)    Becca Viramontes     ED Course as of Apr 15 2034   Thu Apr 15, 2021   2010 Patient resting in bed in no acute distress. Maintaining a pulse ox of 94% on room air. Discussed results of labs and imaging with her. Chest x-ray does show mild vascular congestion consistent with her history of CHF. We will ambulate her to see how she tolerates this and measure her pulse ox. [MS]   2033 Patient was ambulate in the gaspar. Maintained a pulse ox of 93%. Was a little short of breath but was comfortable. She states she is comfortable going home and will make an appoint to see her PCP. She understands if she has worsening symptoms or new concerns that she can return to the ED for further evaluation. [MS]      ED Course User Index  [MS] Becca Viramontes, DO       --------------------------------------------- PAST HISTORY ---------------------------------------------  Past Medical History:  has a past medical history of Anticoagulated on Coumadin, Atrial fibrillation ([address] Basal cell carcinoma of neck, CAD (coronary artery disease), CHF (congestive heart failure) ([address] Depression, DJD (degenerative joint disease), GERD (gastroesophageal reflux disease), History of cardiovascular stress test, Hyperparathyroidism ([address] Hypertension, Hypothyroidism, Mild mitral regurgitation, Osteoarthritis, Pulmonary hypertension ([address] Renal calculi, Tricuspid regurgitation, and Urinary incontinence. Past Surgical History:  has a past surgical history that includes Thyroidectomy (1960); Kidney stone surgery (1972); Cholecystectomy (1972); Kidney stone surgery (1981); Cataract removal (6/2005); Incontinence surgery (5/2005);  Thyroid surgery (5/2005); parathyroidectomy; Knee Arthroplasty (Bilateral, 9/2005); Total shoulder arthroplasty (Left, 2009); fracture surgery (Left); Carpal tunnel release; Finger surgery (7/2011); Total shoulder arthroplasty (Left); Inner ear surgery (2011); Cataract removal with implant (Bilateral); Colonoscopy; and Cardiac catheterization (3/22/14). Social History:  reports that she has never smoked. She has never used smokeless tobacco. She reports that she does not drink alcohol or use drugs. Family History: family history includes Arthritis in her mother; Cancer in her brother and son; Diabetes in her father; Heart Attack in her mother; Heart Disease in her brother, brother, father, mother, and sister; Heart Surgery in her brother; Mental Illness in her father. The patients home medications have been reviewed.     Allergies: Codeine, Penicillins, Vicodin [hydrocodone-acetaminophen], and Adhesive tape    -------------------------------------------------- RESULTS -------------------------------------------------  Labs:  Results for orders placed or performed during the hospital encounter of 04/15/21   CBC Auto Differential   Result Value Ref Range    WBC 5.0 4.5 - 11.5 E9/L    RBC 4.05 3.50 - 5.50 E12/L    Hemoglobin 12.1 11.5 - 15.5 g/dL    Hematocrit 38.1 34.0 - 48.0 %    MCV 94.1 80.0 - 99.9 fL    MCH 29.9 26.0 - 35.0 pg    MCHC 31.8 (L) 32.0 - 34.5 %    RDW 15.0 11.5 - 15.0 fL    Platelets 175 497 - 809 E9/L    MPV 9.9 7.0 - 12.0 fL    Neutrophils % 68.9 43.0 - 80.0 %    Immature Granulocytes % 0.4 0.0 - 5.0 %    Lymphocytes % 17.7 (L) 20.0 - 42.0 %    Monocytes % 10.2 2.0 - 12.0 %    Eosinophils % 2.2 0.0 - 6.0 %    Basophils % 0.6 0.0 - 2.0 %    Neutrophils Absolute 3.43 1.80 - 7.30 E9/L    Immature Granulocytes # 0.02 E9/L    Lymphocytes Absolute 0.88 (L) 1.50 - 4.00 E9/L    Monocytes Absolute 0.51 0.10 - 0.95 E9/L    Eosinophils Absolute 0.11 0.05 - 0.50 E9/L    Basophils Absolute 0.03 0.00 - 0.20 E9/L Comprehensive Metabolic Panel w/ Reflex to MG   Result Value Ref Range    Sodium 138 132 - 146 mmol/L    Potassium reflex Magnesium 4.4 3.5 - 5.0 mmol/L    Chloride 103 98 - 107 mmol/L    CO2 27 22 - 29 mmol/L    Anion Gap 8 7 - 16 mmol/L    Glucose 89 74 - 99 mg/dL    BUN 21 8 - 23 mg/dL    CREATININE 0.8 0.5 - 1.0 mg/dL    GFR Non-African American >60 >=60 mL/min/1.73    GFR African American >60     Calcium 9.5 8.6 - 10.2 mg/dL    Total Protein 6.9 6.4 - 8.3 g/dL    Albumin 4.1 3.5 - 5.2 g/dL    Total Bilirubin 0.7 0.0 - 1.2 mg/dL    Alkaline Phosphatase 98 35 - 104 U/L    ALT 16 0 - 32 U/L    AST 22 0 - 31 U/L   Troponin   Result Value Ref Range    Troponin <0.01 0.00 - 0.03 ng/mL   Brain Natriuretic Peptide   Result Value Ref Range    Pro-BNP 2,315 (H) 0 - 450 pg/mL   Protime-INR   Result Value Ref Range    Protime 19.5 (H) 9.3 - 12.4 sec    INR 1.7    APTT   Result Value Ref Range    aPTT 28.4 24.5 - 35.1 sec   Lactate, Sepsis   Result Value Ref Range    Lactic Acid, Sepsis 0.8 0.5 - 1.9 mmol/L   EKG 12 Lead   Result Value Ref Range    Ventricular Rate 80 BPM    Atrial Rate 78 BPM    QRS Duration 98 ms    Q-T Interval 416 ms    QTc Calculation (Bazett) 479 ms    R Axis 7 degrees    T Axis 39 degrees       Radiology:  XR CHEST PORTABLE   Final Result   Slight pulmonary vascular congestion. Mild right basilar atelectasis.             ------------------------- NURSING NOTES AND VITALS REVIEWED ---------------------------  Date / Time Roomed:  4/15/2021  5:42 PM  ED Bed Assignment:  06/06    The nursing notes within the ED encounter and vital signs as below have been reviewed.    BP (!) 145/80   Pulse 93   Temp 98 °F (36.7 °C) (Oral)   Resp 22   SpO2 94%   Oxygen Saturation Interpretation: Normal      ------------------------------------------ PROGRESS NOTES ------------------------------------------  I have spoken with the patient and discussed todays results, in addition to providing specific details for the plan of care and counseling regarding the diagnosis and prognosis. Their questions are answered at this time and they are agreeable with the plan. I discussed at length with them reasons for immediate return here for re evaluation. They will followup with primary care by calling their office tomorrow. --------------------------------- ADDITIONAL PROVIDER NOTES ---------------------------------  At this time the patient is without objective evidence of an acute process requiring hospitalization or inpatient management. They have remained hemodynamically stable throughout their entire ED visit and are stable for discharge with outpatient follow-up. The plan has been discussed in detail and they are aware of the specific conditions for emergent return, as well as the importance of follow-up. New Prescriptions    No medications on file       Diagnosis:  1. Dyspnea on exertion    2. Pulmonary vascular congestion        Disposition:  Patient's disposition: Discharge to home  Patient's condition is stable.          Irina Garay DO  04/15/21 2036

## 2021-04-15 NOTE — ED NOTES
Bed: 06  Expected date:   Expected time:   Means of arrival:   Comments:  Alexandre Singleton RN  04/15/21 [id number]

## 2021-04-16 ENCOUNTER — CARE COORDINATION (OUTPATIENT)
Dept: CARE COORDINATION | Age: 86
End: 2021-04-16

## 2021-04-16 ENCOUNTER — OFFICE VISIT (OUTPATIENT)
Dept: FAMILY MEDICINE CLINIC | Age: 86
End: 2021-04-16
Payer: MEDICARE

## 2021-04-16 ENCOUNTER — APPOINTMENT (OUTPATIENT)
Dept: GENERAL RADIOLOGY | Age: 86
DRG: 292 | End: 2021-04-16
Payer: MEDICARE

## 2021-04-16 ENCOUNTER — HOSPITAL ENCOUNTER (INPATIENT)
Age: 86
LOS: 1 days | Discharge: NURSING, REHAB OR LONG-TERM CARE | DRG: 292 | End: 2021-04-20
Attending: EMERGENCY MEDICINE | Admitting: FAMILY MEDICINE
Payer: MEDICARE

## 2021-04-16 VITALS
DIASTOLIC BLOOD PRESSURE: 71 MMHG | HEIGHT: 66 IN | HEART RATE: 79 BPM | OXYGEN SATURATION: 95 % | WEIGHT: 251 LBS | SYSTOLIC BLOOD PRESSURE: 127 MMHG | BODY MASS INDEX: 40.34 KG/M2 | TEMPERATURE: 98 F | RESPIRATION RATE: 16 BRPM

## 2021-04-16 DIAGNOSIS — I50.9 ACUTE ON CHRONIC CONGESTIVE HEART FAILURE, UNSPECIFIED HEART FAILURE TYPE (HCC): Primary | ICD-10-CM

## 2021-04-16 PROBLEM — R62.7 FAILURE TO THRIVE IN ADULT: Status: ACTIVE | Noted: 2021-04-16

## 2021-04-16 LAB
ALBUMIN SERPL-MCNC: 4.4 G/DL (ref 3.5–5.2)
ALP BLD-CCNC: 89 U/L (ref 35–104)
ALT SERPL-CCNC: 14 U/L (ref 0–32)
ANION GAP SERPL CALCULATED.3IONS-SCNC: 10 MMOL/L (ref 7–16)
AST SERPL-CCNC: 26 U/L (ref 0–31)
BASOPHILS ABSOLUTE: 0.06 E9/L (ref 0–0.2)
BASOPHILS RELATIVE PERCENT: 1.2 % (ref 0–2)
BILIRUB SERPL-MCNC: 0.7 MG/DL (ref 0–1.2)
BUN BLDV-MCNC: 20 MG/DL (ref 8–23)
CALCIUM SERPL-MCNC: 9.7 MG/DL (ref 8.6–10.2)
CHLORIDE BLD-SCNC: 104 MMOL/L (ref 98–107)
CO2: 28 MMOL/L (ref 22–29)
CREAT SERPL-MCNC: 0.8 MG/DL (ref 0.5–1)
EKG ATRIAL RATE: 78 BPM
EKG Q-T INTERVAL: 416 MS
EKG QRS DURATION: 98 MS
EKG QTC CALCULATION (BAZETT): 479 MS
EKG R AXIS: 7 DEGREES
EKG T AXIS: 39 DEGREES
EKG VENTRICULAR RATE: 80 BPM
EOSINOPHILS ABSOLUTE: 0.14 E9/L (ref 0.05–0.5)
EOSINOPHILS RELATIVE PERCENT: 2.9 % (ref 0–6)
GFR AFRICAN AMERICAN: >60
GFR NON-AFRICAN AMERICAN: >60 ML/MIN/1.73
GLUCOSE BLD-MCNC: 79 MG/DL (ref 74–99)
HCT VFR BLD CALC: 39.6 % (ref 34–48)
HEMOGLOBIN: 12.6 G/DL (ref 11.5–15.5)
IMMATURE GRANULOCYTES #: 0.02 E9/L
IMMATURE GRANULOCYTES %: 0.4 % (ref 0–5)
INR BLD: 1.9
LYMPHOCYTES ABSOLUTE: 0.82 E9/L (ref 1.5–4)
LYMPHOCYTES RELATIVE PERCENT: 16.8 % (ref 20–42)
MCH RBC QN AUTO: 29.9 PG (ref 26–35)
MCHC RBC AUTO-ENTMCNC: 31.8 % (ref 32–34.5)
MCV RBC AUTO: 93.8 FL (ref 80–99.9)
MONOCYTES ABSOLUTE: 0.55 E9/L (ref 0.1–0.95)
MONOCYTES RELATIVE PERCENT: 11.2 % (ref 2–12)
NEUTROPHILS ABSOLUTE: 3.3 E9/L (ref 1.8–7.3)
NEUTROPHILS RELATIVE PERCENT: 67.5 % (ref 43–80)
PDW BLD-RTO: 15.2 FL (ref 11.5–15)
PLATELET # BLD: 169 E9/L (ref 130–450)
PMV BLD AUTO: 10.2 FL (ref 7–12)
POTASSIUM REFLEX MAGNESIUM: 4.2 MMOL/L (ref 3.5–5)
PRO-BNP: 2505 PG/ML (ref 0–450)
PROTHROMBIN TIME: 20.2 SEC (ref 9.3–12.4)
RBC # BLD: 4.22 E12/L (ref 3.5–5.5)
SARS-COV-2, NAAT: NOT DETECTED
SODIUM BLD-SCNC: 142 MMOL/L (ref 132–146)
TOTAL PROTEIN: 7.3 G/DL (ref 6.4–8.3)
TROPONIN: <0.01 NG/ML (ref 0–0.03)
TSH SERPL DL<=0.05 MIU/L-ACNC: 8.62 UIU/ML (ref 0.27–4.2)
WBC # BLD: 4.9 E9/L (ref 4.5–11.5)

## 2021-04-16 PROCEDURE — G8417 CALC BMI ABV UP PARAM F/U: HCPCS | Performed by: STUDENT IN AN ORGANIZED HEALTH CARE EDUCATION/TRAINING PROGRAM

## 2021-04-16 PROCEDURE — 80053 COMPREHEN METABOLIC PANEL: CPT

## 2021-04-16 PROCEDURE — 84443 ASSAY THYROID STIM HORMONE: CPT

## 2021-04-16 PROCEDURE — 99213 OFFICE O/P EST LOW 20 MIN: CPT | Performed by: STUDENT IN AN ORGANIZED HEALTH CARE EDUCATION/TRAINING PROGRAM

## 2021-04-16 PROCEDURE — 36415 COLL VENOUS BLD VENIPUNCTURE: CPT

## 2021-04-16 PROCEDURE — 1123F ACP DISCUSS/DSCN MKR DOCD: CPT | Performed by: STUDENT IN AN ORGANIZED HEALTH CARE EDUCATION/TRAINING PROGRAM

## 2021-04-16 PROCEDURE — 85025 COMPLETE CBC W/AUTO DIFF WBC: CPT

## 2021-04-16 PROCEDURE — G0378 HOSPITAL OBSERVATION PER HR: HCPCS

## 2021-04-16 PROCEDURE — 4040F PNEUMOC VAC/ADMIN/RCVD: CPT | Performed by: STUDENT IN AN ORGANIZED HEALTH CARE EDUCATION/TRAINING PROGRAM

## 2021-04-16 PROCEDURE — 84484 ASSAY OF TROPONIN QUANT: CPT

## 2021-04-16 PROCEDURE — 83880 ASSAY OF NATRIURETIC PEPTIDE: CPT

## 2021-04-16 PROCEDURE — 99284 EMERGENCY DEPT VISIT MOD MDM: CPT

## 2021-04-16 PROCEDURE — 71045 X-RAY EXAM CHEST 1 VIEW: CPT

## 2021-04-16 PROCEDURE — G8399 PT W/DXA RESULTS DOCUMENT: HCPCS | Performed by: STUDENT IN AN ORGANIZED HEALTH CARE EDUCATION/TRAINING PROGRAM

## 2021-04-16 PROCEDURE — 1090F PRES/ABSN URINE INCON ASSESS: CPT | Performed by: STUDENT IN AN ORGANIZED HEALTH CARE EDUCATION/TRAINING PROGRAM

## 2021-04-16 PROCEDURE — 99212 OFFICE O/P EST SF 10 MIN: CPT | Performed by: STUDENT IN AN ORGANIZED HEALTH CARE EDUCATION/TRAINING PROGRAM

## 2021-04-16 PROCEDURE — 87635 SARS-COV-2 COVID-19 AMP PRB: CPT

## 2021-04-16 PROCEDURE — 93005 ELECTROCARDIOGRAM TRACING: CPT | Performed by: STUDENT IN AN ORGANIZED HEALTH CARE EDUCATION/TRAINING PROGRAM

## 2021-04-16 PROCEDURE — 85610 PROTHROMBIN TIME: CPT

## 2021-04-16 PROCEDURE — G8427 DOCREV CUR MEDS BY ELIG CLIN: HCPCS | Performed by: STUDENT IN AN ORGANIZED HEALTH CARE EDUCATION/TRAINING PROGRAM

## 2021-04-16 PROCEDURE — 1036F TOBACCO NON-USER: CPT | Performed by: STUDENT IN AN ORGANIZED HEALTH CARE EDUCATION/TRAINING PROGRAM

## 2021-04-16 RX ORDER — POTASSIUM CHLORIDE 20 MEQ/1
20 TABLET, EXTENDED RELEASE ORAL
Status: DISCONTINUED | OUTPATIENT
Start: 2021-04-17 | End: 2021-04-20 | Stop reason: HOSPADM

## 2021-04-16 RX ORDER — LEVOTHYROXINE SODIUM 0.12 MG/1
125 TABLET ORAL
Status: DISCONTINUED | OUTPATIENT
Start: 2021-04-17 | End: 2021-04-20 | Stop reason: HOSPADM

## 2021-04-16 RX ORDER — SODIUM CHLORIDE 0.9 % (FLUSH) 0.9 %
5-40 SYRINGE (ML) INJECTION PRN
Status: DISCONTINUED | OUTPATIENT
Start: 2021-04-16 | End: 2021-04-20 | Stop reason: HOSPADM

## 2021-04-16 RX ORDER — POLYETHYLENE GLYCOL 3350 17 G/17G
17 POWDER, FOR SOLUTION ORAL DAILY PRN
Status: DISCONTINUED | OUTPATIENT
Start: 2021-04-16 | End: 2021-04-20 | Stop reason: HOSPADM

## 2021-04-16 RX ORDER — ALBUTEROL SULFATE 2.5 MG/3ML
2.5 SOLUTION RESPIRATORY (INHALATION) ONCE
Status: DISCONTINUED | OUTPATIENT
Start: 2021-04-16 | End: 2021-04-20 | Stop reason: HOSPADM

## 2021-04-16 RX ORDER — WARFARIN SODIUM 4 MG/1
8 TABLET ORAL
Status: DISCONTINUED | OUTPATIENT
Start: 2021-04-16 | End: 2021-04-20 | Stop reason: HOSPADM

## 2021-04-16 RX ORDER — ALBUTEROL SULFATE 2.5 MG/3ML
2.5 SOLUTION RESPIRATORY (INHALATION) EVERY 4 HOURS PRN
Status: DISCONTINUED | OUTPATIENT
Start: 2021-04-16 | End: 2021-04-20 | Stop reason: HOSPADM

## 2021-04-16 RX ORDER — SPIRONOLACTONE 25 MG/1
12.5 TABLET ORAL EVERY EVENING
Status: DISCONTINUED | OUTPATIENT
Start: 2021-04-16 | End: 2021-04-20 | Stop reason: HOSPADM

## 2021-04-16 RX ORDER — FUROSEMIDE 10 MG/ML
20 INJECTION INTRAMUSCULAR; INTRAVENOUS ONCE
Status: COMPLETED | OUTPATIENT
Start: 2021-04-16 | End: 2021-04-17

## 2021-04-16 RX ORDER — GUAIFENESIN 400 MG/1
400 TABLET ORAL 3 TIMES DAILY
Status: DISCONTINUED | OUTPATIENT
Start: 2021-04-16 | End: 2021-04-20 | Stop reason: HOSPADM

## 2021-04-16 RX ORDER — ACETAMINOPHEN 325 MG/1
650 TABLET ORAL EVERY 6 HOURS PRN
Status: DISCONTINUED | OUTPATIENT
Start: 2021-04-16 | End: 2021-04-20 | Stop reason: HOSPADM

## 2021-04-16 RX ORDER — GUAIFENESIN 400 MG/1
400 TABLET ORAL EVERY 4 HOURS PRN
Status: DISCONTINUED | OUTPATIENT
Start: 2021-04-16 | End: 2021-04-20 | Stop reason: HOSPADM

## 2021-04-16 RX ORDER — PREDNISOLONE ACETATE 10 MG/ML
1 SUSPENSION/ DROPS OPHTHALMIC
Status: DISCONTINUED | OUTPATIENT
Start: 2021-04-17 | End: 2021-04-20 | Stop reason: HOSPADM

## 2021-04-16 RX ORDER — SODIUM CHLORIDE 9 MG/ML
25 INJECTION, SOLUTION INTRAVENOUS PRN
Status: DISCONTINUED | OUTPATIENT
Start: 2021-04-16 | End: 2021-04-20 | Stop reason: HOSPADM

## 2021-04-16 RX ORDER — LEVALBUTEROL INHALATION SOLUTION 0.63 MG/3ML
1 SOLUTION RESPIRATORY (INHALATION) EVERY 8 HOURS PRN
Status: DISCONTINUED | OUTPATIENT
Start: 2021-04-16 | End: 2021-04-20 | Stop reason: HOSPADM

## 2021-04-16 RX ORDER — LISINOPRIL 10 MG/1
10 TABLET ORAL EVERY EVENING
Status: DISCONTINUED | OUTPATIENT
Start: 2021-04-16 | End: 2021-04-20 | Stop reason: HOSPADM

## 2021-04-16 RX ORDER — SODIUM CHLORIDE 0.9 % (FLUSH) 0.9 %
5-40 SYRINGE (ML) INJECTION EVERY 12 HOURS SCHEDULED
Status: DISCONTINUED | OUTPATIENT
Start: 2021-04-16 | End: 2021-04-20 | Stop reason: HOSPADM

## 2021-04-16 RX ORDER — ESCITALOPRAM OXALATE 10 MG/1
10 TABLET ORAL DAILY
Status: DISCONTINUED | OUTPATIENT
Start: 2021-04-17 | End: 2021-04-20 | Stop reason: HOSPADM

## 2021-04-16 RX ORDER — FAMOTIDINE 20 MG/1
TABLET, FILM COATED ORAL
Qty: 90 TABLET | Refills: 3 | Status: CANCELLED | OUTPATIENT
Start: 2021-04-16

## 2021-04-16 RX ORDER — WARFARIN SODIUM 4 MG/1
4 TABLET ORAL
Status: DISCONTINUED | OUTPATIENT
Start: 2021-04-17 | End: 2021-04-20 | Stop reason: HOSPADM

## 2021-04-16 RX ORDER — ACETAMINOPHEN 650 MG/1
650 SUPPOSITORY RECTAL EVERY 6 HOURS PRN
Status: DISCONTINUED | OUTPATIENT
Start: 2021-04-16 | End: 2021-04-20 | Stop reason: HOSPADM

## 2021-04-16 RX ORDER — WARFARIN SODIUM 4 MG/1
4 TABLET ORAL DAILY
Status: DISCONTINUED | OUTPATIENT
Start: 2021-04-16 | End: 2021-04-16 | Stop reason: CLARIF

## 2021-04-16 RX ORDER — FAMOTIDINE 20 MG/1
10 TABLET, FILM COATED ORAL DAILY
Status: DISCONTINUED | OUTPATIENT
Start: 2021-04-17 | End: 2021-04-19

## 2021-04-16 RX ORDER — CHOLECALCIFEROL (VITAMIN D3) 50 MCG
2000 TABLET ORAL
Status: DISCONTINUED | OUTPATIENT
Start: 2021-04-17 | End: 2021-04-20 | Stop reason: HOSPADM

## 2021-04-16 RX ORDER — MECLIZINE HCL 12.5 MG/1
25 TABLET ORAL 3 TIMES DAILY PRN
Status: DISCONTINUED | OUTPATIENT
Start: 2021-04-16 | End: 2021-04-20 | Stop reason: HOSPADM

## 2021-04-16 ASSESSMENT — ENCOUNTER SYMPTOMS
CONSTIPATION: 0
NAUSEA: 0
COUGH: 0
WHEEZING: 0
ABDOMINAL DISTENTION: 0
BACK PAIN: 0
SHORTNESS OF BREATH: 1
STRIDOR: 0
COLOR CHANGE: 0
ABDOMINAL PAIN: 0
VOMITING: 0
DIARRHEA: 0

## 2021-04-16 NOTE — ED PROVIDER NOTES
[facility]      Pt Name: Javier Rebolledo  MRN: [id number]  [place] 1935  Date of evaluation: 4/16/2021      CHIEF COMPLAINT       Chief Complaint   Patient presents with    Insomnia     difficulty sleeping, pt states she cant stay comfortable when trying to sleep or stay asleep        TITO Rebolledo is a 81 y.o. female with past medical history of CHF on Lasix presents with complaints of insomnia per chief complaint. Patient states that she was seen at [facility] 2 weeks prior for shortness of breath. States that since her discharge she is still having shortness of breath and trouble sleeping. She is admitting to orthopnea, proximal nocturnal dyspnea, and bilateral leg swelling. States that she has gained 10 pounds unintentionally since last Easter. States that she does not take her Lasix only as needed. States that she does not like taking it because she always has to use the restroom. Shortness of breath exacerbated with exertion. Alleviated with rest and sitting up. Denies any recent fevers, chills, nausea, vomiting, chest pain, abdominal pain, flank pain, dysuria, hematuria, diarrhea, constipation, new rashes or sores. Except as noted above the remainder of the review of systems was reviewed and negative. Review of Systems   Constitutional: Negative for activity change, appetite change, diaphoresis, fatigue, fever and unexpected weight change. HENT: Negative for congestion. Eyes: Negative for visual disturbance. Respiratory: Positive for shortness of breath. Negative for cough, wheezing and stridor. Cardiovascular: Positive for chest pain and leg swelling. Negative for palpitations. Gastrointestinal: Negative for abdominal distention, abdominal pain, constipation, diarrhea, nausea and vomiting. Endocrine: Negative for polyphagia and polyuria. Genitourinary: Negative for dysuria and hematuria.    Musculoskeletal: MDM  Number of Diagnoses or Management Options  Acute on chronic congestive heart failure, unspecified heart failure type [facility])  Diagnosis management comments: 43-year-old female with a past medical history of CHF on Lasix presents with complaints of insomnia per chief complaint. Patient does state that when she tries to go to sleep and lies flat she gets really short of breath. States that she wakes up gasping for air. And states that she has to sleep in a reclined position. Admits to bilateral leg swelling and a 10 pound intentional weight gain since last Easter. Vitals within normal limits. On physical exam patient is no acute distress, speaking full sense, alert and oriented x3. She has been mild rhonchorous breath sounds lower lung bases. She has 1+ bilateral leg edema. Diagnostic labs and imaging interpreted and reviewed. Patient has elevated BNP of 2000. EKG is normal sinus rhythm. Troponin negative. Patient is in CHF exacerbation due to noncompliance with medications. I suspect her insomnia is due to her paroxysmal nocturnal dyspnea. Patient admitted to the hospital for CHF exacerbation. Patient was given Lasix in the department as well as supplemental oxygen. ED Course as of Apr 18 0356 Fri Apr 16, 2021   1732 EKG read and interpreted by me. Heart rate 84. Atrial fibrillation. Normal axis deviation. QTc 486. No ST elevations or depressions. Unchanged from previous EKG.     [JV]      ED Course User Index  [JV] Zaira Anderson MD         --------------------------------------------- PAST HISTORY ---------------------------------------------  Past Medical History:  has a past medical history of Anticoagulated on Coumadin, Atrial fibrillation ([facility] [address] Basal cell carcinoma of neck, CAD (coronary artery disease), CHF (congestive heart failure) ([facility] [address] Depression, DJD (degenerative joint disease), GERD (gastroesophageal reflux disease), History of cardiovascular stress test, 15. 2 (H) 11.5 - 15.0 fL    Platelets 642 660 - 414 E9/L    MPV 10.2 7.0 - 12.0 fL    Neutrophils % 67.5 43.0 - 80.0 %    Immature Granulocytes % 0.4 0.0 - 5.0 %    Lymphocytes % 16.8 (L) 20.0 - 42.0 %    Monocytes % 11.2 2.0 - 12.0 %    Eosinophils % 2.9 0.0 - 6.0 %    Basophils % 1.2 0.0 - 2.0 %    Neutrophils Absolute 3.30 1.80 - 7.30 E9/L    Immature Granulocytes # 0.02 E9/L    Lymphocytes Absolute 0.82 (L) 1.50 - 4.00 E9/L    Monocytes Absolute 0.55 0.10 - 0.95 E9/L    Eosinophils Absolute 0.14 0.05 - 0.50 E9/L    Basophils Absolute 0.06 0.00 - 0.20 E9/L   Troponin   Result Value Ref Range    Troponin <0.01 0.00 - 0.03 ng/mL   Brain Natriuretic Peptide   Result Value Ref Range    Pro-BNP 2,505 (H) 0 - 450 pg/mL   Protime-INR   Result Value Ref Range    Protime 20.2 (H) 9.3 - 12.4 sec    INR 1.9    Comprehensive Metabolic Panel w/ Reflex to MG   Result Value Ref Range    Sodium 142 132 - 146 mmol/L    Potassium reflex Magnesium 4.2 3.5 - 5.0 mmol/L    Chloride 104 98 - 107 mmol/L    CO2 28 22 - 29 mmol/L    Anion Gap 10 7 - 16 mmol/L    Glucose 79 74 - 99 mg/dL    BUN 20 8 - 23 mg/dL    CREATININE 0.8 0.5 - 1.0 mg/dL    GFR Non-African American >60 >=60 mL/min/1.73    GFR African American >60     Calcium 9.7 8.6 - 10.2 mg/dL    Total Protein 7.3 6.4 - 8.3 g/dL    Albumin 4.4 3.5 - 5.2 g/dL    Total Bilirubin 0.7 0.0 - 1.2 mg/dL    Alkaline Phosphatase 89 35 - 104 U/L    ALT 14 0 - 32 U/L    AST 26 0 - 31 U/L   TSH WITHOUT REFLEX   Result Value Ref Range    TSH 8.620 (H) 0.270 - 4.200 uIU/mL   Comprehensive Metabolic Panel w/ Reflex to MG   Result Value Ref Range    Sodium 142 132 - 146 mmol/L    Potassium reflex Magnesium 3.8 3.5 - 5.0 mmol/L    Chloride 103 98 - 107 mmol/L    CO2 32 (H) 22 - 29 mmol/L    Anion Gap 7 7 - 16 mmol/L    Glucose 88 74 - 99 mg/dL    BUN 17 8 - 23 mg/dL    CREATININE 0.8 0.5 - 1.0 mg/dL    GFR Non-African American >60 >=60 mL/min/1.73    GFR African American >60     Calcium 9.4 8.6 - the lung periphery is new and may be artifactual   secondary to overlying soft tissues, however recommend correlation for   infectious symptoms, as atypical pneumonia (such as COVID-19) can also have   this appearance. 2. Cardiac enlargement appears increased, although may be exaggerated by AP   technique. EKG:  This EKG is signed and interpreted by me. Please refer to work-up tab for EKG reading.      ------------------------- NURSING NOTES AND VITALS REVIEWED ---------------------------  Date / Time Roomed:  4/16/2021  4:49 PM  ED Bed Assignment:  [id number]    The nursing notes within the ED encounter and vital signs as below have been reviewed. Patient Vitals for the past 24 hrs:   BP Temp Temp src Pulse Resp SpO2   04/17/21 2200 131/62 97.9 °F (36.6 °C) Temporal 91 18 90 %   04/17/21 1831 128/61 -- -- 66 -- --   04/17/21 1822 (!) 158/70 -- -- 73 -- --   04/17/21 1815 (!) 163/73 -- -- -- -- --   04/17/21 1736 108/64 -- -- 70 -- --   04/17/21 1707 133/62 98.7 °F (37.1 °C) Temporal 84 20 97 %   04/17/21 1554 (!) 142/65 98.3 °F (36.8 °C) Temporal 63 18 96 %   04/17/21 1030 (!) 143/63 97.2 °F (36.2 °C) Temporal 87 16 94 %   04/17/21 0400 (!) 107/59 97.2 °F (36.2 °C) Temporal 81 18 93 %       Oxygen Saturation Interpretation: Normal    ------------------------------------------ PROGRESS NOTES ------------------------------------------    Counseling:  I have spoken with the patient and discussed todays results, in addition to providing specific details for the plan of care and counseling regarding the diagnosis and prognosis. Their questions are answered at this time and they are agreeable with the plan of admission.    --------------------------------- ADDITIONAL PROVIDER NOTES ---------------------------------  Consultations:  Spoke with Dr. Pj Snider  Discussed case. They will admit the patient.   This patient's ED course included: a personal history and physicial examination, re-evaluation prior to disposition, multiple bedside re-evaluations, IV medications, cardiac monitoring and continuous pulse oximetry    This patient has remained hemodynamically stable during their ED course. Diagnosis:  1. Acute on chronic congestive heart failure, unspecified heart failure type ([facility] [address]        Disposition:  Patient's disposition: Admit to telemetry  Patient's condition is fair.         Robert Aguirre MD  Resident  04/18/21 5933

## 2021-04-16 NOTE — ED NOTES
Bed: 18A-18  Expected date:   Expected time:   Means of arrival:   Comments:  [address], RN  04/16/21 3514

## 2021-04-16 NOTE — PROGRESS NOTES
S: 81 y.o. female here for CHF exac. Sob, weight gain 8 lbs, urinary incontinence. Difficulty w/ ambulation. Complete dependence in ADLs.     O: VS: /71   Pulse 79   Temp 98 °F (36.7 °C) (Temporal)   Resp 16   Ht 5' 6\" (1.676 m)   Wt 251 lb (113.9 kg)   SpO2 95%   BMI 40.51 kg/m²    General: NAD, alert and interacting appropriately. CV:  RRR, no gallops, rubs, or murmurs    Resp: diminished BS b/l   Ext:  Swelling b/l    Impression: CHF exac. Difficulty w/ ambulation. Plan:   ER    Attending Physician Statement  I have discussed the case, including pertinent history and exam findings with the resident. I agree with the documented assessment and plan.

## 2021-04-17 ENCOUNTER — APPOINTMENT (OUTPATIENT)
Dept: GENERAL RADIOLOGY | Age: 86
DRG: 292 | End: 2021-04-17
Payer: MEDICARE

## 2021-04-17 LAB
ALBUMIN SERPL-MCNC: 4 G/DL (ref 3.5–5.2)
ALP BLD-CCNC: 87 U/L (ref 35–104)
ALT SERPL-CCNC: 14 U/L (ref 0–32)
ANION GAP SERPL CALCULATED.3IONS-SCNC: 7 MMOL/L (ref 7–16)
AST SERPL-CCNC: 23 U/L (ref 0–31)
BASOPHILS ABSOLUTE: 0.04 E9/L (ref 0–0.2)
BASOPHILS RELATIVE PERCENT: 1 % (ref 0–2)
BILIRUB SERPL-MCNC: 0.9 MG/DL (ref 0–1.2)
BUN BLDV-MCNC: 17 MG/DL (ref 8–23)
CALCIUM SERPL-MCNC: 9.4 MG/DL (ref 8.6–10.2)
CHLORIDE BLD-SCNC: 103 MMOL/L (ref 98–107)
CO2: 32 MMOL/L (ref 22–29)
CREAT SERPL-MCNC: 0.8 MG/DL (ref 0.5–1)
EKG ATRIAL RATE: 108 BPM
EKG ATRIAL RATE: 277 BPM
EKG Q-T INTERVAL: 412 MS
EKG Q-T INTERVAL: 424 MS
EKG QRS DURATION: 100 MS
EKG QRS DURATION: 98 MS
EKG QTC CALCULATION (BAZETT): 486 MS
EKG QTC CALCULATION (BAZETT): 486 MS
EKG R AXIS: 12 DEGREES
EKG R AXIS: 18 DEGREES
EKG T AXIS: -74 DEGREES
EKG T AXIS: 40 DEGREES
EKG VENTRICULAR RATE: 79 BPM
EKG VENTRICULAR RATE: 84 BPM
EOSINOPHILS ABSOLUTE: 0.14 E9/L (ref 0.05–0.5)
EOSINOPHILS RELATIVE PERCENT: 3.4 % (ref 0–6)
GFR AFRICAN AMERICAN: >60
GFR NON-AFRICAN AMERICAN: >60 ML/MIN/1.73
GLUCOSE BLD-MCNC: 88 MG/DL (ref 74–99)
HCT VFR BLD CALC: 38.6 % (ref 34–48)
HEMOGLOBIN: 12.1 G/DL (ref 11.5–15.5)
IMMATURE GRANULOCYTES #: 0.01 E9/L
IMMATURE GRANULOCYTES %: 0.2 % (ref 0–5)
INR BLD: 1.9
LYMPHOCYTES ABSOLUTE: 0.78 E9/L (ref 1.5–4)
LYMPHOCYTES RELATIVE PERCENT: 19.1 % (ref 20–42)
MAGNESIUM: 2 MG/DL (ref 1.6–2.6)
MCH RBC QN AUTO: 29.3 PG (ref 26–35)
MCHC RBC AUTO-ENTMCNC: 31.3 % (ref 32–34.5)
MCV RBC AUTO: 93.5 FL (ref 80–99.9)
MONOCYTES ABSOLUTE: 0.51 E9/L (ref 0.1–0.95)
MONOCYTES RELATIVE PERCENT: 12.5 % (ref 2–12)
NEUTROPHILS ABSOLUTE: 2.61 E9/L (ref 1.8–7.3)
NEUTROPHILS RELATIVE PERCENT: 63.8 % (ref 43–80)
PDW BLD-RTO: 15.1 FL (ref 11.5–15)
PLATELET # BLD: 162 E9/L (ref 130–450)
PMV BLD AUTO: 10.2 FL (ref 7–12)
POTASSIUM REFLEX MAGNESIUM: 3.8 MMOL/L (ref 3.5–5)
PROTHROMBIN TIME: 20.7 SEC (ref 9.3–12.4)
RBC # BLD: 4.13 E12/L (ref 3.5–5.5)
SODIUM BLD-SCNC: 142 MMOL/L (ref 132–146)
TOTAL PROTEIN: 6.8 G/DL (ref 6.4–8.3)
TROPONIN: <0.01 NG/ML (ref 0–0.03)
WBC # BLD: 4.1 E9/L (ref 4.5–11.5)

## 2021-04-17 PROCEDURE — 6370000000 HC RX 637 (ALT 250 FOR IP): Performed by: FAMILY MEDICINE

## 2021-04-17 PROCEDURE — 85025 COMPLETE CBC W/AUTO DIFF WBC: CPT

## 2021-04-17 PROCEDURE — 93005 ELECTROCARDIOGRAM TRACING: CPT

## 2021-04-17 PROCEDURE — 83735 ASSAY OF MAGNESIUM: CPT

## 2021-04-17 PROCEDURE — 99222 1ST HOSP IP/OBS MODERATE 55: CPT | Performed by: FAMILY MEDICINE

## 2021-04-17 PROCEDURE — G0378 HOSPITAL OBSERVATION PER HR: HCPCS

## 2021-04-17 PROCEDURE — 73590 X-RAY EXAM OF LOWER LEG: CPT

## 2021-04-17 PROCEDURE — 94664 DEMO&/EVAL PT USE INHALER: CPT

## 2021-04-17 PROCEDURE — 80053 COMPREHEN METABOLIC PANEL: CPT

## 2021-04-17 PROCEDURE — 96374 THER/PROPH/DIAG INJ IV PUSH: CPT

## 2021-04-17 PROCEDURE — 94640 AIRWAY INHALATION TREATMENT: CPT

## 2021-04-17 PROCEDURE — 6360000002 HC RX W HCPCS: Performed by: STUDENT IN AN ORGANIZED HEALTH CARE EDUCATION/TRAINING PROGRAM

## 2021-04-17 PROCEDURE — 93010 ELECTROCARDIOGRAM REPORT: CPT | Performed by: INTERNAL MEDICINE

## 2021-04-17 PROCEDURE — 6370000000 HC RX 637 (ALT 250 FOR IP): Performed by: STUDENT IN AN ORGANIZED HEALTH CARE EDUCATION/TRAINING PROGRAM

## 2021-04-17 PROCEDURE — 2580000003 HC RX 258: Performed by: STUDENT IN AN ORGANIZED HEALTH CARE EDUCATION/TRAINING PROGRAM

## 2021-04-17 PROCEDURE — 85610 PROTHROMBIN TIME: CPT

## 2021-04-17 PROCEDURE — 84484 ASSAY OF TROPONIN QUANT: CPT

## 2021-04-17 PROCEDURE — 73502 X-RAY EXAM HIP UNI 2-3 VIEWS: CPT

## 2021-04-17 PROCEDURE — 36415 COLL VENOUS BLD VENIPUNCTURE: CPT

## 2021-04-17 PROCEDURE — 96376 TX/PRO/DX INJ SAME DRUG ADON: CPT

## 2021-04-17 RX ORDER — NITROGLYCERIN 0.4 MG/1
TABLET SUBLINGUAL
Status: DISPENSED
Start: 2021-04-17 | End: 2021-04-18

## 2021-04-17 RX ORDER — NITROGLYCERIN 0.4 MG/1
0.4 TABLET SUBLINGUAL EVERY 5 MIN PRN
Status: DISCONTINUED | OUTPATIENT
Start: 2021-04-17 | End: 2021-04-20 | Stop reason: HOSPADM

## 2021-04-17 RX ORDER — IPRATROPIUM BROMIDE AND ALBUTEROL SULFATE 2.5; .5 MG/3ML; MG/3ML
1 SOLUTION RESPIRATORY (INHALATION)
Status: DISCONTINUED | OUTPATIENT
Start: 2021-04-17 | End: 2021-04-17

## 2021-04-17 RX ORDER — FUROSEMIDE 10 MG/ML
40 INJECTION INTRAMUSCULAR; INTRAVENOUS DAILY
Status: DISCONTINUED | OUTPATIENT
Start: 2021-04-17 | End: 2021-04-20 | Stop reason: HOSPADM

## 2021-04-17 RX ORDER — IPRATROPIUM BROMIDE AND ALBUTEROL SULFATE 2.5; .5 MG/3ML; MG/3ML
1 SOLUTION RESPIRATORY (INHALATION) EVERY 6 HOURS
Status: DISCONTINUED | OUTPATIENT
Start: 2021-04-17 | End: 2021-04-20 | Stop reason: HOSPADM

## 2021-04-17 RX ADMIN — IPRATROPIUM BROMIDE AND ALBUTEROL SULFATE 1 AMPULE: 2.5; .5 SOLUTION RESPIRATORY (INHALATION) at 20:38

## 2021-04-17 RX ADMIN — SPIRONOLACTONE 12.5 MG: 25 TABLET ORAL at 18:28

## 2021-04-17 RX ADMIN — Medication 10 ML: at 00:33

## 2021-04-17 RX ADMIN — FAMOTIDINE 10 MG: 20 TABLET ORAL at 10:46

## 2021-04-17 RX ADMIN — IPRATROPIUM BROMIDE AND ALBUTEROL SULFATE 1 AMPULE: 2.5; .5 SOLUTION RESPIRATORY (INHALATION) at 12:18

## 2021-04-17 RX ADMIN — PREDNISOLONE ACETATE 1 DROP: 10 SUSPENSION/ DROPS OPHTHALMIC at 15:58

## 2021-04-17 RX ADMIN — PREDNISOLONE ACETATE 1 DROP: 10 SUSPENSION/ DROPS OPHTHALMIC at 22:09

## 2021-04-17 RX ADMIN — POTASSIUM CHLORIDE 20 MEQ: 1500 TABLET, EXTENDED RELEASE ORAL at 07:04

## 2021-04-17 RX ADMIN — WARFARIN SODIUM 8 MG: 4 TABLET ORAL at 00:31

## 2021-04-17 RX ADMIN — LISINOPRIL 10 MG: 10 TABLET ORAL at 00:30

## 2021-04-17 RX ADMIN — GUAIFENESIN 400 MG: 400 TABLET, FILM COATED ORAL at 10:46

## 2021-04-17 RX ADMIN — GUAIFENESIN 400 MG: 400 TABLET, FILM COATED ORAL at 22:09

## 2021-04-17 RX ADMIN — LEVOTHYROXINE SODIUM 125 MCG: 0.12 TABLET ORAL at 07:04

## 2021-04-17 RX ADMIN — Medication 10 ML: at 22:12

## 2021-04-17 RX ADMIN — FUROSEMIDE 20 MG: 10 INJECTION, SOLUTION INTRAMUSCULAR; INTRAVENOUS at 00:39

## 2021-04-17 RX ADMIN — LISINOPRIL 10 MG: 10 TABLET ORAL at 18:28

## 2021-04-17 RX ADMIN — SPIRONOLACTONE 12.5 MG: 25 TABLET ORAL at 00:39

## 2021-04-17 RX ADMIN — GUAIFENESIN 400 MG: 400 TABLET, FILM COATED ORAL at 15:58

## 2021-04-17 RX ADMIN — NITROGLYCERIN 0.4 MG: 0.4 TABLET, ORALLY DISINTEGRATING SUBLINGUAL at 17:19

## 2021-04-17 RX ADMIN — NITROGLYCERIN 0.4 MG: 0.4 TABLET, ORALLY DISINTEGRATING SUBLINGUAL at 17:32

## 2021-04-17 RX ADMIN — GUAIFENESIN 400 MG: 400 TABLET, FILM COATED ORAL at 00:32

## 2021-04-17 RX ADMIN — PREDNISOLONE ACETATE 1 DROP: 10 SUSPENSION/ DROPS OPHTHALMIC at 10:52

## 2021-04-17 RX ADMIN — FUROSEMIDE 40 MG: 10 INJECTION, SOLUTION INTRAMUSCULAR; INTRAVENOUS at 10:46

## 2021-04-17 RX ADMIN — Medication 10 ML: at 10:45

## 2021-04-17 RX ADMIN — ESCITALOPRAM 10 MG: 10 TABLET, FILM COATED ORAL at 10:46

## 2021-04-17 ASSESSMENT — ENCOUNTER SYMPTOMS
COLOR CHANGE: 0
COUGH: 1
DIARRHEA: 0
BACK PAIN: 0
CHEST TIGHTNESS: 0
CONSTIPATION: 0
ABDOMINAL PAIN: 0
EYE PAIN: 0
SORE THROAT: 0
SHORTNESS OF BREATH: 1
EYE ITCHING: 0

## 2021-04-17 ASSESSMENT — PAIN DESCRIPTION - DESCRIPTORS
DESCRIPTORS: CRAMPING
DESCRIPTORS: CRUSHING
DESCRIPTORS: CRAMPING

## 2021-04-17 ASSESSMENT — PAIN SCALES - GENERAL
PAINLEVEL_OUTOF10: 4
PAINLEVEL_OUTOF10: 0
PAINLEVEL_OUTOF10: 10
PAINLEVEL_OUTOF10: 0

## 2021-04-17 ASSESSMENT — PAIN DESCRIPTION - LOCATION
LOCATION: GENERALIZED
LOCATION: LEG
LOCATION: GENERALIZED

## 2021-04-17 ASSESSMENT — PAIN DESCRIPTION - PAIN TYPE
TYPE: CHRONIC PAIN
TYPE: ACUTE PAIN
TYPE: ACUTE PAIN

## 2021-04-17 NOTE — PROGRESS NOTES
Paged regarding chest pain starting suddenly after 1700. Pt states that reached her R hand across her chest and developed tightness on left side of her chest that wrapped around under her breast. No SOB or palp or dizziness. States has had similar symptoms in past but does not last this long. Did receive nitro, with some relief in sx.      Vitals:    04/17/21 1707   BP: 133/62   Pulse: 84   Resp: 20   Temp: 98.7 °F (37.1 °C)   SpO2: 97%     Exam:   General: hard of hearing, answering questions  Heart: irreg irreg  Lungs: diminished  Chest: ttp L sternal area and under L breast    EKG reviewed, unchanged from prior  Troponin pending    A/P  Chest pain poss cardiac vs MSK vs GERD  Follow troponin  Trial nitro   If cardiac workup neg consider lido patch for MSK pain, GI cocktail as well if no relief    Electronically signed by Cristiano Lopez MD PGY-2 on 4/17/2021 at 5:35 PM    Above discussed with: Dr. Sam Green

## 2021-04-17 NOTE — H&P
[facility] Resident Inpatient  History and Physical      CC: Insomnia (difficulty sleeping, pt states she cant stay comfortable when trying to sleep or stay asleep)      HPI: This is an 28-year-old woman with a past medical history of chronic diastolic heart failure (EF: 55-60%), persistent A. fib (AC with coumadin), hypothyroidism, coronary artery disease, obesity who presents for shortness of breath. SOB is at rest and with exertion, onset several months. She feels it is worsening over the last 1 week. She presented to the ER on 4/15, imaging revealed mild pulmonary vascular congestion, she was otherwise hypoxic and stable for outpatient follow-up with PCP.  4/16 the following day she was at her PCPs office, and there was concern that she cannot carry out her ADLs, in addition to her having shortness of breath, and was sent for social work evaluation for placement. Her symptoms are associated with a 8 pound weight gain in 2 weeks, dry cough and swelling of her legs. Lying upright with pillows alleviates her shortness of breath. She denies fevers, chills, chest pain, chest tightness, n/.v    Patient follows Dr. Dee Sims, was last seen 5/2020 and at that time was to continue 40 of Lasix, spironolactone 12.5 and follow-up. Her last echocardiogram was done in 2020 as well. ED Course:    The patient remained unchanged  Labs proBNP 2315, PT-INR 19.5-1.7, COVID-19 negative  Imaging was CXR-hazy attenuation of the lung periphery, cardiac enlargement  EKG: Atrial fibrillation with nonspecific ST and T wave abnormalities  pt admitted for CHF exacerbation, failure to thrive in an adult    ED orders COVID-19, chest x-ray, CBC, BNP, PT/INR  PMH:  has a past medical history of Anticoagulated on Coumadin, Atrial fibrillation (HCC), Basal cell carcinoma of neck, CAD (coronary artery disease), CHF (congestive heart failure) ([address] Depression, DJD (degenerative joint disease), GERD (gastroesophageal reflux disease), History of cardiovascular stress test, Hyperparathyroidism ([address] Hypertension, Hypothyroidism, Mild mitral regurgitation, Osteoarthritis, Pulmonary hypertension ([address] Renal calculi, Tricuspid regurgitation, and Urinary incontinence. PSH:  has a past surgical history that includes Thyroidectomy (1960); Kidney stone surgery (1972); Cholecystectomy (1972); Kidney stone surgery (1981); Cataract removal (6/2005); Incontinence surgery (5/2005); Thyroid surgery (5/2005); parathyroidectomy; Knee Arthroplasty (Bilateral, 9/2005); Total shoulder arthroplasty (Left, 2009); fracture surgery (Left); Carpal tunnel release; Finger surgery (7/2011); Total shoulder arthroplasty (Left); Inner ear surgery (2011); Cataract removal with implant (Bilateral); Colonoscopy; and Cardiac catheterization (3/22/14). FH: family history includes Arthritis in her mother; Cancer in her brother and son; Diabetes in her father; Heart Attack in her mother; Heart Disease in her brother, brother, father, mother, and sister; Heart Surgery in her brother; Mental Illness in her father. Social:  reports that she has never smoked. She has never used smokeless tobacco. She reports that she does not drink alcohol or use drugs. Allergies: Allergies   Allergen Reactions    Codeine Other (See Comments)     Hallucination    Penicillins Hives    Vicodin [Hydrocodone-Acetaminophen] Other (See Comments)     Hallucination      Adhesive Tape Rash        Home Medications:   No current facility-administered medications on file prior to encounter.       Current Outpatient Medications on File Prior to Encounter   Medication Sig Dispense Refill    levothyroxine (SYNTHROID) 125 MCG tablet Take 1 tablet by mouth every morning (before breakfast) 24 tablet 0    metoprolol succinate (TOPROL XL) 50 MG extended release tablet TAKE 1 & 1/2 TABLETS BY MOUTH EVERY EVENING 42 tablet 5    lisinopril (PRINIVIL;ZESTRIL) 10 MG tablet TAKE ONE TABLET BY Negative for chest tightness. Cardiovascular: Positive for leg swelling. Negative for chest pain and palpitations. Gastrointestinal: Negative for abdominal pain, constipation and diarrhea. Genitourinary: Negative for difficulty urinating and dysuria. Musculoskeletal: Negative for back pain and gait problem. Skin: Negative for color change and pallor. Neurological: Negative for light-headedness and headaches. Psychiatric/Behavioral: Negative for agitation and confusion. PE:  Blood pressure (!) 163/66, pulse 86, temperature 96.1 °F (35.6 °C), resp. rate 18, height 5' 6\" (1.676 m), weight 251 lb (113.9 kg), SpO2 97 %, not currently breastfeeding. General: Alert, cooperative  HEENT: Normocephalic, atraumatic. Conjunctiva/corneas clear, EOM's intact, no pallor or icterus. Neck: Supple, symmetrical, trachea midline, no JVD  Chest: No tenderness or deformity, full & symmetric excursion  Lung: Diminished breath sounds bilaterally, no Rales/wheezes/rubs  Heart: RRR, S1 and S2 normal, no murmur, rub or gallop. DP pulses 2/4  Abdomen: SNTND, no masses, no organomegaly, no guarding, rebound or rigidity. Genital/Rectal: deferred  Extremities: Nonpitting edema bilateral legs, no erythema  Skin: Skin color,no rashes or lesions  Musculoskeletal: No joint swelling, no muscle tenderness. Normal ROM in extremities.    Neurologic: Alert & Oriented; sensation intact    Labs:   Results for orders placed or performed during the hospital encounter of 04/16/21   COVID-19, Rapid    Specimen: Nasopharyngeal Swab   Result Value Ref Range    SARS-CoV-2, NAAT Not Detected Not Detected   CBC Auto Differential   Result Value Ref Range    WBC 4.9 4.5 - 11.5 E9/L    RBC 4.22 3.50 - 5.50 E12/L    Hemoglobin 12.6 11.5 - 15.5 g/dL    Hematocrit 39.6 34.0 - 48.0 %    MCV 93.8 80.0 - 99.9 fL    MCH 29.9 26.0 - 35.0 pg    MCHC 31.8 (L) 32.0 - 34.5 %    RDW 15.2 (H) 11.5 - 15.0 fL    Platelets 295 121 - 815 E9/L    MPV 10.2 7.0 - 12.0 fL    Neutrophils % 67.5 43.0 - 80.0 %    Immature Granulocytes % 0.4 0.0 - 5.0 %    Lymphocytes % 16.8 (L) 20.0 - 42.0 %    Monocytes % 11.2 2.0 - 12.0 %    Eosinophils % 2.9 0.0 - 6.0 %    Basophils % 1.2 0.0 - 2.0 %    Neutrophils Absolute 3.30 1.80 - 7.30 E9/L    Immature Granulocytes # 0.02 E9/L    Lymphocytes Absolute 0.82 (L) 1.50 - 4.00 E9/L    Monocytes Absolute 0.55 0.10 - 0.95 E9/L    Eosinophils Absolute 0.14 0.05 - 0.50 E9/L    Basophils Absolute 0.06 0.00 - 0.20 E9/L   Troponin   Result Value Ref Range    Troponin <0.01 0.00 - 0.03 ng/mL   Brain Natriuretic Peptide   Result Value Ref Range    Pro-BNP 2,505 (H) 0 - 450 pg/mL   Protime-INR   Result Value Ref Range    Protime 20.2 (H) 9.3 - 12.4 sec    INR 1.9    Comprehensive Metabolic Panel w/ Reflex to MG   Result Value Ref Range    Sodium 142 132 - 146 mmol/L    Potassium reflex Magnesium 4.2 3.5 - 5.0 mmol/L    Chloride 104 98 - 107 mmol/L    CO2 28 22 - 29 mmol/L    Anion Gap 10 7 - 16 mmol/L    Glucose 79 74 - 99 mg/dL    BUN 20 8 - 23 mg/dL    CREATININE 0.8 0.5 - 1.0 mg/dL    GFR Non-African American >60 >=60 mL/min/1.73    GFR African American >60     Calcium 9.7 8.6 - 10.2 mg/dL    Total Protein 7.3 6.4 - 8.3 g/dL    Albumin 4.4 3.5 - 5.2 g/dL    Total Bilirubin 0.7 0.0 - 1.2 mg/dL    Alkaline Phosphatase 89 35 - 104 U/L    ALT 14 0 - 32 U/L    AST 26 0 - 31 U/L   EKG 12 Lead   Result Value Ref Range    Ventricular Rate 84 BPM    Atrial Rate 277 BPM    QRS Duration 98 ms    Q-T Interval 412 ms    QTc Calculation (Bazett) 486 ms    R Axis 12 degrees    T Axis 40 degrees       Imaging:  Xr Chest Portable    Result Date: 4/16/2021  EXAMINATION: ONE XRAY VIEW OF THE CHEST 4/16/2021 2:27 pm COMPARISON: One-view chest x-ray 04/15/2021 HISTORY: ORDERING SYSTEM PROVIDED HISTORY: sob TECHNOLOGIST PROVIDED HISTORY: Reason for exam:->sob What reading provider will be dictating this exam?->CRC FINDINGS: There is mild-moderate enlargement of the cardiac silhouette, which appears increased since the previous exam and may be exaggerated by AP technique. The mediastinal contours are within normal limits. There is evidence of aortic atherosclerosis. Hazy attenuation of the peripheral lungs is new compared to the previous exam, right greater than left. Hilar/basilar predominant peribronchial thickening is similar to the previous exam.  There is mild blunting of the right costophrenic sulcus, which is similar to slightly increased compared to the previous exam and may be due to underlying atelectasis/scarring or small pleural effusion. Osseous degenerative changes are present. A left shoulder replacement is partially imaged. 1. Hazy attenuation of the lung periphery is new and may be artifactual secondary to overlying soft tissues, however recommend correlation for infectious symptoms, as atypical pneumonia (such as COVID-19) can also have this appearance. 2. Cardiac enlargement appears increased, although may be exaggerated by AP technique. Xr Chest Portable    Result Date: 4/15/2021  EXAMINATION: ONE XRAY VIEW OF THE CHEST 4/15/2021 6:32 pm COMPARISON: 02/09/2021 HISTORY: ORDERING SYSTEM PROVIDED HISTORY: sob TECHNOLOGIST PROVIDED HISTORY: Reason for exam:->sob FINDINGS: The heart is within normal limits for size. Pulmonary vasculature may be slightly prominent. Mild streaky right basilar atelectasis appears present. No adenopathy or pleural effusion. Prior left shoulder arthroplasty. Slight pulmonary vascular congestion. Mild right basilar atelectasis. Assessment and Plan  Active Problems:    History of atrial fibrillation    Chronic anticoagulation    Heart failure (HCC)    Chronic obstructive pulmonary disease (HCC)    Age-related physical debility    Failure to thrive in adult  Resolved Problems:    * No resolved hospital problems.  *    CHF Exacerbation  Lasix 40 IV  Lactone 12.5-home med continued  Lisinopril 10 mg-home

## 2021-04-17 NOTE — PROGRESS NOTES
[facility] - Family Medicine Inpatient   Resident Progress Note    S:  Hospital day: 0   Brief Synopsis: Martin Lincoln is an 81-year-old woman who presented with worsening shortness of breath and also unable to carry out ADLs at home. She is being managed for CHF exacerbation and failure to thrive in an adult. Seen today at bedside patient slept well overnight is complaining of difficulty when lying flat, use pillows to prop herself up. She denies a cough fever chills chest pain abdominal pain. Cont meds:    sodium chloride       Scheduled meds:    furosemide  40 mg Intravenous Daily    ipratropium-albuterol  1 ampule Inhalation Q6H    escitalopram  10 mg Oral Daily    famotidine  10 mg Oral Daily    levothyroxine  125 mcg Oral QAM AC    lisinopril  10 mg Oral QPM    potassium chloride  20 mEq Oral QAM AC    spironolactone  12.5 mg Oral QPM    prednisoLONE acetate  1 drop Both Eyes 6 times per day    vitamin D  2,000 Units Oral QAM AC    sodium chloride flush  5-40 mL Intravenous 2 times per day    warfarin  4 mg Oral Once per day on Wed Sat    warfarin  8 mg Oral Once per day on Sun Mon Tue Thu Fri    albuterol  2.5 mg Nebulization Once    guaiFENesin  400 mg Oral TID     PRN meds: guaiFENesin, levalbuterol, meclizine, [Held by provider] albuterol, sodium chloride flush, sodium chloride, polyethylene glycol, acetaminophen **OR** acetaminophen     I reviewed the patient's past medical and surgical history, Medications and Allergies. O:  BP (!) 143/63   Pulse 87   Temp 97.2 °F (36.2 °C) (Temporal)   Resp 16   Ht 5' 6\" (1.676 m)   Wt 251 lb (113.9 kg)   SpO2 94%   BMI 40.51 kg/m²   24 hour I&O: I/O last 3 completed shifts:  In: -   Out: 900 [Urine:900]  I/O this shift:  In: 240 [P.O.:240]  Out: -       Physical Exam   Constitutional: She is oriented to person, place, and time. She appears well-nourished. HENT:   Head: Normocephalic.    Cardiovascular: Normal rate, regular rhythm, normal heart sounds and intact distal pulses. Pulmonary/Chest: Effort normal.   Diminished breath sounds anterior posteriorly   Abdominal: Soft. Bowel sounds are normal. She exhibits no distension. There is no abdominal tenderness. Musculoskeletal:      Comments: Nonpitting edema bilateral lower extremities   Neurological: She is alert and oriented to person, place, and time. Skin: Skin is warm and dry. No rash noted. Vitals reviewed. Labs:  Na/K/Cl/CO2:  142/3.8/103/32 (04/17 6184)  BUN/Cr/glu/ALT/AST/amyl/lip:  17/0.8/--/14/23/--/-- (04/17 6263)  WBC/Hgb/Hct/Plts:  4.1/12.1/38.6/162 (04/17 9609)  estimated creatinine clearance is 66 mL/min (based on SCr of 0.8 mg/dL). Other pertinent labs as noted below    Radiology:  XR CHEST PORTABLE   Final Result   1. Hazy attenuation of the lung periphery is new and may be artifactual   secondary to overlying soft tissues, however recommend correlation for   infectious symptoms, as atypical pneumonia (such as COVID-19) can also have   this appearance. 2. Cardiac enlargement appears increased, although may be exaggerated by AP   technique. XR HIP LEFT (2-3 VIEWS)    (Results Pending)   XR TIBIA FIBULA LEFT (2 VIEWS)    (Results Pending)       A/P:  Active Problems:    History of atrial fibrillation    Chronic anticoagulation    Heart failure (HCC)    Chronic obstructive pulmonary disease (HCC)    Age-related physical debility    Failure to thrive in adult  Resolved Problems:    * No resolved hospital problems.  *    Dyspnea likely 2/2 CHF exacerbation vs COPD  IV diuresis  DuoNebs  Home medications Symbicort continued  C/W monitor patient's symptomatic complaints, the above treatment  At discharge likely will need PFTs, sleep study    CHF Exacerbation  Lasix 40 IV  Lactone 12.5-home med continued  Lisinopril 10 mg-home med  Strict I's and O's, daily weights  Last echo was done in 2020, showed an EF of 55 to 60%, with indeterminant diastolic dysfunction    Persistent Afib  INR 1.9 today, continue to monitor  Warfarin 4 mg, Wednesday Saturday  Warfarin 8 mg on all other days  Admitted to telemetry     FTT in adult  SW consult  PT/OT  COVID-19, -4/16  Already has a home health care nurse, coming 2X a week.     Hypothyroid  Synthroid 125 MCG  TSH: 8  Continue to keep at the current Synthroid dose, will need close outpatient follow-up for TSH      GI/DVT ppx: Coumadin  Diet:  General        Electronically signed by Elli Spangler  PGY-2 on 4/17/2021 at 11:49 AM  This case was discussed with attending physician: Dr. Augustine Jesus

## 2021-04-17 NOTE — PROGRESS NOTES
RN called into room, patient c/o 10/10 upper left chest pain, crushing. Pt grimacing, crying out in pain, and reaching her hand to the left side of her chest.    VS taken. EKG done. Dr. Caridad Roque notified. Lab notified of stat troponin. New orders received. RN remained at bedside. Patient stated pain changed to a tightness going around her ribcage. 2 doses of SL nitro given. Patient currently resting comfortably in bed stating her pain is now gone.

## 2021-04-17 NOTE — PROGRESS NOTES
[address] [place]  Family Medicine Attending    S: 81 y.o. female with weight gain and some SOB. Dry cough, some paroxysmal nocturnal dyspnea. No f/c/cp/n/v. Lasix, aldactone through Dr. Malcolm Morgan. Elevated proBNP. O: VS- Blood pressure (!) 107/59, pulse 81, temperature 97.2 °F (36.2 °C), temperature source Temporal, resp. rate 18, height 5' 6\" (1.676 m), weight 251 lb (113.9 kg), SpO2 93 %, not currently breastfeeding. Exam is as noted by resident with the following changes, additions or corrections:  General: NAD, appropriate affect and grooming. AOx3   CV:  Irregular rhythm, no gallops, rubs, or murmurs    Resp: CTAB   Abd:  Soft, nontender   Ext:  No pitting edema. LLE ttp over shin, externally rotated L hip    Impressions: Active Problems:    History of atrial fibrillation    Chronic anticoagulation    Chronic obstructive pulmonary disease (HCC)    Age-related physical debility    Heart failure (HCC)    Failure to thrive in adult  Resolved Problems:    * No resolved hospital problems. *      Plan:     CHF exac - Lasix, I/Os, daily weights  Lisinopril. BTxs for possible copd. mucinex for cough  PAF - INR 1.9.   FTT.  consult. Xrays L hip and tib fib  PFTs and sleep study advised     Attending Physician Statement  I have reviewed the chart, including any radiology or labs, and seen the patient with the resident(s). I personally reviewed and performed key elements of the history and exam.  I agree with the assessment, plan and orders as documented by the resident. Please refer to the resident note for additional information.       Electronically signed by Kaz Garay MD on 4/17/2021 at 9:12 AM

## 2021-04-18 LAB
ALBUMIN SERPL-MCNC: 3.5 G/DL (ref 3.5–5.2)
ALP BLD-CCNC: 78 U/L (ref 35–104)
ALT SERPL-CCNC: 13 U/L (ref 0–32)
ANION GAP SERPL CALCULATED.3IONS-SCNC: 9 MMOL/L (ref 7–16)
AST SERPL-CCNC: 22 U/L (ref 0–31)
BASOPHILS ABSOLUTE: 0.07 E9/L (ref 0–0.2)
BASOPHILS RELATIVE PERCENT: 1.5 % (ref 0–2)
BILIRUB SERPL-MCNC: 0.7 MG/DL (ref 0–1.2)
BUN BLDV-MCNC: 19 MG/DL (ref 8–23)
C-REACTIVE PROTEIN: 0.3 MG/DL (ref 0–0.4)
CALCIUM SERPL-MCNC: 9 MG/DL (ref 8.6–10.2)
CHLORIDE BLD-SCNC: 102 MMOL/L (ref 98–107)
CO2: 29 MMOL/L (ref 22–29)
CREAT SERPL-MCNC: 0.9 MG/DL (ref 0.5–1)
EOSINOPHILS ABSOLUTE: 0.13 E9/L (ref 0.05–0.5)
EOSINOPHILS RELATIVE PERCENT: 2.7 % (ref 0–6)
GFR AFRICAN AMERICAN: >60
GFR NON-AFRICAN AMERICAN: 60 ML/MIN/1.73
GLUCOSE BLD-MCNC: 89 MG/DL (ref 74–99)
HCT VFR BLD CALC: 38.3 % (ref 34–48)
HEMOGLOBIN: 12.3 G/DL (ref 11.5–15.5)
IMMATURE GRANULOCYTES #: 0.01 E9/L
IMMATURE GRANULOCYTES %: 0.2 % (ref 0–5)
INR BLD: 2
LYMPHOCYTES ABSOLUTE: 0.84 E9/L (ref 1.5–4)
LYMPHOCYTES RELATIVE PERCENT: 17.4 % (ref 20–42)
MCH RBC QN AUTO: 29.5 PG (ref 26–35)
MCHC RBC AUTO-ENTMCNC: 32.1 % (ref 32–34.5)
MCV RBC AUTO: 91.8 FL (ref 80–99.9)
MONOCYTES ABSOLUTE: 0.63 E9/L (ref 0.1–0.95)
MONOCYTES RELATIVE PERCENT: 13.1 % (ref 2–12)
NEUTROPHILS ABSOLUTE: 3.14 E9/L (ref 1.8–7.3)
NEUTROPHILS RELATIVE PERCENT: 65.1 % (ref 43–80)
PDW BLD-RTO: 14.9 FL (ref 11.5–15)
PLATELET # BLD: 174 E9/L (ref 130–450)
PMV BLD AUTO: 10.2 FL (ref 7–12)
POTASSIUM REFLEX MAGNESIUM: 3.9 MMOL/L (ref 3.5–5)
PROTHROMBIN TIME: 22.1 SEC (ref 9.3–12.4)
RBC # BLD: 4.17 E12/L (ref 3.5–5.5)
SEDIMENTATION RATE, ERYTHROCYTE: 23 MM/HR (ref 0–20)
SODIUM BLD-SCNC: 140 MMOL/L (ref 132–146)
TOTAL CK: 50 U/L (ref 20–180)
TOTAL PROTEIN: 6.2 G/DL (ref 6.4–8.3)
WBC # BLD: 4.8 E9/L (ref 4.5–11.5)

## 2021-04-18 PROCEDURE — 80053 COMPREHEN METABOLIC PANEL: CPT

## 2021-04-18 PROCEDURE — G0378 HOSPITAL OBSERVATION PER HR: HCPCS

## 2021-04-18 PROCEDURE — 6370000000 HC RX 637 (ALT 250 FOR IP): Performed by: STUDENT IN AN ORGANIZED HEALTH CARE EDUCATION/TRAINING PROGRAM

## 2021-04-18 PROCEDURE — 85610 PROTHROMBIN TIME: CPT

## 2021-04-18 PROCEDURE — 85025 COMPLETE CBC W/AUTO DIFF WBC: CPT

## 2021-04-18 PROCEDURE — 99232 SBSQ HOSP IP/OBS MODERATE 35: CPT | Performed by: FAMILY MEDICINE

## 2021-04-18 PROCEDURE — 6360000002 HC RX W HCPCS: Performed by: STUDENT IN AN ORGANIZED HEALTH CARE EDUCATION/TRAINING PROGRAM

## 2021-04-18 PROCEDURE — 2580000003 HC RX 258: Performed by: STUDENT IN AN ORGANIZED HEALTH CARE EDUCATION/TRAINING PROGRAM

## 2021-04-18 PROCEDURE — 94640 AIRWAY INHALATION TREATMENT: CPT

## 2021-04-18 PROCEDURE — 86140 C-REACTIVE PROTEIN: CPT

## 2021-04-18 PROCEDURE — 85651 RBC SED RATE NONAUTOMATED: CPT

## 2021-04-18 PROCEDURE — 96376 TX/PRO/DX INJ SAME DRUG ADON: CPT

## 2021-04-18 PROCEDURE — 82550 ASSAY OF CK (CPK): CPT

## 2021-04-18 PROCEDURE — 36415 COLL VENOUS BLD VENIPUNCTURE: CPT

## 2021-04-18 RX ORDER — LIDOCAINE 4 G/G
1 PATCH TOPICAL DAILY
Status: DISCONTINUED | OUTPATIENT
Start: 2021-04-18 | End: 2021-04-20 | Stop reason: HOSPADM

## 2021-04-18 RX ADMIN — PREDNISOLONE ACETATE 1 DROP: 10 SUSPENSION/ DROPS OPHTHALMIC at 01:32

## 2021-04-18 RX ADMIN — IPRATROPIUM BROMIDE AND ALBUTEROL SULFATE 1 AMPULE: 2.5; .5 SOLUTION RESPIRATORY (INHALATION) at 19:55

## 2021-04-18 RX ADMIN — Medication 2000 UNITS: at 06:29

## 2021-04-18 RX ADMIN — PREDNISOLONE ACETATE 1 DROP: 10 SUSPENSION/ DROPS OPHTHALMIC at 21:47

## 2021-04-18 RX ADMIN — FAMOTIDINE 10 MG: 20 TABLET ORAL at 08:49

## 2021-04-18 RX ADMIN — SPIRONOLACTONE 12.5 MG: 25 TABLET ORAL at 17:41

## 2021-04-18 RX ADMIN — PREDNISOLONE ACETATE 1 DROP: 10 SUSPENSION/ DROPS OPHTHALMIC at 12:33

## 2021-04-18 RX ADMIN — MECLIZINE 25 MG: 12.5 TABLET ORAL at 21:47

## 2021-04-18 RX ADMIN — LEVOTHYROXINE SODIUM 125 MCG: 0.12 TABLET ORAL at 06:29

## 2021-04-18 RX ADMIN — WARFARIN SODIUM 8 MG: 4 TABLET ORAL at 17:41

## 2021-04-18 RX ADMIN — FUROSEMIDE 40 MG: 10 INJECTION, SOLUTION INTRAMUSCULAR; INTRAVENOUS at 08:48

## 2021-04-18 RX ADMIN — PREDNISOLONE ACETATE 1 DROP: 10 SUSPENSION/ DROPS OPHTHALMIC at 06:10

## 2021-04-18 RX ADMIN — PREDNISOLONE ACETATE 1 DROP: 10 SUSPENSION/ DROPS OPHTHALMIC at 22:58

## 2021-04-18 RX ADMIN — Medication: at 21:48

## 2021-04-18 RX ADMIN — ACETAMINOPHEN 650 MG: 325 TABLET ORAL at 06:33

## 2021-04-18 RX ADMIN — POTASSIUM CHLORIDE 20 MEQ: 1500 TABLET, EXTENDED RELEASE ORAL at 06:29

## 2021-04-18 RX ADMIN — ESCITALOPRAM 10 MG: 10 TABLET, FILM COATED ORAL at 08:49

## 2021-04-18 RX ADMIN — Medication 10 ML: at 08:51

## 2021-04-18 RX ADMIN — IPRATROPIUM BROMIDE AND ALBUTEROL SULFATE 1 AMPULE: 2.5; .5 SOLUTION RESPIRATORY (INHALATION) at 12:35

## 2021-04-18 RX ADMIN — GUAIFENESIN 400 MG: 400 TABLET, FILM COATED ORAL at 12:32

## 2021-04-18 RX ADMIN — PREDNISOLONE ACETATE 1 DROP: 10 SUSPENSION/ DROPS OPHTHALMIC at 10:02

## 2021-04-18 RX ADMIN — PREDNISOLONE ACETATE 1 DROP: 10 SUSPENSION/ DROPS OPHTHALMIC at 15:29

## 2021-04-18 RX ADMIN — GUAIFENESIN 400 MG: 400 TABLET, FILM COATED ORAL at 21:47

## 2021-04-18 RX ADMIN — GUAIFENESIN 400 MG: 400 TABLET, FILM COATED ORAL at 08:49

## 2021-04-18 RX ADMIN — ACETAMINOPHEN 650 MG: 325 TABLET ORAL at 21:49

## 2021-04-18 RX ADMIN — IPRATROPIUM BROMIDE AND ALBUTEROL SULFATE 1 AMPULE: 2.5; .5 SOLUTION RESPIRATORY (INHALATION) at 08:19

## 2021-04-18 RX ADMIN — LISINOPRIL 10 MG: 10 TABLET ORAL at 17:41

## 2021-04-18 ASSESSMENT — PAIN SCALES - GENERAL
PAINLEVEL_OUTOF10: 0
PAINLEVEL_OUTOF10: 8
PAINLEVEL_OUTOF10: 5

## 2021-04-18 NOTE — PROGRESS NOTES
[address] [facility]  Family Medicine Attending    S: 81 y.o. female with weight gain and some SOB. Dry cough, some paroxysmal nocturnal dyspnea. No f/c/cp/n/v. Lasix, aldactone through Dr. Angel Gutierrez. Elevated proBNP. Chest wall pain b/l. No concerning cardiac findings. O: VS- Blood pressure (!) 146/75, pulse 87, temperature 98.2 °F (36.8 °C), temperature source Temporal, resp. rate 17, height 5' 6\" (1.676 m), weight 250 lb 4 oz (113.5 kg), SpO2 90 %, not currently breastfeeding. Exam is as noted by resident with the following changes, additions or corrections:  General: NAD, appropriate affect and grooming. AOx3   CV:  Irregular rhythm, no gallops, rubs, or murmurs. ttp chest wall b/l    Resp: CTAB   Abd:  Soft, nontender   Ext:  No pitting edema. LLE ttp over shin     Impressions: Active Problems:    History of atrial fibrillation    Chronic anticoagulation    Chronic obstructive pulmonary disease (HCC)    Age-related physical debility    Heart failure (HCC)    Failure to thrive in adult  Resolved Problems:    * No resolved hospital problems. *      Plan:     CHF exac - Lasix, I/Os, daily weights  Lisinopril. BTxs for possible copd. mucinex for cough  PAF - INR 1.9.   FTT.  consult., PT/OT  Xrays L hip and tib fib neg for concerning findings. Tylenol and Lidocaine patch for chest wall pain  Check sed rate, CRP and CPK for diffuse pain. Consider switch from lexapro to cymbalta if pain continues outpt. PFTs and sleep study advised as outpt       Attending Physician Statement  I have reviewed the chart, including any radiology or labs, and seen the patient with the resident(s). I personally reviewed and performed key elements of the history and exam.  I agree with the assessment, plan and orders as documented by the resident. Please refer to the resident note for additional information.       Electronically signed by Heavenly Nguyen MD on 4/18/2021 at 8:56 AM

## 2021-04-18 NOTE — PROGRESS NOTES
[facility] Inpatient   Resident Progress Note    S:  Hospital day: 0   Brief Synopsis: Bre Sanchez is an 27-year-old woman who presented with worsening shortness of breath and also unable to carry out ADLs at home. She is being managed for CHF exacerbation and failure to thrive in an adult. Acute events last 24 hours  Had chest pain, work-up EKG, troponin was neg  Seen today does not have chest pain, complains of diffuse body ache in lower extremities, arms. Feels shortness of breath has improved from admission. Denied having acid-reflux or GERD symptoms, feels she gets this more when food is cold, uncooked, and so far while she is here that has not happened. Cont meds:    sodium chloride       Scheduled meds:    furosemide  40 mg Intravenous Daily    ipratropium-albuterol  1 ampule Inhalation Q6H    escitalopram  10 mg Oral Daily    famotidine  10 mg Oral Daily    levothyroxine  125 mcg Oral QAM AC    lisinopril  10 mg Oral QPM    potassium chloride  20 mEq Oral QAM AC    spironolactone  12.5 mg Oral QPM    prednisoLONE acetate  1 drop Both Eyes 6 times per day    vitamin D  2,000 Units Oral QAM AC    sodium chloride flush  5-40 mL Intravenous 2 times per day    warfarin  4 mg Oral Once per day on Wed Sat    warfarin  8 mg Oral Once per day on Sun Mon Tue Thu Fri    albuterol  2.5 mg Nebulization Once    guaiFENesin  400 mg Oral TID     PRN meds: nitroGLYCERIN, guaiFENesin, levalbuterol, meclizine, [Held by provider] albuterol, sodium chloride flush, sodium chloride, polyethylene glycol, acetaminophen **OR** acetaminophen     I reviewed the patient's past medical and surgical history, Medications and Allergies. O:  /62   Pulse 91   Temp 97.9 °F (36.6 °C) (Temporal)   Resp 18   Ht 5' 6\" (1.676 m)   Wt 250 lb 4 oz (113.5 kg)   SpO2 90%   BMI 40.39 kg/m²   24 hour I&O: I/O last 3 completed shifts:   In: 360 [P.O.:360]  Out: 1700 [Urine:1700]  No intake/output data recorded. Physical Exam   Constitutional: She is oriented to person, place, and time. She appears well-nourished. HENT:   Head: Normocephalic. Cardiovascular: Normal rate, regular rhythm, normal heart sounds and intact distal pulses. Pulmonary/Chest: Effort normal.   Diminished breath sounds anterior posteriorly  +Chest Wall Tenderness   Abdominal: Soft. Bowel sounds are normal. She exhibits no distension. There is no abdominal tenderness. Musculoskeletal:      Comments: Nonpitting edema bilateral lower extremities   Neurological: She is alert and oriented to person, place, and time. Skin: Skin is warm and dry. No rash noted. Vitals reviewed. Labs:  Na/K/Cl/CO2:  142/3.8/103/32 (04/17 3092)  BUN/Cr/glu/ALT/AST/amyl/lip:  17/0.8/--/14/23/--/-- (04/17 0930)  WBC/Hgb/Hct/Plts:  4.1/12.1/38.6/162 (04/17 8609)  estimated creatinine clearance is 66 mL/min (based on SCr of 0.8 mg/dL). Other pertinent labs as noted below    Radiology:  XR TIBIA FIBULA LEFT (2 VIEWS)   Final Result   No acute abnormality identified. XR HIP LEFT (2-3 VIEWS)   Final Result   Left hip prosthesis in anatomic orientation. XR CHEST PORTABLE   Final Result   1. Hazy attenuation of the lung periphery is new and may be artifactual   secondary to overlying soft tissues, however recommend correlation for   infectious symptoms, as atypical pneumonia (such as COVID-19) can also have   this appearance. 2. Cardiac enlargement appears increased, although may be exaggerated by AP   technique. A/P:  Active Problems:    History of atrial fibrillation    Chronic anticoagulation    Heart failure (HCC)    Chronic obstructive pulmonary disease (HCC)    Age-related physical debility    Failure to thrive in adult  Resolved Problems:    * No resolved hospital problems.  *    Dyspnea likely 2/2 CHF exacerbation vs worsening asthma/COPD  IV Lasix 40  DuoNebs  C/W monitor patient's symptomatic complaints, the above treatment  At discharge might need PFTs, sleep study    CHF Exacerbation   Lasix 40 IV  Itlivunua64.5-home med continued  Lisinopril 10 mg-home med  Strict I's and O's, daily weights  Last echo was done in 2020, showed an EF of 55 to 60%, with indeterminant diastolic dysfunction    Persistent Afib  INR check daily  Warfarin 4 mg, Wednesday Saturday  Warfarin 8 mg on all other days  Admitted to telemetry     FTT in adult  SW consult  PT/OT  COVID-19, -4/16  Already has a home health care nurse, coming 2X a week.     Hypothyroid  Synthroid 125 MCG  TSH: 8  Continue to keep at the current Synthroid dose, will need close outpatient follow-up for TSH      GI/DVT ppx: Coumadin  Diet:  General        Electronically signed by Surendra Stringer  PGY-2 on 4/18/2021 at 7:06 AM  This case was discussed with attending physician: Dr. Andressa Cartagena

## 2021-04-19 LAB
ALBUMIN SERPL-MCNC: 3.7 G/DL (ref 3.5–5.2)
ALP BLD-CCNC: 73 U/L (ref 35–104)
ALT SERPL-CCNC: 16 U/L (ref 0–32)
ANION GAP SERPL CALCULATED.3IONS-SCNC: 11 MMOL/L (ref 7–16)
AST SERPL-CCNC: 39 U/L (ref 0–31)
BASOPHILS ABSOLUTE: 0.08 E9/L (ref 0–0.2)
BASOPHILS RELATIVE PERCENT: 1.8 % (ref 0–2)
BILIRUB SERPL-MCNC: 0.6 MG/DL (ref 0–1.2)
BUN BLDV-MCNC: 20 MG/DL (ref 8–23)
CALCIUM SERPL-MCNC: 9.1 MG/DL (ref 8.6–10.2)
CHLORIDE BLD-SCNC: 101 MMOL/L (ref 98–107)
CO2: 27 MMOL/L (ref 22–29)
CREAT SERPL-MCNC: 0.9 MG/DL (ref 0.5–1)
EOSINOPHILS ABSOLUTE: 0.14 E9/L (ref 0.05–0.5)
EOSINOPHILS RELATIVE PERCENT: 3.1 % (ref 0–6)
GFR AFRICAN AMERICAN: >60
GFR NON-AFRICAN AMERICAN: 60 ML/MIN/1.73
GLUCOSE BLD-MCNC: 87 MG/DL (ref 74–99)
HCT VFR BLD CALC: 38.8 % (ref 34–48)
HEMOGLOBIN: 12.6 G/DL (ref 11.5–15.5)
IMMATURE GRANULOCYTES #: 0.01 E9/L
IMMATURE GRANULOCYTES %: 0.2 % (ref 0–5)
INR BLD: 1.8
LYMPHOCYTES ABSOLUTE: 0.9 E9/L (ref 1.5–4)
LYMPHOCYTES RELATIVE PERCENT: 20 % (ref 20–42)
MAGNESIUM: 2.2 MG/DL (ref 1.6–2.6)
MCH RBC QN AUTO: 29.9 PG (ref 26–35)
MCHC RBC AUTO-ENTMCNC: 32.5 % (ref 32–34.5)
MCV RBC AUTO: 92.2 FL (ref 80–99.9)
MONOCYTES ABSOLUTE: 0.53 E9/L (ref 0.1–0.95)
MONOCYTES RELATIVE PERCENT: 11.8 % (ref 2–12)
NEUTROPHILS ABSOLUTE: 2.83 E9/L (ref 1.8–7.3)
NEUTROPHILS RELATIVE PERCENT: 63.1 % (ref 43–80)
PDW BLD-RTO: 15 FL (ref 11.5–15)
PLATELET # BLD: 184 E9/L (ref 130–450)
PMV BLD AUTO: 9.9 FL (ref 7–12)
POTASSIUM REFLEX MAGNESIUM: 4.9 MMOL/L (ref 3.5–5)
PROTHROMBIN TIME: 19.1 SEC (ref 9.3–12.4)
RBC # BLD: 4.21 E12/L (ref 3.5–5.5)
REASON FOR REJECTION: NORMAL
REJECTED TEST: NORMAL
SARS-COV-2, NAAT: NOT DETECTED
SODIUM BLD-SCNC: 139 MMOL/L (ref 132–146)
TOTAL PROTEIN: 6.8 G/DL (ref 6.4–8.3)
WBC # BLD: 4.5 E9/L (ref 4.5–11.5)

## 2021-04-19 PROCEDURE — 80053 COMPREHEN METABOLIC PANEL: CPT

## 2021-04-19 PROCEDURE — 97161 PT EVAL LOW COMPLEX 20 MIN: CPT | Performed by: PHYSICAL THERAPIST

## 2021-04-19 PROCEDURE — 83735 ASSAY OF MAGNESIUM: CPT

## 2021-04-19 PROCEDURE — 85025 COMPLETE CBC W/AUTO DIFF WBC: CPT

## 2021-04-19 PROCEDURE — 2580000003 HC RX 258: Performed by: STUDENT IN AN ORGANIZED HEALTH CARE EDUCATION/TRAINING PROGRAM

## 2021-04-19 PROCEDURE — G0378 HOSPITAL OBSERVATION PER HR: HCPCS

## 2021-04-19 PROCEDURE — 97165 OT EVAL LOW COMPLEX 30 MIN: CPT

## 2021-04-19 PROCEDURE — 87635 SARS-COV-2 COVID-19 AMP PRB: CPT

## 2021-04-19 PROCEDURE — 96376 TX/PRO/DX INJ SAME DRUG ADON: CPT

## 2021-04-19 PROCEDURE — 6370000000 HC RX 637 (ALT 250 FOR IP): Performed by: STUDENT IN AN ORGANIZED HEALTH CARE EDUCATION/TRAINING PROGRAM

## 2021-04-19 PROCEDURE — 97530 THERAPEUTIC ACTIVITIES: CPT | Performed by: PHYSICAL THERAPIST

## 2021-04-19 PROCEDURE — 36415 COLL VENOUS BLD VENIPUNCTURE: CPT

## 2021-04-19 PROCEDURE — 6360000002 HC RX W HCPCS: Performed by: STUDENT IN AN ORGANIZED HEALTH CARE EDUCATION/TRAINING PROGRAM

## 2021-04-19 PROCEDURE — 85610 PROTHROMBIN TIME: CPT

## 2021-04-19 PROCEDURE — 6370000000 HC RX 637 (ALT 250 FOR IP): Performed by: FAMILY MEDICINE

## 2021-04-19 PROCEDURE — 99232 SBSQ HOSP IP/OBS MODERATE 35: CPT | Performed by: FAMILY MEDICINE

## 2021-04-19 PROCEDURE — 94640 AIRWAY INHALATION TREATMENT: CPT

## 2021-04-19 RX ORDER — FAMOTIDINE 20 MG/1
20 TABLET, FILM COATED ORAL DAILY
Status: DISCONTINUED | OUTPATIENT
Start: 2021-04-20 | End: 2021-04-20 | Stop reason: HOSPADM

## 2021-04-19 RX ORDER — FAMOTIDINE 20 MG/1
20 TABLET, FILM COATED ORAL ONCE
Status: COMPLETED | OUTPATIENT
Start: 2021-04-19 | End: 2021-04-19

## 2021-04-19 RX ADMIN — POTASSIUM CHLORIDE 20 MEQ: 1500 TABLET, EXTENDED RELEASE ORAL at 05:12

## 2021-04-19 RX ADMIN — PREDNISOLONE ACETATE 1 DROP: 10 SUSPENSION/ DROPS OPHTHALMIC at 04:35

## 2021-04-19 RX ADMIN — SPIRONOLACTONE 12.5 MG: 25 TABLET ORAL at 17:54

## 2021-04-19 RX ADMIN — GUAIFENESIN 400 MG: 400 TABLET, FILM COATED ORAL at 09:54

## 2021-04-19 RX ADMIN — LEVOTHYROXINE SODIUM 125 MCG: 0.12 TABLET ORAL at 06:08

## 2021-04-19 RX ADMIN — FAMOTIDINE 10 MG: 20 TABLET ORAL at 09:54

## 2021-04-19 RX ADMIN — IPRATROPIUM BROMIDE AND ALBUTEROL SULFATE 1 AMPULE: 2.5; .5 SOLUTION RESPIRATORY (INHALATION) at 01:38

## 2021-04-19 RX ADMIN — PREDNISOLONE ACETATE 1 DROP: 10 SUSPENSION/ DROPS OPHTHALMIC at 16:00

## 2021-04-19 RX ADMIN — GUAIFENESIN 400 MG: 400 TABLET, FILM COATED ORAL at 14:12

## 2021-04-19 RX ADMIN — Medication 10 ML: at 09:55

## 2021-04-19 RX ADMIN — ESCITALOPRAM 10 MG: 10 TABLET, FILM COATED ORAL at 09:54

## 2021-04-19 RX ADMIN — FAMOTIDINE 20 MG: 20 TABLET ORAL at 12:18

## 2021-04-19 RX ADMIN — WARFARIN SODIUM 8 MG: 4 TABLET ORAL at 17:54

## 2021-04-19 RX ADMIN — PREDNISOLONE ACETATE 1 DROP: 10 SUSPENSION/ DROPS OPHTHALMIC at 08:00

## 2021-04-19 RX ADMIN — PREDNISOLONE ACETATE 1 DROP: 10 SUSPENSION/ DROPS OPHTHALMIC at 12:18

## 2021-04-19 RX ADMIN — LISINOPRIL 10 MG: 10 TABLET ORAL at 17:54

## 2021-04-19 RX ADMIN — FUROSEMIDE 40 MG: 10 INJECTION, SOLUTION INTRAMUSCULAR; INTRAVENOUS at 09:54

## 2021-04-19 RX ADMIN — Medication 5 ML: at 23:36

## 2021-04-19 RX ADMIN — Medication 2000 UNITS: at 05:12

## 2021-04-19 RX ADMIN — IPRATROPIUM BROMIDE AND ALBUTEROL SULFATE 1 AMPULE: 2.5; .5 SOLUTION RESPIRATORY (INHALATION) at 07:45

## 2021-04-19 ASSESSMENT — PAIN SCALES - GENERAL: PAINLEVEL_OUTOF10: 0

## 2021-04-19 NOTE — DISCHARGE INSTR - COC
Continuity of Care Form    Patient Name: Juvencio Zuniga   :  1935  MRN:  [id number]    Admit date:  2021  Discharge date:  ***    Code Status Order: Full Code   Advance Directives:   Advance Care Flowsheet Documentation     Date/Time Healthcare Directive Type of Healthcare Directive Copy in [address] Agent's Name Healthcare Agent's Phone Number    21 0050  Yes, patient has an advance directive for healthcare treatment  Durable power of  for health care  --  Healthcare power of   Yanira Orellana  -- [phone]           Admitting Physician:  Trino Terry MD  PCP: Abel Cheng MD    Discharging Nurse: [facility] Unit/Room#: 4994/1824-W  Discharging Unit Phone Number: ***    Emergency Contact:   Extended Emergency Contact Information  Primary Emergency Contact: Roscoe Nettles  Address: [address] [place] [address] Jeanmarie Taveras of [address]  Phone: [phone]  Mobile Phone: [phone]  Relation: Spouse  Secondary Emergency Contact: Lars Cortez  Address: Erlene Breaker BY PATIENT   [facility] of [address]  Phone: [phone]  Mobile Phone: [phone]  Relation: Child    Past Surgical History:  Past Surgical History:   Procedure Laterality Date    CARDIAC CATHETERIZATION  3/22/14    CARPAL TUNNEL RELEASE      Right hand.  CATARACT REMOVAL  2005    right, bilateral cataract surgery.  CATARACT REMOVAL WITH IMPLANT Bilateral     CHOLECYSTECTOMY  1972    COLONOSCOPY      FINGER SURGERY  2011    S/P removal of cyst from right index finger.  FRACTURE SURGERY Left     arm fractured-casted, Bilateral feet fractures-casted.  INCONTINENCE SURGERY  2005    Urinary incontinence, S/P bladder suspension.     INNER EAR SURGERY      tube place in right ear   [address]    KNEE ARTHROPLASTY Bilateral 2005    PARATHYROIDECTOMY Three times    SHOULDER ARTHROPLASTY Left 2009    SHOULDER ARTHROPLASTY Left      shoulder total replacement.     THYROID SURGERY  5/2005    recurrent    THYROIDECTOMY  1960       Immunization History:   Immunization History   Administered Date(s) Administered    COVID-19, Albarado Peter, PF, 30mcg/0.3mL 01/25/2021, 02/15/2021    Influenza Vaccine, unspecified formulation 09/21/2016    Influenza Virus Vaccine 09/29/2014, 10/16/2018    Influenza, MDCK Quadv, IM, PF (Flucelvax 4 yrs and older) 09/21/2017    Influenza, Quadv, adjuvanted, 72 yrs +, IM, PF (Fluad) 12/14/2020    Influenza, Triv, inactivated, subunit, adjuvanted, IM (Fluad 65 yrs and older) 10/31/2019    Pneumococcal Conjugate 13-valent (Vuwkwlj50) 09/17/2015    Pneumococcal Polysaccharide (Ertkmlndj78) 01/04/2011    Tdap (Boostrix, Adacel) 09/01/2015       Active Problems:  Patient Active Problem List   Diagnosis Code    GERD (gastroesophageal reflux disease) K21.9    History of atrial fibrillation Z86.79    Depression F32.9    Hypothyroidism E03.9    Nonischemic cardiomyopathy ([facility]) I42.8    Permanent atrial fibrillation ([facility]) I48.21    Astigmatism H52.209    History of artificial lens replacement Z96.1    Presbyopia H52.4    Tear film insufficiency H04.129    Essential hypertension I10    Pulmonary hypertension ([facility]) I27.20    Class 2 severe obesity due to excess calories with serious comorbidity and body mass index (BMI) of 39.0 to 39.9 in adult ([facility]) E66.01, Z68.39    Tricuspid regurgitation I07.1    Greater trochanteric bursitis M70.60    Leg length discrepancy M21.70    Chronic anticoagulation Z79.01    Chronic obstructive pulmonary disease ([facility]) J44.9    Mixed stress and urge urinary incontinence N39.46    Primary osteoarthritis involving multiple joints M89.49    Fuchs' endothelial dystrophy H18.519    Pseudophakia, both eyes Z96.1    Chronic diastolic (congestive) heart failure ([facility]) I50.32    Pure hypercholesterolemia E78.00    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    VHD (valvular heart disease) I38    Patent foramen ovale Q21.1    Coronary arteriosclerosis in native artery I25.10    Dizziness of unknown cause R42    Age-related physical debility R54    Frequent falls R29.6    Heart failure (HCC) I50.9    Failure to thrive in adult R62.7       Isolation/Infection:   Isolation          No Isolation        Patient Infection Status     Infection Onset Added Last Indicated Last Indicated By Review Planned Expiration Resolved Resolved By    None active    Resolved    COVID-19 Rule Out 21 COVID-19, Rapid (Ordered)   21 Rule-Out Test Resulted    COVID-19 Rule Out 05/10/20 05/10/20 05/10/20 COVID-19 (Ordered)   05/10/20 Rule-Out Test Resulted          Nurse Assessment:  Last Vital Signs: /71   Pulse 105   Temp 97.9 °F (36.6 °C) (Temporal)   Resp 16   Ht 5' 6\" (1.676 m)   Wt 247 lb (112 kg)   SpO2 94%   BMI 39.87 kg/m²     Last documented pain score (0-10 scale): Pain Level: 5  Last Weight:   Wt Readings from Last 1 Encounters:   21 247 lb (112 kg)     Mental Status:  {IP PT MENTAL STATUS:}    IV Access:  { ANDRESSA IV ACCESS:427276814}    Nursing Mobility/ADLs:  Walking   {P DME ORW}  Transfer  {[facility] DME ULOC:389864116}  Bathing  {[facility] DME AEKW:269835812}  Dressing  {[facility] DME FBUT:923318846}  Toileting  {[facility] DME HICS:034431874}  Feeding  {[facility] DME JSJF:372282918}  Med Admin  {[facility] DME KJCF:084306499}  Med Delivery   { ANDRESSA MED Delivery:559286732}    Wound Care Documentation and Therapy:        Elimination:  Continence:   · Bowel: {YES / FE:68061}  · Bladder: {YES / HT:38343}  Urinary Catheter: {Urinary Catheter:176136733}   Colostomy/Ileostomy/Ileal Conduit: {YES / DD:75101}       Date of Last BM: ***    Intake/Output Summary (Last 24 hours) at 2021 0925  Last data filed at 2021 2134  Gross per 24 hour   Intake 600 ml   Output 385 ml   Net 215 ml     I/O last 3 completed shifts:   In: 600 [P.O.:600]  Out: 385 [Urine:385]    Safety Concerns:     [address] ANDRESSA Safety Concerns:591399506}    Impairments/Disabilities:      [address] ANDRESSA Impairments/Disabilities:573085253}    Nutrition Therapy:  Current Nutrition Therapy:   - 2 Gram low sodium diet    Routes of Feeding: {CHP DME Other Feedings:996160027}  Liquids: {Slp liquid thickness:90038}  Daily Fluid Restriction: {CHP DME Yes amt example:417589199}  Last Modified Barium Swallow with Video (Video Swallowing Test): {Done Not Done JLFJ:455169369}    Treatments at the Time of Hospital Discharge:   Respiratory Treatments: ***  Oxygen Therapy:  {Therapy; copd oxygen:35017}  Ventilator:    { CC Vent ZDXJ:652130778}    Rehab Therapies: {THERAPEUTIC INTERVENTION:8464314726}  Weight Bearing Status/Restrictions: {[facility] Weight Bearin}  Other Medical Equipment (for information only, NOT a DME order):  {EQUIPMENT:915101644}  Other Treatments: Daily weights and strict intake/output    Patient's personal belongings (please select all that are sent with patient):  {[facility] DME Belongings:360717504}    RN SIGNATURE:  {Esignature:092005139}    CASE MANAGEMENT/SOCIAL WORK SECTION    Inpatient Status Date: ***    Readmission Risk Assessment Score:  Readmission Risk              Risk of Unplanned Readmission:        0           Discharging to Facility/ Agency   · Name:   · Address:  · Phone:  · Fax:    Dialysis Facility (if applicable)   · Name:  · Address:  · Dialysis Schedule:  · Phone:  · Fax:    / signature: {Esignature:330135296}    PHYSICIAN SECTION    Prognosis: {Prognosis:4971887938}    Condition at Discharge: [address] Patient Condition:475773835}    Rehab Potential (if transferring to Rehab): {Prognosis:3775347580}    Recommended Labs or Other Treatments After Discharge: ***    Physician Certification: I certify the above information and transfer of Rory Batista  is necessary for the continuing treatment of the diagnosis listed and that she requires {Admit to Appropriate Level of Care:72364} for {GREATER/LESS:098257440} 30 days.      Update Admission H&P: {CHP DME Changes in NPWRS:040546105}    PHYSICIAN SIGNATURE:  {Esignature:474632153}

## 2021-04-19 NOTE — PROGRESS NOTES
Occupational Therapy  OCCUPATIONAL THERAPY INITIAL EVALUATION      Date:2021  Patient Name: Bre Sanchez  MRN: [id number]  : 1935  Room: [address] [place] [address]    Evaluating OT: Dalila Holter, OTD, OTR/L #[id number]    Referring physician: Basilia Woods MD  AM-PAC Daily Activity Raw Score: 14  Recommended Adaptive Equipment: TBD pending discharge plan     Diagnosis: Heart failure ([address] [I50.9]  Reason for Admission: Presented to hospital with insomnia, acute on chronic CHF  Pertinent Medical History/Surgery: A-fib, CAD, CHF, DJD, GERD, hyperparathyroidism, HTN, OA, pulmonary HTN, urinary incontinence, mild mitral regurgitation, cardiac cath, R carpal tunnel release, B cholecystectomy, inner ear surgery, thyroid surgery, shoulder arthroplasty    Precautions:  Falls, tele     Home Living: Pt lives with   in a one story house with 3 steps to enter with B railings. Bedroom and bathroom are located on the first floor. Bathroom setup: Walk-in shower with seat   Equipment owned: bsc, spc x2, lift chair  Prior Level of Function:   Per patient, assist with ADLs ,  assist with IADLs. Patient was using 2 spc for functional mobility. Patient reports home health aide visits 3 times a week for 2-3 hours to assist with dressing, bathing and meal prep. Patient reports she does not change out of nightgown on days when aide is present. Patient reports difficulty preparing meals on days when aide not present reporting  goes to store to purchase candy. Patient reports difficulty sleeping in bed and uses lift chair at times for sleeping.    Driving: No,  drives                             Leisure:Spending time with pet dog    Pain Level: Mild neck pain  Cognition: A&O: self:yes, place:yes, time: yes, situation:yes  Communication: WFL  Follows 1-2 step directions   Memory:  good    Sequencing:  fair    Problem solving:  fair    Judgement/safety:  fair      Functional Assessment:   Initial Eval Status  Date: 4/19/21 Treatment Status  Date: Short Term Goals=LTG  Treatment frequency: PRN 1-4 x/week   Feeding Set-up/SBA   Modified [place]    Grooming Minimal Assist  Completed while seated for safety to comb hair, brush teeth, and wash face   Modified [place]     UB Dressing Moderate Assist   Modified [place]    LB Dressing Maximal Assist   Minimal Assist   Using assistive devices as needed   Bathing Maximal Assist   Minimal Assist    Toileting Maximal Assist   Modified [place]    Bed Mobility  Supine to sit: NT  Sit to supine: NT  Supine to sit: Mod I   Sit to supine: Mod I   Functional Transfers Sit to stand: Min A  Stand to sit: Min A  Using fww  Sit to stand: Mod I  Stand to sit: Mod I  Stand pivot: Mod I  Using fww   Functional Mobility Min A  Completed short distance in room using fww  Mod I   Using fww, functional household distance   Balance Sitting:     Static:SBA    Dynamic:SBA  Standing: Min A     Activity Tolerance Fair-  Fair+   Visual/  Perceptual Glasses/corrective lenses: yes, donned         Safety       Fair                  Good     Upper Extremities:   Hand Dominance: Right [x]  Left []      ROM and Strength Coordination Short Term Goals=LTG   Right UE Active ROM:   Shoulder: ~0-30 degrees flexion/extension (reports limited ROM at baseline)  Elbow-hand: WFL         Strength: 4-/5 Fine/gross Motor Coordination: Mild impairment, assist with fmc tasks during ADLs        Left UE AShoulder: ~0-30 degrees flexion/extension (reports limited ROM at baseline)  Elbow-hand: WFL     Strength: 4-/5 Fine/gross Motor Coordination: Mild impairment, assist with fmc tasks during ADLs          Hearing: uses hearing aides  Sensation:  No c/o numbness or tingling  Tone:  WFL  Edema: none observed B UE                            Comments: Upon arrival, patient sitting in chair and agreeable to session.  OT evaluation performed with education provided regarding the purpose and benefits of OT session, along with mobility and I/ADL completion. Overall, patient presents with decreased activity tolerance, impaired balance, and weakness limiting ability to complete ADLs, along with functional mobility/transfers. Patient would benefit from continued skilled OT to increase safety and functional performance with ADLs/ functional mobility to maximize functional outcomes in order for patient to return to [facility]. At end of session, patient sitting in chair with call light and phone within reach, along with all lines and tubes intact.   Skilled monitoring of vitals:  Skilled monitoring of the patient's response throughout treatment.     SpO2: 98% , patient on RA;  bpm at rest, HR up to 128 bpm with activity      Eval Complexity: Low    Assessment of current deficits   Functional mobility [x]  ADLs [x] Strength [x]  Cognition []  Functional transfers  [x] IADLs [x] Safety Awareness [x]  Endurance/Activity tolerance [x]  Fine Motor Coordination [] Balance [x] Vision/perception [] Sensation []   Gross Motor Coordination [] ROM [] Delirium []                  Motor Control []    Plan of Care:  OT 1-4x/week for 1-2 weeks PRN   [x] ADL retraining/modified techniques, along with assistive device recommendations to maximize patient safety and performance with self-care while maintaining precautions   [x]Balance retraining/tolerance tasks for facilitation of postural control with dynamic challenges during ADLs and functional activities   [x] Delirium Prevention/treatment to maximize functional outcomes   [] Cognitive Re-Training/ executive function skills for safe participation in ADLs/IADLs, along with functional activities   [x] Energy conservation techniques/Strategies to improve activity tolerance      [x] Environmental modifications for safe mobility and completion of ADLs    [x] Functional mobility training/DME recommendations for increased performance, safety and fall prevention  while maintaining precautions     [x] Functional transfer/mobility training/DME recommendations for increased performance, safety and fall prevention while maintaining precautions           []Neuromuscular re-education: facilitation of righting/equilibrium reactions, midline orientation, scapular stability/mobility, normalization muscle tone and facilitation active functional movement/Attention   [x] Patient/Family education to increase safety and functional independence   [x] Positioning to improve functional independence, safety, and skin integrity   []Sensory re-education techniques to improve extremity awareness, maintain skin integrity and improve hand function         []Splinting/positioning needs to maintain joint/skin integrity and prevent contractures       [x] Therapeutic Activity to improve activity tolerance for functional activities and ADLs    [x]Therapeutic Exercise to improve UE strength and activity tolerance for functional transfers and ADLs   [] Visual/Perceptual retraining to improve body awareness and safety during functional activities and ADLs   []      Rehab Potential:  Good for established goals    Patient / Family Goal: To improve strength     Evaluation Time includes thorough review of current medical information, gathering information on past medical history/social history and prior level of function, completion of standardized testing/informal observation of tasks, assessment of data and education on plan of care and goals. Patient and/or family were instructed on functional diagnosis, prognosis/goals and OT plan of care. Demonstrated good understanding.     Time In: 09:01  Time Out: 09:30  Total Session Time: 29 minutes  Total Treatment Time: n/a    Min Units   OT Eval Low 97165  X  1   OT Eval Medium 26941      OT Eval High [id number]       OT Re-Eval [id number]       Therapeutic Ex [id number]       Therapeutic Activities 03004       ADL/Self Care 13381       Orthotic Management 12755       Neuro Re-Ed 54353       Non-Billable Time

## 2021-04-19 NOTE — PROGRESS NOTES
[facility]  Family Medicine Attending    S: 81 y.o. female with past medical history of chronic diastolic heart failure (EF: 55-60%), persistent A. fib (AC with coumadin), hypothyroidism, coronary artery disease, obesity who presents for shortness of breath. SOB is at rest and with exertion, onset several months. She feels it is worsening over the last 1 week. She presented to the ER on 4/15, imaging revealed mild pulmonary vascular congestion, she was otherwise hypoxic and stable for outpatient follow-up with PCP.  4/16 the following day she was at her PCPs office, and there was concern that she cannot carry out her ADLs, in addition to her having shortness of breath, and was sent for social work evaluation for placement. Her symptoms are associated with a 8 pound weight gain in 2 weeks, dry cough and swelling of her legs. Lying upright with pillows alleviates her shortness of breath. Since admission, she has developed severe chest wall cramps, and generalized muscle cramps. EKG's, cardiac enzymes were negative, and she had chest wall tenderness to palpation. Today, she states that she is breathing better, but still having cramps diffusely. She blames it on Lasix. She would like to get up, but has not had PT/OT over the weekend. O: VS- Blood pressure 125/71, pulse 105, temperature 97.9 °F (36.6 °C), temperature source Temporal, resp. rate 16, height 5' 6\" (1.676 m), weight 247 lb (112 kg), SpO2 94 %, not currently breastfeeding.   Exam is as noted by resident with the following changes, additions or corrections:  Awake, alert, in mild distress  Heart - irreg rhythm, normal rate  Lungs- decreased BS, but no rales noted  Ext- bilateral scars over knees, marked tenderness to palpation of LE's, L > R.  No significant swelling or erythema noted    Impressions:   Principal Problem:    Heart failure ([address]  Active Problems:    History of atrial fibrillation    Chronic anticoagulation

## 2021-04-19 NOTE — PROGRESS NOTES
Physical Therapy      Physical Therapy Initial Assessment     Name: May Gambino  : 1935  MRN: [id number]    Referring Provider:  Roxana Bundy MD    Date of Service: 2021    Evaluating PT:  [place], PT, DPT [id number]      Room #:  -A  Diagnosis:  Heart failure  PMHx/PSHx:  Hypothyroidism, OA, urinary incontinence, HTN, hyperparathyroidism, depression, DJD of multiple joints, renal calculi, basal cell carcinoma of neck, Afib, CAD, anticoagulated on coumadin, CHF, tricuspid regurgitation, mild mitral regurgitation, pulmonary HTN  Procedure/Surgery:  None this admission  Precautions:  Falls, [place]  Equipment Needs:  Pt owns necessary DME    SUBJECTIVE:    Pt lives with her  in a 1 story house with 3 stairs to enter and B rails. There is also a ramp to enter the front of her home but pt describes it to be in poor condition. Bed is on first floor and bath is on first floor. Pt ambulated with 2 SPC prior to admission for household distances only. She reports uses of scooters at stores for community distances. Pt states she does own FWW. OBJECTIVE:   Initial Evaluation  Date: 21 Treatment Short Term/ Long Term   Goals   AM-PAC 6 Clicks 50/18     Was pt agreeable to Eval/treatment? yes     Does pt have pain? /10 abdominal/back pain     Bed Mobility  Rolling: NT  Supine to sit: SBA  Sit to supine: NT  Scooting: SBA to EOB  Rolling: Mod Independent   Supine to sit: Mod Independent   Sit to supine: Mod Independent   Scooting: Mod Independent    Transfers Sit to stand: Min A from EOB and toilet  Stand to sit: Min A  Stand pivot: Min A with FWW  Sit to stand: Mod Independent   Stand to sit: Mod Independent   Stand pivot:  Mod Independent    Ambulation    30 feet, 20 feet with FWW with Min A  >50 feet with AAD with Mod Independent    Stair negotiation: ascended and descended  NT  3 steps with B rails Supervision   ROM BUE:  WNL  BLE:  WNL     Strength BUE:  WNL  BLE:  4-/5 Balance Sitting EOB:  Supervision  Dynamic Standing:  Min A with FWW  Sitting EOB:  Independent  Dynamic Standing: Mod Independent with AAD     Pt is A & O x 3  Sensation:  B hand paraesthesias describing as \"needing to waking them up\"  Edema:  unremarkable    Vitals:  Sitting EOB: , SPO2 96% on RA  Post ambulation seated in chair: , SPO2 94% on RA    Patient education  Pt educated on role of therapy, safety with mobility, transfer technique, improved gait mechanics and use of FWW    Patient response to education:   Pt verbalized understanding Pt demonstrated skill Pt requires further education in this area   yes yes yes     ASSESSMENT:    Comments:  Pt resting semi-supine upon arrival, agreeable to PT eval. Pt reports lightheadedness upon upright sitting requiring approx 2 min static sitting at EOB for symptoms to resolve. She requires cues for safe technique with sit<>stand transfers and light assist for lift/lower from EOB and toilet. Pt completed amb with slight forward flexed posture throughout and manual assist for balance. She was seated on toilet to void between distances. During amb from restroom to bedside chair, pt experienced small buckling of R knee stating that this has been intermittently happening at home and required increased assist for balance and safety. Pt was seated in chair at bedside with OT initiating session upon PT exit. Pt is functioning slightly below baseline and will benefit from continued skilled PT services to improve independence with all bed mobility/transfers, gait mechanics, and improved overall endurance.      Treatment:  Patient practiced and was instructed in the following treatment:     Bed mobility: cues for sequencing and safety, education to remain seated at EOB until lightheadedness subsides prior to standing   Transfer training: cues for B UE placement, manual assist for lift/lower from EOB and toilet   Gait training: cues for upright posture and walker approximation, manual assist for balance, cues for improved walker negotiation    Pt's/ family goals   1. To return home at [address]    Patient and or family understand(s) diagnosis, prognosis, and plan of care. yes    PLAN OF CARE:    Current Treatment Recommendations   [x] Strengthening     [] ROM   [x] Balance Training   [x] Endurance Training   [x] Transfer Training   [x] Gait Training   [x] Stair Training   [] Positioning   [x] Safety and Education Training   [x] Patient/Caregiver Education   [] HEP  [] Other       PT care will be provided in accordance with the objectives noted above. Whenever appropriate, clear delegation orders will be provided for nursing staff. Exercises and functional mobility practice will be used as well as appropriate assistive devices or modalities to obtain goals. Patient and family education will also be administered as needed. Frequency of treatments: 2-5x/week x 7-10 days. Time in  0839  Time out  0903    Total Treatment Time  10 minutes     Evaluation Time includes thorough review of current medical information, gathering information on past medical history/social history and prior level of function, completion of standardized testing/informal observation of tasks, assessment of data and education on plan of care and goals.     CPT codes:  [x] Low Complexity PT evaluation 58317  [] Moderate Complexity PT evaluation 30113  [] High Complexity PT evaluation 82790  [] PT Re-evaluation 37290  [] Gait training 89574 0 minutes  [] Manual therapy 02643 0 minutes  [] Therapeutic activities 20191 10 minutes  [] Therapeutic exercises 04835 0 minutes  [] Neuromuscular reeducation 90546 0 minutes     Samson Jesus, PT, DPT  [id number]

## 2021-04-19 NOTE — PROGRESS NOTES
[facility] Inpatient   Resident Progress Note    S:  Hospital day: 2    Brief Synopsis: Trena Rowe is an 81-year-old woman who presented with worsening shortness of breath and also unable to carry out ADLs at home. She is being managed for CHF exacerbation and failure to thrive in an adult. Patient seen and examined today morning. She reports significant improvement on breathing. She is complaining of bilateral leg pain and cramp, more on left leg. Has non pitting edema. Cont meds:    sodium chloride       Scheduled meds:    lidocaine  1 patch Transdermal Daily    furosemide  40 mg Intravenous Daily    ipratropium-albuterol  1 ampule Inhalation Q6H    escitalopram  10 mg Oral Daily    famotidine  10 mg Oral Daily    levothyroxine  125 mcg Oral QAM AC    lisinopril  10 mg Oral QPM    potassium chloride  20 mEq Oral QAM AC    spironolactone  12.5 mg Oral QPM    prednisoLONE acetate  1 drop Both Eyes 6 times per day    vitamin D  2,000 Units Oral QAM AC    sodium chloride flush  5-40 mL Intravenous 2 times per day    warfarin  4 mg Oral Once per day on Wed Sat    warfarin  8 mg Oral Once per day on Sun Mon Tue Thu Fri    albuterol  2.5 mg Nebulization Once    guaiFENesin  400 mg Oral TID     PRN meds: nitroGLYCERIN, guaiFENesin, levalbuterol, meclizine, [Held by provider] albuterol, sodium chloride flush, sodium chloride, polyethylene glycol, acetaminophen **OR** acetaminophen     I reviewed the patient's past medical and surgical history, Medications and Allergies. O:  /71   Pulse 105   Temp 97.9 °F (36.6 °C) (Temporal)   Resp 16   Ht 5' 6\" (1.676 m)   Wt 247 lb (112 kg)   SpO2 94%   BMI 39.87 kg/m²   24 hour I&O: I/O last 3 completed shifts: In: 600 [P.O.:600]  Out: 385 [Urine:385]  No intake/output data recorded. Physical Exam   Constitutional: She is oriented to person, place, and time. She appears well-nourished. HENT:   Head: Normocephalic. Cardiovascular: Regular rhythm, normal heart sounds and intact distal pulses. Irregular rhythm    Pulmonary/Chest: Effort normal.   Diminished breath sounds anterior posteriorly  +Chest Wall Tenderness   Abdominal: Soft. Bowel sounds are normal. She exhibits no distension. There is no abdominal tenderness. Musculoskeletal:      Comments: Nonpitting edema bilateral lower extremities, Homans equivocal   Neurological: She is alert and oriented to person, place, and time. Skin: Skin is warm and dry. No rash noted. Bilateral scar over both knees. Tender on palpation both calf Left>Right    Vitals reviewed. Labs:  Na/K/Cl/CO2:  139/4.9/101/27 (04/19 3767)  BUN/Cr/glu/ALT/AST/amyl/lip:  20/0.9/--/16/39/--/-- (04/19 2591)  WBC/Hgb/Hct/Plts:  4.5/12.6/38.8/184 (04/19 9997)  estimated creatinine clearance is 58 mL/min (based on SCr of 0.9 mg/dL). Other pertinent labs as noted below    Radiology:  XR TIBIA FIBULA LEFT (2 VIEWS)   Final Result   No acute abnormality identified. XR HIP LEFT (2-3 VIEWS)   Final Result   Left hip prosthesis in anatomic orientation. XR CHEST PORTABLE   Final Result   1. Hazy attenuation of the lung periphery is new and may be artifactual   secondary to overlying soft tissues, however recommend correlation for   infectious symptoms, as atypical pneumonia (such as COVID-19) can also have   this appearance. 2. Cardiac enlargement appears increased, although may be exaggerated by AP   technique. A/P:  Principal Problem:    Heart failure (HCC)  Active Problems:    History of atrial fibrillation    Chronic anticoagulation    Chronic obstructive pulmonary disease (HCC)    Age-related physical debility    Failure to thrive in adult  Resolved Problems:    * No resolved hospital problems.  *    Dyspnea likely 2/2 CHF exacerbation vs worsening asthma/COPD  IV Lasix 40  DuoNebs  At discharge might need PFTs, sleep study    CHF Exacerbation   Lasix 40 IV  [id number]-home med continued  Lisinopril 10 mg-home med  Strict I's and O's, daily weights  Last echo was done in 2020, showed an EF of 55 to 60%, with indeterminant diastolic dysfunction    Persistent Afib  INR is 2, therapeutic   Warfarin 4 mg, Wednesday Saturday  Warfarin 8 mg on all other days  INR daily     FTT in adult  SW consult  PT/OT  COVID-19, -4/16  Already has a home health care nurse, coming 2X a week.     Hypothyroid  Synthroid 125 MCG  TSH: 8  Continue to keep at the current Synthroid dose, will need close outpatient follow-up for TSH      GI/DVT ppx: Coumadin  Diet:  General    Code: Did verify with patient regarding code status. She does not want CPR, Intubation during arrest.    Current: DNR CCA          Electronically signed by Romayne Baumgarten  PGY-2 on 4/19/2021 at 10:56 AM  This case was discussed with attending physician: Dr. Terell Ordonez.

## 2021-04-19 NOTE — PROGRESS NOTES
[facility] RESIDENCY PROGRAM  DATE OF VISIT : 2021    Patient : Luli Swann   Age : 81 y.o.  : 1935   MRN : [id number]   ______________________________________________________________________      Assessment & Plan :     Diagnosis Orders   1. Acute on chronic congestive heart failure, unspecified heart failure type (HCC)         Proceed to ER for further management and evaluation, admission for placement    Chief Complaint :   Chief Complaint   Patient presents with    Shortness of Breath    Weight Gain     8 lb in a week       HPI : Luli Swann is 81 y.o. female who presented to the clinic today for follow-up on emergency room visit, patient went to the ER for shortness of breath increased lower extremity swelling and weight gain, she was diagnosed with CHF exacerbation and sent home. However she is coming here today with similar symptoms that are still worsening she is unable to take care of herself, and her  is unable to take care of her she is interested in placement she is incontinent dependent in IADLs and ADLs. She is currently alert and oriented x3. Past Medical History :  Past Medical History:   Diagnosis Date    Anticoagulated on Coumadin     on Coumadin for this Dr. Lucrecia Villatoro controls    Atrial [facility] ([facility]     Basal cell carcinoma of neck     CAD (coronary artery disease)     History of luminal irregularities of the coronary artery, stable.     CHF (congestive heart failure) ([facility])     stage I diastolic dysfunction on 92    Depression 2010    DJD (degenerative joint disease)     SEVERE IN RIGHT HAND, IN MULTIPLE OTHER JOINTS    GERD (gastroesophageal reflux disease) 2010    History of cardiovascular stress test 2014    lexiscan    Hyperparathyroidism ([facility] 2010    Had parathyroidectomy    Hypertension 2010    Hypothyroidism 2010    Mild mitral regurgitation 7/10/14    Osteoarthritis 11/16/2010    Pulmonary hypertension ([address] 7/10/14    mild    Renal calculi     x3    Tricuspid regurgitation 7/10/14    mild-moderate    Urinary incontinence 11/16/2010     Past Surgical History:   Procedure Laterality Date    CARDIAC CATHETERIZATION  3/22/14    CARPAL TUNNEL RELEASE      Right hand.  CATARACT REMOVAL  6/2005    right, bilateral cataract surgery.  CATARACT REMOVAL WITH IMPLANT Bilateral     CHOLECYSTECTOMY  1972    COLONOSCOPY      FINGER SURGERY  7/2011    S/P removal of cyst from right index finger.  FRACTURE SURGERY Left     arm fractured-casted, Bilateral feet fractures-casted.  INCONTINENCE SURGERY  5/2005    Urinary incontinence, S/P bladder suspension.  INNER EAR SURGERY  2011    tube place in right ear   [address]    KIDNEY STONE SURGERY  1981    KNEE ARTHROPLASTY Bilateral 9/2005    PARATHYROIDECTOMY      Three times    SHOULDER ARTHROPLASTY Left 2009    SHOULDER ARTHROPLASTY Left      shoulder total replacement.  THYROID SURGERY  5/2005    recurrent    THYROIDECTOMY  1960         Review of Systems :    ROS - Per HPI   ______________________________________________________________________    Physical Exam :    Vitals: /71   Pulse 79   Temp 98 °F (36.7 °C) (Temporal)   Resp 16   Ht 5' 6\" (1.676 m)   Wt 251 lb (113.9 kg)   SpO2 95%   BMI 40.51 kg/m²   GENERAL: Alert, cooperative, no acute distress. CHEST: No tenderness or deformity, full & symmetric excursion  LUNG: Clear to auscultation bilaterally,  respirations unlabored. No rales/wheezing/rubs  HEART: RRR, S1 and S2 normal, no murmur, rub or gallop. DP pulses 2/4  ABDOMEN: SNTND, no masses, no organomegaly, no guarding, rebound or rigidity. EXTREMITIES:  Extremities normal, atraumatic, no cyanosis.  2+edema.     ______________________________________________________________________        Swetha Goyal MD

## 2021-04-20 VITALS
RESPIRATION RATE: 16 BRPM | SYSTOLIC BLOOD PRESSURE: 138 MMHG | DIASTOLIC BLOOD PRESSURE: 68 MMHG | OXYGEN SATURATION: 97 % | HEART RATE: 90 BPM | TEMPERATURE: 98.2 F | BODY MASS INDEX: 39.53 KG/M2 | WEIGHT: 246 LBS | HEIGHT: 66 IN

## 2021-04-20 LAB
ALBUMIN SERPL-MCNC: 3.8 G/DL (ref 3.5–5.2)
ALP BLD-CCNC: 83 U/L (ref 35–104)
ALT SERPL-CCNC: 14 U/L (ref 0–32)
ANION GAP SERPL CALCULATED.3IONS-SCNC: 10 MMOL/L (ref 7–16)
AST SERPL-CCNC: 23 U/L (ref 0–31)
BASOPHILS ABSOLUTE: 0.07 E9/L (ref 0–0.2)
BASOPHILS RELATIVE PERCENT: 1.3 % (ref 0–2)
BILIRUB SERPL-MCNC: 0.5 MG/DL (ref 0–1.2)
BLOOD CULTURE, ROUTINE: NORMAL
BUN BLDV-MCNC: 21 MG/DL (ref 8–23)
CALCIUM SERPL-MCNC: 9.4 MG/DL (ref 8.6–10.2)
CHLORIDE BLD-SCNC: 101 MMOL/L (ref 98–107)
CO2: 27 MMOL/L (ref 22–29)
CREAT SERPL-MCNC: 0.9 MG/DL (ref 0.5–1)
CULTURE, BLOOD 2: NORMAL
EOSINOPHILS ABSOLUTE: 0.2 E9/L (ref 0.05–0.5)
EOSINOPHILS RELATIVE PERCENT: 3.8 % (ref 0–6)
GFR AFRICAN AMERICAN: >60
GFR NON-AFRICAN AMERICAN: 60 ML/MIN/1.73
GLUCOSE BLD-MCNC: 90 MG/DL (ref 74–99)
HCT VFR BLD CALC: 42.6 % (ref 34–48)
HEMOGLOBIN: 13.8 G/DL (ref 11.5–15.5)
IMMATURE GRANULOCYTES #: 0.02 E9/L
IMMATURE GRANULOCYTES %: 0.4 % (ref 0–5)
LYMPHOCYTES ABSOLUTE: 0.93 E9/L (ref 1.5–4)
LYMPHOCYTES RELATIVE PERCENT: 17.7 % (ref 20–42)
MCH RBC QN AUTO: 30.1 PG (ref 26–35)
MCHC RBC AUTO-ENTMCNC: 32.4 % (ref 32–34.5)
MCV RBC AUTO: 92.8 FL (ref 80–99.9)
MONOCYTES ABSOLUTE: 0.52 E9/L (ref 0.1–0.95)
MONOCYTES RELATIVE PERCENT: 9.9 % (ref 2–12)
NEUTROPHILS ABSOLUTE: 3.5 E9/L (ref 1.8–7.3)
NEUTROPHILS RELATIVE PERCENT: 66.9 % (ref 43–80)
PDW BLD-RTO: 15.1 FL (ref 11.5–15)
PLATELET # BLD: 181 E9/L (ref 130–450)
PMV BLD AUTO: 9.8 FL (ref 7–12)
POTASSIUM REFLEX MAGNESIUM: 4.2 MMOL/L (ref 3.5–5)
RBC # BLD: 4.59 E12/L (ref 3.5–5.5)
SODIUM BLD-SCNC: 138 MMOL/L (ref 132–146)
TOTAL PROTEIN: 6.8 G/DL (ref 6.4–8.3)
WBC # BLD: 5.2 E9/L (ref 4.5–11.5)

## 2021-04-20 PROCEDURE — 6370000000 HC RX 637 (ALT 250 FOR IP): Performed by: STUDENT IN AN ORGANIZED HEALTH CARE EDUCATION/TRAINING PROGRAM

## 2021-04-20 PROCEDURE — 2580000003 HC RX 258: Performed by: STUDENT IN AN ORGANIZED HEALTH CARE EDUCATION/TRAINING PROGRAM

## 2021-04-20 PROCEDURE — 94640 AIRWAY INHALATION TREATMENT: CPT

## 2021-04-20 PROCEDURE — 80053 COMPREHEN METABOLIC PANEL: CPT

## 2021-04-20 PROCEDURE — 2060000000 HC ICU INTERMEDIATE R&B

## 2021-04-20 PROCEDURE — 36415 COLL VENOUS BLD VENIPUNCTURE: CPT

## 2021-04-20 PROCEDURE — 99239 HOSP IP/OBS DSCHRG MGMT >30: CPT | Performed by: FAMILY MEDICINE

## 2021-04-20 PROCEDURE — 96376 TX/PRO/DX INJ SAME DRUG ADON: CPT

## 2021-04-20 PROCEDURE — 85025 COMPLETE CBC W/AUTO DIFF WBC: CPT

## 2021-04-20 PROCEDURE — 6360000002 HC RX W HCPCS: Performed by: STUDENT IN AN ORGANIZED HEALTH CARE EDUCATION/TRAINING PROGRAM

## 2021-04-20 RX ADMIN — FUROSEMIDE 40 MG: 10 INJECTION, SOLUTION INTRAMUSCULAR; INTRAVENOUS at 10:37

## 2021-04-20 RX ADMIN — ACETAMINOPHEN 650 MG: 325 TABLET ORAL at 04:31

## 2021-04-20 RX ADMIN — GUAIFENESIN 400 MG: 400 TABLET, FILM COATED ORAL at 09:07

## 2021-04-20 RX ADMIN — FAMOTIDINE 20 MG: 20 TABLET ORAL at 10:37

## 2021-04-20 RX ADMIN — PREDNISOLONE ACETATE 1 DROP: 10 SUSPENSION/ DROPS OPHTHALMIC at 12:00

## 2021-04-20 RX ADMIN — POTASSIUM CHLORIDE 20 MEQ: 1500 TABLET, EXTENDED RELEASE ORAL at 05:41

## 2021-04-20 RX ADMIN — GUAIFENESIN 400 MG: 400 TABLET, FILM COATED ORAL at 14:33

## 2021-04-20 RX ADMIN — IPRATROPIUM BROMIDE AND ALBUTEROL SULFATE 1 AMPULE: 2.5; .5 SOLUTION RESPIRATORY (INHALATION) at 08:56

## 2021-04-20 RX ADMIN — Medication 2000 UNITS: at 05:41

## 2021-04-20 RX ADMIN — PREDNISOLONE ACETATE 1 DROP: 10 SUSPENSION/ DROPS OPHTHALMIC at 08:00

## 2021-04-20 RX ADMIN — PREDNISOLONE ACETATE 1 DROP: 10 SUSPENSION/ DROPS OPHTHALMIC at 02:39

## 2021-04-20 RX ADMIN — PREDNISOLONE ACETATE 1 DROP: 10 SUSPENSION/ DROPS OPHTHALMIC at 15:48

## 2021-04-20 RX ADMIN — IPRATROPIUM BROMIDE AND ALBUTEROL SULFATE 1 AMPULE: 2.5; .5 SOLUTION RESPIRATORY (INHALATION) at 01:30

## 2021-04-20 RX ADMIN — LEVOTHYROXINE SODIUM 125 MCG: 0.12 TABLET ORAL at 05:41

## 2021-04-20 RX ADMIN — Medication 10 ML: at 09:08

## 2021-04-20 RX ADMIN — ESCITALOPRAM 10 MG: 10 TABLET, FILM COATED ORAL at 10:38

## 2021-04-20 ASSESSMENT — PAIN SCALES - GENERAL
PAINLEVEL_OUTOF10: 10
PAINLEVEL_OUTOF10: 0

## 2021-04-20 NOTE — PLAN OF CARE
Patient going to discharge to [facility], daily weight, strict intake/output, and 2 Gram sodium diet orders added to [address]. Secure email sent to [facility] for appointment scheduling, patient will need to contact PCP once discharged from [place] [id number] for follow up appointment.

## 2021-04-20 NOTE — PROGRESS NOTES
CLINICAL PHARMACY: DISCHARGE MED RECONCILIATION/REVIEW    [facility]) Select Patient?: Yes  Total # of Interventions Recommended: 0   -   Total # Interventions Accepted: 0  Intervention Severity:   - Level 1 Intervention Present?: No   - Level 2 #: 0   - Level 3 #: 0   Time Spent (min): 15    Additional Documentation: See progress note.

## 2021-04-20 NOTE — PROGRESS NOTES
[facility]   Department of Pharmacy   Pharmacist Transition of Care Services         Patient Demographics  Name:  [address] [place] [address] [place] Record Number:  [id number]  Gender:  female   Age:  81 y.o. YOB: 1935    Readmission Risk: 15%       Pharmacist Review and Summary of Medications     Date of last reviewed/update: 4/20/21     Category Comments   New Medication Started        Change in Outpatient Medication      Discontinued/On hold Outpatient Medication       Other No changes to medications          Documentation of Pharmacist Interventions and Follow-up Plan:     The following Pharmacist Transition of Care Services were completed:   [x]  Reviewed and summarized medication changes  []  Entire home medication list was reviewed for accuracy  []  Home medication list was updated or corrected    [x]  Reviewed discharge medication reconciliation  []  Discharge medication list was updated or corrected    [x]  Added information to AVS   []  Patient education was provided on new medications  []  Patient education was provided on medication changes  []  Reviewed the AVS with patient    Additional Interventions:  []  Inpatient prescriber was contacted and the following pharmacy recommendations        were accepted: **     [] Other interventions: **        Pharmacist: Bk Dunham PharmD, [facility]  Date:  4/20/2021 1:49 PM  Time spent counseling patient face-to-face OR over the phone on medications: 0 minutes

## 2021-04-20 NOTE — PROGRESS NOTES
adult  Resolved Problems:    * No resolved hospital problems. *      Plan:   She does not appear volume overloaded clinically at this time   PT/OT   OK for BENI. Would consider possibility of long-term placement in assisted living or SNF     Attending Physician Statement  I have reviewed the chart and seen the patient with the resident(s). I personally reviewed images, EKG's and similar tests, if present. I personally reviewed and performed key elements of the history and exam.  I have reviewed and confirmed student and/or resident history and exam with changes as indicated above. I agree with the assessment, plan and orders as documented by the resident. Please refer to the resident and/or student note for additional information.       Candace Jimenez

## 2021-04-20 NOTE — PROGRESS NOTES
[facility] Inpatient   Resident Progress Note    S:  Hospital day: 2    Brief Synopsis: Juvencio Zuniga is an 30-year-old woman who presented with worsening shortness of breath and also unable to carry out ADLs at home. She is being managed for CHF exacerbation and failure to thrive in an adult. Patient seen and examined today morning. She reports significant improvement on breathing. She is complaining of bilateral leg pain and cramp, more on left leg. Has non pitting edema. Denies any new complaints overnight. Willing for BENI and PT/OT    Cont meds:    sodium chloride       Scheduled meds:    famotidine  20 mg Oral Daily    lidocaine  1 patch Transdermal Daily    furosemide  40 mg Intravenous Daily    ipratropium-albuterol  1 ampule Inhalation Q6H    escitalopram  10 mg Oral Daily    levothyroxine  125 mcg Oral QAM AC    lisinopril  10 mg Oral QPM    potassium chloride  20 mEq Oral QAM AC    spironolactone  12.5 mg Oral QPM    prednisoLONE acetate  1 drop Both Eyes 6 times per day    vitamin D  2,000 Units Oral QAM AC    sodium chloride flush  5-40 mL Intravenous 2 times per day    warfarin  4 mg Oral Once per day on Wed Sat    warfarin  8 mg Oral Once per day on Sun Mon Tue Thu Fri    albuterol  2.5 mg Nebulization Once    guaiFENesin  400 mg Oral TID     PRN meds: nitroGLYCERIN, guaiFENesin, levalbuterol, meclizine, [Held by provider] albuterol, sodium chloride flush, sodium chloride, polyethylene glycol, acetaminophen **OR** acetaminophen     I reviewed the patient's past medical and surgical history, Medications and Allergies.     O:  /65   Pulse 98   Temp 98.1 °F (36.7 °C) (Temporal)   Resp 11   Ht 5' 6\" (1.676 m)   Wt 246 lb (111.6 kg)   SpO2 96%   BMI 39.71 kg/m²   24 hour I&O: I/O last 3 completed shifts:  In: -   Out: 825 [Urine:825]  I/O this shift:  In: -   Out: 700 [Urine:700]      Physical Exam   Constitutional: She is oriented to person, place, and time. She appears well-nourished. HENT:   Head: Normocephalic. Cardiovascular: Regular rhythm, normal heart sounds and intact distal pulses. Irregular rhythm    Pulmonary/Chest: Effort normal.   Diminished breath sounds anterior posteriorly  +Chest Wall Tenderness   Abdominal: Soft. Bowel sounds are normal. She exhibits no distension. There is no abdominal tenderness. Musculoskeletal:      Comments: Nonpitting edema bilateral lower extremities, Homans equivocal   Neurological: She is alert and oriented to person, place, and time. Skin: Skin is warm and dry. No rash noted. Bilateral scar over both knees. Tender on palpation both calf Left>Right    Vitals reviewed. Labs:  Na/K/Cl/CO2:  139/4.9/101/27 (04/19 2313)  BUN/Cr/glu/ALT/AST/amyl/lip:  20/0.9/--/16/39/--/-- (04/19 4434)  WBC/Hgb/Hct/Plts:  4.5/12.6/38.8/184 (04/19 7691)  estimated creatinine clearance is 58 mL/min (based on SCr of 0.9 mg/dL). Other pertinent labs as noted below    Radiology:  XR TIBIA FIBULA LEFT (2 VIEWS)   Final Result   No acute abnormality identified. XR HIP LEFT (2-3 VIEWS)   Final Result   Left hip prosthesis in anatomic orientation. XR CHEST PORTABLE   Final Result   1. Hazy attenuation of the lung periphery is new and may be artifactual   secondary to overlying soft tissues, however recommend correlation for   infectious symptoms, as atypical pneumonia (such as COVID-19) can also have   this appearance. 2. Cardiac enlargement appears increased, although may be exaggerated by AP   technique. A/P:  Principal Problem:    Heart failure (HCC)  Active Problems:    History of atrial fibrillation    Chronic anticoagulation    Chronic obstructive pulmonary disease (HCC)    Age-related physical debility    Failure to thrive in adult  Resolved Problems:    * No resolved hospital problems.  *    Dyspnea likely 2/2 CHF exacerbation vs worsening asthma/COPD  IV Lasix 40  DuoNebs  At discharge might need PFTs, sleep study    CHF Exacerbation   Lasix 40 IV  Sqoxzbics54.5-home med continued  Lisinopril 10 mg-home med  Strict I's and O's, daily weights  Last echo was done in 2020, showed an EF of 55 to 60%, with indeterminant diastolic dysfunction    Persistent Afib  INR is 2, therapeutic   Warfarin 4 mg, Wednesday Saturday  Warfarin 8 mg on all other days  INR daily     FTT in adult  SW consult  PT/OT  COVID-19, -4/16  Already has a home health care nurse, coming 2X a week.     Hypothyroid  Synthroid 125 MCG  TSH: 8  Continue to keep at the current Synthroid dose, will need close outpatient follow-up for TSH      GI/DVT ppx: Coumadin  Diet:  General    Code: Did verify with patient regarding code status. She does not want CPR, Intubation during arrest.    Current: DNR CCA          Electronically signed by Jodi Quintero  PGY-3 on 4/20/2021 at 5:45 AM  This case was discussed with attending physician: Dr. Madai Torres.

## 2021-04-20 NOTE — CARE COORDINATION
Pt discharging today. Called daughter and left message. Called spouse who does not want to cover ambullette cost, he will have someone come pick pt up around 4pm. Notified bedside RN, charge RN, pt, and Zuleima(CARMEN). Envelope and HENS in soft chart. Bonnie Punches LSW

## 2021-04-21 ENCOUNTER — CARE COORDINATION (OUTPATIENT)
Dept: CARE COORDINATION | Age: 86
End: 2021-04-21

## 2021-04-21 NOTE — ADT AUTH CERT
Utilization Reviews       Heart Failure - Care Day 5 (4/20/2021) by Kendrick Collazo RN       Review Status Review Entered   Completed 4/20/2021 15:13      Criteria Review      Care Day: 5 Care Date: 4/20/2021 Level of Care: Telemetry    Guideline Day 1    Level Of Care    (X) ICU [E] or intermediate care [F] or floor    4/20/2021 3:13 PM EDT by Hai Granados      MS TELE    (X) Readmission Risk Assessment    4/20/2021 3:13 PM EDT by Hai Granados      15%    Clinical Status    (X) * Clinical Indications met [G]    4/20/2021 3:13 PM EDT by [address] PER PA    ( ) Tachypnea, edema, and dyspnea    4/20/2021 3:13 PM EDT by Hai Granados      136/68  90  16  98.1   97% ra    Routes    (X) Parenteral medications    (X) Low-salt diet    4/20/2021 3:13 PM EDT by Andrews Sinclair y    Interventions    (X) Weigh    4/20/2021 3:13 PM EDT by Hai Granados      qd    Medications    (X) IV diuretics    4/20/2021 3:13 PM EDT by Hai Granados      IV LASIX 40 IV QD// PO ALDACTONE 25 QD    * Milestone   Additional Notes   4-20 CONVERTED TO INPT THIS DAY W PA SUPPORT   IM-S: 81 y.o. female with past medical history of chronic diastolic heart failure (EF: 55-60%), persistent A. fib (AC with coumadin), hypothyroidism, coronary artery disease, obesity who presents for shortness of breath.  SOB is at rest and with exertion, onset several months.  She feels it is worsening over the last 1 week.  She presented to the ER on 4/15, imaging revealed mild pulmonary vascular congestion, she was otherwise hypoxic and stable for outpatient follow-up with PCP.  4/16 the following day she was at her PCPs office, and there was concern that she cannot carry out her ADLs, in addition to her having shortness of breath, and was sent for social work evaluation for placement.  Her symptoms are associated with a 8 pound weight gain in 2 weeks, dry cough and swelling of her legs.  Lying upright with pillows alleviates her shortness of breath.     Since admission, she has developed severe chest wall cramps, and generalized muscle cramps.  EKG's, cardiac enzymes were negative, and she had chest wall tenderness to palpation. Today, no new complaints.  Generalized achiness, but breathing better.       O: VS- Blood pressure 138/68, pulse 90, temperature 98.2 °F (36.8 °C), temperature source Temporal, resp. rate 16, height 5' 6\" (1.676 m), weight 246 lb (111.6 kg), SpO2 97 %, not currently breastfeeding.    Exam is as noted by resident with the following changes, additions or corrections:   Awake, alert, in mild distress   Heart - irreg rhythm, normal rate   Lungs- decreased BS, but no rales noted   Ext- bilateral scars over knees, marked tenderness to palpation of LE's, L > R.  No significant swelling or erythema noted       Impressions:               Principal Problem:     Heart failure (HCC)   Active Problems:     History of atrial fibrillation     Chronic anticoagulation     Chronic obstructive pulmonary disease (HCC)     Age-related physical debility     Failure to thrive in adult   Dyspnea likely 2/2 CHF exacerbation vs worsening asthma/COPD   IV Lasix 40   DuoNebs   At discharge might need PFTs, sleep study       CHF Exacerbation    Lasix 40 IV   Bscpaefvo00.5-home med continued   Lisinopril 10 mg-home med   Strict I's and O's, daily weights   Last echo was done in 2020, showed an EF of 55 to 60%, with indeterminant diastolic dysfunction       Persistent Afib   INR is 2, therapeutic    Warfarin 4 mg, Wednesday Saturday   Warfarin 8 mg on all other days   INR daily       FTT in adult   SW consult   PT/OT   COVID-19, -4/16   Already has a home health care nurse, coming 2X a week.       Hypothyroid   Synthroid 125 MCG   TSH: 8   Continue to keep at the current Synthroid dose, will need close outpatient follow-up for TSH           GI/DVT ppx: Coumadin   Diet:  General       Code: Did verify with patient regarding code status. She does not want CPR, Intubation during arrest.       Current: DNR CCA           Plan:   She does not appear volume overloaded clinically at this time               PT/OT               OK for BENI.  Would consider possibility of long-term placement in assisted living or SNF      ADDENDUM\" DCD TO SNF THIS EVENING OF       PA SUPPORTS CONVERSION TO INPT ON  by Ayan Gonzalez RN       Review Status Review Entered   In Primary 2021 10:43      Criteria Review          Summary: obs list upgrade We recommend that the following pt's current hospitalization. ..           Note type: Physician Advisor Note level: Hospital Account       Note text: obs list upgrade               We recommend that the following pt's current hospitalization under Observation status is APPROPRIATE .        If you agree, please place a new ADMIT order in [facility] as recommended.               Name: Luli Swann        : 1935        CSN: [id number]                        82yo F w/ hx A fib, CHF, hypothyroidism, CAD presents ED  w/ incr SOB, wt gain, cough, LE swelling. Admitted management CHF, FTT.  Still on IV lasix                       This chart was reviewed at 9:03 AM 2021

## 2021-04-22 NOTE — DISCHARGE SUMMARY
Discharge Summary    Yisel Lundberg  :  1935  MRN:  [id number]    Admit date:  2021  Discharge date:  2021    Admitting Physician:  Annye Mortimer, MD      Admission Condition:  stable    Discharged Condition:  stable    Discharge Diagnoses:   Patient Active Problem List   Diagnosis    GERD (gastroesophageal reflux disease)    History of atrial fibrillation    Depression    Hypothyroidism    Nonischemic cardiomyopathy ([address]    Permanent atrial fibrillation ([address]    Astigmatism    History of artificial lens replacement    Presbyopia    Tear film insufficiency    Essential hypertension    Pulmonary hypertension ([address]    Class 2 severe obesity due to excess calories with serious comorbidity and body mass index (BMI) of 39.0 to 39.9 in adult ([address]    Tricuspid regurgitation    Greater trochanteric bursitis    Leg length discrepancy    Chronic anticoagulation    Chronic obstructive pulmonary disease (HCC)    Mixed stress and urge urinary incontinence    Primary osteoarthritis involving multiple joints    Fuchs' endothelial dystrophy    Pseudophakia, both eyes    Chronic diastolic (congestive) heart failure ([address]    Pure hypercholesterolemia    Spinal stenosis of lumbar region with neurogenic claudication    VHD (valvular heart disease)    Patent foramen ovale    Coronary arteriosclerosis in native artery    Dizziness of unknown cause    Age-related physical debility    Frequent falls    Heart failure (HCC)    Failure to thrive in adult       Hospital Course:     Yisel Lundberg is a 81 y.o. female who was admitted to the hospital for failure to thrive, recurrent fall, failure to take care of herself, severe muscular deconditioning. Work up revealed clinically euvolemic status for CHF.   Physical therapy and Occupational Therapy were consulted patient was found to be significantly impaired to able to take care of herself and was advised for placement to [facility] and subsequent placement to a nursing home. . The patient's course was otherwise uneventful. She progressed well, pain was controlled on PO medications. Patient was accepted for subacute rehab and discharge subsequently there with the goals of final placement to the nursing home/long-term care facility. Patient voiced that she will plan to do that.     Discharge Medications:         Medication List      CONTINUE taking these medications    acetaminophen 325 MG tablet  Commonly known as: TYLENOL     b complex vitamins capsule  Take 1 capsule by mouth every morning (before breakfast)     escitalopram 10 MG tablet  Commonly known as: Lexapro  Take 1 tablet by mouth daily     famotidine 20 MG tablet  Commonly known as: PEPCID  Take one tablet by mouth daily     furosemide 40 MG tablet  Commonly known as: LASIX  Take 1 tablet by mouth every morning (before breakfast)     guaiFENesin 600 MG extended release tablet  Commonly known as: MUCINEX     levalbuterol 0.63 MG/3ML nebulization  Commonly known as: XOPENEX  Take 3 mLs by nebulization every 8 hours as needed for Wheezing DX: COPD J44.9     levothyroxine 125 MCG tablet  Commonly known as: SYNTHROID  Take 1 tablet by mouth every morning (before breakfast)     lisinopril 10 MG tablet  Commonly known as: PRINIVIL;ZESTRIL  TAKE ONE TABLET BY MOUTH EVERY EVENING     meclizine 25 MG tablet  Commonly known as: ANTIVERT  Take 1 tablet by mouth 3 times daily as needed for Dizziness     metoprolol succinate 50 MG extended release tablet  Commonly known as: TOPROL XL  TAKE 1 & 1/2 TABLETS BY MOUTH EVERY EVENING     potassium chloride 20 MEQ extended release tablet  Commonly known as: KLOR-CON M  Take 1 tablet by mouth every morning (before breakfast)     prednisoLONE acetate 1 % ophthalmic suspension  Commonly known as: PRED FORTE     ProAir  (90 Base) MCG/ACT inhaler  Generic drug: albuterol sulfate HFA  USE 2 PUFFS EVERY 6 HOURS  AS NEEDED FOR WHEEZING     spironolactone 25 MG tablet  Commonly known as: ALDACTONE  Take 0.5 tablets by mouth every evening     vitamin C 250 MG tablet  Take 1 tablet by mouth every morning (before breakfast)     Vitamin D (Ergocalciferol) 50 MCG (2000 UT) Caps  Take 2,000 Units by mouth every morning (before breakfast)     warfarin 4 MG tablet  Commonly known as: Coumadin  Take as directed. If you are unsure how to take this medication, talk to your nurse or doctor. Original instructions: Take 4 mg Wednesday and Saturday. Take 8 mg on all other days. Consults:  none    Significant Diagnostic Studies:      Labs:  Na/K/Cl/CO2:  138/4.2/101/27 (04/20 0554)  BUN/Cr/glu/ALT/AST/amyl/lip:  21/0.9/--/14/23/--/-- (04/20 0554)  WBC/Hgb/Hct/Plts:  5.2/13.8/42.6/181 (04/20 0554)  [unfilled]  estimated creatinine clearance is 58 mL/min (based on SCr of 0.9 mg/dL). New Imaging:  Xr Hip Left (2-3 Views)    Result Date: 4/17/2021  EXAMINATION: TWO XRAY VIEWS OF THE LEFT HIP 4/17/2021 12:36 pm COMPARISON: February 2014 HISTORY: ORDERING SYSTEM PROVIDED HISTORY: LE pain TECHNOLOGIST PROVIDED HISTORY: Reason for exam:->LE pain What reading provider will be dictating this exam?->CRC FINDINGS: Left hip prosthesis in anatomic orientation. No acute fracture or dislocation. Visualized pubic rami are intact. Large gluteal injection granuloma incidentally noted. Left hip prosthesis in anatomic orientation. Xr Tibia Fibula Left (2 Views)    Result Date: 4/17/2021  EXAMINATION: AP and lateral XRAY VIEWS OF THE LEFT TIBIA AND FIBULA 4/17/2021 1:36 pm COMPARISON: None. HISTORY: ORDERING SYSTEM PROVIDED HISTORY: le pain TECHNOLOGIST PROVIDED HISTORY: Reason for exam:->le pain What reading provider will be dictating this exam?->CRC FINDINGS: The tibia and fibula are intact. No fracture identified. No acute bony lesion identified. There is a knee prosthesis in place. There are degenerative findings at the ankle. No acute abnormality identified.      Xr Chest Portable    Result Date: 4/16/2021  EXAMINATION: ONE XRAY VIEW OF THE CHEST 4/16/2021 2:27 pm COMPARISON: One-view chest x-ray 04/15/2021 HISTORY: ORDERING SYSTEM PROVIDED HISTORY: sob TECHNOLOGIST PROVIDED HISTORY: Reason for exam:->sob What reading provider will be dictating this exam?->CRC FINDINGS: There is mild-moderate enlargement of the cardiac silhouette, which appears increased since the previous exam and may be exaggerated by AP technique. The mediastinal contours are within normal limits. There is evidence of aortic atherosclerosis. Hazy attenuation of the peripheral lungs is new compared to the previous exam, right greater than left. Hilar/basilar predominant peribronchial thickening is similar to the previous exam.  There is mild blunting of the right costophrenic sulcus, which is similar to slightly increased compared to the previous exam and may be due to underlying atelectasis/scarring or small pleural effusion. Osseous degenerative changes are present. A left shoulder replacement is partially imaged. 1. Hazy attenuation of the lung periphery is new and may be artifactual secondary to overlying soft tissues, however recommend correlation for infectious symptoms, as atypical pneumonia (such as COVID-19) can also have this appearance. 2. Cardiac enlargement appears increased, although may be exaggerated by AP technique. Xr Chest Portable    Result Date: 4/15/2021  EXAMINATION: ONE XRAY VIEW OF THE CHEST 4/15/2021 6:32 pm COMPARISON: 02/09/2021 HISTORY: ORDERING SYSTEM PROVIDED HISTORY: sob TECHNOLOGIST PROVIDED HISTORY: Reason for exam:->sob FINDINGS: The heart is within normal limits for size. Pulmonary vasculature may be slightly prominent. Mild streaky right basilar atelectasis appears present. No adenopathy or pleural effusion. Prior left shoulder arthroplasty. Slight pulmonary vascular congestion. Mild right basilar atelectasis.          Treatments: Continuation of outpatient medical therapy, PT OT, supportive care. Discharge Exam:  Per progress note on day of discharge    Disposition:   SNF    No future appointments. Upon discharge from subacute rehab facility, or in a month follow-up with PCP. More than 30 minutes was spent in preparation of this patient's discharge including, but not limited to, examination, preparation of documents, prescription preparation, counseling and coordination.     Signed:  Wil Giron MD  4/22/2021, 3:58 PM

## 2021-04-23 LAB
EKG ATRIAL RATE: 77 BPM
EKG Q-T INTERVAL: 428 MS
EKG QRS DURATION: 98 MS
EKG QTC CALCULATION (BAZETT): 471 MS
EKG R AXIS: 27 DEGREES
EKG T AXIS: 53 DEGREES
EKG VENTRICULAR RATE: 73 BPM

## 2021-04-28 LAB
EKG ATRIAL RATE: 57 BPM
EKG ATRIAL RATE: 85 BPM
EKG Q-T INTERVAL: 346 MS
EKG Q-T INTERVAL: 406 MS
EKG QRS DURATION: 100 MS
EKG QRS DURATION: 100 MS
EKG QTC CALCULATION (BAZETT): 441 MS
EKG QTC CALCULATION (BAZETT): 471 MS
EKG R AXIS: 1 DEGREES
EKG R AXIS: 32 DEGREES
EKG T AXIS: -148 DEGREES
EKG T AXIS: 83 DEGREES
EKG VENTRICULAR RATE: 81 BPM
EKG VENTRICULAR RATE: 98 BPM

## 2021-05-04 ENCOUNTER — TELEPHONE (OUTPATIENT)
Dept: PHARMACY | Facility: CLINIC | Age: 86
End: 2021-05-04

## 2021-05-04 NOTE — TELEPHONE ENCOUNTER
[facility]ARMAC[facility] REVIEW: ADHERENCE REVIEW  Identified care gap per [place]B adherence    Last Visit: 4/16/21    ASSESSMENT  ACE/ARB ADHERENCE    Per Insurance Records through [facility]:   87%; Potential Fail Date: 6/19/21):   LISINOPRIL TAB 10MG last filled on 3/16/21 for 28 day supply. Next refill due: 4/13/21    Per [facility]:   LISINOPRIL TAB 10MG last picked up 3/16/21 on  for 30 day supply. 5 refills remaining. Billed through [facility]     BP Readings from Last 3 Encounters:   04/20/21 138/68   04/16/21 127/71   04/15/21 (!) 145/80     Estimated Creatinine Clearance: 58 mL/min (based on SCr of 0.9 mg/dL). PLAN  The following are interventions that have been identified:   - Patient overdue refilling Lisinopril and active on home medication list.   - Patient eligible for 90 day supply of Lisinopril    Attempting to reach patient to review.  Left message asking for return call. Household member who picked up stated patient was currently in hospital and should be home in couple of days. Will call back 5/7/21.        Future Appointments   Date Time Provider Hernandez Mcintyre   5/5/2021 12:40 PM Marcio Lopes MD [address] [place] [address]   Direct: [phone]  Department, toll free: [phone], option 7

## 2021-05-05 ENCOUNTER — OFFICE VISIT (OUTPATIENT)
Dept: CARDIOLOGY CLINIC | Age: 86
End: 2021-05-05
Payer: MEDICARE

## 2021-05-05 VITALS
BODY MASS INDEX: 39.47 KG/M2 | OXYGEN SATURATION: 99 % | RESPIRATION RATE: 18 BRPM | HEIGHT: 66 IN | DIASTOLIC BLOOD PRESSURE: 74 MMHG | HEART RATE: 87 BPM | WEIGHT: 245.6 LBS | SYSTOLIC BLOOD PRESSURE: 138 MMHG

## 2021-05-05 DIAGNOSIS — I48.21 PERMANENT ATRIAL FIBRILLATION (HCC): Primary | ICD-10-CM

## 2021-05-05 DIAGNOSIS — F02.80 ALZHEIMER'S DEMENTIA WITHOUT BEHAVIORAL DISTURBANCE, UNSPECIFIED TIMING OF DEMENTIA ONSET: ICD-10-CM

## 2021-05-05 DIAGNOSIS — I50.32 CHRONIC DIASTOLIC (CONGESTIVE) HEART FAILURE (HCC): ICD-10-CM

## 2021-05-05 DIAGNOSIS — G30.9 ALZHEIMER'S DEMENTIA WITHOUT BEHAVIORAL DISTURBANCE, UNSPECIFIED TIMING OF DEMENTIA ONSET: ICD-10-CM

## 2021-05-05 PROBLEM — R42 DIZZINESS OF UNKNOWN CAUSE: Status: RESOLVED | Noted: 2019-12-12 | Resolved: 2021-05-05

## 2021-05-05 PROBLEM — R62.7 FAILURE TO THRIVE IN ADULT: Status: RESOLVED | Noted: 2021-04-16 | Resolved: 2021-05-05

## 2021-05-05 PROBLEM — I50.9 HEART FAILURE (HCC): Status: RESOLVED | Noted: 2021-04-16 | Resolved: 2021-05-05

## 2021-05-05 PROBLEM — I38 VHD (VALVULAR HEART DISEASE): Status: RESOLVED | Noted: 2019-03-18 | Resolved: 2021-05-05

## 2021-05-05 PROBLEM — F03.90 DEMENTIA (HCC): Status: ACTIVE | Noted: 2021-05-05

## 2021-05-05 PROBLEM — M15.9 PRIMARY OSTEOARTHRITIS INVOLVING MULTIPLE JOINTS: Status: RESOLVED | Noted: 2017-09-21 | Resolved: 2021-05-05

## 2021-05-05 PROBLEM — R54 AGE-RELATED PHYSICAL DEBILITY: Chronic | Status: RESOLVED | Noted: 2020-09-30 | Resolved: 2021-05-05

## 2021-05-05 PROCEDURE — G8399 PT W/DXA RESULTS DOCUMENT: HCPCS | Performed by: INTERNAL MEDICINE

## 2021-05-05 PROCEDURE — G8417 CALC BMI ABV UP PARAM F/U: HCPCS | Performed by: INTERNAL MEDICINE

## 2021-05-05 PROCEDURE — 93000 ELECTROCARDIOGRAM COMPLETE: CPT | Performed by: INTERNAL MEDICINE

## 2021-05-05 PROCEDURE — 1090F PRES/ABSN URINE INCON ASSESS: CPT | Performed by: INTERNAL MEDICINE

## 2021-05-05 PROCEDURE — 1123F ACP DISCUSS/DSCN MKR DOCD: CPT | Performed by: INTERNAL MEDICINE

## 2021-05-05 PROCEDURE — 1036F TOBACCO NON-USER: CPT | Performed by: INTERNAL MEDICINE

## 2021-05-05 PROCEDURE — G8427 DOCREV CUR MEDS BY ELIG CLIN: HCPCS | Performed by: INTERNAL MEDICINE

## 2021-05-05 PROCEDURE — 4040F PNEUMOC VAC/ADMIN/RCVD: CPT | Performed by: INTERNAL MEDICINE

## 2021-05-05 PROCEDURE — 99214 OFFICE O/P EST MOD 30 MIN: CPT | Performed by: INTERNAL MEDICINE

## 2021-05-05 PROCEDURE — 1111F DSCHRG MED/CURRENT MED MERGE: CPT | Performed by: INTERNAL MEDICINE

## 2021-05-05 RX ORDER — BISACODYL 10 MG
10 SUPPOSITORY, RECTAL RECTAL DAILY PRN
COMMUNITY
End: 2021-01-01

## 2021-05-05 RX ORDER — NYSTATIN 100000 [USP'U]/G
POWDER TOPICAL 4 TIMES DAILY
COMMUNITY
End: 2021-01-01

## 2021-05-05 NOTE — PROGRESS NOTES
Chief Complaint   Patient presents with    Atrial Fibrillation       Patient Active Problem List    Diagnosis Date Noted    Depression 11/16/2010     Priority: Medium    Hypothyroidism 11/16/2010     Priority: Medium    Dementia ([facility] [address] [address] 05/05/2021    Frequent falls 09/30/2020    Coronary arteriosclerosis in native artery 09/25/2019    Patent foramen ovale 09/24/2019    Spinal stenosis of lumbar region with neurogenic claudication 02/25/2019    Pure hypercholesterolemia 02/08/2019    Chronic diastolic (congestive) heart failure ([facility] [address] 05/22/2018    Fuchs' endothelial dystrophy 12/04/2017    Pseudophakia, both eyes 12/04/2017    Mixed stress and urge urinary incontinence 09/21/2017    Chronic obstructive pulmonary disease ([facility] [address]     Chronic anticoagulation     Greater trochanteric bursitis 08/18/2016    Essential hypertension 01/05/2016    Class 2 severe obesity due to excess calories with serious comorbidity and body mass index (BMI) of 39.0 to 39.9 in adult [facility]) 01/05/2016    History of artificial lens replacement 02/09/2015    Permanent atrial fibrillation (HCC) 03/20/2014       Current Outpatient Medications   Medication Sig Dispense Refill    nystatin (MYCOSTATIN) 016912 UNIT/GM powder Apply topically 4 times daily      bisacodyl (DULCOLAX) 10 MG suppository Place 10 mg rectally daily as needed for Constipation      magnesium hydroxide (MILK OF MAGNESIA) 400 MG/5ML suspension Take 30 mLs by mouth daily as needed for Constipation      levothyroxine (SYNTHROID) 125 MCG tablet Take 1 tablet by mouth every morning (before breakfast) 24 tablet 0    metoprolol succinate (TOPROL XL) 50 MG extended release tablet TAKE 1 & 1/2 TABLETS BY MOUTH EVERY EVENING 42 tablet 5    lisinopril (PRINIVIL;ZESTRIL) 10 MG tablet TAKE ONE TABLET BY MOUTH EVERY EVENING 28 tablet 5    meclizine (ANTIVERT) 25 MG tablet Take 1 tablet by mouth 3 times daily as needed for Dizziness 100 tablet 5    warfarin (COUMADIN) 4 Vitals:    05/05/21 1254   BP: 138/74   Pulse: 87   Resp: 18   SpO2: 99%   Weight: 245 lb 9.6 oz (111.4 kg)   Height: 5' 6\" (1.676 m)       Social History     Socioeconomic History    Marital status:      Spouse name: Anayeli Garcia Number of children: 3    Years of education: Not on file    Highest education level: High school graduate   Occupational History    Not on file   Social Needs    Financial resource strain: Somewhat hard    Food insecurity     Worry: Sometimes true     Inability: Sometimes true    Transportation needs     Medical: Yes     Non-medical: No   Tobacco Use    Smoking status: Never Smoker    Smokeless tobacco: Never Used   Substance and Sexual Activity    Alcohol use: No     Frequency: Never     Binge frequency: Never    Drug use: No    Sexual activity: Not Currently   Lifestyle    Physical activity     Days per week: 0 days     Minutes per session: 0 min    Stress:  Only a little   Relationships    Social connections     Talks on phone: Once a week     Gets together: Once a week     Attends Christianity service: 1 to 4 times per year     Active member of club or organization: No     Attends meetings of clubs or organizations: Never     Relationship status:     Intimate partner violence     Fear of current or ex partner: Not on file     Emotionally abused: Not on file     Physically abused: Not on file     Forced sexual activity: Not on file   Other Topics Concern    Not on file   Social History Narrative    1 cup coffee daily       Family History   Problem Relation Age of Onset    Heart Disease Mother     Heart Attack Mother     Arthritis Mother     Heart Disease Father     Mental Illness Father     Diabetes Father     Heart Disease Brother     Cancer Brother     Cancer Son     Heart Disease Sister     Heart Disease Brother     Heart Surgery Brother          SUBJECTIVE: Lety Albert presents to the office today for re-evaluation of cardiac diagnoses and hfu.  I reviewed records - in for \"failure to thrive\" - no cardiac involvement and no overt CHF. Resides in NH and is not oriented. . DR Hua Green PATIENT  She complains of no cardiac complaints . Review of Systems:   Heart: as above   Lungs: as above   Eyes: denies changes in vision or discharge. Ears: denies changes in hearing or pain. Nose: denies epistaxis or masses   Throat: denies sore throat or trouble swallowing. Neuro: denies numbness, tingling, tremors. Skin: denies rashes or itching. : denies hematuria, dysuria   GI: denies vomiting, diarrhea   Psych: denies mood changed, anxiety, depression. all others negative. Physical Exam   /74   Pulse 87   Resp 18   Ht 5' 6\" (1.676 m)   Wt 245 lb 9.6 oz (111.4 kg)   SpO2 99%   BMI 39.64 kg/m²   Constitutional:  NOT oriented to person, place, and time. obese. No distress. Head: Normocephalic and atraumatic. Eyes: EOM are normal. Pupils are equal, round, and reactive to light. Neck: Normal range of motion. Neck supple. No hepatojugular reflux and no JVD present. Carotid bruit is not present. No tracheal deviation present. No thyromegaly present. Cardiovascular: Normal rate, irregular rhythm, normal heart sounds and intact distal pulses. Exam reveals no gallop and no friction rub. No murmur heard. Pulmonary/Chest: Effort normal and breath sounds normal. No respiratory distress. No wheezes. No rales. No tenderness. Abdominal: Soft. Bowel sounds are normal. No distension and no mass. No tenderness. No rebound and no guarding. Musculoskeletal: Normal range of motion. trace edema and no tenderness. Neurological: no focal deficits  Skin: Skin is warm and dry. No rash noted. Not diaphoretic. No erythema. Psychiatric: Normal mood and affect. Behavior is normal.     EKG:  atrial fibrillation, rate controlled, axis +34, NST, unchanged from previous tracings.     ASSESSMENT AND PLAN:  Patient Active Problem List Diagnosis    Depression    Hypothyroidism    Permanent atrial fibrillation (HCC)    History of artificial lens replacement    Essential hypertension    Class 2 severe obesity due to excess calories with serious comorbidity and body mass index (BMI) of 39.0 to 39.9 in adult [facility])    Greater trochanteric bursitis    Chronic anticoagulation    Chronic obstructive pulmonary disease (HCC)    Mixed stress and urge urinary incontinence    Fuchs' endothelial dystrophy    Pseudophakia, both eyes    Chronic diastolic (congestive) heart failure ([address]    Pure hypercholesterolemia    Spinal stenosis of lumbar region with neurogenic claudication    Patent foramen ovale    Coronary arteriosclerosis in native artery    Frequent falls    Dementia [facility])     Patient with permanent atrial fibrillation, rate controlled on beta blocker, on [address]  Hx of \"HFpEF\" with no evidence of volume overload  No testing planned  OV prn WITH DR. Mahesh Saucedo M.D  [facility]

## 2021-05-07 ENCOUNTER — CARE COORDINATION (OUTPATIENT)
Dept: CASE MANAGEMENT | Age: 86
End: 2021-05-07

## 2021-05-07 NOTE — CARE COORDINATION
[facility] Transitions Initial Follow Up Call    Call within 2 business days of discharge: Yes    Patient: Bre Sanchez Patient : 1935   MRN: <[id number]>  Reason for Admission: CHF  Discharge Date: 21 RARS: Readmission Risk Score: 15      Last Discharge [facility]       Complaint Diagnosis Description Type Department Provider    21 Insomnia Acute on chronic congestive heart failure, unspecified heart failure type [facility]) ED to Hosp-Admission (Discharged) (ADMITTED) TONY Mendoza MD; Juan J MONTES Acute Care Course:  Pt to [facility] with [facility] and found to have exacerbated CHF and failure to thrive. She then went to [place] [address] for 16 days with d/c to home with [facility]. Sig Hx:   CHF, afib, debelity, COPD, HTN, falls    DME:     Conversation:  Attempted to speak to [place] but she was unable to hear and asked me to call back and speak with her daughter    Follow up plan: Will check on progress. Care Transitions 24 Hour Call    Do you have all of your prescriptions and are they filled?: Yes  Patient DME: Quad cane, Straight cane, Shower chair, Walker, Other  Other Patient DME: grab bars near toilet and shower, transport chair, wheelchair ramp on front of home  Patient Home Equipment: Nebulizer  Do you have support at home?: Partner/Spouse/SO  Are you an active caregiver in your home?: No  Care Transitions Interventions         Follow Up  No future appointments.     Sandra Weaver, BUFFYN, RN   [address] [place] [address] Transition Nurse  [phone]

## 2021-05-07 NOTE — TELEPHONE ENCOUNTER
For [place] in place: No    Recommendation Provided To: Patient/Caregiver: 1 via Telephone   Intervention Detail: Adherence Monitorin   Gap Closed?  Yes    Total # of Interventions Recommended: 1   Total # of Interventions Accepted: 1   Intervention Accepted By: Patient/Caregiver: 1   Time Spent (min): 15

## 2021-05-07 NOTE — TELEPHONE ENCOUNTER
Spoke to patient who stated she was off on her medications due to recent stay at hospital. She asked if pharmacy could be contacted to have them send out Lisinopril to her. Preferred to stay with 30 d/s. Called pharmacy and refilled medication for her and it will be shipped out today.

## 2021-05-11 NOTE — CARE COORDINATION
[facility] Transitions Initial Follow Up Call    Call within 2 business days of discharge: Yes    Patient: Stan Moreno Patient : 1935   MRN: <[id number]>  Reason for Admission:   CHF  Discharge Date: 21 RARS: Readmission Risk Score: 15      Last Discharge [address] [place] [address]       Complaint Diagnosis Description Type Department Provider    21 Insomnia Acute on chronic congestive heart failure, unspecified heart failure type [facility]) ED to Hosp-Admission (Discharged) (ADMITTED) TONY HolleyWE Mikey Velasquez MD; Ada Harris A... Acute Care Course:  Pt to [facility] with [facility] and found to have exacerbated CHF and failure to thrive. She then went to [place] [address] for 16 days with d/c to home with [facility] Qu[facility].       Sig Hx:   CHF, afib, debelity, COPD, HTN, falls     DME:     Conversation:  Attempted to contact patient for follow up  transition call. Left voicemail message to return call. Contact information provided. Will continue to follow up. Follow up plan:        Care Transitions 24 Hour Call    Do you have all of your prescriptions and are they filled?: Yes  Post Acute Services: Home Health (Comment: [facility] for skilled nursing, PT, and OT.)  Patient DME: Quad cane, Straight cane, Shower chair, Walker, Other  Other Patient DME: grab bars near toilet and shower, transport chair, wheelchair ramp on front of home  Patient Home Equipment: Nebulizer  Do you have support at home?: Partner/Spouse/SO  Are you an active caregiver in your home?: No  Care Transitions Interventions         Follow Up  No future appointments.     John Guzman LPN

## 2021-05-14 NOTE — CARE COORDINATION
[facility] Transitions Follow Up Call    2021    Patient: Jarocho Santoro  Patient : 1935   MRN: <[id number]>  Reason for Admission: CHF  Discharge Date: 21 RARS: Readmission Risk Score: 15    Attempted to contact patient for initial transitions of care call. Unable to reach patient or LVM on mobile number listed d/t message, not accepting calls at this time. Caller left a confidential message with contact information on home number Will make a final attempt next week. Care Transitions Subsequent and Final Call    Subsequent and Final Calls  Care Transitions Interventions  Other Interventions: Follow Up  No future appointments.     Neil Coronado, RN

## 2021-05-17 NOTE — TELEPHONE ENCOUNTER
I never stopped this medication. Her refills had been sent to [facility]. I sent a new prescription to [facility].

## 2021-05-25 NOTE — PROGRESS NOTES
I spoke to Dr. Kaz Garay  About pt INR today , per his orders pt is to take 8 mg daily of coumadin. Pt has been taking 4 mg only. I called pt and advised her of instructions to take 8 mg of coumadin daily and to come in the office on 5-27-21 to get her INR checked again since it was low today. Pt verbally understood orders.

## 2021-06-01 NOTE — PROGRESS NOTES
Patient advised of same dose instructions. She correctly read them back as 8 mg coumadin daily was taking 4 mg coumadin in the am and 4 mg in the pm so she doesn't for get a dose. She verbalized understanding . Advised patient to take both tablets together the same time every day. She verbalized understanding.

## 2021-06-04 NOTE — TELEPHONE ENCOUNTER
Patient called, sounded confused, slurring words. States that she had a 3 lb weight gain, having trouble staying awake today \" I can barely keep my eyes open toady\". Reports that she \"lost\" her lisinopril  And has not taken it in 3 days. I advised ER, patient gave phone to her daughter Graciela Marshall who called back as it was difficult to hear due to loud buzzing noise over patient's phone line. Graciela Marshall stated that the house was very hot and that her mother seemed ok to her that she may have been confused due to the phone noise . Reviewed signs of stroke and advised ER if patient has any of those symptoms. Patient has appointment on Monday with Dr. Kiley Dave advised Graciela Marshall of appointment date and time .

## 2021-06-07 NOTE — TELEPHONE ENCOUNTER
Last Appointment:  6/7/2021  Future Appointments   Date Time Provider Hernandez Mcintyre   8/2/2021  2:00 PM MD Fredi Dowd [facility]

## 2021-06-08 NOTE — PROGRESS NOTES
LM on daughter Nicolasa's phone to inform pt to stop taking her potassium and to continue taking 8 mg of coumadin daily and recheck back in one week. I have been trying to reach pt all day but pt must be having issues with her phone every time someone answers the phone it hangs up right away and I am  unable to leave messages. I also called pt cell and it goes directly to voice mail and could not leave a message there either.

## 2021-06-09 NOTE — CARE COORDINATION
Ambulatory Care Coordination Note  6/9/2021  CM Risk Score: 7  Charlson 10 Year Mortality Risk Score: 100%     ACC: Derek Langford RN    Summary Note:   AMRITA spoke with [place] for care coordination. She admits her hearing aids are \"not working\" and she requested AMRITA to speak with her private care giver Curt Amor. AMRITA introduced self to [facility] and explained care coordination program. Nasir Johnson states she assists [place] and her  with meals, laundry and housekeeping. She reports an aid comes weekly for personal care. Nasir Johnson states [place] receives pre-packaged meds and also has coumadin and lasix in bottles that she doses herself. Nasir Johnson states she sometimes finds pills on the floor. [place] removes the levothyroxine from the AM pills to take it before eating and would like to have it in it's own tray. She also requested that the lasix be returned to the tray. Medications were reviewed with Nasir Johnson and Alicia [address] reports that [place] does not have lisinopril. She denies any increased shortness of breath for [place]. She admits that [place] does not have a working nebulizer for PRN treatments. She reports that [place] uses a walker or rollator at all times while ambulating. She states [place] has a home INR machine and is assisted with testing by Pedro Baig the machine representative. Future appointments were reviewed. PLAN  Contact [facility] for pre packaged medication adjustments. Notify PCP that [place] will need an interim script for lisinopril, and also a script for a nebulizer. Continue to follow for care coordination.           Ambulatory Care Coordination Assessment    Care Coordination Protocol  Program Enrollment: Complex Care  Referral from Primary Care Provider: Yes  Week 1 - Initial Assessment     Do you have all of your prescriptions and are they filled?: No  Barriers to medication adherence: Complexity of regimen, Forgets to take  Are you able to afford your medications?: Yes  How often do you have trouble taking your medications the way you have been told to take them?: Sometimes I take them as prescribed. Do you have Home O2 Therapy?: No      Ability to seek help/take action for Emergent Urgent situations i.e. fire, crime, inclement weather or health crisis.: Needs Assistance  Ability to ambulate to restroom: Independent  Ability handle personal hygeine needs (bathing/dressing/grooming): Needs Assistance  Ability to manage Medications: Needs Assistance  Ability to prepare Food Preparation: Needs Assistance  Ability to maintain home (clean home, laundry): Dependent  Ability to drive and/or has transportation: Dependent  Ability to do shopping: Needs Assistance  Ability to manage finances: Needs Assistance  Is patient able to live independently?: No     Current Housing: Private Residence        Per the Fall Risk Screening, did the patient have 2 or more falls or 1 fall with injury in the past year?: Yes  How often do you think you are about to fall and you do NOT fall?  For example, you grab something to stabilize yourself or hold onto a wall/furniture?: Frequently  Use of a Mobility Aid: Yes (Comment: Rollator/walker)  Difficulty walking/impaired gait: Yes  Issues with feet or shoes like numbness, edema, shoes not fitting: Yes  Changes in vision, poor vision or poor lighting in environment: Yes  Dizziness: No  Other Fall Risk: No     Frequent urination at night?: Yes  Do you use rails/bars?: Yes  Do you have a non-slip tub mat?: Yes     Are you experiencing loss of meaning?: No  Are you experiencing loss of hope and peace?: No     Thinking about your patient's physical health needs, are there any symptoms or problems (risk indicators) you are unsure about that require further investigation?: Moderate to severe symptoms or problems that impact on daily life   Are the patients physical health problems impacting on their mental well-being?: Mild impact on mental well-being e.g. \"\"feeling fed-up\"\", \"\"reduced enjoyment\"\"   Are there any problems with your patients lifestyle behaviors (alcohol, drugs, diet, exercise) that are impacting on physical or mental well-being?: Some mild concern of potential negative impact on well-being   Do you have any other concerns about your patients mental well-being? How would you rate their severity and impact on the patient?: Mild problems - don't interfere with function   How would you rate their home environment in terms of safety and stability (including domestic violence, insecure housing, neighbor harassment)?: Safe, stable, but with some inconsistency   How do daily activities impact on the patient's well-being? (include current or anticipated unemployment, work, caregiving, access to transportation or other): Contributes to low mood or stress at times   How would you rate their social network (family, work, friends)?: Adequate participation with social networks   How would you rate their financial resources (including ability to afford all required medical care)?: Financially secure, some resource challenges   How wells does the patient now understand their health and well-being (symptoms, signs or risk factors) and what they need to do to manage their health?: Little understanding which impacts on their ability to undertake better management   How well do you think your patient can engage in healthcare discussions? (Barriers include language, deafness, aphasia, alcohol or drug problems, learning difficulties, concentration): Some difficulties in communication with or without moderate barriers   Do other services need to be involved to help this patient?: Other care/services in place and adequate   Are current services involved with this patient well-coordinated?  (Include coordination with other services you are now recommendation): Required care/services in place and adequately coordinated   Suggested Interventions and Community Resources  Fall Risk Prevention: In Process Home Care Waiver: Completed   Medi Set or Pill Pack: Completed   Senior Services: Completed   Other Services: Completed   Zone Management Tools: In Process                  Prior to Admission medications    Medication Sig Start Date End Date Taking? Authorizing Provider   furosemide (LASIX) 40 MG tablet Take 1 tablet by mouth every morning (before breakfast) 5/21/21  Yes Rose Hickey MD   famotidine (PEPCID) 20 MG tablet Take one tablet by mouth daily 5/17/21  Yes Rose Hickey MD   escitalopram (LEXAPRO) 10 MG tablet TAKE 1 TABLET BY MOUTH DAILY 5/10/21  Yes Rose Hickey MD   nystatin (MYCOSTATIN) 466220 UNIT/GM powder Apply topically 4 times daily   Yes Historical Provider, MD   bisacodyl (DULCOLAX) 10 MG suppository Place 10 mg rectally daily as needed for Constipation   Yes Historical Provider, MD   magnesium hydroxide (MILK OF MAGNESIA) 400 MG/5ML suspension Take 30 mLs by mouth daily as needed for Constipation   Yes Historical Provider, MD   levothyroxine (SYNTHROID) 125 MCG tablet Take 1 tablet by mouth every morning (before breakfast) 3/26/21  Yes Rose Hickey MD   metoprolol succinate (TOPROL XL) 50 MG extended release tablet TAKE 1 & 1/2 TABLETS BY MOUTH EVERY EVENING 3/16/21  Yes Rose Hickey MD   meclizine (ANTIVERT) 25 MG tablet Take 1 tablet by mouth 3 times daily as needed for Dizziness 2/10/21 2/10/22 Yes Rose Hickey MD   warfarin (COUMADIN) 4 MG tablet Take 4 mg Wednesday and Saturday. Take 8 mg on all other days.   Patient taking differently: Currently 4mg daily, with 6mg on Wed, per Beata @ [facility] 5/5/21. 1/25/21  Yes Rose Hickey MD   prednisoLONE acetate (PRED FORTE) 1 % ophthalmic suspension INSTILL 1 DROP IN BOTH EYES FOUR TIMES DAILY 12/11/20  Yes Historical Provider, MD   spironolactone (ALDACTONE) 25 MG tablet Take 0.5 tablets by mouth every evening 10/28/20  Yes Hal Johnson MD   Vitamin D, Ergocalciferol, 50 MCG (2000 UT) CAPS Take 2,000 Units by mouth every morning (before breakfast) 10/27/20  Yes Pretty Martinez MD   b complex vitamins capsule Take 1 capsule by mouth every morning (before breakfast) 10/27/20  Yes Pretty Martinez MD   Ascorbic Acid (VITAMIN C) 250 MG tablet Take 1 tablet by mouth every morning (before breakfast) 10/27/20  Yes Pretty Martinez MD   guaiFENesin (MUCINEX) 600 MG extended release tablet Take 1,200 mg by mouth daily as needed    Yes Historical Provider, MD   PROAIR  (90 Base) MCG/ACT inhaler USE 2 PUFFS EVERY 6 HOURS  AS NEEDED FOR WHEEZING 10/21/19  Yes Pretty Martinez MD   acetaminophen (TYLENOL) 325 MG tablet Take 650 mg by mouth every 6 hours as needed for Pain or Fever   Yes Historical Provider, MD   potassium chloride (KLOR-CON M) 20 MEQ extended release tablet TAKE ONE TABLET BY MOUTH EVERY MORNING  Patient not taking: Reported on 6/9/2021 6/7/21   Pretty Martinez MD   lisinopril (PRINIVIL;ZESTRIL) 10 MG tablet TAKE ONE TABLET BY MOUTH EVERY EVENING  Patient not taking: Reported on 6/7/2021 3/16/21   Pretty Martinez MD   levalbuterol Denisse Fellers) 0.63 MG/3ML nebulization Take 3 mLs by nebulization every 8 hours as needed for Wheezing DX: COPD J44.9  Patient not taking: Reported on 6/9/2021 2/24/20   Pretty Martinez MD       Future Appointments   Date Time Provider Hernandez Miguelina   8/2/2021  2:00 PM MD Wilbert Burton [facility]     ,   Congestive Heart Failure Assessment    Are you currently restricting fluids?: No Restriction  Do you understand a low sodium diet?: Yes  Do you understand how to read food labels?: Yes  How many restaurant meals do you eat per week?: 0  Do you salt your food before tasting it?: No         Symptoms:  CHF associated angina: Neg, CHF associated dyspnea on exertion: Pos, CHF associated fatigue: Pos, CHF associated leg swelling: Neg, CHF associated orthostatic hypotension: Neg, CHF associated PND: Neg, CHF associated shortness of breath: Neg, CHF associated weakness: Pos      Symptom course: stable  Patient-reported weight (lb):  (Comment: did not weigh herself today)  Salt intake watch compared to last visit: stable     ,   COPD Assessment    Does the patient understand envrionmental exposure?: Yes  Is the patient able to verbalize Rescue vs. Long Acting medications?: Yes  Does the patient have a nebulizer?: Yes  Does the patient use a space with inhaled medications?: No            Symptoms:  COPD associated chest pain: Pos, COPD associated increased fatigue: Pos      Symptom course: stable  Breathlessness: minimal exertion  Increase use of rapid acting/rescue inhaled medications?: No  Change in chronic cough?: No/At Baseline  Change in sputum?: No/At Baseline  Self Monitoring - SaO2: No      and   General Assessment    Do you have any symptoms that are causing concern?: Yes  Progression since Onset: Unchanged  Reported Symptoms: Fatigue

## 2021-06-09 NOTE — CARE COORDINATION
AMRITA spoke with Maribel Doran at [facility]. Prepackaged medication tray discussed and ACM requested per PCP to place levothyroxine in first tray by itself and for lasix to be returned to tray. Maribel Doran states June packaging has already been prepared and will ship 6/10/21. Changes discussed will start July 16, 2021 package. ACM inquired about lisinopril not in current tray. Maribel Doran states the lisinopril has been returned to the tray and will be in the June shipment. ACM updated Reena Leon (care giver).

## 2021-06-15 NOTE — PROGRESS NOTES
I finally got in touch with pt and advised her of INR instructions per her pcp. Pt verbalized understanding and I also informed pt of new script for coumadin that was sent to her pharmacy today. Pt will recheck INR again in two weeks.

## 2021-06-22 NOTE — PROGRESS NOTES
Patient advised of same dose instructions and next inr self check date. She verbalized under standing.

## 2021-06-28 NOTE — TELEPHONE ENCOUNTER
She may take a second Lasix today, but she needs to make sure that she is taking all of her pills as prescribed every day. Let us know if she is having breathing difficulties or if her weight continues to increase.

## 2021-06-28 NOTE — TELEPHONE ENCOUNTER
[place] called in and she has gained 10 lbs in the past week and both her feet are swollen, she believes that she took her lasix today. She is wondering if she should take another one.     Please advise    Thank you

## 2021-06-30 NOTE — PROGRESS NOTES
I have been unable to reach pt since yesterday both phones in pt chart are either not working or not taking calls

## 2021-07-06 PROBLEM — I50.9 HEART FAILURE (HCC): Status: ACTIVE | Noted: 2021-01-01

## 2021-07-06 NOTE — TELEPHONE ENCOUNTER
Tried calling pt twice but unable to reach her I did leave pt a voice message on her house phone since I was not able to get them on their cell phone

## 2021-07-06 NOTE — ED TRIAGE NOTES
FIRST PROVIDER CONTACT ASSESSMENT NOTE        Department of Emergency Medicine            ED  First Provider Note            7/6/21  5:29 PM EDT    Chief Complaint: Congestive Heart Failure (states she has been gaining weight and increased swelling in bilateral lower extremities)      History of Present Illness:    Mayte Patel is a 81 y.o. female who presents to the emergency department for BLE edema, weight gain  Focused Screening Exam:  Constitutional:  Alert, appears stated age and is in no distress.     *ALLERGIES*     Codeine, Penicillins, Vicodin [hydrocodone-acetaminophen], and Adhesive tape     ED Triage Vitals   BP Temp Temp src Pulse Resp SpO2 Height Weight   07/06/21 1714 07/06/21 1703 -- 07/06/21 1703 07/06/21 1714 07/06/21 1703 07/06/21 1714 07/06/21 1714   99/76 97.5 °F (36.4 °C)  90 17 92 % 5' 6\" (1.676 m) 260 lb (117.9 kg)        Initial Plan of Care:  Initiate Treatment-Testing, Proceed toTreatment Area When Bed Available for ED Attending/MLP to Continue Care    -----------------END OF FIRST PROVIDER CONTACT ASSESSMENT NOTE--------------  Electronically signed by ARIEL Do CNP   DD: 7/6/21

## 2021-07-06 NOTE — TELEPHONE ENCOUNTER
I just got message that she was advised to go to ED. It also states that she had not been taking her medication as prescribed. Please see if she is going to ED. She could have same day appointment if necessary and available.

## 2021-07-06 NOTE — TELEPHONE ENCOUNTER
Pt just called me back and I advised her to got to ED like her pcp suggested for the fatigue and weight gain  Pt verbalized understanding and will head there now

## 2021-07-07 PROBLEM — I50.33 ACUTE ON CHRONIC DIASTOLIC HF (HEART FAILURE) (HCC): Status: ACTIVE | Noted: 2021-01-01

## 2021-07-07 PROBLEM — I50.9 ACUTE DECOMPENSATED HEART FAILURE (HCC): Status: ACTIVE | Noted: 2021-01-01

## 2021-07-07 PROBLEM — I50.9 HEART FAILURE (HCC): Status: RESOLVED | Noted: 2021-01-01 | Resolved: 2021-01-01

## 2021-07-07 NOTE — PROGRESS NOTES
[address] [facility]  Family Medicine Attending    S: 81 y.o. female with past medical history of CAD, CHF, hypertension, A. fib, who presented to the ED with 3 days worsening shortness of breath. States that she was noted to be wheezing recently and gaining weight, despite increasing her lasix for several days. She also has fallen recently, on a history of frequent falls. She forgets to take her pills at times, and misses doses despite attem[pts to supply them with pill packs from the pharmacy. She does have someone come in several days a week to help with housework. Today, c/o diffuse pain, some SOB, palpitations, and frequent falls. Having trouble sleeping in the hospital    O: VS- Blood pressure (!) 142/89, pulse 73, temperature 97.7 °F (36.5 °C), temperature source Temporal, resp. rate 12, height 5' 6\" (1.676 m), weight 256 lb (116.1 kg), SpO2 96 %, not currently breastfeeding. Exam is as noted by resident with the following changes, additions or corrections:  Alert, oriented X 3, in NAD  Heart - irreg  Lungs- scattered wheezes  Ext -1+ edema, but diffuse tenderness to palpation over feet and shins    Impressions:   Principal Problem:    Acute decompensated heart failure (HCC)  Active Problems:    Hypothyroidism    Permanent atrial fibrillation (HCC)    Essential hypertension    Coronary arteriosclerosis in native artery    Frequent falls    Dementia ([facility] [address]  Resolved Problems:    Heart failure ([facility] [address]      Plan:   Appreciate cardiology recs   IV diuresis, K+ supplementation   Start coumadin at reduced dose (4 mg once weekly, with 8 mg on all other days)   PT/OT evaluation - ? Need for placement vs home PT     Attending Physician Statement  I have reviewed the chart and seen the patient with the resident(s). I personally reviewed images, EKG's and similar tests, if present.   I personally reviewed and performed key elements of the history and exam.  I have reviewed and confirmed student and/or resident history and exam with changes as indicated above. I agree with the assessment, plan and orders as documented by the resident. Please refer to the resident and/or student note for additional information.       Dina Singleton MD

## 2021-07-07 NOTE — CONSULTS
Patient seen and examined. Chart, labs, imaging studies, EKG and rhythm strips reviewed. Full consult to follow. We were asked to see the patient for CHF    IMPRESSION:  1. Acute on chronic HFpEF  2. Valarie AI, mild TR, mild to moderate pHTN,  PFO with bi directional shunt and normal EF ( 55-60% ) on Echo 5/2020  3. Patent coronaries on cath in 2014  4. Chronic Afib, controlled rate, anticoagulated on coumadin with INR = 3.4 ( 7/6/21 )  5. Obesity  6. HTN  7. Hypothyroidism, on replacement therapy with elevated TSH in 4/2021  8. H/o hyperparathyroidism s/p parathyroidectomy  9. H/o urinary incontinence s/p bladder re suspension surgery  10. H/o kidney stones  11. OA/DJD. H/o left shoulder arthroplasty. Ambulates with a walker  12. H/o cholecystectomy  13. Depression  14. Kashia  15. Hypokalemia  16. DNR - CCA    PLAN:   1. IV to PO diuresis ( see orders ). Monitor BMP, I's and O's and daily weights  2. Supplement K+  3. Coumadin for INR between 2.0 - 3.0  4. Rest of cardiac medications same  5. Check TSH. Adjust synthroid dose accordingly  6. Cardiology will sign off.  Please call if needed    Electronically signed by Zeferino Miller MD on 7/7/2021 at 8:51 AM

## 2021-07-07 NOTE — PLAN OF CARE
Problem: Falls - Risk of:  Goal: Will remain free from falls  Description: Will remain free from falls  7/7/2021 1517 by Sarah Lowry RN  Outcome: Met This Shift  7/7/2021 0553 by Bubba Sullivan RN  Outcome: Met This Shift  Goal: Absence of physical injury  Description: Absence of physical injury  7/7/2021 1517 by Sarah Lowry RN  Outcome: Met This Shift  7/7/2021 0553 by Bubba Sullivan RN  Outcome: Met This Shift     Problem: Skin Integrity:  Goal: Will show no infection signs and symptoms  Description: Will show no infection signs and symptoms  Outcome: Met This Shift  Goal: Absence of new skin breakdown  Description: Absence of new skin breakdown  Outcome: Met This Shift

## 2021-07-07 NOTE — H&P
[address] [facility]  Department of Family Medicine  Family Medicine Residency Program  History and Physical    Patient:  Russ Saul 81 y.o. female MRN: [id number]     Date of Service: 7/7/2021      Chief complaint:   Chief Complaint   Patient presents with    Congestive Heart Failure     states she has been gaining weight and increased swelling in bilateral lower extremities        History of Present Illness   The patient is a 81 y.o. female with a past medical history of CAD, CHF, hypertension, A. fib, who presented to the ED with 3 days worsening shortness of breath. States she is also had approximately a 20 pound weight gain in the past couple weeks. She has also had tightness in bilateral lower extremities without any pain. States like she feels bloated all over. States that she was told to take extra of her Lasix recently, after which she took 120 mg p.o. on 7/6 and 2 days prior to that. States that her symptoms did not seem to improve much. States that she was told by a family friend that she was audibly wheezing, but patient is unsure how long its been going on as she was unaware she was wheezing at all. Patient does have albuterol inhaler at home which she states has not been helping. Patiently currently denies any chest pain, abdominal pain, nausea or vomiting. Patient denies any possible triggers to her symptoms while in her daughter-in-law who may have been smoking nearby. States that she has been feeling off for the past few weeks and that she has had more falls than normal.  States that they are nonsyncopal without any lightheadedness or dizziness. States that she was sitting in her Rollator on Tuesday and accidentally rolled backward into a wall but did not hit her head or lose consciousness. States that she does have difficulty at home due to living with home ER and who is also sick.   Patient states she has been compliant with her medications and does not endorse any recent medication changes. ED Course:   Patient had BMP remarkable for hypokalemia of 3.3. Chest x-ray revealing cardiomegaly with vascular congestion and small right pleural effusion. Changes from prior studies. Patient was given 1 dose 20 mg IV Lasix. Past Medical History:       Diagnosis Date    Anticoagulated on Coumadin     on Coumadin for this Dr. Nils Beatty controls    Atrial fibrillation ([address]     Basal cell carcinoma of neck     CAD (coronary artery disease)     History of luminal irregularities of the coronary artery, stable.  CHF (congestive heart failure) ([facility])     stage I diastolic dysfunction on 7/02/66    Depression 11/16/2010    DJD (degenerative joint disease)     SEVERE IN RIGHT HAND, IN MULTIPLE OTHER JOINTS    GERD (gastroesophageal reflux disease) 11/16/2010    History of cardiovascular stress test 03/18/2014    lexiscan    Hyperparathyroidism ([address] 11/16/2010    Had parathyroidectomy    Hypertension 11/16/2010    Hypothyroidism 11/16/2010    Mild mitral regurgitation 7/10/14    Osteoarthritis 11/16/2010    Pulmonary hypertension ([address] 7/10/14    mild    Renal calculi     x3    Tricuspid regurgitation 7/10/14    mild-moderate    Urinary incontinence 11/16/2010       Past Surgical History:        Procedure Laterality Date    CARDIAC CATHETERIZATION  3/22/14    CARPAL TUNNEL RELEASE      Right hand.  CATARACT REMOVAL  6/2005    right, bilateral cataract surgery.  CATARACT REMOVAL WITH IMPLANT Bilateral     CHOLECYSTECTOMY  1972    COLONOSCOPY      FINGER SURGERY  7/2011    S/P removal of cyst from right index finger.  FRACTURE SURGERY Left     arm fractured-casted, Bilateral feet fractures-casted.  INCONTINENCE SURGERY  5/2005    Urinary incontinence, S/P bladder suspension.     INNER EAR SURGERY  2011    tube place in right ear   [address]    KNEE ARTHROPLASTY Bilateral 9/2005    PARATHYROIDECTOMY Three times    SHOULDER ARTHROPLASTY Left 2009    SHOULDER ARTHROPLASTY Left      shoulder total replacement.  THYROID SURGERY  5/2005    recurrent    THYROIDECTOMY  1960       Medications Prior to Admission:    Prior to Admission medications    Medication Sig Start Date End Date Taking? Authorizing Provider   warfarin (COUMADIN) 4 MG tablet Take 8 mg (two tablets) daily.  6/15/21  Yes Darrius Martínez MD   potassium chloride (KLOR-CON M) 20 MEQ extended release tablet TAKE ONE TABLET BY MOUTH EVERY MORNING 6/7/21  Yes Darrius Martínez MD   famotidine (PEPCID) 20 MG tablet Take one tablet by mouth daily 5/17/21  Yes Darrius Martínez MD   escitalopram (LEXAPRO) 10 MG tablet TAKE 1 TABLET BY MOUTH DAILY 5/10/21  Yes Darrius Martínez MD   nystatin (MYCOSTATIN) 134844 UNIT/GM powder Apply topically 4 times daily   Yes Historical Provider, MD   bisacodyl (DULCOLAX) 10 MG suppository Place 10 mg rectally daily as needed for Constipation   Yes Historical Provider, MD   magnesium hydroxide (MILK OF MAGNESIA) 400 MG/5ML suspension Take 30 mLs by mouth daily as needed for Constipation   Yes Historical Provider, MD   levothyroxine (SYNTHROID) 125 MCG tablet Take 1 tablet by mouth every morning (before breakfast) 3/26/21  Yes Darrius Martínez MD   metoprolol succinate (TOPROL XL) 50 MG extended release tablet TAKE 1 & 1/2 TABLETS BY MOUTH EVERY EVENING 3/16/21  Yes Darrius Martínez MD   lisinopril (PRINIVIL;ZESTRIL) 10 MG tablet TAKE ONE TABLET BY MOUTH EVERY EVENING 3/16/21  Yes Darrius Martínez MD   meclizine (ANTIVERT) 25 MG tablet Take 1 tablet by mouth 3 times daily as needed for Dizziness 2/10/21 2/10/22 Yes Darrius Martínez MD   prednisoLONE acetate (PRED FORTE) 1 % ophthalmic suspension INSTILL 1 DROP IN BOTH EYES FOUR TIMES DAILY 12/11/20  Yes Historical Provider, MD   spironolactone (ALDACTONE) 25 MG tablet Take 0.5 tablets by mouth every evening 10/28/20  Yes Ivett Hutchins MD   Vitamin D, Ergocalciferol, 50 MCG (2000 UT) CAPS Take 2,000 Units by mouth every morning (before breakfast) 10/27/20  Yes Behzad Bean MD   b complex vitamins capsule Take 1 capsule by mouth every morning (before breakfast) 10/27/20  Yes Behzad Bean MD   Ascorbic Acid (VITAMIN C) 250 MG tablet Take 1 tablet by mouth every morning (before breakfast) 10/27/20  Yes Behzad Bean MD   guaiFENesin (MUCINEX) 600 MG extended release tablet Take 1,200 mg by mouth daily as needed    Yes Historical Provider, MD   levalbuterol Isi [facility]) 0.63 MG/3ML nebulization Take 3 mLs by nebulization every 8 hours as needed for Wheezing DX: COPD J44.9 2/24/20  Yes Behzad Bean MD   PROAIR  ([address]) MCG/ACT inhaler USE 2 PUFFS EVERY 6 HOURS  AS NEEDED FOR WHEEZING 10/21/19  Yes Behzad Bean MD   acetaminophen (TYLENOL) 325 MG tablet Take 650 mg by mouth every 6 hours as needed for Pain or Fever   Yes Historical Provider, MD   furosemide (LASIX) 40 MG tablet Take 1 tablet by mouth every morning (before breakfast) 5/21/21   Behzad Bean MD       Allergies:  Codeine, Penicillins, Vicodin [hydrocodone-acetaminophen], and Adhesive tape    Family History:       Problem Relation Age of Onset    Heart Disease Mother     Heart Attack Mother     Arthritis Mother     Heart Disease Father     Mental Illness Father     Diabetes Father     Heart Disease Brother     Cancer Brother     Cancer Son     Heart Disease Sister     Heart Disease Brother     Heart Surgery Brother        Social History:   TOBACCO:   reports that she has never smoked. She has never used smokeless tobacco.  ETOH:   reports no history of alcohol use. OCCUPATION:      REVIEW OF SYSTEMS:  · Constitutional: No fever, no chill or change in weight; good appetite  · HEENT: No blurred vision, no sore throat, no running nose.   · Respiratory: SOB, No cough, no sputum, no pleuritic chest pain  · Cardiology: Orthopnea, PND, weight gain, leg tightness No angina, no palpitation  · Gastroenterology: No dysphagia, no reflux; no abdominal pain, no nausea or vomiting; no constipation or diarrhea. No blood in stool. · Genitourinary: No dysuria, no frequency, hesitancy; no hematuria  · Musculoskeletal: chronic joint pain and myalgias, no change in range of movement  · Neurology: no focal weakness in extremities, no slurred speech, no double vision, no tingling or numbness sensation. · Skin: no skin change noticed by patient  · Psychology: no depressed mood, no suicidal ideation    Physical Exam   · Vitals: BP (!) 142/89   Pulse 73   Temp 97.7 °F (36.5 °C) (Temporal)   Resp 12   Ht 5' 6\" (1.676 m)   Wt 260 lb (117.9 kg)   SpO2 96%   BMI 41.97 kg/m²   · General Appearance: Alert, cooperative, no acute distress. · HEENT: Normocephalic, atraumatic. conjunctiva/corneas clear, EOM's intact, no pallor  or icterus. · Neck: Supple, symmetrical, trachea midline, no cervical LAD. No thyroid enlargement/tenderness/nodules. No carotid bruit or JVD  · Chest wall: No tenderness or deformity, full & symmetric excursion  · Lung:  B/l expiratory wheezes, no rales, respirations unlabored. · Heart: Regular rate and a fib, S1 and S2 normal, no murmur, rub or   gallop. no bruits (carotid/femoral/abd), pedal pulses 2+,  no edema  · Abdomen: Soft, non-tender, bowel sounds active all four quadrants, no masses, no organomegaly  · Genital and rectal exam: deferred  · Extremities:  Trace ankle edema. Extremities normal, atraumatic, no cyanosis. . Pulses equal bilaterally  · Skin: Skin texture, turgor normal, no rashes or lesions  · Musculokeletal: No joint swelling, B/L knee tenderness (Chronic) ROM normal in all joints of extremities. · Neurologic:   Alert&Oriented.        Labs and Imaging Studies   Basic Labs  Results for orders placed or performed during the hospital encounter of 07/06/21   Brain Natriuretic Peptide   Result Value Ref Range    Pro-BNP 2,764 (H) 0 - 450 pg/mL   Troponin   Result Value Ref Range    Troponin, High Sensitivity 21 (H) 0 - 9 ng/L   Protime-INR   Result Value Ref Range    Protime 37.5 (H) 9.3 - 12.4 sec    INR 3.4    CBC auto differential   Result Value Ref Range    WBC 5.2 4.5 - 11.5 E9/L    RBC 3.94 3.50 - 5.50 E12/L    Hemoglobin 11.8 11.5 - 15.5 g/dL    Hematocrit 36.3 34.0 - 48.0 %    MCV 92.1 80.0 - 99.9 fL    MCH 29.9 26.0 - 35.0 pg    MCHC 32.5 32.0 - 34.5 %    RDW 15.0 11.5 - 15.0 fL    Platelets 802 604 - 895 E9/L    MPV 9.8 7.0 - 12.0 fL    Neutrophils % 62.2 43.0 - 80.0 %    Immature Granulocytes % 0.2 0.0 - 5.0 %    Lymphocytes % 19.6 (L) 20.0 - 42.0 %    Monocytes % 11.6 2.0 - 12.0 %    Eosinophils % 5.0 0.0 - 6.0 %    Basophils % 1.4 0.0 - 2.0 %    Neutrophils Absolute 3.21 1.80 - 7.30 E9/L    Immature Granulocytes # 0.01 E9/L    Lymphocytes Absolute 1.01 (L) 1.50 - 4.00 E9/L    Monocytes Absolute 0.60 0.10 - 0.95 E9/L    Eosinophils Absolute 0.26 0.05 - 0.50 E9/L    Basophils Absolute 0.07 0.00 - 0.20 E9/L   Comprehensive Metabolic Panel   Result Value Ref Range    Sodium 144 132 - 146 mmol/L    Potassium 3.3 (L) 3.5 - 5.0 mmol/L    Chloride 105 98 - 107 mmol/L    CO2 31 (H) 22 - 29 mmol/L    Anion Gap 8 7 - 16 mmol/L    Glucose 95 74 - 99 mg/dL    BUN 15 6 - 23 mg/dL    CREATININE 0.8 0.5 - 1.0 mg/dL    GFR Non-African American >60 >=60 mL/min/1.73    GFR African American >60     Calcium 9.1 8.6 - 10.2 mg/dL    Total Protein 6.9 6.4 - 8.3 g/dL    Albumin 4.1 3.5 - 5.2 g/dL    Total Bilirubin 1.0 0.0 - 1.2 mg/dL    Alkaline Phosphatase 80 35 - 104 U/L    ALT 16 0 - 32 U/L    AST 25 0 - 31 U/L   EKG 12 Lead   Result Value Ref Range    Ventricular Rate 100 BPM    Atrial Rate 104 BPM    QRS Duration 100 ms    Q-T Interval 384 ms    QTc Calculation (Bazett) 495 ms    R Axis 20 degrees    T Axis -62 degrees       Imaging Studies:  Radiology:   XR CHEST (2 VW)   Final Result   Cardiomegaly with central vascular congestion, similar to the prior   examination. Small right pleural effusion.

## 2021-07-07 NOTE — PROGRESS NOTES
educated on role of PT intervention. Pt educated on safety in room with utilization of call light for assistance with mobility. Pt educated on importance of maximizing OOB time by transferring to bedside chair for meals and ambulating to bathroom/transferring to bedside commode with assistance from nursing and therapy staff to increase functional activity tolerance and overall functional independence. Patient response to education:   Pt verbalized understanding Pt demonstrated skill Pt requires further education in this area   yes yes yes     ASSESSMENT:    Conditions Requiring Skilled Therapeutic Intervention:    [x]Decreased strength     [x]Decreased ROM  [x]Decreased functional mobility  [x]Decreased balance   [x]Decreased endurance   []Decreased posture  []Decreased sensation  []Decreased coordination   []Decreased vision  [x]Decreased safety awareness   [x]Increased pain       Comments:  RN cleared pt for activity prior to session. Pt received supine in bed and agreeable to PT intervention at this time. Pt performed all functional mobility as noted above. Pt severely [place] limiting communication at times. She reports generalized body aches bu states she is feeling better today. She fatigues quickly d/t SOB. Poor safety awareness when ambulating as pt attempted to demonstrate how she fell at home. Max VC to redirect and prevent another potential fall. Pt returned to supine at end of session and left with all needs met and call light in reach. Pt requires continued skilled PT intervention for the purposes of maximizing functional mobility and independence by addressing deficits described above. Treatment:  Patient practiced and was instructed in the following treatment:     Therapeutic Activities Completed:  o Functional mobility as noted above:   - Bed mobility: as noted above.   Max VC and hand over hand guidance to facilitate efficient use of BUE on bed rail to promote more independent completion of task. - Transfer training: sit<>stand: modA from EOB. Max VC for proper hand placement on edge of mattress and sequencing to promote safe and more independent completion of task. Stand pivot back to EOB front [employer] Dl. Max VC for proper sequencing and Dl for balance as well as assistance negotiating [employer].  - Ambulation: 20 feet front [employer] Dl x 2 reps. Standing rest break between bouts d/t SOB. Cues throughout for proper sequencing, improved posture, and proper use of WW to improve dynamic standing balance.  o Skilled repositioning in supine with HOB elevated for comfort and good posture to promote improved cardiorespiratory function. o Pt education as noted above. Pt's/ family goals   1. BENI. \"I need to get stronger before I go home\"    Prognosis is good for reaching above PT goals. Patient and or family understand(s) diagnosis, prognosis, and plan of care. yes    PHYSICAL THERAPY PLAN OF CARE:    PT POC is established based on physician order and patient diagnosis     Referring provider/PT Order:    07/06/21 2345  PT eval and treat Start: 07/06/21 2345, End: 07/06/21 2345, ONE TIME, Standing Count: 1 Occurrences, R      Katey Quiroz MD     Diagnosis:  Heart failure ([address] [I50.9]  Acute decompensated heart failure ([address] [I50.9]  Acute on chronic diastolic HF (heart failure) ([address] [I50.33]  Specific instructions for next treatment:  Increase activity tolerance.      Current Treatment Recommendations:     [x] Strengthening to improve independence with functional mobility   [x] ROM to improve independence with functional mobility   [x] Balance Training to improve static/dynamic balance and to reduce fall risk  [x] Endurance Training to improve activity tolerance during functional mobility   [x] Transfer Training to improve safety and independence with all functional transfers   [x] Gait Training to improve gait mechanics, endurance and assess need for appropriate assistive device  [x] Stair Training in preparation for safe discharge home and/or into the community   [x] Positioning to prevent skin breakdown and contractures  [x] Safety and Education Training   [x] Patient/Caregiver Education   [] HEP  [] Other     PT long term treatment goals are located in above grid    Frequency of treatments: 2-5x/week x 1-2 weeks. Time in  1336  Time out  1350    Total Treatment Time  10 minutes     Evaluation Time includes thorough review of current medical information, gathering information on past medical history/social history and prior level of function, completion of standardized testing/informal observation of tasks, assessment of data and education on plan of care and goals.     CPT codes:  [x] Low Complexity PT evaluation 83976  [] Moderate Complexity PT evaluation 55237  [] High Complexity PT evaluation 10607  [] PT Re-evaluation 48686  [] Gait training 94575 0 minutes  [] Manual therapy 50174 0 minutes  [x] Therapeutic activities 98336 10 minutes  [] Therapeutic exercises 54799 0 minutes  [] Neuromuscular reeducation 29945 0 minutes     Eben Verdin, PT, DPT  [id number]

## 2021-07-07 NOTE — CONSULTS
Inpatient Cardiology Consultation      Reason for Consult: CHF    Consulting Physician: Dr. Tavia Cote    Requesting Physician: Dr. German Duran    Date of Consultation: 7/7/2021    HISTORY OF PRESENT ILLNESS:     This 25-year-old female is known to [facility] cardiology and is followed by Dr. Carlos Ruiz. She has a history of permanent atrial fibrillation, coronary artery disease with a history of cardiomyopathy but her EF is now normal, and CHF due to diastolic dysfunction. She is on chronic anticoagulation. She was last evaluated by Dr. Jon Victoria on May 5 of this year and her rates were controlled and CHF was compensated    She presented to the emergency room with weight gain and swelling of her lower extremities. She has gained about 30 pounds. Her chronic dyspnea with exertion has been worse but she denies any chest pain. She is having difficulty swallowing and Pepcid is helping. She has orthopnea and swelling of the lower extremities but denies palpitations. Blood pressure on admission was questionably 99/76 and was then 167/92 and she was afebrile with an O2 saturation of 92% on room air and a pulse of 90. Potassium was 3.3 with a BUN of 15 and a creatinine of 0.8, BNP 2764 and troponin 21, WBCs 5.2 and hemoglobin 11.8 and hematocrit 36.3, INR 3.4. Chest x-ray showed small right pleural effusion and cardiomegaly with central vascular congestion similar to prior exams. She was treated with Lasix 20 mg IV last evening and then given 40 mg IV this morning. She is a DNR CCA. Coumadin has been held due to supratherapeutic INR. PAST MEDICAL/SURGICAL HISTORY:  1. DJD/Osteoarthritis   2. Basal cell carcinoma of neck  3. Depression  4. GERD  5. Hypothyroidism s/p thyroidectomy   6. Hyperparathyroidism s/p parathyroidectomy   7. History of kidney stones   8. Urinary incontinence status post surgery remotely  9. S/p bladder resuspension  10. S/p bilateral cataract surgery  11. S/p cholecystectomy  12.  S/p removal of cyst from right index finger  13. S/p left shoulder replacement   14. History of vertigo on chronic meclizine therapy. 15. Vitamin D deficiency. 16.  Congestive heart failure due to diastolic dysfunction  17. permanent atrial fibrillation  18. Hard of hearing  19. Hypertension  20. Denies hyperlipidemia and diabetes, CVA  21. Questionable obstructive sleep apnea, she tells me testing was incomplete  22. Dysphagia  23. History of nonischemic cardiomyopathy/left heart cardiac catheterization in 2014 normal coronaries with an EF of 30%  24.  2D echo May 10, 2020   2D echocardiogram May 10, 2020 EF 55 to 60% and no regional wall motion abnormalities but the left atrium is severely dilated and PFO with bidirectional shunt and mild aortic regurgitation and mild tricuspid regurgitation and right ventricular systolic pressure 48 mmHg         Medications Prior to admit:  Prior to Admission medications    Medication Sig Start Date End Date Taking? Authorizing Provider   warfarin (COUMADIN) 4 MG tablet Take 8 mg (two tablets) daily.  6/15/21  Yes Dina Singleton MD   potassium chloride (KLOR-CON M) 20 MEQ extended release tablet TAKE ONE TABLET BY MOUTH EVERY MORNING 6/7/21  Yes Dina Singleton MD   famotidine (PEPCID) 20 MG tablet Take one tablet by mouth daily 5/17/21  Yes Dina Singleton MD   escitalopram (LEXAPRO) 10 MG tablet TAKE 1 TABLET BY MOUTH DAILY 5/10/21  Yes Dina Singleton MD   nystatin (MYCOSTATIN) 392868 UNIT/GM powder Apply topically 4 times daily   Yes Historical Provider, MD   bisacodyl (DULCOLAX) 10 MG suppository Place 10 mg rectally daily as needed for Constipation   Yes Historical Provider, MD   magnesium hydroxide (MILK OF MAGNESIA) 400 MG/5ML suspension Take 30 mLs by mouth daily as needed for Constipation   Yes Historical Provider, MD   levothyroxine (SYNTHROID) 125 MCG tablet Take 1 tablet by mouth every morning (before breakfast) 3/26/21  Yes Dina Singleton MD   metoprolol succinate (TOPROL XL) 50 MG extended release tablet TAKE 1 & 1/2 TABLETS BY MOUTH EVERY EVENING 3/16/21  Yes Maco Waite MD   lisinopril (PRINIVIL;ZESTRIL) 10 MG tablet TAKE ONE TABLET BY MOUTH EVERY EVENING 3/16/21  Yes Maco Waite MD   meclizine (ANTIVERT) 25 MG tablet Take 1 tablet by mouth 3 times daily as needed for Dizziness 2/10/21 2/10/22 Yes Maco Waite MD   prednisoLONE acetate (PRED FORTE) 1 % ophthalmic suspension INSTILL 1 DROP IN BOTH EYES FOUR TIMES DAILY 12/11/20  Yes Historical Provider, MD   spironolactone (ALDACTONE) 25 MG tablet Take 0.5 tablets by mouth every evening 10/28/20  Yes Rain Waller MD   Vitamin D, Ergocalciferol, 50 MCG (2000 UT) CAPS Take 2,000 Units by mouth every morning (before breakfast) 10/27/20  Yes Maco Waite MD   b complex vitamins capsule Take 1 capsule by mouth every morning (before breakfast) 10/27/20  Yes Maco Waite MD   Ascorbic Acid (VITAMIN C) 250 MG tablet Take 1 tablet by mouth every morning (before breakfast) 10/27/20  Yes Maco Waite MD   guaiFENesin ([place] [address]) 600 MG extended release tablet Take 1,200 mg by mouth daily as needed    Yes Historical Provider, MD   levalbuterol (XOPENEX) 0.63 MG/3ML nebulization Take 3 mLs by nebulization every 8 hours as needed for Wheezing DX: COPD J44.9 2/24/20  Yes Maco Waite MD   PROAIR  (72 Base) MCG/ACT inhaler USE 2 PUFFS EVERY 6 HOURS  AS NEEDED FOR WHEEZING 10/21/19  Yes Maco Waite MD   acetaminophen (TYLENOL) 325 MG tablet Take 650 mg by mouth every 6 hours as needed for Pain or Fever   Yes Historical Provider, MD   furosemide (LASIX) 40 MG tablet Take 1 tablet by mouth every morning (before breakfast) 5/21/21   Maco Waite MD       Current Medications:    Current Facility-Administered Medications: metoprolol succinate (TOPROL XL) extended release tablet 75 mg, 75 mg, Oral, Daily  sodium chloride flush 0.9 % injection 5-40 mL, 5-40 mL, Intravenous, 2 times per day  sodium chloride flush 0.9 % injection 5-40 mL, 5-40 mL, Intravenous, PRN  0.9 % sodium chloride infusion, 25 mL, Intravenous, PRN  ondansetron (ZOFRAN-ODT) disintegrating tablet 4 mg, 4 mg, Oral, Q8H PRN **OR** ondansetron (ZOFRAN) injection 4 mg, 4 mg, Intravenous, Q6H PRN  polyethylene glycol (GLYCOLAX) packet 17 g, 17 g, Oral, Daily PRN  acetaminophen (TYLENOL) tablet 650 mg, 650 mg, Oral, Q6H PRN **OR** acetaminophen (TYLENOL) suppository 650 mg, 650 mg, Rectal, Q6H PRN  ipratropium-albuterol (DUONEB) nebulizer solution 1 ampule, 1 ampule, Inhalation, Q4H WA  escitalopram (LEXAPRO) tablet 10 mg, 10 mg, Oral, Daily  famotidine (PEPCID) tablet 20 mg, 20 mg, Oral, Daily  levothyroxine (SYNTHROID) tablet 125 mcg, 125 mcg, Oral, QAM AC  lisinopril (PRINIVIL;ZESTRIL) tablet 10 mg, 10 mg, Oral, QPM  prednisoLONE acetate (PRED FORTE) 1 % ophthalmic suspension 1 drop, 1 drop, Both Eyes, Q4H While awake  spironolactone (ALDACTONE) tablet 12.5 mg, 12.5 mg, Oral, QPM  Vitamin D (CHOLECALCIFEROL) tablet 2,000 Units, 2,000 Units, Oral, QAM AC  guaiFENesin tablet 400 mg, 400 mg, Oral, TID  potassium chloride (KLOR-CON M) extended release tablet 20 mEq, 20 mEq, Oral, BID WC    Allergies:  Codeine, Penicillins, Vicodin [hydrocodone-acetaminophen], and Adhesive tape    Social History: Non-smoker nondrinker and lives with her       Family History:   Family History   Problem Relation Age of Onset    Heart Disease Mother     Heart Attack Mother     Arthritis Mother     Heart Disease Father     Mental Illness Father     Diabetes Father     Heart Disease Brother     Cancer Brother     Cancer Son     Heart Disease Sister     Heart Disease Brother     Heart Surgery Brother        REVIEW OF SYSTEMS:     · Constitutional: Denies fatigue, fevers, chills or night sweats  · Eyes: Denies visual changes or drainage  · ENT: Denies headaches or hearing loss. No mouth sores or sore throat.  No epistaxis   · Cardiovascular: Denies chest pain, pressure or palpitations. No lower extremity swelling. · Respiratory: Denies WOO, cough, orthopnea or PND. No hemoptysis   · Gastrointestinal: Denies hematemesis or anorexia. No hematochezia or melena    · Genitourinary: Denies urgency, dysuria or hematuria. · Musculoskeletal:  positive gait disturbance, weakness or joint complaints  · Integumentary: Denies rash, hives or pruritis   · Neurological: Denies dizziness, headaches or seizures. No numbness or tingling  · Psychiatric: Denies anxiety or depression. · Endocrine: Denies temperature intolerance. No recent weight change. .  · Hematologic/Lymphatic: Denies abnormal bruising or bleeding. No swollen lymph nodes    PHYSICAL EXAM:   /62   Pulse 65   Temp 97.7 °F (36.5 °C) (Temporal)   Resp 12   Ht 5' 6\" (1.676 m)   Wt 256 lb (116.1 kg)   SpO2 94%   BMI 41.32 kg/m²   CONST:  Well developed, well nourished who appears of stated age. Awake, alert and cooperative. No apparent distress. HEENT:   Head- Normocephalic, atraumatic   Eyes- Conjunctivae pink, anicteric  Throat- Oral mucosa pink and moist  Neck-  No stridor, trachea midline, no jugular venous distention. No carotid bruit. CHEST: Chest symmetrical and non-tender to palpation. No accessory muscle use or intercostal retractions  RESPIRATORY: Lung sounds - clear throughout fields   CARDIOVASCULAR:     Heart Inspection- shows no noted pulsations  Heart Palpation- no heaves or thrills; PMI is non-displaced   Heart Ausculation-  irregularly irregular  rate and rhythm, no murmur. No s3, s4 or rub   PV: No lower extremity edema. No varicosities. Pedal pulses palpable, no clubbing or cyanosis   ABDOMEN: Soft, non-tender to light palpation. Bowel sounds present. No palpable masses no organomegaly; no abdominal bruit  MS: Good muscle strength and tone. No atrophy or abnormal movements.    : Deferred  SKIN: Warm and dry no statis dermatitis or ulcers   NEURO / PSYCH: Oriented to person, place and time. Speech clear and appropriate. Follows all commands. Pleasant affect     DATA:    ECG / Tele strips  atrial fibrillation with controlled ventricular response  Diagnostic:      Intake/Output Summary (Last 24 hours) at 7/7/2021 0829  Last data filed at 7/7/2021 0819  Gross per 24 hour   Intake --   Output 1325 ml   Net -1325 ml       Labs:   CBC:   Recent Labs     07/06/21 2124   WBC 5.2   HGB 11.8   HCT 36.3        BMP:   Recent Labs     07/06/21 2124      K 3.3*   CO2 31*   BUN 15   CREATININE 0.8   LABGLOM >60   CALCIUM 9.1     Mag: No results for input(s): MG in the last 72 hours. Phos: No results for input(s): PHOS in the last 72 hours. TFT:   Lab Results   Component Value Date    TSH 8.620 (H) 04/16/2021    U4YSDGQ 73.94 (L) 08/04/2014    W7FHBVJ 6.5 08/04/2014    T4FREE 1.20 01/02/2019      HgA1c:   Lab Results   Component Value Date    LABA1C 5.9 03/17/2014     No results found for: EAG  proBNP:   Recent Labs     07/06/21  1737   PROBNP 2,764*     PT/INR:   Recent Labs     07/06/21  1737   PROTIME 37.5*   INR 3.4     APTT:No results for input(s): APTT in the last 72 hours. CARDIAC ENZYMES:  Recent Labs     07/06/21  1737   TROPHS 21*     FASTING LIPID PANEL:  Lab Results   Component Value Date    CHOL 151 05/12/2020    HDL 65 05/12/2020    LDLCALC 77 05/12/2020    TRIG 46 05/12/2020     LIVER PROFILE:  Recent Labs     07/06/21 2124   AST 25   ALT 16   LABALBU 4.1      Electronically signed by Fanny Schlatter, APRN - CNP on 7/7/2021 at 8:29 AM     I personally and independently saw and examined patient and reviewed all done pertinent laboratory data, imaging studies, ECGs and rhythm strips. I have reviewed and agree with the APN history and physical exam as documented in the above note. Electronically signed by Rodney Fabian MD on 7/7/2021 at 6:36 PM     We were asked to see the patient for CHF     IMPRESSION:  1. Acute on chronic HFpEF  2.  Mild AI, mild TR, mild to moderate pHTN, PFO with bi directional shunt and normal EF ( 55-60% ) on Echo 5/2020  3. Patent coronaries on cath in 2014  4. Chronic Afib, controlled rate, anticoagulated on coumadin with INR = 3.4 ( 7/6/21 )  5. Obesity  6. HTN  7. Hypothyroidism, on replacement therapy with elevated TSH in 4/2021  8. H/o hyperparathyroidism s/p parathyroidectomy  9. H/o urinary incontinence s/p bladder re suspension surgery  10. H/o kidney stones  11. OA/DJD. H/o left shoulder arthroplasty. Ambulates with a walker  12. H/o cholecystectomy  13. Depression  14. [place]  15. Hypokalemia  16. DNR - CCA     PLAN:   1. IV to PO diuresis ( see orders ). Monitor BMP, I's and O's and daily weights  2. Supplement K+  3. Coumadin for INR between 2.0 - 3.0  4. Rest of cardiac medications same  5. Check TSH. Adjust synthroid dose accordingly  6. Cardiology will sign off.  Please call if needed     Electronically signed by Zeferino Miller MD on 7/7/2021 at 8:51 AM

## 2021-07-07 NOTE — PROGRESS NOTES
Message sent to Kym Hemphill MD   Via perfect serve due to the patient complaining of right leg pain.

## 2021-07-07 NOTE — ED NOTES
Bed: 23  Expected date:   Expected time:   Means of arrival:   Comments:  triage     Ramos Holden RN  07/06/21 2022

## 2021-07-07 NOTE — PROGRESS NOTES
Messaged sent to Omid Philip MD   Via perfect serve due to patients INR being 3.1.       Will hold coumadin for tonight's dose per md.

## 2021-07-07 NOTE — PROGRESS NOTES
Occupational Therapy    OCCUPATIONAL THERAPY INITIAL EVALUATION    [address] [address] [address]    Date:2021                                             Patient Name: Angle Marino  MRN: [id number]  : 1935  Room: [address] [place] [address]    Evaluating OT: Chad Sanchez, LIZETTD, OTR/L; #[id number]    Referring Provider: Dori Zamora MD  Specific Provider Orders/Date: OT Evaluation and Treatment, 2021     Diagnosis: Heart failure ([facility] [address] [I50.9]  Acute decompensated heart failure ([address] [I50.9]  Acute on chronic diastolic HF (heart failure) ([facility] [address] [I50.33]  Surgery: None  Pertinent Medical History: A-fib, CAD, CHF, DJD, GERD, hyperparathyroidism, HTN, OA, pulmonary HTN, urinary incontinence, mild mitral regurgitation, cardiac cath, R carpal tunnel release, B cholecystectomy, inner ear surgery, thyroid surgery, L shoulder arthroplasty     Precautions:  Fall Risk, tele, SCD, impaired cognition, alarm, TAPS, [place]      Assessment of current deficits   [x] Functional mobility   [x]ADLs  [x] Strength               [x]Cognition   [x] Functional transfers   [x] IADLs         [x] Safety Awareness   [x]Endurance   [x] Fine Coordination              [x] Balance      [] Vision/perception   []Sensation    [x]Gross Motor Coordination  [x] ROM  [] Delirium                   [] Motor Control     OT PLAN OF CARE   OT POC based on physician orders, patient diagnosis and results of clinical assessment    Frequency/Duration: 1-3 days/wk for 2 weeks PRN   Specific OT Treatment Interventions to include:   * Instruction/training on adapted ADL techniques and AE recommendations to increase functional independence within precautions       * Training on energy conservation strategies, correct breathing pattern and techniques to improve independence/tolerance for self-care routine  * Functional transfer/mobility training/DME recommendations for increased independence, safety, and fall prevention  * Patient/Family education to increase follow through with safety techniques and functional independence  * Recommendation of environmental modifications for increased safety with functional transfers/mobility and ADLs  * Cognitive retraining/development of therapeutic activities to improve problem solving, judgement, memory, and attention for increased safety/participation in ADL/IADL tasks  * Therapeutic exercise to improve motor endurance, ROM, and functional strength for ADLs/functional transfers  * Therapeutic activities to facilitate/challenge dynamic balance, stand tolerance for increased safety and independence with ADLs  * Therapeutic activities to facilitate gross/fine motor skills for increased independence with ADLs  * Positioning to improve skin integrity, interaction with environment and functional independence  * Delirium prevention/treatment    Recommended Adaptive Equipment: TBD pending discharge plan     Home Living: Patient is a poor historian. Per chart from previous admissions, patient  lives with   in a one story house with 3 steps to enter with B railings. Bedroom and bathroom are located on the first floor. Bathroom setup: Walk-in shower with seat   Equipment owned: spc, Rollator, shower chair    Prior Level of Function: Per patient, assist with ADLs and IADLs. Patient was using spc for functional mobility. Patient reports home health aide visits 3 times a week for 2-3 hours to assist with bathing. Driving: no  Occupation: retired    Pain Level: Patient reporting neck, R shoulder, and back pain intermittently during session, unable to provide additional information  Cognition: A&O: 3/4 to person, year situation not place as patient reported she was at SOV;  Follows 1-2 step directions   Memory:  Fair-    Sequencing:  Fair-   Problem solving:  Fair-  Judgement/safety:  Fair-      Functional Assessment:  AM-PAC Daily Activity Raw Score: 12/24   Initial Eval Status  Date: 7/7/21 Treatment Status  Date: STGs = LTGs  Time frame: 10-14 days   Feeding Set-up/SBA       Grooming Set-up/SBA  Seated   Supervision   Standing at sink   UB Dressing Moderate Assist   Doffed shirt and donned [facility] gown  Set-up/supervision    LB Dressing Dependent   Moderate Assist    Bathing Maximal Assist  Completed partial sponge bathing using wipes  Moderate Assist    Toileting Dependent   [place] catheter, attempted additional toileting via bedpan due to patient report of urgency with patient unable to have BM  Moderate Assist    Bed Mobility  R rolling: Min A  Supine to sit: Moderate Assist   Sit to supine: Moderate Assist   Log roll for pain management   Supine to sit: Modified [place]   Sit to supine: Modified [place]    Functional Transfers Moderate Assist ->Minimal Assist    Supervision    Functional Mobility Minimal Assist using fww  Short distance in room   Supervision using fww  Bed<>bathroom   Balance Sitting:     Static: SBA    Dynamic: SBA/CGA  Standing: Min A     Activity Tolerance Fair  Fair+   Visual/  Perceptual Glasses: yes                Hand Dominance: R handed   AROM (PROM) Strength Additional Info:    RUE  Shoulder: ~0-90 degrees flexion/ extension  Elbow-Hand: WFL : 4-/5 Fair+ FMC/dexterity noted during ADL tasks       LUE Shoulder: ~0-90 degrees flexion/ extension  Elbow-Hand: WFL : 4-/5 Fair+ FMC/dexterity noted during ADL tasks       Hearing: [place]  Sensation:  No c/o numbness or tingling   Tone: WFL   Edema: None observed in B UE    Comments: Nursing approved OT session. Upon arrival patient semi-supine in bed, agreeable to session. At end of session, patient semi-supine in bed with call light and phone within reach, all lines and tubes intact. Alarm on. OT evaluation performed with education provided regarding the purpose and benefits of OT session, along with mobility and I/ADL completion.  Overall patient demonstrated impaired cognition, weakness, decreased activity tolerance, and impaired standing balance limiting independence and safety during completion of ADL/functional transfer/mobility tasks. Patient would benefit from continued skilled OT to increase safety and independence with completion of ADL/IADL tasks for functional independence and improved quality of life. Treatment: OT treatment provided this date includes:    Instruction/training on safety and adapted techniques for completion of ADLs: Facilitated partial sponge bathing using wipes seated with exception of ed-hygiene standing with max A overall. Patient able to assist with B UE with assistance required with B LE. Min A using fww for standing balance and total A for front/rear ed-hygiene. Patient instructed to complete grooming tasks seated edge of bed/chair due to decreased standing tolerance and impaired balance. Set-up/SBA to comb hair, wash face, and complete oral care using mouthwash.   Instruction/training on safe functional mobility/transfer techniques: Therapist assessed and modified environment to maximize safety and patient performance. Mod A supine<>sit utilizing log roll technique with verbal cues for sequencing. Mod A with initial sit<>stand with patient progressing to min A sit<>stand with verbal cues for hand placement and positioning. Patient completed functional mobility short distance bed<>chair using fww with min A and verbal/tactile cues for technique and fww positioning.   Skilled monitoring of vitals:  Skilled monitoring of the patient's response throughout treatment.   SpO2 at rest 92%, SpO2 during activity 94% patient on RA    Rehab Potential: Good  for established goals     Patient / Family Goal: Patient would like to \"get up and move\"       Patient and/or family were instructed on functional diagnosis, prognosis/goals and OT plan of care. Demonstrated fair understanding. ·  Eval Complexity: Evaluation Complexity: Mod Complexity  ?  History: Expanded review of medical records and additional review of physical, cognitive, or psychosocial history related to current functional performance  ? Exam: 5+ performance deficits  ? Assistance/Modification: mod/max assistance or modifications required to perform tasks. May have comorbidities that affect occupational performance. Time In: 10:42  Time Out: 11:24  Total Treatment Time: 24 minutes    Min Units   OT Eval Low 25257       OT Eval Medium 97166  X 1   OT Eval High 57394       OT Re-Eval 09907       Therapeutic Ex 09113  12  1   Therapeutic Activities 90511       ADL/Self Care 25563  12  1   Orthotic Management 15598       Neuro Re-Ed 09555       Non-Billable Time              Evaluation Time includes thorough review of current medical information, gathering information on past medical history/social history and prior level of function, completion of standardized testing/informal observation of tasks, assessment of data and education on plan of care and goals.             Sheliah Barley, OTD, OTR/L; #[id number]

## 2021-07-07 NOTE — ED PROVIDER NOTES
HPI:  7/6/21, Time: 8:26 PM EDT         New Rowan is a 81 y.o. female presenting to the ED for increased weight gain and swelling, beginning months ago. The complaint has been persistent, moderate in severity, and worsened by nothing. Patient reporting increased swelling and over 20 pounds of weight gain. Patient reporting increased shortness of breath with exertion she reports difficulty lying flat down. Patient reports increase of her Lasix to 80 mg a day. Patient reporting no improvement. Patient reporting no chest pain she does report some difficulty swallowing. She reports no abdominal pain or vomiting or diarrhea she reports no fever. There is no history of any productive cough. Patient is very hard of hearing history is very limited. ROS:   Pertinent positives and negatives are stated within HPI, all other systems reviewed and are negative.  --------------------------------------------- PAST HISTORY ---------------------------------------------  Past Medical History:  has a past medical history of Anticoagulated on Coumadin, Atrial fibrillation (HCC), Basal cell carcinoma of neck, CAD (coronary artery disease), CHF (congestive heart failure) ([address] Depression, DJD (degenerative joint disease), GERD (gastroesophageal reflux disease), History of cardiovascular stress test, Hyperparathyroidism ([address] Hypertension, Hypothyroidism, Mild mitral regurgitation, Osteoarthritis, Pulmonary hypertension ([address] Renal calculi, Tricuspid regurgitation, and Urinary incontinence. Past Surgical History:  has a past surgical history that includes Thyroidectomy (1960); Kidney stone surgery (1972); Cholecystectomy (1972); Kidney stone surgery (1981); Cataract removal (6/2005); Incontinence surgery (5/2005); Thyroid surgery (5/2005); parathyroidectomy; Knee Arthroplasty (Bilateral, 9/2005); Total shoulder arthroplasty (Left, 2009); fracture surgery (Left);  Carpal tunnel release; Finger surgery (7/2011); Total shoulder arthroplasty (Left); Inner ear surgery (2011); Cataract removal with implant (Bilateral); Colonoscopy; and Cardiac catheterization (3/22/14). Social History:  reports that she has never smoked. She has never used smokeless tobacco. She reports that she does not drink alcohol and does not use drugs. Family History: family history includes Arthritis in her mother; Cancer in her brother and son; Diabetes in her father; Heart Attack in her mother; Heart Disease in her brother, brother, father, mother, and sister; Heart Surgery in her brother; Mental Illness in her father. The patients home medications have been reviewed. Allergies: Codeine, Penicillins, Vicodin [hydrocodone-acetaminophen], and Adhesive tape    ---------------------------------------------------PHYSICAL EXAM--------------------------------------    Constitutional/General: Alert and oriented to person and place, well appearing, non toxic in NAD  Head: Normocephalic and atraumatic  Eyes: PERRL, EOMI  Mouth: Oropharynx clear, handling secretions, no trismus  Neck: Supple, full ROM, non tender to palpation in the midline, no stridor, no crepitus, no meningeal signs  Pulmonary: Lungs wheezes crackles  Cardiovascular:  Regular rate. Irregular rhythm. No murmurs, gallops, or rubs. 2+ distal pulses  Chest: no chest wall tenderness  Abdomen: Soft. Non tender. Non distended. +BS. No rebound, guarding, or rigidity. No pulsatile masses appreciated. Musculoskeletal: Moves all extremities x 4. Warm and well perfused, no clubbing, cyanosis, noted edema bilaterally  Skin: warm and dry. No rashes.    Neurologic: Oriented to person and place CN 2-12 grossly intact, no focal deficits, symmetric strength 5/5 in the upper and lower extremities bilaterally  Psych: Normal Affect    -------------------------------------------------- RESULTS -------------------------------------------------  I have personally reviewed all laboratory and imaging results for this patient. Results are listed below.      LABS:  Results for orders placed or performed during the hospital encounter of 07/06/21   Brain Natriuretic Peptide   Result Value Ref Range    Pro-BNP 2,764 (H) 0 - 450 pg/mL   Troponin   Result Value Ref Range    Troponin, High Sensitivity 21 (H) 0 - 9 ng/L   Protime-INR   Result Value Ref Range    Protime 37.5 (H) 9.3 - 12.4 sec    INR 3.4    CBC auto differential   Result Value Ref Range    WBC 5.2 4.5 - 11.5 E9/L    RBC 3.94 3.50 - 5.50 E12/L    Hemoglobin 11.8 11.5 - 15.5 g/dL    Hematocrit 36.3 34.0 - 48.0 %    MCV 92.1 80.0 - 99.9 fL    MCH 29.9 26.0 - 35.0 pg    MCHC 32.5 32.0 - 34.5 %    RDW 15.0 11.5 - 15.0 fL    Platelets 662 659 - 325 E9/L    MPV 9.8 7.0 - 12.0 fL    Neutrophils % 62.2 43.0 - 80.0 %    Immature Granulocytes % 0.2 0.0 - 5.0 %    Lymphocytes % 19.6 (L) 20.0 - 42.0 %    Monocytes % 11.6 2.0 - 12.0 %    Eosinophils % 5.0 0.0 - 6.0 %    Basophils % 1.4 0.0 - 2.0 %    Neutrophils Absolute 3.21 1.80 - 7.30 E9/L    Immature Granulocytes # 0.01 E9/L    Lymphocytes Absolute 1.01 (L) 1.50 - 4.00 E9/L    Monocytes Absolute 0.60 0.10 - 0.95 E9/L    Eosinophils Absolute 0.26 0.05 - 0.50 E9/L    Basophils Absolute 0.07 0.00 - 0.20 E9/L   Comprehensive Metabolic Panel   Result Value Ref Range    Sodium 144 132 - 146 mmol/L    Potassium 3.3 (L) 3.5 - 5.0 mmol/L    Chloride 105 98 - 107 mmol/L    CO2 31 (H) 22 - 29 mmol/L    Anion Gap 8 7 - 16 mmol/L    Glucose 95 74 - 99 mg/dL    BUN 15 6 - 23 mg/dL    CREATININE 0.8 0.5 - 1.0 mg/dL    GFR Non-African American >60 >=60 mL/min/1.73    GFR African American >60     Calcium 9.1 8.6 - 10.2 mg/dL    Total Protein 6.9 6.4 - 8.3 g/dL    Albumin 4.1 3.5 - 5.2 g/dL    Total Bilirubin 1.0 0.0 - 1.2 mg/dL    Alkaline Phosphatase 80 35 - 104 U/L    ALT 16 0 - 32 U/L    AST 25 0 - 31 U/L   EKG 12 Lead   Result Value Ref Range    Ventricular Rate 100 BPM    Atrial Rate 104 BPM    QRS Duration 100 ms    Q-T Interval 384 ms    QTc Calculation (Bazett) 495 ms    R Axis 20 degrees    T Axis -62 degrees       RADIOLOGY:  Interpreted by Radiologist.  XR CHEST (2 VW)   Final Result   Cardiomegaly with central vascular congestion, similar to the prior   examination. Small right pleural effusion. Interstitial prominence, likely chronic. EKG: This EKG is signed and interpreted by me. Rate: 100  Rhythm: Atrial fibrillation  Interpretation: non-specific EKG  Comparison: stable as compared to patient's most recent EKG        ------------------------- NURSING NOTES AND VITALS REVIEWED ---------------------------   The nursing notes within the ED encounter and vital signs as below have been reviewed by myself. BP (!) 154/98   Pulse 90   Temp 98 °F (36.7 °C)   Resp 20   Ht 5' 6\" (1.676 m)   Wt 260 lb (117.9 kg)   SpO2 95%   BMI 41.97 kg/m²   Oxygen Saturation Interpretation: Normal    The patients available past medical records and past encounters were reviewed. ------------------------------ ED COURSE/MEDICAL DECISION MAKING----------------------  Medications   furosemide (LASIX) injection 20 mg (20 mg Intravenous Given 7/6/21 2136)             Medical Decision Making:      Presenting here because of increased weight gain as well as shortness of breath with exertion she reports no active chest pains but occasional chest pains at home. Patient reporting no abdominal patient noted to be in heart failure. Patient is anticoagulated. Patient will be admitted to monitored bed  Re-Evaluations:             Re-evaluation. Patients symptoms show no change    Patient rechecked and made aware of findings and plan. Patient will be admitted recheck vital signs blood pressure has improved. Patient given Lasix. Patient reporting no active chest pain. Consultations:             I did speak to  and he will admit    Critical Care:          This patient's ED course included: a personal history

## 2021-07-08 NOTE — PROGRESS NOTES
[facility] Inpatient   Resident Progress Note    S:  Hospital day: 2   Brief Synopsis: Angeli Vides is a 81 y.o. female with a past medical history of CAD, CHF, hypertension, A. fib, who presented to the ED with 3 days worsening shortness of breath. States she is also had approximately a 20 pound weight gain in the past couple weeks. She has also had tightness in bilateral lower extremities without any pain. Admitted for CHF exacerbation and treated with IV Lasix. No acute events overnight. Patient was seen and examined this morning. Patient states that her breathing is much improved, but she does endorse some cramping sensation in her legs and abdomen. Is not having any chest pain, nausea, vomiting, diarrhea or constipation. Patient is agreeable to going to facility if at least for short-term. Cont meds:    sodium chloride       Scheduled meds:    warfarin  8 mg Oral Once    metoprolol succinate  75 mg Oral Daily    furosemide  40 mg Intravenous BID    [START ON 7/10/2021] furosemide  40 mg Oral Daily    potassium chloride  40 mEq Oral TID WC    sodium chloride flush  5-40 mL Intravenous 2 times per day    ipratropium-albuterol  1 ampule Inhalation Q4H WA    escitalopram  10 mg Oral Daily    famotidine  20 mg Oral Daily    levothyroxine  125 mcg Oral QAM AC    lisinopril  10 mg Oral QPM    prednisoLONE acetate  1 drop Both Eyes Q4H While awake    spironolactone  12.5 mg Oral QPM    vitamin D  2,000 Units Oral QAM AC    guaiFENesin  400 mg Oral TID     PRN meds: diclofenac sodium, sodium chloride flush, sodium chloride, ondansetron **OR** ondansetron, polyethylene glycol, acetaminophen **OR** acetaminophen     I reviewed the patient's past medical and surgical history, Medications and Allergies.     O:  BP (!) 107/55   Pulse 99   Temp 96.7 °F (35.9 °C) (Temporal)   Resp 20   Ht 5' 6\" (1.676 m)   Wt 245 lb 3 oz (111.2 kg)   SpO2 92%   BMI 39.57 kg/m²   24 hour I&O: · General Appearance: Alert, cooperative, no acute distress. · HEENT: Normocephalic, atraumatic. conjunctiva/corneas clear, EOM's intact, no pallor  or icterus. · Neck: Supple, symmetrical, trachea midline, no cervical LAD. No thyroid enlargement/tenderness/nodules. No carotid bruit or JVD  · Chest wall: No tenderness or deformity, full & symmetric excursion  · Lung:  Much improved breath sounds, no rales, respirations unlabored. · Heart: Regular rate and a fib, S1 and S2 normal, no murmur, rub or   gallop. no bruits (carotid/femoral/abd), pedal pulses 2+,  no edema  · Abdomen: Soft, non-tender, bowel sounds active all four quadrants, no masses, no organomegaly  · Genital and rectal exam: deferred  · Extremities:  Trace pedal edema. Ankle edema much improved. Extremities normal, atraumatic, no cyanosis. . Pulses equal bilaterally  · Skin: Skin texture, turgor normal, no rashes or lesions  · Musculokeletal: No joint swelling, B/L knee tenderness (Chronic) ROM normal in all joints of extremities. · Neurologic:   Alert&Oriented. Labs:  Na/K/Cl/CO2:  141/3.8/101/28 (07/07 1656)  BUN/Cr/glu/ALT/AST/amyl/lip:  12/0.7/--/--/--/--/-- (07/07 1656)  WBC/Hgb/Hct/Plts:  3.9/11.0/34.2/153 (07/07 0745)  estimated creatinine clearance is 74 mL/min (based on SCr of 0.7 mg/dL). Other pertinent labs as noted below    New Imaging:  XR CHEST (2 VW)   Final Result   Cardiomegaly with central vascular congestion, similar to the prior   examination. Small right pleural effusion. Interstitial prominence, likely chronic.              A/P:  Principal Problem:    Acute decompensated heart failure (HCC)  Active Problems:    Hypothyroidism    Permanent atrial fibrillation (HCC)    Essential hypertension    Coronary arteriosclerosis in native artery    Frequent falls    Dementia (HCC)    Acute on chronic diastolic HF (heart failure) ([facility] [address]  Resolved Problems:    Heart failure (HCC)      Dyspnea 2/2 acute decompensated CHF vs COPD   · Pro-BNP elevated >2700, baseline ~2000  · Last echo in 5/2020 with EF 55% and PFO  · Admit to telemetry  · Got 20 IV lasix in ED, took 120 PO 7/6  · Cardiology following, IV Lasix and PO scheduled for 5 doses  · CXR showing pulmonary congestion with small R pleural effusion  · Strict I&Os  · Daily weights  · 40 IV Lasix in am  · Duonebs q4 hours  · Incentive spirometry  · PO potassium for now as K on admission 3.3, to be D/C'd before discharge  · Consider repeat CXR  · Monitor physical exam     HTN  · Continue home lisinopril 10  · Metoprolol 75mg nightly  · Spironolactone 12.5mg     Frequent falls  · Last fall on Tuesday  · Denies syncope, pre-syncope or head injury  · PT/OT  · Consult to social work for possible placement needs/ [facility]     Afib  Multivalvular disease  Supratherapeutic INR  · Currently rate controlled  · Supratherapeutic INR 3.4, goal 2-3  · Held coumadin on admission  · Restart coumadin 8mg this evening, will re-dose after INR check tomorrow  · Daily INR  · Takes 8mg daily  · No changes recently to diet     Hypothyroidism  · Continue home synthroid  · TSH WNL this admission      DVT / GI prophylaxis: warfarin and PC and GI prophylaxis not indicated    Electronically signed by Johnathan Moya MD on 7/8/2021 at 8:49 AM  This case was discussed with senior resident and attending physician: Dr. Robbin Ramirez

## 2021-07-08 NOTE — PROGRESS NOTES
Occupational Therapy                   OT BEDSIDE TREATMENT NOTE   [facility] [place] [address]  [address] [place] [address]     Date:2021  Patient Name: Ron Gracia  MRN: [id number]  : 1935  Room: [address] [place] [address]     Evaluating OT: Danny Islas, OTEVERETT, OTR/L; #[id number]     Referring Provider: Robbin Ariza MD  Specific Provider Orders/Date: OT Evaluation and Treatment, 2021      Diagnosis: Heart failure ([facility] [I50.9]  Acute decompensated heart failure ([facility] [I50.9]  Acute on chronic diastolic HF (heart failure) ([facility] [I50.33]  Surgery: None  Pertinent Medical History: A-fib, CAD, CHF, DJD, GERD, hyperparathyroidism, HTN, OA, pulmonary HTN, urinary incontinence, mild mitral regurgitation, cardiac cath, R carpal tunnel release, B cholecystectomy, inner ear surgery, thyroid surgery, L shoulder arthroplasty      Precautions:  Fall Risk, tele, SCD, impaired cognition, alarm, TAPS, Craig        Assessment of current deficits   [x]? Functional mobility             [x]?ADLs           [x]? Strength                  [x]? Cognition   [x]? Functional transfers           [x]? IADLs         [x]? Safety Awareness   [x]? Endurance   [x]? Fine Coordination              [x]? Balance      []? Vision/perception   []? Sensation     [x]? Gross Motor Coordination  [x]? ROM           []?  Delirium                   []? Motor Control      OT PLAN OF CARE   OT POC based on physician orders, patient diagnosis and results of clinical assessment     Frequency/Duration: 1-3 days/wk for 2 weeks PRN   Specific OT Treatment Interventions to include:   * Instruction/training on adapted ADL techniques and AE recommendations to increase functional independence within precautions       * Training on energy conservation strategies, correct breathing pattern and techniques to improve independence/tolerance for self-care routine  * Functional transfer/mobility training/DME recommendations for increased independence, safety, and fall prevention  * Patient/Family education to increase follow through with safety techniques and functional independence  * Recommendation of environmental modifications for increased safety with functional transfers/mobility and ADLs  * Cognitive retraining/development of therapeutic activities to improve problem solving, judgement, memory, and attention for increased safety/participation in ADL/IADL tasks  * Therapeutic exercise to improve motor endurance, ROM, and functional strength for ADLs/functional transfers  * Therapeutic activities to facilitate/challenge dynamic balance, stand tolerance for increased safety and independence with ADLs  * Therapeutic activities to facilitate gross/fine motor skills for increased independence with ADLs  * Positioning to improve skin integrity, interaction with environment and functional independence  * Delirium prevention/treatment     Recommended Adaptive Equipment: TBD pending discharge plan      Home Living: Patient is a poor historian. Per chart from previous admissions, patient  lives with   in a one story house with 3 steps to enter with B railings. Bedroom and bathroom are located on the first floor. Bathroom setup: Walk-in shower with seat   Equipment owned: spc, Rollator, shower chair     Prior Level of Function: Per patient, assist with ADLs and IADLs. Patient was using spc for functional mobility. Patient reports home health aide visits 3 times a week for 2-3 hours to assist with bathing. Driving: no  Occupation: retired     Pain Level: Patient reporting neck, R shoulder, and back pain intermittently during session, unable to provide additional information  Cognition: A&O: 3/4 to person, year situation not place as patient reported she was at SO;  Follows 1-2 step directions              Memory:  Fair-               Sequencing:  Fair-              Problem solving:  Fair-  Judgement/safety:  Fair- Functional Assessment:  AM-PAC Daily Activity Raw Score: 12/24    Initial Eval Status  Date: 7/7/21 Treatment Status  Date: STGs = LTGs  Time frame: 10-14 days   Feeding Set-up/SBA        Grooming Set-up/SBA  Seated    Supervision   Standing at sink   UB Dressing Moderate Assist   Doffed shirt and donned hospital gown   Set-up/supervision    LB Dressing Dependent    Moderate Assist    Bathing Maximal Assist  Completed partial sponge bathing using wipes   Moderate Assist    Toileting Dependent   Peralta catheter, attempted additional toileting via bedpan due to patient report of urgency with patient unable to have BM   Moderate Assist    Bed Mobility  R rolling: Min A  Supine to sit: Moderate Assist   Sit to supine: Moderate Assist   Log roll for pain management    Supine to sit: Modified [place]   Sit to supine: Modified [place]    Functional Transfers Moderate Assist ->Minimal Assist      Supervision    Functional Mobility Minimal Assist using fww  Short distance in room    Supervision using fww  Bed<>bathroom   Balance Sitting:     Static: SBA    Dynamic: SBA/CGA  Standing: Min A       Activity Tolerance Fair   Fair+   Visual/  Perceptual Glasses: yes            Evaluating OT: Brian Grandchild, OTD, OTR/L; #[id number]     Referring Provider: Herve Alicea MD  Specific Provider Orders/Date: OT Evaluation and Treatment, 07/06/2021      Diagnosis: Heart failure ([facility] [address] [I50.9]  Acute decompensated heart failure (HCC) [I50.9]  Acute on chronic diastolic HF (heart failure) (HCC) [I50.33]  Surgery: None  Pertinent Medical History: A-fib, CAD, CHF, DJD, GERD, hyperparathyroidism, HTN, OA, pulmonary HTN, urinary incontinence, mild mitral regurgitation, cardiac cath, R carpal tunnel release, B cholecystectomy, inner ear surgery, thyroid surgery, L shoulder arthroplasty      Precautions:  Fall Risk, tele, SCD, impaired cognition, alarm, TAPS, [place]        Assessment of current deficits   [x]?  Functional mobility Per chart from previous admissions, patient  lives with   in a one story house with 3 steps to enter with B railings. Bedroom and bathroom are located on the first floor. Bathroom setup: Walk-in shower with seat   Equipment owned: spc, Rollator, shower chair     Prior Level of Function: Per patient, assist with ADLs and IADLs. Patient was using spc for functional mobility. Patient reports home health aide visits 3 times a week for 2-3 hours to assist with bathing. Driving: no  Occupation: retired     7/8/21  Pain Level: Patient reported L shoulder pain, did not rate intensity  Cognition: A&O: 3/4 to person, year situation not place as patient reported she was at SO; Follows 1-2 step directions              Memory:  Fair-               Sequencing:  Fair-              Problem solving:  Fair-  Judgement/safety:  Fair-                 Functional Assessment:  AM-PAC Daily Activity Raw Score: 14/24 (7/8/21)    Initial Eval Status  Date: 7/7/21 Treatment Status  Date: 7/8/21 STGs = LTGs  Time frame: 10-14 days   Feeding Set-up/SBA  NT      Grooming Set-up/SBA  Seated   Set-up/SBA  Seated  Min A  Standing Supervision   Standing at sink   UB Dressing Moderate Assist   Doffed shirt and donned hospital gown  Minimal Assist  Donned/doffed hospital gown Set-up/supervision    LB Dressing Dependent  NT Moderate Assist    Bathing Maximal Assist  Completed partial sponge bathing using wipes Maximal Assist Moderate Assist    Toileting Dependent   Peralta catheter, attempted additional toileting via bedpan due to patient report of urgency with patient unable to have BM Dependent   Peralta catheter + total A for ed-hygiene with BM  Moderate Assist    Bed Mobility  R rolling: Min A  Supine to sit: Moderate Assist   Sit to supine: Moderate Assist   Log roll for pain management  Supine to sit: CGA   Sit to supine:  Moderate Assist   Supine to sit: Modified [place]   Sit to supine: Modified [place]    Functional Transfers Moderate Assist ->Minimal Assist    Moderate Assist <->Minimal Assist  Supervision    Functional Mobility Minimal Assist using fww  Short distance in room  Minimal Assist using fww  Completed in room/bathroom  Supervision using fww  Bed<>bathroom   Balance Sitting:     Static: SBA    Dynamic: SBA/CGA  Standing: Min A Sitting:     Static: SBA    Dynamic: SBA/CGA  Standing: Min A      Activity Tolerance Fair  Fair Fair+   Visual/  Perceptual Glasses: yes                Treatment: OT treatment provided this date includes:    ADL training-  Instruction/training on safety and adapted techniques for completion of ADLs: Patient completed toileting using standard commode with total A for rear ed-hygiene standing and min A with UE for standing balance. Patient instructed to complete grooming tasks standing at sink with patient washing hands standing with min A for balance. Patient demonstrating signs of fatigue/dyspnea after standing at sink ~2 minutes with patient instructed to complete additional grooming tasks seated for energy conservation. Set-up/SBA to wash face and comb hair. Facilitated sponge bathing seated in chair for energy conservation. Max A overall for thoroughness with patient able to assist with B UE and chest with additional assistance for thoroughness and B LE. Ed-hygiene completed standing with total A and min A with UE support to maintain standing balance during self-care. Min A to doff/ don hospital gown with verbal cues for positioning and technique.   Mobility training-  Instruction/training on safe functional mobility/transfer techniques: Therapist assessed and modified environment to maximize safety and patient performance. CGA supine>sit utilizing log roll technique with mod A sit>supine with verbal cues for sequencing and safety. Min<>mod A sit<>stand from various surfaces, including toileting transfer, with verbal cues for hand placement and positioning.  Patient completed functional mobility bed>bathroom>chair>bed using fww with min A overall and verbal/tactile cues for positioning, technique, and pursed lip breathing.   Skilled monitoring of vitals:  Skilled monitoring of the patient's response throughout treatment. SpO2 during activity 94-96%, SpO2 at end of session 97% , patient on RA; HR: 90s-103 bpm    Comments: Upon arrival pt semi-supine in bed, agreeable and motivated to participate. Pt educated on safety with functional transfers and energy conservation techniques to improve performance with ADLs. At end of session semi-supine in bed all lines and tubes intact, call light within reach. Alarm on. Nursing notified. · Pt has made good progress towards set goals.    · Continue with current plan of care with focus on improving safety and activity tolerance for participation in ADLs      Treatment Time In:08:30            Treatment Time Out: 09:31    Total Treatment Time: 61 minutes           Treatment Charges: Mins Units   Ther Ex  [id number]     Manual Therapy Florentino Dupree [id number] [phone]   ADL/Home Mgt [id number] 93 3   Neuro Re-ed [id number]     Group Therapy      Orthotic manage/training  [id number]     Non-Billable Time     Total Timed Treatment 61 4       Completed by: JOAN Shultz, OTR/L #[id number]

## 2021-07-08 NOTE — PLAN OF CARE
Problem: Falls - Risk of:  Goal: Will remain free from falls  Description: Will remain free from falls  7/8/2021 0313 by Rosa Carver RN  Outcome: Met This Shift     Problem: Falls - Risk of:  Goal: Absence of physical injury  Description: Absence of physical injury  7/8/2021 0313 by Rosa Carver RN  Outcome: Met This Shift     Problem: Skin Integrity:  Goal: Will show no infection signs and symptoms  Description: Will show no infection signs and symptoms  7/8/2021 0313 by Rosa Carver RN  Outcome: Met This Shift     Problem: Skin Integrity:  Goal: Absence of new skin breakdown  Description: Absence of new skin breakdown  7/8/2021 0313 by Rosa Carver RN  Outcome: Met This Shift

## 2021-07-08 NOTE — CARE COORDINATION
Received message from daughter who would like mom to go to [facility] at discharge for rehab. Referral made to [facility]-admissions, await assessment and acceptance. If accepted, Russell to start precert today. Envelope and ambullett form in soft chart, HENS to be completed once accepting facility. 11:15am spoke with Charlie) they accepted, will not need a COVID unless symptoms. Therapy evaluations are complete, Mikey(Ulysses) to start precert today. HENS completed, envelope, ambullette form, and HENS in soft chart. Taiwo Mendiola, MSW, LSW

## 2021-07-08 NOTE — PROGRESS NOTES
the patient with the resident(s). I personally reviewed images, EKG's and similar tests, if present. I personally reviewed and performed key elements of the history and exam.  I have reviewed and confirmed student and/or resident history and exam with changes as indicated above. I agree with the assessment, plan and orders as documented by the resident. Please refer to the resident and/or student note for additional information.       Emmanuel Fierro MD

## 2021-07-08 NOTE — PLAN OF CARE
Problem: Falls - Risk of:  Goal: Will remain free from falls  Description: Will remain free from falls  7/8/2021 1520 by Sil Nagel RN  Outcome: Met This Shift  7/8/2021 0313 by Mejia Martínez RN  Outcome: Met This Shift  Goal: Absence of physical injury  Description: Absence of physical injury  7/8/2021 1520 by Sil Nagel RN  Outcome: Met This Shift  7/8/2021 0313 by Mejia Martínez RN  Outcome: Met This Shift     Problem: Skin Integrity:  Goal: Will show no infection signs and symptoms  Description: Will show no infection signs and symptoms  7/8/2021 1520 by Sil Nagel RN  Outcome: Met This Shift  7/8/2021 0313 by Mejia Martínez RN  Outcome: Met This Shift  Goal: Absence of new skin breakdown  Description: Absence of new skin breakdown  7/8/2021 0313 by Mejia Martínez RN  Outcome: Met This Shift

## 2021-07-09 PROBLEM — E87.6 HYPOKALEMIA: Status: ACTIVE | Noted: 2021-01-01

## 2021-07-09 NOTE — DISCHARGE INSTR - COC
Continuity of Care Form    Patient Name: Torie Yi   :  1935  MRN:  [id number]    Admit date:  2021  Discharge date:  ***    Code Status Order: DNR-CCA   Advance Directives:      Admitting Physician:  Osmin Shelley MD  PCP: Avtar Dave MD    Discharging Nurse: [facility] Unit/Room#: 5019/5872-O  Discharging Unit Phone Number: ***    Emergency Contact:   Extended Emergency Contact Information  Primary Emergency Contact: Roscoe Nettles  Address: [address] [place] [address], [address] Phone: [phone]  Mobile Phone: [phone]  Relation: Spouse  Secondary Emergency Contact: Eligio Chen  Address: [place]ce [place] BY PATIENT   Eran [address] Phone: [phone]  Mobile Phone: [phone]  Relation: Child    Past Surgical History:  Past Surgical History:   Procedure Laterality Date    CARDIAC CATHETERIZATION  3/22/14    CARPAL TUNNEL RELEASE      Right hand. CATARACT REMOVAL  2005    right, bilateral cataract surgery. CATARACT REMOVAL WITH IMPLANT Bilateral     CHOLECYSTECTOMY  1972    COLONOSCOPY      FINGER SURGERY  2011    S/P removal of cyst from right index finger. FRACTURE SURGERY Left     arm fractured-casted, Bilateral feet fractures-casted. INCONTINENCE SURGERY  2005    Urinary incontinence, S/P bladder suspension. INNER EAR SURGERY      tube place in right ear    [facility] ARTHROPLASTY Bilateral 2005    PARATHYROIDECTOMY      Three times    SHOULDER ARTHROPLASTY Left 2009    SHOULDER ARTHROPLASTY Left      shoulder total replacement.     THYROID SURGERY  2005    recurrent    THYROIDECTOMY  1960       Immunization History:   Immunization History   Administered Date(s) Administered    COVID-19, Pfizer, PF, 30mcg/0.3mL 2021, 02/15/2021    Influenza Vaccine, unspecified formulation 2016    Influenza Virus Vaccine 2014, 10/16/2018    Influenza, MDCK Quadv, IM, PF (Flucelvax 4 yrs and older) 09/21/2017    Influenza, Quadv, adjuvanted, 65 yrs +, IM, PF (Fluad) 12/14/2020    Influenza, Triv, inactivated, subunit, adjuvanted, IM (Fluad 65 yrs and older) 10/31/2019    Pneumococcal Conjugate 13-valent ([id number]) 09/17/2015    Pneumococcal Polysaccharide ([id number]) 01/04/2011    Tdap (Boostrix, Adacel) 09/01/2015       Active Problems:  Patient Active Problem List   Diagnosis Code    Depression F32.9    Hypothyroidism E03.9    Permanent atrial fibrillation (HCC) I48.21    History of artificial lens replacement Z96.1    Essential hypertension I10    Class 2 severe obesity due to excess calories with serious comorbidity and body mass index (BMI) of 39.0 to 39.9 in adult ([facility] E66.01, Z68.39    Greater trochanteric bursitis M70.60    Chronic anticoagulation Z79.01    Chronic obstructive pulmonary disease (HCC) J44.9    Mixed stress and urge urinary incontinence N39.46    Fuchs' endothelial dystrophy H18.519    Pseudophakia, both eyes Z96.1    Chronic diastolic (congestive) heart failure (HCC) I50.32    Pure hypercholesterolemia E78.00    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    Patent foramen ovale Q21.1    Coronary arteriosclerosis in native artery I25.10    Frequent falls R29.6    Dementia ([facility]) F03.90    Acute decompensated heart failure (HCC) I50.9    Acute on chronic diastolic HF (heart failure) ([facility]) I50.33    Hypokalemia E87.6       Isolation/Infection:   Isolation            No Isolation          Patient Infection Status       Infection Onset Added Last Indicated Last Indicated By Review Planned Expiration Resolved Resolved By    None active    Resolved    COVID-19 Rule Out 04/19/21 04/19/21 04/19/21 COVID-19, Rapid (Ordered)   04/19/21 Rule-Out Test Resulted    COVID-19 Rule Out 04/16/21 04/16/21 04/16/21 COVID-19, Rapid (Ordered)   04/16/21 Rule-Out Test Resulted    COVID-19 Rule Out 05/10/20 05/10/20 05/10/20 COVID-19 (Ordered)   05/10/20 Rule-Out Test Resulted            Nurse Assessment:  Last Vital Signs: BP (!) 128/90   Pulse 107   Temp 96.8 °F (36 °C) (Temporal)   Resp 18   Ht 5' 6\" (1.676 m)   Wt 258 lb 4.8 oz (117.2 kg)   SpO2 95%   BMI 41.69 kg/m²     Last documented pain score (0-10 scale): Pain Level: 0  Last Weight:   Wt Readings from Last 1 Encounters:   21 258 lb 4.8 oz (117.2 kg)     Mental Status:  {IP PT MENTAL STATUS::::0}    IV Access:  { ANDRESSA IV ACCESS:008266971:::0}    Nursing Mobility/ADLs:  Walking   {CHP DME ADLs:670750401:::0}  Transfer  {CHP DME ADLs:513819680:::0}  Bathing  {CHP DME ADLs:939173563:::0}  Dressing  {CHP DME ADLs:956203833:::0}  Toileting  {CHP DME ADLs:804683796:::0}  Feeding  {CHP DME ADLs:529793499:::0}  Med Admin  {CHP DME ADLs:190639130:::0}  Med Delivery   { ANDRESSA MED Delivery:652894546:::0}    Wound Care Documentation and Therapy:        Elimination:  Continence: Bowel: {YES / SK:56584}  Bladder: {YES / D}  Urinary Catheter: {Urinary Catheter:360527549:::0}   Colostomy/Ileostomy/Ileal Conduit: {YES / QS:88367}       Date of Last BM: ***    Intake/Output Summary (Last 24 hours) at 2021 1226  Last data filed at 2021 1015  Gross per 24 hour   Intake 240 ml   Output 5350 ml   Net -5110 ml     I/O last 3 completed shifts:   In: 600 [P.O.:600]  Out: 4150 [Urine:4150]    Safety Concerns:     [address] Safety Concerns:542854215:::0}    Impairments/Disabilities:      [address] Impairments/Disabilities:783981451:::0}    Nutrition Therapy:  Current Nutrition Therapy:   Dane CRISOSTOMO Diet List:772716968:::0}    Routes of Feeding: {CHP DME Other Feedings:284578083:::0}  Liquids: {Slp liquid thickness:46728}  Daily Fluid Restriction: {CHP DME Yes amt example:662712390:::0}  Last Modified Barium Swallow with Video (Video Swallowing Test): {Done Not Done CIAV:957376772:::8}    Treatments at the Time of Hospital Discharge:   Respiratory Treatments: ***  Oxygen Therapy:  {Therapy; copd oxygen:14044:::0}  Ventilator:    {MH CC Vent List:472419659:::0}    Rehab Therapies: {THERAPEUTIC INTERVENTION:4630257870}  Weight Bearing Status/Restrictions: [address] Jacques CC Weight Bearin:::0}  Other Medical Equipment (for information only, NOT a DME order):  {EQUIPMENT:667666178}  Other Treatments: ***    Patient's personal belongings (please select all that are sent with patient):  {CHP DME Belongings:091923812:::0}    RN SIGNATURE:  {Esignature:262652839:::0}    CASE MANAGEMENT/SOCIAL WORK SECTION    Inpatient Status Date: ***    Readmission Risk Assessment Score:  Readmission Risk              Risk of Unplanned Readmission:  20           Discharging to Facility/ Agency   Name:   Address:  Phone:  Fax:    Dialysis Facility (if applicable)   Name:  Address:  Dialysis Schedule:  Phone:  Fax:    / signature: {Esignature:242403728:::0}    PHYSICIAN SECTION    Prognosis: Good    Condition at Discharge: Stable    Rehab Potential (if transferring to Rehab): Good    Recommended Labs or Other Treatments After Discharge: Should hold dose of PO lasix on 7/10. Patient seemingly overdiuresed. Plan to repeat BMP within 2-3 days of discharge for monitoring of K and Creatinine. INR may need checked daily as patient missed 2 doses once she was found supratherapeutic. Physician Certification: I certify the above information and transfer of Sonya De Leon  is necessary for the continuing treatment of the diagnosis listed and that she requires [facility] for less 30 days.      Update Admission H&P: No change in H&P    PHYSICIAN SIGNATURE:  Electronically signed by Reji Juan MD on 21 at 12:28 PM EDT

## 2021-07-09 NOTE — PROGRESS NOTES
[facility]  Family Medicine Attending    S: 81 y.o. female with past medical history of CAD, CHF, hypertension, A. fib, who presented to the ED with 3 days worsening shortness of breath. States that she was noted to be wheezing recently and gaining weight, despite increasing her lasix for several days. She also has fallen recently, on a history of frequent falls. She forgets to take her pills at times, and misses doses despite attem[pts to supply them with pill packs from the pharmacy. She does have someone come in several days a week to help with housework. Very [place]  Today, feels \"crummy\". Lots of back and neck pain. Denies SOB or chest pain. Did have 7 beat NSVT last night    O: VS- Blood pressure (!) 155/95, pulse 103, temperature 96.9 °F (36.1 °C), temperature source Temporal, resp. rate 20, height 5' 6\" (1.676 m), weight 258 lb 4.8 oz (117.2 kg), SpO2 95 %, not currently breastfeeding.   Exam is as noted by resident with the following changes, additions or corrections:  Alert, oriented X 3, in NAD  Heart - irreg  Lungs- clear  Ext -trace edema, much less tenderness to palpation over feet and shins  Weight down 15 pounds, now back up near baseline (doubt accuracy of weights)  INR - 2.8 yesterday, pending today    Impressions:   Principal Problem:    Acute decompensated heart failure ([address]  Active Problems:    Hypothyroidism    Permanent atrial fibrillation ([address]    Essential hypertension    Coronary arteriosclerosis in native artery    Frequent falls    Dementia ([facility]    Acute on chronic diastolic HF (heart failure) (HCC)    Hypokalemia  Resolved Problems:    Heart failure (HCC)      Plan:   Appreciate cardiology recs   IV diuresis, K+ supplementation - most recent lytes normal   Start coumadin at reduced dose (4 mg once weekly, with 8 mg on all other days)   Would give 8 mg today   Check magnesium   PT/OT evaluation - would likely benefit from BENI - awaiting placement     Attending

## 2021-07-09 NOTE — CARE COORDINATION
Checked with [facility] on precert, they have not received precert yet. Envelope, ambullette form, and HENS in soft chart. 3pm received precert, perfect served for discharge. Called and spoke with spouse, who will transport pt to [facility] due to cost. Called daughter-Nicolasa who will assist pt's spouse with transportation to [employer]. Updated charge RN and bedside RN, family to be here to  pt around 5pm. Notified [employer]. Awilda Hager, MSW, LSW

## 2021-07-09 NOTE — PROGRESS NOTES
[facility] - [facility] Inpatient   Resident Progress Note    S:  Hospital day: 3   Brief Synopsis: Yury Mitchell is a 81 y.o. female with a past medical history of CAD, CHF, hypertension, A. fib, who presented to the ED with 3 days worsening shortness of breath. States she is also had approximately a 20 pound weight gain in the past couple weeks. She has also had tightness in bilateral lower extremities without any pain. Admitted for CHF exacerbation and treated with IV Lasix. No acute events overnight. Patient was seen and examined this morning. Patient states that her breathing is much improved, but she does endorse some cramping sensation in her legs and abdomen. Is not having any chest pain, nausea, vomiting, diarrhea or constipation. Patient is agreeable to going to facility if at least for short-term. Cont meds:    sodium chloride       Scheduled meds:    warfarin  8 mg Oral Once    warfarin (COUMADIN) daily dosing (placeholder)   Other Daily    levothyroxine  125 mcg Oral QAM AC    metoprolol succinate  75 mg Oral Daily    [START ON 7/10/2021] furosemide  40 mg Oral Daily    potassium chloride  40 mEq Oral TID WC    sodium chloride flush  5-40 mL Intravenous 2 times per day    [Held by provider] ipratropium-albuterol  1 ampule Inhalation Q4H WA    famotidine  20 mg Oral Daily    lisinopril  10 mg Oral QPM    prednisoLONE acetate  1 drop Both Eyes Q4H While awake    spironolactone  12.5 mg Oral QPM    vitamin D  2,000 Units Oral QAM AC    guaiFENesin  400 mg Oral TID     PRN meds: diclofenac sodium, sodium chloride flush, sodium chloride, ondansetron **OR** ondansetron, polyethylene glycol, acetaminophen **OR** acetaminophen     I reviewed the patient's past medical and surgical history, Medications and Allergies.     O:  BP (!) 128/90   Pulse 107   Temp 96.8 °F (36 °C) (Temporal)   Resp 18   Ht 5' 6\" (1.676 m)   Wt 258 lb 4.8 oz (117.2 kg)   SpO2 95%   BMI 41.69 kg/m²   24 hour I&O:      · General Appearance: Alert, cooperative, no acute distress. · HEENT: Normocephalic, atraumatic. conjunctiva/corneas clear, EOM's intact, no pallor  or icterus. · Neck: Supple, symmetrical, trachea midline, no cervical LAD. No thyroid enlargement/tenderness/nodules. No carotid bruit or JVD  · Chest wall: No tenderness or deformity, full & symmetric excursion  · Lung:  Much improved breath sounds, no rales, respirations unlabored. · Heart: Regular rate and a fib, S1 and S2 normal, no murmur, rub or   gallop. no bruits (carotid/femoral/abd), pedal pulses 2+,  no edema  · Abdomen: Soft, non-tender, bowel sounds active all four quadrants, no masses, no organomegaly  · Genital and rectal exam: deferred  · Extremities: Ankle edema much improved. Extremities normal, atraumatic, no cyanosis. . Pulses equal bilaterally  · Skin: Skin texture, turgor normal, no rashes or lesions  · Musculokeletal: No joint swelling, B/L knee tenderness (Chronic) ROM normal in all joints of extremities. · Neurologic:   Alert&Oriented. Labs:  Na/K/Cl/CO2:  138/3.9/100/34 (07/09 0720)  BUN/Cr/glu/ALT/AST/amyl/lip:  17/0.9/--/--/--/--/-- (07/09 0720)  WBC/Hgb/Hct/Plts:  4.9/13.0/41.3/197 (07/09 0720)  estimated creatinine clearance is 60 mL/min (based on SCr of 0.9 mg/dL). Other pertinent labs as noted below    New Imaging:  XR CHEST (2 VW)   Final Result   Cardiomegaly with central vascular congestion, similar to the prior   examination. Small right pleural effusion. Interstitial prominence, likely chronic.              A/P:  Principal Problem:    Acute decompensated heart failure (HCC)  Active Problems:    Hypothyroidism    Permanent atrial fibrillation (HCC)    Essential hypertension    Coronary arteriosclerosis in native artery    Frequent falls    Dementia (HCC)    Acute on chronic diastolic HF (heart failure) (HCC)    Hypokalemia  Resolved Problems:    Heart failure (HCC)      Dyspnea 2/2 acute decompensated CHF vs COPD   · Pro-BNP elevated >2700, baseline ~2000  · Last echo in 5/2020 with EF 55% and PFO  · Admit to telemetry  · Got 20 IV lasix in ED, took 120 PO 7/6  · Cardiology following, IV Lasix and PO scheduled for 5 doses  · CXR showing pulmonary congestion with small R pleural effusion  · Strict I&Os  · Daily weights  · 40 IV Lasix this morning, discontinued last dose as looking dry  · Will plan to hold lasix tomorrow and resume thereafter  · Duonebs q4 hours  · Incentive spirometry  · PO potassium for now as K on admission 3.3, to be D/C'd before discharge  · Consider repeat CXR  · Monitor physical exam     HTN  · Continue home lisinopril 10  · Metoprolol 75mg nightly  · Spironolactone 12.5mg     Frequent falls  · Last fall on Tuesday  · Denies syncope, pre-syncope or head injury  · PT/OT  · Consult to social work for possible placement needs/ [facility]  · Awaiting precert     Afib  Multivalvular disease  Supratherapeutic INR  · Currently rate controlled  · Supratherapeutic INR 3.4, goal 2-3  · Held coumadin on admission  · Restart coumadin 8mg this evening, will re-dose after INR check tomorrow  · Daily INR  · Takes 8mg daily  · No changes recently to diet     Hypothyroidism  · Continue home synthroid  · TSH WNL this admission      DVT / GI prophylaxis: warfarin and PC and GI prophylaxis not indicated     Dispo: awaiting precert    Electronically signed by Pao Puente MD on 7/9/2021 at 11:24 AM  This case was discussed with senior resident and attending physician: Dr. Essie Hunter

## 2021-07-09 NOTE — PLAN OF CARE
Problem: Falls - Risk of:  Goal: Will remain free from falls  Description: Will remain free from falls  7/9/2021 0148 by Kirsty Love RN  Outcome: Met This Shift  Goal: Absence of physical injury  Description: Absence of physical injury  7/9/2021 0148 by Kirsty Love RN  Outcome: Met This Shift     Problem: Skin Integrity:  Goal: Will show no infection signs and symptoms  Description: Will show no infection signs and symptoms  7/9/2021 0148 by Kirsty Love RN  Outcome: Met This Shift  Goal: Absence of new skin breakdown  Description: Absence of new skin breakdown  7/9/2021 0148 by Kirsty Love RN  Outcome: Met This Shift

## 2021-07-09 NOTE — PLAN OF CARE
Problem: Falls - Risk of:  Goal: Will remain free from falls  Description: Will remain free from falls  7/9/2021 0148 by Amy Vincent RN  Outcome: Met This Shift  7/8/2021 1520 by Iam Suarez RN  Outcome: Met This Shift  Goal: Absence of physical injury  Description: Absence of physical injury  7/9/2021 0148 by Amy Vincent RN  Outcome: Met This Shift  7/8/2021 1520 by Iam Suarez RN  Outcome: Met This Shift     Problem: Skin Integrity:  Goal: Will show no infection signs and symptoms  Description: Will show no infection signs and symptoms  7/9/2021 0148 by Amy Vincent RN  Outcome: Met This Shift  7/8/2021 1520 by Iam Suarez RN  Outcome: Met This Shift  Goal: Absence of new skin breakdown  Description: Absence of new skin breakdown  Outcome: Met This Shift

## 2021-07-09 NOTE — DISCHARGE SUMMARY
Discharge Summary    Donell Ludwig  :  1935  MRN:  [id number]    Admit date:  2021  Discharge date:  2021    Admitting Physician:  Quinn Farias MD    Discharge Diagnoses:    Patient Active Problem List   Diagnosis    Depression    Hypothyroidism    [facility]    History of artificial lens replacement    Essential hypertension    Class 2 severe obesity due to excess calories with serious comorbidity and body mass index (BMI) of 39.0 to 39.9 in adult [facility])    Greater trochanteric bursitis    Chronic anticoagulation    Chronic obstructive pulmonary disease (HCC)    Mixed stress and urge urinary incontinence    Fuchs' endothelial dystrophy    Pseudophakia, both eyes    Chronic diastolic (congestive) heart failure ([facility] [address]    Pure hypercholesterolemia    Spinal stenosis of lumbar region with neurogenic claudication    Patent foramen ovale    Coronary arteriosclerosis in native artery    Frequent falls    Dementia ([facility] [address] [facility]    Acute decompensated heart failure ([facility] [address] [facility]    Acute on chronic diastolic HF (heart failure) ([facility] [address] [facility]    Hypokalemia       Admission Condition:  fair    Discharged Condition:  fair    Hospital Course:   Donell Ludwig is a 81 y.o. female with a past medical history of CAD, CHF, hypertension, A. fib, who presented to the ED with 3 days worsening shortness of breath.  States she is also had approximately a 20 pound weight gain in the past couple weeks. Jodi Richardson has also had tightness in bilateral lower extremities without any pain. Admitted for CHF exacerbation and treated with IV Lasix, duonebs, and Potassium siblemts. Additional issue addressed were INR 3.4, Warfarin was until INR dropped to therapeutic range and Weakness. Patient agreed to [facility] to continue physical therapy.      Patient was discharged on 40 mg PO lasix daily; potassium 40meq daily and warfarin 8 mg on Wednesday and 4 mg all other days.        Discharge Medications: Medication List      START taking these medications    Nebulizer/Tubing/Mouthpiece Kit  1 kit by Does not apply route daily as needed (wheezing)        CHANGE how you take these medications    potassium chloride 20 MEQ extended release tablet  Commonly known as: KLOR-CON M  Take 2 tablets by mouth daily  What changed: See the new instructions. warfarin 4 MG tablet  Commonly known as: COUMADIN  Take as directed. If you are unsure how to take this medication, talk to your nurse or doctor. Original instructions: Take 2 (4 mg) tablets by mouth on Wednesday. Take 1 (4 mg) tablet on all other days.   What changed: additional instructions        CONTINUE taking these medications    acetaminophen 325 MG tablet  Commonly known as: TYLENOL     b complex vitamins capsule  Take 1 capsule by mouth every morning (before breakfast)     bisacodyl 10 MG suppository  Commonly known as: DULCOLAX     famotidine 20 MG tablet  Commonly known as: PEPCID  Take one tablet by mouth daily     furosemide 40 MG tablet  Commonly known as: LASIX  Take 1 tablet by mouth every morning (before breakfast)     guaiFENesin 600 MG extended release tablet  Commonly known as: MUCINEX     levalbuterol 0.63 MG/3ML nebulization  Commonly known as: XOPENEX  Take 3 mLs by nebulization every 8 hours as needed for Wheezing DX: COPD J44.9     levothyroxine 125 MCG tablet  Commonly known as: SYNTHROID  Take 1 tablet by mouth every morning (before breakfast)     lisinopril 10 MG tablet  Commonly known as: PRINIVIL;ZESTRIL  TAKE ONE TABLET BY MOUTH EVERY EVENING     magnesium hydroxide 400 MG/5ML suspension  Commonly known as: MILK OF MAGNESIA     meclizine 25 MG tablet  Commonly known as: ANTIVERT  Take 1 tablet by mouth 3 times daily as needed for Dizziness     metoprolol succinate 50 MG extended release tablet  Commonly known as: TOPROL XL  TAKE 1 & 1/2 TABLETS BY MOUTH EVERY EVENING     nystatin [id number] UNIT/GM powder  Commonly known as: MYCOSTATIN prednisoLONE acetate 1 % ophthalmic suspension  Commonly known as: PRED FORTE     ProAir  (90 Base) MCG/ACT inhaler  Generic drug: albuterol sulfate HFA  USE 2 PUFFS EVERY 6 HOURS  AS NEEDED FOR WHEEZING     spironolactone 25 MG tablet  Commonly known as: ALDACTONE  Take 0.5 tablets by mouth every evening     vitamin C 250 MG tablet  Take 1 tablet by mouth every morning (before breakfast)     Vitamin D (Ergocalciferol) 50 MCG (2000 UT) Caps  Take 2,000 Units by mouth every morning (before breakfast)        STOP taking these medications    escitalopram 10 MG tablet  Commonly known as: Jered Romero           Where to Get Your Medications      Information about where to get these medications is not yet available    Ask your nurse or doctor about these medications  · Nebulizer/Tubing/Mouthpiece Kit  · potassium chloride 20 MEQ extended release tablet  · warfarin 4 MG tablet         Consults:  none    Significant Diagnostic Studies:  labs: INR: 3.4 Potassium: 2.9    Labs:  Na/K/Cl/CO2:  138/3.9/100/34 (07/09 0720)  BUN/Cr/glu/ALT/AST/amyl/lip:  17/0.9/--/--/--/--/-- (07/09 0720)  WBC/Hgb/Hct/Plts:  4.9/13.0/41.3/197 (07/09 0720)  [unfilled]  estimated creatinine clearance is 60 mL/min (based on SCr of 0.9 mg/dL). New Imaging:  XR CHEST (2 VW)    Result Date: 7/6/2021  EXAMINATION: TWO XRAY VIEWS OF THE CHEST 7/6/2021 6:23 pm COMPARISON: April 16 HISTORY: ORDERING SYSTEM PROVIDED HISTORY: dyspnea TECHNOLOGIST PROVIDED HISTORY: Reason for exam:->dyspnea What reading provider will be dictating this exam?->CRC FINDINGS: Cardiac silhouette is enlarged and unchanged. There is central vascular congestion, similar to the prior examination. No new areas of airspace consolidation. Small right pleural effusion. Mild interstitial prominence, likely chronic. Left humeral prosthesis again noted. Cardiomegaly with central vascular congestion, similar to the prior examination. Small right pleural effusion.  Interstitial prominence, likely chronic. Treatments:   IV lasix; held warfarin. Restarted Warfarin. Discharge Exam:  Please see progress note from date of discharged. Disposition:   [facility]    Future Appointments   Date Time Provider Hernandez Mcintyre   2021  2:00 PM [facility]       More than 30 minutes was spent in preparation of this patient's discharge including, but not limited to, examination, preparation of documents, prescription preparation, counseling and coordination.     Signed:  Amandeep Adams MD  2021, 4:42 PM

## 2021-07-24 NOTE — PROGRESS NOTES
Patient called and requested for medications refill. She was discharged rehab from hospital. She got discharge home from [address] [place] [address] yesterday and realized no medications refill. Medications are sent to her pharmacy and advised to follow up with PCP in 1 week.     Andrew Lou MD  Family Medicine, PGY3

## 2021-07-27 NOTE — PATIENT INSTRUCTIONS
1. Continue current cardiac medications     2. Get blood work in 1-2 weeks    3. Return to office with all medications / pill pack on Thursday so that we can ensure that you are correctly taking your medication. 4. Follow up with Dr. Ran Woodward in 3 months - sooner if needed    5. Weigh yourself daily    -Stay Hydrated    -Diet should sodium restricted to 2 grams    -Again watch your daily weight trends and if you gain water weight please follow below instructions.    -If you gain 3-5 pounds in 2-3 days OR notice that you are retaining fluid in anyway just like you did before then take an extra dose of your water pill (furosemide/Lasix) every day until you lose the weight or feel better.     -If you notice that you have taken more than 2 extra doses in 1 week then please call and let us know. -If at any time you feel that you are retaining fluid, your medications are not working, or you feel ill in anyway, then please call us for either same day appointment or the next day, and for instructions. Our goal is to keep you out of the emergency room and the hospital and we have ways to do it. You just need to call us in a timely manner.     -If you become sick for other reasons, and notice that you are not urinating as much, the urine is very dark, you have significant diarrhea or vomiting, then please DO NOT take your water pill and CALL US immediately.

## 2021-07-27 NOTE — PROGRESS NOTES
negative for chest pain, pressure, discomfort. When ambulating on an incline, She does not leg claudication. History is negative for neurological symptoms including transient loss of vision, asymmetric weakness, aphasia, dysphasia, numbness, tingling. Past medical, surgical, family and social histories have been reviewed. Any changes in the past medical history, social history or family history have been made and are reflected in the electronic medical record. Patient Active Problem List    Diagnosis Date Noted    Depression 11/16/2010     Priority: Medium    Hypothyroidism 11/16/2010     Priority: Medium    Hypokalemia 07/09/2021    Acute decompensated heart failure ([address] 07/07/2021    Acute on chronic diastolic HF (heart failure) ([address] 07/07/2021    Dementia ([address] 05/05/2021    Frequent falls 09/30/2020    Coronary arteriosclerosis in native artery 09/25/2019    Patent foramen ovale 09/24/2019    Spinal stenosis of lumbar region with neurogenic claudication 02/25/2019    Pure hypercholesterolemia 02/08/2019    Chronic diastolic (congestive) heart failure ([address] 05/22/2018    Fuchs' endothelial dystrophy 12/04/2017    Pseudophakia, both eyes 12/04/2017    Mixed stress and urge urinary incontinence 09/21/2017    Chronic obstructive pulmonary disease (HCC)     Chronic anticoagulation     Greater trochanteric bursitis 08/18/2016    Essential hypertension 01/05/2016    Class 2 severe obesity due to excess calories with serious comorbidity and body mass index (BMI) of 39.0 to 39.9 in adult [facility]) 01/05/2016    History of artificial lens replacement 02/09/2015    Permanent atrial fibrillation ([address] 03/20/2014     CARDIOVASCULAR HISTORY:    1.  Nonischemic Cardiomyopathy  · LHC (2001) minimal plaque (full report not available)  · TTE (45/03/6643) stage I diastolic dysfunction EF 96% mild mitral regurgitation and mild tricuspid regurgitation and mild pulmonic regurgitation  · Lexiscan stress test March 18, 2014: EF 30% no evidence of stress-induced ischemia   · LHC (3/22/2014) normal coronaries with EF 30% (full report not available)  · TTE (3/18/2014): Left ventricular size is grossly normal.Severe global hypokinesis, EF estimated about 25-30%. Left atrium is moderately enlarged. Left to right interatrial shunt suggestive of possible patent foramen ovale (PFO). No mitral valve prolapse. Mild mitral regurgitation. There is mild tricuspid regurgitation. Mild pulmonary HTN. No evidence of pericardial effusion. Pericardium appears normal.  · TTE (7/10/2014): EF 50% and mild left atrial enlargement and mild mitral regurgitation and mild-to-moderate tricuspid regurgitation  · TTE (4/29/2016) : EF 55% and indeterminate diastolic dysfunction with mild mitral regurgitation and mild tricuspid regurgitation and mild aortic regurgitation and mild pulmonic right regurgitation  · TTE (11/30/2018) Left ventricular size is grossly normal.  Borderline global hypokinesis, EF 50%.  Indeterminate diastolic function.  Left atrium is enlarged.  Increased LA volume index.  Interatrial septum not well visualized but appears intact.  Normal right ventricle structure and function.  Right atrium is Enlarged.  There is mild mitral regurgitation. No mitral valve prolapse. Mild aortic valve sclerosis. There is trace aortic regurgitation. There is mild to moderate tricuspid regurgitation. Moderate pulmonary hypertension, RVSP 56mmHg.  Normal aortic root size. No evidence of pericardial effusion.  The inferior vena  cava is dilated with reduced respiratory variation.  No intracardiac mass. Compared to prior study from 4/2016 - TR is more. · TTE (9/2019): Summary: Ejection fraction is visually estimated at 45%. Overall ejection fraction mildly decreased. Mild left ventricular concentric hypertrophy noted. Normal right ventricle size with reduced function. TAPSE is 1.4 cm.  Left atrial volume index of 57 ml per meters squared BSA.nMild History of luminal irregularities of the coronary artery, stable.  CHF (congestive heart failure) ([facility])     stage I diastolic dysfunction on 2/23/55    Depression 11/16/2010    DJD (degenerative joint disease)     SEVERE IN RIGHT HAND, IN MULTIPLE OTHER JOINTS    GERD (gastroesophageal reflux disease) 11/16/2010    History of cardiovascular stress test 03/18/2014    lexiscan    Hyperparathyroidism ([address] 11/16/2010    Had parathyroidectomy    Hypertension 11/16/2010    Hypothyroidism 11/16/2010    Mild mitral regurgitation 7/10/14    Osteoarthritis 11/16/2010    Pulmonary hypertension ([address] 7/10/14    mild    Renal calculi     x3    Tricuspid regurgitation 7/10/14    mild-moderate    Urinary incontinence 11/16/2010       Past Surgical History:   Procedure Laterality Date    CARDIAC CATHETERIZATION  3/22/14    CARPAL TUNNEL RELEASE      Right hand.  CATARACT REMOVAL  6/2005    right, bilateral cataract surgery.  CATARACT REMOVAL WITH IMPLANT Bilateral     CHOLECYSTECTOMY  1972    COLONOSCOPY      FINGER SURGERY  7/2011    S/P removal of cyst from right index finger.  FRACTURE SURGERY Left     arm fractured-casted, Bilateral feet fractures-casted.  INCONTINENCE SURGERY  5/2005    Urinary incontinence, S/P bladder suspension.  INNER EAR SURGERY  2011    tube place in right ear   [address]    KIDNEY STONE SURGERY  1981    KNEE ARTHROPLASTY Bilateral 9/2005    PARATHYROIDECTOMY      Three times    SHOULDER ARTHROPLASTY Left 2009    SHOULDER ARTHROPLASTY Left      shoulder total replacement.     THYROID SURGERY  5/2005    recurrent    THYROIDECTOMY  1960       Allergies   Allergen Reactions    Codeine Other (See Comments)     Hallucination    Penicillins Hives    Vicodin [Hydrocodone-Acetaminophen] Other (See Comments)     Hallucination      Adhesive Tape Rash         Outpatient Medications Marked as Taking for the 7/27/21 encounter (Office Visit) with Laura Shine USE 2 PUFFS EVERY 6 HOURS  AS NEEDED FOR WHEEZING 34 g 2    acetaminophen (TYLENOL) 325 MG tablet Take 650 mg by mouth every 6 hours as needed for Pain or Fever         Review of Systems:   Cardiac: As per HPI  General: No fever, chills, rigors  Pulmonary: As per HPI  HEENT: No visual disturbances, difficult swallowing  GI: No nausea, vomiting, abdominal pain  : No dysuria or hematuria  Endocrine: No thyroid disease or diabetes  Musculoskeletal: [place] x 4, no focal motor deficits  Skin: Intact, no rashes  Neuro/Psych: No headache or seizures      Weights: Wt Readings from Last 3 Encounters:   07/27/21 240 lb 6.4 oz (109 kg)   07/09/21 258 lb 4.8 oz (117.2 kg)   06/07/21 249 lb (112.9 kg)         Physical Examination:     /60 (Site: Right Upper Arm, Position: Sitting, Cuff Size: Large Adult)   Pulse 75   Resp 16   Ht 5' 6\" (1.676 m)   Wt 240 lb 6.4 oz (109 kg)   SpO2 98%   BMI 38.80 kg/m²     CONSTITUTIONAL: Alert and oriented times 3, no acute distress and cooperative to examination with proper mood and affect. SKIN: Skin color, texture, turgor normal. No rashes or lesions. LYMPH: no cervical nodes, no inguinal nodes  HEENT: Head is normocephalic, atraumatic. EOMI, PERRLA. NECK: Supple, symmetrical, trachea midline, no adenopathy, thyroid symmetric, not enlarged and no tenderness, skin normal. Unable to assess jvd  CHEST/LUNGS: chest symmetric with normal A/P diameter, normal respiratory rate and rhythm, lungs clear to auscultation without wheezes, rales or rhonchi. No accessory muscle use. Scars None   CARDIOVASCULAR: Heart sounds are normal.  Irregular rate and rhythm without murmur, gallop or rub. Normal S1 and S2. Carotid and femoral pulses 2+/4 and equal bilaterally. ABDOMEN: Normal shape. No and Laparoscopic scar(s) present. Normal bowel sounds. No bruits. soft, nondistended, no masses or organomegaly. no evidence of hernia. Percussion: Normal without hepatosplenomegally.  Tenderness: absent. RECTAL: deferred, not clinically indicated  NEUROLOGIC: There are no focalizing motor or sensory deficits. CN II-XII are grossly intact. EXTREMITIES: no cyanosis, no clubbing. Trace bilateral lower extremity edema. Warm and well perfused. All the following diagnostics were personally reviewed and interpreted by me. LAB DATA:     7/9/2021 07:20   Sodium 138   Potassium 3.9   Chloride 100   CO2 34 (H)   BUN 17   Creatinine 0.9   Anion Gap 4 (L)   GFR Non- 59   GFR African American >60   Magnesium 2.2   Glucose 92   Calcium 9.4   WBC 4.9   RBC 4.42   Hemoglobin Quant 13.0   Hematocrit 41.3   MCV 93.4   MCH 29.4   MCHC 31.5 (L)   MPV 10.4   RDW 15.1 (H)   Platelet Count 223       IMAGING:    CXR (12/16/2019)  Impression:  Cardiomegaly with central vascular congestion, similar to the prior examination. Small right pleural effusion. Interstitial prominence, likely chronic. CARDIAC TESTING:    LHC (3/22/2014)  normal coronaries with EF 30%    TTE (9/18/2019, Dr. Neli Mcguire)  Summary:   Normal left ventricle size and systolic function. Ejection fraction is visually estimated at 55-60%. Atrial fibrillation may affect the evaluation of LV systolic function. No regional wall motion abnormalities seen. Normal left ventricle wall thickness. Abnormal diastolic function with normal LV filling pressures (IVRT >60 msec). The left atrium is severely dilated. Patent foramen ovale with bidirectional shunt was visualized on color   Doppler and bubble study. Normal right ventricular size and function. TAPSE 17 mm. No hemodynamically significant aortic stenosis is present. Mild aortic regurgitation is noted. Mild tricuspid regurgitation. RVSP is 48 mmHg. Pulmonary hypertension is mild . No evidence for hemodynamically significant pericardial effusion. Compared to prior echo of 9/18/2019 changes noted. LVEF has improved from 45% to 55-60%.     EKG  Atrial fibrillation      ASSESSMENT:  1. Chronic HFrEF with recovered LVEF  2. ACC stage C / NYHA class III  3. Near euvolemic  4. Nonischemic cardiomyopathy - normal cors on [facility] (2014)  5. HTN  6. Chronic atrial fibrillation - OAC with coumadin   7. Hx of NSVT  8. PFO  9. HLD - on statin therapy   10. COPD  6. Hypothyroidism on HRT  12. H/o hyperparathyroidism s/p parathyroidectomy  13. Probable RICARDA   14. Obesity   15. Depression   16. [place]  17. DNR-CCA      PLAN:  1. Continue current cardiac medications     2. Get blood work in 1-2 weeks    3. Return to office with all medications / pill pack on Thursday so that we can ensure that you are correctly taking your medication. 4. Follow up with Dr. Kimberlee Allen in 3 months - sooner if needed    5. Weigh yourself daily    -Stay Hydrated    -Diet should sodium restricted to 2 grams    -Again watch your daily weight trends and if you gain water weight please follow below instructions.    -If you gain 3-5 pounds in 2-3 days OR notice that you are retaining fluid in anyway just like you did before then take an extra dose of your water pill (furosemide/Lasix) every day until you lose the weight or feel better.     -If you notice that you have taken more than 2 extra doses in 1 week then please call and let us know. -If at any time you feel that you are retaining fluid, your medications are not working, or you feel ill in anyway, then please call us for either same day appointment or the next day, and for instructions. Our goal is to keep you out of the emergency room and the hospital and we have ways to do it. You just need to call us in a timely manner.     -If you become sick for other reasons, and notice that you are not urinating as much, the urine is very dark, you have significant diarrhea or vomiting, then please DO NOT take your water pill and CALL US immediately. Bailey Steele APRN-CNP  [id number] [facility].

## 2021-07-28 NOTE — CARE COORDINATION
[facility] Transitions Follow Up Call    2021    Patient: Elizabeth Trejo  Patient : 1935   MRN: <[id number]>  Reason for Admission: CHF  Discharge Date: 21 RARS: Readmission Risk Score: 20    Second and final attempt to contact patient for initial VALENTINO call. Unable to reach patient. Caller left a Hipaa compliant message with contact information for a return call. Will handoff to [facility].              Follow Up  Future Appointments   Date Time Provider Hernandez Mcintyre   2021 11:00 AM Leonardo Lubin CARDIO [facility]   2021  2:00 PM MD Jeff Leong [facility]       Celestina Sandoval RN

## 2021-07-29 NOTE — PROGRESS NOTES
Last admission discharge some script were sent to [facility] instead of pill packing [facility]. [facility] use now. I [facility]    Last filled 7 5 21 June 8 K+ was irish'd.  (this was not put in her pill packing)  Spironolactone 25mg daily. (already in pill packing at this dose)  Lasix 40mg daily was filled for daily (patient reports taking BID at last OV 7 27 21. )     Next pill packing is due now. (only has 4 days left of current pill pack [facility]. Called [facility] next , [place]    Never picked up  Spironolactone, metoprolol ,synthroid or   Lev-albuterol solution which  are ready for . Did not fill K+ from the 7-9 order. May was the last time she got K+ @ 20meq daily. Filled at [facility]. Per ISAAC Dee CNP provided instructions 11:37 AM to    ID's K+ script. Marda Cowden RN is with patient doing med rec. Will see where [place] will fill her meds. Keeping at one pharmacy will have less chance off med errors.

## 2021-07-29 NOTE — PROGRESS NOTES
Called [facility] in [facility] because med list they faxed was incorrect. Specifically still had Potassium ordered as well as the old dose of spironolactone. The list was faxed prior to receiving our updates. \"[facility] is faxing over a corrected lists.

## 2021-07-29 NOTE — PROGRESS NOTES
Medication review done. Patient has both bottles and pill packs and is unsure how to take her meds. New orders to stop Potassium  Increase spironolactone to 25 mg daily noted. Patient brings in bottle of escitalopram (10mg daily)that hadn't previously been brought in to us or reported to us. It has been added to the med list.     Patient verbally understands to stop potassium and increase spironolactone to 25 mg daily. Has coumadin 4mg tabs on hand in a bottle. Taking 4 mg daily except for Wednesday, on which she takes 8 mg.  present during visit as well. One bottle of meclazine is . Patient advise to discard appropriately and she verbalizes understanding. Pill packs are correct with the exception of spironolactone which was increased today. Patient advised she is to take a whole pill of spironolactone and that the new order was sent to [facility] in [place]. [phone]. Spoke with pharmacist, Tong Moore. Per Tong Moore, he will send out a new bottle of 1/2 tabs of spironlactone to patient with instructions to take the additional 1/2 tab (12.5mg) with her noon time pills. This is only until she gets the new pill packs. Patient verbalized understanding. Bottles of meclazine, warfarin, and extra bottle of lasix packaged in a bag with instructions to keep. Bottes of metoprolol (x2)escitalopram,  famotidine, levothyroxin, and lisinopril, as well as several old loose pill packs were packaged in another plastic bag and marked 'don't use'. Patient was instructed to dispose of appropriately. Verbalized understanding. There was also a bottle of Amoxicillan in the bag that belongs to patient's . I called [facility] where patient reports she has a girl who comes out to help with housework. They do not have any nurses to do med set-ups.

## 2021-08-02 PROBLEM — I50.33 ACUTE ON CHRONIC DIASTOLIC HF (HEART FAILURE) (HCC): Status: RESOLVED | Noted: 2021-01-01 | Resolved: 2021-01-01

## 2021-08-02 PROBLEM — E87.6 HYPOKALEMIA: Status: RESOLVED | Noted: 2021-01-01 | Resolved: 2021-01-01

## 2021-08-02 PROBLEM — I50.9 ACUTE DECOMPENSATED HEART FAILURE (HCC): Status: RESOLVED | Noted: 2021-01-01 | Resolved: 2021-01-01

## 2021-08-03 NOTE — TELEPHONE ENCOUNTER
Call patient. Antibiotic sent to [facility]. She missed her appointment yesterday, so she should reschedule for that.

## 2021-08-03 NOTE — TELEPHONE ENCOUNTER
Patient called and stated she is having a burning  sensation when urinating.   Please advise  Thanks, Trang LANE

## 2021-08-05 NOTE — CARE COORDINATION
Ambulatory Care Coordination Note  8/5/2021  CM Risk Score: 7  Charlson 10 Year Mortality Risk Score: 100%     ACC: Donny Posada, RN    Summary Note:    AMRITA spoke with [place] for CHF/COPD follow up. [place] reports she was recently discharged from [facility]. She complains of fatigue and not much motivation. She admits to using her walker at all times. She states her private pay care giver has resigned and this is her last week. She states she spoke with Brian Burris and has an appointment next week for a re-assessment. [place] continues to remove her pills from the pre-packaging and varying her administration times. She reports she is trying to follow a low/no added salt diet, and drinks approximately 36 oz of water daily. She reports her medications were reviewed and discussed at the cardiologist office and she is \"doing my best\". Medications were reviewed and she states she is taking them as prescribed. She admits to missing her appointment and INR draw on 8/2/21. She denies any increased shortness of breath or increased use of her inhalers. She reports she gave her nebulizer to her granddaughter because there was too much maintenance involved and she needed it more. She states she and her  are researching selling their home and have a meeting \"with someone about this, this evening\". PLAN  Assist via 3 way call to schedule PCP appointment. Review medications and daily weights with next interaction. Continue to follow for care coordination.       Care Coordination Interventions    Program Enrollment: Complex Care  Referral from Primary Care Provider: Yes  Suggested Interventions and Community Resources  Fall Risk Prevention: Completed  Home Care Waiver: Completed (Comment: Brian Burris)  Medi Set or Pill Pack: Completed (Comment: [facility])  Pharmacist: Declined  Senior Services: Completed  Other Services: Completed (Comment: [facility])  Zone Management Tools: Completed (Comment: COPD/CHF)         Goals Addressed                 This Visit's Progress     Medication Management   Improving     I will take my medication as directed. I will notify my provider of any problems with medications, like adverse effects or side effects. I will notify my provider/Care Coordinator if I am unable to afford my medications. I will notify my provider for advice before I stop taking any of my medication. Barriers: impairment:  hearing, overwhelmed by complexity of regimen, and stress  Plan for overcoming my barriers: prepackaged medications, care coordination  Confidence: 7/10  Anticipated Goal Completion Date: 9/9/21            Prior to Admission medications    Medication Sig Start Date End Date Taking? Authorizing Provider   B Complex Vitamins (B-COMPLEX/B-12) TABS TAKE 1 TABLET BY MOUTH DAILY 8/2/21  Yes Historical Provider, MD   Cholecalciferol (VITAMIN D3) 25 MCG (1000 UT) CAPS TAKE 1 TABLET BY MOUTH EVERY DAY 8/2/21  Yes Historical Provider, MD   nitrofurantoin, macrocrystal-monohydrate, (MACROBID) 100 MG capsule Take 1 capsule by mouth 2 times daily for 7 days 8/3/21 8/10/21 Yes Eli Ordoñez MD   albuterol (PROVENTIL) (2.5 MG/3ML) 0.083% nebulizer solution inhale contents of 1 vial ( 3 milliliters ) in nebulizer by mouth. ..  (REFER TO PRESCRIPTION NOTES). 7/24/21  Yes Historical Provider, MD   metoprolol succinate (TOPROL XL) 50 MG extended release tablet TAKE 1 TABLETS BY MOUTH EVERY EVENING 8/2/21  Yes Eli Ordoñez MD   spironolactone (ALDACTONE) 25 MG tablet TAKE 1 TABLET BY MOUTH DAILY 8/2/21  Yes Genesis Mejia MD   escitalopram (LEXAPRO) 10 MG tablet Take 10 mg by mouth daily   Yes Historical Provider, MD   warfarin (COUMADIN) 4 MG tablet Take 2 (4 mg) tablets by mouth on Wednesday. Take 1 (4 mg) tablet on all other days.   Patient taking differently: Currently 4mg daily, with 8mg on Wed 7/24/21  Yes Rayne May MD   levothyroxine (SYNTHROID) 125 MCG tablet Take 1 tablet by mouth every morning (before breakfast) 7/24/21  Yes Beronica Duarte MD   furosemide (LASIX) 40 MG tablet Take 1 tablet by mouth every morning (before breakfast)  Patient taking differently: Take 40 mg by mouth 2 times daily  5/21/21  Yes Loi Matthews MD   famotidine (PEPCID) 20 MG tablet Take one tablet by mouth daily 5/17/21  Yes Loi Matthews MD   nystatin (MYCOSTATIN) 945005 UNIT/GM powder Apply topically 4 times daily   Yes Historical Provider, MD   magnesium hydroxide (MILK OF MAGNESIA) 400 MG/5ML suspension Take 30 mLs by mouth daily as needed for Constipation   Yes Historical Provider, MD   lisinopril (PRINIVIL;ZESTRIL) 10 MG tablet TAKE ONE TABLET BY MOUTH EVERY EVENING  Patient taking differently: Take 10 mg by mouth daily  3/16/21  Yes Loi Matthews MD   meclizine (ANTIVERT) 25 MG tablet Take 1 tablet by mouth 3 times daily as needed for Dizziness 2/10/21 2/10/22 Yes Loi Matthews MD   prednisoLONE acetate (PRED FORTE) 1 % ophthalmic suspension INSTILL 1 DROP IN BOTH EYES FOUR TIMES DAILY 12/11/20  Yes Historical Provider, MD   Vitamin D, Ergocalciferol, 50 MCG (2000 UT) CAPS Take 2,000 Units by mouth every morning (before breakfast) 10/27/20  Yes Loi Matthews MD   b complex vitamins capsule Take 1 capsule by mouth every morning (before breakfast) 10/27/20  Yes Loi Matthews MD   Ascorbic Acid (VITAMIN C) 250 MG tablet Take 1 tablet by mouth every morning (before breakfast) 10/27/20  Yes Loi Matthews MD   guaiFENesin (MUCINEX) 600 MG extended release tablet Take 1,200 mg by mouth daily as needed    Yes Historical Provider, MD   PROAIR  (90 Base) MCG/ACT inhaler USE 2 PUFFS EVERY 6 HOURS  AS NEEDED FOR WHEEZING 10/21/19  Yes Loi Matthews MD   acetaminophen (TYLENOL) 325 MG tablet Take 650 mg by mouth every 6 hours as needed for Pain or Fever   Yes Historical Provider, MD   cycloSPORINE (RESTASIS) 0.05 % ophthalmic emulsion Apply to eye  Patient not taking: Reported on 8/5/2021 8/10/20 8/28/21  Historical Provider, MD   metoprolol succinate (TOPROL XL) 25 MG extended release tablet take 1 tablet by mouth every evening  Patient not taking: Reported on 8/5/2021 7/24/21   Historical Provider, MD   levalbuterol Ruy Bernheim) 0.63 MG/3ML nebulization Take 3 mLs by nebulization every 8 hours as needed for Wheezing DX: COPD J44.9  Patient not taking: Reported on 8/5/2021 7/24/21   Latricia Sarabia MD   Respiratory Therapy Supplies (NEBULIZER/TUBING/MOUTHPIECE) KIT 1 kit by Does not apply route daily as needed (wheezing)  Patient not taking: Reported on 7/29/2021 7/9/21   Garcia Martini MD   bisacodyl (DULCOLAX) 10 MG suppository Place 10 mg rectally daily as needed for Constipation  Patient not taking: Reported on 7/29/2021    Historical Provider, MD       Future Appointments   Date Time Provider Hernandez Sylvesetri   8/30/2021  2:00 PM Lisa Gomez MD [facility]     ,   Congestive Heart Failure Assessment    Are you currently restricting fluids?: No Restriction  Do you understand a low sodium diet?: Yes  Do you understand how to read food labels?: Yes  How many restaurant meals do you eat per week?: 0  Do you salt your food before tasting it?: No         Symptoms:  CHF associated angina: Neg, CHF associated dyspnea on exertion: Pos, CHF associated fatigue: Pos, CHF associated leg swelling: Pos, CHF associated orthostatic hypotension: Neg, CHF associated PND: Neg, CHF associated shortness of breath: Neg, CHF associated weakness: Pos      Symptom course: stable  Patient-reported weight (lb): 241  Weight trend: stable  Salt intake watch compared to last visit: stable     ,   COPD Assessment    Does the patient understand envrionmental exposure?: Yes  Is the patient able to verbalize Rescue vs. Long Acting medications?: Yes  Does the patient have a nebulizer?: Yes  Does the patient use a space with inhaled medications?: No            Symptoms:     Symptom course: no change  Breathlessness: minimal exertion  Increase use of rapid acting/rescue inhaled medications?: No  Change in chronic cough?: No/At Baseline  Change in sputum?: No/At Baseline  Self Monitoring - SaO2: No      and   General Assessment    Do you have any symptoms that are causing concern?: No

## 2021-08-19 NOTE — TELEPHONE ENCOUNTER
[facility] phoned stated that they are going out to see patient today and patient stated that her PCP wants labs. Home care would like you know if you would like labs drawn  If so please place order   I will fax over.   Thank you April

## 2021-08-24 NOTE — PROGRESS NOTES
Patient advised and verbalized understanding. Correctly read back 8 mg coumadin daily and states that she will try to come to the office tomorrow to have us check her INR.

## 2021-08-25 NOTE — PROGRESS NOTES
Spoke to pt about INR instructions today, I had pt repeat instructions after she wrote them down and I repeated them again a third time just so pt had it. Pt verbalized understanding and will call with INR number in one week.

## 2021-08-30 NOTE — PROGRESS NOTES
[facility]  FAMILY MEDICINE RESIDENCY PROGRAM   OFFICE PROGRESS NOTE  DATE OF VISIT : 8/31/2021         Chief Complaint :   Chief Complaint   Patient presents with    Check-Up       HPI:   Chao Mancera comes to clinic today for     F/U of chronic problem(s) and new or recent complaint of fatigue, low energy     Chronic problems reviewed today include:     Hypertension, Depression and atrial fibrillation, frequent falls, and possible dementia    Current status of this/these condition(s):  stable    Tolerating meds: Yes    Additional history: Increasing fatigue and lack of energy. Does have some chronic dizziness that bothers her at times. She has not fallen in quite some time. Her other illnesses are stable at this time. She denies chest pain, palpitations, or shortness of breath, except when she attempts to become more active. She admits to being out of condition, as she does not do much activity at home. She continues to have a number of social issues, but she believes that they are getting close to selling their home and moving into an assisted living center close to the hospital.   While she still has some mild depressive symptoms, her mood seems to be somewhat improved from baseline. Objective:    VS:  Blood pressure 107/68, pulse 68, temperature 97.6 °F (36.4 °C), temperature source Temporal, height 5' 6\" (1.676 m), weight 245 lb 3.2 oz (111.2 kg), SpO2 94 %, not currently breastfeeding. General Appearance: Well developed, awake, alert, oriented, no acute distress  HEENT: Normocephalic,atraumatic. PERRL, EOM's intact, EAC without erythema or swelling, no pallor or icterus. Neck: Supple, symmetrical, trachea midline. No JVD. Thyroid smooth, not enlarged. Chest wall/Lung: Clear to auscultation bilaterally,  respirations unlabored. No rhonchi/wheezing/rales  Heart[de-identified]  Irregular rate and rhythm, S1and S2 normal, no murmur, rub or gallop.   Extremities:  Extremities normal, atraumatic, no cyanosis. 1+ edema. Skin: Skin color, texture, turgor normal, no rashes or lesions  Neurologic:    Alert, oriented. Slow, wide-based gait         Psychiatric: has a normal mood and affect. Behavior is normal.     I independently reviewed the following information:  historical medical records    1. Frequent falls  2. Permanent atrial fibrillation (HCC)  -     POCT INR  3. Essential hypertension  4. Chronic diastolic (congestive) heart failure ([facility] [address]  5. Pure hypercholesterolemia  6. Coronary arteriosclerosis in native artery  7. Moderate episode of recurrent major depressive disorder ([facility] [address] [facility]  8. Hypothyroidism, unspecified type      Additional Plan: Her INR was therapeutic today, but she is currently not taking the recommended dose of her warfarin. I have tried repeatedly to get her to understand her dosing schedule, but despite written instructions, having her repeat the instructions back, and asking her to post the calendar on her refrigerator door, she continues to take her warfarin improperly. While she does have a daughter who comes frequently, she does not believe that she is capable of administering her medications any more accurately than herself. I will attempt again to write out her instructions and will follow her INR closely. I am going to decrease her lisinopril to 5 mg daily, as this is per possibly is contributing to her dizziness. Her blood pressure is very well controlled. Her other medications will remain the same and I will continue to follow her closely, both with INRs and visits. On this date 8/30/2021 I have spent 36 minutes reviewing previous notes, test results and face to face with the patient discussing the diagnosis and importance of compliance with the treatment plan as well as documenting on the day of the visit. RTO in in 3 month(s) or sooner for any persistent, new, or worsening symptoms.       Please see Patient Instructions for further counseling and information given. Advised to be adherent to the treatment plans discussed today, and please call with any questions or concerns, letting the office know of any reasons that the plans may not be followed. The risks of untreated conditions include worsening illness, injury, disability, and possibly, death. Please call if symptoms change in any way, worsen, or fail to completely resolve, as this could necessitate a change to treatment plans. Patient and/or caregiver expressed understanding. Indications and proper use of medication(s) reviewed. Potential side-effects and risks of medication(s) also explained. Patient and/or caregiver was instructed to call if any new symptoms develop prior to next visit. Health risk factors discussed and addressed.     Signed by : Robb Maldonado MD, M.D.

## 2021-08-31 NOTE — PROGRESS NOTES
[facility]  FAMILY MEDICINE RESIDENCY PROGRAM   OFFICE PROGRESS NOTE  DATE OF VISIT : 2021         Chief Complaint : No chief complaint on file. HPI:   [place] comes to clinic today for     {Blank multiple:45312::\"F/U of chronic problem(s)\",\"new or recent complaint of ***\"}     Chronic problems reviewed today include:     {Chronic disease RK:68245}    Current status of this/these condition(s):  {stable/unstable:09403}    Tolerating meds: {YES / U}    Additional history: ***     Objective:    VS:  not currently breastfeeding. General Appearance: Well developed, awake, alert, oriented, no acute distress  HEENT: Normocephalic,atraumatic. PERRL, EOM's intact, EAC without erythema or swelling, no pallor or icterus. Neck: Supple, symmetrical, trachea midline. No JVD. Thyroid smooth, not enlarged. Chest wall/Lung: Clear to auscultation bilaterally,  respirations unlabored. No rhonchi/wheezing/rales  Heart::  {Blank single:30354::\"Irregular\",\"Regular\"} rate and rhythm, S1and S2 normal, {Blank single:88265::\"+murmur\",\"+***murmur\",\"+ASHLEY\",\"GII/VI ASHLEY\",\"no murmur\",\"no murmur, rub or gallop\"}. Abdomen: Soft, nontender. Normal BS, no hepatosplenomegaly or mass  Extremities:  Extremities normal, atraumatic, no cyanosis. *** edema. Skin: Skin color, texture, turgor normal, no rashes or lesions  Musculoskeletal: ROM grossly normal in all joints of extremities, no obvious joint swelling. Neurologic:    Alert, oriented. Normal gait and coordination   No focal neurologic deficits noted. Psychiatric: has a normal mood and affect. Behavior is normal.     I independently reviewed the following information:  {Outside review:83643}    {There are no diagnoses linked to this encounter.  (Refresh or delete this SmartLink)}    Additional Plan:  ***    {Time Documentation Optional:421046898}    RTO in {follow up:13531::\"prn\"} or sooner for any persistent, new, or worsening symptoms. Please see Patient Instructions for further counseling and information given. Advised to be adherent to the treatment plans discussed today, and please call with any questions or concerns, letting the office know of any reasons that the plans may not be followed. The risks of untreated conditions include worsening illness, injury, disability, and possibly, death. Please call if symptoms change in any way, worsen, or fail to completely resolve, as this could necessitate a change to treatment plans. Patient and/or caregiver expressed understanding. Indications and proper use of medication(s) reviewed. Potential side-effects and risks of medication(s) also explained. Patient and/or caregiver was instructed to call if any new symptoms develop prior to next visit. Health risk factors discussed and addressed.     Signed by : Andrew Martinez MD, M.D.

## 2021-09-08 NOTE — TELEPHONE ENCOUNTER
----- Message from John Kamara sent at 9/7/2021  5:32 PM EDT -----  Subject: Message to Provider    QUESTIONS  Information for Provider? Pt phnd - her INR was taken today & it is at 1.9   - does she continue on same amt of medication?  ---------------------------------------------------------------------------  --------------  CALL BACK INFO  What is the best way for the office to contact you? OK to leave message on   voicemail  Preferred Call Back Phone Number? [phone]  ---------------------------------------------------------------------------  --------------  SCRIPT ANSWERS  Relationship to Patient?  Self

## 2021-09-09 NOTE — PROGRESS NOTES
Patient advised same dose, printed calender and mailed to her address. Hi lighted days when she is to take 3 tablets vs 2 tablets . Patient aware.

## 2021-09-15 NOTE — TELEPHONE ENCOUNTER
Last Appointment:  8/30/2021  Future Appointments   Date Time Provider Hernandez Mcintyre   12/6/2021  3:00 PM MD Stephy Powell [facility]

## 2021-09-29 NOTE — TELEPHONE ENCOUNTER
Pt needs this medication to go to [facility] since it was sent to [place]. Pt pharmacy called requesting this medication today. Thank you.

## 2021-10-12 NOTE — PROGRESS NOTES
Detailed message left  with same dose instructions and next inr recheck date, requested a return call to confirm.

## 2021-10-21 NOTE — CARE COORDINATION
Ambulatory Care Coordination Note  10/21/2021  CM Risk Score: 11  Charlson 10 Year Mortality Risk Score: 100%     ACC: Lennox Simpers, RN    Summary Note:   AMRITA spoke with [place] for CHF/COPD follow up. She admits she is unable to locate her hearing aid and requested that AMRITA speak to her  Gideon Becekr. AMRITA continued conversation with Gideon Becker relaying information to [place]. She confirmed that she spoke with April from her PCP office and is aware of her 4.1 INR and that she did not take her warfarin on 10/19/21 as instructed. They report that they are still reviewing assisted living choices and the sale of their home should be final soon. She reports she is weighing herself daily but did not provide a current weight. AMRITA unable to review medications with [place] because she stated she was \"becoming tired\" and asked that we review them at a later date. Future appointments were reviewed and [place] verbalized understanding that she is to repeat her INR in one week. PLAN  Review medications, daily weights and assess care coordination needs with next interaction. Continue to follow for care coordination. Care Coordination Interventions    Program Enrollment: Complex Care  Referral from Primary Care Provider: Yes  Suggested Interventions and Community Resources  Fall Risk Prevention: Completed  Home Care Waiver: Completed (Comment: [facility])  Medi Set or Pill Pack: Completed (Comment: [facility])  Pharmacist: Declined  Senior Services: Completed  Other Services: Completed (Comment: [facility])  Zone Management Tools: Completed (Comment: COPD/CHF)         Goals Addressed                 This Visit's Progress     Medication Management   Improving     I will take my medication as directed. I will notify my provider of any problems with medications, like adverse effects or side effects. I will notify my provider/Care Coordinator if I am unable to afford my medications.   I will notify not taking: Reported on 8/5/2021 7/24/21   Jarvis Jensen MD   levothyroxine (SYNTHROID) 125 MCG tablet Take 1 tablet by mouth every morning (before breakfast) 7/24/21   Jarvis Jensen MD   Respiratory Therapy Supplies (NEBULIZER/TUBING/MOUTHPIECE) KIT 1 kit by Does not apply route daily as needed (wheezing)  Patient not taking: Reported on 7/29/2021 7/9/21   Anthony Justice MD   furosemide (LASIX) 40 MG tablet Take 1 tablet by mouth every morning (before breakfast)  Patient taking differently: Take 40 mg by mouth 2 times daily  5/21/21   Bernie Harris MD   famotidine (PEPCID) 20 MG tablet Take one tablet by mouth daily 5/17/21   Bernie Harris MD   nystatin (MYCOSTATIN) 523329 UNIT/GM powder Apply topically 4 times daily    Historical Provider, MD   bisacodyl (DULCOLAX) 10 MG suppository Place 10 mg rectally daily as needed for Constipation  Patient not taking: Reported on 7/29/2021    Historical Provider, MD   magnesium hydroxide (MILK OF MAGNESIA) 400 MG/5ML suspension Take 30 mLs by mouth daily as needed for Constipation  Patient not taking: Reported on 8/30/2021    Historical Provider, MD   meclizine (ANTIVERT) 25 MG tablet Take 1 tablet by mouth 3 times daily as needed for Dizziness 2/10/21 2/10/22  Bernie Harris MD   prednisoLONE acetate (PRED FORTE) 1 % ophthalmic suspension INSTILL 1 DROP IN BOTH EYES FOUR TIMES DAILY 12/11/20   Historical Provider, MD   Vitamin D, Ergocalciferol, 50 MCG (2000 UT) CAPS Take 2,000 Units by mouth every morning (before breakfast) 10/27/20   Bernie Harris MD   b complex vitamins capsule Take 1 capsule by mouth every morning (before breakfast) 10/27/20   Bernie Harris MD   Ascorbic Acid (VITAMIN C) 250 MG tablet Take 1 tablet by mouth every morning (before breakfast) 10/27/20   Bernie Harris MD   guaiFENesin (MUCINEX) 600 MG extended release tablet Take 1,200 mg by mouth daily as needed     Historical Provider, MD   PROAIR  (90 Base) MCG/ACT inhaler USE 2 PUFFS EVERY 6 HOURS  AS NEEDED FOR WHEEZING 10/21/19   Barbi Mccarty MD   acetaminophen (TYLENOL) 325 MG tablet Take 650 mg by mouth every 6 hours as needed for Pain or Fever    Historical Provider, MD       Future Appointments   Date Time Provider Hernandez Mcintyre   12/6/2021  3:00 PM MD Marnie Puente [facility]     ,   Congestive Heart Failure Assessment    Are you currently restricting fluids?: No Restriction  Do you understand a low sodium diet?: Yes  Do you understand how to read food labels?: Yes  How many restaurant meals do you eat per week?: 0  Do you salt your food before tasting it?: No         Symptoms:        ,   COPD Assessment    Does the patient understand envrionmental exposure?: Yes  Is the patient able to verbalize Rescue vs. Long Acting medications?: Yes  Does the patient have a nebulizer?: Yes  Does the patient use a space with inhaled medications?: No            Symptoms:         and   General Assessment    Do you have any symptoms that are causing concern?: No

## 2021-10-25 NOTE — TELEPHONE ENCOUNTER
Spoke to pt about INR which she cannot do today since the company that comes out to check it for her come tomorrow. Pt informed me that tomorrow when she gets it checked she will call right back to us to give us the result.

## 2021-10-25 NOTE — TELEPHONE ENCOUNTER
[place] called in and is stating that she has miss placed her 5mg coumadin pills. She is asking if she could take her 4mg pills until she has her next inr.      Please advise  Thank you

## 2021-10-25 NOTE — TELEPHONE ENCOUNTER
I can't be sure what dose she should take until she gets an INR, since her last INR was not normal and we made a change. Please have her get an INR and call us.

## 2021-11-02 NOTE — PROGRESS NOTES
[facility]  FAMILY MEDICINE RESIDENCY PROGRAM   OFFICE PROGRESS NOTE  DATE OF VISIT : 11/2/2021         Chief Complaint :   Chief Complaint   Patient presents with    Check-Up       HPI:   Mrs. Harjeet Pickard is here for a physical exam as she is trying to go into an assisted living facility. She is accompanied today by her  and her daughter and daughter-in-law. Apparently, she is lost her hearing aids and says she is unable to hear any of our conversation in this we yell in her ear very loudly. Even then, she is only getting bits and pieces. Her daughter spends time with her daily, and is familiar with her problems and medications. [place] continues to suffer from severe mobility issues and some mild dementia. The daughter states that she rarely gets out of the chair or her bed. She requires assistance for most activities, although she is able to take her own medications when they are given to her. She requires assistance with toileting and bathing, and does not do any housework, laundry, shopping, or meal preparation. She is able to walk short distances with the assistance of a walker, but she is somewhat unsteady. I have completed multiple forms including history and exam forms for the assisted living facility. Please refer to these forms for the rest of the visit. I independently reviewed the following information:  historical medical records, lab reports and office notes    1. Frequent falls  2. Hypothyroidism, unspecified type  -     TSH without Reflex; Future  3. Permanent atrial fibrillation (HCC)  -     POCT INR  4. Essential hypertension  -     Comprehensive Metabolic Panel; Future  -     CBC Auto Differential; Future  5. Class 2 severe obesity due to excess calories with serious comorbidity and body mass index (BMI) of 39.0 to 39.9 in adult ([facility] [address] [facility]  6. Mixed stress and urge urinary incontinence  7. Chronic diastolic (congestive) heart failure (HCC)  8.  Pure hypercholesterolemia  -     Lipid Panel; Future  9. Spinal stenosis of lumbar region with neurogenic claudication  10. Alzheimer's dementia without behavioral disturbance, unspecified timing of dementia onset ([facility] [address]      Additional Plan:    I did obtain laboratory testing on her today and we will notify her of those results. Her daughter is taking her for new hearing aids today. She has an appointment at the assisted living center this afternoon to make arrangements for a room there. Her INR today was 2.1, so we will continue her on 10 mg daily which is the dose she is supposed to be currently taking. On this date 11/2/2021 I have spent 38 minutes reviewing previous notes, test results and face to face with the patient discussing the diagnosis and importance of compliance with the treatment plan as well as documenting on the day of the visit. RTO in in 3 month(s) or sooner for any persistent, new, or worsening symptoms. Please see Patient Instructions for further counseling and information given. Advised to be adherent to the treatment plans discussed today, and please call with any questions or concerns, letting the office know of any reasons that the plans may not be followed. The risks of untreated conditions include worsening illness, injury, disability, and possibly, death. Please call if symptoms change in any way, worsen, or fail to completely resolve, as this could necessitate a change to treatment plans. Patient and/or caregiver expressed understanding. Indications and proper use of medication(s) reviewed. Potential side-effects and risks of medication(s) also explained. Patient and/or caregiver was instructed to call if any new symptoms develop prior to next visit. Health risk factors discussed and addressed.     Signed by : Joe Fuchs MD, M.EVERETT.

## 2021-11-09 NOTE — TELEPHONE ENCOUNTER
----- Message from [address] sent at 11/9/2021 12:21 PM EST -----  Subject: Medication Problem    QUESTIONS  Name of Medication? warfarin (COUMADIN) 5 MG tablet  Patient-reported dosage and instructions? 5 mg  What question or problem do you have with the medication? Pt stated that   this medications mg is not helping her enough and she is wanting to know   if the does can be raised or if another medication could help her more. Please call pt to talk about . Preferred Pharmacy? [facility] [address], [address] [place] [address]   Pharmacy phone number (if available)? [phone]  Additional Information for Provider?   ---------------------------------------------------------------------------  --------------  [address]  What is the best way for the office to contact you? OK to leave message on   voicemail  Preferred Call Back Phone Number? [phone]  ---------------------------------------------------------------------------  --------------  SCRIPT ANSWERS  Relationship to Patient?  Self

## 2021-11-09 NOTE — TELEPHONE ENCOUNTER
----- Message from Temitope Romero sent at 11/2/2021  5:11 PM EDT -----  Subject: Medication Problem    QUESTIONS  Name of Medication? warfarin (COUMADIN) 5 MG tablet  Patient-reported dosage and instructions? 5mg 2 daily  What question or problem do you have with the medication? Pt says that she   is supposed to take 2 pills a day was only given 5 pills. She is wondering   why she did not get her full prescription. [facility] [address] [place] [address]   Pharmacy phone number (if available)? [phone]  Additional Information for Provider?   ---------------------------------------------------------------------------  --------------  [address]  What is the best way for the office to contact you? OK to leave message on   voicemail  Preferred Call Back Phone Number? [phone]  ---------------------------------------------------------------------------  --------------  SCRIPT ANSWERS  Relationship to Patient?  Self

## 2021-11-09 NOTE — TELEPHONE ENCOUNTER
I sent in the prescription for two 5 mg tablets daily, since that is her dose. Her insurance should cover the new prescription, as we have been adjusting this and it is critical for her to get this dose. She was attempting to take the dose with an old prescription of 4 mg tablets, which is very risky.

## 2021-11-09 NOTE — TELEPHONE ENCOUNTER
I phoned patients pharmacy  Pharmacy stated that he just talked to patient   Patient ran out of her 5 mg of coumadin so the pharmacist gave her enough to last her until her insurance will cover the medication. Pharmacist asked if patients medication changed or patient is taking the medication wrong. I stated patient takes 10 mg qd for awhile now.   Please advise  Thank you April

## 2021-11-17 NOTE — TELEPHONE ENCOUNTER
----- Message from Lexus Ellison sent at 11/17/2021  2:44 PM EST -----  Subject: Message to Provider    QUESTIONS  Information for Provider? Patient would like for Trang to call her back,   she had an INR 3 yesterday. Please call back [phone]  ---------------------------------------------------------------------------  --------------  CALL BACK INFO  What is the best way for the office to contact you? OK to leave message on   voicemail  Preferred Call Back Phone Number? [phone]  ---------------------------------------------------------------------------  --------------  SCRIPT ANSWERS  Relationship to Patient?  Self

## 2021-11-29 PROBLEM — R26.2 AMBULATORY DYSFUNCTION: Status: ACTIVE | Noted: 2021-01-01

## 2021-11-29 NOTE — PROGRESS NOTES
conversation  Sensation:  Pt denies numbness and tingling to extremities  Edema:  unremarkable    Patient education  Pt educated on role of therapy, safety with mobility, transfer technique, safe use of FWW    Patient response to education:   Pt verbalized understanding Pt demonstrated skill Pt requires further education in this area   yes yes yes     ASSESSMENT:    Conditions Requiring Skilled Therapeutic Intervention:    [x]Decreased strength     []Decreased ROM  [x]Decreased functional mobility  [x]Decreased balance   [x]Decreased endurance   [x]Decreased posture  [x]Decreased sensation  [x]Decreased coordination   []Decreased vision  [x]Decreased safety awareness   [x]Increased pain       Comments:  Pt resting semi-supine upon arrival, agreeable to PT eval. Pt completed bed mobility with slow pace of movement and assist required for B LE and trunk negotiation. Pt required manual assist for balance during sit<>stand transfers with decreased assist for lift vs lower due to elevated height of bed in ER. Pt amb with very slow pace of movement and short, shuffled steps. Pt required hands on assist for balance and walker management throughout. Distance limited by overall fatigue and B LE discomfort. Pt returned to semi-supine with assist required at trunk and B LEs for completion of transition. Pt is currently at elevated risk of falls due to balance and B LE weakness s/p falls. She will benefit from continued skilled PT services to improve independence with all functional mobility, balance training, and improved overall endurance. Treatment:  Patient practiced and was instructed in the following treatment:     Bed mobility: cues for sequencing and technique, manual assist for B LE and trunk negotiation   Transfer training: cues for hand placement, manual assist for lift/lower and balance   Gait training: cues for upright posture and walker approximation, manual assist for balance    Pt's/ family goals   1.  To return home to [facility] [place] [address]     Prognosis is good for reaching above PT goals. Patient and or family understand(s) diagnosis, prognosis, and plan of care. yes    PHYSICAL THERAPY PLAN OF CARE:    PT POC is established based on physician order and patient diagnosis     Referring provider/PT Order:    11/29/21 1768  PT evaluation and treat  Start:  11/29/21 1015,   End:  11/29/21 1015,   ONE TIME,   Standing Count:  1 Occurrences,   Noreen Pride MD     Diagnosis:  Ambulatory dysfunction [R26.2]  Specific instructions for next treatment:  Progress mobility as tolerated    Current Treatment Recommendations:     [x] Strengthening to improve independence with functional mobility   [] ROM to improve independence with functional mobility   [x] Balance Training to improve static/dynamic balance and to reduce fall risk  [x] Endurance Training to improve activity tolerance during functional mobility   [x] Transfer Training to improve safety and independence with all functional transfers   [x] Gait Training to improve gait mechanics, endurance and asses need for appropriate assistive device  [] Stair Training in preparation for safe discharge home and/or into the community   [x] Positioning to prevent skin breakdown and contractures  [x] Safety and Education Training   [x] Patient/Caregiver Education   [x] HEP  [] Other     PT long term treatment goals are located in above grid    Frequency of treatments: 2-5x/week x 1-2 weeks. Time in  1415  Time out  1430    Total Treatment Time  0 minutes     Evaluation Time includes thorough review of current medical information, gathering information on past medical history/social history and prior level of function, completion of standardized testing/informal observation of tasks, assessment of data and education on plan of care and goals.     CPT codes:  [x] Low Complexity PT evaluation 44259  [] Moderate Complexity PT evaluation 91326  [] High Complexity PT evaluation [id number]  [] PT Re-evaluation Q400070  [] Gait training 16410 0 minutes  [] Manual therapy 37550 0 minutes  [] Therapeutic activities 75850 0 minutes  [] Therapeutic exercises 49246 0 minutes  [] Neuromuscular reeducation 64643 0 minutes     Barney Quevedo, PT, DPT  [id number]

## 2021-11-29 NOTE — ED PROVIDER NOTES
HPI:  11/28/21, Time: 10:43 PM EST         [place] is a 81 y.o. female presenting to the ED for fall out of bed, beginning short time ago. The complaint has been persistent, moderate in severity, and worsened by movement of bilateral knees. Patient reports that she moved from her home to a assisted living facility. Patient reporting she is on Coumadin. Patient was attempting to get out of her bed and slid out of bed she landed on her knees. She also struck her head on the foot rest of the recliner. Patient complaining neck pain she does also report some lower back pain. Patient reporting no chest pain or abdominal pain. Patient reporting no fever chills or productive cough. Patient reports she uses a walker as well as a Rollator at home. Patient was unable to walk after the fall today. ROS:   Pertinent positives and negatives are stated within HPI, all other systems reviewed and are negative.  --------------------------------------------- PAST HISTORY ---------------------------------------------  Past Medical History:  has a past medical history of Anticoagulated on Coumadin, Atrial fibrillation (HCC), Basal cell carcinoma of neck, CAD (coronary artery disease), CHF (congestive heart failure) ([address] Depression, DJD (degenerative joint disease), GERD (gastroesophageal reflux disease), History of cardiovascular stress test, Hyperparathyroidism ([address] Hypertension, Hypothyroidism, Mild mitral regurgitation, Osteoarthritis, Pulmonary hypertension ([address] Renal calculi, Tricuspid regurgitation, and Urinary incontinence. Past Surgical History:  has a past surgical history that includes Thyroidectomy (1960); Kidney stone surgery (1972); Cholecystectomy (1972); Kidney stone surgery (1981); Cataract removal (6/2005); Incontinence surgery (5/2005); Thyroid surgery (5/2005); parathyroidectomy; Knee Arthroplasty (Bilateral, 9/2005);  Total shoulder arthroplasty (Left, 2009); fracture surgery (Left); Carpal tunnel release; Finger surgery (7/2011); Total shoulder arthroplasty (Left); Inner ear surgery (2011); Cataract removal with implant (Bilateral); Colonoscopy; and Cardiac catheterization (3/22/14). Social History:  reports that she has never smoked. She has never used smokeless tobacco. She reports previous alcohol use. She reports that she does not use drugs. Family History: family history includes Arthritis in her mother; Cancer in her brother and son; Diabetes in her father; Heart Attack in her mother; Heart Disease in her brother, brother, father, mother, and sister; Heart Surgery in her brother; Mental Illness in her father. The patients home medications have been reviewed. Allergies: Codeine, Penicillins, Vicodin [hydrocodone-acetaminophen], and Adhesive tape    ---------------------------------------------------PHYSICAL EXAM--------------------------------------    Constitutional/General: Alert and oriented x3, mild distress  Head: Normocephalic and atraumatic  Eyes: PERRL, EOMI  Mouth: Oropharynx clear, handling secretions, no trismus  Neck: C-collar in place tender to touch on palpation. Pulmonary: Lungs clear to auscultation bilaterally, no wheezes, rales, or rhonchi. Not in respiratory distress  Cardiovascular:  Regular rate. Irregular rhythm. No murmurs, gallops, or rubs. 2+ distal pulses  Chest: no chest wall tenderness  Abdomen: Soft. Non tender. Non distended. +BS. No rebound, guarding, or rigidity. No pulsatile masses appreciated. Musculoskeletal: Moves all extremities x 4. Tender lower lumbar spine patient has noted tenderness to bilateral knees with bruising. Pulses are intact distally warm and well perfused, no clubbing, cyanosis, or edema. Capillary refill <3 seconds  Skin: warm and dry. No rashes.    Neurologic: GCS 15, CN 2-12 grossly intact, no focal deficits, symmetric strength 5/5 in the upper, patient able to bend knees slightly but very limited secondary to pain.  Psych: Normal Affect    -------------------------------------------------- RESULTS -------------------------------------------------  I have personally reviewed all laboratory and imaging results for this patient. Results are listed below.      LABS:  Results for orders placed or performed during the hospital encounter of 11/28/21   CBC auto differential   Result Value Ref Range    WBC 5.8 4.5 - 11.5 E9/L    RBC 4.00 3.50 - 5.50 E12/L    Hemoglobin 11.9 11.5 - 15.5 g/dL    Hematocrit 36.7 34.0 - 48.0 %    MCV 91.8 80.0 - 99.9 fL    MCH 29.8 26.0 - 35.0 pg    MCHC 32.4 32.0 - 34.5 %    RDW 14.1 11.5 - 15.0 fL    Platelets 999 168 - 304 E9/L    MPV 10.2 7.0 - 12.0 fL    Neutrophils % 72.5 43.0 - 80.0 %    Immature Granulocytes % 0.2 0.0 - 5.0 %    Lymphocytes % 15.1 (L) 20.0 - 42.0 %    Monocytes % 8.4 2.0 - 12.0 %    Eosinophils % 2.6 0.0 - 6.0 %    Basophils % 1.2 0.0 - 2.0 %    Neutrophils Absolute 4.22 1.80 - 7.30 E9/L    Immature Granulocytes # 0.01 E9/L    Lymphocytes Absolute 0.88 (L) 1.50 - 4.00 E9/L    Monocytes Absolute 0.49 0.10 - 0.95 E9/L    Eosinophils Absolute 0.15 0.05 - 0.50 E9/L    Basophils Absolute 0.07 0.00 - 0.20 E9/L   Comprehensive Metabolic Panel   Result Value Ref Range    Sodium 140 132 - 146 mmol/L    Potassium 4.1 3.5 - 5.0 mmol/L    Chloride 102 98 - 107 mmol/L    CO2 25 22 - 29 mmol/L    Anion Gap 13 7 - 16 mmol/L    Glucose 140 (H) 74 - 99 mg/dL    BUN 24 (H) 6 - 23 mg/dL    CREATININE 0.9 0.5 - 1.0 mg/dL    GFR Non-African American 59 >=60 mL/min/1.73    GFR African American >60     Calcium 9.4 8.6 - 10.2 mg/dL    Total Protein 6.5 6.4 - 8.3 g/dL    Albumin 4.0 3.5 - 5.2 g/dL    Total Bilirubin 0.7 0.0 - 1.2 mg/dL    Alkaline Phosphatase 77 35 - 104 U/L    ALT 19 0 - 32 U/L    AST 25 0 - 31 U/L   Troponin   Result Value Ref Range    Troponin, High Sensitivity 17 (H) 0 - 9 ng/L   Protime-INR   Result Value Ref Range    Protime 24.4 (H) 9.3 - 12.4 sec    INR 2.2 RADIOLOGY:  Interpreted by Radiologist.  XR KNEE RIGHT (MIN 4 VIEWS)   Final Result   No significant change identified. XR KNEE LEFT (MIN 4 VIEWS)   Final Result   No significant change. Short-term follow-up recommended if symptoms persist.         CT HEAD WO CONTRAST   Final Result   No acute intracranial findings. Consider MRI if symptoms persist.      Volume loss and findings suggestive of minimal chronic microvascular ischemia. CT CERVICAL SPINE WO CONTRAST   Final Result   Examination partially degraded by patient motion. Allowing for this, no   acute process identified. Prominent degenerative changes. MRI would be useful if symptoms persist.         CT LUMBAR SPINE WO CONTRAST   Final Result   Advanced degenerative changes and other chronic appearing findings. MRI   would be useful if symptoms persist.         CT CHEST WO CONTRAST   Final Result   Limited examination by patient motion. Scattered areas of scarring and or atelectasis within the lungs. Moderate cardiomegaly. Other chronic appearing findings. Short-term follow-up recommended if   symptoms persist.             EKG:  This EKG is signed and interpreted by me. Rate: 94  Rhythm: Atrial fibrillation  Interpretation: non-specific EKG  Comparison: stable as compared to patient's most recent EKG          ------------------------- NURSING NOTES AND VITALS REVIEWED ---------------------------   The nursing notes within the ED encounter and vital signs as below have been reviewed by myself. BP (!) 154/88   Pulse 98   Temp 97.9 °F (36.6 °C) (Oral)   Resp 16   SpO2 97%   Oxygen Saturation Interpretation: Normal    The patients available past medical records and past encounters were reviewed.         ------------------------------ ED COURSE/MEDICAL DECISION MAKING----------------------  Medications   0.9 % sodium chloride infusion ( IntraVENous New Bag 11/29/21 0146)   ondansetron (ZOFRAN) injection 4 mg (4 mg IntraVENous Given 11/29/21 0144)   fentaNYL (SUBLIMAZE) injection 25 mcg (25 mcg IntraVENous Given 11/29/21 0144)   metoclopramide (REGLAN) injection 10 mg (10 mg IntraVENous Given 11/29/21 0142)             Medical Decision Making:   Presenting here because of fall. Patient is in assisted living. Patient reportedly slid off bed and fell and landed on her knees. She did strike her head she does complain of some neck pain. Patient also complains of back pain. Patient medicated here for pain. Patient while here in the emergency part was also complained of rib pain and chest pain on the left side. Patient CT of the chest was ordered as well. There is no obvious fractures here. Patient medicated and still having pain. Patient attempted to ambulate having difficulty even sitting up. Plan will be to admit to family practice     Re-Evaluations:             Re-evaluation. Patients symptoms show no change  Patient reexamined still having pain. Patient we attempted to sit up and still complaining of bilateral knee pain. Patient unable to stand    Consultations:               Family practice  Critical Care: This patient's ED course included: a personal history and physicial eaxmination    This patient has been closely monitored during their ED course. Counseling: The emergency provider has spoken with the patient and discussed todays results, in addition to providing specific details for the plan of care and counseling regarding the diagnosis and prognosis. Questions are answered at this time and they are agreeable with the plan.       --------------------------------- IMPRESSION AND DISPOSITION ---------------------------------    IMPRESSION  1. Injury of head, initial encounter    2. Ambulatory dysfunction    3. Acute pain of both knees        DISPOSITION  Disposition: Admit  Patient condition is stable        NOTE: This report was transcribed using voice recognition software.  Every effort was made to ensure accuracy; however, inadvertent computerized transcription errors may be present          Keenan Orozco MD  11/29/21 612 Santos Keller MD  11/29/21 0762

## 2021-11-29 NOTE — H&P
[facility] Resident Inpatient  History and Physical      CC: Ground-level fall    HPI: History obtained from patient. Patient is oriented to time, place, person. Karen Ochoa is a 81 y.o. female with a PMH of CAD, CHF, hypertension, A. Fib, and hypothyroidism who presents to ED for Fall (slid out of bed and hit mehdi knees, + head +thinners (warfarin). Patient reports recently moving to skilled nursing facility where she reports having 2 falls. Last fall occurred this morning while getting out of bed. Patient reports sliding out of bed and hitting both of her knees as well as her head against a recliner. She reports chronic dizziness. Denies any loss of consciousness, bladder function, or bowel movement. Patient recalls falling and aftermath. She reports chronic lower extremity weakness. Patient reports mild nausea but no emesis. She denies any headaches, blurry vision, or change in speech. ED Course: The patient remained hemodynamically stable. Labs CBC within normal limits. Hemoglobin 11.9, troponin XVII, CMP within normal limits except for glucose of 104. Imaging: CT head: No intracranial findings. CT chest without contrast: Moderate cardiomegaly, scattered areas of scarring and/or atelectasis within the lungs. CT cervical spine: Prominent degenerative changes. CT lumbar spine: Advanced degenerative changes. X-rays of knees bilaterally: No significant changes. EKG: A. fib, unchanged from previous tracings. Patient was given fentanyl for pain, and Reglan. Pt admitted for ground-level fall.       PMH:  has a past medical history of Anticoagulated on Coumadin, Atrial fibrillation (HCC), Basal cell carcinoma of neck, CAD (coronary artery disease), CHF (congestive heart failure) ([address] Depression, DJD (degenerative joint disease), GERD (gastroesophageal reflux disease), History of cardiovascular stress test, Hyperparathyroidism ([address] Hypertension, Hypothyroidism, Mild mitral regurgitation, Osteoarthritis, Pulmonary hypertension ([facility] Renal calculi, Tricuspid regurgitation, and Urinary incontinence. PSH:  has a past surgical history that includes Thyroidectomy (1960); Kidney stone surgery (1972); Cholecystectomy (1972); Kidney stone surgery (1981); Cataract removal (6/2005); Incontinence surgery (5/2005); Thyroid surgery (5/2005); parathyroidectomy; Knee Arthroplasty (Bilateral, 9/2005); Total shoulder arthroplasty (Left, 2009); fracture surgery (Left); Carpal tunnel release; Finger surgery (7/2011); Total shoulder arthroplasty (Left); Inner ear surgery (2011); Cataract removal with implant (Bilateral); Colonoscopy; and Cardiac catheterization (3/22/14). FH: family history includes Arthritis in her mother; Cancer in her brother and son; Diabetes in her father; Heart Attack in her mother; Heart Disease in her brother, brother, father, mother, and sister; Heart Surgery in her brother; Mental Illness in her father. Social:  reports that she has never smoked. She has never used smokeless tobacco. She reports previous alcohol use. She reports that she does not use drugs. Allergies: Allergies   Allergen Reactions    Codeine Other (See Comments)     Hallucination    Penicillins Hives    Vicodin [Hydrocodone-Acetaminophen] Other (See Comments)     Hallucination      Adhesive Tape Rash        Home Medications:   No current facility-administered medications on file prior to encounter. Current Outpatient Medications on File Prior to Encounter   Medication Sig Dispense Refill    escitalopram (LEXAPRO) 10 MG tablet TAKE 1 TABLET BY MOUTH DAILY 28 tablet 3    warfarin (COUMADIN) 5 MG tablet Take 10 mg (two tablets) every day.  60 tablet 2    levothyroxine (SYNTHROID) 125 MCG tablet Take 1 tablet by mouth every morning (before breakfast) 30 tablet 5    lisinopril (PRINIVIL;ZESTRIL) 5 MG tablet Take 1 tablet by mouth daily 90 tablet 1    B Complex Vitamins (B-COMPLEX/B-12) TABS TAKE 1 TABLET BY MOUTH DAILY      Cholecalciferol (VITAMIN D3) 25 MCG (1000 UT) CAPS TAKE 1 TABLET BY MOUTH EVERY DAY      cycloSPORINE (RESTASIS) 0.05 % ophthalmic emulsion Apply to eye (Patient not taking: Reported on 8/5/2021)      albuterol (PROVENTIL) (2.5 MG/3ML) 0.083% nebulizer solution inhale contents of 1 vial ( 3 milliliters ) in nebulizer by mouth. ..  (REFER TO PRESCRIPTION NOTES).  (Patient not taking: Reported on 8/30/2021)      metoprolol succinate (TOPROL XL) 25 MG extended release tablet take 1 tablet by mouth every evening      metoprolol succinate (TOPROL XL) 50 MG extended release tablet TAKE 1 TABLETS BY MOUTH EVERY EVENING 42 tablet 3    spironolactone (ALDACTONE) 25 MG tablet TAKE 1 TABLET BY MOUTH DAILY 30 tablet 6    levalbuterol (XOPENEX) 0.63 MG/3ML nebulization Take 3 mLs by nebulization every 8 hours as needed for Wheezing DX: COPD J44.9 (Patient not taking: Reported on 8/5/2021) 864 mL 3    [DISCONTINUED] levothyroxine (SYNTHROID) 125 MCG tablet Take 1 tablet by mouth every morning (before breakfast) 24 tablet 2    Respiratory Therapy Supplies (NEBULIZER/TUBING/MOUTHPIECE) KIT 1 kit by Does not apply route daily as needed (wheezing) (Patient not taking: Reported on 7/29/2021) 1 kit 0    furosemide (LASIX) 40 MG tablet Take 1 tablet by mouth every morning (before breakfast) (Patient taking differently: Take 40 mg by mouth 2 times daily ) 28 tablet 5    famotidine (PEPCID) 20 MG tablet Take one tablet by mouth daily 90 tablet 3    nystatin (MYCOSTATIN) 906768 UNIT/GM powder Apply topically 4 times daily      bisacodyl (DULCOLAX) 10 MG suppository Place 10 mg rectally daily as needed for Constipation (Patient not taking: Reported on 7/29/2021)      magnesium hydroxide (MILK OF MAGNESIA) 400 MG/5ML suspension Take 30 mLs by mouth daily as needed for Constipation (Patient not taking: Reported on 8/30/2021)      meclizine (ANTIVERT) 25 MG tablet Take 1 tablet by mouth 3 times daily as needed for Dizziness 100 tablet 5    prednisoLONE acetate (PRED FORTE) 1 % ophthalmic suspension INSTILL 1 DROP IN BOTH EYES FOUR TIMES DAILY      Vitamin D, Ergocalciferol, 50 MCG (2000 UT) CAPS Take 2,000 Units by mouth every morning (before breakfast) 28 capsule 5    b complex vitamins capsule Take 1 capsule by mouth every morning (before breakfast) 28 capsule 5    Ascorbic Acid (VITAMIN C) 250 MG tablet Take 1 tablet by mouth every morning (before breakfast) 28 tablet 5    guaiFENesin (MUCINEX) 600 MG extended release tablet Take 1,200 mg by mouth daily as needed       PROAIR  (90 Base) MCG/ACT inhaler USE 2 PUFFS EVERY 6 HOURS  AS NEEDED FOR WHEEZING 34 g 2    acetaminophen (TYLENOL) 325 MG tablet Take 650 mg by mouth every 6 hours as needed for Pain or Fever         ROS:   Const: No fever, chills, night sweats, no recent unexplained weight gain/loss  HEENT: No blurred vision, double vision  Resp: No cough, no sputum, no pleuritic chest pain, no sob  Cardio: No chest pain, no exertional dyspnea, no PND, no orthopnea, no palpitation, no leg swelling. GI: No dysphagia, no reflux; no abdominal pain, nausea, no vomiting; no c/d. No hematochezia    : No dysuria, no frequency, hesitancy; no hematuria  MSK: Bilateral knee pain and swelling, no myalgia, no change in ROM  Neuro: Lower extremity weakness, no slurred speech,  no numbness or tingling in extremities  Endo: no heat/cold intolerance, no polyphagia, polydipsia or polyuria  Hem: no increased bleeding, no bruising, no lymphadenopathy  Skin: no skin changes  Psych: no depressed mood, no suicidal ideation    PE:  Blood pressure (!) 164/74, pulse 97, temperature 97.9 °F (36.6 °C), temperature source Oral, resp. rate 15, SpO2 98 %, not currently breastfeeding. General: Alert, cooperative, no acute distress, difficulty hearing. HEENT: Normocephalic, atraumatic. conjunctiva/corneas clear, EOM's intact, no pallor or icterus. Chest: No tenderness or deformity, full & symmetric excursion  Lung: Clear to auscultation bilaterally,  respirations unlabored. No rales/wheezing/rubs  Heart: Irregularly irregular, S1 and S2 normal, no murmur, rub or gallop. DP pulses 2/4  Abdomen: Soft, nontender, obese, no masses, no organomegaly, no guarding, rebound or rigidity. Genital/Rectal: deferred  Extremities:  Extremities normal, atraumatic, no cyanosis . Distal pulses equal bilaterally. Bilateral knee edema. Tender to light palpation. Anterior and posterior drawer test negative. Pain with patellar grind test..  Straight leg raise negative  Skin: Skin color, texture, turgor normal, no rashes or lesions  Musculoskeletal: No joint swelling, no muscle tenderness. Normal ROM in extremities. Neurologic: Alert & Oriented; CNII-XII intact; Normal and symmetric strength in UEs and LEs; Sensation intact  Psychiatric: appropriate affect. Intact judgment and insight.      Labs:   Results for orders placed or performed during the hospital encounter of 11/28/21   CBC auto differential   Result Value Ref Range    WBC 5.8 4.5 - 11.5 E9/L    RBC 4.00 3.50 - 5.50 E12/L    Hemoglobin 11.9 11.5 - 15.5 g/dL    Hematocrit 36.7 34.0 - 48.0 %    MCV 91.8 80.0 - 99.9 fL    MCH 29.8 26.0 - 35.0 pg    MCHC 32.4 32.0 - 34.5 %    RDW 14.1 11.5 - 15.0 fL    Platelets 311 793 - 184 E9/L    MPV 10.2 7.0 - 12.0 fL    Neutrophils % 72.5 43.0 - 80.0 %    Immature Granulocytes % 0.2 0.0 - 5.0 %    Lymphocytes % 15.1 (L) 20.0 - 42.0 %    Monocytes % 8.4 2.0 - 12.0 %    Eosinophils % 2.6 0.0 - 6.0 %    Basophils % 1.2 0.0 - 2.0 %    Neutrophils Absolute 4.22 1.80 - 7.30 E9/L    Immature Granulocytes # 0.01 E9/L    Lymphocytes Absolute 0.88 (L) 1.50 - 4.00 E9/L    Monocytes Absolute 0.49 0.10 - 0.95 E9/L    Eosinophils Absolute 0.15 0.05 - 0.50 E9/L    Basophils Absolute 0.07 0.00 - 0.20 E9/L   Comprehensive Metabolic Panel   Result Value Ref Range    Sodium 140 132 - 146 mmol/L Potassium 4.1 3.5 - 5.0 mmol/L    Chloride 102 98 - 107 mmol/L    CO2 25 22 - 29 mmol/L    Anion Gap 13 7 - 16 mmol/L    Glucose 140 (H) 74 - 99 mg/dL    BUN 24 (H) 6 - 23 mg/dL    CREATININE 0.9 0.5 - 1.0 mg/dL    GFR Non-African American 59 >=60 mL/min/1.73    GFR African American >60     Calcium 9.4 8.6 - 10.2 mg/dL    Total Protein 6.5 6.4 - 8.3 g/dL    Albumin 4.0 3.5 - 5.2 g/dL    Total Bilirubin 0.7 0.0 - 1.2 mg/dL    Alkaline Phosphatase 77 35 - 104 U/L    ALT 19 0 - 32 U/L    AST 25 0 - 31 U/L   Troponin   Result Value Ref Range    Troponin, High Sensitivity 17 (H) 0 - 9 ng/L   Protime-INR   Result Value Ref Range    Protime 24.4 (H) 9.3 - 12.4 sec    INR 2.2    EKG 12 Lead   Result Value Ref Range    Ventricular Rate 94 BPM    Atrial Rate 101 BPM    QRS Duration 102 ms    Q-T Interval 388 ms    QTc Calculation (Bazett) 485 ms    R Axis 27 degrees    T Axis 48 degrees       Imaging:  XR KNEE RIGHT (MIN 4 VIEWS)   Final Result   No significant change identified. XR KNEE LEFT (MIN 4 VIEWS)   Final Result   No significant change. Short-term follow-up recommended if symptoms persist.         CT HEAD WO CONTRAST   Final Result   No acute intracranial findings. Consider MRI if symptoms persist.      Volume loss and findings suggestive of minimal chronic microvascular ischemia. CT CERVICAL SPINE WO CONTRAST   Final Result   Examination partially degraded by patient motion. Allowing for this, no   acute process identified. Prominent degenerative changes. MRI would be useful if symptoms persist.         CT LUMBAR SPINE WO CONTRAST   Final Result   Advanced degenerative changes and other chronic appearing findings. MRI   would be useful if symptoms persist.         CT CHEST WO CONTRAST   Final Result   Limited examination by patient motion. Scattered areas of scarring and or atelectasis within the lungs. Moderate cardiomegaly. Other chronic appearing findings.   Short-term follow-up recommended if   symptoms persist.             Assessment and Plan  Active Problems:    Ambulatory dysfunction  Resolved Problems:    * No resolved hospital problems. *    Ground-level fall  -History of ground-level falls  -Hematoma vs meniscul tear  - Consider mri if swelling does not improve. - Consider repeating echo    Head trauma  -History of NELLY dow on warfarin. -CT of head: No acute intracranial findings  -Alert and oriented x3  -Continue to monitor  -will consult PT/OT  - will consult case management for placement      Hypertension  -Continue home dose metoprolol, spironolactone and lisinopril    CHF  -Continue Lasix  -Last echocardiogram performed May 2020. EF at that time 55 to 60%.     A. Fib  -Continue warfarin 10 mg daily and metoprolol  -INR 2.2 on 11/29    Hypothyroidism  -Reports increased fatigue and depression  -We will recheck TSH  -Last TSH 11/2/2021 within normal limits  -Continue levothyroxine    Depression  -Continue Lexapro      DVT / GI prophylaxis: Warfarin and Pepcid    Dispo -       Electronically signed by Vanessa Gusman MD on 11/29/21 at 6:11 AM EST  This case was discussed with attending physician: Dr. Rianna Asher

## 2021-11-29 NOTE — ED NOTES
Assumed patient care, patient is to the ED for a fall. Patient has c/o of pain in her legs. Patient stated she did hit her head. Patient medicated as ordered for pain. Se vitals.  Will continue to monitor      Kirby Tompkins RN  11/29/21 0149

## 2021-11-29 NOTE — DISCHARGE INSTR - COC
Continuity of Care Form    Patient Name: Paz Morales   :  1935  MRN:  [id number]    Admit date:  2021  Discharge date:  ***    Code Status Order: DNR-CCA   Advance Directives:      Admitting Physician:  No admitting provider for patient encounter. PCP: Peggy Tabares MD    Discharging Nurse: [facility] Unit/Room#: [facility]  Discharging Unit Phone Number: ***    Emergency Contact:   Extended Emergency Contact Information  Primary Emergency Contact: Roscoe Nettles  Address: [address] [place] [address], [address] [facility] Pamela Blotter  [address]  Phone: [phone]  Mobile Phone: [phone]  Relation: Spouse  Secondary Emergency Contact: Sky Crow  Address: [address] BY PATIENT   Eran Aviles of [facility] Phone: [phone]  Mobile Phone: [phone]  Relation: Child    Past Surgical History:  Past Surgical History:   Procedure Laterality Date    CARDIAC CATHETERIZATION  3/22/14    CARPAL TUNNEL RELEASE      Right hand. CATARACT REMOVAL  2005    right, bilateral cataract surgery. CATARACT REMOVAL WITH IMPLANT Bilateral     CHOLECYSTECTOMY  1972    COLONOSCOPY      FINGER SURGERY  2011    S/P removal of cyst from right index finger. FRACTURE SURGERY Left     arm fractured-casted, Bilateral feet fractures-casted. INCONTINENCE SURGERY  2005    Urinary incontinence, S/P bladder suspension. INNER EAR SURGERY      tube place in right ear    [facility] ARTHROPLASTY Bilateral 2005    PARATHYROIDECTOMY      Three times    SHOULDER ARTHROPLASTY Left 2009    SHOULDER ARTHROPLASTY Left      shoulder total replacement.     THYROID SURGERY  2005    recurrent    THYROIDECTOMY  1960       Immunization History:   Immunization History   Administered Date(s) Administered    COVID-19, Pfizer, PF, 30mcg/0.3mL 2021, 02/15/2021    Influenza Vaccine, unspecified formulation 2016    Influenza Virus Vaccine 09/29/2014, 10/16/2018    Influenza, MDCK Quadv, IM, PF (Flucelvax 2 yrs and older) 09/21/2017    Influenza, Quadv, adjuvanted, 65 yrs +, IM, PF (Fluad) 12/14/2020    Influenza, Triv, inactivated, subunit, adjuvanted, IM (Fluad 65 yrs and older) 10/31/2019    Pneumococcal Conjugate 13-valent (Chwxaew25) 09/17/2015    Pneumococcal Polysaccharide (Amvirmlhy08) 01/04/2011    Tdap (Boostrix, Adacel) 09/01/2015       Active Problems:  Patient Active Problem List   Diagnosis Code    Depression F32. 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    Pseudophakia, both eyes Z96.1    Chronic diastolic (congestive) heart failure ([facility]) I50.32    Pure hypercholesterolemia E78.00    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    Patent foramen ovale Q21.1    Coronary arteriosclerosis in native artery I25.10    Frequent falls R29.6    Dementia ([facility]) F03.90    Ambulatory dysfunction R26.2       Isolation/Infection:   Isolation            No Isolation          Patient Infection Status       Infection Onset Added Last Indicated Last Indicated By Review Planned Expiration Resolved Resolved By    None active    Resolved    COVID-19 (Rule Out) 04/19/21 04/19/21 04/19/21 COVID-19, Rapid (Ordered)   04/19/21 Rule-Out Test Resulted    COVID-19 (Rule Out) 04/16/21 04/16/21 04/16/21 COVID-19, Rapid (Ordered)   04/16/21 Rule-Out Test Resulted    COVID-19 (Rule Out) 05/10/20 05/10/20 05/10/20 COVID-19 (Ordered)   05/10/20 Rule-Out Test Resulted Nurse Assessment:  Last Vital Signs: BP (!) 121/59   Pulse 65   Temp 98 °F (36.7 °C)   Resp 18   SpO2 96%     Last documented pain score (0-10 scale): Pain Level: 10  Last Weight:   Wt Readings from Last 1 Encounters:   21 243 lb (110.2 kg)     Mental Status:  {IP PT MENTAL STATUS:26941}    IV Access:  { ANDRESSA IV ACCESS:595485730}    Nursing Mobility/ADLs:  Walking   {CHP DME LNJQ:154311889}  Transfer  {CHP DME QYJF:998657647}  Bathing  {CHP DME UXCK:012865314}  Dressing  {CHP DME AAZL:282471861}  Toileting  {CHP DME IAHN:452989136}  Feeding  {CHP DME SUEX:701877195}  Med Admin  {CHP DME UPJ}  Med Delivery   { ANDRESSA MED Delivery:770672423}    Wound Care Documentation and Therapy:        Elimination:  Continence: Bowel: {YES / CK:55464}  Bladder: {YES / LN:14490}  Urinary Catheter: {Urinary Catheter:012063040}   Colostomy/Ileostomy/Ileal Conduit: {YES / OB:70803}       Date of Last BM: ***  No intake or output data in the 24 hours ending 21 1528  No intake/output data recorded.     Safety Concerns:     [employer] Safety Concerns:481458938}    Impairments/Disabilities:      [employer] Impairments/Disabilities:086562079}    Nutrition Therapy:  Current Nutrition Therapy:   [employer] Diet List:605258266}    Routes of Feeding: {CHP DME Other Feedings:102840589}  Liquids: {Slp liquid thickness:52175}  Daily Fluid Restriction: {CHP DME Yes amt example:003937208}  Last Modified Barium Swallow with Video (Video Swallowing Test): {Done Not Done ULWV:763901795}    Treatments at the Time of Hospital Discharge:   Respiratory Treatments: ***  Oxygen Therapy:  {Therapy; copd oxygen:39638}  Ventilator:    { CC Vent IXTR:261713692}    Rehab Therapies: {THERAPEUTIC INTERVENTION:8041399926}  Weight Bearing Status/Restrictions: [address] Weight Bearin}  Other Medical Equipment (for information only, NOT a DME order):  {EQUIPMENT:065940868}  Other Treatments: ***    Patient's personal belongings (please select all that are sent with patient):  {CHP DME Belongings:787754654}    RN SIGNATURE:  {Esignature:424978548}    CASE MANAGEMENT/SOCIAL WORK SECTION    Inpatient Status Date: ***    Readmission Risk Assessment Score:  Readmission Risk              Risk of Unplanned Readmission:  0           Discharging to Facility/ Agency   Name:   Address:  Phone:  Fax:    Dialysis Facility (if applicable)   Name:  Address:  Dialysis Schedule:  Phone:  Fax:    / signature: {Esignature:844263270}    PHYSICIAN SECTION    Prognosis: Good    Condition at Discharge: Stable    Rehab Potential (if transferring to Rehab): Fair    Recommended Labs or Other Treatments After Discharge: Engage in PT and OT. Physician Certification: I certify the above information and transfer of Lauren Plants  is necessary for the continuing treatment of the diagnosis listed and that she requires Jacob Jose for greater 30 days.      Update Admission H&P: No change in H&P    PHYSICIAN SIGNATURE:  Electronically signed by Michelle Youngblood MD on 11/29/21 at 3:29 PM EST

## 2021-11-29 NOTE — ED NOTES
This RN received report from Blaine lao [place].      Mitesh Mccarty RN  11/29/21 4737

## 2021-11-29 NOTE — ED NOTES
SOCIAL WORK NOTIFIED THAT PATIENT OK TO DC BACK TO FACILITY. JUANPABLO BONNER TO CALL PATIENT'S DAUGHTER TO NOTIFY.       Sergei Keys RN  11/29/21 7939

## 2021-11-29 NOTE — CARE COORDINATION
Social Work /Transition of Care:    Pt presents to the ED secondary to a fall at [facility]. Pt is very [place]. Per report from facility, pt stated she is unable to walk and pt had been considering [facility] for [facility]. Pt and  recently moved into the [facility] together. Per staff at facility, pt will need PT/OT evals prior to discharge to see if pt can return to [facility] or would need [facility] placement. SW attempted to call pt's daughter, with no answer, left voice message. MINA reviewed pt's PT/OT evals with facility liaison who reports pt is able to return to [facility] upon discharge and a referral will be made to [facility]. Pt will need a rapid covid test.      4pm-  MINA spoke to pt's daughter who is aware of plan for pt to return to [facility] with [facility]. Pt's daughter states she is able to pick pt up from ED when pt is medically clear for discharge.

## 2021-11-29 NOTE — PROGRESS NOTES
[facility]  Family Medicine Attending    S: 81 y.o. female with PMH of CAD, CHF, hypertension, A. Fib, and hypothyroidism who presents to ED for Fall. Recently moved into assisted living, but continues to have frequent falls. This morning fell while getting out of bed. C/O knee pain and swelling. X-rays without acute changes  Today, she feels as though she can return to Assisted Living. Still with pain over kneecaps    O: VS- Blood pressure (!) 164/74, pulse 97, temperature 97.9 °F (36.6 °C), temperature source Oral, resp. rate 15, SpO2 98 %, not currently breastfeeding. Exam is as noted by resident with the following changes, additions or corrections:  Awake, alert, NAD  Heart- irreg  Lungs- clear  Ext - Mild swelling both knees without erythema. 3-4 cm abrasion/ecchymosis over left kneecap. Tender to palpation bilaterally    Impressions: Active Problems:    Hypothyroidism    Permanent atrial fibrillation (HCC)    Essential hypertension    Class 2 severe obesity due to excess calories with serious comorbidity and body mass index (BMI) of 39.0 to 39.9 in [facility])    Chronic anticoagulation    Mixed stress and urge urinary incontinence    Chronic diastolic (congestive) heart failure (HCC)    Frequent falls    Ambulatory dysfunction  Resolved Problems:    * No resolved hospital problems. *      Plan:   OK to return to Assisted Living with PT provided there     Attending Physician Statement  I have reviewed the chart and seen the patient with the resident(s). I personally reviewed images, EKG's and similar tests, if present. I personally reviewed and performed key elements of the history and exam.  I have reviewed and confirmed student and/or resident history and exam with changes as indicated above. I agree with the assessment, plan and orders as documented by the resident. Please refer to the resident and/or student note for additional information.       Hipolito Harrison MD

## 2021-11-29 NOTE — PROGRESS NOTES
OCCUPATIONAL THERAPY INITIAL EVALUATION     [address] [address] [address]        OTIS:                                                  Patient Name: Marycruz Sims    MRN: [id number]    : 1935    Room: [facility]      Evaluating OT: Gloriacarter Eliana, [address]ia Cohen OTR/L; [id number]      Referring Provider: Vanessa Gusman MD    Specific Provider Orders/Date: OT Eval and Treat 21      Diagnosis: Ambulatory Dysfunction    Surgery: none this admission     Pertinent Medical History:  has a past medical history of Anticoagulated on Coumadin, Atrial fibrillation ([address] Basal cell carcinoma of neck, CAD (coronary artery disease), CHF (congestive heart failure) ([address] Depression, DJD (degenerative joint disease), GERD (gastroesophageal reflux disease), History of cardiovascular stress test, Hyperparathyroidism ([address] Hypertension, Hypothyroidism, Mild mitral regurgitation, Osteoarthritis, Pulmonary hypertension ([address] Renal calculi, Tricuspid regurgitation, and Urinary incontinence.      Recommended Adaptive Equipment:  TBD pending progression      Precautions:  Fall Risk, severe [place], IV     Assessment of current deficits    [x] Functional mobility  [x]ADLs  [x] Strength               []Cognition    [x] Functional transfers   [x] IADLs         [x] Safety Awareness   [x]Endurance    [] Fine Coordination              [x] Balance      [] Vision/perception   []Sensation     []Gross Motor Coordination  [] ROM  [] Delirium                   [] Motor Control     OT PLAN OF CARE   OT POC based on physician orders, patient diagnosis and results of clinical assessment    Frequency/Duration: 1-3 days/wk for 2 weeks PRN   Specific OT Treatment Interventions to include:   * Instruction/training on adapted ADL techniques and AE recommendations to increase functional independence within precautions       * Training on energy conservation strategies, correct breathing pattern and techniques to improve independence/tolerance for self-care routine  * Functional transfer/mobility training/DME recommendations for increased independence, safety, and fall prevention  * Patient/Family education to increase follow through with safety techniques and functional independence  * Recommendation of environmental modifications for increased safety with functional transfers/mobility and ADLs  * Therapeutic exercise to improve motor endurance, ROM, and functional strength for ADLs/functional transfers  * Therapeutic activities to facilitate/challenge dynamic balance, stand tolerance for increased safety and independence with ADLs    Home Living: Pt poor historian d/t cognition.  Per chart pt admitted from [place] [id number] ( also living at assisted living facility)  Bathroom setup: handicap acccessible  Equipment owned: sock aide (pt reports using to assist with donning socks)    Prior Level of Function: mod ind - minimal assist with ADLs, assist from staff with IADLs; ambulated   Driving: no    Pain Level: reports discomfort/painin L knee and R side of neck during session - unable to provide level  Cognition: A&O: 1/4 - pt easily distracted and difficult to fully assess cognition - oriented to self only   Follows sinlge-simple step directions - required repetition d/t sever [place] and increased time to process   Memory:  Fair -   Sequencing:  Fair -   Problem solving:  Fair -   Judgement/safety:  Fair -     Functional Assessment:  AM-PAC Daily Activity Raw Score: 14/24   Initial Eval Status  Date: 11/29/21 Treatment Status  Date: STGs = LTGs  Time frame: 10-14 days   Feeding Stand by Assist   Opening packages/tray set up  D/t decreased insight/problem solving skills  IND   Grooming Minimal Assist   Simulated functional reach for grooming  Independent    UB Dressing Minimal Assist   Demonstrated difficulty with overhead activity d/t BUE weakness  Independent    LB Dressing Dependent   Pt reports use of sock aide to cory/doff socks prior   Required assist lying supine in bed  Moderate [place]    Bathing Moderate Assist  Limited functional reach requiring increased assist   Decreased insight/problem solving for safety with task  Independent    Toileting Moderate Assist   Decreased dynamic standing balance requiring increased assist with pericare/LB clothing for safety with task  Independent    Bed Mobility  Log Roll: Moderate Assist  Supine to sit: Moderate Assist   Sit to supine: Moderate Assist   Supine to sit: Independent   Sit to supine: Independent    Functional Transfers Sit to stand: Moderate Asssit   Stand to sit:Mod Assist  Stand pivot: Mod Assist  Commode: NT  Sit to stand: Mod Ind with AD   Stand to sit:Mod Ind with AD   Stand pivot: Mod Ind with AD   Commode: Mod Ind with AD     Functional Mobility Min A with ww  Within hallway  Mod Ind with AD     Balance Sitting     Static - SBA     Dynamic - SBA  Standing: Min A with ww  Sitting:  Static: ind  Dynamic: ind  Standing: Mod Ind with AD    Activity Tolerance Fair   Limited d/t fatigue and SOB following functional mobility   Good   Visual/  Perceptual Glasses: yes - not present        Vitals SpO2 on RA following functional activity: 95%  Pt reports feeling SOB educated on deep breathing techniues       Hand Dominance: Right   AROM (PROM) Strength Additional Info:  Goal:   RUE  WFL 3/5 good  and wfl FMC/dexterity noted during ADL tasks   Improve overall RUE strength to 3+/5 for participation in functional tasks       LUE WFL 3/5 Good  and wfl FMC/dexterity noted during ADL tasks   Improve overall LUE strength to 3+/5 for participation in functional tasks       Hearing: severe [place]  Sensation:  No c/o numbness or tingling   Tone: WFL   Edema: unremarkable    Comment: Cleared by RN to see pt. Upon arrival patient lying supine in bed and agreeable to OT session.  At end of session, patient lying supine in bed with call light and phone within reach, all lines and tubes intact. Overall patient demonstrated  decreased independence and safety during completion of ADL/functional transfer/mobility tasks. Pt would benefit from continued skilled OT to increase safety and independence with completion of ADL/IADL tasks for functional independence and quality of life. Treatment: OT treatment provided this date includes:    ADL-  Instruction/training on safety and adapted techniques for completion of ADLs - provided assist to cory socks lying supine in bed, pt demonstrated decreased functional reach     Mobility-  Instruction/training on safety and improved independence with bed mobility/functional transfers  and functional mobility. - provided verbal cues for proper bed mobility and increased asssit with trunk d/t decreased core strength and verbal/tactile assist for BLE progression    Sitting EOB x 2 minutes to improve dynamic sitting balance and activity tolerance during ADLs.   Activity tolerance- Instruction/training on energy conservation/work simplification for completion of ADLs - educated on deep breathing/ rest breaks d/t SOB following functional task      Rehab Potential: Good  for established goals     LTG: maximize independence with ADLs to return to PLOF    Patient and/or family were instructed on functional diagnosis, prognosis/goals and OT plan of care. Demonstrated fair understanding. [] Malnutrition indicators have been identified and nursing has been notified to ensure a dietitian consult is ordered. ·  Eval Complexity: Low     Evaluation time includes thorough review of current medical information, gathering information on past medical & social history & PLOF, completion of standardized testing, informal observation of tasks, consultation with other medical professions/disciplines, assessment of data & development of POC/goals.      Time In: 1430  Time Out: 1445  Total Treatment Time: none    Min Units OT Eval Low 71529  x     OT Eval Medium 92074      OT Eval High 26801      OT Re-Eval [id number]       Therapeutic Ex [id number]       Therapeutic Activities 17848       ADL/Self Care 88926       Orthotic Management 60172       Manual [id number]     Neuro Re-Ed 48665       Non-Billable Time          Evaluation Time additionally includes thorough review of current medical information, gathering information on past medical history/social history and prior level of function, interpretation of standardized testing/informal observation of tasks, assessment of data and development of plan of care and goals.           CALVIN Jaimes OTR/L; [id number]

## 2021-12-01 NOTE — CARE COORDINATION
[facility] Transitions Initial Follow Up Call    Call within 2 business days of discharge: Yes    Patient: Hai Ibrahim Patient : 1935   MRN: [id number]  Reason for Admission: Injury of Head; Ambulatory Dysfunction; Acute Pain of Both Knees; Chest Wall Pain  Discharge Date: 21 RARS: Readmission Risk Score: 20      Last Discharge [facility]       Complaint Diagnosis Description Type Department Provider    21 Fall Injury of head, initial encounter . .. ED (DISCHARGE) [facility] Hugh Benson MD           Second attempt to reach the patient for initial Care Transition call post hospital discharge. Unable to reach patient; no answer, voice mailbox full.    CTN will sign off and resolve episode; provide hand off to ACM    Follow Up  Future Appointments   Date Time Provider Hernandez Mcintyre   2021  3:00 PM MD Mia Hogan [facility]       Savannah Newberry RN

## 2021-12-02 NOTE — CARE COORDINATION
Ambulatory Care Coordination Note  12/2/2021  CM Risk Score: 11  [place] 10 Year Mortality Risk Score: 100%     ACC: Lemuel Conte, RN    Summary Note:   [facility] spoke with [place] for COPD/CHF follow up. She reports her and Karla Quarles (), are now residents at [facility]. She admits she was in the ED for a fall on 11/28/21. She states she began physical therapy this week. She requested that [facility] complete the phone call with Karla Quarles because she was having trouble hearing without her hearing aid. Karla Quarles reports there is a facility nurse and a dispensary for medications. He reports [place]'s INR today 2.4. Future appointments were reviewed. [facility] discussed that [facility] will no longer follow [place] for care coordination, but that they can contact [facility] with any questions or concerns in the future. PLAN  Notify PCP [facility] will no longer follow for care coordination. Place in 90 day exclusion. Care Coordination Interventions    Program Enrollment: Complex Care  Referral from Primary Care Provider: Yes  Suggested Interventions and Community Resources  Fall Risk Prevention: Completed  Home Care Waiver: Completed (Comment: [facility] P[facility]rez)  Medi Set or Pill Pack: Completed (Comment: [facility])  Pharmacist: Declined  Senior Services: Completed  Other Services: Completed (Comment: [facility])  Zone Management Tools: Completed (Comment: COPD/CHF)         Goals Addressed                 This Visit's Progress     COMPLETED: Medication Management   Improving     I will take my medication as directed. I will notify my provider of any problems with medications, like adverse effects or side effects. I will notify my provider/Care Coordinator if I am unable to afford my medications. I will notify my provider for advice before I stop taking any of my medication.     Barriers: impairment:  hearing, overwhelmed by complexity of regimen, and stress  Plan for overcoming my barriers: prepackaged medications, care coordination  Confidence: 7/10  Anticipated Goal Completion Date: 11/9/21            Prior to Admission medications    Medication Sig Start Date End Date Taking?  Authorizing Provider   albuterol (PROVENTIL) (2.5 MG/3ML) 0.083% nebulizer solution Take 2.5 mg by nebulization every 6 hours as needed for Wheezing    Historical Provider, MD   vitamin D (CHOLECALCIFEROL) 25 MCG (1000 UT) TABS tablet Take 1,000 Units by mouth daily    Historical Provider, MD   bisacodyl (DULCOLAX) 10 MG suppository Place 10 mg rectally daily as needed for Constipation    Historical Provider, MD   famotidine (PEPCID) 20 MG tablet Take 20 mg by mouth 2 times daily    Historical Provider, MD   furosemide (LASIX) 40 MG tablet Take 40 mg by mouth daily    Historical Provider, MD   escitalopram (LEXAPRO) 10 MG tablet Take 10 mg by mouth daily    Historical Provider, MD   meclizine (ANTIVERT) 25 MG tablet Take 25 mg by mouth 3 times daily as needed for Dizziness    Historical Provider, MD   metoprolol succinate (TOPROL XL) 50 MG extended release tablet Take 50 mg by mouth nightly    Historical Provider, MD   magnesium hydroxide (MILK OF MAGNESIA) 400 MG/5ML suspension Take 5 mLs by mouth daily as needed for Constipation    Historical Provider, MD   guaiFENesin (MUCINEX) 600 MG extended release tablet Take 1,200 mg by mouth daily as needed for Congestion    Historical Provider, MD   albuterol sulfate HFA (VENTOLIN HFA) 108 (90 Base) MCG/ACT inhaler Inhale 2 puffs into the lungs every 6 hours as needed for Wheezing    Historical Provider, MD   levothyroxine (SYNTHROID) 125 MCG tablet Take 125 mcg by mouth Daily    Historical Provider, MD   b complex vitamins capsule Take 1 capsule by mouth daily    Historical Provider, MD   warfarin (COUMADIN) 5 MG tablet Take 10 mg by mouth nightly    Historical Provider, MD   levalbuterol (XOPENEX) 0.63 MG/3ML nebulization Take 1 ampule by nebulization every 8 hours as needed for Wheezing    Historical Provider, MD   acetaminophen (TYLENOL) 325 MG tablet Take 2 tablets by mouth every 6 hours as needed for Pain or Fever 11/29/21   Veronica Rosales MD   lisinopril (PRINIVIL;ZESTRIL) 5 MG tablet Take 1 tablet by mouth daily 8/30/21   Martha Hernandez MD   metoprolol succinate (TOPROL XL) 25 MG extended release tablet Take 25 mg by mouth nightly  7/24/21   Historical Provider, MD   spironolactone (ALDACTONE) 25 MG tablet TAKE 1 TABLET BY MOUTH DAILY 8/2/21   Madai Babcock MD   levothyroxine (SYNTHROID) 125 MCG tablet Take 1 tablet by mouth every morning (before breakfast) 7/24/21   Rama Andee Sandhoff, MD   Ascorbic Acid (VITAMIN C) 250 MG tablet Take 1 tablet by mouth every morning (before breakfast) 10/27/20   Martha Hernandez MD       Future Appointments   Date Time Provider Hernandez Mcintyre   12/6/2021  3:00 PM MD Jose Hansen [facility]   1/27/2022  2:15 PM Madai Babcock MD [facility]     ,   Congestive Heart Failure Assessment    Are you currently restricting fluids?: No Restriction  Do you understand a low sodium diet?: Yes  Do you understand how to read food labels?: Yes  How many restaurant meals do you eat per week?: 0  Do you salt your food before tasting it?: No         Symptoms:        ,   COPD Assessment    Does the patient understand envrionmental exposure?: Yes  Is the patient able to verbalize Rescue vs. Long Acting medications?: Yes  Does the patient have a nebulizer?: Yes  Does the patient use a space with inhaled medications?: No            Symptoms:         and   General Assessment    Do you have any symptoms that are causing concern?: Yes  Progression since Onset: Gradually Improving  Reported Symptoms: Pain

## 2021-12-13 NOTE — TELEPHONE ENCOUNTER
The pharmacist from [facility] called in and is asking if you could please send him over new scripts for all of [place]'s medications. They are needing them as they are the pharmacy for [facility].     Thank you

## 2021-12-14 NOTE — PROGRESS NOTES
Detailed message left on voicemail with new dosing instructions, requested a return call to confirm.

## 2022-01-01 ENCOUNTER — APPOINTMENT (OUTPATIENT)
Dept: GENERAL RADIOLOGY | Age: 87
DRG: 640 | End: 2022-01-01
Payer: MEDICARE

## 2022-01-01 ENCOUNTER — HOSPITAL ENCOUNTER (EMERGENCY)
Age: 87
Discharge: NURSING, REHAB OR LONG-TERM CARE | End: 2022-02-25
Attending: EMERGENCY MEDICINE
Payer: MEDICARE

## 2022-01-01 ENCOUNTER — CARE COORDINATION (OUTPATIENT)
Dept: CASE MANAGEMENT | Age: 87
End: 2022-01-01

## 2022-01-01 ENCOUNTER — APPOINTMENT (OUTPATIENT)
Dept: GENERAL RADIOLOGY | Age: 87
End: 2022-01-01
Payer: MEDICARE

## 2022-01-01 ENCOUNTER — HOSPITAL ENCOUNTER (EMERGENCY)
Age: 87
Discharge: HOME | End: 2022-03-15
Attending: EMERGENCY MEDICINE
Payer: MEDICARE

## 2022-01-01 ENCOUNTER — HOSPITAL ENCOUNTER (INPATIENT)
Age: 87
LOS: 21 days | Discharge: NURSING, REHAB OR LONG-TERM CARE | DRG: 640 | End: 2022-05-04
Attending: STUDENT IN AN ORGANIZED HEALTH CARE EDUCATION/TRAINING PROGRAM | Admitting: INTERNAL MEDICINE
Payer: MEDICARE

## 2022-01-01 ENCOUNTER — ANTI-COAG VISIT (OUTPATIENT)
Dept: FAMILY MEDICINE CLINIC | Age: 87
End: 2022-01-01

## 2022-01-01 ENCOUNTER — TELEPHONE (OUTPATIENT)
Dept: FAMILY MEDICINE CLINIC | Age: 87
End: 2022-01-01

## 2022-01-01 ENCOUNTER — APPOINTMENT (OUTPATIENT)
Dept: CT IMAGING | Age: 87
DRG: 640 | End: 2022-01-01
Payer: MEDICARE

## 2022-01-01 ENCOUNTER — APPOINTMENT (OUTPATIENT)
Dept: CT IMAGING | Age: 87
DRG: 177 | End: 2022-01-01
Payer: MEDICARE

## 2022-01-01 ENCOUNTER — HOSPITAL ENCOUNTER (OUTPATIENT)
Age: 87
Setting detail: OBSERVATION
Discharge: OTHER INSTITUTION | End: 2022-03-30
Attending: EMERGENCY MEDICINE | Admitting: FAMILY MEDICINE
Payer: MEDICARE

## 2022-01-01 ENCOUNTER — TELEPHONE (OUTPATIENT)
Dept: OTHER | Facility: CLINIC | Age: 87
End: 2022-01-01

## 2022-01-01 ENCOUNTER — OFFICE VISIT (OUTPATIENT)
Dept: CARDIOLOGY CLINIC | Age: 87
End: 2022-01-01
Payer: MEDICARE

## 2022-01-01 ENCOUNTER — HOSPITAL ENCOUNTER (INPATIENT)
Age: 87
LOS: 2 days | Discharge: HOME | DRG: 177 | End: 2022-01-06
Attending: EMERGENCY MEDICINE | Admitting: FAMILY MEDICINE
Payer: MEDICARE

## 2022-01-01 ENCOUNTER — HOSPITAL ENCOUNTER (OUTPATIENT)
Age: 87
Setting detail: OBSERVATION
Discharge: NURSING, REHAB OR LONG-TERM CARE | End: 2022-01-13
Attending: EMERGENCY MEDICINE | Admitting: FAMILY MEDICINE
Payer: MEDICARE

## 2022-01-01 ENCOUNTER — APPOINTMENT (OUTPATIENT)
Dept: ULTRASOUND IMAGING | Age: 87
DRG: 640 | End: 2022-01-01
Payer: MEDICARE

## 2022-01-01 ENCOUNTER — CARE COORDINATION (OUTPATIENT)
Dept: CARE COORDINATION | Age: 87
End: 2022-01-01

## 2022-01-01 ENCOUNTER — APPOINTMENT (OUTPATIENT)
Dept: CT IMAGING | Age: 87
End: 2022-01-01
Payer: MEDICARE

## 2022-01-01 ENCOUNTER — HOSPITAL ENCOUNTER (EMERGENCY)
Age: 87
Discharge: HOME | End: 2022-03-06
Attending: EMERGENCY MEDICINE
Payer: MEDICARE

## 2022-01-01 ENCOUNTER — CLINICAL DOCUMENTATION (OUTPATIENT)
Dept: FAMILY MEDICINE CLINIC | Age: 87
End: 2022-01-01

## 2022-01-01 ENCOUNTER — TELEPHONE (OUTPATIENT)
Dept: ADMINISTRATIVE | Age: 87
End: 2022-01-01

## 2022-01-01 VITALS
HEART RATE: 110 BPM | SYSTOLIC BLOOD PRESSURE: 144 MMHG | WEIGHT: 270.3 LBS | BODY MASS INDEX: 43.44 KG/M2 | OXYGEN SATURATION: 94 % | RESPIRATION RATE: 19 BRPM | HEIGHT: 66 IN | TEMPERATURE: 96.5 F | DIASTOLIC BLOOD PRESSURE: 97 MMHG

## 2022-01-01 VITALS
SYSTOLIC BLOOD PRESSURE: 120 MMHG | OXYGEN SATURATION: 98 % | TEMPERATURE: 98.1 F | HEART RATE: 124 BPM | DIASTOLIC BLOOD PRESSURE: 68 MMHG | RESPIRATION RATE: 18 BRPM | BODY MASS INDEX: 39.09 KG/M2 | HEIGHT: 66 IN | WEIGHT: 243.25 LBS

## 2022-01-01 VITALS
DIASTOLIC BLOOD PRESSURE: 89 MMHG | OXYGEN SATURATION: 100 % | SYSTOLIC BLOOD PRESSURE: 153 MMHG | RESPIRATION RATE: 16 BRPM | WEIGHT: 250 LBS | HEIGHT: 66 IN | HEART RATE: 79 BPM | BODY MASS INDEX: 40.18 KG/M2 | TEMPERATURE: 98.3 F

## 2022-01-01 VITALS
HEIGHT: 66 IN | RESPIRATION RATE: 16 BRPM | SYSTOLIC BLOOD PRESSURE: 119 MMHG | DIASTOLIC BLOOD PRESSURE: 67 MMHG | TEMPERATURE: 97.6 F | BODY MASS INDEX: 39.37 KG/M2 | OXYGEN SATURATION: 94 % | HEART RATE: 86 BPM | WEIGHT: 245 LBS

## 2022-01-01 VITALS
BODY MASS INDEX: 39.05 KG/M2 | HEIGHT: 66 IN | DIASTOLIC BLOOD PRESSURE: 50 MMHG | SYSTOLIC BLOOD PRESSURE: 118 MMHG | HEART RATE: 108 BPM | WEIGHT: 243 LBS | RESPIRATION RATE: 14 BRPM

## 2022-01-01 VITALS
DIASTOLIC BLOOD PRESSURE: 100 MMHG | OXYGEN SATURATION: 99 % | RESPIRATION RATE: 19 BRPM | HEART RATE: 110 BPM | HEIGHT: 66 IN | SYSTOLIC BLOOD PRESSURE: 147 MMHG | BODY MASS INDEX: 40.18 KG/M2 | WEIGHT: 250 LBS

## 2022-01-01 VITALS
TEMPERATURE: 98.5 F | RESPIRATION RATE: 16 BRPM | WEIGHT: 250 LBS | HEART RATE: 71 BPM | OXYGEN SATURATION: 100 % | SYSTOLIC BLOOD PRESSURE: 118 MMHG | BODY MASS INDEX: 40.35 KG/M2 | DIASTOLIC BLOOD PRESSURE: 82 MMHG

## 2022-01-01 VITALS
WEIGHT: 245.3 LBS | RESPIRATION RATE: 18 BRPM | DIASTOLIC BLOOD PRESSURE: 79 MMHG | OXYGEN SATURATION: 97 % | TEMPERATURE: 98.1 F | BODY MASS INDEX: 39.59 KG/M2 | SYSTOLIC BLOOD PRESSURE: 132 MMHG | HEART RATE: 77 BPM

## 2022-01-01 DIAGNOSIS — E66.8 MODERATE OBESITY: ICD-10-CM

## 2022-01-01 DIAGNOSIS — S09.8XXD BLUNT HEAD INJURY, SUBSEQUENT ENCOUNTER: Primary | ICD-10-CM

## 2022-01-01 DIAGNOSIS — J90 CHRONIC BILATERAL PLEURAL EFFUSIONS: ICD-10-CM

## 2022-01-01 DIAGNOSIS — J44.9 CHRONIC OBSTRUCTIVE PULMONARY DISEASE, UNSPECIFIED COPD TYPE (HCC): ICD-10-CM

## 2022-01-01 DIAGNOSIS — I50.9 CHRONIC CONGESTIVE HEART FAILURE, UNSPECIFIED HEART FAILURE TYPE (HCC): ICD-10-CM

## 2022-01-01 DIAGNOSIS — R41.82 ALTERED MENTAL STATUS, UNSPECIFIED ALTERED MENTAL STATUS TYPE: ICD-10-CM

## 2022-01-01 DIAGNOSIS — U07.1 COVID-19 VIRUS INFECTION: ICD-10-CM

## 2022-01-01 DIAGNOSIS — R07.89 CHEST WALL PAIN: ICD-10-CM

## 2022-01-01 DIAGNOSIS — I48.21 PERMANENT ATRIAL FIBRILLATION (HCC): Primary | ICD-10-CM

## 2022-01-01 DIAGNOSIS — F09 COGNITIVE DYSFUNCTION: ICD-10-CM

## 2022-01-01 DIAGNOSIS — N30.00 ACUTE CYSTITIS WITHOUT HEMATURIA: ICD-10-CM

## 2022-01-01 DIAGNOSIS — S00.03XA HEMATOMA OF SCALP, INITIAL ENCOUNTER: ICD-10-CM

## 2022-01-01 DIAGNOSIS — R79.1 SUPRATHERAPEUTIC INR: ICD-10-CM

## 2022-01-01 DIAGNOSIS — R41.0 DISORIENTATION: ICD-10-CM

## 2022-01-01 DIAGNOSIS — R52 BODY ACHES: Primary | ICD-10-CM

## 2022-01-01 DIAGNOSIS — R41.82 ALTERED MENTAL STATUS, UNSPECIFIED ALTERED MENTAL STATUS TYPE: Primary | ICD-10-CM

## 2022-01-01 DIAGNOSIS — E87.0 HYPERNATREMIA: Primary | ICD-10-CM

## 2022-01-01 DIAGNOSIS — E03.9 HYPOTHYROIDISM, UNSPECIFIED TYPE: Primary | ICD-10-CM

## 2022-01-01 DIAGNOSIS — R07.89 ATYPICAL CHEST PAIN: ICD-10-CM

## 2022-01-01 DIAGNOSIS — I50.9 CONGESTIVE HEART FAILURE, UNSPECIFIED HF CHRONICITY, UNSPECIFIED HEART FAILURE TYPE (HCC): ICD-10-CM
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
## 2022-01-01 DIAGNOSIS — S09.90XA CLOSED HEAD INJURY, INITIAL ENCOUNTER: Primary | ICD-10-CM

## 2022-01-01 DIAGNOSIS — I50.32 CHRONIC DIASTOLIC (CONGESTIVE) HEART FAILURE (HCC): ICD-10-CM

## 2022-01-01 DIAGNOSIS — U07.1 ACUTE HYPOXEMIC RESPIRATORY FAILURE DUE TO COVID-19 (HCC): Primary | ICD-10-CM

## 2022-01-01 DIAGNOSIS — S00.83XA CONTUSION OF FACE, INITIAL ENCOUNTER: ICD-10-CM

## 2022-01-01 DIAGNOSIS — J96.01 ACUTE HYPOXEMIC RESPIRATORY FAILURE DUE TO COVID-19 (HCC): Primary | ICD-10-CM

## 2022-01-01 DIAGNOSIS — S09.90XA INJURY OF HEAD, INITIAL ENCOUNTER: Primary | ICD-10-CM

## 2022-01-01 DIAGNOSIS — S00.03XA SCALP HEMATOMA, INITIAL ENCOUNTER: ICD-10-CM

## 2022-01-01 DIAGNOSIS — I10 ESSENTIAL HYPERTENSION: ICD-10-CM

## 2022-01-01 DIAGNOSIS — Z79.01 CHRONIC ANTICOAGULATION: ICD-10-CM

## 2022-01-01 LAB
AADO2: 104.4 MMHG
ACETAMINOPHEN LEVEL: <5 MCG/ML (ref 10–30)
ADENOVIRUS BY PCR: NOT DETECTED
ALBUMIN SERPL-MCNC: 3 G/DL (ref 3.5–5.2)
ALBUMIN SERPL-MCNC: 3 G/DL (ref 3.5–5.2)
ALBUMIN SERPL-MCNC: 3.1 G/DL (ref 3.5–5.2)
ALBUMIN SERPL-MCNC: 3.1 G/DL (ref 3.5–5.2)
ALBUMIN SERPL-MCNC: 3.2 G/DL (ref 3.5–5.2)
ALBUMIN SERPL-MCNC: 3.2 G/DL (ref 3.5–5.2)
ALBUMIN SERPL-MCNC: 3.3 G/DL (ref 3.5–5.2)
ALBUMIN SERPL-MCNC: 3.4 G/DL (ref 3.5–5.2)
ALBUMIN SERPL-MCNC: 3.4 G/DL (ref 3.5–5.2)
ALBUMIN SERPL-MCNC: 3.7 G/DL (ref 3.5–5.2)
ALBUMIN SERPL-MCNC: 3.7 G/DL (ref 3.5–5.2)
ALBUMIN SERPL-MCNC: 3.8 G/DL (ref 3.5–5.2)
ALBUMIN SERPL-MCNC: 3.9 G/DL (ref 3.5–5.2)
ALBUMIN SERPL-MCNC: 4.1 G/DL (ref 3.5–5.2)
ALBUMIN SERPL-MCNC: 4.1 G/DL (ref 3.5–5.2)
ALBUMIN SERPL-MCNC: 4.2 G/DL (ref 3.5–5.2)
ALP BLD-CCNC: 101 U/L (ref 35–104)
ALP BLD-CCNC: 104 U/L (ref 35–104)
ALP BLD-CCNC: 107 U/L (ref 35–104)
ALP BLD-CCNC: 111 U/L (ref 35–104)
ALP BLD-CCNC: 57 U/L (ref 35–104)
ALP BLD-CCNC: 58 U/L (ref 35–104)
ALP BLD-CCNC: 61 U/L (ref 35–104)
ALP BLD-CCNC: 64 U/L (ref 35–104)
ALP BLD-CCNC: 65 U/L (ref 35–104)
ALP BLD-CCNC: 71 U/L (ref 35–104)
ALP BLD-CCNC: 72 U/L (ref 35–104)
ALP BLD-CCNC: 84 U/L (ref 35–104)
ALP BLD-CCNC: 86 U/L (ref 35–104)
ALP BLD-CCNC: 86 U/L (ref 35–104)
ALP BLD-CCNC: 87 U/L (ref 35–104)
ALP BLD-CCNC: 97 U/L (ref 35–104)
ALT SERPL-CCNC: 12 U/L (ref 0–32)
ALT SERPL-CCNC: 14 U/L (ref 0–32)
ALT SERPL-CCNC: 15 U/L (ref 0–32)
ALT SERPL-CCNC: 16 U/L (ref 0–32)
ALT SERPL-CCNC: 16 U/L (ref 0–32)
ALT SERPL-CCNC: 18 U/L (ref 0–32)
ALT SERPL-CCNC: 19 U/L (ref 0–32)
ALT SERPL-CCNC: 19 U/L (ref 0–32)
ALT SERPL-CCNC: 20 U/L (ref 0–32)
ALT SERPL-CCNC: 20 U/L (ref 0–32)
ALT SERPL-CCNC: 21 U/L (ref 0–32)
ALT SERPL-CCNC: 22 U/L (ref 0–32)
ALT SERPL-CCNC: 23 U/L (ref 0–32)
ALT SERPL-CCNC: 28 U/L (ref 0–32)
AMMONIA: 11 UMOL/L (ref 11–51)
AMMONIA: 20 UMOL/L (ref 11–51)
ANION GAP SERPL CALCULATED.3IONS-SCNC: 10 MMOL/L (ref 7–16)
ANION GAP SERPL CALCULATED.3IONS-SCNC: 11 MMOL/L (ref 7–16)
ANION GAP SERPL CALCULATED.3IONS-SCNC: 12 MMOL/L (ref 7–16)
ANION GAP SERPL CALCULATED.3IONS-SCNC: 12 MMOL/L (ref 7–16)
ANION GAP SERPL CALCULATED.3IONS-SCNC: 13 MMOL/L (ref 7–16)
ANION GAP SERPL CALCULATED.3IONS-SCNC: 15 MMOL/L (ref 7–16)
ANION GAP SERPL CALCULATED.3IONS-SCNC: 16 MMOL/L (ref 7–16)
ANION GAP SERPL CALCULATED.3IONS-SCNC: 4 MMOL/L (ref 7–16)
ANION GAP SERPL CALCULATED.3IONS-SCNC: 4 MMOL/L (ref 7–16)
ANION GAP SERPL CALCULATED.3IONS-SCNC: 6 MMOL/L (ref 7–16)
ANION GAP SERPL CALCULATED.3IONS-SCNC: 7 MMOL/L (ref 7–16)
ANION GAP SERPL CALCULATED.3IONS-SCNC: 8 MMOL/L (ref 7–16)
ANION GAP SERPL CALCULATED.3IONS-SCNC: 9 MMOL/L (ref 7–16)
ANISOCYTOSIS: ABNORMAL
APTT: 32.5 SEC (ref 24.5–35.1)
APTT: 41.5 SEC (ref 24.5–35.1)
AST SERPL-CCNC: 21 U/L (ref 0–31)
AST SERPL-CCNC: 23 U/L (ref 0–31)
AST SERPL-CCNC: 26 U/L (ref 0–31)
AST SERPL-CCNC: 26 U/L (ref 0–31)
AST SERPL-CCNC: 27 U/L (ref 0–31)
AST SERPL-CCNC: 29 U/L (ref 0–31)
AST SERPL-CCNC: 30 U/L (ref 0–31)
AST SERPL-CCNC: 31 U/L (ref 0–31)
AST SERPL-CCNC: 31 U/L (ref 0–31)
AST SERPL-CCNC: 32 U/L (ref 0–31)
AST SERPL-CCNC: 32 U/L (ref 0–31)
AST SERPL-CCNC: 33 U/L (ref 0–31)
AST SERPL-CCNC: 36 U/L (ref 0–31)
AST SERPL-CCNC: 40 U/L (ref 0–31)
B.E.: 4.3 MMOL/L (ref -3–3)
B.E.: 5.3 MMOL/L (ref -3–3)
B.E.: 6.4 MMOL/L (ref -3–3)
B.E.: 8 MMOL/L (ref -3–3)
B.E.: 8.2 MMOL/L (ref -3–3)
B.E.: 8.4 MMOL/L (ref -3–3)
B.E.: 9.4 MMOL/L (ref -3–3)
BACTERIA: ABNORMAL /HPF
BASOPHILS ABSOLUTE: 0 E9/L (ref 0–0.2)
BASOPHILS ABSOLUTE: 0.02 E9/L (ref 0–0.2)
BASOPHILS ABSOLUTE: 0.04 E9/L (ref 0–0.2)
BASOPHILS ABSOLUTE: 0.04 E9/L (ref 0–0.2)
BASOPHILS ABSOLUTE: 0.05 E9/L (ref 0–0.2)
BASOPHILS ABSOLUTE: 0.06 E9/L (ref 0–0.2)
BASOPHILS ABSOLUTE: 0.07 E9/L (ref 0–0.2)
BASOPHILS ABSOLUTE: 0.08 E9/L (ref 0–0.2)
BASOPHILS ABSOLUTE: 0.13 E9/L (ref 0–0.2)
BASOPHILS RELATIVE PERCENT: 0.1 % (ref 0–2)
BASOPHILS RELATIVE PERCENT: 0.2 % (ref 0–2)
BASOPHILS RELATIVE PERCENT: 0.2 % (ref 0–2)
BASOPHILS RELATIVE PERCENT: 0.3 % (ref 0–2)
BASOPHILS RELATIVE PERCENT: 0.4 % (ref 0–2)
BASOPHILS RELATIVE PERCENT: 0.6 % (ref 0–2)
BASOPHILS RELATIVE PERCENT: 0.7 % (ref 0–2)
BASOPHILS RELATIVE PERCENT: 0.9 % (ref 0–2)
BASOPHILS RELATIVE PERCENT: 0.9 % (ref 0–2)
BASOPHILS RELATIVE PERCENT: 1 % (ref 0–2)
BASOPHILS RELATIVE PERCENT: 1 % (ref 0–2)
BASOPHILS RELATIVE PERCENT: 1.1 % (ref 0–2)
BASOPHILS RELATIVE PERCENT: 1.1 % (ref 0–2)
BASOPHILS RELATIVE PERCENT: 1.2 % (ref 0–2)
BASOPHILS RELATIVE PERCENT: 1.3 % (ref 0–2)
BASOPHILS RELATIVE PERCENT: 1.4 % (ref 0–2)
BASOPHILS RELATIVE PERCENT: 1.7 % (ref 0–2)
BASOPHILS RELATIVE PERCENT: 1.9 % (ref 0–2)
BASOPHILS RELATIVE PERCENT: 2.7 % (ref 0–2)
BILIRUB SERPL-MCNC: 0.3 MG/DL (ref 0–1.2)
BILIRUB SERPL-MCNC: 0.4 MG/DL (ref 0–1.2)
BILIRUB SERPL-MCNC: 0.5 MG/DL (ref 0–1.2)
BILIRUB SERPL-MCNC: 0.5 MG/DL (ref 0–1.2)
BILIRUB SERPL-MCNC: 0.6 MG/DL (ref 0–1.2)
BILIRUB SERPL-MCNC: 0.7 MG/DL (ref 0–1.2)
BILIRUB SERPL-MCNC: 0.9 MG/DL (ref 0–1.2)
BILIRUB SERPL-MCNC: 1 MG/DL (ref 0–1.2)
BILIRUBIN URINE: NEGATIVE
BLOOD CULTURE, ROUTINE: NORMAL
BLOOD CULTURE, ROUTINE: NORMAL
BLOOD, URINE: ABNORMAL
BORDETELLA PARAPERTUSSIS BY PCR: NOT DETECTED
BORDETELLA PERTUSSIS BY PCR: NOT DETECTED
BUN BLDV-MCNC: 10 MG/DL (ref 6–23)
BUN BLDV-MCNC: 11 MG/DL (ref 6–23)
BUN BLDV-MCNC: 13 MG/DL (ref 6–23)
BUN BLDV-MCNC: 13 MG/DL (ref 6–23)
BUN BLDV-MCNC: 14 MG/DL (ref 6–23)
BUN BLDV-MCNC: 14 MG/DL (ref 6–23)
BUN BLDV-MCNC: 15 MG/DL (ref 6–23)
BUN BLDV-MCNC: 15 MG/DL (ref 6–23)
BUN BLDV-MCNC: 16 MG/DL (ref 6–23)
BUN BLDV-MCNC: 16 MG/DL (ref 6–23)
BUN BLDV-MCNC: 17 MG/DL (ref 6–23)
BUN BLDV-MCNC: 18 MG/DL (ref 6–23)
BUN BLDV-MCNC: 19 MG/DL (ref 6–23)
BUN BLDV-MCNC: 19 MG/DL (ref 6–23)
BUN BLDV-MCNC: 21 MG/DL (ref 6–23)
BUN BLDV-MCNC: 21 MG/DL (ref 6–23)
BUN BLDV-MCNC: 22 MG/DL (ref 6–23)
BUN BLDV-MCNC: 23 MG/DL (ref 6–23)
BUN BLDV-MCNC: 24 MG/DL (ref 6–23)
BUN BLDV-MCNC: 26 MG/DL (ref 6–23)
BUN BLDV-MCNC: 27 MG/DL (ref 6–23)
BUN BLDV-MCNC: 8 MG/DL (ref 6–23)
BUN BLDV-MCNC: 8 MG/DL (ref 6–23)
BUN BLDV-MCNC: 9 MG/DL (ref 6–23)
BURR CELLS: ABNORMAL
C-REACTIVE PROTEIN: 1.1 MG/DL (ref 0–0.4)
C-REACTIVE PROTEIN: 8.7 MG/DL (ref 0–0.4)
CALCIUM SERPL-MCNC: 10.1 MG/DL (ref 8.6–10.2)
CALCIUM SERPL-MCNC: 8.6 MG/DL (ref 8.6–10.2)
CALCIUM SERPL-MCNC: 8.7 MG/DL (ref 8.6–10.2)
CALCIUM SERPL-MCNC: 8.8 MG/DL (ref 8.6–10.2)
CALCIUM SERPL-MCNC: 8.9 MG/DL (ref 8.6–10.2)
CALCIUM SERPL-MCNC: 8.9 MG/DL (ref 8.6–10.2)
CALCIUM SERPL-MCNC: 9 MG/DL (ref 8.6–10.2)
CALCIUM SERPL-MCNC: 9.1 MG/DL (ref 8.6–10.2)
CALCIUM SERPL-MCNC: 9.2 MG/DL (ref 8.6–10.2)
CALCIUM SERPL-MCNC: 9.3 MG/DL (ref 8.6–10.2)
CALCIUM SERPL-MCNC: 9.4 MG/DL (ref 8.6–10.2)
CALCIUM SERPL-MCNC: 9.5 MG/DL (ref 8.6–10.2)
CALCIUM SERPL-MCNC: 9.6 MG/DL (ref 8.6–10.2)
CALCIUM SERPL-MCNC: 9.9 MG/DL (ref 8.6–10.2)
CHLAMYDOPHILIA PNEUMONIAE BY PCR: NOT DETECTED
CHLORIDE BLD-SCNC: 100 MMOL/L (ref 98–107)
CHLORIDE BLD-SCNC: 101 MMOL/L (ref 98–107)
CHLORIDE BLD-SCNC: 101 MMOL/L (ref 98–107)
CHLORIDE BLD-SCNC: 102 MMOL/L (ref 98–107)
CHLORIDE BLD-SCNC: 103 MMOL/L (ref 98–107)
CHLORIDE BLD-SCNC: 104 MMOL/L (ref 98–107)
CHLORIDE BLD-SCNC: 105 MMOL/L (ref 98–107)
CHLORIDE BLD-SCNC: 106 MMOL/L (ref 98–107)
CHLORIDE BLD-SCNC: 107 MMOL/L (ref 98–107)
CHLORIDE BLD-SCNC: 107 MMOL/L (ref 98–107)
CHLORIDE BLD-SCNC: 108 MMOL/L (ref 98–107)
CHLORIDE BLD-SCNC: 109 MMOL/L (ref 98–107)
CHLORIDE URINE RANDOM: 175 MMOL/L
CLARITY: ABNORMAL
CLARITY: CLEAR
CO2: 23 MMOL/L (ref 22–29)
CO2: 24 MMOL/L (ref 22–29)
CO2: 26 MMOL/L (ref 22–29)
CO2: 28 MMOL/L (ref 22–29)
CO2: 28 MMOL/L (ref 22–29)
CO2: 30 MMOL/L (ref 22–29)
CO2: 31 MMOL/L (ref 22–29)
CO2: 32 MMOL/L (ref 22–29)
CO2: 33 MMOL/L (ref 22–29)
CO2: 34 MMOL/L (ref 22–29)
CO2: 35 MMOL/L (ref 22–29)
CO2: 36 MMOL/L (ref 22–29)
CO2: 37 MMOL/L (ref 22–29)
COHB: 0.3 % (ref 0–1.5)
COHB: 0.4 % (ref 0–1.5)
COHB: 0.5 % (ref 0–1.5)
COHB: 0.7 % (ref 0–1.5)
COHB: 0.7 % (ref 0–1.5)
COLOR: YELLOW
CORONAVIRUS 229E BY PCR: DETECTED
CORONAVIRUS HKU1 BY PCR: NOT DETECTED
CORONAVIRUS NL63 BY PCR: NOT DETECTED
CORONAVIRUS OC43 BY PCR: NOT DETECTED
CREAT SERPL-MCNC: 0.5 MG/DL (ref 0.5–1)
CREAT SERPL-MCNC: 0.6 MG/DL (ref 0.5–1)
CREAT SERPL-MCNC: 0.7 MG/DL (ref 0.5–1)
CREAT SERPL-MCNC: 0.8 MG/DL (ref 0.5–1)
CREAT SERPL-MCNC: 0.9 MG/DL (ref 0.5–1)
CREAT SERPL-MCNC: 1 MG/DL (ref 0.5–1)
CREAT SERPL-MCNC: 1 MG/DL (ref 0.5–1)
CRITICAL: ABNORMAL
CRYSTALS, UA: ABNORMAL /HPF
CULTURE, BLOOD 2: NORMAL
CULTURE, RESPIRATORY: NORMAL
D DIMER: 503 NG/ML DDU
D DIMER: 696 NG/ML DDU
DATE ANALYZED: ABNORMAL
DATE OF COLLECTION: ABNORMAL
EKG ATRIAL RATE: 105 BPM
EKG ATRIAL RATE: 111 BPM
EKG ATRIAL RATE: 127 BPM
EKG ATRIAL RATE: 131 BPM
EKG ATRIAL RATE: 138 BPM
EKG ATRIAL RATE: 241 BPM
EKG ATRIAL RATE: 241 BPM
EKG ATRIAL RATE: 43 BPM
EKG ATRIAL RATE: 82 BPM
EKG ATRIAL RATE: 86 BPM
EKG Q-T INTERVAL: 312 MS
EKG Q-T INTERVAL: 338 MS
EKG Q-T INTERVAL: 342 MS
EKG Q-T INTERVAL: 368 MS
EKG Q-T INTERVAL: 390 MS
EKG Q-T INTERVAL: 390 MS
EKG Q-T INTERVAL: 392 MS
EKG Q-T INTERVAL: 398 MS
EKG Q-T INTERVAL: 400 MS
EKG Q-T INTERVAL: 404 MS
EKG QRS DURATION: 100 MS
EKG QRS DURATION: 102 MS
EKG QRS DURATION: 102 MS
EKG QRS DURATION: 104 MS
EKG QRS DURATION: 88 MS
EKG QRS DURATION: 88 MS
EKG QRS DURATION: 92 MS
EKG QRS DURATION: 94 MS
EKG QRS DURATION: 96 MS
EKG QRS DURATION: 98 MS
EKG QTC CALCULATION (BAZETT): 425 MS
EKG QTC CALCULATION (BAZETT): 436 MS
EKG QTC CALCULATION (BAZETT): 456 MS
EKG QTC CALCULATION (BAZETT): 466 MS
EKG QTC CALCULATION (BAZETT): 470 MS
EKG QTC CALCULATION (BAZETT): 474 MS
EKG QTC CALCULATION (BAZETT): 477 MS
EKG QTC CALCULATION (BAZETT): 497 MS
EKG QTC CALCULATION (BAZETT): 502 MS
EKG QTC CALCULATION (BAZETT): 541 MS
EKG R AXIS: 11 DEGREES
EKG R AXIS: 20 DEGREES
EKG R AXIS: 21 DEGREES
EKG R AXIS: 23 DEGREES
EKG R AXIS: 32 DEGREES
EKG R AXIS: 34 DEGREES
EKG R AXIS: 48 DEGREES
EKG R AXIS: 49 DEGREES
EKG R AXIS: 60 DEGREES
EKG R AXIS: 91 DEGREES
EKG T AXIS: -112 DEGREES
EKG T AXIS: -126 DEGREES
EKG T AXIS: -128 DEGREES
EKG T AXIS: -23 DEGREES
EKG T AXIS: -28 DEGREES
EKG T AXIS: 113 DEGREES
EKG T AXIS: 146 DEGREES
EKG T AXIS: 22 DEGREES
EKG T AXIS: 26 DEGREES
EKG T AXIS: 58 DEGREES
EKG VENTRICULAR RATE: 107 BPM
EKG VENTRICULAR RATE: 110 BPM
EKG VENTRICULAR RATE: 112 BPM
EKG VENTRICULAR RATE: 112 BPM
EKG VENTRICULAR RATE: 130 BPM
EKG VENTRICULAR RATE: 70 BPM
EKG VENTRICULAR RATE: 84 BPM
EKG VENTRICULAR RATE: 86 BPM
EKG VENTRICULAR RATE: 88 BPM
EKG VENTRICULAR RATE: 90 BPM
EOSINOPHILS ABSOLUTE: 0 E9/L (ref 0.05–0.5)
EOSINOPHILS ABSOLUTE: 0.02 E9/L (ref 0.05–0.5)
EOSINOPHILS ABSOLUTE: 0.08 E9/L (ref 0.05–0.5)
EOSINOPHILS ABSOLUTE: 0.11 E9/L (ref 0.05–0.5)
EOSINOPHILS ABSOLUTE: 0.12 E9/L (ref 0.05–0.5)
EOSINOPHILS ABSOLUTE: 0.12 E9/L (ref 0.05–0.5)
EOSINOPHILS ABSOLUTE: 0.14 E9/L (ref 0.05–0.5)
EOSINOPHILS ABSOLUTE: 0.14 E9/L (ref 0.05–0.5)
EOSINOPHILS ABSOLUTE: 0.15 E9/L (ref 0.05–0.5)
EOSINOPHILS ABSOLUTE: 0.16 E9/L (ref 0.05–0.5)
EOSINOPHILS ABSOLUTE: 0.19 E9/L (ref 0.05–0.5)
EOSINOPHILS ABSOLUTE: 0.2 E9/L (ref 0.05–0.5)
EOSINOPHILS ABSOLUTE: 0.2 E9/L (ref 0.05–0.5)
EOSINOPHILS ABSOLUTE: 0.24 E9/L (ref 0.05–0.5)
EOSINOPHILS ABSOLUTE: 0.24 E9/L (ref 0.05–0.5)
EOSINOPHILS ABSOLUTE: 0.29 E9/L (ref 0.05–0.5)
EOSINOPHILS RELATIVE PERCENT: 0 % (ref 0–6)
EOSINOPHILS RELATIVE PERCENT: 0.4 % (ref 0–6)
EOSINOPHILS RELATIVE PERCENT: 0.8 % (ref 0–6)
EOSINOPHILS RELATIVE PERCENT: 1.7 % (ref 0–6)
EOSINOPHILS RELATIVE PERCENT: 2.2 % (ref 0–6)
EOSINOPHILS RELATIVE PERCENT: 2.4 % (ref 0–6)
EOSINOPHILS RELATIVE PERCENT: 2.4 % (ref 0–6)
EOSINOPHILS RELATIVE PERCENT: 2.6 % (ref 0–6)
EOSINOPHILS RELATIVE PERCENT: 2.6 % (ref 0–6)
EOSINOPHILS RELATIVE PERCENT: 3.1 % (ref 0–6)
EOSINOPHILS RELATIVE PERCENT: 3.4 % (ref 0–6)
EOSINOPHILS RELATIVE PERCENT: 3.8 % (ref 0–6)
EOSINOPHILS RELATIVE PERCENT: 4 % (ref 0–6)
EOSINOPHILS RELATIVE PERCENT: 4.6 % (ref 0–6)
EOSINOPHILS RELATIVE PERCENT: 4.6 % (ref 0–6)
EOSINOPHILS RELATIVE PERCENT: 5.6 % (ref 0–6)
EOSINOPHILS RELATIVE PERCENT: 6.2 % (ref 0–6)
EOSINOPHILS RELATIVE PERCENT: 6.9 % (ref 0–6)
EPITHELIAL CELLS, UA: ABNORMAL /HPF
ETHANOL: <10 MG/DL (ref 0–0.08)
FERRITIN: 155 NG/ML
FERRITIN: 312 NG/ML
FERRITIN: 411 NG/ML
FIBRINOGEN: 669 MG/DL (ref 225–540)
FIO2: 35 %
FOLATE: >20 NG/ML (ref 4.8–24.2)
GFR AFRICAN AMERICAN: >60
GFR NON-AFRICAN AMERICAN: 53 ML/MIN/1.73
GFR NON-AFRICAN AMERICAN: 53 ML/MIN/1.73
GFR NON-AFRICAN AMERICAN: 59 ML/MIN/1.73
GFR NON-AFRICAN AMERICAN: >60 ML/MIN/1.73
GLUCOSE BLD-MCNC: 101 MG/DL (ref 74–99)
GLUCOSE BLD-MCNC: 103 MG/DL (ref 74–99)
GLUCOSE BLD-MCNC: 106 MG/DL (ref 74–99)
GLUCOSE BLD-MCNC: 106 MG/DL (ref 74–99)
GLUCOSE BLD-MCNC: 107 MG/DL (ref 74–99)
GLUCOSE BLD-MCNC: 110 MG/DL (ref 74–99)
GLUCOSE BLD-MCNC: 111 MG/DL (ref 74–99)
GLUCOSE BLD-MCNC: 112 MG/DL (ref 74–99)
GLUCOSE BLD-MCNC: 112 MG/DL (ref 74–99)
GLUCOSE BLD-MCNC: 119 MG/DL (ref 74–99)
GLUCOSE BLD-MCNC: 120 MG/DL (ref 74–99)
GLUCOSE BLD-MCNC: 121 MG/DL (ref 74–99)
GLUCOSE BLD-MCNC: 134 MG/DL (ref 74–99)
GLUCOSE BLD-MCNC: 138 MG/DL (ref 74–99)
GLUCOSE BLD-MCNC: 141 MG/DL (ref 74–99)
GLUCOSE BLD-MCNC: 144 MG/DL (ref 74–99)
GLUCOSE BLD-MCNC: 155 MG/DL (ref 74–99)
GLUCOSE BLD-MCNC: 76 MG/DL (ref 74–99)
GLUCOSE BLD-MCNC: 79 MG/DL (ref 74–99)
GLUCOSE BLD-MCNC: 84 MG/DL (ref 74–99)
GLUCOSE BLD-MCNC: 87 MG/DL (ref 74–99)
GLUCOSE BLD-MCNC: 87 MG/DL (ref 74–99)
GLUCOSE BLD-MCNC: 91 MG/DL (ref 74–99)
GLUCOSE BLD-MCNC: 92 MG/DL (ref 74–99)
GLUCOSE BLD-MCNC: 93 MG/DL (ref 74–99)
GLUCOSE BLD-MCNC: 94 MG/DL (ref 74–99)
GLUCOSE BLD-MCNC: 94 MG/DL (ref 74–99)
GLUCOSE BLD-MCNC: 97 MG/DL (ref 74–99)
GLUCOSE BLD-MCNC: 98 MG/DL (ref 74–99)
GLUCOSE BLD-MCNC: 99 MG/DL (ref 74–99)
GLUCOSE URINE: NEGATIVE MG/DL
GRAM STAIN ORDERABLE: NORMAL
HCO3: 29.5 MMOL/L (ref 22–26)
HCO3: 30.2 MMOL/L (ref 22–26)
HCO3: 31 MMOL/L (ref 22–26)
HCO3: 34.6 MMOL/L (ref 22–26)
HCO3: 34.7 MMOL/L (ref 22–26)
HCO3: 35.3 MMOL/L (ref 22–26)
HCO3: 35.8 MMOL/L (ref 22–26)
HCT VFR BLD CALC: 30.4 % (ref 34–48)
HCT VFR BLD CALC: 30.8 % (ref 34–48)
HCT VFR BLD CALC: 30.9 % (ref 34–48)
HCT VFR BLD CALC: 31 % (ref 34–48)
HCT VFR BLD CALC: 32.6 % (ref 34–48)
HCT VFR BLD CALC: 32.6 % (ref 34–48)
HCT VFR BLD CALC: 33.6 % (ref 34–48)
HCT VFR BLD CALC: 33.6 % (ref 34–48)
HCT VFR BLD CALC: 34.2 % (ref 34–48)
HCT VFR BLD CALC: 34.6 % (ref 34–48)
HCT VFR BLD CALC: 35.9 % (ref 34–48)
HCT VFR BLD CALC: 36.2 % (ref 34–48)
HCT VFR BLD CALC: 36.7 % (ref 34–48)
HCT VFR BLD CALC: 37 % (ref 34–48)
HCT VFR BLD CALC: 37.1 % (ref 34–48)
HCT VFR BLD CALC: 37.6 % (ref 34–48)
HCT VFR BLD CALC: 37.9 % (ref 34–48)
HCT VFR BLD CALC: 38 % (ref 34–48)
HCT VFR BLD CALC: 38.3 % (ref 34–48)
HCT VFR BLD CALC: 38.7 % (ref 34–48)
HCT VFR BLD CALC: 38.8 % (ref 34–48)
HCT VFR BLD CALC: 40.7 % (ref 34–48)
HCT VFR BLD CALC: 41.6 % (ref 34–48)
HCT VFR BLD CALC: 43.8 % (ref 34–48)
HCT VFR BLD CALC: 45.2 % (ref 34–48)
HEMOGLOBIN: 10.1 G/DL (ref 11.5–15.5)
HEMOGLOBIN: 10.1 G/DL (ref 11.5–15.5)
HEMOGLOBIN: 10.3 G/DL (ref 11.5–15.5)
HEMOGLOBIN: 10.3 G/DL (ref 11.5–15.5)
HEMOGLOBIN: 10.4 G/DL (ref 11.5–15.5)
HEMOGLOBIN: 10.7 G/DL (ref 11.5–15.5)
HEMOGLOBIN: 10.8 G/DL (ref 11.5–15.5)
HEMOGLOBIN: 10.9 G/DL (ref 11.5–15.5)
HEMOGLOBIN: 11.4 G/DL (ref 11.5–15.5)
HEMOGLOBIN: 11.6 G/DL (ref 11.5–15.5)
HEMOGLOBIN: 11.9 G/DL (ref 11.5–15.5)
HEMOGLOBIN: 12 G/DL (ref 11.5–15.5)
HEMOGLOBIN: 12 G/DL (ref 11.5–15.5)
HEMOGLOBIN: 12.1 G/DL (ref 11.5–15.5)
HEMOGLOBIN: 12.2 G/DL (ref 11.5–15.5)
HEMOGLOBIN: 12.2 G/DL (ref 11.5–15.5)
HEMOGLOBIN: 12.8 G/DL (ref 11.5–15.5)
HEMOGLOBIN: 13.1 G/DL (ref 11.5–15.5)
HEMOGLOBIN: 13.1 G/DL (ref 11.5–15.5)
HEMOGLOBIN: 14.6 G/DL (ref 11.5–15.5)
HEMOGLOBIN: 9.4 G/DL (ref 11.5–15.5)
HEMOGLOBIN: 9.5 G/DL (ref 11.5–15.5)
HEMOGLOBIN: 9.5 G/DL (ref 11.5–15.5)
HEMOGLOBIN: 9.6 G/DL (ref 11.5–15.5)
HEMOGLOBIN: 9.7 G/DL (ref 11.5–15.5)
HHB: 1.2 % (ref 0–5)
HHB: 1.8 % (ref 0–5)
HHB: 1.8 % (ref 0–5)
HHB: 3.2 % (ref 0–5)
HHB: 4.1 % (ref 0–5)
HHB: 5 % (ref 0–5)
HHB: 5.5 % (ref 0–5)
HUMAN METAPNEUMOVIRUS BY PCR: NOT DETECTED
HUMAN RHINOVIRUS/ENTEROVIRUS BY PCR: NOT DETECTED
HYPOCHROMIA: ABNORMAL
IMMATURE GRANULOCYTES #: 0 E9/L
IMMATURE GRANULOCYTES #: 0.01 E9/L
IMMATURE GRANULOCYTES #: 0.02 E9/L
IMMATURE GRANULOCYTES #: 0.03 E9/L
IMMATURE GRANULOCYTES #: 0.05 E9/L
IMMATURE GRANULOCYTES %: 0 % (ref 0–5)
IMMATURE GRANULOCYTES %: 0.2 % (ref 0–5)
IMMATURE GRANULOCYTES %: 0.3 % (ref 0–5)
IMMATURE GRANULOCYTES %: 0.4 % (ref 0–5)
IMMATURE GRANULOCYTES %: 0.4 % (ref 0–5)
IMMATURE GRANULOCYTES %: 0.5 % (ref 0–5)
IMMATURE GRANULOCYTES %: 0.9 % (ref 0–5)
IMMATURE GRANULOCYTES %: 1 % (ref 0–5)
IMMATURE GRANULOCYTES %: 1.5 % (ref 0–5)
INFLUENZA A BY PCR: NOT DETECTED
INFLUENZA A: NOT DETECTED
INFLUENZA B BY PCR: NOT DETECTED
INFLUENZA B: NOT DETECTED
INR BLD: 1.2
INR BLD: 1.4
INR BLD: 1.8
INR BLD: 1.8
INR BLD: 2
INR BLD: 2.2
INR BLD: 2.2
INR BLD: 2.6
INR BLD: 2.7
INR BLD: 2.8
INR BLD: 3.1
INR BLD: 3.2
INR BLD: 3.7
INR BLD: 3.7
INR BLD: 7.3
KETONES, URINE: NEGATIVE MG/DL
L. PNEUMOPHILA SEROGP 1 UR AG: NORMAL
L. PNEUMOPHILA SEROGP 1 UR AG: NORMAL
LAB: ABNORMAL
LACTATE DEHYDROGENASE: 336 U/L (ref 135–214)
LACTIC ACID, SEPSIS: 0.7 MMOL/L (ref 0.5–1.9)
LACTIC ACID, SEPSIS: 1 MMOL/L (ref 0.5–1.9)
LACTIC ACID: 0.7 MMOL/L (ref 0.5–2.2)
LACTIC ACID: 0.9 MMOL/L (ref 0.5–2.2)
LACTIC ACID: 1.4 MMOL/L (ref 0.5–2.2)
LACTIC ACID: 1.7 MMOL/L (ref 0.5–2.2)
LEUKOCYTE ESTERASE, URINE: ABNORMAL
LEUKOCYTE ESTERASE, URINE: NEGATIVE
LV EF: 28 %
LVEF MODALITY: NORMAL
LYMPHOCYTES ABSOLUTE: 0.31 E9/L (ref 1.5–4)
LYMPHOCYTES ABSOLUTE: 0.41 E9/L (ref 1.5–4)
LYMPHOCYTES ABSOLUTE: 0.44 E9/L (ref 1.5–4)
LYMPHOCYTES ABSOLUTE: 0.46 E9/L (ref 1.5–4)
LYMPHOCYTES ABSOLUTE: 0.52 E9/L (ref 1.5–4)
LYMPHOCYTES ABSOLUTE: 0.53 E9/L (ref 1.5–4)
LYMPHOCYTES ABSOLUTE: 0.54 E9/L (ref 1.5–4)
LYMPHOCYTES ABSOLUTE: 0.55 E9/L (ref 1.5–4)
LYMPHOCYTES ABSOLUTE: 0.58 E9/L (ref 1.5–4)
LYMPHOCYTES ABSOLUTE: 0.61 E9/L (ref 1.5–4)
LYMPHOCYTES ABSOLUTE: 0.64 E9/L (ref 1.5–4)
LYMPHOCYTES ABSOLUTE: 0.64 E9/L (ref 1.5–4)
LYMPHOCYTES ABSOLUTE: 0.65 E9/L (ref 1.5–4)
LYMPHOCYTES ABSOLUTE: 0.65 E9/L (ref 1.5–4)
LYMPHOCYTES ABSOLUTE: 0.66 E9/L (ref 1.5–4)
LYMPHOCYTES ABSOLUTE: 0.66 E9/L (ref 1.5–4)
LYMPHOCYTES ABSOLUTE: 0.73 E9/L (ref 1.5–4)
LYMPHOCYTES ABSOLUTE: 0.73 E9/L (ref 1.5–4)
LYMPHOCYTES ABSOLUTE: 0.76 E9/L (ref 1.5–4)
LYMPHOCYTES ABSOLUTE: 0.89 E9/L (ref 1.5–4)
LYMPHOCYTES ABSOLUTE: 1.17 E9/L (ref 1.5–4)
LYMPHOCYTES RELATIVE PERCENT: 10.1 % (ref 20–42)
LYMPHOCYTES RELATIVE PERCENT: 10.3 % (ref 20–42)
LYMPHOCYTES RELATIVE PERCENT: 10.6 % (ref 20–42)
LYMPHOCYTES RELATIVE PERCENT: 11.2 % (ref 20–42)
LYMPHOCYTES RELATIVE PERCENT: 12.2 % (ref 20–42)
LYMPHOCYTES RELATIVE PERCENT: 12.3 % (ref 20–42)
LYMPHOCYTES RELATIVE PERCENT: 12.4 % (ref 20–42)
LYMPHOCYTES RELATIVE PERCENT: 13.2 % (ref 20–42)
LYMPHOCYTES RELATIVE PERCENT: 13.8 % (ref 20–42)
LYMPHOCYTES RELATIVE PERCENT: 15.5 % (ref 20–42)
LYMPHOCYTES RELATIVE PERCENT: 15.7 % (ref 20–42)
LYMPHOCYTES RELATIVE PERCENT: 16.6 % (ref 20–42)
LYMPHOCYTES RELATIVE PERCENT: 17.5 % (ref 20–42)
LYMPHOCYTES RELATIVE PERCENT: 25.6 % (ref 20–42)
LYMPHOCYTES RELATIVE PERCENT: 4.4 % (ref 20–42)
LYMPHOCYTES RELATIVE PERCENT: 6.1 % (ref 20–42)
LYMPHOCYTES RELATIVE PERCENT: 6.9 % (ref 20–42)
LYMPHOCYTES RELATIVE PERCENT: 7.5 % (ref 20–42)
LYMPHOCYTES RELATIVE PERCENT: 7.8 % (ref 20–42)
LYMPHOCYTES RELATIVE PERCENT: 7.9 % (ref 20–42)
LYMPHOCYTES RELATIVE PERCENT: 8.2 % (ref 20–42)
Lab: ABNORMAL
MAGNESIUM: 2 MG/DL (ref 1.6–2.6)
MAGNESIUM: 2.1 MG/DL (ref 1.6–2.6)
MAGNESIUM: 2.2 MG/DL (ref 1.6–2.6)
MAGNESIUM: 2.3 MG/DL (ref 1.6–2.6)
MCH RBC QN AUTO: 28.7 PG (ref 26–35)
MCH RBC QN AUTO: 28.7 PG (ref 26–35)
MCH RBC QN AUTO: 28.8 PG (ref 26–35)
MCH RBC QN AUTO: 29.5 PG (ref 26–35)
MCH RBC QN AUTO: 29.6 PG (ref 26–35)
MCH RBC QN AUTO: 29.7 PG (ref 26–35)
MCH RBC QN AUTO: 29.9 PG (ref 26–35)
MCH RBC QN AUTO: 29.9 PG (ref 26–35)
MCH RBC QN AUTO: 30 PG (ref 26–35)
MCH RBC QN AUTO: 30.1 PG (ref 26–35)
MCH RBC QN AUTO: 30.4 PG (ref 26–35)
MCH RBC QN AUTO: 30.6 PG (ref 26–35)
MCH RBC QN AUTO: 30.7 PG (ref 26–35)
MCH RBC QN AUTO: 30.7 PG (ref 26–35)
MCH RBC QN AUTO: 30.8 PG (ref 26–35)
MCH RBC QN AUTO: 30.8 PG (ref 26–35)
MCH RBC QN AUTO: 31.5 PG (ref 26–35)
MCHC RBC AUTO-ENTMCNC: 28.8 % (ref 32–34.5)
MCHC RBC AUTO-ENTMCNC: 29 % (ref 32–34.5)
MCHC RBC AUTO-ENTMCNC: 29.1 % (ref 32–34.5)
MCHC RBC AUTO-ENTMCNC: 29.2 % (ref 32–34.5)
MCHC RBC AUTO-ENTMCNC: 29.8 % (ref 32–34.5)
MCHC RBC AUTO-ENTMCNC: 30 % (ref 32–34.5)
MCHC RBC AUTO-ENTMCNC: 30.1 % (ref 32–34.5)
MCHC RBC AUTO-ENTMCNC: 30.3 % (ref 32–34.5)
MCHC RBC AUTO-ENTMCNC: 30.6 % (ref 32–34.5)
MCHC RBC AUTO-ENTMCNC: 30.7 % (ref 32–34.5)
MCHC RBC AUTO-ENTMCNC: 30.8 % (ref 32–34.5)
MCHC RBC AUTO-ENTMCNC: 30.9 % (ref 32–34.5)
MCHC RBC AUTO-ENTMCNC: 31 % (ref 32–34.5)
MCHC RBC AUTO-ENTMCNC: 31 % (ref 32–34.5)
MCHC RBC AUTO-ENTMCNC: 31.1 % (ref 32–34.5)
MCHC RBC AUTO-ENTMCNC: 31.5 % (ref 32–34.5)
MCHC RBC AUTO-ENTMCNC: 32.2 % (ref 32–34.5)
MCHC RBC AUTO-ENTMCNC: 32.2 % (ref 32–34.5)
MCHC RBC AUTO-ENTMCNC: 32.3 % (ref 32–34.5)
MCHC RBC AUTO-ENTMCNC: 32.4 % (ref 32–34.5)
MCHC RBC AUTO-ENTMCNC: 32.7 % (ref 32–34.5)
MCHC RBC AUTO-ENTMCNC: 33 % (ref 32–34.5)
MCHC RBC AUTO-ENTMCNC: 33.3 % (ref 32–34.5)
MCV RBC AUTO: 100 FL (ref 80–99.9)
MCV RBC AUTO: 103 FL (ref 80–99.9)
MCV RBC AUTO: 91.1 FL (ref 80–99.9)
MCV RBC AUTO: 91.7 FL (ref 80–99.9)
MCV RBC AUTO: 91.8 FL (ref 80–99.9)
MCV RBC AUTO: 92.6 FL (ref 80–99.9)
MCV RBC AUTO: 93.2 FL (ref 80–99.9)
MCV RBC AUTO: 94.4 FL (ref 80–99.9)
MCV RBC AUTO: 94.9 FL (ref 80–99.9)
MCV RBC AUTO: 95.1 FL (ref 80–99.9)
MCV RBC AUTO: 96.6 FL (ref 80–99.9)
MCV RBC AUTO: 98 FL (ref 80–99.9)
MCV RBC AUTO: 98 FL (ref 80–99.9)
MCV RBC AUTO: 98.6 FL (ref 80–99.9)
MCV RBC AUTO: 98.7 FL (ref 80–99.9)
MCV RBC AUTO: 98.7 FL (ref 80–99.9)
MCV RBC AUTO: 98.8 FL (ref 80–99.9)
MCV RBC AUTO: 98.8 FL (ref 80–99.9)
MCV RBC AUTO: 98.9 FL (ref 80–99.9)
MCV RBC AUTO: 99.1 FL (ref 80–99.9)
MCV RBC AUTO: 99.1 FL (ref 80–99.9)
MCV RBC AUTO: 99.4 FL (ref 80–99.9)
MCV RBC AUTO: 99.5 FL (ref 80–99.9)
MCV RBC AUTO: 99.7 FL (ref 80–99.9)
MCV RBC AUTO: 99.7 FL (ref 80–99.9)
METAMYELOCYTES RELATIVE PERCENT: 0.9 % (ref 0–1)
METER GLUCOSE: 105 MG/DL (ref 74–99)
METER GLUCOSE: 81 MG/DL (ref 74–99)
METER GLUCOSE: 89 MG/DL (ref 74–99)
METHB: 0.3 % (ref 0–1.5)
METHB: 0.4 % (ref 0–1.5)
METHB: 0.4 % (ref 0–1.5)
METHB: 0.5 % (ref 0–1.5)
METHB: 0.5 % (ref 0–1.5)
MODE: ABNORMAL
MONOCYTES ABSOLUTE: 0.16 E9/L (ref 0.1–0.95)
MONOCYTES ABSOLUTE: 0.18 E9/L (ref 0.1–0.95)
MONOCYTES ABSOLUTE: 0.2 E9/L (ref 0.1–0.95)
MONOCYTES ABSOLUTE: 0.28 E9/L (ref 0.1–0.95)
MONOCYTES ABSOLUTE: 0.34 E9/L (ref 0.1–0.95)
MONOCYTES ABSOLUTE: 0.46 E9/L (ref 0.1–0.95)
MONOCYTES ABSOLUTE: 0.47 E9/L (ref 0.1–0.95)
MONOCYTES ABSOLUTE: 0.47 E9/L (ref 0.1–0.95)
MONOCYTES ABSOLUTE: 0.51 E9/L (ref 0.1–0.95)
MONOCYTES ABSOLUTE: 0.58 E9/L (ref 0.1–0.95)
MONOCYTES ABSOLUTE: 0.58 E9/L (ref 0.1–0.95)
MONOCYTES ABSOLUTE: 0.61 E9/L (ref 0.1–0.95)
MONOCYTES ABSOLUTE: 0.63 E9/L (ref 0.1–0.95)
MONOCYTES ABSOLUTE: 0.68 E9/L (ref 0.1–0.95)
MONOCYTES ABSOLUTE: 0.68 E9/L (ref 0.1–0.95)
MONOCYTES ABSOLUTE: 0.75 E9/L (ref 0.1–0.95)
MONOCYTES ABSOLUTE: 0.79 E9/L (ref 0.1–0.95)
MONOCYTES ABSOLUTE: 0.87 E9/L (ref 0.1–0.95)
MONOCYTES ABSOLUTE: 1.07 E9/L (ref 0.1–0.95)
MONOCYTES RELATIVE PERCENT: 1.7 % (ref 2–12)
MONOCYTES RELATIVE PERCENT: 10.2 % (ref 2–12)
MONOCYTES RELATIVE PERCENT: 10.2 % (ref 2–12)
MONOCYTES RELATIVE PERCENT: 11.2 % (ref 2–12)
MONOCYTES RELATIVE PERCENT: 11.6 % (ref 2–12)
MONOCYTES RELATIVE PERCENT: 11.7 % (ref 2–12)
MONOCYTES RELATIVE PERCENT: 11.8 % (ref 2–12)
MONOCYTES RELATIVE PERCENT: 12.3 % (ref 2–12)
MONOCYTES RELATIVE PERCENT: 12.5 % (ref 2–12)
MONOCYTES RELATIVE PERCENT: 13.5 % (ref 2–12)
MONOCYTES RELATIVE PERCENT: 13.6 % (ref 2–12)
MONOCYTES RELATIVE PERCENT: 15.7 % (ref 2–12)
MONOCYTES RELATIVE PERCENT: 16.5 % (ref 2–12)
MONOCYTES RELATIVE PERCENT: 2.6 % (ref 2–12)
MONOCYTES RELATIVE PERCENT: 3.5 % (ref 2–12)
MONOCYTES RELATIVE PERCENT: 5.2 % (ref 2–12)
MONOCYTES RELATIVE PERCENT: 7 % (ref 2–12)
MONOCYTES RELATIVE PERCENT: 7.1 % (ref 2–12)
MONOCYTES RELATIVE PERCENT: 8.2 % (ref 2–12)
MONOCYTES RELATIVE PERCENT: 8.8 % (ref 2–12)
MONOCYTES RELATIVE PERCENT: 9.1 % (ref 2–12)
MYCOPLASMA PNEUMONIAE BY PCR: NOT DETECTED
MYELOCYTE PERCENT: 0.9 % (ref 0–0)
NEUTROPHILS ABSOLUTE: 1.87 E9/L (ref 1.8–7.3)
NEUTROPHILS ABSOLUTE: 1.91 E9/L (ref 1.8–7.3)
NEUTROPHILS ABSOLUTE: 2.61 E9/L (ref 1.8–7.3)
NEUTROPHILS ABSOLUTE: 2.69 E9/L (ref 1.8–7.3)
NEUTROPHILS ABSOLUTE: 2.73 E9/L (ref 1.8–7.3)
NEUTROPHILS ABSOLUTE: 3.08 E9/L (ref 1.8–7.3)
NEUTROPHILS ABSOLUTE: 3.7 E9/L (ref 1.8–7.3)
NEUTROPHILS ABSOLUTE: 3.73 E9/L (ref 1.8–7.3)
NEUTROPHILS ABSOLUTE: 3.84 E9/L (ref 1.8–7.3)
NEUTROPHILS ABSOLUTE: 4.08 E9/L (ref 1.8–7.3)
NEUTROPHILS ABSOLUTE: 4.23 E9/L (ref 1.8–7.3)
NEUTROPHILS ABSOLUTE: 4.55 E9/L (ref 1.8–7.3)
NEUTROPHILS ABSOLUTE: 4.62 E9/L (ref 1.8–7.3)
NEUTROPHILS ABSOLUTE: 4.83 E9/L (ref 1.8–7.3)
NEUTROPHILS ABSOLUTE: 4.96 E9/L (ref 1.8–7.3)
NEUTROPHILS ABSOLUTE: 5.36 E9/L (ref 1.8–7.3)
NEUTROPHILS ABSOLUTE: 5.59 E9/L (ref 1.8–7.3)
NEUTROPHILS ABSOLUTE: 6.19 E9/L (ref 1.8–7.3)
NEUTROPHILS ABSOLUTE: 7.33 E9/L (ref 1.8–7.3)
NEUTROPHILS ABSOLUTE: 7.48 E9/L (ref 1.8–7.3)
NEUTROPHILS ABSOLUTE: 8.01 E9/L (ref 1.8–7.3)
NEUTROPHILS RELATIVE PERCENT: 54 % (ref 43–80)
NEUTROPHILS RELATIVE PERCENT: 58.8 % (ref 43–80)
NEUTROPHILS RELATIVE PERCENT: 62.5 % (ref 43–80)
NEUTROPHILS RELATIVE PERCENT: 62.9 % (ref 43–80)
NEUTROPHILS RELATIVE PERCENT: 64.3 % (ref 43–80)
NEUTROPHILS RELATIVE PERCENT: 66 % (ref 43–80)
NEUTROPHILS RELATIVE PERCENT: 71.3 % (ref 43–80)
NEUTROPHILS RELATIVE PERCENT: 71.9 % (ref 43–80)
NEUTROPHILS RELATIVE PERCENT: 73.7 % (ref 43–80)
NEUTROPHILS RELATIVE PERCENT: 74.6 % (ref 43–80)
NEUTROPHILS RELATIVE PERCENT: 75.4 % (ref 43–80)
NEUTROPHILS RELATIVE PERCENT: 76.7 % (ref 43–80)
NEUTROPHILS RELATIVE PERCENT: 76.8 % (ref 43–80)
NEUTROPHILS RELATIVE PERCENT: 77 % (ref 43–80)
NEUTROPHILS RELATIVE PERCENT: 78.7 % (ref 43–80)
NEUTROPHILS RELATIVE PERCENT: 80.8 % (ref 43–80)
NEUTROPHILS RELATIVE PERCENT: 81.3 % (ref 43–80)
NEUTROPHILS RELATIVE PERCENT: 86.1 % (ref 43–80)
NEUTROPHILS RELATIVE PERCENT: 86.1 % (ref 43–80)
NEUTROPHILS RELATIVE PERCENT: 90.4 % (ref 43–80)
NEUTROPHILS RELATIVE PERCENT: 93.9 % (ref 43–80)
NITRITE, URINE: NEGATIVE
NITRITE, URINE: POSITIVE
O2 CONTENT: 15.4 ML/DL
O2 CONTENT: 15.6 ML/DL
O2 CONTENT: 15.7 ML/DL
O2 CONTENT: 15.7 ML/DL
O2 CONTENT: 15.8 ML/DL
O2 CONTENT: 17.4 ML/DL
O2 SATURATION: 94.4 % (ref 92–98.5)
O2 SATURATION: 94.9 % (ref 92–98.5)
O2 SATURATION: 95.9 % (ref 92–98.5)
O2 SATURATION: 96.8 % (ref 92–98.5)
O2 SATURATION: 98.2 % (ref 92–98.5)
O2 SATURATION: 98.2 % (ref 92–98.5)
O2 SATURATION: 98.8 % (ref 92–98.5)
O2HB: 93.5 % (ref 94–97)
O2HB: 94 % (ref 94–97)
O2HB: 95.1 % (ref 94–97)
O2HB: 95.8 % (ref 94–97)
O2HB: 97.3 % (ref 94–97)
O2HB: 97.4 % (ref 94–97)
O2HB: 98 % (ref 94–97)
OPERATOR ID: 1868
OPERATOR ID: 405
OPERATOR ID: 5100
OPERATOR ID: 659
ORGANISM: ABNORMAL
ORGANISM: ABNORMAL
OSMOLALITY URINE: 522 MOSM/KG (ref 300–900)
OSMOLALITY: 312 MOSM/KG (ref 285–310)
OVALOCYTES: ABNORMAL
PARAINFLUENZA VIRUS 1 BY PCR: NOT DETECTED
PARAINFLUENZA VIRUS 2 BY PCR: NOT DETECTED
PARAINFLUENZA VIRUS 3 BY PCR: NOT DETECTED
PARAINFLUENZA VIRUS 4 BY PCR: NOT DETECTED
PARATHYROID HORMONE INTACT: 112 PG/ML (ref 15–65)
PATIENT TEMP: 37 C
PCO2: 44.8 MMHG (ref 35–45)
PCO2: 45 MMHG (ref 35–45)
PCO2: 47 MMHG (ref 35–45)
PCO2: 54.9 MMHG (ref 35–45)
PCO2: 57.3 MMHG (ref 35–45)
PCO2: 59.3 MMHG (ref 35–45)
PCO2: 62.8 MMHG (ref 35–45)
PDW BLD-RTO: 14.4 FL (ref 11.5–15)
PDW BLD-RTO: 14.5 FL (ref 11.5–15)
PDW BLD-RTO: 14.6 FL (ref 11.5–15)
PDW BLD-RTO: 14.7 FL (ref 11.5–15)
PDW BLD-RTO: 14.8 FL (ref 11.5–15)
PDW BLD-RTO: 14.9 FL (ref 11.5–15)
PDW BLD-RTO: 15 FL (ref 11.5–15)
PDW BLD-RTO: 15.1 FL (ref 11.5–15)
PDW BLD-RTO: 15.2 FL (ref 11.5–15)
PDW BLD-RTO: 15.3 FL (ref 11.5–15)
PDW BLD-RTO: 15.7 FL (ref 11.5–15)
PDW BLD-RTO: 15.8 FL (ref 11.5–15)
PDW BLD-RTO: 15.9 FL (ref 11.5–15)
PEEP/CPAP: 5 CMH2O
PFO2: 2.34 MMHG/%
PH BLOOD GAS: 7.37 (ref 7.35–7.45)
PH BLOOD GAS: 7.38 (ref 7.35–7.45)
PH BLOOD GAS: 7.41 (ref 7.35–7.45)
PH BLOOD GAS: 7.42 (ref 7.35–7.45)
PH BLOOD GAS: 7.42 (ref 7.35–7.45)
PH BLOOD GAS: 7.44 (ref 7.35–7.45)
PH BLOOD GAS: 7.46 (ref 7.35–7.45)
PH UA: 6 (ref 5–9)
PH UA: 7.5 (ref 5–9)
PHOSPHORUS: 2.2 MG/DL (ref 2.5–4.5)
PHOSPHORUS: 2.4 MG/DL (ref 2.5–4.5)
PHOSPHORUS: 2.4 MG/DL (ref 2.5–4.5)
PHOSPHORUS: 2.7 MG/DL (ref 2.5–4.5)
PHOSPHORUS: 2.9 MG/DL (ref 2.5–4.5)
PHOSPHORUS: 2.9 MG/DL (ref 2.5–4.5)
PHOSPHORUS: 3 MG/DL (ref 2.5–4.5)
PLATELET # BLD: 114 E9/L (ref 130–450)
PLATELET # BLD: 115 E9/L (ref 130–450)
PLATELET # BLD: 116 E9/L (ref 130–450)
PLATELET # BLD: 137 E9/L (ref 130–450)
PLATELET # BLD: 155 E9/L (ref 130–450)
PLATELET # BLD: 162 E9/L (ref 130–450)
PLATELET # BLD: 163 E9/L (ref 130–450)
PLATELET # BLD: 166 E9/L (ref 130–450)
PLATELET # BLD: 167 E9/L (ref 130–450)
PLATELET # BLD: 168 E9/L (ref 130–450)
PLATELET # BLD: 168 E9/L (ref 130–450)
PLATELET # BLD: 171 E9/L (ref 130–450)
PLATELET # BLD: 177 E9/L (ref 130–450)
PLATELET # BLD: 180 E9/L (ref 130–450)
PLATELET # BLD: 180 E9/L (ref 130–450)
PLATELET # BLD: 185 E9/L (ref 130–450)
PLATELET # BLD: 186 E9/L (ref 130–450)
PLATELET # BLD: 188 E9/L (ref 130–450)
PLATELET # BLD: 190 E9/L (ref 130–450)
PLATELET # BLD: 192 E9/L (ref 130–450)
PLATELET # BLD: 193 E9/L (ref 130–450)
PLATELET # BLD: 199 E9/L (ref 130–450)
PLATELET # BLD: 202 E9/L (ref 130–450)
PLATELET # BLD: 205 E9/L (ref 130–450)
PLATELET # BLD: 212 E9/L (ref 130–450)
PMV BLD AUTO: 10 FL (ref 7–12)
PMV BLD AUTO: 10.1 FL (ref 7–12)
PMV BLD AUTO: 10.2 FL (ref 7–12)
PMV BLD AUTO: 10.3 FL (ref 7–12)
PMV BLD AUTO: 10.5 FL (ref 7–12)
PMV BLD AUTO: 10.6 FL (ref 7–12)
PMV BLD AUTO: 10.9 FL (ref 7–12)
PMV BLD AUTO: 11 FL (ref 7–12)
PMV BLD AUTO: 11 FL (ref 7–12)
PMV BLD AUTO: 11.4 FL (ref 7–12)
PMV BLD AUTO: 9.7 FL (ref 7–12)
PMV BLD AUTO: 9.8 FL (ref 7–12)
PMV BLD AUTO: 9.9 FL (ref 7–12)
PO2: 112.6 MMHG (ref 75–100)
PO2: 117.2 MMHG (ref 75–100)
PO2: 150.6 MMHG (ref 75–100)
PO2: 72.7 MMHG (ref 75–100)
PO2: 76.4 MMHG (ref 75–100)
PO2: 81.8 MMHG (ref 75–100)
PO2: 96.5 MMHG (ref 75–100)
POIKILOCYTES: ABNORMAL
POLYCHROMASIA: ABNORMAL
POTASSIUM REFLEX MAGNESIUM: 3.1 MMOL/L (ref 3.5–5)
POTASSIUM REFLEX MAGNESIUM: 3.1 MMOL/L (ref 3.5–5)
POTASSIUM REFLEX MAGNESIUM: 3.4 MMOL/L (ref 3.5–5)
POTASSIUM REFLEX MAGNESIUM: 3.5 MMOL/L (ref 3.5–5)
POTASSIUM REFLEX MAGNESIUM: 3.5 MMOL/L (ref 3.5–5)
POTASSIUM REFLEX MAGNESIUM: 3.6 MMOL/L (ref 3.5–5)
POTASSIUM REFLEX MAGNESIUM: 3.7 MMOL/L (ref 3.5–5)
POTASSIUM REFLEX MAGNESIUM: 3.8 MMOL/L (ref 3.5–5)
POTASSIUM REFLEX MAGNESIUM: 3.9 MMOL/L (ref 3.5–5)
POTASSIUM REFLEX MAGNESIUM: 3.9 MMOL/L (ref 3.5–5)
POTASSIUM REFLEX MAGNESIUM: 4 MMOL/L (ref 3.5–5)
POTASSIUM REFLEX MAGNESIUM: 4.1 MMOL/L (ref 3.5–5)
POTASSIUM REFLEX MAGNESIUM: 4.2 MMOL/L (ref 3.5–5)
POTASSIUM REFLEX MAGNESIUM: 4.4 MMOL/L (ref 3.5–5)
POTASSIUM REFLEX MAGNESIUM: 4.5 MMOL/L (ref 3.5–5)
POTASSIUM SERPL-SCNC: 3.4 MMOL/L (ref 3.5–5)
POTASSIUM SERPL-SCNC: 3.4 MMOL/L (ref 3.5–5)
POTASSIUM SERPL-SCNC: 3.6 MMOL/L (ref 3.5–5)
POTASSIUM SERPL-SCNC: 3.7 MMOL/L (ref 3.5–5)
POTASSIUM SERPL-SCNC: 3.8 MMOL/L (ref 3.5–5)
POTASSIUM SERPL-SCNC: 3.8 MMOL/L (ref 3.5–5)
POTASSIUM SERPL-SCNC: 3.9 MMOL/L (ref 3.5–5)
POTASSIUM SERPL-SCNC: 3.9 MMOL/L (ref 3.5–5)
POTASSIUM SERPL-SCNC: 4.1 MMOL/L (ref 3.5–5)
POTASSIUM SERPL-SCNC: 4.5 MMOL/L (ref 3.5–5)
POTASSIUM SERPL-SCNC: 4.6 MMOL/L (ref 3.5–5)
POTASSIUM SERPL-SCNC: 5 MMOL/L (ref 3.5–5)
POTASSIUM, UR: 27.7 MMOL/L
PRO-BNP: 1362 PG/ML (ref 0–450)
PRO-BNP: 1715 PG/ML (ref 0–450)
PRO-BNP: 1942 PG/ML (ref 0–450)
PRO-BNP: 3232 PG/ML (ref 0–450)
PRO-BNP: 5807 PG/ML (ref 0–450)
PROCALCITONIN: 0.05 NG/ML (ref 0–0.08)
PROCALCITONIN: 0.16 NG/ML (ref 0–0.08)
PROTEIN UA: 30 MG/DL
PROTEIN UA: NEGATIVE MG/DL
PROTHROMBIN TIME: 13.2 SEC (ref 9.3–12.4)
PROTHROMBIN TIME: 15.1 SEC (ref 9.3–12.4)
PROTHROMBIN TIME: 20.4 SEC (ref 9.3–12.4)
PROTHROMBIN TIME: 22 SEC (ref 9.3–12.4)
PROTHROMBIN TIME: 24.7 SEC (ref 9.3–12.4)
PROTHROMBIN TIME: 25.1 SEC (ref 9.3–12.4)
PROTHROMBIN TIME: 29.1 SEC (ref 9.3–12.4)
PROTHROMBIN TIME: 30 SEC (ref 9.3–12.4)
PROTHROMBIN TIME: 30.4 SEC (ref 9.3–12.4)
PROTHROMBIN TIME: 33.4 SEC (ref 9.3–12.4)
PROTHROMBIN TIME: 36.1 SEC (ref 9.3–12.4)
PROTHROMBIN TIME: 40.9 SEC (ref 9.3–12.4)
PROTHROMBIN TIME: 41.5 SEC (ref 9.3–12.4)
PROTHROMBIN TIME: 79.3 SEC (ref 9.3–12.4)
PS: 15 CMH20
RBC # BLD: 3.05 E12/L (ref 3.5–5.5)
RBC # BLD: 3.09 E12/L (ref 3.5–5.5)
RBC # BLD: 3.12 E12/L (ref 3.5–5.5)
RBC # BLD: 3.13 E12/L (ref 3.5–5.5)
RBC # BLD: 3.29 E12/L (ref 3.5–5.5)
RBC # BLD: 3.3 E12/L (ref 3.5–5.5)
RBC # BLD: 3.36 E12/L (ref 3.5–5.5)
RBC # BLD: 3.43 E12/L (ref 3.5–5.5)
RBC # BLD: 3.47 E12/L (ref 3.5–5.5)
RBC # BLD: 3.48 E12/L (ref 3.5–5.5)
RBC # BLD: 3.63 E12/L (ref 3.5–5.5)
RBC # BLD: 3.64 E12/L (ref 3.5–5.5)
RBC # BLD: 3.79 E12/L (ref 3.5–5.5)
RBC # BLD: 3.85 E12/L (ref 3.5–5.5)
RBC # BLD: 3.9 E12/L (ref 3.5–5.5)
RBC # BLD: 3.91 E12/L (ref 3.5–5.5)
RBC # BLD: 3.97 E12/L (ref 3.5–5.5)
RBC # BLD: 4 E12/L (ref 3.5–5.5)
RBC # BLD: 4.06 E12/L (ref 3.5–5.5)
RBC # BLD: 4.08 E12/L (ref 3.5–5.5)
RBC # BLD: 4.21 E12/L (ref 3.5–5.5)
RBC # BLD: 4.26 E12/L (ref 3.5–5.5)
RBC # BLD: 4.44 E12/L (ref 3.5–5.5)
RBC # BLD: 4.56 E12/L (ref 3.5–5.5)
RBC # BLD: 4.64 E12/L (ref 3.5–5.5)
RBC UA: >20 /HPF (ref 0–2)
RBC UA: ABNORMAL /HPF (ref 0–2)
REASON FOR REJECTION: NORMAL
REJECTED TEST: NORMAL
RESPIRATORY SYNCYTIAL VIRUS BY PCR: NOT DETECTED
RI(T): 1.28
SALICYLATE, SERUM: <0.3 MG/DL (ref 0–30)
SARS-COV-2 RNA, RT PCR: DETECTED
SARS-COV-2, NAAT: NOT DETECTED
SARS-COV-2, NAAT: NOT DETECTED
SARS-COV-2, PCR: DETECTED
SARS-COV-2, PCR: DETECTED
SCHISTOCYTES: ABNORMAL
SEDIMENTATION RATE, ERYTHROCYTE: 38 MM/HR (ref 0–20)
SMEAR, RESPIRATORY: NORMAL
SODIUM BLD-SCNC: 138 MMOL/L (ref 132–146)
SODIUM BLD-SCNC: 140 MMOL/L (ref 132–146)
SODIUM BLD-SCNC: 141 MMOL/L (ref 132–146)
SODIUM BLD-SCNC: 142 MMOL/L (ref 132–146)
SODIUM BLD-SCNC: 143 MMOL/L (ref 132–146)
SODIUM BLD-SCNC: 144 MMOL/L (ref 132–146)
SODIUM BLD-SCNC: 144 MMOL/L (ref 132–146)
SODIUM BLD-SCNC: 145 MMOL/L (ref 132–146)
SODIUM BLD-SCNC: 146 MMOL/L (ref 132–146)
SODIUM BLD-SCNC: 148 MMOL/L (ref 132–146)
SODIUM BLD-SCNC: 149 MMOL/L (ref 132–146)
SODIUM BLD-SCNC: 151 MMOL/L (ref 132–146)
SODIUM BLD-SCNC: 151 MMOL/L (ref 132–146)
SODIUM BLD-SCNC: 152 MMOL/L (ref 132–146)
SODIUM URINE: 142 MMOL/L
SOURCE, BLOOD GAS: ABNORMAL
SPECIFIC GRAVITY UA: 1.01 (ref 1–1.03)
SPECIFIC GRAVITY UA: 1.02 (ref 1–1.03)
STREP PNEUMONIAE ANTIGEN, URINE: NORMAL
STREP PNEUMONIAE ANTIGEN, URINE: NORMAL
T4 FREE: 1.02 NG/DL (ref 0.93–1.7)
THB: 11.3 G/DL (ref 11.5–16.5)
THB: 11.3 G/DL (ref 11.5–16.5)
THB: 11.4 G/DL (ref 11.5–16.5)
THB: 11.6 G/DL (ref 11.5–16.5)
THB: 11.8 G/DL (ref 11.5–16.5)
THB: 12.4 G/DL (ref 11.5–16.5)
THB: 13.5 G/DL (ref 11.5–16.5)
TIME ANALYZED: 1023
TIME ANALYZED: 1101
TIME ANALYZED: 1112
TIME ANALYZED: 1127
TIME ANALYZED: 1229
TIME ANALYZED: 817
TIME ANALYZED: 842
TOTAL CK: 92 U/L (ref 20–180)
TOTAL PROTEIN: 5.2 G/DL (ref 6.4–8.3)
TOTAL PROTEIN: 5.7 G/DL (ref 6.4–8.3)
TOTAL PROTEIN: 5.8 G/DL (ref 6.4–8.3)
TOTAL PROTEIN: 5.8 G/DL (ref 6.4–8.3)
TOTAL PROTEIN: 5.9 G/DL (ref 6.4–8.3)
TOTAL PROTEIN: 5.9 G/DL (ref 6.4–8.3)
TOTAL PROTEIN: 6.1 G/DL (ref 6.4–8.3)
TOTAL PROTEIN: 6.1 G/DL (ref 6.4–8.3)
TOTAL PROTEIN: 6.3 G/DL (ref 6.4–8.3)
TOTAL PROTEIN: 6.5 G/DL (ref 6.4–8.3)
TOTAL PROTEIN: 6.7 G/DL (ref 6.4–8.3)
TOTAL PROTEIN: 6.7 G/DL (ref 6.4–8.3)
TOTAL PROTEIN: 6.8 G/DL (ref 6.4–8.3)
TOTAL PROTEIN: 6.9 G/DL (ref 6.4–8.3)
TOTAL PROTEIN: 7.2 G/DL (ref 6.4–8.3)
TOTAL PROTEIN: 7.3 G/DL (ref 6.4–8.3)
TRICYCLIC ANTIDEPRESSANTS SCREEN SERUM: NEGATIVE NG/ML
TROPONIN, HIGH SENSITIVITY: 19 NG/L (ref 0–9)
TROPONIN, HIGH SENSITIVITY: 20 NG/L (ref 0–9)
TROPONIN, HIGH SENSITIVITY: 22 NG/L (ref 0–9)
TROPONIN, HIGH SENSITIVITY: 22 NG/L (ref 0–9)
TROPONIN, HIGH SENSITIVITY: 23 NG/L (ref 0–9)
TROPONIN, HIGH SENSITIVITY: 24 NG/L (ref 0–9)
TROPONIN, HIGH SENSITIVITY: 24 NG/L (ref 0–9)
TROPONIN, HIGH SENSITIVITY: 25 NG/L (ref 0–9)
TROPONIN, HIGH SENSITIVITY: 25 NG/L (ref 0–9)
TSH SERPL DL<=0.05 MIU/L-ACNC: 1.89 UIU/ML (ref 0.27–4.2)
UREA NITROGEN, UR: 433 MG/DL (ref 800–1666)
URIC ACID, SERUM: 8.2 MG/DL (ref 2.4–5.7)
URINE CULTURE, ROUTINE: ABNORMAL
URINE CULTURE, ROUTINE: NORMAL
URINE CULTURE, ROUTINE: NORMAL
UROBILINOGEN, URINE: 0.2 E.U./DL
UROBILINOGEN, URINE: 1 E.U./DL
UROBILINOGEN, URINE: 1 E.U./DL
VITAMIN B-12: 1170 PG/ML (ref 211–946)
VITAMIN D 25-HYDROXY: 37 NG/ML (ref 30–100)
WBC # BLD: 10.4 E9/L (ref 4.5–11.5)
WBC # BLD: 3.3 E9/L (ref 4.5–11.5)
WBC # BLD: 3.5 E9/L (ref 4.5–11.5)
WBC # BLD: 4.1 E9/L (ref 4.5–11.5)
WBC # BLD: 4.2 E9/L (ref 4.5–11.5)
WBC # BLD: 4.3 E9/L (ref 4.5–11.5)
WBC # BLD: 4.6 E9/L (ref 4.5–11.5)
WBC # BLD: 4.6 E9/L (ref 4.5–11.5)
WBC # BLD: 4.7 E9/L (ref 4.5–11.5)
WBC # BLD: 4.8 E9/L (ref 4.5–11.5)
WBC # BLD: 4.8 E9/L (ref 4.5–11.5)
WBC # BLD: 5.2 E9/L (ref 4.5–11.5)
WBC # BLD: 5.6 E9/L (ref 4.5–11.5)
WBC # BLD: 5.6 E9/L (ref 4.5–11.5)
WBC # BLD: 5.7 E9/L (ref 4.5–11.5)
WBC # BLD: 5.7 E9/L (ref 4.5–11.5)
WBC # BLD: 5.9 E9/L (ref 4.5–11.5)
WBC # BLD: 5.9 E9/L (ref 4.5–11.5)
WBC # BLD: 6.2 E9/L (ref 4.5–11.5)
WBC # BLD: 6.4 E9/L (ref 4.5–11.5)
WBC # BLD: 6.8 E9/L (ref 4.5–11.5)
WBC # BLD: 7 E9/L (ref 4.5–11.5)
WBC # BLD: 7.1 E9/L (ref 4.5–11.5)
WBC # BLD: 7.8 E9/L (ref 4.5–11.5)
WBC # BLD: 8.7 E9/L (ref 4.5–11.5)
WBC UA: ABNORMAL /HPF (ref 0–5)

## 2022-01-01 PROCEDURE — 1090F PRES/ABSN URINE INCON ASSESS: CPT | Performed by: INTERNAL MEDICINE

## 2022-01-01 PROCEDURE — 2580000003 HC RX 258: Performed by: STUDENT IN AN ORGANIZED HEALTH CARE EDUCATION/TRAINING PROGRAM

## 2022-01-01 PROCEDURE — 94640 AIRWAY INHALATION TREATMENT: CPT

## 2022-01-01 PROCEDURE — 36415 COLL VENOUS BLD VENIPUNCTURE: CPT

## 2022-01-01 PROCEDURE — 2700000000 HC OXYGEN THERAPY PER DAY

## 2022-01-01 PROCEDURE — 6370000000 HC RX 637 (ALT 250 FOR IP): Performed by: FAMILY MEDICINE

## 2022-01-01 PROCEDURE — 85610 PROTHROMBIN TIME: CPT

## 2022-01-01 PROCEDURE — 6370000000 HC RX 637 (ALT 250 FOR IP): Performed by: STUDENT IN AN ORGANIZED HEALTH CARE EDUCATION/TRAINING PROGRAM

## 2022-01-01 PROCEDURE — 2500000003 HC RX 250 WO HCPCS: Performed by: EMERGENCY MEDICINE

## 2022-01-01 PROCEDURE — 2580000003 HC RX 258: Performed by: FAMILY MEDICINE

## 2022-01-01 PROCEDURE — 74177 CT ABD & PELVIS W/CONTRAST: CPT

## 2022-01-01 PROCEDURE — 82550 ASSAY OF CK (CPK): CPT

## 2022-01-01 PROCEDURE — G0378 HOSPITAL OBSERVATION PER HR: HCPCS

## 2022-01-01 PROCEDURE — 80053 COMPREHEN METABOLIC PANEL: CPT

## 2022-01-01 PROCEDURE — 2500000003 HC RX 250 WO HCPCS: Performed by: STUDENT IN AN ORGANIZED HEALTH CARE EDUCATION/TRAINING PROGRAM

## 2022-01-01 PROCEDURE — 82728 ASSAY OF FERRITIN: CPT

## 2022-01-01 PROCEDURE — 99225 PR SBSQ OBSERVATION CARE/DAY 25 MINUTES: CPT | Performed by: FAMILY MEDICINE

## 2022-01-01 PROCEDURE — 83735 ASSAY OF MAGNESIUM: CPT

## 2022-01-01 PROCEDURE — 80143 DRUG ASSAY ACETAMINOPHEN: CPT

## 2022-01-01 PROCEDURE — 70450 CT HEAD/BRAIN W/O DYE: CPT

## 2022-01-01 PROCEDURE — 6360000002 HC RX W HCPCS: Performed by: FAMILY MEDICINE

## 2022-01-01 PROCEDURE — 93010 ELECTROCARDIOGRAM REPORT: CPT | Performed by: INTERNAL MEDICINE

## 2022-01-01 PROCEDURE — 6370000000 HC RX 637 (ALT 250 FOR IP): Performed by: PHYSICIAN ASSISTANT

## 2022-01-01 PROCEDURE — 96375 TX/PRO/DX INJ NEW DRUG ADDON: CPT

## 2022-01-01 PROCEDURE — 99221 1ST HOSP IP/OBS SF/LOW 40: CPT | Performed by: INTERNAL MEDICINE

## 2022-01-01 PROCEDURE — 36600 WITHDRAWAL OF ARTERIAL BLOOD: CPT

## 2022-01-01 PROCEDURE — 6360000002 HC RX W HCPCS: Performed by: STUDENT IN AN ORGANIZED HEALTH CARE EDUCATION/TRAINING PROGRAM

## 2022-01-01 PROCEDURE — 71275 CT ANGIOGRAPHY CHEST: CPT

## 2022-01-01 PROCEDURE — 99232 SBSQ HOSP IP/OBS MODERATE 35: CPT | Performed by: INTERNAL MEDICINE

## 2022-01-01 PROCEDURE — 83605 ASSAY OF LACTIC ACID: CPT

## 2022-01-01 PROCEDURE — 96376 TX/PRO/DX INJ SAME DRUG ADON: CPT

## 2022-01-01 PROCEDURE — 2060000000 HC ICU INTERMEDIATE R&B

## 2022-01-01 PROCEDURE — 87206 SMEAR FLUORESCENT/ACID STAI: CPT

## 2022-01-01 PROCEDURE — 99285 EMERGENCY DEPT VISIT HI MDM: CPT

## 2022-01-01 PROCEDURE — 80048 BASIC METABOLIC PNL TOTAL CA: CPT

## 2022-01-01 PROCEDURE — 82962 GLUCOSE BLOOD TEST: CPT

## 2022-01-01 PROCEDURE — 97530 THERAPEUTIC ACTIVITIES: CPT

## 2022-01-01 PROCEDURE — 99232 SBSQ HOSP IP/OBS MODERATE 35: CPT | Performed by: FAMILY MEDICINE

## 2022-01-01 PROCEDURE — 97162 PT EVAL MOD COMPLEX 30 MIN: CPT

## 2022-01-01 PROCEDURE — 85384 FIBRINOGEN ACTIVITY: CPT

## 2022-01-01 PROCEDURE — 82805 BLOOD GASES W/O2 SATURATION: CPT

## 2022-01-01 PROCEDURE — 84550 ASSAY OF BLOOD/URIC ACID: CPT

## 2022-01-01 PROCEDURE — 4040F PNEUMOC VAC/ADMIN/RCVD: CPT | Performed by: INTERNAL MEDICINE

## 2022-01-01 PROCEDURE — 83930 ASSAY OF BLOOD OSMOLALITY: CPT

## 2022-01-01 PROCEDURE — 96374 THER/PROPH/DIAG INJ IV PUSH: CPT

## 2022-01-01 PROCEDURE — 84484 ASSAY OF TROPONIN QUANT: CPT

## 2022-01-01 PROCEDURE — 85730 THROMBOPLASTIN TIME PARTIAL: CPT

## 2022-01-01 PROCEDURE — 85025 COMPLETE CBC W/AUTO DIFF WBC: CPT

## 2022-01-01 PROCEDURE — 99223 1ST HOSP IP/OBS HIGH 75: CPT | Performed by: PSYCHIATRY & NEUROLOGY

## 2022-01-01 PROCEDURE — 72125 CT NECK SPINE W/O DYE: CPT

## 2022-01-01 PROCEDURE — 74018 RADEX ABDOMEN 1 VIEW: CPT

## 2022-01-01 PROCEDURE — 99233 SBSQ HOSP IP/OBS HIGH 50: CPT | Performed by: INTERNAL MEDICINE

## 2022-01-01 PROCEDURE — 2580000003 HC RX 258: Performed by: INTERNAL MEDICINE

## 2022-01-01 PROCEDURE — 99217 PR OBSERVATION CARE DISCHARGE MANAGEMENT: CPT | Performed by: FAMILY MEDICINE

## 2022-01-01 PROCEDURE — 1200000000 HC SEMI PRIVATE

## 2022-01-01 PROCEDURE — 1111F DSCHRG MED/CURRENT MED MERGE: CPT | Performed by: INTERNAL MEDICINE

## 2022-01-01 PROCEDURE — 82436 ASSAY OF URINE CHLORIDE: CPT

## 2022-01-01 PROCEDURE — 94660 CPAP INITIATION&MGMT: CPT

## 2022-01-01 PROCEDURE — 6370000000 HC RX 637 (ALT 250 FOR IP): Performed by: INTERNAL MEDICINE

## 2022-01-01 PROCEDURE — G8417 CALC BMI ABV UP PARAM F/U: HCPCS | Performed by: INTERNAL MEDICINE

## 2022-01-01 PROCEDURE — 6360000004 HC RX CONTRAST MEDICATION: Performed by: STUDENT IN AN ORGANIZED HEALTH CARE EDUCATION/TRAINING PROGRAM

## 2022-01-01 PROCEDURE — 87070 CULTURE OTHR SPECIMN AEROBIC: CPT

## 2022-01-01 PROCEDURE — 97161 PT EVAL LOW COMPLEX 20 MIN: CPT

## 2022-01-01 PROCEDURE — 2580000003 HC RX 258

## 2022-01-01 PROCEDURE — 97165 OT EVAL LOW COMPLEX 30 MIN: CPT

## 2022-01-01 PROCEDURE — 92610 EVALUATE SWALLOWING FUNCTION: CPT

## 2022-01-01 PROCEDURE — 87635 SARS-COV-2 COVID-19 AMP PRB: CPT

## 2022-01-01 PROCEDURE — 99222 1ST HOSP IP/OBS MODERATE 55: CPT

## 2022-01-01 PROCEDURE — 87088 URINE BACTERIA CULTURE: CPT

## 2022-01-01 PROCEDURE — 2500000003 HC RX 250 WO HCPCS: Performed by: PHYSICIAN ASSISTANT

## 2022-01-01 PROCEDURE — 82077 ASSAY SPEC XCP UR&BREATH IA: CPT

## 2022-01-01 PROCEDURE — 84100 ASSAY OF PHOSPHORUS: CPT

## 2022-01-01 PROCEDURE — XW033E5 INTRODUCTION OF REMDESIVIR ANTI-INFECTIVE INTO PERIPHERAL VEIN, PERCUTANEOUS APPROACH, NEW TECHNOLOGY GROUP 5: ICD-10-PCS | Performed by: FAMILY MEDICINE

## 2022-01-01 PROCEDURE — 02HV33Z INSERTION OF INFUSION DEVICE INTO SUPERIOR VENA CAVA, PERCUTANEOUS APPROACH: ICD-10-PCS | Performed by: SURGERY

## 2022-01-01 PROCEDURE — 1123F ACP DISCUSS/DSCN MKR DOCD: CPT | Performed by: INTERNAL MEDICINE

## 2022-01-01 PROCEDURE — 93005 ELECTROCARDIOGRAM TRACING: CPT | Performed by: FAMILY MEDICINE

## 2022-01-01 PROCEDURE — 84439 ASSAY OF FREE THYROXINE: CPT

## 2022-01-01 PROCEDURE — 70498 CT ANGIOGRAPHY NECK: CPT

## 2022-01-01 PROCEDURE — 99222 1ST HOSP IP/OBS MODERATE 55: CPT | Performed by: FAMILY MEDICINE

## 2022-01-01 PROCEDURE — 82306 VITAMIN D 25 HYDROXY: CPT

## 2022-01-01 PROCEDURE — 97530 THERAPEUTIC ACTIVITIES: CPT | Performed by: PHYSICAL THERAPIST

## 2022-01-01 PROCEDURE — 6360000002 HC RX W HCPCS: Performed by: INTERNAL MEDICINE

## 2022-01-01 PROCEDURE — 87205 SMEAR GRAM STAIN: CPT

## 2022-01-01 PROCEDURE — 85651 RBC SED RATE NONAUTOMATED: CPT

## 2022-01-01 PROCEDURE — 71045 X-RAY EXAM CHEST 1 VIEW: CPT

## 2022-01-01 PROCEDURE — 81001 URINALYSIS AUTO W/SCOPE: CPT

## 2022-01-01 PROCEDURE — 93005 ELECTROCARDIOGRAM TRACING: CPT | Performed by: INTERNAL MEDICINE

## 2022-01-01 PROCEDURE — 85378 FIBRIN DEGRADE SEMIQUANT: CPT

## 2022-01-01 PROCEDURE — 87186 SC STD MICRODIL/AGAR DIL: CPT

## 2022-01-01 PROCEDURE — 87449 NOS EACH ORGANISM AG IA: CPT

## 2022-01-01 PROCEDURE — 70486 CT MAXILLOFACIAL W/O DYE: CPT

## 2022-01-01 PROCEDURE — 97535 SELF CARE MNGMENT TRAINING: CPT

## 2022-01-01 PROCEDURE — 6360000004 HC RX CONTRAST MEDICATION: Performed by: RADIOLOGY

## 2022-01-01 PROCEDURE — 71260 CT THORAX DX C+: CPT

## 2022-01-01 PROCEDURE — 99214 OFFICE O/P EST MOD 30 MIN: CPT | Performed by: INTERNAL MEDICINE

## 2022-01-01 PROCEDURE — 76937 US GUIDE VASCULAR ACCESS: CPT | Performed by: SURGERY

## 2022-01-01 PROCEDURE — 93010 ELECTROCARDIOGRAM REPORT: CPT | Performed by: FAMILY MEDICINE

## 2022-01-01 PROCEDURE — 85027 COMPLETE CBC AUTOMATED: CPT

## 2022-01-01 PROCEDURE — 51798 US URINE CAPACITY MEASURE: CPT

## 2022-01-01 PROCEDURE — 6370000000 HC RX 637 (ALT 250 FOR IP)

## 2022-01-01 PROCEDURE — 3023F SPIROM DOC REV: CPT | Performed by: INTERNAL MEDICINE

## 2022-01-01 PROCEDURE — 87040 BLOOD CULTURE FOR BACTERIA: CPT

## 2022-01-01 PROCEDURE — 99223 1ST HOSP IP/OBS HIGH 75: CPT | Performed by: INTERNAL MEDICINE

## 2022-01-01 PROCEDURE — 84443 ASSAY THYROID STIM HORMONE: CPT

## 2022-01-01 PROCEDURE — 82140 ASSAY OF AMMONIA: CPT

## 2022-01-01 PROCEDURE — 83970 ASSAY OF PARATHORMONE: CPT

## 2022-01-01 PROCEDURE — 6360000002 HC RX W HCPCS

## 2022-01-01 PROCEDURE — 0202U NFCT DS 22 TRGT SARS-COV-2: CPT

## 2022-01-01 PROCEDURE — 6360000002 HC RX W HCPCS: Performed by: EMERGENCY MEDICINE

## 2022-01-01 PROCEDURE — U0003 INFECTIOUS AGENT DETECTION BY NUCLEIC ACID (DNA OR RNA); SEVERE ACUTE RESPIRATORY SYNDROME CORONAVIRUS 2 (SARS-COV-2) (CORONAVIRUS DISEASE [COVID-19]), AMPLIFIED PROBE TECHNIQUE, MAKING USE OF HIGH THROUGHPUT TECHNOLOGIES AS DESCRIBED BY CMS-2020-01-R: HCPCS

## 2022-01-01 PROCEDURE — 82746 ASSAY OF FOLIC ACID SERUM: CPT

## 2022-01-01 PROCEDURE — G8484 FLU IMMUNIZE NO ADMIN: HCPCS | Performed by: INTERNAL MEDICINE

## 2022-01-01 PROCEDURE — G8399 PT W/DXA RESULTS DOCUMENT: HCPCS | Performed by: INTERNAL MEDICINE

## 2022-01-01 PROCEDURE — 87636 SARSCOV2 & INF A&B AMP PRB: CPT

## 2022-01-01 PROCEDURE — 83880 ASSAY OF NATRIURETIC PEPTIDE: CPT

## 2022-01-01 PROCEDURE — 74176 CT ABD & PELVIS W/O CONTRAST: CPT

## 2022-01-01 PROCEDURE — 71250 CT THORAX DX C-: CPT

## 2022-01-01 PROCEDURE — 83615 LACTATE (LD) (LDH) ENZYME: CPT

## 2022-01-01 PROCEDURE — 86140 C-REACTIVE PROTEIN: CPT

## 2022-01-01 PROCEDURE — 97161 PT EVAL LOW COMPLEX 20 MIN: CPT | Performed by: PHYSICAL THERAPIST

## 2022-01-01 PROCEDURE — 93971 EXTREMITY STUDY: CPT

## 2022-01-01 PROCEDURE — 2580000003 HC RX 258: Performed by: EMERGENCY MEDICINE

## 2022-01-01 PROCEDURE — 73560 X-RAY EXAM OF KNEE 1 OR 2: CPT

## 2022-01-01 PROCEDURE — 96366 THER/PROPH/DIAG IV INF ADDON: CPT

## 2022-01-01 PROCEDURE — 80179 DRUG ASSAY SALICYLATE: CPT

## 2022-01-01 PROCEDURE — 93971 EXTREMITY STUDY: CPT | Performed by: RADIOLOGY

## 2022-01-01 PROCEDURE — 93005 ELECTROCARDIOGRAM TRACING: CPT

## 2022-01-01 PROCEDURE — U0005 INFEC AGEN DETEC AMPLI PROBE: HCPCS

## 2022-01-01 PROCEDURE — 84133 ASSAY OF URINE POTASSIUM: CPT

## 2022-01-01 PROCEDURE — 93005 ELECTROCARDIOGRAM TRACING: CPT | Performed by: EMERGENCY MEDICINE

## 2022-01-01 PROCEDURE — 93306 TTE W/DOPPLER COMPLETE: CPT

## 2022-01-01 PROCEDURE — 84540 ASSAY OF URINE/UREA-N: CPT

## 2022-01-01 PROCEDURE — 84145 PROCALCITONIN (PCT): CPT

## 2022-01-01 PROCEDURE — 6370000000 HC RX 637 (ALT 250 FOR IP): Performed by: EMERGENCY MEDICINE

## 2022-01-01 PROCEDURE — 93005 ELECTROCARDIOGRAM TRACING: CPT | Performed by: STUDENT IN AN ORGANIZED HEALTH CARE EDUCATION/TRAINING PROGRAM

## 2022-01-01 PROCEDURE — 96365 THER/PROPH/DIAG IV INF INIT: CPT

## 2022-01-01 PROCEDURE — 93000 ELECTROCARDIOGRAM COMPLETE: CPT | Performed by: INTERNAL MEDICINE

## 2022-01-01 PROCEDURE — APPSS60 APP SPLIT SHARED TIME 46-60 MINUTES: Performed by: PHYSICIAN ASSISTANT

## 2022-01-01 PROCEDURE — 2500000003 HC RX 250 WO HCPCS: Performed by: FAMILY MEDICINE

## 2022-01-01 PROCEDURE — 99220 PR INITIAL OBSERVATION CARE/DAY 70 MINUTES: CPT | Performed by: FAMILY MEDICINE

## 2022-01-01 PROCEDURE — 1036F TOBACCO NON-USER: CPT | Performed by: INTERNAL MEDICINE

## 2022-01-01 PROCEDURE — 82607 VITAMIN B-12: CPT

## 2022-01-01 PROCEDURE — 80307 DRUG TEST PRSMV CHEM ANLYZR: CPT

## 2022-01-01 PROCEDURE — 2500000003 HC RX 250 WO HCPCS: Performed by: INTERNAL MEDICINE

## 2022-01-01 PROCEDURE — 36556 INSERT NON-TUNNEL CV CATH: CPT | Performed by: SURGERY

## 2022-01-01 PROCEDURE — 87077 CULTURE AEROBIC IDENTIFY: CPT

## 2022-01-01 PROCEDURE — 73521 X-RAY EXAM HIPS BI 2 VIEWS: CPT

## 2022-01-01 PROCEDURE — 83935 ASSAY OF URINE OSMOLALITY: CPT

## 2022-01-01 PROCEDURE — 70496 CT ANGIOGRAPHY HEAD: CPT

## 2022-01-01 PROCEDURE — 84300 ASSAY OF URINE SODIUM: CPT

## 2022-01-01 PROCEDURE — G8427 DOCREV CUR MEDS BY ELIG CLIN: HCPCS | Performed by: INTERNAL MEDICINE

## 2022-01-01 RX ORDER — SODIUM CHLORIDE 0.9 % (FLUSH) 0.9 %
5-40 SYRINGE (ML) INJECTION EVERY 12 HOURS SCHEDULED
Status: DISCONTINUED | OUTPATIENT
Start: 2022-01-01 | End: 2022-01-01 | Stop reason: HOSPADM

## 2022-01-01 RX ORDER — METOPROLOL SUCCINATE 50 MG/1
50 TABLET, EXTENDED RELEASE ORAL NIGHTLY
Status: DISCONTINUED | OUTPATIENT
Start: 2022-01-01 | End: 2022-01-01 | Stop reason: HOSPADM

## 2022-01-01 RX ORDER — ONDANSETRON 2 MG/ML
4 INJECTION INTRAMUSCULAR; INTRAVENOUS ONCE
Status: COMPLETED | OUTPATIENT
Start: 2022-01-01 | End: 2022-01-01

## 2022-01-01 RX ORDER — METOPROLOL SUCCINATE 50 MG/1
50 TABLET, EXTENDED RELEASE ORAL 2 TIMES DAILY
Status: DISCONTINUED | OUTPATIENT
Start: 2022-01-01 | End: 2022-01-01

## 2022-01-01 RX ORDER — POLYETHYLENE GLYCOL 3350 17 G/17G
17 POWDER, FOR SOLUTION ORAL DAILY PRN
Status: DISCONTINUED | OUTPATIENT
Start: 2022-01-01 | End: 2022-01-01 | Stop reason: HOSPADM

## 2022-01-01 RX ORDER — DIPHENHYDRAMINE HYDROCHLORIDE 50 MG/ML
50 INJECTION INTRAMUSCULAR; INTRAVENOUS ONCE
Status: COMPLETED | OUTPATIENT
Start: 2022-01-01 | End: 2022-01-01

## 2022-01-01 RX ORDER — LEVOTHYROXINE SODIUM 0.12 MG/1
125 TABLET ORAL DAILY
Status: DISCONTINUED | OUTPATIENT
Start: 2022-01-01 | End: 2022-01-01 | Stop reason: HOSPADM

## 2022-01-01 RX ORDER — LEVALBUTEROL TARTRATE 45 UG/1
1 AEROSOL, METERED ORAL EVERY 6 HOURS PRN
Status: DISCONTINUED | OUTPATIENT
Start: 2022-01-01 | End: 2022-01-01 | Stop reason: CLARIF

## 2022-01-01 RX ORDER — 0.9 % SODIUM CHLORIDE 0.9 %
30 INTRAVENOUS SOLUTION INTRAVENOUS PRN
Status: DISCONTINUED | OUTPATIENT
Start: 2022-01-01 | End: 2022-01-01 | Stop reason: HOSPADM

## 2022-01-01 RX ORDER — LEVALBUTEROL INHALATION SOLUTION 0.63 MG/3ML
1 SOLUTION RESPIRATORY (INHALATION) 2 TIMES DAILY
Status: DISCONTINUED | OUTPATIENT
Start: 2022-01-01 | End: 2022-01-01 | Stop reason: HOSPADM

## 2022-01-01 RX ORDER — SODIUM CHLORIDE 9 MG/ML
25 INJECTION, SOLUTION INTRAVENOUS PRN
Status: DISCONTINUED | OUTPATIENT
Start: 2022-01-01 | End: 2022-01-01 | Stop reason: HOSPADM

## 2022-01-01 RX ORDER — HYDROXYZINE HYDROCHLORIDE 10 MG/1
10 TABLET, FILM COATED ORAL 3 TIMES DAILY PRN
Status: DISCONTINUED | OUTPATIENT
Start: 2022-01-01 | End: 2022-01-01 | Stop reason: HOSPADM

## 2022-01-01 RX ORDER — SPIRONOLACTONE 25 MG/1
25 TABLET ORAL DAILY
Status: DISCONTINUED | OUTPATIENT
Start: 2022-01-01 | End: 2022-01-01 | Stop reason: HOSPADM

## 2022-01-01 RX ORDER — ALBUTEROL SULFATE 90 UG/1
2 AEROSOL, METERED RESPIRATORY (INHALATION) EVERY 6 HOURS PRN
COMMUNITY

## 2022-01-01 RX ORDER — LORAZEPAM 2 MG/ML
0.5 INJECTION INTRAMUSCULAR ONCE
Status: DISCONTINUED | OUTPATIENT
Start: 2022-01-01 | End: 2022-01-01

## 2022-01-01 RX ORDER — LORAZEPAM 2 MG/ML
0.5 INJECTION INTRAMUSCULAR ONCE
Status: COMPLETED | OUTPATIENT
Start: 2022-01-01 | End: 2022-01-01

## 2022-01-01 RX ORDER — DEXAMETHASONE SODIUM PHOSPHATE 10 MG/ML
10 INJECTION INTRAMUSCULAR; INTRAVENOUS ONCE
Status: COMPLETED | OUTPATIENT
Start: 2022-01-01 | End: 2022-01-01

## 2022-01-01 RX ORDER — 0.9 % SODIUM CHLORIDE 0.9 %
1000 INTRAVENOUS SOLUTION INTRAVENOUS ONCE
Status: DISCONTINUED | OUTPATIENT
Start: 2022-01-01 | End: 2022-01-01

## 2022-01-01 RX ORDER — ACETAMINOPHEN 650 MG/1
650 SUPPOSITORY RECTAL EVERY 6 HOURS PRN
Status: DISCONTINUED | OUTPATIENT
Start: 2022-01-01 | End: 2022-01-01 | Stop reason: HOSPADM

## 2022-01-01 RX ORDER — HYDROCHLOROTHIAZIDE 12.5 MG/1
12.5 TABLET ORAL DAILY
Status: DISCONTINUED | OUTPATIENT
Start: 2022-01-01 | End: 2022-01-01 | Stop reason: HOSPADM

## 2022-01-01 RX ORDER — SODIUM CHLORIDE 0.9 % (FLUSH) 0.9 %
5-40 SYRINGE (ML) INJECTION EVERY 12 HOURS SCHEDULED
Status: DISCONTINUED | OUTPATIENT
Start: 2022-01-01 | End: 2022-01-01

## 2022-01-01 RX ORDER — VITAMIN B COMPLEX
1000 TABLET ORAL DAILY
Status: DISCONTINUED | OUTPATIENT
Start: 2022-01-01 | End: 2022-01-01 | Stop reason: HOSPADM

## 2022-01-01 RX ORDER — LISINOPRIL 5 MG/1
5 TABLET ORAL DAILY
Status: DISCONTINUED | OUTPATIENT
Start: 2022-01-01 | End: 2022-01-01 | Stop reason: HOSPADM

## 2022-01-01 RX ORDER — FUROSEMIDE 20 MG/1
40 TABLET ORAL DAILY
Status: DISCONTINUED | OUTPATIENT
Start: 2022-01-01 | End: 2022-01-01 | Stop reason: HOSPADM

## 2022-01-01 RX ORDER — POTASSIUM CHLORIDE 7.45 MG/ML
10 INJECTION INTRAVENOUS
Status: DISCONTINUED | OUTPATIENT
Start: 2022-01-01 | End: 2022-01-01

## 2022-01-01 RX ORDER — LEVALBUTEROL INHALATION SOLUTION 0.63 MG/3ML
1 SOLUTION RESPIRATORY (INHALATION) 2 TIMES DAILY
COMMUNITY

## 2022-01-01 RX ORDER — VITAMIN C
1 TAB ORAL DAILY
Status: DISCONTINUED | OUTPATIENT
Start: 2022-01-01 | End: 2022-01-01 | Stop reason: HOSPADM

## 2022-01-01 RX ORDER — METOPROLOL TARTRATE 5 MG/5ML
5 INJECTION INTRAVENOUS EVERY 6 HOURS PRN
Status: DISCONTINUED | OUTPATIENT
Start: 2022-01-01 | End: 2022-01-01

## 2022-01-01 RX ORDER — POTASSIUM CHLORIDE 7.45 MG/ML
10 INJECTION INTRAVENOUS
Status: COMPLETED | OUTPATIENT
Start: 2022-01-01 | End: 2022-01-01

## 2022-01-01 RX ORDER — ZINC SULFATE 50(220)MG
50 CAPSULE ORAL DAILY
Status: DISCONTINUED | OUTPATIENT
Start: 2022-01-01 | End: 2022-01-01 | Stop reason: HOSPADM

## 2022-01-01 RX ORDER — MIDAZOLAM HYDROCHLORIDE 2 MG/2ML
1 INJECTION, SOLUTION INTRAMUSCULAR; INTRAVENOUS SEE ADMIN INSTRUCTIONS
Status: COMPLETED | OUTPATIENT
Start: 2022-01-01 | End: 2022-01-01

## 2022-01-01 RX ORDER — GUAIFENESIN 400 MG/1
400 TABLET ORAL EVERY 4 HOURS PRN
Status: DISCONTINUED | OUTPATIENT
Start: 2022-01-01 | End: 2022-01-01 | Stop reason: HOSPADM

## 2022-01-01 RX ORDER — DILTIAZEM HYDROCHLORIDE 5 MG/ML
10 INJECTION INTRAVENOUS ONCE
Status: DISCONTINUED | OUTPATIENT
Start: 2022-01-01 | End: 2022-01-01

## 2022-01-01 RX ORDER — WARFARIN SODIUM 10 MG/1
10 TABLET ORAL
Status: COMPLETED | OUTPATIENT
Start: 2022-01-01 | End: 2022-01-01

## 2022-01-01 RX ORDER — DEXTROSE MONOHYDRATE 50 MG/ML
100 INJECTION, SOLUTION INTRAVENOUS PRN
Status: DISCONTINUED | OUTPATIENT
Start: 2022-01-01 | End: 2022-01-01 | Stop reason: HOSPADM

## 2022-01-01 RX ORDER — VITAMIN B COMPLEX
1 CAPSULE ORAL DAILY
Status: ON HOLD | COMMUNITY
End: 2022-01-01 | Stop reason: HOSPADM

## 2022-01-01 RX ORDER — ALBUTEROL SULFATE 2.5 MG/3ML
2.5 SOLUTION RESPIRATORY (INHALATION) EVERY 6 HOURS PRN
Status: DISCONTINUED | OUTPATIENT
Start: 2022-01-01 | End: 2022-01-01 | Stop reason: HOSPADM

## 2022-01-01 RX ORDER — TORSEMIDE 20 MG/1
20 TABLET ORAL DAILY
Qty: 30 TABLET | Refills: 3 | Status: SHIPPED | OUTPATIENT
Start: 2022-01-01

## 2022-01-01 RX ORDER — DEXAMETHASONE 4 MG/1
6 TABLET ORAL DAILY
Status: DISCONTINUED | OUTPATIENT
Start: 2022-01-01 | End: 2022-01-01 | Stop reason: HOSPADM

## 2022-01-01 RX ORDER — BUDESONIDE 0.5 MG/2ML
0.5 INHALANT ORAL 2 TIMES DAILY
Status: DISCONTINUED | OUTPATIENT
Start: 2022-01-01 | End: 2022-01-01 | Stop reason: HOSPADM

## 2022-01-01 RX ORDER — METOPROLOL TARTRATE 5 MG/5ML
5 INJECTION INTRAVENOUS ONCE
Status: COMPLETED | OUTPATIENT
Start: 2022-01-01 | End: 2022-01-01

## 2022-01-01 RX ORDER — METOPROLOL TARTRATE 5 MG/5ML
5 INJECTION INTRAVENOUS EVERY 6 HOURS PRN
Status: CANCELLED | OUTPATIENT
Start: 2022-01-01

## 2022-01-01 RX ORDER — SODIUM CHLORIDE 0.9 % (FLUSH) 0.9 %
5-40 SYRINGE (ML) INJECTION PRN
Status: DISCONTINUED | OUTPATIENT
Start: 2022-01-01 | End: 2022-01-01 | Stop reason: HOSPADM

## 2022-01-01 RX ORDER — ONDANSETRON 2 MG/ML
INJECTION INTRAMUSCULAR; INTRAVENOUS
Status: COMPLETED
Start: 2022-01-01 | End: 2022-01-01

## 2022-01-01 RX ORDER — ACETAMINOPHEN 325 MG/1
650 TABLET ORAL EVERY 6 HOURS PRN
Status: DISCONTINUED | OUTPATIENT
Start: 2022-01-01 | End: 2022-01-01 | Stop reason: HOSPADM

## 2022-01-01 RX ORDER — DEXTROSE MONOHYDRATE 50 MG/ML
INJECTION, SOLUTION INTRAVENOUS CONTINUOUS
Status: DISCONTINUED | OUTPATIENT
Start: 2022-01-01 | End: 2022-01-01

## 2022-01-01 RX ORDER — ESCITALOPRAM OXALATE 10 MG/1
10 TABLET ORAL DAILY
Status: DISCONTINUED | OUTPATIENT
Start: 2022-01-01 | End: 2022-01-01 | Stop reason: HOSPADM

## 2022-01-01 RX ORDER — WARFARIN SODIUM 5 MG/1
TABLET ORAL
Qty: 30 TABLET | Refills: 3 | Status: ON HOLD | OUTPATIENT
Start: 2022-01-01 | End: 2022-01-01 | Stop reason: HOSPADM

## 2022-01-01 RX ORDER — DIVALPROEX SODIUM 125 MG/1
125 CAPSULE, COATED PELLETS ORAL EVERY 12 HOURS SCHEDULED
Status: DISCONTINUED | OUTPATIENT
Start: 2022-01-01 | End: 2022-01-01

## 2022-01-01 RX ORDER — OLANZAPINE 10 MG/1
5 INJECTION, POWDER, LYOPHILIZED, FOR SOLUTION INTRAMUSCULAR EVERY 8 HOURS PRN
Qty: 10 EACH | Refills: 1 | Status: SHIPPED | OUTPATIENT
Start: 2022-01-01

## 2022-01-01 RX ORDER — SODIUM CHLORIDE 9 MG/ML
INJECTION, SOLUTION INTRAVENOUS CONTINUOUS
Status: DISCONTINUED | OUTPATIENT
Start: 2022-01-01 | End: 2022-01-01

## 2022-01-01 RX ORDER — ONDANSETRON 2 MG/ML
4 INJECTION INTRAMUSCULAR; INTRAVENOUS EVERY 6 HOURS PRN
Status: DISCONTINUED | OUTPATIENT
Start: 2022-01-01 | End: 2022-01-01 | Stop reason: HOSPADM

## 2022-01-01 RX ORDER — MECOBALAMIN 5000 MCG
5 TABLET,DISINTEGRATING ORAL NIGHTLY
Status: COMPLETED | OUTPATIENT
Start: 2022-01-01 | End: 2022-01-01

## 2022-01-01 RX ORDER — METOPROLOL SUCCINATE 25 MG/1
50 TABLET, EXTENDED RELEASE ORAL NIGHTLY
Status: DISCONTINUED | OUTPATIENT
Start: 2022-01-01 | End: 2022-01-01

## 2022-01-01 RX ORDER — METOPROLOL SUCCINATE 25 MG/1
75 TABLET, EXTENDED RELEASE ORAL DAILY
Status: DISCONTINUED | OUTPATIENT
Start: 2022-01-01 | End: 2022-01-01

## 2022-01-01 RX ORDER — MECLIZINE HCL 12.5 MG/1
25 TABLET ORAL 3 TIMES DAILY PRN
Status: DISCONTINUED | OUTPATIENT
Start: 2022-01-01 | End: 2022-01-01 | Stop reason: HOSPADM

## 2022-01-01 RX ORDER — FUROSEMIDE 10 MG/ML
20 INJECTION INTRAMUSCULAR; INTRAVENOUS ONCE
Status: COMPLETED | OUTPATIENT
Start: 2022-01-01 | End: 2022-01-01

## 2022-01-01 RX ORDER — METOPROLOL SUCCINATE 100 MG/1
100 TABLET, EXTENDED RELEASE ORAL DAILY
Qty: 30 TABLET | Refills: 3 | Status: SHIPPED | OUTPATIENT
Start: 2022-01-01

## 2022-01-01 RX ORDER — POTASSIUM CHLORIDE 7.45 MG/ML
INJECTION INTRAVENOUS
Status: DISCONTINUED
Start: 2022-01-01 | End: 2022-01-01

## 2022-01-01 RX ORDER — OLANZAPINE 10 MG/1
5 INJECTION, POWDER, LYOPHILIZED, FOR SOLUTION INTRAMUSCULAR ONCE
Status: CANCELLED | OUTPATIENT
Start: 2022-01-01

## 2022-01-01 RX ORDER — LISINOPRIL 5 MG/1
5 TABLET ORAL DAILY
Status: DISCONTINUED | OUTPATIENT
Start: 2022-01-01 | End: 2022-01-01

## 2022-01-01 RX ORDER — WARFARIN SODIUM 5 MG/1
5 TABLET ORAL DAILY
Status: DISCONTINUED | OUTPATIENT
Start: 2022-01-01 | End: 2022-01-01

## 2022-01-01 RX ORDER — METOPROLOL SUCCINATE 50 MG/1
50 TABLET, EXTENDED RELEASE ORAL NIGHTLY
Status: DISCONTINUED | OUTPATIENT
Start: 2022-01-01 | End: 2022-01-01

## 2022-01-01 RX ORDER — FUROSEMIDE 10 MG/ML
20 INJECTION INTRAMUSCULAR; INTRAVENOUS DAILY
Status: DISCONTINUED | OUTPATIENT
Start: 2022-01-01 | End: 2022-01-01

## 2022-01-01 RX ORDER — FAMOTIDINE 20 MG/1
20 TABLET, FILM COATED ORAL 2 TIMES DAILY
Status: DISCONTINUED | OUTPATIENT
Start: 2022-01-01 | End: 2022-01-01 | Stop reason: HOSPADM

## 2022-01-01 RX ORDER — POTASSIUM CHLORIDE 7.45 MG/ML
10 INJECTION INTRAVENOUS ONCE
Status: COMPLETED | OUTPATIENT
Start: 2022-01-01 | End: 2022-01-01

## 2022-01-01 RX ORDER — WARFARIN SODIUM 7.5 MG/1
15 TABLET ORAL
Status: DISCONTINUED | OUTPATIENT
Start: 2022-01-01 | End: 2022-01-01

## 2022-01-01 RX ORDER — DIVALPROEX SODIUM 500 MG/1
500 TABLET, DELAYED RELEASE ORAL 2 TIMES DAILY
Qty: 60 TABLET | Refills: 1 | Status: SHIPPED | OUTPATIENT
Start: 2022-01-01

## 2022-01-01 RX ORDER — ALBUTEROL SULFATE 90 UG/1
2 AEROSOL, METERED RESPIRATORY (INHALATION) EVERY 6 HOURS PRN
Status: DISCONTINUED | OUTPATIENT
Start: 2022-01-01 | End: 2022-01-01 | Stop reason: SDUPTHER

## 2022-01-01 RX ORDER — ACETAMINOPHEN 325 MG/1
650 TABLET ORAL EVERY 4 HOURS PRN
COMMUNITY

## 2022-01-01 RX ORDER — FUROSEMIDE 10 MG/ML
20 INJECTION INTRAMUSCULAR; INTRAVENOUS 2 TIMES DAILY
Status: DISCONTINUED | OUTPATIENT
Start: 2022-01-01 | End: 2022-01-01

## 2022-01-01 RX ORDER — BISACODYL 10 MG
10 SUPPOSITORY, RECTAL RECTAL DAILY PRN
Status: DISCONTINUED | OUTPATIENT
Start: 2022-01-01 | End: 2022-01-01 | Stop reason: HOSPADM

## 2022-01-01 RX ORDER — ASCORBIC ACID 500 MG
1000 TABLET ORAL DAILY
Status: DISCONTINUED | OUTPATIENT
Start: 2022-01-01 | End: 2022-01-01 | Stop reason: HOSPADM

## 2022-01-01 RX ORDER — WARFARIN SODIUM 10 MG/1
10 TABLET ORAL NIGHTLY
Status: DISCONTINUED | OUTPATIENT
Start: 2022-01-01 | End: 2022-01-01 | Stop reason: DRUGHIGH

## 2022-01-01 RX ORDER — ALBUTEROL SULFATE 90 UG/1
1 AEROSOL, METERED RESPIRATORY (INHALATION) EVERY 6 HOURS PRN
Status: DISCONTINUED | OUTPATIENT
Start: 2022-01-01 | End: 2022-01-01 | Stop reason: HOSPADM

## 2022-01-01 RX ORDER — ASCORBIC ACID 500 MG
1000 TABLET ORAL DAILY
Status: ON HOLD | COMMUNITY
End: 2022-01-01 | Stop reason: HOSPADM

## 2022-01-01 RX ORDER — LEVALBUTEROL INHALATION SOLUTION 0.63 MG/3ML
1 SOLUTION RESPIRATORY (INHALATION) EVERY 8 HOURS PRN
Status: DISCONTINUED | OUTPATIENT
Start: 2022-01-01 | End: 2022-01-01 | Stop reason: HOSPADM

## 2022-01-01 RX ORDER — DEXAMETHASONE 4 MG/1
4 TABLET ORAL DAILY
Qty: 10 TABLET | Refills: 0 | Status: CANCELLED | OUTPATIENT
Start: 2022-01-01 | End: 2022-01-01

## 2022-01-01 RX ORDER — CEFDINIR 300 MG/1
300 CAPSULE ORAL 2 TIMES DAILY
Qty: 14 CAPSULE | Refills: 0 | Status: CANCELLED | DISCHARGE
Start: 2022-01-01 | End: 2022-01-01

## 2022-01-01 RX ORDER — 0.9 % SODIUM CHLORIDE 0.9 %
1000 INTRAVENOUS SOLUTION INTRAVENOUS ONCE
Status: COMPLETED | OUTPATIENT
Start: 2022-01-01 | End: 2022-01-01

## 2022-01-01 RX ORDER — DEXAMETHASONE SODIUM PHOSPHATE 10 MG/ML
6 INJECTION INTRAMUSCULAR; INTRAVENOUS EVERY 24 HOURS
Status: DISCONTINUED | OUTPATIENT
Start: 2022-01-01 | End: 2022-01-01 | Stop reason: CLARIF

## 2022-01-01 RX ORDER — OLANZAPINE 10 MG/1
5 INJECTION, POWDER, LYOPHILIZED, FOR SOLUTION INTRAMUSCULAR EVERY 8 HOURS PRN
Status: DISCONTINUED | OUTPATIENT
Start: 2022-01-01 | End: 2022-01-01 | Stop reason: HOSPADM

## 2022-01-01 RX ORDER — LEVOTHYROXINE SODIUM 175 UG/1
175 TABLET ORAL DAILY
Status: DISCONTINUED | OUTPATIENT
Start: 2022-01-01 | End: 2022-01-01 | Stop reason: HOSPADM

## 2022-01-01 RX ORDER — ALBUTEROL SULFATE 90 UG/1
2 AEROSOL, METERED RESPIRATORY (INHALATION) EVERY 6 HOURS PRN
Status: DISCONTINUED | OUTPATIENT
Start: 2022-01-01 | End: 2022-01-01 | Stop reason: HOSPADM

## 2022-01-01 RX ORDER — MULTIVIT WITH MINERALS/LUTEIN
TABLET ORAL
COMMUNITY
Start: 2021-01-01 | End: 2022-01-01 | Stop reason: ALTCHOICE

## 2022-01-01 RX ORDER — ASCORBIC ACID 500 MG
500 TABLET ORAL DAILY
Status: DISCONTINUED | OUTPATIENT
Start: 2022-01-01 | End: 2022-01-01 | Stop reason: HOSPADM

## 2022-01-01 RX ORDER — MECLIZINE HYDROCHLORIDE 25 MG/1
25 TABLET ORAL 3 TIMES DAILY PRN
Status: ON HOLD | COMMUNITY
End: 2022-01-01 | Stop reason: HOSPADM

## 2022-01-01 RX ORDER — ONDANSETRON 4 MG/1
4 TABLET, ORALLY DISINTEGRATING ORAL EVERY 8 HOURS PRN
Status: DISCONTINUED | OUTPATIENT
Start: 2022-01-01 | End: 2022-01-01 | Stop reason: HOSPADM

## 2022-01-01 RX ORDER — FLUTICASONE PROPIONATE 50 MCG
2 SPRAY, SUSPENSION (ML) NASAL DAILY
Status: ON HOLD | COMMUNITY
End: 2022-01-01 | Stop reason: HOSPADM

## 2022-01-01 RX ORDER — LEVOTHYROXINE SODIUM 175 UG/1
175 TABLET ORAL DAILY
Qty: 30 TABLET | Refills: 3 | Status: SHIPPED | OUTPATIENT
Start: 2022-01-01

## 2022-01-01 RX ORDER — WARFARIN SODIUM 10 MG/1
10 TABLET ORAL
Status: DISCONTINUED | OUTPATIENT
Start: 2022-01-01 | End: 2022-01-01 | Stop reason: HOSPADM

## 2022-01-01 RX ORDER — FUROSEMIDE 10 MG/ML
20 INJECTION INTRAMUSCULAR; INTRAVENOUS ONCE
Status: DISCONTINUED | OUTPATIENT
Start: 2022-01-01 | End: 2022-01-01

## 2022-01-01 RX ORDER — FAMOTIDINE 20 MG/1
20 TABLET, FILM COATED ORAL DAILY
Status: DISCONTINUED | OUTPATIENT
Start: 2022-01-01 | End: 2022-01-01 | Stop reason: HOSPADM

## 2022-01-01 RX ORDER — DIVALPROEX SODIUM 125 MG/1
250 CAPSULE, COATED PELLETS ORAL EVERY 12 HOURS SCHEDULED
Status: DISCONTINUED | OUTPATIENT
Start: 2022-01-01 | End: 2022-01-01

## 2022-01-01 RX ORDER — WARFARIN SODIUM 10 MG/1
10 TABLET ORAL DAILY
Status: DISCONTINUED | OUTPATIENT
Start: 2022-01-01 | End: 2022-01-01 | Stop reason: HOSPADM

## 2022-01-01 RX ORDER — TORSEMIDE 20 MG/1
20 TABLET ORAL DAILY
Status: DISCONTINUED | OUTPATIENT
Start: 2022-01-01 | End: 2022-01-01 | Stop reason: HOSPADM

## 2022-01-01 RX ORDER — DIVALPROEX SODIUM 125 MG/1
500 CAPSULE, COATED PELLETS ORAL EVERY 12 HOURS SCHEDULED
Status: DISCONTINUED | OUTPATIENT
Start: 2022-01-01 | End: 2022-01-01 | Stop reason: HOSPADM

## 2022-01-01 RX ORDER — METOPROLOL SUCCINATE 100 MG/1
100 TABLET, EXTENDED RELEASE ORAL DAILY
Status: DISCONTINUED | OUTPATIENT
Start: 2022-01-01 | End: 2022-01-01 | Stop reason: HOSPADM

## 2022-01-01 RX ORDER — LOPERAMIDE HYDROCHLORIDE 2 MG/1
4 CAPSULE ORAL 4 TIMES DAILY PRN
Status: ON HOLD | COMMUNITY
End: 2022-01-01 | Stop reason: HOSPADM

## 2022-01-01 RX ORDER — DEXTROSE AND SODIUM CHLORIDE 5; .45 G/100ML; G/100ML
INJECTION, SOLUTION INTRAVENOUS CONTINUOUS
Status: ACTIVE | OUTPATIENT
Start: 2022-01-01 | End: 2022-01-01

## 2022-01-01 RX ORDER — SPIRONOLACTONE 25 MG/1
25 TABLET ORAL DAILY
Status: DISCONTINUED | OUTPATIENT
Start: 2022-01-01 | End: 2022-01-01

## 2022-01-01 RX ORDER — PHYTONADIONE 5 MG/1
2.5 TABLET ORAL ONCE
Status: COMPLETED | OUTPATIENT
Start: 2022-01-01 | End: 2022-01-01

## 2022-01-01 RX ORDER — METOPROLOL SUCCINATE 25 MG/1
50 TABLET, EXTENDED RELEASE ORAL ONCE
Status: COMPLETED | OUTPATIENT
Start: 2022-01-01 | End: 2022-01-01

## 2022-01-01 RX ORDER — SODIUM CHLORIDE 9 MG/ML
INJECTION, SOLUTION INTRAVENOUS PRN
Status: DISCONTINUED | OUTPATIENT
Start: 2022-01-01 | End: 2022-01-01 | Stop reason: SDUPTHER

## 2022-01-01 RX ORDER — FUROSEMIDE 40 MG/1
20 TABLET ORAL DAILY
Status: DISCONTINUED | OUTPATIENT
Start: 2022-01-01 | End: 2022-01-01

## 2022-01-01 RX ORDER — WARFARIN SODIUM 10 MG/1
10 TABLET ORAL NIGHTLY
COMMUNITY
End: 2022-01-01

## 2022-01-01 RX ORDER — OLANZAPINE 10 MG/1
5 INJECTION, POWDER, LYOPHILIZED, FOR SOLUTION INTRAMUSCULAR ONCE
Status: DISCONTINUED | OUTPATIENT
Start: 2022-01-01 | End: 2022-01-01

## 2022-01-01 RX ORDER — POTASSIUM CHLORIDE 7.45 MG/ML
10 INJECTION INTRAVENOUS PRN
Status: DISCONTINUED | OUTPATIENT
Start: 2022-01-01 | End: 2022-01-01 | Stop reason: HOSPADM

## 2022-01-01 RX ORDER — OLANZAPINE 10 MG/1
5 INJECTION, POWDER, LYOPHILIZED, FOR SOLUTION INTRAMUSCULAR EVERY 8 HOURS PRN
Status: DISCONTINUED | OUTPATIENT
Start: 2022-01-01 | End: 2022-01-01

## 2022-01-01 RX ORDER — HYDRALAZINE HYDROCHLORIDE 20 MG/ML
5 INJECTION INTRAMUSCULAR; INTRAVENOUS EVERY 6 HOURS PRN
Status: DISCONTINUED | OUTPATIENT
Start: 2022-01-01 | End: 2022-01-01

## 2022-01-01 RX ORDER — DOXYCYCLINE HYCLATE 100 MG/1
100 CAPSULE ORAL EVERY 12 HOURS SCHEDULED
Status: DISCONTINUED | OUTPATIENT
Start: 2022-01-01 | End: 2022-01-01

## 2022-01-01 RX ORDER — DEXTROSE MONOHYDRATE 25 G/50ML
12.5 INJECTION, SOLUTION INTRAVENOUS PRN
Status: DISCONTINUED | OUTPATIENT
Start: 2022-01-01 | End: 2022-01-01 | Stop reason: HOSPADM

## 2022-01-01 RX ORDER — DIPHENHYDRAMINE HYDROCHLORIDE 50 MG/ML
25 INJECTION INTRAMUSCULAR; INTRAVENOUS ONCE
Status: COMPLETED | OUTPATIENT
Start: 2022-01-01 | End: 2022-01-01

## 2022-01-01 RX ORDER — SODIUM CHLORIDE 0.9 % (FLUSH) 0.9 %
10 SYRINGE (ML) INJECTION
Status: ACTIVE | OUTPATIENT
Start: 2022-01-01 | End: 2022-01-01

## 2022-01-01 RX ORDER — HEPARIN SODIUM (PORCINE) LOCK FLUSH IV SOLN 100 UNIT/ML 100 UNIT/ML
1 SOLUTION INTRAVENOUS PRN
Status: DISCONTINUED | OUTPATIENT
Start: 2022-01-01 | End: 2022-01-01 | Stop reason: HOSPADM

## 2022-01-01 RX ORDER — DEXAMETHASONE 4 MG/1
4 TABLET ORAL DAILY
Qty: 7 TABLET | Refills: 0 | Status: CANCELLED | DISCHARGE
Start: 2022-01-01 | End: 2022-01-01

## 2022-01-01 RX ORDER — SODIUM CHLORIDE 9 MG/ML
INJECTION, SOLUTION INTRAVENOUS PRN
Status: DISCONTINUED | OUTPATIENT
Start: 2022-01-01 | End: 2022-01-01 | Stop reason: HOSPADM

## 2022-01-01 RX ORDER — DEXAMETHASONE 4 MG/1
6 TABLET ORAL DAILY
Status: DISCONTINUED | OUTPATIENT
Start: 2022-01-01 | End: 2022-01-01

## 2022-01-01 RX ORDER — LORAZEPAM 2 MG/ML
0.5 INJECTION INTRAMUSCULAR ONCE
Status: DISCONTINUED | OUTPATIENT
Start: 2022-01-01 | End: 2022-01-01 | Stop reason: HOSPADM

## 2022-01-01 RX ORDER — SODIUM CHLORIDE 0.9 % (FLUSH) 0.9 %
10 SYRINGE (ML) INJECTION PRN
Status: DISCONTINUED | OUTPATIENT
Start: 2022-01-01 | End: 2022-01-01

## 2022-01-01 RX ORDER — LORAZEPAM 0.5 MG/1
0.5 TABLET ORAL EVERY 6 HOURS PRN
Status: DISCONTINUED | OUTPATIENT
Start: 2022-01-01 | End: 2022-01-01

## 2022-01-01 RX ORDER — GUAIFENESIN 400 MG/1
400 TABLET ORAL 3 TIMES DAILY PRN
Status: DISCONTINUED | OUTPATIENT
Start: 2022-01-01 | End: 2022-01-01 | Stop reason: HOSPADM

## 2022-01-01 RX ORDER — DIVALPROEX SODIUM 125 MG/1
500 CAPSULE, COATED PELLETS ORAL EVERY 12 HOURS SCHEDULED
Qty: 60 CAPSULE | Refills: 3 | Status: CANCELLED | OUTPATIENT
Start: 2022-01-01

## 2022-01-01 RX ORDER — POTASSIUM CHLORIDE 20 MEQ/1
20 TABLET, EXTENDED RELEASE ORAL ONCE
Status: DISCONTINUED | OUTPATIENT
Start: 2022-01-01 | End: 2022-01-01

## 2022-01-01 RX ORDER — HEPARIN SODIUM (PORCINE) LOCK FLUSH IV SOLN 100 UNIT/ML 100 UNIT/ML
1 SOLUTION INTRAVENOUS EVERY 12 HOURS SCHEDULED
Status: DISCONTINUED | OUTPATIENT
Start: 2022-01-01 | End: 2022-01-01 | Stop reason: HOSPADM

## 2022-01-01 RX ORDER — GUAIFENESIN 600 MG/1
600 TABLET, EXTENDED RELEASE ORAL DAILY
COMMUNITY

## 2022-01-01 RX ORDER — CEFDINIR 300 MG/1
300 CAPSULE ORAL 2 TIMES DAILY
Qty: 14 CAPSULE | Refills: 0 | Status: CANCELLED | OUTPATIENT
Start: 2022-01-01 | End: 2022-01-01

## 2022-01-01 RX ORDER — MAGNESIUM SULFATE IN WATER 40 MG/ML
2000 INJECTION, SOLUTION INTRAVENOUS PRN
Status: DISCONTINUED | OUTPATIENT
Start: 2022-01-01 | End: 2022-01-01 | Stop reason: HOSPADM

## 2022-01-01 RX ORDER — DEXAMETHASONE SODIUM PHOSPHATE 4 MG/ML
6 INJECTION, SOLUTION INTRA-ARTICULAR; INTRALESIONAL; INTRAMUSCULAR; INTRAVENOUS; SOFT TISSUE EVERY 24 HOURS
Status: DISCONTINUED | OUTPATIENT
Start: 2022-01-01 | End: 2022-01-01

## 2022-01-01 RX ORDER — METOPROLOL TARTRATE 5 MG/5ML
5 INJECTION INTRAVENOUS EVERY 6 HOURS
Status: DISCONTINUED | OUTPATIENT
Start: 2022-01-01 | End: 2022-01-01 | Stop reason: HOSPADM

## 2022-01-01 RX ADMIN — WATER 1000 MG: 1 INJECTION INTRAMUSCULAR; INTRAVENOUS; SUBCUTANEOUS at 18:27

## 2022-01-01 RX ADMIN — LISINOPRIL 5 MG: 5 TABLET ORAL at 08:35

## 2022-01-01 RX ADMIN — ESCITALOPRAM 10 MG: 10 TABLET, FILM COATED ORAL at 09:45

## 2022-01-01 RX ADMIN — HYDROCHLOROTHIAZIDE 12.5 MG: 12.5 TABLET ORAL at 09:42

## 2022-01-01 RX ADMIN — BUDESONIDE 500 MCG: 0.5 SUSPENSION RESPIRATORY (INHALATION) at 20:23

## 2022-01-01 RX ADMIN — DEXTROSE AND SODIUM CHLORIDE: 5; 450 INJECTION, SOLUTION INTRAVENOUS at 05:23

## 2022-01-01 RX ADMIN — Medication 5 MG: at 21:20

## 2022-01-01 RX ADMIN — LEVOTHYROXINE SODIUM 125 MCG: 0.12 TABLET ORAL at 06:31

## 2022-01-01 RX ADMIN — Medication 10 ML: at 22:06

## 2022-01-01 RX ADMIN — IOPAMIDOL 75 ML: 755 INJECTION, SOLUTION INTRAVENOUS at 21:52

## 2022-01-01 RX ADMIN — MIDAZOLAM 1 MG: 1 INJECTION INTRAMUSCULAR; INTRAVENOUS at 13:52

## 2022-01-01 RX ADMIN — Medication 1 TABLET: at 08:51

## 2022-01-01 RX ADMIN — ACETAMINOPHEN 650 MG: 325 TABLET ORAL at 21:19

## 2022-01-01 RX ADMIN — METOPROLOL SUCCINATE 100 MG: 100 TABLET, FILM COATED, EXTENDED RELEASE ORAL at 09:59

## 2022-01-01 RX ADMIN — METOPROLOL SUCCINATE 50 MG: 50 TABLET, FILM COATED, EXTENDED RELEASE ORAL at 21:20

## 2022-01-01 RX ADMIN — Medication 10 ML: at 09:17

## 2022-01-01 RX ADMIN — METOPROLOL TARTRATE 5 MG: 5 INJECTION, SOLUTION INTRAVENOUS at 08:18

## 2022-01-01 RX ADMIN — LEVALBUTEROL HYDROCHLORIDE 0.63 MG: 0.63 SOLUTION RESPIRATORY (INHALATION) at 08:11

## 2022-01-01 RX ADMIN — BUDESONIDE 500 MCG: 0.5 SUSPENSION RESPIRATORY (INHALATION) at 08:05

## 2022-01-01 RX ADMIN — Medication 10 ML: at 20:09

## 2022-01-01 RX ADMIN — METOPROLOL SUCCINATE 100 MG: 100 TABLET, FILM COATED, EXTENDED RELEASE ORAL at 09:41

## 2022-01-01 RX ADMIN — LEVALBUTEROL HYDROCHLORIDE 0.63 MG: 0.63 SOLUTION RESPIRATORY (INHALATION) at 07:48

## 2022-01-01 RX ADMIN — DEXAMETHASONE 6 MG: 4 TABLET ORAL at 15:24

## 2022-01-01 RX ADMIN — POTASSIUM CHLORIDE 10 MEQ: 7.46 INJECTION, SOLUTION INTRAVENOUS at 12:48

## 2022-01-01 RX ADMIN — ENOXAPARIN SODIUM 30 MG: 100 INJECTION SUBCUTANEOUS at 08:09

## 2022-01-01 RX ADMIN — METOPROLOL TARTRATE 5 MG: 5 INJECTION, SOLUTION INTRAVENOUS at 15:50

## 2022-01-01 RX ADMIN — Medication 10 ML: at 20:34

## 2022-01-01 RX ADMIN — DEXTROSE AND SODIUM CHLORIDE: 5; 450 INJECTION, SOLUTION INTRAVENOUS at 16:59

## 2022-01-01 RX ADMIN — SODIUM CHLORIDE, PRESERVATIVE FREE 10 ML: 5 INJECTION INTRAVENOUS at 18:00

## 2022-01-01 RX ADMIN — LEVOTHYROXINE SODIUM 175 MCG: 0.17 TABLET ORAL at 05:07

## 2022-01-01 RX ADMIN — METOPROLOL TARTRATE 5 MG: 5 INJECTION, SOLUTION INTRAVENOUS at 11:23

## 2022-01-01 RX ADMIN — LEVALBUTEROL HYDROCHLORIDE 0.63 MG: 0.63 SOLUTION RESPIRATORY (INHALATION) at 19:21

## 2022-01-01 RX ADMIN — LEVALBUTEROL HYDROCHLORIDE 0.63 MG: 0.63 SOLUTION RESPIRATORY (INHALATION) at 08:37

## 2022-01-01 RX ADMIN — LISINOPRIL 5 MG: 5 TABLET ORAL at 09:24

## 2022-01-01 RX ADMIN — LEVALBUTEROL HYDROCHLORIDE 0.63 MG: 0.63 SOLUTION RESPIRATORY (INHALATION) at 20:50

## 2022-01-01 RX ADMIN — LISINOPRIL 5 MG: 10 TABLET ORAL at 10:34

## 2022-01-01 RX ADMIN — Medication 10 ML: at 21:40

## 2022-01-01 RX ADMIN — Medication 250 MG: at 17:23

## 2022-01-01 RX ADMIN — METOPROLOL SUCCINATE 50 MG: 50 TABLET, EXTENDED RELEASE ORAL at 20:40

## 2022-01-01 RX ADMIN — BUDESONIDE 500 MCG: 0.5 SUSPENSION RESPIRATORY (INHALATION) at 21:36

## 2022-01-01 RX ADMIN — ACETAMINOPHEN 650 MG: 325 TABLET ORAL at 01:35

## 2022-01-01 RX ADMIN — LORAZEPAM 0.5 MG: 2 INJECTION INTRAMUSCULAR; INTRAVENOUS at 16:09

## 2022-01-01 RX ADMIN — METOPROLOL SUCCINATE 75 MG: 25 TABLET, EXTENDED RELEASE ORAL at 11:04

## 2022-01-01 RX ADMIN — DIPHENHYDRAMINE HYDROCHLORIDE 25 MG: 50 INJECTION, SOLUTION INTRAMUSCULAR; INTRAVENOUS at 19:48

## 2022-01-01 RX ADMIN — DIVALPROEX SODIUM 500 MG: 125 CAPSULE, COATED PELLETS ORAL at 21:24

## 2022-01-01 RX ADMIN — Medication 5 ML: at 16:11

## 2022-01-01 RX ADMIN — Medication 10 ML: at 21:21

## 2022-01-01 RX ADMIN — BUDESONIDE 500 MCG: 0.5 SUSPENSION RESPIRATORY (INHALATION) at 08:27

## 2022-01-01 RX ADMIN — LEVALBUTEROL HYDROCHLORIDE 0.63 MG: 0.63 SOLUTION RESPIRATORY (INHALATION) at 18:16

## 2022-01-01 RX ADMIN — DIVALPROEX SODIUM 125 MG: 125 CAPSULE, COATED PELLETS ORAL at 10:06

## 2022-01-01 RX ADMIN — HYDROXYZINE HYDROCHLORIDE 10 MG: 10 TABLET ORAL at 23:28

## 2022-01-01 RX ADMIN — DEXTROSE MONOHYDRATE: 50 INJECTION, SOLUTION INTRAVENOUS at 15:29

## 2022-01-01 RX ADMIN — FUROSEMIDE 20 MG: 40 TABLET ORAL at 09:45

## 2022-01-01 RX ADMIN — LEVALBUTEROL HYDROCHLORIDE 0.63 MG: 0.63 SOLUTION RESPIRATORY (INHALATION) at 19:43

## 2022-01-01 RX ADMIN — METOPROLOL TARTRATE 5 MG: 1 INJECTION, SOLUTION INTRAVENOUS at 23:42

## 2022-01-01 RX ADMIN — FUROSEMIDE 40 MG: 20 TABLET ORAL at 09:16

## 2022-01-01 RX ADMIN — LEVALBUTEROL HYDROCHLORIDE 0.63 MG: 0.63 SOLUTION RESPIRATORY (INHALATION) at 09:04

## 2022-01-01 RX ADMIN — LEVOTHYROXINE SODIUM 175 MCG: 0.17 TABLET ORAL at 05:47

## 2022-01-01 RX ADMIN — SPIRONOLACTONE 25 MG: 25 TABLET ORAL at 10:47

## 2022-01-01 RX ADMIN — WATER 1000 MG: 1 INJECTION INTRAMUSCULAR; INTRAVENOUS; SUBCUTANEOUS at 18:40

## 2022-01-01 RX ADMIN — WATER 1000 MG: 1 INJECTION INTRAMUSCULAR; INTRAVENOUS; SUBCUTANEOUS at 18:21

## 2022-01-01 RX ADMIN — LISINOPRIL 5 MG: 5 TABLET ORAL at 08:51

## 2022-01-01 RX ADMIN — METOPROLOL SUCCINATE 100 MG: 100 TABLET, FILM COATED, EXTENDED RELEASE ORAL at 08:31

## 2022-01-01 RX ADMIN — FUROSEMIDE 40 MG: 20 TABLET ORAL at 08:52

## 2022-01-01 RX ADMIN — DEXTROSE MONOHYDRATE: 50 INJECTION, SOLUTION INTRAVENOUS at 11:56

## 2022-01-01 RX ADMIN — ESCITALOPRAM OXALATE 10 MG: 10 TABLET ORAL at 08:00

## 2022-01-01 RX ADMIN — LORAZEPAM 0.5 MG: 0.5 TABLET ORAL at 06:12

## 2022-01-01 RX ADMIN — LEVALBUTEROL HYDROCHLORIDE 0.63 MG: 0.63 SOLUTION RESPIRATORY (INHALATION) at 19:50

## 2022-01-01 RX ADMIN — BUDESONIDE 500 MCG: 0.5 SUSPENSION RESPIRATORY (INHALATION) at 08:52

## 2022-01-01 RX ADMIN — Medication 10 ML: at 11:23

## 2022-01-01 RX ADMIN — METOPROLOL TARTRATE 5 MG: 1 INJECTION, SOLUTION INTRAVENOUS at 18:00

## 2022-01-01 RX ADMIN — Medication 250 MG: at 11:56

## 2022-01-01 RX ADMIN — OLANZAPINE 5 MG: 10 INJECTION, POWDER, LYOPHILIZED, FOR SOLUTION INTRAMUSCULAR at 18:56

## 2022-01-01 RX ADMIN — ESCITALOPRAM OXALATE 10 MG: 10 TABLET ORAL at 10:47

## 2022-01-01 RX ADMIN — ONDANSETRON 4 MG: 2 INJECTION INTRAMUSCULAR; INTRAVENOUS at 10:20

## 2022-01-01 RX ADMIN — METOPROLOL TARTRATE 5 MG: 1 INJECTION, SOLUTION INTRAVENOUS at 10:56

## 2022-01-01 RX ADMIN — BUDESONIDE 500 MCG: 0.5 SUSPENSION RESPIRATORY (INHALATION) at 09:04

## 2022-01-01 RX ADMIN — DIVALPROEX SODIUM 500 MG: 125 CAPSULE, COATED PELLETS ORAL at 20:40

## 2022-01-01 RX ADMIN — ESCITALOPRAM 10 MG: 10 TABLET, FILM COATED ORAL at 09:42

## 2022-01-01 RX ADMIN — METOPROLOL TARTRATE 5 MG: 5 INJECTION, SOLUTION INTRAVENOUS at 10:28

## 2022-01-01 RX ADMIN — ACETAMINOPHEN 650 MG: 325 TABLET ORAL at 02:42

## 2022-01-01 RX ADMIN — METOPROLOL TARTRATE 5 MG: 1 INJECTION, SOLUTION INTRAVENOUS at 13:13

## 2022-01-01 RX ADMIN — METOPROLOL TARTRATE 5 MG: 1 INJECTION, SOLUTION INTRAVENOUS at 06:26

## 2022-01-01 RX ADMIN — OXYCODONE HYDROCHLORIDE AND ACETAMINOPHEN 1000 MG: 500 TABLET ORAL at 10:51

## 2022-01-01 RX ADMIN — FUROSEMIDE 20 MG: 10 INJECTION, SOLUTION INTRAMUSCULAR; INTRAVENOUS at 02:25

## 2022-01-01 RX ADMIN — BUDESONIDE 500 MCG: 0.5 SUSPENSION RESPIRATORY (INHALATION) at 20:24

## 2022-01-01 RX ADMIN — METOPROLOL SUCCINATE 50 MG: 50 TABLET, EXTENDED RELEASE ORAL at 20:18

## 2022-01-01 RX ADMIN — POTASSIUM PHOSPHATE, MONOBASIC AND POTASSIUM PHOSPHATE, DIBASIC 10 MMOL: 224; 236 INJECTION, SOLUTION, CONCENTRATE INTRAVENOUS at 20:15

## 2022-01-01 RX ADMIN — ESCITALOPRAM 10 MG: 10 TABLET, FILM COATED ORAL at 09:24

## 2022-01-01 RX ADMIN — POTASSIUM CHLORIDE: 2 INJECTION, SOLUTION, CONCENTRATE INTRAVENOUS at 14:34

## 2022-01-01 RX ADMIN — METOPROLOL SUCCINATE 100 MG: 100 TABLET, FILM COATED, EXTENDED RELEASE ORAL at 09:45

## 2022-01-01 RX ADMIN — DIVALPROEX SODIUM 500 MG: 125 CAPSULE, COATED PELLETS ORAL at 21:18

## 2022-01-01 RX ADMIN — HYDROCHLOROTHIAZIDE 12.5 MG: 12.5 TABLET ORAL at 10:00

## 2022-01-01 RX ADMIN — LORAZEPAM 0.5 MG: 2 INJECTION INTRAMUSCULAR; INTRAVENOUS at 16:23

## 2022-01-01 RX ADMIN — Medication 10 ML: at 20:53

## 2022-01-01 RX ADMIN — BUDESONIDE 500 MCG: 0.5 SUSPENSION RESPIRATORY (INHALATION) at 10:20

## 2022-01-01 RX ADMIN — OXYCODONE HYDROCHLORIDE AND ACETAMINOPHEN 500 MG: 500 TABLET ORAL at 10:17

## 2022-01-01 RX ADMIN — SPIRONOLACTONE 25 MG: 25 TABLET ORAL at 08:00

## 2022-01-01 RX ADMIN — SODIUM CHLORIDE, PRESERVATIVE FREE 100 UNITS: 5 INJECTION INTRAVENOUS at 16:10

## 2022-01-01 RX ADMIN — SODIUM CHLORIDE: 9 INJECTION, SOLUTION INTRAVENOUS at 12:08

## 2022-01-01 RX ADMIN — MECLIZINE 25 MG: 12.5 TABLET ORAL at 21:37

## 2022-01-01 RX ADMIN — WATER 1000 MG: 1 INJECTION INTRAMUSCULAR; INTRAVENOUS; SUBCUTANEOUS at 20:58

## 2022-01-01 RX ADMIN — METOPROLOL TARTRATE 5 MG: 1 INJECTION, SOLUTION INTRAVENOUS at 05:17

## 2022-01-01 RX ADMIN — ACETAMINOPHEN 650 MG: 325 TABLET ORAL at 20:40

## 2022-01-01 RX ADMIN — OXYCODONE HYDROCHLORIDE AND ACETAMINOPHEN 500 MG: 500 TABLET ORAL at 08:00

## 2022-01-01 RX ADMIN — POTASSIUM CHLORIDE 10 MEQ: 7.45 INJECTION INTRAVENOUS at 11:27

## 2022-01-01 RX ADMIN — LEVOTHYROXINE SODIUM 175 MCG: 0.17 TABLET ORAL at 06:00

## 2022-01-01 RX ADMIN — FUROSEMIDE 40 MG: 20 TABLET ORAL at 10:47

## 2022-01-01 RX ADMIN — FUROSEMIDE 20 MG: 40 TABLET ORAL at 09:59

## 2022-01-01 RX ADMIN — LEVALBUTEROL HYDROCHLORIDE 0.63 MG: 0.63 SOLUTION RESPIRATORY (INHALATION) at 20:31

## 2022-01-01 RX ADMIN — FAMOTIDINE 20 MG: 20 TABLET, FILM COATED ORAL at 08:00

## 2022-01-01 RX ADMIN — LEVOTHYROXINE SODIUM 175 MCG: 0.17 TABLET ORAL at 06:08

## 2022-01-01 RX ADMIN — ACETAMINOPHEN 650 MG: 325 TABLET ORAL at 21:37

## 2022-01-01 RX ADMIN — LEVALBUTEROL HYDROCHLORIDE 0.63 MG: 0.63 SOLUTION RESPIRATORY (INHALATION) at 08:51

## 2022-01-01 RX ADMIN — WATER 1000 MG: 1 INJECTION INTRAMUSCULAR; INTRAVENOUS; SUBCUTANEOUS at 16:24

## 2022-01-01 RX ADMIN — LEVALBUTEROL HYDROCHLORIDE 0.63 MG: 0.63 SOLUTION RESPIRATORY (INHALATION) at 18:46

## 2022-01-01 RX ADMIN — METOPROLOL TARTRATE 5 MG: 1 INJECTION, SOLUTION INTRAVENOUS at 12:25

## 2022-01-01 RX ADMIN — DIVALPROEX SODIUM 250 MG: 125 CAPSULE, COATED PELLETS ORAL at 08:31

## 2022-01-01 RX ADMIN — LEVALBUTEROL HYDROCHLORIDE 0.63 MG: 0.63 SOLUTION RESPIRATORY (INHALATION) at 21:02

## 2022-01-01 RX ADMIN — METOPROLOL SUCCINATE 50 MG: 50 TABLET, EXTENDED RELEASE ORAL at 20:34

## 2022-01-01 RX ADMIN — LORAZEPAM 0.5 MG: 0.5 TABLET ORAL at 23:38

## 2022-01-01 RX ADMIN — DIVALPROEX SODIUM 125 MG: 125 CAPSULE, COATED PELLETS ORAL at 22:04

## 2022-01-01 RX ADMIN — DIVALPROEX SODIUM 250 MG: 125 CAPSULE, COATED PELLETS ORAL at 09:06

## 2022-01-01 RX ADMIN — LORAZEPAM 0.5 MG: 0.5 TABLET ORAL at 10:16

## 2022-01-01 RX ADMIN — METOPROLOL SUCCINATE 50 MG: 50 TABLET, FILM COATED, EXTENDED RELEASE ORAL at 21:37

## 2022-01-01 RX ADMIN — BUDESONIDE 500 MCG: 0.5 SUSPENSION RESPIRATORY (INHALATION) at 19:26

## 2022-01-01 RX ADMIN — LEVALBUTEROL HYDROCHLORIDE 0.63 MG: 0.63 SOLUTION RESPIRATORY (INHALATION) at 21:03

## 2022-01-01 RX ADMIN — ENOXAPARIN SODIUM 40 MG: 40 INJECTION SUBCUTANEOUS at 10:42

## 2022-01-01 RX ADMIN — BUDESONIDE 500 MCG: 0.5 SUSPENSION RESPIRATORY (INHALATION) at 19:43

## 2022-01-01 RX ADMIN — WARFARIN SODIUM 10 MG: 10 TABLET ORAL at 18:39

## 2022-01-01 RX ADMIN — Medication 1000 UNITS: at 10:34

## 2022-01-01 RX ADMIN — ESCITALOPRAM 10 MG: 10 TABLET, FILM COATED ORAL at 09:06

## 2022-01-01 RX ADMIN — METOPROLOL SUCCINATE 100 MG: 100 TABLET, FILM COATED, EXTENDED RELEASE ORAL at 09:06

## 2022-01-01 RX ADMIN — WARFARIN SODIUM 10 MG: 10 TABLET ORAL at 18:43

## 2022-01-01 RX ADMIN — LEVOTHYROXINE SODIUM 125 MCG: 0.12 TABLET ORAL at 10:33

## 2022-01-01 RX ADMIN — METOPROLOL TARTRATE 5 MG: 1 INJECTION, SOLUTION INTRAVENOUS at 18:11

## 2022-01-01 RX ADMIN — Medication 10 ML: at 10:00

## 2022-01-01 RX ADMIN — FUROSEMIDE 20 MG: 10 INJECTION, SOLUTION INTRAMUSCULAR; INTRAVENOUS at 08:56

## 2022-01-01 RX ADMIN — DOXYCYCLINE 100 MG: 100 INJECTION, POWDER, LYOPHILIZED, FOR SOLUTION INTRAVENOUS at 19:49

## 2022-01-01 RX ADMIN — HYDROXYZINE HYDROCHLORIDE 10 MG: 10 TABLET ORAL at 09:16

## 2022-01-01 RX ADMIN — Medication 10 ML: at 08:09

## 2022-01-01 RX ADMIN — METOPROLOL SUCCINATE 100 MG: 100 TABLET, FILM COATED, EXTENDED RELEASE ORAL at 09:37

## 2022-01-01 RX ADMIN — ESCITALOPRAM 10 MG: 10 TABLET, FILM COATED ORAL at 08:35

## 2022-01-01 RX ADMIN — Medication 1 TABLET: at 10:47

## 2022-01-01 RX ADMIN — ENOXAPARIN SODIUM 30 MG: 100 INJECTION SUBCUTANEOUS at 16:12

## 2022-01-01 RX ADMIN — ONDANSETRON 4 MG: 2 INJECTION INTRAMUSCULAR; INTRAVENOUS at 17:59

## 2022-01-01 RX ADMIN — POTASSIUM CHLORIDE 10 MEQ: 7.45 INJECTION INTRAVENOUS at 13:26

## 2022-01-01 RX ADMIN — SODIUM CHLORIDE, PRESERVATIVE FREE 10 ML: 5 INJECTION INTRAVENOUS at 11:05

## 2022-01-01 RX ADMIN — CEFTRIAXONE SODIUM 1000 MG: 1 INJECTION, POWDER, FOR SOLUTION INTRAMUSCULAR; INTRAVENOUS at 17:43

## 2022-01-01 RX ADMIN — DIVALPROEX SODIUM 500 MG: 125 CAPSULE, COATED PELLETS ORAL at 09:45

## 2022-01-01 RX ADMIN — BUDESONIDE 500 MCG: 0.5 SUSPENSION RESPIRATORY (INHALATION) at 19:59

## 2022-01-01 RX ADMIN — METOPROLOL TARTRATE 5 MG: 1 INJECTION, SOLUTION INTRAVENOUS at 23:21

## 2022-01-01 RX ADMIN — FAMOTIDINE 20 MG: 20 TABLET ORAL at 09:06

## 2022-01-01 RX ADMIN — METOPROLOL TARTRATE 5 MG: 1 INJECTION, SOLUTION INTRAVENOUS at 18:25

## 2022-01-01 RX ADMIN — Medication 10 ML: at 15:29

## 2022-01-01 RX ADMIN — LEVOTHYROXINE SODIUM 175 MCG: 0.17 TABLET ORAL at 05:35

## 2022-01-01 RX ADMIN — METOPROLOL TARTRATE 25 MG: 25 TABLET, FILM COATED ORAL at 19:58

## 2022-01-01 RX ADMIN — METOPROLOL TARTRATE 25 MG: 25 TABLET, FILM COATED ORAL at 05:47

## 2022-01-01 RX ADMIN — METOPROLOL SUCCINATE 50 MG: 25 TABLET, EXTENDED RELEASE ORAL at 14:41

## 2022-01-01 RX ADMIN — LEVALBUTEROL HYDROCHLORIDE 0.63 MG: 0.63 SOLUTION RESPIRATORY (INHALATION) at 20:09

## 2022-01-01 RX ADMIN — LISINOPRIL 5 MG: 10 TABLET ORAL at 08:07

## 2022-01-01 RX ADMIN — Medication 10 ML: at 21:02

## 2022-01-01 RX ADMIN — MECLIZINE 25 MG: 12.5 TABLET ORAL at 21:20

## 2022-01-01 RX ADMIN — POTASSIUM CHLORIDE 10 MEQ: 7.45 INJECTION INTRAVENOUS at 12:26

## 2022-01-01 RX ADMIN — BUDESONIDE 500 MCG: 0.5 SUSPENSION RESPIRATORY (INHALATION) at 19:21

## 2022-01-01 RX ADMIN — IOPAMIDOL 150 ML: 755 INJECTION, SOLUTION INTRAVENOUS at 11:32

## 2022-01-01 RX ADMIN — FAMOTIDINE 20 MG: 20 TABLET ORAL at 20:18

## 2022-01-01 RX ADMIN — METOPROLOL TARTRATE 5 MG: 1 INJECTION, SOLUTION INTRAVENOUS at 18:42

## 2022-01-01 RX ADMIN — DIVALPROEX SODIUM 500 MG: 125 CAPSULE, COATED PELLETS ORAL at 20:12

## 2022-01-01 RX ADMIN — METOPROLOL TARTRATE 5 MG: 1 INJECTION, SOLUTION INTRAVENOUS at 13:17

## 2022-01-01 RX ADMIN — ENOXAPARIN SODIUM 30 MG: 100 INJECTION SUBCUTANEOUS at 09:23

## 2022-01-01 RX ADMIN — FUROSEMIDE 20 MG: 10 INJECTION, SOLUTION INTRAMUSCULAR; INTRAVENOUS at 10:19

## 2022-01-01 RX ADMIN — PERFLUTREN 1.65 MG: 6.52 INJECTION, SUSPENSION INTRAVENOUS at 09:12

## 2022-01-01 RX ADMIN — ESCITALOPRAM 10 MG: 10 TABLET, FILM COATED ORAL at 09:37

## 2022-01-01 RX ADMIN — OLANZAPINE 5 MG: 10 INJECTION, POWDER, LYOPHILIZED, FOR SOLUTION INTRAMUSCULAR at 11:21

## 2022-01-01 RX ADMIN — Medication 10 ML: at 19:49

## 2022-01-01 RX ADMIN — LISINOPRIL 5 MG: 5 TABLET ORAL at 10:46

## 2022-01-01 RX ADMIN — Medication 1000 UNITS: at 10:47

## 2022-01-01 RX ADMIN — LEVALBUTEROL HYDROCHLORIDE 0.63 MG: 0.63 SOLUTION RESPIRATORY (INHALATION) at 10:19

## 2022-01-01 RX ADMIN — ENOXAPARIN SODIUM 30 MG: 100 INJECTION SUBCUTANEOUS at 20:00

## 2022-01-01 RX ADMIN — FAMOTIDINE 20 MG: 20 TABLET ORAL at 09:16

## 2022-01-01 RX ADMIN — LEVALBUTEROL HYDROCHLORIDE 0.63 MG: 0.63 SOLUTION RESPIRATORY (INHALATION) at 09:17

## 2022-01-01 RX ADMIN — LEVOTHYROXINE SODIUM 175 MCG: 0.17 TABLET ORAL at 06:10

## 2022-01-01 RX ADMIN — BUDESONIDE 500 MCG: 0.5 SUSPENSION RESPIRATORY (INHALATION) at 08:38

## 2022-01-01 RX ADMIN — LEVOTHYROXINE SODIUM 175 MCG: 0.17 TABLET ORAL at 05:24

## 2022-01-01 RX ADMIN — POTASSIUM CHLORIDE 10 MEQ: 7.45 INJECTION INTRAVENOUS at 14:26

## 2022-01-01 RX ADMIN — LEVOTHYROXINE SODIUM 175 MCG: 0.17 TABLET ORAL at 07:33

## 2022-01-01 RX ADMIN — FAMOTIDINE 20 MG: 20 TABLET ORAL at 20:48

## 2022-01-01 RX ADMIN — WATER 1000 MG: 1 INJECTION INTRAMUSCULAR; INTRAVENOUS; SUBCUTANEOUS at 19:48

## 2022-01-01 RX ADMIN — Medication 1000 UNITS: at 08:00

## 2022-01-01 RX ADMIN — FUROSEMIDE 40 MG: 20 TABLET ORAL at 10:17

## 2022-01-01 RX ADMIN — DIVALPROEX SODIUM 500 MG: 125 CAPSULE, COATED PELLETS ORAL at 10:00

## 2022-01-01 RX ADMIN — BUDESONIDE 500 MCG: 0.5 SUSPENSION RESPIRATORY (INHALATION) at 09:16

## 2022-01-01 RX ADMIN — FAMOTIDINE 20 MG: 20 TABLET ORAL at 09:23

## 2022-01-01 RX ADMIN — FUROSEMIDE 20 MG: 40 TABLET ORAL at 09:37

## 2022-01-01 RX ADMIN — FUROSEMIDE 20 MG: 10 INJECTION, SOLUTION INTRAMUSCULAR; INTRAVENOUS at 02:33

## 2022-01-01 RX ADMIN — LEVALBUTEROL HYDROCHLORIDE 0.63 MG: 0.63 SOLUTION RESPIRATORY (INHALATION) at 08:57

## 2022-01-01 RX ADMIN — METOPROLOL SUCCINATE 100 MG: 100 TABLET, FILM COATED, EXTENDED RELEASE ORAL at 10:06

## 2022-01-01 RX ADMIN — SODIUM CHLORIDE, PRESERVATIVE FREE 100 UNITS: 5 INJECTION INTRAVENOUS at 11:04

## 2022-01-01 RX ADMIN — REMDESIVIR 100 MG: 100 INJECTION, POWDER, LYOPHILIZED, FOR SOLUTION INTRAVENOUS at 22:08

## 2022-01-01 RX ADMIN — HYDROCHLOROTHIAZIDE 12.5 MG: 12.5 TABLET ORAL at 09:46

## 2022-01-01 RX ADMIN — LEVALBUTEROL HYDROCHLORIDE 0.63 MG: 0.63 SOLUTION RESPIRATORY (INHALATION) at 21:36

## 2022-01-01 RX ADMIN — LEVOTHYROXINE SODIUM 175 MCG: 0.17 TABLET ORAL at 09:06

## 2022-01-01 RX ADMIN — ESCITALOPRAM 10 MG: 10 TABLET, FILM COATED ORAL at 09:16

## 2022-01-01 RX ADMIN — BUDESONIDE 500 MCG: 0.5 SUSPENSION RESPIRATORY (INHALATION) at 21:03

## 2022-01-01 RX ADMIN — LEVALBUTEROL HYDROCHLORIDE 0.63 MG: 0.63 SOLUTION RESPIRATORY (INHALATION) at 19:59

## 2022-01-01 RX ADMIN — METOPROLOL TARTRATE 5 MG: 1 INJECTION, SOLUTION INTRAVENOUS at 05:38

## 2022-01-01 RX ADMIN — DEXAMETHASONE SODIUM PHOSPHATE 10 MG: 10 INJECTION INTRAMUSCULAR; INTRAVENOUS at 22:19

## 2022-01-01 RX ADMIN — LEVOTHYROXINE SODIUM 175 MCG: 0.17 TABLET ORAL at 15:27

## 2022-01-01 RX ADMIN — ONDANSETRON 4 MG: 2 INJECTION INTRAMUSCULAR; INTRAVENOUS at 14:37

## 2022-01-01 RX ADMIN — LEVOTHYROXINE SODIUM 175 MCG: 0.17 TABLET ORAL at 05:18

## 2022-01-01 RX ADMIN — FUROSEMIDE 20 MG: 10 INJECTION, SOLUTION INTRAMUSCULAR; INTRAVENOUS at 09:58

## 2022-01-01 RX ADMIN — SPIRONOLACTONE 25 MG: 25 TABLET ORAL at 08:51

## 2022-01-01 RX ADMIN — LEVOTHYROXINE SODIUM 175 MCG: 0.17 TABLET ORAL at 06:26

## 2022-01-01 RX ADMIN — DIPHENHYDRAMINE HYDROCHLORIDE 50 MG: 50 INJECTION, SOLUTION INTRAMUSCULAR; INTRAVENOUS at 20:09

## 2022-01-01 RX ADMIN — FUROSEMIDE 40 MG: 20 TABLET ORAL at 08:00

## 2022-01-01 RX ADMIN — DEXAMETHASONE SODIUM PHOSPHATE 6 MG: 4 INJECTION, SOLUTION INTRAMUSCULAR; INTRAVENOUS at 12:22

## 2022-01-01 RX ADMIN — Medication 10 ML: at 20:59

## 2022-01-01 RX ADMIN — LEVOTHYROXINE SODIUM 175 MCG: 0.17 TABLET ORAL at 06:03

## 2022-01-01 RX ADMIN — POTASSIUM CHLORIDE 10 MEQ: 7.46 INJECTION, SOLUTION INTRAVENOUS at 14:36

## 2022-01-01 RX ADMIN — CEFTRIAXONE SODIUM 1000 MG: 1 INJECTION, POWDER, FOR SOLUTION INTRAMUSCULAR; INTRAVENOUS at 16:08

## 2022-01-01 RX ADMIN — BUDESONIDE 500 MCG: 0.5 SUSPENSION RESPIRATORY (INHALATION) at 08:16

## 2022-01-01 RX ADMIN — LORAZEPAM 0.5 MG: 2 INJECTION INTRAMUSCULAR; INTRAVENOUS at 04:26

## 2022-01-01 RX ADMIN — LEVALBUTEROL HYDROCHLORIDE 0.63 MG: 0.63 SOLUTION RESPIRATORY (INHALATION) at 10:13

## 2022-01-01 RX ADMIN — DIVALPROEX SODIUM 500 MG: 125 CAPSULE, COATED PELLETS ORAL at 19:59

## 2022-01-01 RX ADMIN — LEVOTHYROXINE SODIUM 175 MCG: 0.17 TABLET ORAL at 06:27

## 2022-01-01 RX ADMIN — METOPROLOL TARTRATE 5 MG: 1 INJECTION, SOLUTION INTRAVENOUS at 17:48

## 2022-01-01 RX ADMIN — LEVOTHYROXINE SODIUM 175 MCG: 0.17 TABLET ORAL at 05:13

## 2022-01-01 RX ADMIN — FUROSEMIDE 40 MG: 20 TABLET ORAL at 09:06

## 2022-01-01 RX ADMIN — BUDESONIDE 500 MCG: 0.5 SUSPENSION RESPIRATORY (INHALATION) at 20:10

## 2022-01-01 RX ADMIN — METOPROLOL TARTRATE 5 MG: 1 INJECTION, SOLUTION INTRAVENOUS at 18:31

## 2022-01-01 RX ADMIN — LORAZEPAM 0.5 MG: 2 INJECTION INTRAMUSCULAR; INTRAVENOUS at 02:13

## 2022-01-01 RX ADMIN — METOPROLOL SUCCINATE 100 MG: 100 TABLET, FILM COATED, EXTENDED RELEASE ORAL at 07:54

## 2022-01-01 RX ADMIN — Medication 10 ML: at 10:42

## 2022-01-01 RX ADMIN — LEVALBUTEROL HYDROCHLORIDE 0.63 MG: 0.63 SOLUTION RESPIRATORY (INHALATION) at 08:16

## 2022-01-01 RX ADMIN — DEXTROSE MONOHYDRATE: 50 INJECTION, SOLUTION INTRAVENOUS at 17:50

## 2022-01-01 RX ADMIN — ENOXAPARIN SODIUM 30 MG: 100 INJECTION SUBCUTANEOUS at 20:58

## 2022-01-01 RX ADMIN — LEVOTHYROXINE SODIUM 175 MCG: 0.17 TABLET ORAL at 05:29

## 2022-01-01 RX ADMIN — FAMOTIDINE 20 MG: 20 TABLET, FILM COATED ORAL at 10:17

## 2022-01-01 RX ADMIN — GUAIFENESIN 400 MG: 400 TABLET ORAL at 08:00

## 2022-01-01 RX ADMIN — LEVALBUTEROL HYDROCHLORIDE 0.63 MG: 0.63 SOLUTION RESPIRATORY (INHALATION) at 20:24

## 2022-01-01 RX ADMIN — GUAIFENESIN 400 MG: 400 TABLET ORAL at 16:08

## 2022-01-01 RX ADMIN — Medication 10 ML: at 21:54

## 2022-01-01 RX ADMIN — METOPROLOL TARTRATE 5 MG: 1 INJECTION, SOLUTION INTRAVENOUS at 05:14

## 2022-01-01 RX ADMIN — DIVALPROEX SODIUM 500 MG: 125 CAPSULE, COATED PELLETS ORAL at 20:36

## 2022-01-01 RX ADMIN — FUROSEMIDE 40 MG: 20 TABLET ORAL at 12:39

## 2022-01-01 RX ADMIN — LEVALBUTEROL HYDROCHLORIDE 0.63 MG: 0.63 SOLUTION RESPIRATORY (INHALATION) at 19:35

## 2022-01-01 RX ADMIN — POTASSIUM CHLORIDE: 2 INJECTION, SOLUTION, CONCENTRATE INTRAVENOUS at 02:00

## 2022-01-01 RX ADMIN — HYDROCHLOROTHIAZIDE 12.5 MG: 12.5 TABLET ORAL at 09:37

## 2022-01-01 RX ADMIN — WATER 1000 MG: 1 INJECTION INTRAMUSCULAR; INTRAVENOUS; SUBCUTANEOUS at 18:30

## 2022-01-01 RX ADMIN — DEXTROSE MONOHYDRATE: 50 INJECTION, SOLUTION INTRAVENOUS at 17:34

## 2022-01-01 RX ADMIN — BUDESONIDE 500 MCG: 0.5 SUSPENSION RESPIRATORY (INHALATION) at 10:14

## 2022-01-01 RX ADMIN — DIPHENHYDRAMINE HYDROCHLORIDE 50 MG: 50 INJECTION, SOLUTION INTRAMUSCULAR; INTRAVENOUS at 00:34

## 2022-01-01 RX ADMIN — DEXAMETHASONE 6 MG: 4 TABLET ORAL at 08:08

## 2022-01-01 RX ADMIN — ONDANSETRON 4 MG: 2 INJECTION INTRAMUSCULAR; INTRAVENOUS at 07:52

## 2022-01-01 RX ADMIN — DEXTROSE MONOHYDRATE: 50 INJECTION, SOLUTION INTRAVENOUS at 07:16

## 2022-01-01 RX ADMIN — METOPROLOL SUCCINATE 75 MG: 25 TABLET, EXTENDED RELEASE ORAL at 11:19

## 2022-01-01 RX ADMIN — ESCITALOPRAM 10 MG: 10 TABLET, FILM COATED ORAL at 14:16

## 2022-01-01 RX ADMIN — LEVALBUTEROL HYDROCHLORIDE 0.63 MG: 0.63 SOLUTION RESPIRATORY (INHALATION) at 19:26

## 2022-01-01 RX ADMIN — Medication 10 ML: at 08:52

## 2022-01-01 RX ADMIN — SODIUM CHLORIDE 1000 ML: 9 INJECTION, SOLUTION INTRAVENOUS at 18:35

## 2022-01-01 RX ADMIN — DIVALPROEX SODIUM 250 MG: 125 CAPSULE, COATED PELLETS ORAL at 20:14

## 2022-01-01 RX ADMIN — IOPAMIDOL 75 ML: 755 INJECTION, SOLUTION INTRAVENOUS at 14:55

## 2022-01-01 RX ADMIN — METOPROLOL TARTRATE 5 MG: 5 INJECTION, SOLUTION INTRAVENOUS at 15:42

## 2022-01-01 RX ADMIN — SPIRONOLACTONE 25 MG: 25 TABLET ORAL at 10:34

## 2022-01-01 RX ADMIN — Medication 5 ML: at 10:18

## 2022-01-01 RX ADMIN — LEVOTHYROXINE SODIUM 175 MCG: 0.17 TABLET ORAL at 05:21

## 2022-01-01 RX ADMIN — METOPROLOL TARTRATE 5 MG: 5 INJECTION, SOLUTION INTRAVENOUS at 21:53

## 2022-01-01 RX ADMIN — BUDESONIDE 500 MCG: 0.5 SUSPENSION RESPIRATORY (INHALATION) at 07:48

## 2022-01-01 RX ADMIN — METOPROLOL TARTRATE 5 MG: 1 INJECTION, SOLUTION INTRAVENOUS at 00:44

## 2022-01-01 RX ADMIN — BUDESONIDE 500 MCG: 0.5 SUSPENSION RESPIRATORY (INHALATION) at 08:58

## 2022-01-01 RX ADMIN — ENOXAPARIN SODIUM 30 MG: 100 INJECTION SUBCUTANEOUS at 11:22

## 2022-01-01 RX ADMIN — METOPROLOL TARTRATE 5 MG: 5 INJECTION, SOLUTION INTRAVENOUS at 20:07

## 2022-01-01 RX ADMIN — DIVALPROEX SODIUM 125 MG: 125 CAPSULE, COATED PELLETS ORAL at 11:03

## 2022-01-01 RX ADMIN — WARFARIN SODIUM 10 MG: 10 TABLET ORAL at 22:07

## 2022-01-01 RX ADMIN — LEVALBUTEROL HYDROCHLORIDE 0.63 MG: 0.63 SOLUTION RESPIRATORY (INHALATION) at 20:02

## 2022-01-01 RX ADMIN — LISINOPRIL 5 MG: 5 TABLET ORAL at 15:27

## 2022-01-01 RX ADMIN — Medication 50 MG: at 08:00

## 2022-01-01 RX ADMIN — DIVALPROEX SODIUM 125 MG: 125 CAPSULE, COATED PELLETS ORAL at 20:40

## 2022-01-01 RX ADMIN — FAMOTIDINE 20 MG: 20 TABLET ORAL at 20:34

## 2022-01-01 RX ADMIN — METOPROLOL TARTRATE 12.5 MG: 25 TABLET, FILM COATED ORAL at 23:57

## 2022-01-01 RX ADMIN — BUDESONIDE 500 MCG: 0.5 SUSPENSION RESPIRATORY (INHALATION) at 21:29

## 2022-01-01 RX ADMIN — ENOXAPARIN SODIUM 30 MG: 100 INJECTION SUBCUTANEOUS at 20:08

## 2022-01-01 RX ADMIN — Medication 10 ML: at 09:25

## 2022-01-01 RX ADMIN — WARFARIN SODIUM 10 MG: 10 TABLET ORAL at 18:31

## 2022-01-01 RX ADMIN — DIVALPROEX SODIUM 500 MG: 125 CAPSULE, COATED PELLETS ORAL at 09:38

## 2022-01-01 RX ADMIN — ACETAMINOPHEN 650 MG: 325 TABLET ORAL at 22:29

## 2022-01-01 RX ADMIN — BUDESONIDE 500 MCG: 0.5 SUSPENSION RESPIRATORY (INHALATION) at 20:03

## 2022-01-01 RX ADMIN — LEVOTHYROXINE SODIUM 125 MCG: 0.12 TABLET ORAL at 06:44

## 2022-01-01 RX ADMIN — LEVALBUTEROL HYDROCHLORIDE 0.63 MG: 0.63 SOLUTION RESPIRATORY (INHALATION) at 20:23

## 2022-01-01 RX ADMIN — IOPAMIDOL 75 ML: 755 INJECTION, SOLUTION INTRAVENOUS at 18:33

## 2022-01-01 RX ADMIN — Medication 1000 UNITS: at 08:51

## 2022-01-01 RX ADMIN — LEVOTHYROXINE SODIUM 175 MCG: 0.17 TABLET ORAL at 07:13

## 2022-01-01 RX ADMIN — ONDANSETRON HYDROCHLORIDE 4 MG: 2 SOLUTION INTRAMUSCULAR; INTRAVENOUS at 07:52

## 2022-01-01 RX ADMIN — DEXTROSE MONOHYDRATE: 50 INJECTION, SOLUTION INTRAVENOUS at 00:58

## 2022-01-01 RX ADMIN — LEVALBUTEROL HYDROCHLORIDE 0.63 MG: 0.63 SOLUTION RESPIRATORY (INHALATION) at 08:27

## 2022-01-01 RX ADMIN — Medication 50 MG: at 10:19

## 2022-01-01 RX ADMIN — METOPROLOL SUCCINATE 50 MG: 25 TABLET, FILM COATED, EXTENDED RELEASE ORAL at 22:07

## 2022-01-01 RX ADMIN — PHYTONADIONE 2.5 MG: 5 TABLET ORAL at 08:56

## 2022-01-01 RX ADMIN — ENOXAPARIN SODIUM 30 MG: 100 INJECTION SUBCUTANEOUS at 20:09

## 2022-01-01 RX ADMIN — METOPROLOL TARTRATE 5 MG: 5 INJECTION, SOLUTION INTRAVENOUS at 06:20

## 2022-01-01 RX ADMIN — OXYCODONE HYDROCHLORIDE AND ACETAMINOPHEN 1000 MG: 500 TABLET ORAL at 08:51

## 2022-01-01 RX ADMIN — LEVALBUTEROL HYDROCHLORIDE 0.63 MG: 0.63 SOLUTION RESPIRATORY (INHALATION) at 08:12

## 2022-01-01 RX ADMIN — FAMOTIDINE 20 MG: 20 TABLET ORAL at 20:41

## 2022-01-01 RX ADMIN — REMDESIVIR 200 MG: 100 INJECTION, POWDER, LYOPHILIZED, FOR SOLUTION INTRAVENOUS at 02:20

## 2022-01-01 RX ADMIN — SODIUM CHLORIDE 1000 ML: 9 INJECTION, SOLUTION INTRAVENOUS at 20:10

## 2022-01-01 RX ADMIN — FUROSEMIDE 20 MG: 10 INJECTION, SOLUTION INTRAMUSCULAR; INTRAVENOUS at 17:50

## 2022-01-01 RX ADMIN — FAMOTIDINE 20 MG: 20 TABLET ORAL at 08:34

## 2022-01-01 RX ADMIN — LISINOPRIL 5 MG: 5 TABLET ORAL at 09:06

## 2022-01-01 RX ADMIN — HYDROXYZINE HYDROCHLORIDE 10 MG: 10 TABLET ORAL at 01:27

## 2022-01-01 RX ADMIN — FUROSEMIDE 40 MG: 20 TABLET ORAL at 08:34

## 2022-01-01 RX ADMIN — WATER 1000 MG: 1 INJECTION INTRAMUSCULAR; INTRAVENOUS; SUBCUTANEOUS at 16:23

## 2022-01-01 RX ADMIN — LEVOTHYROXINE SODIUM 125 MCG: 0.12 TABLET ORAL at 06:47

## 2022-01-01 RX ADMIN — LORAZEPAM 0.5 MG: 2 INJECTION INTRAMUSCULAR; INTRAVENOUS at 18:56

## 2022-01-01 RX ADMIN — WATER 1000 MG: 1 INJECTION INTRAMUSCULAR; INTRAVENOUS; SUBCUTANEOUS at 14:26

## 2022-01-01 RX ADMIN — LEVALBUTEROL HYDROCHLORIDE 0.63 MG: 0.63 SOLUTION RESPIRATORY (INHALATION) at 21:28

## 2022-01-01 RX ADMIN — METOPROLOL TARTRATE 5 MG: 5 INJECTION, SOLUTION INTRAVENOUS at 18:37

## 2022-01-01 RX ADMIN — LEVALBUTEROL HYDROCHLORIDE 0.63 MG: 0.63 SOLUTION RESPIRATORY (INHALATION) at 08:05

## 2022-01-01 RX ADMIN — DEXTROSE MONOHYDRATE: 50 INJECTION, SOLUTION INTRAVENOUS at 17:23

## 2022-01-01 RX ADMIN — METOPROLOL TARTRATE 5 MG: 5 INJECTION, SOLUTION INTRAVENOUS at 20:09

## 2022-01-01 RX ADMIN — DIVALPROEX SODIUM 500 MG: 125 CAPSULE, COATED PELLETS ORAL at 09:42

## 2022-01-01 RX ADMIN — Medication 10 ML: at 09:06

## 2022-01-01 RX ADMIN — METOPROLOL SUCCINATE 50 MG: 50 TABLET, EXTENDED RELEASE ORAL at 20:48

## 2022-01-01 RX ADMIN — SODIUM CHLORIDE, PRESERVATIVE FREE 100 UNITS: 5 INJECTION INTRAVENOUS at 20:07

## 2022-01-01 RX ADMIN — ESCITALOPRAM 10 MG: 10 TABLET, FILM COATED ORAL at 15:27

## 2022-01-01 RX ADMIN — POTASSIUM CHLORIDE 10 MEQ: 7.45 INJECTION INTRAVENOUS at 13:15

## 2022-01-01 RX ADMIN — METOPROLOL TARTRATE 5 MG: 1 INJECTION, SOLUTION INTRAVENOUS at 12:43

## 2022-01-01 RX ADMIN — ENOXAPARIN SODIUM 40 MG: 40 INJECTION SUBCUTANEOUS at 09:58

## 2022-01-01 RX ADMIN — METOPROLOL TARTRATE 5 MG: 5 INJECTION, SOLUTION INTRAVENOUS at 11:08

## 2022-01-01 RX ADMIN — ESCITALOPRAM OXALATE 10 MG: 10 TABLET ORAL at 08:52

## 2022-01-01 RX ADMIN — ESCITALOPRAM 10 MG: 10 TABLET, FILM COATED ORAL at 09:59

## 2022-01-01 RX ADMIN — METOPROLOL TARTRATE 5 MG: 1 INJECTION, SOLUTION INTRAVENOUS at 05:30

## 2022-01-01 ASSESSMENT — ENCOUNTER SYMPTOMS
EYE PAIN: 0
SINUS PRESSURE: 0
STRIDOR: 0
COUGH: 1
BACK PAIN: 0
SHORTNESS OF BREATH: 1
DIARRHEA: 0
CONSTIPATION: 0
SHORTNESS OF BREATH: 0
SORE THROAT: 0
BLOOD IN STOOL: 0
SORE THROAT: 0
WHEEZING: 1
ABDOMINAL PAIN: 0
NAUSEA: 0
CHEST TIGHTNESS: 0
ABDOMINAL PAIN: 0
PHOTOPHOBIA: 0
BACK PAIN: 0
VOMITING: 0
RHINORRHEA: 0
VOMITING: 0
DIARRHEA: 1
CHOKING: 0
NAUSEA: 0

## 2022-01-01 ASSESSMENT — PAIN SCALES - GENERAL
PAINLEVEL_OUTOF10: 0
PAINLEVEL_OUTOF10: 5
PAINLEVEL_OUTOF10: 0
PAINLEVEL_OUTOF10: 4
PAINLEVEL_OUTOF10: 0
PAINLEVEL_OUTOF10: 3
PAINLEVEL_OUTOF10: 10
PAINLEVEL_OUTOF10: 2
PAINLEVEL_OUTOF10: 0
PAINLEVEL_OUTOF10: 8
PAINLEVEL_OUTOF10: 0
PAINLEVEL_OUTOF10: 0
PAINLEVEL_OUTOF10: 4
PAINLEVEL_OUTOF10: 0
PAINLEVEL_OUTOF10: 5

## 2022-01-01 ASSESSMENT — PAIN SCALES - WONG BAKER
WONGBAKER_NUMERICALRESPONSE: 0

## 2022-01-01 ASSESSMENT — PAIN DESCRIPTION - ORIENTATION: ORIENTATION: LEFT

## 2022-01-01 ASSESSMENT — PAIN - FUNCTIONAL ASSESSMENT: PAIN_FUNCTIONAL_ASSESSMENT: 0-10

## 2022-01-01 ASSESSMENT — PAIN DESCRIPTION - LOCATION: LOCATION: SHOULDER

## 2022-01-01 ASSESSMENT — PAIN DESCRIPTION - DESCRIPTORS: DESCRIPTORS: ACHING

## 2022-01-01 ASSESSMENT — PAIN DESCRIPTION - PAIN TYPE: TYPE: CHRONIC PAIN

## 2022-01-01 ASSESSMENT — PAIN DESCRIPTION - FREQUENCY: FREQUENCY: CONTINUOUS

## 2022-01-04 PROBLEM — J96.01 ACUTE HYPOXEMIC RESPIRATORY FAILURE DUE TO COVID-19 (HCC): Status: ACTIVE | Noted: 2022-01-01

## 2022-01-04 PROBLEM — F09 COGNITIVE DYSFUNCTION: Status: ACTIVE | Noted: 2022-01-01

## 2022-01-04 PROBLEM — U07.1 ACUTE HYPOXEMIC RESPIRATORY FAILURE DUE TO COVID-19 (HCC): Status: ACTIVE | Noted: 2022-01-01

## 2022-01-04 PROBLEM — N20.0 NEPHROLITHIASIS: Status: ACTIVE | Noted: 2022-01-01

## 2022-01-04 NOTE — ED PROVIDER NOTES
[facility] [place] [facility]  Department of Emergency Medicine     Written by: Abner Mcghee DO  Patient Name: Kellee Spencer  Attending Provider: Clarissa Salvador DO  Admit Date: 2022  1:58 PM  MRN: [id number]                   : 1935        Chief Complaint   Patient presents with    Positive For Covid-19     increased weakness, positive for coivd on     Fatigue    - Chief complaint    Kellee Spencer is 81 y.o. female from [facility] who presents after being found COVID-19 positive. She is vaccinated against COVID-19. Patient received Moderna vaccine x2 with the last dose being in 2021. She has been having more fatigue and weakness for the last few days. Denies any known sick contacts. She is hard of hearing. Other complaints include diarrhea and sleep disturbance. Patient notes she has been having trouble falling sleeping. She also reports generalized weakness and lightheadedness. Denies any falls or loss of consciousness. She also complains of dysuria for the last few days. Denies any frequency or urgency. Patient also notes her lower extremities have been more swollen than usual.  Denies any chest pain or shortness of breath. Review of Systems   Constitutional: Negative for chills, diaphoresis, fatigue and fever. HENT: Positive for congestion and hearing loss. Negative for rhinorrhea, sinus pressure, sneezing and sore throat. Eyes: Negative for photophobia and visual disturbance. Respiratory: Positive for cough, shortness of breath and wheezing. Negative for choking, chest tightness and stridor. Cardiovascular: Positive for leg swelling. Negative for chest pain and palpitations. Gastrointestinal: Positive for diarrhea. Negative for abdominal pain, blood in stool, constipation, nausea and vomiting. Endocrine: Negative for polyphagia and polyuria. Genitourinary: Positive for dysuria.  Negative for flank pain, frequency, hematuria and urgency. Musculoskeletal: Positive for arthralgias and myalgias. Negative for back pain, joint swelling, neck pain and neck stiffness. Skin: Negative for rash and wound. Neurological: Positive for light-headedness. Negative for dizziness, tremors, syncope, weakness, numbness and headaches. Psychiatric/Behavioral: Positive for sleep disturbance. Negative for agitation, behavioral problems, confusion and hallucinations. The patient is not nervous/anxious. Physical Exam  Constitutional:       General: She is not in acute distress. Appearance: She is obese. She is not ill-appearing, toxic-appearing or diaphoretic. HENT:      Head: Normocephalic and atraumatic. Right Ear: External ear normal.      Left Ear: External ear normal.      Ears:      Comments: [place] b/l     Nose: Nose normal.      Mouth/Throat:      Mouth: Mucous membranes are moist.      Pharynx: No oropharyngeal exudate or posterior oropharyngeal erythema. Eyes:      Extraocular Movements: Extraocular movements intact. Conjunctiva/sclera: Conjunctivae normal.      Pupils: Pupils are equal, round, and reactive to light. Cardiovascular:      Rate and Rhythm: Normal rate and regular rhythm. Pulses: Normal pulses. Heart sounds: Normal heart sounds. No murmur heard. No friction rub. No gallop. Comments: W/ PVCs on telemetry    Pulmonary:      Effort: Pulmonary effort is normal. No respiratory distress. Breath sounds: No stridor. Wheezing and rhonchi present. No rales. Chest:      Chest wall: No tenderness. Abdominal:      General: Abdomen is flat. There is no distension. Palpations: Abdomen is soft. There is no mass. Tenderness: There is no abdominal tenderness. There is no guarding. Hernia: No hernia is present. Musculoskeletal:         General: Normal range of motion. Cervical back: Normal range of motion and neck supple. Right lower leg: Edema present.       Left lower leg: Edema present. Comments: +1 pitting edema BLE up to knees. Skin:     General: Skin is warm and dry. Capillary Refill: Capillary refill takes less than 2 seconds. Coloration: Skin is pale. Findings: No erythema or rash. Neurological:      General: No focal deficit present. Mental Status: She is alert and oriented to person, place, and time. Sensory: No sensory deficit. Motor: Weakness present. Coordination: Coordination normal.   Psychiatric:         Mood and Affect: Mood normal.         Behavior: Behavior normal.         Thought Content: Thought content normal.         Judgment: Judgment normal.          Procedures       MDM  Number of Diagnoses or Management Options  Diagnosis management comments:   Wheezing and rhonchi, likely secondary to pneumonia, bacterial versus viral versus both  COVID-19 positive in nursing facility. We will recheck COVID-19 and influenza. Vital signs currently stable. Check respiratory culture and Gram stain. Follow CBC and CMP    Dysuria  Check UA    Tachycardia  EKG 12-lead  Given positive for COVID-19 infection, will check CTA pulmonary to rule out PE    LE edema BLE   Check proBNP              Amount and/or Complexity of Data Reviewed  Clinical lab tests: ordered    Patient Progress  Patient progress: stable     ED Course as of 01/11/22 2322 Tue Jan 04, 2022 2022 Patient desatted down into the mid to upper 80s. Is low as 86%. Has Covid pneumonia, also has urinary tract infection. Will give Decadron in addition to the Rocephin for the UTI. [SO]      ED Course User Index  [SO] Devante Burns DO      ED Course as of 01/11/22 2322 Tue Jan 04, 2022 2022 Patient desatted down into the mid to upper [de-identified]. Is low as 86%. Has Covid pneumonia, also has urinary tract infection.   Will give Decadron in addition to the Rocephin for the UTI. [SO]      ED Course User Index  [SO] Devante Burns DO --------------------------------------------- PAST HISTORY ---------------------------------------------  Past Medical History:  has a past medical history of Anticoagulated on Coumadin, Atrial fibrillation ([facility] [address] Basal cell carcinoma of neck, CAD (coronary artery disease), CHF (congestive heart failure) ([facility] [address] Depression, DJD (degenerative joint disease), GERD (gastroesophageal reflux disease), History of cardiovascular stress test, [facility] Hypertension, Hypothyroidism, Mild mitral regurgitation, Osteoarthritis, Pulmonary hypertension ([facility] Renal calculi, Tricuspid regurgitation, and Urinary incontinence. Past Surgical History:  has a past surgical history that includes Thyroidectomy (1960); Kidney stone surgery (1972); Cholecystectomy (1972); Kidney stone surgery (1981); Cataract removal (6/2005); Incontinence surgery (5/2005); Thyroid surgery (5/2005); parathyroidectomy; Knee Arthroplasty (Bilateral, 9/2005); Total shoulder arthroplasty (Left, 2009); fracture surgery (Left); Carpal tunnel release; Finger surgery (7/2011); Total shoulder arthroplasty (Left); Inner ear surgery (2011); Cataract removal with implant (Bilateral); Colonoscopy; and Cardiac catheterization (3/22/14). Social History:  reports that she has never smoked. She has never used smokeless tobacco. She reports previous alcohol use. She reports that she does not use drugs. Family History: family history includes Arthritis in her mother; Cancer in her brother and son; Diabetes in her father; Heart Attack in her mother; Heart Disease in her brother, brother, father, mother, and sister; Heart Surgery in her brother; Mental Illness in her father. The patients home medications have been reviewed.     Allergies: Codeine, Penicillins, Vicodin [hydrocodone-acetaminophen], and Adhesive tape    -------------------------------------------------- RESULTS -------------------------------------------------    LABS:  Results for orders placed or performed during the hospital encounter of 01/04/22   Culture, Respiratory    Specimen: Sputum Expectorated   Result Value Ref Range    CULTURE, RESPIRATORY Oral Pharyngeal Donna present     Smear, Respiratory Refer to ordered Gram stain for results    GRAM STAIN    Specimen: Lung   Result Value Ref Range    Gram Stain Orderable       Group 3: >25 PMN's/LPF and >10 Epithelial cells/LPF  Moderate Polymorphonuclear leukocytes  Moderate Epithelial cells  Few Gram negative rods  Rare Gram positive diplococci  Rare Gram positive rods     COVID-19 & Influenza Combo    Specimen: Nasopharyngeal Swab   Result Value Ref Range    SARS-CoV-2 RNA, RT PCR DETECTED (A) NOT DETECTED    INFLUENZA A NOT DETECTED NOT DETECTED    INFLUENZA B NOT DETECTED NOT DETECTED   Culture, Blood 2    Specimen: Blood   Result Value Ref Range    Culture, Blood 2 5 Days no growth    Culture, Blood 1    Specimen: Blood   Result Value Ref Range    Blood Culture, Routine 5 Days no growth    Culture, Urine    Specimen: Urine, clean catch   Result Value Ref Range    Urine Culture, Routine <10,000 CFU/mL  Gram negative rods   (A)     Organism Staphylococcus epidermidis (A)     Urine Culture, Routine >100,000 CFU/ml        Susceptibility    Staphylococcus epidermidis - BACTERIAL SUSCEPTIBILITY PANEL BY JOVANY     DAPTOmycin ^0. 5 Sensitive mcg/mL     doxycycline ^1 Sensitive mcg/mL     nitrofurantoin <=^16 Sensitive mcg/mL     oxacillin >=^4 Resistant mcg/mL     trimethoprim-sulfamethoxazole ^20 Sensitive mcg/mL     vancomycin ^1 Sensitive mcg/mL   LEGIONELLA ANTIGEN, URINE    Specimen: Urine, clean catch   Result Value Ref Range    L. pneumophila Serogp 1 Ur Ag       Presumptive Negative -suggesting no recent or current infections  with Legionella pneumophila serogroup 1.   Infection to Legionella cannot be ruled out since other serogroups  and species may cause infection, antigen may not be present in  early infection, or level of antigen may be below the  detection limit. Normal Range: Presumptive Negative     STREP PNEUMONIAE ANTIGEN    Specimen: Urine, clean catch   Result Value Ref Range    STREP PNEUMONIAE ANTIGEN, URINE       Presumptive negative- suggests no current or recent  pneumococcal infection.   Infection due to Strep pneumoniae cannot be  ruled out since the antigen present in the sample  may be below the detection limit of the test.  Normal Range:Presumptive Negative     CBC Auto Differential   Result Value Ref Range    WBC 8.7 4.5 - 11.5 E9/L    RBC 4.26 3.50 - 5.50 E12/L    Hemoglobin 12.8 11.5 - 15.5 g/dL    Hematocrit 38.8 34.0 - 48.0 %    MCV 91.1 80.0 - 99.9 fL    MCH 30.0 26.0 - 35.0 pg    MCHC 33.0 32.0 - 34.5 %    RDW 14.7 11.5 - 15.0 fL    Platelets 446 885 - 966 E9/L    MPV 10.2 7.0 - 12.0 fL    Neutrophils % 86.1 (H) 43.0 - 80.0 %    Lymphocytes % 6.9 (L) 20.0 - 42.0 %    Monocytes % 7.0 2.0 - 12.0 %    Eosinophils % 0.0 0.0 - 6.0 %    Basophils % 0.2 0.0 - 2.0 %    Neutrophils Absolute 7.48 (H) 1.80 - 7.30 E9/L    Lymphocytes Absolute 0.61 (L) 1.50 - 4.00 E9/L    Monocytes Absolute 0.61 0.10 - 0.95 E9/L    Eosinophils Absolute 0.00 (L) 0.05 - 0.50 E9/L    Basophils Absolute 0.00 0.00 - 0.20 E9/L    Anisocytosis 1+     Poikilocytes 2+     Ovalocytes 2+    Comprehensive Metabolic Panel w/ Reflex to MG   Result Value Ref Range    Sodium 140 132 - 146 mmol/L    Potassium reflex Magnesium 3.9 3.5 - 5.0 mmol/L    Chloride 104 98 - 107 mmol/L    CO2 26 22 - 29 mmol/L    Anion Gap 10 7 - 16 mmol/L    Glucose 121 (H) 74 - 99 mg/dL    BUN 24 (H) 6 - 23 mg/dL    CREATININE 0.9 0.5 - 1.0 mg/dL    GFR Non-African American 59 >=60 mL/min/1.73    GFR African American >60     Calcium 9.5 8.6 - 10.2 mg/dL    Total Protein 7.3 6.4 - 8.3 g/dL    Albumin 4.1 3.5 - 5.2 g/dL    Total Bilirubin 0.4 0.0 - 1.2 mg/dL    Alkaline Phosphatase 71 35 - 104 U/L    ALT 21 0 - 32 U/L    AST 32 (H) 0 - 31 U/L   Urinalysis, reflex to microscopic   Result Value Ref Range    Color, UA Yellow Straw/Yellow    Clarity, UA Clear Clear    Glucose, Ur Negative Negative mg/dL    Bilirubin Urine Negative Negative    Ketones, Urine Negative Negative mg/dL    Specific Gravity, UA 1.020 1.005 - 1.030    Blood, Urine LARGE (A) Negative    pH, UA 6.0 5.0 - 9.0    Protein, UA 30 (A) Negative mg/dL    Urobilinogen, Urine 0.2 <2.0 E.U./dL    Nitrite, Urine POSITIVE (A) Negative    Leukocyte Esterase, Urine TRACE (A) Negative   Brain Natriuretic Peptide   Result Value Ref Range    Pro-BNP 3,232 (H) 0 - 450 pg/mL   Microscopic Urinalysis   Result Value Ref Range    WBC, UA 2-5 0 - 5 /HPF    RBC, UA 10-20 (A) 0 - 2 /HPF    Bacteria, UA MANY (A) None Seen /HPF   Lactate, Sepsis   Result Value Ref Range    Lactic Acid, Sepsis 0.7 0.5 - 1.9 mmol/L   Procalcitonin   Result Value Ref Range    Procalcitonin 0.16 (H) 0.00 - 0.08 ng/mL   TSH WITHOUT REFLEX   Result Value Ref Range    TSH 1.890 0.270 - 4.200 uIU/mL   T4, FREE   Result Value Ref Range    T4 Free 1.02 0.93 - 1.70 ng/dL   C-REACTIVE PROTEIN   Result Value Ref Range    CRP 8.7 (H) 0.0 - 0.4 mg/dL   D-dimer, quantitative   Result Value Ref Range    D-Dimer, Quant 503 ng/mL DDU   CK   Result Value Ref Range    Total CK 92 20 - 180 U/L   CBC WITH AUTO DIFFERENTIAL   Result Value Ref Range    WBC 7.8 4.5 - 11.5 E9/L    RBC 4.06 3.50 - 5.50 E12/L    Hemoglobin 12.2 11.5 - 15.5 g/dL    Hematocrit 37.6 34.0 - 48.0 %    MCV 92.6 80.0 - 99.9 fL    MCH 30.0 26.0 - 35.0 pg    MCHC 32.4 32.0 - 34.5 %    RDW 14.6 11.5 - 15.0 fL    Platelets 041 (L) 493 - 450 E9/L    MPV 10.2 7.0 - 12.0 fL    Neutrophils % 93.9 (H) 43.0 - 80.0 %    Lymphocytes % 4.4 (L) 20.0 - 42.0 %    Monocytes % 1.7 (L) 2.0 - 12.0 %    Eosinophils % 0.0 0.0 - 6.0 %    Basophils % 0.3 0.0 - 2.0 %    Neutrophils Absolute 7.33 (H) 1.80 - 7.30 E9/L    Lymphocytes Absolute 0.31 (L) 1.50 - 4.00 E9/L    Monocytes Absolute 0.16 0.10 - 0.95 E9/L    Eosinophils Absolute 0.00 (L) 0.05 - 0.50 E9/L    Basophils Absolute 0.00 0.00 - 0.20 E9/L    Polychromasia 1+     Poikilocytes 1+     Ovalocytes 1+    Protime-INR   Result Value Ref Range    Protime 20.4 (H) 9.3 - 12.4 sec    INR 1.8    APTT   Result Value Ref Range    aPTT 32.5 24.5 - 35.1 sec   Fibrinogen   Result Value Ref Range    Fibrinogen 669 (H) 225 - 540 mg/dL   SEDIMENTATION RATE   Result Value Ref Range    Sed Rate 38 (H) 0 - 20 mm/Hr   Comprehensive Metabolic Panel w/ Reflex to MG   Result Value Ref Range    Sodium 141 132 - 146 mmol/L    Potassium reflex Magnesium 3.8 3.5 - 5.0 mmol/L    Chloride 106 98 - 107 mmol/L    CO2 24 22 - 29 mmol/L    Anion Gap 11 7 - 16 mmol/L    Glucose 155 (H) 74 - 99 mg/dL    BUN 18 6 - 23 mg/dL    CREATININE 0.7 0.5 - 1.0 mg/dL    GFR Non-African American >60 >=60 mL/min/1.73    GFR African American >60     Calcium 8.7 8.6 - 10.2 mg/dL    Total Protein 6.5 6.4 - 8.3 g/dL    Albumin 3.4 (L) 3.5 - 5.2 g/dL    Total Bilirubin 0.4 0.0 - 1.2 mg/dL    Alkaline Phosphatase 61 35 - 104 U/L    ALT 16 0 - 32 U/L    AST 27 0 - 31 U/L   Lactic acid, plasma   Result Value Ref Range    Lactic Acid 0.7 0.5 - 2.2 mmol/L   CBC auto differential   Result Value Ref Range    WBC 6.8 4.5 - 11.5 E9/L    RBC 4.00 3.50 - 5.50 E12/L    Hemoglobin 12.0 11.5 - 15.5 g/dL    Hematocrit 36.7 34.0 - 48.0 %    MCV 91.8 80.0 - 99.9 fL    MCH 30.0 26.0 - 35.0 pg    MCHC 32.7 32.0 - 34.5 %    RDW 14.6 11.5 - 15.0 fL    Platelets 244 (L) 502 - 450 E9/L    MPV 10.6 7.0 - 12.0 fL    Neutrophils % 90.4 (H) 43.0 - 80.0 %    Lymphocytes % 6.1 (L) 20.0 - 42.0 %    Monocytes % 2.6 2.0 - 12.0 %    Eosinophils % 0.0 0.0 - 6.0 %    Basophils % 0.1 0.0 - 2.0 %    Neutrophils Absolute 6.19 1.80 - 7.30 E9/L    Lymphocytes Absolute 0.41 (L) 1.50 - 4.00 E9/L    Monocytes Absolute 0.20 0.10 - 0.95 E9/L    Eosinophils Absolute 0.00 (L) 0.05 - 0.50 E9/L    Basophils Absolute 0.00 0.00 - 0.20 E9/L    Metamyelocytes Relative 0.9 0.0 - 1.0 %    Poikilocytes 1+ [place] Cells 1+     Ovalocytes 1+    SPECIMEN REJECTION   Result Value Ref Range    Rejected Test CBCND     Reason for Rejection see below    FERRITIN   Result Value Ref Range    Ferritin 312 ng/mL   Comprehensive Metabolic Panel w/ Reflex to MG   Result Value Ref Range    Sodium 142 132 - 146 mmol/L    Potassium reflex Magnesium 4.1 3.5 - 5.0 mmol/L    Chloride 109 (H) 98 - 107 mmol/L    CO2 23 22 - 29 mmol/L    Anion Gap 10 7 - 16 mmol/L    Glucose 138 (H) 74 - 99 mg/dL    BUN 27 (H) 6 - 23 mg/dL    CREATININE 0.8 0.5 - 1.0 mg/dL    GFR Non-African American >60 >=60 mL/min/1.73    GFR African American >60     Calcium 8.7 8.6 - 10.2 mg/dL    Total Protein 6.1 (L) 6.4 - 8.3 g/dL    Albumin 3.1 (L) 3.5 - 5.2 g/dL    Total Bilirubin 0.3 0.0 - 1.2 mg/dL    Alkaline Phosphatase 57 35 - 104 U/L    ALT 14 0 - 32 U/L    AST 23 0 - 31 U/L   CBC auto differential   Result Value Ref Range    WBC 5.7 4.5 - 11.5 E9/L    RBC 4.08 3.50 - 5.50 E12/L    Hemoglobin 12.2 11.5 - 15.5 g/dL    Hematocrit 38.7 34.0 - 48.0 %    MCV 94.9 80.0 - 99.9 fL    MCH 29.9 26.0 - 35.0 pg    MCHC 31.5 (L) 32.0 - 34.5 %    RDW 14.4 11.5 - 15.0 fL    Platelets 389 (L) 458 - 450 E9/L    MPV 11.0 7.0 - 12.0 fL    Neutrophils % 86.1 (H) 43.0 - 80.0 %    Lymphocytes % 7.8 (L) 20.0 - 42.0 %    Monocytes % 5.2 2.0 - 12.0 %    Eosinophils % 0.0 0.0 - 6.0 %    Basophils % 0.2 0.0 - 2.0 %    Neutrophils Absolute 4.96 1.80 - 7.30 E9/L    Lymphocytes Absolute 0.46 (L) 1.50 - 4.00 E9/L    Monocytes Absolute 0.28 0.10 - 0.95 E9/L    Eosinophils Absolute 0.00 (L) 0.05 - 0.50 E9/L    Basophils Absolute 0.00 0.00 - 0.20 E9/L    Myelocyte Percent 0.9 0 - 0 %    Poikilocytes 2+     [place] Cells 1+     Ovalocytes 2+    FERRITIN   Result Value Ref Range    Ferritin 411 ng/mL   PROTIME-INR   Result Value Ref Range    Protime 25.1 (H) 9.3 - 12.4 sec    INR 2.2    EKG 12 Lead   Result Value Ref Range    Ventricular Rate 107 BPM    Atrial Rate 105 BPM    QRS Duration 102 ms Q-T Interval 342 ms    QTc Calculation (Bazett) 456 ms    R Axis 34 degrees    T Axis 58 degrees       RADIOLOGY:  CT ABDOMEN PELVIS WO CONTRAST   Final Result   Evaluation somewhat limited due to excreted contrast material within the   bilateral collecting systems fluid to recent CTA. Bilateral nonobstructing   nephrolithiasis. No hydronephrosis. Bilateral lower lobe ground-glass and consolidative opacities, compatible   with multifocal pneumonia. RECOMMENDATIONS:   Unavailable         CTA PULMONARY W CONTRAST   Final Result   1. No evidence of pulmonary embolism. Suboptimal evaluation of the   peripheral pulmonary arteries. 2. Pulmonary infiltrates in the middle lobe and bilateral lower lobes,   consistent with pneumonia. 3. Cardiomegaly. 4. Nephrolithiasis. RECOMMENDATIONS:   Unavailable                 ------------------------- NURSING NOTES AND VITALS REVIEWED ---------------------------  Date / Time Roomed:  1/4/2022  1:58 PM  ED Bed Assignment:  [id number]    The nursing notes within the ED encounter and vital signs as below have been reviewed. No data found. Oxygen Saturation Interpretation: Abnormal and Improved after treatment    ------------------------------------------ PROGRESS NOTES ------------------------------------------  Re-evaluation(s):  Patients symptoms are improving  Repeat physical examination is improved    Counseling:  I have spoken with the patient and discussed todays results, in addition to providing specific details for the plan of care and counseling regarding the diagnosis and prognosis. Their questions are answered at this time and they are agreeable with the plan of admission.    --------------------------------- ADDITIONAL PROVIDER NOTES ---------------------------------  Consultations:   Spoke with admitting team.  Discussed case. They will admit the patient.   This patient's ED course included: a personal history and physicial examination, re-evaluation prior to disposition and multiple bedside re-evaluations    This patient has remained hemodynamically stable during their ED course. Diagnosis:  1. Acute hypoxemic respiratory failure due to COVID-19 ([address]    2. Acute cystitis without hematuria        Disposition:  Patient's disposition: Admit to telemetry  Patient's condition is stable. Patient was seen and evaluated by myself and my attending Omid Magana DO. Assessment and Plan discussed with attending provider, please see attestation for final plan of care. Nathan Joshua DO PGY-2      Nathan Joshua DO  Resident  01/04/22 1520      ATTENDING PROVIDER ATTESTATION:     Cleve Rodriguez presented to the emergency department for evaluation of Positive For Covid-19 (increased weakness, positive for coivd on January 1st) and Fatigue   and was initially evaluated by the Medical Resident. See Original ED Note for H&P and ED course above. I have reviewed and discussed the case, including pertinent history (medical, surgical, family and social) and exam findings with the Medical Resident assigned to Cleve Rodriguez. I have personally performed and/or participated in the history, exam, medical decision making, and procedures and agree with all pertinent clinical information. I, Dr. Vivek Maldonado, am the primary provider of record       I have reviewed my findings and recommendations with the assigned Medical Resident, Cleve Rodriguez and members of family present at the time of disposition. My findings/plan: The primary encounter diagnosis was Acute hypoxemic respiratory failure due to COVID-19 [facility]). A diagnosis of Acute cystitis without hematuria was also pertinent to this visit.   Discharge Medication List as of 1/6/2022  3:56 PM        DO Omid Weaver DO  01/11/22 [id number]

## 2022-01-04 NOTE — ED NOTES
Bed: 38  Expected date:   Expected time:   Means of arrival:   Comments:     Michela Broderick LPN  81/74/29 [id number]

## 2022-01-05 NOTE — PROGRESS NOTES
Pharmacy Consultation Note  (Anticoagulant Dosing and Monitoring)    Initial consult date:   Consulting physician:    Allergies:  Codeine, Penicillins, Vicodin [hydrocodone-acetaminophen], and Adhesive tape    81 y.o. female; 167.6 cm, 110.2 kg  Warfarin Indication Target   INR Range Home Dose  (if applicable) Diet/Feeding Tube   AF 1 - 3 10 mg daily (?) 1/5:AAT       Warfarin drug-drug interactions  Start  Stop Home Med? Comments                   Hepatic Function Panel:                            Lab Results   Component Value Date    ALKPHOS 61 01/05/2022    ALT 16 01/05/2022    AST 27 01/05/2022    PROT 6.5 01/05/2022    BILITOT 0.4 01/05/2022    BILIDIR 0.3 08/04/2014    IBILI 0.6 08/04/2014    LABALBU 3.4 01/05/2022    LABALBU 4.4 02/14/2012     Date Warfarin Dose INR Heparin or LMWH HBG/HCT PLT Comment   1/5 10 mg  1.8 X 12/36.7 116                                          Assessment:  · 87 yo/F, 167.6 cm, 110.2 kg admitted for fatigue and SOB; found to be +COVID. · On warfarin 10 mg daily PTA for Hx of AF. Surprisingly unexpected high warfarin dose for this 87 yo/F. Plan:  · Give warfarin 10 mg tonight. · Daily PT/INR until the INR is stable within the therapeutic range. · Pharmacist will follow and monitor/adjust dosing as necessary.     SHAYNA Sargent [facility] PharmD 1/5/2022 2:20 PM

## 2022-01-05 NOTE — H&P
[facility] - Family [facility] Resident Inpatient  History and Physical      CC: Positive For Covid-19 (increased weakness, positive for coivd on January 1st) and Fatigue      HPI: History obtained from EMR. Alexus Poe is a 81 y.o. female with a PMH of cognitive deficits, diastolic heart failure (Echo 2020: EF 55-60%), permanent a fib (on coumadin), PFO (Echo 2020), COPD, Hypothyroidism, nephrolithiasis with surgery who presents to the ED for a positive COVID-19 test, fatigue and, weakness. Patient has cognitive deficit at baseline and is unable to provide a history at bedside. Patient was demonstrating increased weakness, fatigue at the facility she stays at [facility]) and was tested for COVID-19 (January 1st) tested positive. Patient's daughter was concerned and asked for patient to be sent to ER for evaluation. Patient's CODE Status was changed to [facility] recently. As per chart check patient did not demonstrate symptoms of chest pain, shortness of breath, cough, fevers/chills, n/v, appetite loss. She presented to the ER today and was saturating on RA breathing comfortable. During her ER course patient desaturated after several hours of being in the ER to O2 saturation 80s and was placed on 2 L NC. She has been stable on 2 L Nasal Cannula for remainder of ER course. As per report she was also showing signs of increased agitation and given a dose of ativan. She has been vaccinated against COVID-19 (2x doses). Patient's  (POA) and daughter ([facility]) were contacted but phone went to voicemail. Code Status is DNR-CCA    ED Course: The patient was closely monitored. O2 desaturated to 80s and was placed on NC. Labs Pro-BNP: 3,232 UA: 10-20 RBC +nitrite +blood +protein COVID-19 PCR positive  Imaging was *CTA Pulmonary- infiltrate middle and bilateral lower lobes, cardiomegaly, nephrolithiasis  EKG: afib with RVR rate 107 bpm Patient was given decadron rocephin ativan .    Pt admitted for Hypoxic Respiratory Failure 2/2 COVID-19, Afib with RVR      PMH:  has a past medical history of Anticoagulated on Coumadin, Atrial fibrillation ([address] Basal cell carcinoma of neck, CAD (coronary artery disease), CHF (congestive heart failure) ([address] Depression, DJD (degenerative joint disease), GERD (gastroesophageal reflux disease), History of cardiovascular stress test, Hyperparathyroidism ([address] Hypertension, Hypothyroidism, Mild mitral regurgitation, Osteoarthritis, Pulmonary hypertension ([address] Renal calculi, Tricuspid regurgitation, and Urinary incontinence. PSH:  has a past surgical history that includes Thyroidectomy (1960); Kidney stone surgery (1972); Cholecystectomy (1972); Kidney stone surgery (1981); Cataract removal (6/2005); Incontinence surgery (5/2005); Thyroid surgery (5/2005); parathyroidectomy; Knee Arthroplasty (Bilateral, 9/2005); Total shoulder arthroplasty (Left, 2009); fracture surgery (Left); Carpal tunnel release; Finger surgery (7/2011); Total shoulder arthroplasty (Left); Inner ear surgery (2011); Cataract removal with implant (Bilateral); Colonoscopy; and Cardiac catheterization (3/22/14). FH: family history includes Arthritis in her mother; Cancer in her brother and son; Diabetes in her father; Heart Attack in her mother; Heart Disease in her brother, brother, father, mother, and sister; Heart Surgery in her brother; Mental Illness in her father. Social:  reports that she has never smoked. She has never used smokeless tobacco. She reports previous alcohol use. She reports that she does not use drugs. Allergies: Allergies   Allergen Reactions    Codeine Other (See Comments)     Hallucination    Penicillins Hives    Vicodin [Hydrocodone-Acetaminophen] Other (See Comments)     Hallucination      Adhesive Tape Rash        Home Medications:   No current facility-administered medications on file prior to encounter.      Current Outpatient Medications on File Prior to Encounter daily as needed for Congestion      [DISCONTINUED] levothyroxine (SYNTHROID) 125 MCG tablet Take 1 tablet by mouth every morning (before breakfast) 24 tablet 2       ROS:   Could not assess as patient obtundedI  Const: Fatigue     PE:  Blood pressure (!) 141/99, pulse 102, temperature 102.1 °F (38.9 °C), resp. rate 16, weight 243 lb (110.2 kg), SpO2 96 %, not currently breastfeeding. Physical Exam  Constitutional:       General: She is not in acute distress. Appearance: She is not ill-appearing, toxic-appearing or diaphoretic. Interventions: Nasal cannula in place. HENT:      Head: Normocephalic and atraumatic. Neck:      Thyroid: No thyromegaly. Vascular: No JVD. Trachea: Trachea normal.   Cardiovascular:      Rate and Rhythm: Regular rhythm. Heart sounds: Normal heart sounds, S1 normal and S2 normal. No gallop. Pulmonary:      Effort: Pulmonary effort is normal. No accessory muscle usage, prolonged expiration, respiratory distress or retractions. Breath sounds: Normal air entry. Examination of the right-lower field reveals wheezing. Examination of the left-lower field reveals wheezing. Wheezing present. No rhonchi or rales. Chest:      Chest wall: No lacerations or deformity. Abdominal:      General: Abdomen is flat. Bowel sounds are normal.      Palpations: Abdomen is soft. Tenderness: There is no abdominal tenderness. Lymphadenopathy:      Cervical: No cervical adenopathy. Skin:     General: Skin is dry. Capillary Refill: Capillary refill takes 2 to 3 seconds. Coloration: Skin is not ashen, cyanotic, jaundiced or mottled. Findings: No abrasion, bruising or ecchymosis. Neurological:      Mental Status: She is lethargic and confused. Psychiatric:         Behavior: Behavior is uncooperative. Cognition and Memory: Cognition is impaired.            Labs:   Results for orders placed or performed during the hospital encounter of 01/04/22 COVID-19 & Influenza Combo    Specimen: Nasopharyngeal Swab   Result Value Ref Range    SARS-CoV-2 RNA, RT PCR DETECTED (A) NOT DETECTED    INFLUENZA A NOT DETECTED NOT DETECTED    INFLUENZA B NOT DETECTED NOT DETECTED   CBC Auto Differential   Result Value Ref Range    WBC 8.7 4.5 - 11.5 E9/L    RBC 4.26 3.50 - 5.50 E12/L    Hemoglobin 12.8 11.5 - 15.5 g/dL    Hematocrit 38.8 34.0 - 48.0 %    MCV 91.1 80.0 - 99.9 fL    MCH 30.0 26.0 - 35.0 pg    MCHC 33.0 32.0 - 34.5 %    RDW 14.7 11.5 - 15.0 fL    Platelets 703 543 - 124 E9/L    MPV 10.2 7.0 - 12.0 fL    Neutrophils % 86.1 (H) 43.0 - 80.0 %    Lymphocytes % 6.9 (L) 20.0 - 42.0 %    Monocytes % 7.0 2.0 - 12.0 %    Eosinophils % 0.0 0.0 - 6.0 %    Basophils % 0.2 0.0 - 2.0 %    Neutrophils Absolute 7.48 (H) 1.80 - 7.30 E9/L    Lymphocytes Absolute 0.61 (L) 1.50 - 4.00 E9/L    Monocytes Absolute 0.61 0.10 - 0.95 E9/L    Eosinophils Absolute 0.00 (L) 0.05 - 0.50 E9/L    Basophils Absolute 0.00 0.00 - 0.20 E9/L    Anisocytosis 1+     Poikilocytes 2+     Ovalocytes 2+    Comprehensive Metabolic Panel w/ Reflex to MG   Result Value Ref Range    Sodium 140 132 - 146 mmol/L    Potassium reflex Magnesium 3.9 3.5 - 5.0 mmol/L    Chloride 104 98 - 107 mmol/L    CO2 26 22 - 29 mmol/L    Anion Gap 10 7 - 16 mmol/L    Glucose 121 (H) 74 - 99 mg/dL    BUN 24 (H) 6 - 23 mg/dL    CREATININE 0.9 0.5 - 1.0 mg/dL    GFR Non-African American 59 >=60 mL/min/1.73    GFR African American >60     Calcium 9.5 8.6 - 10.2 mg/dL    Total Protein 7.3 6.4 - 8.3 g/dL    Albumin 4.1 3.5 - 5.2 g/dL    Total Bilirubin 0.4 0.0 - 1.2 mg/dL    Alkaline Phosphatase 71 35 - 104 U/L    ALT 21 0 - 32 U/L    AST 32 (H) 0 - 31 U/L   Urinalysis, reflex to microscopic   Result Value Ref Range    Color, UA Yellow Straw/Yellow    Clarity, UA Clear Clear    Glucose, Ur Negative Negative mg/dL    Bilirubin Urine Negative Negative    Ketones, Urine Negative Negative mg/dL    Specific Gravity, UA 1.020 1.005 - 1. 030    Blood, Urine LARGE (A) Negative    pH, UA 6.0 5.0 - 9.0    Protein, UA 30 (A) Negative mg/dL    Urobilinogen, Urine 0.2 <2.0 E.U./dL    Nitrite, Urine POSITIVE (A) Negative    Leukocyte Esterase, Urine TRACE (A) Negative   Brain Natriuretic Peptide   Result Value Ref Range    Pro-BNP 3,232 (H) 0 - 450 pg/mL   Microscopic Urinalysis   Result Value Ref Range    WBC, UA 2-5 0 - 5 /HPF    RBC, UA 10-20 (A) 0 - 2 /HPF    Bacteria, UA MANY (A) None Seen /HPF       Imaging:  CTA PULMONARY W CONTRAST    Result Date: 1/4/2022  EXAMINATION: CTA OF THE CHEST 1/4/2022 6:25 pm TECHNIQUE: CTA of the chest was performed after the administration of intravenous contrast.  Multiplanar reformatted images are provided for review. MIP images are provided for review. Dose modulation, iterative reconstruction, and/or weight based adjustment of the mA/kV was utilized to reduce the radiation dose to as low as reasonably achievable. COMPARISON: None. HISTORY: ORDERING SYSTEM PROVIDED HISTORY: COVID +, wheezing, r/o PE TECHNOLOGIST PROVIDED HISTORY: Reason for exam:->COVID +, wheezing, r/o PE Decision Support Exception - unselect if not a suspected or confirmed emergency medical condition->Emergency Medical Condition (MA) What reading provider will be dictating this exam?->CRC FINDINGS: Pulmonary Arteries: Somewhat limited visualization of the peripheral pulmonary arteries due to respiratory motion artifact and suboptimal arterial enhancement. Within this limitation, no pulmonary embolism is seen. The main pulmonary artery is normal in caliber. Mediastinum: The heart is enlarged. Mild calcified atherosclerosis is seen in the aorta. No aneurysm. No lymphadenopathy seen in the chest.  Small hiatal hernia. Lungs/pleura: Pulmonary infiltrates are seen in the bilateral lower lobes, as well as the middle lobe, consistent with pneumonia. The central airway is clear. No pneumothorax or pleural effusion is seen.  Upper Abdomen: Reflux of contrast into the hepatic veins suggests right heart failure. Prominent fatty infiltration of the pancreas is seen. Small intrarenal calculi are noted. Soft Tissues/Bones: No acute bone or soft tissue abnormality. 1. No evidence of pulmonary embolism. Suboptimal evaluation of the peripheral pulmonary arteries. 2. Pulmonary infiltrates in the middle lobe and bilateral lower lobes, consistent with pneumonia. 3. Cardiomegaly. 4. Nephrolithiasis. RECOMMENDATIONS: Unavailable       Assessment and Plan  Principal Problem:    Acute hypoxemic respiratory failure due to COVID-19 [facility])  Active Problems:    Permanent atrial fibrillation (HCC)    Chronic diastolic (congestive) heart failure (HCC)    Nephrolithiasis    Chronic obstructive pulmonary disease (HCC)    Hypothyroidism    Dementia (HCC)  Resolved Problems:    * No resolved hospital problems.  *      Acute Hypoxic Respiratory Failure 2/2 COVID-19  In ER CTA Pulmonary- infiltrate RLL/middle lobe             Requiring 2 L nasal cannula             Got 10 mg decadron             Got rocephin  Plan for 6 mg decadron daily  Remdesivir 200 mg 1 x dose (pharmacology consulted)  Follow-up ESR, CRP, D-Dimer, CPK, CK, PT/PTT, D-Dimer  If severity symptoms worsen (escalating O2 requirement/D-Dimer increase)- consider Toci/Barcitinib  Vitamin C, D, Zinc  Anticoagulate with coumadin    Right Lobe Infiltrate  In ER CTA Pulmonary- infiltrate RLL/middle lobe              Given 1x rocephin  Follow-up pro-calcitonin  Follow-up respiratory culture, urine legionella/strep pneum, viral panel  Held on starting abx of PNA at this time    Afib with RVR  Admit to telemetry  Toprol XL home med continued  Coumadin home dose cont'd  Follow-up PT-INR    Nephrolithiasis  (s/p kidney stone surgery 2x)  In ER UA- rbc, protein            Given 1x dose rocephin  CT WO Contrast pending  Urine Culture pending    Chronic Diastolic HF  Echocardiogram (55-60%)   In ER pro-BNP: ~3000            Not given any diuretic  Continued home meds- aldactone, lasix, lisinopril  Cardiology, Dr. Hank Schroeder, is following o/p    COPD  Continued xopenex (inhaler form)  Possibly has more asthma component not on other COPD meds at facility    Hypothyroidism  Synthroid home dose cont'd  Follow-up thyroid studies  TSH/labs may be elevated in setting of infection    Cognitive Impairement, Dementia  Called daughter 1x did not get through  Baseline questionable, neurovascular checks  Bedside swallow before ordering diet  Closely monitor I/O, and other sources that could worsen mentation    Hx of PFO    Hx of Urinary Incontinence        DVT / GI prophylaxis: Pepcid and Coumadin    Dispo - TELEMETRY      Electronically signed by Jose Veliz MD on 1/4/22 at 9:20 PM EST  This case was discussed with attending physician: Dr. Tyesha Bingham

## 2022-01-05 NOTE — ED NOTES
Pt son called and has been updated to the best of this rn's ability and scope.       Trino Flores, RN  01/04/22 [address] [facility], RN  01/04/22 1947

## 2022-01-05 NOTE — PROGRESS NOTES
Pharmacy Consultation Note    Consult date: 1/4/2022  Physician/provider:Dr Harry Bolaños has been consulted to evaluate criteria for Remdesivir therapy. Based on the algorithm, the patient DOES currently meet [facility] P&T approved Covid-19 treatment algorithm for Remdesivir.     Thank you for the consult,  Bryson Batista [facility]

## 2022-01-05 NOTE — PROGRESS NOTES
[facility]  Family Medicine Attending    S: 81 y.o. female with history of afib on coumadin, PFO, COPD , cognitive impairment. She tested positive for COVID on Jan 4 . Was tested due to fatigue and weakness. In the ER she had hypoxia to 80% which improved with 2L NC. Was treated for UTI with rocephin. CT showed bilateral infiltrates and negative for PE. Patient seen and examined on rounds . Denies shortness of breath. Does report a bothersome cough. Denies chest pain. O: VS- Blood pressure 136/82, pulse 96, temperature 97 °F (36.1 °C), temperature source Axillary, resp. rate 16, weight 243 lb (110.2 kg), SpO2 94 %, not currently breastfeeding. Exam is as noted by resident with the following changes, additions or corrections:   Gen - lying in bed, mild respiratory distress   Cardio - regular rate, irregularly irregular rhythm. Lungs - diffuse wheeze   Ext- trace bilateral pitting edema     Impressions:   Principal Problem:    Acute hypoxemic respiratory failure due to COVID-19 [facility])  Active Problems:    Hypothyroidism    Permanent atrial fibrillation (HCC)    Chronic obstructive pulmonary disease (HCC)    Chronic diastolic (congestive) heart failure (HCC)    Dementia (HCC)    Nephrolithiasis  Resolved Problems:    * No resolved hospital problems. *      Plan:   Acute hypoxic respiratory failure 2/2 COVID 19. Pharmacy consulted for remdesivir . On decadron . Continue coumadin for afib. Continue home bp meds. Cultures pending. Rocephin for UTI. Patient does not meet criteria for Toci      Attending Physician Statement  I have reviewed the chart, including any radiology or labs, and seen the patient with the resident(s). I personally reviewed and performed key elements of the history and exam.  I agree with the assessment, plan and orders as documented by the resident. Please refer to the resident note for additional information. Krysta Raya.  Mila Decker MD

## 2022-01-05 NOTE — PROGRESS NOTES
Pharmacy Consultation Note    Consult date: 1/5/2022  Physician/provider: Dr. Tarsha Lawrence has been consulted to evaluate criteria for Tocilizumab therapy. Based on the algorithm, the patient DOES NOT currently meet [facility] P&T approved Covid-19 treatment algorithm for Tocilizumab. Provider has been notified. Please re-consult if the clinical condition changes and patient meets criteria for initiation based on [facility] P&T approved criteria for use. Tocilizumab restricted to ID, Pulm, or Intensivist.  Please consult one of the above to order tocilizumab if indicated.     Thank you for the consult,  Octavia Victoria, [address] [facility]

## 2022-01-05 NOTE — PROGRESS NOTES
[facility]  Family Medicine Residency Program  Progress Note    Patient:  Janell Hernadez 81 y.o. female MRN: [id number]     Date of Service: 1/5/2022     CC: Fatigue, weakness    Subjective     81-year-old female, history of baseline cognitive deficit permanent A. fib on Coumadin, PFO, COPD presented to the ED via EMS from facility for symptoms of weakness and fatigue. Test positive for Covid January 1. Dnr CCA. Evaluated in the ED, saturating at room air initially, apparently was hypoxic to 80% and placed on 2 L nasal cannula with complete recovery to 99%. CTA chest consistent with bilateral infiltrates. Patient seen and examined at bedside in the ER this AM.  Resting comfortably on 1.5 L of oxygen. Turned off oxygen and patient remained with sats of 98 to 98%. Getting remdesivir, consideration for dosing. Resume Lasix given elevated BNP. Rate controlled A. fib at bedside. Objective     Physical Exam:  · Vitals: /82   Pulse 96   Temp 97 °F (36.1 °C) (Axillary)   Resp 16   Wt 243 lb (110.2 kg)   SpO2 94%   BMI 39.22 kg/m²     · I & O - 24hr: No intake/output data recorded. Physical Exam  Constitutional:       General: She is not in acute distress. Appearance: She is not ill-appearing, toxic-appearing or diaphoretic. Interventions: Nasal cannula in place. HENT:      Head: Normocephalic and atraumatic. Neck:      Thyroid: No thyromegaly. Vascular: No JVD. Trachea: Trachea normal.   Cardiovascular:      Rate and Rhythm: Regular rhythm. Heart sounds: Normal heart sounds, S1 normal and S2 normal. No gallop. Pulmonary:      Effort: Pulmonary effort is normal. No accessory muscle usage, prolonged expiration, respiratory distress or retractions. Breath sounds: Normal air entry. Examination of the right-lower field reveals wheezing. Examination of the left-lower field reveals wheezing. Wheezing present. No rhonchi or rales.    Chest: History of COPD  -Continue home medication Xopenex     4. Hypothyroidism  -Home dose Synthroid  -Consider follow-up TSH studies     5.   Baseline cognitive impairment/dementia  -CODE STATUS is DNR CCA  She is able to converse though has somewhat difficulty conversing  -Also appears to be hard of hearing       PT/OT evaluation: Pending  DVT prophylaxis/ GI prophylaxis: Warfarin/Protonix  Disposition: Telemetry    Anibal Duverney, MD  Attending physician: Dr. Wendy Martínez

## 2022-01-06 NOTE — PROGRESS NOTES
Physician Progress Note      Adam Clancy  CSN #:                  [id number]  :                       1935  ADMIT DATE:       2022 1:58 PM  [address] [place] DATE:  RESPONDING  PROVIDER #:        Haleigh Collazo DO          QUERY TEXT:    Patient admitted with COVID infection, noted to have CTA reporting   infiltrates. If possible, please document in progress notes and discharge   summary if you are evaluating and/or treating any of the following: The medical record reflects the following:  Risk Factors: COVID infection, Acute respiratory failure,  Clinical Indicators:  CTA pulm: 1. No PE. 2. Pulmonary infiltrates in the middle   lobe and bilateral lower lobes, consistent with pneumonia. CT abdomen: Bilateral lower lobe ground-glass and consolidative opacities,   compatible with multifocal pneumonia. Procalcitonin 0.16. Treatment: Rocephin, Decadron, Remdesivir, Albuterol inhaler, vitamins. Thank you, Nevin Euceda RN [facility] [phone]  Options provided:  -- COVID pneumonia  -- CT result is not clinically significant  -- Other - I will add my own diagnosis  -- Disagree - Not applicable / Not valid  -- Disagree - Clinically unable to determine / Unknown  -- Refer to Clinical Documentation Reviewer    PROVIDER RESPONSE TEXT:    This patient has COVID pneumonia.     Query created by: Chris Grey on 2022 10:32 AM      Electronically signed by:  Haleigh Collazo DO 2022 11:11 AM

## 2022-01-06 NOTE — DISCHARGE INSTR - COC
Continuity of Care Form    Patient Name: Perla Kelley   :  1935  MRN:  [id number]    Admit date:  2022  Discharge date:  ***    Code Status Order: DNR-CCA   Advance Directives:      Admitting Physician:  Ruth Forrester DO  PCP: Shelly Lora MD    Discharging Nurse: [facility] Unit/Room#: 8029/8373-R  Discharging Unit Phone Number: ***    Emergency Contact:   Extended Emergency Contact Information  Primary Emergency Contact: Nettles,Roscoe  Address: [address] [place] [address], [address] Phone: [phone]  Mobile Phone: [phone]  Relation: Spouse  Secondary Emergency Contact: Carly Monae  Address: [place] BY PATIENT   Eran Aviles  [address] [facility] Phone: [phone]  Mobile Phone: [phone]  Relation: Child    Past Surgical History:  Past Surgical History:   Procedure Laterality Date    CARDIAC CATHETERIZATION  3/22/14    CARPAL TUNNEL RELEASE      Right hand. CATARACT REMOVAL  2005    right, bilateral cataract surgery. CATARACT REMOVAL WITH IMPLANT Bilateral     CHOLECYSTECTOMY  1972    COLONOSCOPY      FINGER SURGERY  2011    S/P removal of cyst from right index finger. FRACTURE SURGERY Left     arm fractured-casted, Bilateral feet fractures-casted. INCONTINENCE SURGERY  2005    Urinary incontinence, S/P bladder suspension. INNER EAR SURGERY      tube place in right ear    [facility] ARTHROPLASTY Bilateral 2005    PARATHYROIDECTOMY      Three times    SHOULDER ARTHROPLASTY Left 2009    SHOULDER ARTHROPLASTY Left      shoulder total replacement.     THYROID SURGERY  2005    recurrent    THYROIDECTOMY  1960       Immunization History:   Immunization History   Administered Date(s) Administered    COVID-19, Pfizer, PF, 30mcg/0.3mL 2021, 02/15/2021    Influenza Vaccine, unspecified formulation 2016    Influenza Virus Vaccine 2014, 10/16/2018    Influenza, MDCK Quadv, IM, PF (Flucelvax 2 yrs and older) 09/21/2017    Influenza, Quadv, adjuvanted, 65 yrs +, IM, PF (Fluad) 12/14/2020    Influenza, Triv, inactivated, subunit, adjuvanted, IM (Fluad 65 yrs and older) 10/31/2019    Pneumococcal Conjugate 13-valent ([id number]) 09/17/2015    Pneumococcal Polysaccharide ([id number]) 01/04/2011    Tdap (Boostrix, Adacel) 09/01/2015       Active Problems:  Patient Active Problem List   Diagnosis Code    Depression F32. A    Hypothyroidism E03.9    Permanent atrial fibrillation (HCC) I48.21    History of artificial lens replacement Z96.1    Essential hypertension I10    Moderate obesity E66.8    Greater trochanteric bursitis M70.60    Chronic anticoagulation Z79.01    Chronic obstructive pulmonary disease ([facility]) J44.9    Mixed stress and urge urinary incontinence N39.46    Fuchs' endothelial dystrophy H18.519    Pseudophakia, both eyes Z96.1    Chronic diastolic (congestive) heart failure ([facility]) I50.32    Pure hypercholesterolemia E78.00    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    Patent foramen ovale Q21.1    Coronary arteriosclerosis in native artery I25.10    Frequent falls R29.6    Dementia ([facility]) F03.90    Ambulatory dysfunction R26.2    Cognitive dysfunction F09    Acute hypoxemic respiratory failure due to COVID-19 ([facility]) U07.1, J96.01    Nephrolithiasis N20.0       Isolation/Infection:   Isolation            Droplet          Patient Infection Status       Infection Onset Added Last Indicated Last Indicated By Review Planned Expiration Resolved Resolved By    COVID-19 01/04/22 01/04/22 01/04/22 COVID-19 & Influenza Combo 01/11/22 01/18/22      Resolved    COVID-19 (Rule Out) 01/04/22 01/04/22 01/04/22 COVID-19 & Influenza Combo (Ordered)   01/04/22 Rule-Out Test Resulted    COVID-19 (Rule Out) 04/19/21 04/19/21 04/19/21 COVID-19, Rapid (Ordered)   04/19/21 Rule-Out Test Resulted    COVID-19 (Rule Out) 04/16/21 04/16/21 04/16/21 COVID-19, Rapid (Ordered)   04/16/21 Rule-Out Test Resulted    COVID-19 (Rule Out) 05/10/20 05/10/20 05/10/20 COVID-19 (Ordered)   05/10/20 Rule-Out Test Resulted            Nurse Assessment:  Last Vital Signs: /79   Pulse 77   Temp 98.1 °F (36.7 °C) (Temporal)   Resp 18   Wt 245 lb 4.8 oz (111.3 kg)   SpO2 97%   BMI 39.59 kg/m²     Last documented pain score (0-10 scale): Pain Level: 0  Last Weight:   Wt Readings from Last 1 Encounters:   01/06/22 245 lb 4.8 oz (111.3 kg)     Mental Status:  oriented    IV Access:  - None    Nursing Mobility/ADLs:  Walking   Assisted  Transfer  Assisted  Bathing  Independent  Dressing  Independent  Toileting  Independent  Feeding  Independent  Med Admin  Dependent  Med Delivery   whole    Wound Care Documentation and Therapy:        Elimination:  Continence: Bowel: No  Bladder: No  Urinary Catheter: None   Colostomy/Ileostomy/Ileal Conduit: No       Date of Last BM: 1-4-21    Intake/Output Summary (Last 24 hours) at 1/6/2022 1551  Last data filed at 1/6/2022 1221  Gross per 24 hour   Intake 360 ml   Output 400 ml   Net -40 ml     I/O last 3 completed shifts: In: 240 [P.O.:240]  Out: 400 [Urine:400]    Safety Concerns: At Risk for Falls    Impairments/Disabilities:      None    Nutrition Therapy:  Current Nutrition Therapy:   - Oral Diet:  ***  Easy to chew bite size, low sodium 2g  Routes of Feeding: Oral  Liquids: Thin Liquids  Daily Fluid Restriction: no  Last Modified Barium Swallow with Video (Video Swallowing Test): not done    Treatments at the Time of Hospital Discharge:   Respiratory Treatments: ***  Oxygen Therapy:  is not on home oxygen therapy.   Ventilator:    - No ventilator support    Rehab Therapies: {THERAPEUTIC INTERVENTION:[id number]}  Weight Bearing Status/Restrictions: No weight bearing restirctions  Other Medical Equipment (for information only, NOT a DME order):  walker  Other Treatments: ***    Patient's personal belongings (please

## 2022-01-06 NOTE — PLAN OF CARE
Patient's chart updated to reflect:      .     - HF care plan, HF education points and HF discharge instructions.  -Orders: 2 gram sodium diet, daily weights, I/O.  -PCP and/or Cardiologist appointment to be scheduled within 7 days of hospital discharge.  -Kristi Bedolla, RN RN, BSN  Heart Failure Navigator

## 2022-01-06 NOTE — PROGRESS NOTES
Physical Therapy  Physical Therapy Initial Assessment     Name: Tuan Gee  : 1935  MRN: [id number]      Date of Service: 2022    Evaluating PT:  Naresh Tarango PT, DPT    Room #:  3851/8587-N  Diagnosis:  Acute cystitis without hematuria [N30.00]  Acute hypoxemic respiratory failure due to COVID-19 ([address] [U07.1, J96.01]  PMHx/PSHx:  Hypothyroidism, OA, urinary incontinence, HTN, hyperparathyroidism, depression, DJD of multiple joints, renal calculi, basal cell carcinoma of neck, Afib, CAD, anticoagulated on coumadin, CHF, tricuspid regurgitation, mild mitral regurgitation, pulmonary HTN  Procedure/Surgery:  N/A  Precautions:  COVID+, fall risk, cognition  Equipment Needs:  TBD    SUBJECTIVE:  Pt lives alone in a single story [facility] apartment with no stairs to enter. Bed is on first floor and bath is on first floor. Pt ambulated with rollator PTA. She uses AE for dressing. OBJECTIVE:   Initial Evaluation  Date: 22 Treatment Short Term/ Long Term   Goals   AM-PAC 6 Clicks 92/46     Was pt agreeable to Eval/treatment? yes     Does pt have pain?  0/10 reported     Bed Mobility  Rolling: min A  Supine to sit: min A  Sit to supine: min A  Scooting: min A  Rolling: mod I  Supine to sit: mod I  Sit to supine: mod I  Scooting: mod I   Transfers Sit to stand: min A  Stand to sit: min A  Stand pivot: min A with AAD  Sit to stand: mod I  Stand to sit: mod I  Stand pivot: mod I with AAD   Ambulation    10'x2, 3'x1 with ww min A  50'x2 with AAD mod I   Stair negotiation: ascended and descended  NT       Strength/ROM:   BLE AROM WFL  BLE grossly 4/5    Balance:   Static Sitting: SBA  Dynamic Sitting: CGA  Static Standing: CGA with ww  Dynamic Standing: min A with ww    Pt is A & O x 1  Sensation:  Pt denies numbness and tingling to extremities  Edema:  unremarkable    Vitals:  SPO2 at rest 94% on RA SPO2 after ambulation 89% on RA, recovered to 93% within 30\" SPO2 post session 95% on RA     Therapeutic Exercises:    Bed mobility: supine<>sit, cued for EOB positioning  Transfers: STSx2, cued for hand placement and postural correction  Ambulation: 10'x2, 3'x1 with ww  BLE AROM    Patient education  Pt educated on role of PT, importance of functional mobility during hospital stay, safety with ww use and transfers    Patient response to education:   Pt verbalized understanding Pt demonstrated skill Pt requires further education in this area   yes partial yes     ASSESSMENT:    Conditions Requiring Skilled Therapeutic Intervention:    [x]Decreased strength     []Decreased ROM  [x]Decreased functional mobility  [x]Decreased balance   [x]Decreased endurance   []Decreased posture  []Decreased sensation  []Decreased coordination   []Decreased vision  [x]Decreased safety awareness   []Increased pain       Comments:    Pt supine in bed upon entering, agreeable to participate. Pt pleasantly confused throughout. Pt assisted to bedside with support of her trunk. Pt sitting upright with good static sitting balance. Pt reporting no dizziness with position change. Pt's demonstrating good strength through BLE, pt instructed to stand from EOB, cued for hand placement and spacing within ww in standing. Pt instructed to ambulate in the room, demonstrating slow felipe, but good balance with ww. Pt cued for safety with room negotiation. Pt cued to transfer to bedside chair for short rest period. Pt's SpO2 detailed above. Once SpO2 returned to normal, pt was assisted back to bed, cued for positioning. Pt transferring well back to supine, assisted with positioning for comfort. Pt remained in bed with all needs met and call bell in reach prior to exiting. Pt would likely require min A from staff to assure safety with functional mobility initially.     Treatment:  Patient practiced and was instructed in the following treatment:     Bed mobility training - pt given verbal and tactile cues to facilitate proper sequencing and safety during rolling and supine>sit as well as provided with physical assistance to complete task    STS and pivot transfer training - pt educated on proper hand and foot placement, safety and sequencing, and use of verbal and tactile cues to safely complete sit<>stand and pivot transfers with hands on assistance to complete task safely    Gait training- pt was given verbal and tactile cues to facilitate good balance and safety during ambulation as well as provided with physical assistance. Pt's/ family goals   1. Return home    Prognosis is good for reaching above PT goals. Patient and or family understand(s) diagnosis, prognosis, and plan of care. yes    PHYSICAL THERAPY PLAN OF CARE:    PT POC is established based on physician order and patient diagnosis     Referring provider/PT Order:  Rosalia Severance, DO  Diagnosis:  Acute cystitis without hematuria [N30.00]  Acute hypoxemic respiratory failure due to COVID-19 ([facility] [U07.1, J96.01]  Specific instructions for next treatment:  Progress as tolerated, transfer and gait training    Current Treatment Recommendations:     [x] Strengthening to improve independence with functional mobility   [] ROM to improve independence with functional mobility   [x] Balance Training to improve static/dynamic balance and to reduce fall risk  [x] Endurance Training to improve activity tolerance during functional mobility   [x] Transfer Training to improve safety and independence with all functional transfers   [x] Gait Training to improve gait mechanics, endurance and assess need for appropriate assistive device  [] Stair Training in preparation for safe discharge home and/or into the community   [] Positioning to prevent skin breakdown and contractures  [x] Safety and Education Training   [x] Patient/Caregiver Education   [] HEP  [] Other     PT long term treatment goals are located in above grid    Frequency of treatments: 2-5x/week x 1-2 weeks.     Time in  1455  Time out  1520    Total Treatment Time  15 minutes     Evaluation Time includes thorough review of current medical information, gathering information on past medical history/social history and prior level of function, completion of standardized testing/informal observation of tasks, assessment of data and education on plan of care and goals.     CPT codes:  [x] Low Complexity PT evaluation 80994  [] Moderate Complexity PT evaluation 30922  [] High Complexity PT evaluation 08160  [] PT Re-evaluation 18406  [] Gait training 76081 -- minutes  [] Manual therapy [id number] -- minutes  [x] Therapeutic activities 61458 15 minutes  [] Therapeutic exercises 28187 -- minutes  [] Neuromuscular reeducation 42992 -- minutes     Abida Dalton, PT, DPT  [id number]

## 2022-01-06 NOTE — PROGRESS NOTES
Pharmacy Consultation Note  (Anticoagulant Dosing and Monitoring)    Initial consult date: 1/5/2022  Consulting physician: Dr. Yeison Foss    Allergies:  Codeine, Penicillins, Vicodin [hydrocodone-acetaminophen], and Adhesive tape    81 y.o. female; 167.6 cm, 110.2 kg  Warfarin Indication Target   INR [place] Dose  (if applicable) Diet/Feeding Tube   AF 1 - 3 10 mg daily (?) 1/5:AAT       Warfarin drug-drug interactions  Start  Stop Home Med? Comments                   Hepatic Function Panel:                            Lab Results   Component Value Date    ALKPHOS 57 01/06/2022    ALT 14 01/06/2022    AST 23 01/06/2022    PROT 6.1 01/06/2022    BILITOT 0.3 01/06/2022    BILIDIR 0.3 08/04/2014    IBILI 0.6 08/04/2014    LABALBU 3.1 01/06/2022    LABALBU 4.4 02/14/2012     Date Warfarin Dose INR Heparin or LMWH HBG/HCT PLT Comment   1/5 10 mg  1.8 X 12/36.7 116    1/6 < 10 mg > 2.2 X 12.2/38.7 115                                 Assessment:  · 85 yo/F, 167.6 cm, 110.2 kg admitted for fatigue and SOB; found to be +COVID. · On warfarin 10 mg daily PTA for Hx of AF. Surprisingly unexpected high warfarin dose for this 85 yo/F.   · 1/6: INR 2.2, therapeutic today. Plan:  · Give warfarin 10 mg tonight. · Daily PT/INR until the INR is stable within the therapeutic range. · Pharmacist will follow and monitor/adjust dosing as necessary.     Merlinda Dustman, PharmD   Pharmacy Resident   Phone: [phone]  1/6/2022 9:34 AM

## 2022-01-06 NOTE — PROGRESS NOTES
[facility]  Family Medicine Residency Program  Progress Note    Patient:  Scarlett Tabares 81 y.o. female MRN: [id number]     Date of Service: 1/6/2022     CC: Fatigue, weakness    Subjective     81-year-old female, history of baseline cognitive deficit permanent A. fib on Coumadin, PFO, COPD presented to the ED via EMS from facility for symptoms of weakness and fatigue. Test positive for Covid January 1. Dnr CCA. Evaluated in the ED, saturating at room air initially, apparently was hypoxic to 80% and placed on 2 L nasal cannula with complete recovery to 99%. CTA chest consistent with bilateral infiltrates. Patient seen and examined at bedside this AM.  He is resting comfortably off oxygen on room air. Sats 98 to 99%. Got remdesivir, not candidate for Tocilizumab. Lasix chronic, rate controlled A. fib at bedside. She is able to converse appropriately. Though she is very hard of hearing. Objective     Physical Exam:  · Vitals: /79   Pulse 77   Temp 98.1 °F (36.7 °C) (Temporal)   Resp 18   Wt 245 lb 4.8 oz (111.3 kg)   SpO2 97%   BMI 39.59 kg/m²     · I & O - 24hr: No intake/output data recorded. Physical Exam  Constitutional:       General: She is not in acute distress. Appearance: She is not ill-appearing, toxic-appearing or diaphoretic. Interventions: Nasal cannula in place. HENT:      Head: Normocephalic and atraumatic. Neck:      Thyroid: No thyromegaly. Vascular: No JVD. Trachea: Trachea normal.   Cardiovascular:      Rate and Rhythm: Regular rhythm. Heart sounds: Normal heart sounds, S1 normal and S2 normal. No gallop. Pulmonary:      Effort: Pulmonary effort is normal. No accessory muscle usage, prolonged expiration, respiratory distress or retractions. Breath sounds: Normal air entry. Examination of the right-lower field reveals wheezing. Examination of the left-lower field reveals wheezing. Wheezing present.  No rhonchi or rales.   Chest:      Chest wall: No lacerations or deformity. Abdominal:      General: Abdomen is flat. Bowel sounds are normal.      Palpations: Abdomen is soft. Tenderness: There is no abdominal tenderness. Lymphadenopathy:      Cervical: No cervical adenopathy. Skin:     General: Skin is dry. Capillary Refill: Capillary refill takes 2 to 3 seconds. Coloration: Skin is not ashen, cyanotic, jaundiced or mottled. Findings: No abrasion, bruising or ecchymosis. Neurological:      Mental Status: She is awake and alert, easily arousable        Resident's Assessment and Cassie Mast is a 81 y.o. female    1. Acute hypoxic respiratory failure likely secondary to COVID-19 infection.  -Initial ER imaging and evaluation consistent with bilateral infiltrates likely COVID-19, given positive Covid test also.  -6 mg Decadron IV daily  -Wean O2 as tolerated, do not feel he requires any oxygen as she was satting 98 to 99% with oxygen off. This may change with ambulation though patient does not ambulate much. -Remdesivir per pharmacy  -Put in for pharmacy consult once again for consideration of doxy.  -Other COVID-19 measures including supportive care, vitamin D, vitamin C, zinc.  -She is already anticoagulated with warfarin, will get daily INRs. -Doubt bacterial pneumonia given pro-Salomon negative though regardless keep close watch for development of new symptoms. Monitor clinical course.  -Otherwise blood cultures pending, urine strep/Legionella antigens were negative. -Urine culture also pending    UTI  -Given 1 dose Rocephin in the ED, Rocephin continue  -Follow for symptom relief, consider sending home SULTANA IBARRA. -Urine culture pending.  -Monitor for the signs and symptoms of any urinary retention, consider bladder scan if suspected.        2.  History of A. fib  -Continue home medications Toprol-XL  -Rate controlled A. fib at bedside this AM.     3.  History of COPD  -Continue home medication Xopenex     4. Hypothyroidism  -Home dose Synthroid  -Consider follow-up TSH studies     5.   Baseline cognitive impairment/dementia  -CODE STATUS is DNR CCA  She is able to converse though has somewhat difficulty conversing  -Also appears to be hard of hearing       PT/OT evaluation: Pending  DVT prophylaxis/ GI prophylaxis: Warfarin/Protonix  Disposition: Telemetry    Sergio Fierro MD  Attending physician: Dr. Annalise Mehta

## 2022-01-06 NOTE — PROGRESS NOTES
[address] [facility]  Family Medicine Attending    S: 81 y.o. female with history of afib on coumadin, PFO, COPD , cognitive impairment. She tested positive for COVID on Jan 4 . Was tested due to fatigue and weakness. In the ER she had hypoxia to 80% which improved with 2L NC. Was treated for UTI with rocephin. CT showed bilateral infiltrates and negative for PE. Patient seen and examined on rounds . Denies shortness of breath. Denies chest pain. Eating ok. Feels weak. O: VS- Blood pressure 132/79, pulse 77, temperature 98.1 °F (36.7 °C), temperature source Temporal, resp. rate 18, weight 245 lb 4.8 oz (111.3 kg), SpO2 97 %, not currently breastfeeding. Exam is as noted by resident with the following changes, additions or corrections:   Gen - sitting up in chair. Alert and oriented x 3. Cardio - regular rate, irregularly irregular rhythm. Lungs - CTAB   Ext- trace bilateral pitting edema     Impressions:   Principal Problem:    Acute hypoxemic respiratory failure due to COVID-19 [facility])  Active Problems:    Hypothyroidism    Permanent atrial fibrillation (HCC)    Chronic obstructive pulmonary disease (HCC)    Chronic diastolic (congestive) heart failure (HCC)    Dementia (HCC)    Nephrolithiasis  Resolved Problems:    * No resolved hospital problems. *      Plan:   Acute hypoxic respiratory failure 2/2 COVID 19. Pharmacy consulted for remdesivir . On decadron . Continue coumadin for afib. Continue home bp meds. Cultures pending. Rocephin for UTI. Patient does not meet criteria for Toci off of oxygen. PT/OT ordered. Attending Physician Statement  I have reviewed the chart, including any radiology or labs, and seen the patient with the resident(s). I personally reviewed and performed key elements of the history and exam.  I agree with the assessment, plan and orders as documented by the resident. Please refer to the resident note for additional information. Elina Ho.  Pedro Lebron MD

## 2022-01-06 NOTE — CARE COORDINATION
1/6 Care Coordination: Plan for discharge today, Physician will pick pt up at 1730. Message left for daughter on pt being discharged. CM/SW will continue to follow for discharge planning.    Kacie BAERN,RN--BC  [phone]

## 2022-01-11 PROBLEM — R41.82 AMS (ALTERED MENTAL STATUS): Status: ACTIVE | Noted: 2022-01-01

## 2022-01-11 NOTE — ED NOTES
Bed: 38  Expected date:   Expected time:   Means of arrival:   Comments:  Room 1400 Licha Larkin, RN  01/11/22 1858

## 2022-01-11 NOTE — ED NOTES
Bed: 01  Expected date:   Expected time:   Means of arrival:   Comments:  Mena Sorensen, WALTER  01/11/22 0118

## 2022-01-11 NOTE — ED PROVIDER NOTES
HPI:  1/11/22, Time: 8:56 AM ZANE Kelley is a 81 y.o. female presenting to the ED for AMS , beginning 1 day ago. The complaint has been constant, severe in severity, and worsened by nothing. History was provided by patient, EMS and EMR. Per EMS, last known well was 0100. Per patient, she states she fell and hit her head. Unsure if she LOC. On warfarin for Afib. Also endorses painful urination with blood in her urine. Unsure of when her symptoms began. Has h/o kidney stones. Denies CP, SOB. Endorses LE and abdominal pain. Unable to answer any other questions. Was recently discharged from hospital on 1/6/2022 for acute hypoxemic respiratory failure 2/2 COVID. Review of Systems:   Unable to assess complete review of systems due to confusion. Pertinent positives and negatives are stated within HPI.        --------------------------------------------- PAST HISTORY ---------------------------------------------  Past Medical History:  has a past medical history of Anticoagulated on Coumadin, Atrial fibrillation ([address] Basal cell carcinoma of neck, CAD (coronary artery disease), CHF (congestive heart failure) ([address] Depression, DJD (degenerative joint disease), GERD (gastroesophageal reflux disease), History of cardiovascular stress test, Hyperparathyroidism ([address] Hypertension, Hypothyroidism, Mild mitral regurgitation, Osteoarthritis, Pulmonary hypertension ([address] Renal calculi, Tricuspid regurgitation, and Urinary incontinence. Past Surgical History:  has a past surgical history that includes Thyroidectomy (1960); Kidney stone surgery (1972); Cholecystectomy (1972); Kidney stone surgery (1981); Cataract removal (6/2005); Incontinence surgery (5/2005); Thyroid surgery (5/2005); parathyroidectomy; Knee Arthroplasty (Bilateral, 9/2005); Total shoulder arthroplasty (Left, 2009); fracture surgery (Left); Carpal tunnel release; Finger surgery (7/2011); Total shoulder arthroplasty (Left);  Inner ear surgery (2011); Cataract removal with implant (Bilateral); Colonoscopy; and Cardiac catheterization (3/22/14). Social History:  reports that she has never smoked. She has never used smokeless tobacco. She reports previous alcohol use. She reports that she does not use drugs. Family History: family history includes Arthritis in her mother; Cancer in her brother and son; Diabetes in her father; Heart Attack in her mother; Heart Disease in her brother, brother, father, mother, and sister; Heart Surgery in her brother; Mental Illness in her father. The patients home medications have been reviewed.     Allergies: Codeine, Penicillins, Vicodin [hydrocodone-acetaminophen], and Adhesive tape    -------------------------------------------------- RESULTS -------------------------------------------------  All laboratory and radiology results have been personally reviewed by myself   LABS:  Results for orders placed or performed during the hospital encounter of 01/11/22   CBC Auto Differential   Result Value Ref Range    WBC 4.8 4.5 - 11.5 E9/L    RBC 4.44 3.50 - 5.50 E12/L    Hemoglobin 13.1 11.5 - 15.5 g/dL    Hematocrit 40.7 34.0 - 48.0 %    MCV 91.7 80.0 - 99.9 fL    MCH 29.5 26.0 - 35.0 pg    MCHC 32.2 32.0 - 34.5 %    RDW 14.5 11.5 - 15.0 fL    Platelets 849 297 - 110 E9/L    MPV 10.3 7.0 - 12.0 fL    Neutrophils % 64.3 43.0 - 80.0 %    Immature Granulocytes % 1.0 0.0 - 5.0 %    Lymphocytes % 13.8 (L) 20.0 - 42.0 %    Monocytes % 16.5 (H) 2.0 - 12.0 %    Eosinophils % 4.0 0.0 - 6.0 %    Basophils % 0.4 0.0 - 2.0 %    Neutrophils Absolute 3.08 1.80 - 7.30 E9/L    Immature Granulocytes # 0.05 E9/L    Lymphocytes Absolute 0.66 (L) 1.50 - 4.00 E9/L    Monocytes Absolute 0.79 0.10 - 0.95 E9/L    Eosinophils Absolute 0.19 0.05 - 0.50 E9/L    Basophils Absolute 0.02 0.00 - 0.20 E9/L   Comprehensive Metabolic Panel w/ Reflex to MG   Result Value Ref Range    Sodium 141 132 - 146 mmol/L    Potassium reflex Magnesium 3.7 3.5 - 5.0 mmol/L    Chloride 102 98 - 107 mmol/L    CO2 30 (H) 22 - 29 mmol/L    Anion Gap 9 7 - 16 mmol/L    Glucose 94 74 - 99 mg/dL    BUN 21 6 - 23 mg/dL    CREATININE 1.0 0.5 - 1.0 mg/dL    GFR Non-African American 53 >=60 mL/min/1.73    GFR African American >60     Calcium 9.4 8.6 - 10.2 mg/dL    Total Protein 6.7 6.4 - 8.3 g/dL    Albumin 3.7 3.5 - 5.2 g/dL    Total Bilirubin 0.6 0.0 - 1.2 mg/dL    Alkaline Phosphatase 64 35 - 104 U/L    ALT 28 0 - 32 U/L    AST 29 0 - 31 U/L   Ammonia   Result Value Ref Range    Ammonia 11.0 11.0 - 51.0 umol/L   Troponin   Result Value Ref Range    Troponin, High Sensitivity 25 (H) 0 - 9 ng/L   Urinalysis   Result Value Ref Range    Color, UA Yellow Straw/Yellow    Clarity, UA Clear Clear    Glucose, Ur Negative Negative mg/dL    Bilirubin Urine Negative Negative    Ketones, Urine Negative Negative mg/dL    Specific Gravity, UA 1.020 1.005 - 1.030    Blood, Urine LARGE (A) Negative    pH, UA 6.0 5.0 - 9.0    Protein, UA Negative Negative mg/dL    Urobilinogen, Urine 0.2 <2.0 E.U./dL    Nitrite, Urine Negative Negative    Leukocyte Esterase, Urine Negative Negative   Lactate, Sepsis   Result Value Ref Range    Lactic Acid, Sepsis 1.0 0.5 - 1.9 mmol/L   Blood Gas, Arterial   Result Value Ref Range    Date Analyzed [id number]     Time Analyzed 1101     Source: Blood Arterial     pH, Blood Gas 7.444 7.350 - 7.450    PCO2 45.0 35.0 - 45.0 mmHg    PO2 72.7 (L) 75.0 - 100.0 mmHg    HCO3 30.2 (H) 22.0 - 26.0 mmol/L    B.E. 5.3 (H) -3.0 - 3.0 mmol/L    O2 Sat 94.4 92.0 - 98.5 %    O2Hb 93.5 (L) 94.0 - 97.0 %    COHb 0.7 0.0 - 1.5 %    MetHb 0.3 0.0 - 1.5 %    HHb 5.5 (H) 0.0 - 5.0 %    tHb (est) 13.5 11.5 - 16.5 g/dL    Mode RA     Date Of Collection      Time Collected      Pt Temp 37.0 C     ID [id number]     Lab [id number]     Critical(s) Notified .  No Critical Values    Troponin   Result Value Ref Range    Troponin, High Sensitivity 22 (H) 0 - 9 ng/L   Microscopic Urinalysis   Result Value Ref Range    WBC, UA 5-10 (A) 0 - 5 /HPF    RBC, UA >20 0 - 2 /HPF    Bacteria, UA MODERATE (A) None Seen /HPF   EKG 12 Lead   Result Value Ref Range    Ventricular Rate 84 BPM    Atrial Rate 241 BPM    QRS Duration 104 ms    Q-T Interval 398 ms    QTc Calculation (Bazett) 470 ms    R Axis 32 degrees    T Axis -128 degrees       RADIOLOGY:  Interpreted by Radiologist.  CT HEAD WO CONTRAST   Final Result   1. No acute intracranial abnormality. 2. Chronic small vessel ischemic disease. CT ABDOMEN PELVIS W IV CONTRAST Additional Contrast? None   Final Result   1. Colonic diverticulosis without evidence of diverticulitis. 2. Small hiatal hernia. CTA PULMONARY W CONTRAST   Final Result   1. There is no pulmonary embolus   2. Very vague patchy bilateral pulmonary infiltrates highly suspicious for   viral pneumonia. 3. Reactive minimal lymphadenopathy within the right hilum. CTA HEAD W CONTRAST   Final Result   No apparent arterial high grade stenosis, occlusion or aneurysm within the   head or neck. CTA NECK W CONTRAST   Final Result   No apparent arterial high grade stenosis, occlusion or aneurysm within the   head or neck. XR CHEST PORTABLE   Final Result   1. Very vague bilateral patchy pulmonary infiltrates slightly more prominent   within the left lower lobe.             ------------------------- NURSING NOTES AND VITALS REVIEWED ---------------------------   The nursing notes within the ED encounter and vital signs as below have been reviewed.    BP (!) 142/102   Pulse 105   Temp 97.9 °F (36.6 °C)   Resp 20   Wt 245 lb (111.1 kg)   SpO2 98%   BMI 39.54 kg/m²   Oxygen Saturation Interpretation: Normal      ---------------------------------------------------PHYSICAL EXAM--------------------------------------      Constitutional/General: Alert and oriented x2 only to self and place, non toxic in NAD   Head: Normocephalic and atraumatic  Eyes: PERRL, EOMI  Mouth: Oropharynx clear, handling secretions, no trismus  Neck: Supple, full ROM, non-tender   Pulmonary: Lungs clear to auscultation bilaterally, no wheezes, rales, or rhonchi. Not in respiratory distress  Cardiovascular:  Regular rate and rhythm, no murmurs, gallops, or rubs. 2+ distal pulses  Abdomen: Soft, non distended, diffusely TTP, bowel sounds present   Extremities: Moves all extremities x 4. Warm and well perfused. TTP of B/L LE  Skin: warm and dry without rash  Neurologic: GCS- eye opening 3, verbal 4, motor 5 = 12, lethargic,   Psych: confused        ------------------------------ ED COURSE/MEDICAL DECISION MAKING----------------------  Medications   sodium chloride flush 0.9 % injection 10 mL (has no administration in time range)   iopamidol (ISOVUE-370) 76 % injection 150 mL (150 mLs IntraVENous Given 1/11/22 1132)   cefTRIAXone (ROCEPHIN) 1,000 mg in sterile water 10 mL IV syringe (1,000 mg IntraVENous New Bag 1/11/22 1426)         ED COURSE:  ED Course as of 01/11/22 1419   Tue Jan 11, 2022   1256 1245: Daughter was present. Stated that she spoke with the patient a few days ago. She was not confused, but it was difficult to understand her due to her persistent coughing. She states that she feels that she was discharged last week. She would feel more comfortable if she stayed because at the assisted living facility, she is quarantined and the nurses are not taking checking in on her very often. [TT]      ED Course User Index  [TT] Joey Schirmer, MD       Medical Decision Making:    Oswaldo Montoya is an 82 yo F with an extensive PMH presenting with AMS. Has baseline cognitive impairment. Pamila Lanes yesterday with +LOC. Dx with COVID on 1/4/22. Saturating well on RA. CXR showed viral pna. CXR, CT head and CTA head/neck/chest negative for acute processes. Troponin 25,22 (close to  Baseline). CBC, CMP, ammonia grossly wnl. Blood culture collected. UA positive for WBC 5-10 and moderate bacteria.  AMS likely 2/2 UTI vs COVID pna vs baseline cognitive impairment. Daughter uncomfortable with her going back to assisted living so will be admitted. Counseling: The emergency provider has spoken with the family member daughter and discussed todays results, in addition to providing specific details for the plan of care and counseling regarding the diagnosis and prognosis. Questions are answered at this time and they are agreeable with the plan.      --------------------------------- IMPRESSION AND DISPOSITION ---------------------------------    IMPRESSION  1.  Altered mental status, unspecified altered mental status type        DISPOSITION  Disposition: Admit to telemetry  Patient condition is stable            Gavin Mcfadden MD   Family Medicine PGY-1  1/11/2022 at 2:19 PM         Gavin Mcfadden MD  Resident  01/11/22 [id number] Slater MD Emanuel  Resident  01/11/22 [id number]

## 2022-01-11 NOTE — TELEPHONE ENCOUNTER
Writer contacted ED provider Laith Perez   to inform of 30 day readmission risk. Writer's attempt to contact ED provider was unsuccessful. No Decision on disposition at this time.       Call Back: If you need to call back to inform of disposition you can contact me at [phone]

## 2022-01-11 NOTE — LETTER
[place] Department Medicaid  CERTIFICATION OF NECESSITY  FOR NON-EMERGENCY TRANSPORTATION   BY GROUND AMBULANCE      Individual Information   1. Name: Yadira De La Cruz 2. [place] Medicaid Billing Number:    3. Address: [place] [address]       Transportation Provider Information   4. Provider Name: Keshia Majano    5. [place] Medicaid Provider Number:  National Provider Identifier (NPI):      Certification  7. Criteria:  During transport, this individual requires:  [x] Medical treatment or continuous     supervision by an EMT. [] The administration or regulation of oxygen by another person. [] Supervised protective restraint. 8. Period Beginning Date: 2022   9. Length  [x] Not more than 1  day(s)  [] One Year     Additional Information Relevant to Certification   10. Comments or Explanations, If Necessary or Appropriate   ALTERED MENTAL STATUS/COVID      Certifying Practitioner Information   11. Name of Practitioner: DR Feliciano Child    12. [place] Medicaid Provider Number, If Applicable:  [address] Provider Identifier (NPI):      Signature Information   14. Date of Signature: 2022 15. Name of Person Signing: Electronically signed by Vitaly Brown RN on 2022 at 11:36 AM     16. Signature and Professional Designation: DR REAL/Electronically signed by Vitaly Brown RN  on 2022 at 11:36 AM       [facility] [id number]  Rev. 2015                                        [address] [place] [address] Admission Date/Time: 22 0847   Hospital Account: [de-identified]    MRN: [id number]    Patient:  [address] Serial #: [id number]            ENCOUNTER          Patient Class: Observation Private Enc?   No Unit RM BD: SEYZ 8S CDU [id number]-[id number]   Hospital Service: Intermediate   ADM DX: Altered mental status, u*   ADM Provider: Herve Castillo MD   Procedure:     ATT Provider: Herve Castillo MD   REF Provider:        PATIENT                 Name: Yadira De La Cruz : 1935 (86 yrs) Address: [place] [facility]* Sex: Female   City: [address]         Marital Status:    Employer: RETIRED         Buddhism: Taoist   Primary Care Provider: Lali Henry MD         Primary Phone: [phone]   EMERGENCY CONTACT   Contact Name Legal Guardian? Relationship to Patient Home Phone Work Phone   1. Roscoe Nettles  2. Nicolasa Celeste      Spouse  Child [phone]              GUARANTOR            Guarantor: Nadeem Judy     : 1935   Address: [place] [facility]* Sex: Female     [place] [address]     Relation to Patient: Self       Home Phone: [phone]   Guarantor ID: [id number]       Work Phone:     Guarantor Employer: RETIRED         Status: RETIRED      COVERAGE        PRIMARY INSURANCE   Payor: [address] [place] [address]: [facility] MEDICARE COMPLETE   Payor Address: ,          Group Number: [id number] Insurance Type: [address] Name: Candy De Leon : 1935   Subscriber ID: [id number] Pat. Rel. to Sub: Self   SECONDARY INSURANCE   Payor:   Plan:     Payor Address:  ,           Group Number:   Insurance Type:     Subscriber Name:   Subscriber :     Subscriber ID:   Pat.  Rel. to Sub:

## 2022-01-11 NOTE — H&P
[facility]  Family [facility] Residency Program  History and Physical    Patient:  Collin Franco 81 y.o. female MRN: [id number]     Date of Service: 1/11/2022    Hospital Day: 1      Chief complaint: had concerns including Altered Mental Status (pt complains of left arm weakness, LKW 0100, +covid). History of Present Illness   The patient is a 81 y.o. female with past medical history of CAD, A. fib anticoagulated with warfarin, CHF, baseline cognitive decline. Presented to the ED from [facility] via EMS. It was noted via facility staff there that patient developed change in mental status. Apparently her last known well was approximately 0100 hours. It was also noted that patient had taken a fall and had struck her head. The patient is not able to confirm or deny head striking or loss of consciousness. When examined at the bedside, patient is easily arousable and able to engage in conversation. She denies any chest pain, shortness of breath, lightheadedness or dizziness. She is unable to fully recall the fall. Though she denies prodromal symptoms possibly occurring prior to the fall. She is not oxygen requiring. Upon further discussion with the ER staff/physicians, daughter was present at bedside and noted that this was not patient's baseline. Patient's daughter also expressed concern that the patient was not being taken care of given that she was in isolation from a positive COVID test.    ED Course: Initial work-up in the ER including CBC/CMP WNL. Troponin cycled and returned 25 and 22. Given history of fall, head striking with anticoagulation, CTA head/neck obtained and negative for acute process. CTA pulmonary, CT A/P also obtained and negative for acute process. Consistent with resolving viral pneumonia. This fits with recent admission for COVID-19.     Past Medical History:      Diagnosis Date    Anticoagulated on Coumadin     on Coumadin for this Dr. Nicanor geiger    Atrial fibrillation (HCC)     Basal cell carcinoma of neck     CAD (coronary artery disease)     History of luminal irregularities of the coronary artery, stable.  CHF (congestive heart failure) ([facility])     stage I diastolic dysfunction on 4/51/93    Depression 11/16/2010    DJD (degenerative joint disease)     SEVERE IN RIGHT HAND, IN MULTIPLE OTHER JOINTS    GERD (gastroesophageal reflux disease) 11/16/2010    History of cardiovascular stress test 03/18/2014    lexiscan    Hyperparathyroidism ([address] 11/16/2010    Had parathyroidectomy    Hypertension 11/16/2010    Hypothyroidism 11/16/2010    Mild mitral regurgitation 7/10/14    Osteoarthritis 11/16/2010    Pulmonary hypertension ([address] 7/10/14    mild    Renal calculi     x3    Tricuspid regurgitation 7/10/14    mild-moderate    Urinary incontinence 11/16/2010       Past Surgical History:        Procedure Laterality Date    CARDIAC CATHETERIZATION  3/22/14    CARPAL TUNNEL RELEASE      Right hand.  CATARACT REMOVAL  6/2005    right, bilateral cataract surgery.  CATARACT REMOVAL WITH IMPLANT Bilateral     CHOLECYSTECTOMY  1972    COLONOSCOPY      FINGER SURGERY  7/2011    S/P removal of cyst from right index finger.  FRACTURE SURGERY Left     arm fractured-casted, Bilateral feet fractures-casted.  INCONTINENCE SURGERY  5/2005    Urinary incontinence, S/P bladder suspension.  INNER EAR SURGERY  2011    tube place in right ear   [address]    KIDNEY STONE SURGERY  1981    KNEE ARTHROPLASTY Bilateral 9/2005    PARATHYROIDECTOMY      Three times    SHOULDER ARTHROPLASTY Left 2009    SHOULDER ARTHROPLASTY Left      shoulder total replacement.  THYROID SURGERY  5/2005    recurrent    THYROIDECTOMY  1960       Medications Prior to Admission:    Prior to Admission medications    Medication Sig Start Date End Date Taking?  Authorizing Provider   levalbuterol [place] Leaf) 0.63 MG/3ML nebulization Take 1 ampule by nebulization every 8 hours as needed for Wheezing    Historical Provider, MD   Cholecalciferol (VITAMIN D3) 25 MCG (1000 UT) CAPS TAKE 1 TABLET BY MOUTH EVERY DAY 12/21/21   Historical Provider, MD   Ascorbic Acid (VITAMIN C) 1000 MG tablet TAKE 1 TABLET BY MOUTH EVERY DAY 12/21/21   Historical Provider, MD   warfarin (COUMADIN) 5 MG tablet Take 2 tablets by mouth nightly 12/13/21   Alistair Dash MD   spironolactone (ALDACTONE) 25 MG tablet Take 1 tablet by mouth daily 12/13/21   Alistair Dash MD   metoprolol succinate (TOPROL XL) 50 MG extended release tablet Take 1 tablet by mouth nightly 12/13/21   Alistair Dash MD   meclizine (ANTIVERT) 25 MG tablet Take 1 tablet by mouth 3 times daily as needed for Dizziness 12/13/21   Alistair Dash MD   magnesium hydroxide (MILK OF MAGNESIA) 400 MG/5ML suspension Take 5 mLs by mouth daily as needed for Constipation 12/13/21   Alistair Dash MD   lisinopril (PRINIVIL;ZESTRIL) 5 MG tablet Take 1 tablet by mouth daily 12/13/21   Alistair Dash MD   levothyroxine (SYNTHROID) 125 MCG tablet Take 1 tablet by mouth Daily 12/13/21   Alistair Dash MD   furosemide (LASIX) 40 MG tablet Take 1 tablet by mouth daily 12/13/21   Alistair Dash MD   famotidine (PEPCID) 20 MG tablet Take 1 tablet by mouth 2 times daily 12/13/21   Alistair Dash MD   escitalopram (LEXAPRO) 10 MG tablet Take 1 tablet by mouth daily 12/13/21   Alistair Dash MD   Cholecalciferol (VITAMIN D3) 25 MCG (1000 UT) CAPS TAKE 1 TABLET BY MOUTH EVERY DAY 12/13/21   Alistair Dash MD   bisacodyl (DULCOLAX) 10 MG suppository Place 1 suppository rectally daily as needed for Constipation 12/13/21   Alistair Dash MD   B-Complex, Folic Acid, TABS TAKE 1 TABLET BY MOUTH DAILY 12/13/21   Alistair Dash MD   Ascorbic Acid (VITAMIN C) 1000 MG tablet TAKE 1 TABLET BY MOUTH EVERY DAY 12/13/21   Alistair Dash MD   albuterol sulfate HFA (VENTOLIN HFA) 108 (90 Base) MCG/ACT inhaler Inhale 2 puffs into the lungs every 6 hours as needed for Wheezing 12/13/21   Awais Meza MD   albuterol (PROVENTIL) (2.5 MG/3ML) 0.083% nebulizer solution Take 3 mLs by nebulization every 6 hours as needed for Wheezing 12/13/21   Awais Meza MD   acetaminophen (TYLENOL) 325 MG tablet Take 2 tablets by mouth every 6 hours as needed for Pain or Fever 12/13/21   Awais Meza MD   guaiFENesin (MUCINEX) 600 MG extended release tablet Take 1,200 mg by mouth daily as needed for Congestion    Historical Provider, MD   levothyroxine (SYNTHROID) 125 MCG tablet Take 1 tablet by mouth every morning (before breakfast) 7/24/21   Holly Gomez MD       Allergies:  Codeine, Penicillins, Vicodin [hydrocodone-acetaminophen], and Adhesive tape    Social History:   TOBACCO:   reports that she has never smoked. She has never used smokeless tobacco.  ETOH:   reports previous alcohol use. REVIEW OF SYSTEMS:    · Refer to HPI    Physical Exam   · Vitals: /62   Pulse 105   Temp 97.9 °F (36.6 °C)   Resp 20   Ht 5' 6\" (1.676 m)   Wt 245 lb (111.1 kg)   SpO2 96%   BMI 39.54 kg/m²     · General Appearance: well-developed and well nourished, in no acute distress and appears in no distress. Nontoxic. · Skin: warm and dry  · Pulmonary/Chest: Clear to auscultation bilaterally, no chest wall tenderness. · Cardiovascular: Normal rate, irregular rhythm, no murmur  · Abdomen: soft, non-tender  · Extremities: no cyanosis and no clubbing  · Musculoskeletal: 1+ BLE edema  · Neurologic: no new findings and engages in conversation with appropriate answers. Oriented to self, date of birth. No FND  Labs and Imaging Studies   Basic Labs  Recent Labs     01/11/22  0946      K 3.7      CO2 30*   BUN 21   CREATININE 1.0   GLUCOSE 94   CALCIUM 9.4       Recent Labs     01/11/22  0946   WBC 4.8   RBC 4.44   HGB 13.1   HCT 40.7   MCV 91.7   MCH 29.5   MCHC 32.2   RDW 14.5      MPV 10.3       CT HEAD WO CONTRAST   Final Result   1.  No acute intracranial abnormality. 2. Chronic small vessel ischemic disease. CT ABDOMEN PELVIS W IV CONTRAST Additional Contrast? None   Final Result   1. Colonic diverticulosis without evidence of diverticulitis. 2. Small hiatal hernia. CTA PULMONARY W CONTRAST   Final Result   1. There is no pulmonary embolus   2. Very vague patchy bilateral pulmonary infiltrates highly suspicious for   viral pneumonia. 3. Reactive minimal lymphadenopathy within the right hilum. CTA HEAD W CONTRAST   Final Result   No apparent arterial high grade stenosis, occlusion or aneurysm within the   head or neck. CTA NECK W CONTRAST   Final Result   No apparent arterial high grade stenosis, occlusion or aneurysm within the   head or neck. XR CHEST PORTABLE   Final Result   1. Very vague bilateral patchy pulmonary infiltrates slightly more prominent   within the left lower lobe. Resident's Assessment and Plan       1. Altered mental status  - Evaluation at bedside patient appears to be at her baseline mental status. She was very easily arousable, able to engage in conversation and able to provide appropriate answers. She is alert and oriented to self, date of birth, city of birth.  - Of note patient does have a chronic history of multiple falls at [place]. - Though she does appear to be at her baseline currently, regardless we will follow-up appropriate imaging and lab work as needed. - Most recent TSH obtained 1/4 was 8.9.  - LFTs were normal, ammonia 11, electrolytes normal, sugar normal, CT imaging of the head also negative and without acute process. - UA was consistent with infection, urine culture sent. Though this is chronic for patient and she does have a chronic history of pathological urine  - She does have a history of nephrolithiasis (nonobstructing) that was noted on previous imaging.   Blood not seen on UA, not complaining of any flank pain or severe abdominal groin pain.  - Consider further long-term placement options such as [facility]    2. History of COVID-19 infection  -CT imaging/x-ray consistent with resolving pneumonia. At this time she is not oxygen requiring.  - Regardless blood cultures are pending for AMS work-up. 3.  A. fib anticoagulated with warfarin  - We will get a stat PT/INR and then get it daily.  - Resume home dose medication  - Toprol-XL home medication    4. History of COPD  - Continue home medication Xopenex         PT/OT evaluation: Pending  DVT prophylaxis/ GI prophylaxis: Warfarin/Protonix  Disposition: Telemetry pending disposition to facility.     Julianne Candelario MD,   Attending physician: Dr. Gerson Scanlon

## 2022-01-12 PROBLEM — U07.1 ACUTE HYPOXEMIC RESPIRATORY FAILURE DUE TO COVID-19 (HCC): Status: RESOLVED | Noted: 2022-01-01 | Resolved: 2022-01-01

## 2022-01-12 PROBLEM — J96.01 ACUTE HYPOXEMIC RESPIRATORY FAILURE DUE TO COVID-19 (HCC): Status: RESOLVED | Noted: 2022-01-01 | Resolved: 2022-01-01

## 2022-01-12 NOTE — PLAN OF CARE
Safety interventions and precautions discussed with patient who does not appear to retain the information. Bed locked and lowered, bed alarm on and functioning. Call bell and belongings within reach, will continue to monitor.

## 2022-01-12 NOTE — PROGRESS NOTES
Physical Therapy    Physical Therapy Initial Assessment     Name: Mc Lozano  : 1935  MRN: [id number]      Date of Service: 2022    Evaluating PT:  Tosha Sriniavsan, PT, DPT  [id number]     Room #:  9336/5003-Z  Diagnosis:  Altered mental status, unspecified altered mental status type [R41.82]  AMS (altered mental status) [R41.82]  PMHx/PSHx:  Basal cell carcinoma of neck, CAD, CHF, DJD, HTN, OA    Precautions:  Falls, Droplet Plus isolation, COVID-19, Cognition  Equipment Needs:  NA    SUBJECTIVE:    Pt admitted from [facility] [place]. Lives with her spouse. Ambulates with Rollator. OBJECTIVE:   Initial Evaluation  Date: 22 Treatment Short Term/ Long Term   Goals   AM-PAC 6 Clicks      Was pt agreeable to Eval/treatment? Yes      Does pt have pain? No c/o pain     Bed Mobility  Rolling: Min A  Supine to sit: Mod A  Sit to supine: Mod A  Scooting: Min A  Rolling: SBA  Supine to sit: SBA  Sit to supine: SBA  Scooting: SBA   Transfers Sit to stand:  Mod A  Stand to sit: Mod A  Stand pivot: NT  Sit to stand: SBA  Stand to sit: SBA  Stand pivot: SBA with FWW    Ambulation    Few side steps with FWW Min A  >50 feet with FWW SBA   Stair negotiation: ascended and descended  NT  >2 steps with 1 rail Min A   ROM BUE:  Per OT eval  BLE:  WFL     Strength BUE:  Per OT eval   BLE:  WFl     Balance Sitting EOB:  SBA  Dynamic Standing:  Min A  Sitting EOB:  Supervision   Dynamic Standing:  SBA     Pt is A & O x 2  Sensation:  Pt denies numbness and tingling to extremities  Edema:  Unremarkable    Vitals:  SpO2 97% at rest   SpO2 88% with STS and side steps but questionable reading -- quick recovery back to 90s    Therapeutic Exercises:    BLE ROM  STS x2    Patient education  Pt educated on PT role, safety during functional mobility    Patient response to education:   Pt verbalized understanding Pt demonstrated skill Pt requires further education in this area   Yes  Yes  Yes      ASSESSMENT:    Conditions Requiring Skilled Therapeutic Intervention:    [x]Decreased strength     []Decreased ROM  [x]Decreased functional mobility  [x]Decreased balance   [x]Decreased endurance   []Decreased posture  []Decreased sensation  []Decreased coordination   []Decreased vision  [x]Decreased safety awareness   []Increased pain       Comments:  Pt received side lying and agreeable to PT evaluation. Vitals monitored during session. Pt c/o being cold and wet on entry. It was noted that she was incontinent of urine so her lower body was wet and spilled water on herself so upper body was wet as well. Pt assisted to EOB and with donning her slippers. Performed STS while bedding switched out. Assisted pt with putting dry gown on while seated EOB. She was able to stand with assistance and take side steps at EOB with safety  Cues. Limited activity tolerance. Pt confused throughout session and [place] as well. Assisted back to bed and given clean/dry linens and pillows. Pt left with call button in reach, lines attached, and needs met. Pt would benefit from continued PT services at discharge. Treatment:  Patient practiced and was instructed in the following treatment:     Bed mobility training - pt given verbal and tactile cues to facilitate proper sequencing and safety during rolling and supine>sit as well as provided with physical assistance to complete task    Sitting EOB for >12 minutes for upright tolerance, postural awareness and BLE ROM   STS training and side stepping at EOB to progress gait tolerance - pt educated on proper hand and foot placement, safety and sequencing, and use of FWW to safely complete sit<>stand and side stepping along EOB with hands on assistance to complete task safely     Skilled positioning - Pt placed in the semi supine position with pillows utilized to facilitate upright posture, joint and skin integrity, and interaction with environment. Pt's/ family goals   1.  None stated Prognosis is fair for reaching above PT goals. Patient and or family understand(s) diagnosis, prognosis, and plan of care. Yes     PHYSICAL THERAPY PLAN OF CARE:    PT POC is established based on physician order and patient diagnosis     Referring provider/PT Order:    01/11/22 [id number]  PT evaluation and treat  Start:  01/11/22 1830,   End:  01/11/22 1830,   ONE TIME,   Standing Count:  1 Occurrences,   Pierce De La Rosa MD       Diagnosis:  Altered mental status, unspecified altered mental status type [R41.82]  AMS (altered mental status) [R41.82]  Specific instructions for next treatment:  Gait training     Current Treatment Recommendations:     [x] Strengthening to improve independence with functional mobility   [] ROM to improve independence with functional mobility   [x] Balance Training to improve static/dynamic balance and to reduce fall risk  [x] Endurance Training to improve activity tolerance during functional mobility   [x] Transfer Training to improve safety and independence with all functional transfers   [x] Gait Training to improve gait mechanics, endurance and asses need for appropriate assistive device  [x] Stair Training in preparation for safe discharge home and/or into the community   [x] Positioning to prevent skin breakdown and contractures  [x] Safety and Education Training   [x] Patient/Caregiver Education   [x] HEP  [] Other     PT long term treatment goals are located in above grid    Frequency of treatments: 2-5x/week x 1-2 weeks. Time in  1050  Time out  1120    Total Treatment Time  15 minutes     Evaluation Time includes thorough review of current medical information, gathering information on past medical history/social history and prior level of function, completion of standardized testing/informal observation of tasks, assessment of data and education on plan of care and goals.     CPT codes:  [x] Low Complexity PT evaluation 33572  [] Moderate Complexity PT evaluation [id number]  [] High Complexity PT evaluation [id number]  [] PT Re-evaluation [id number]  [] Gait training 53099 -- minutes  [] Manual therapy [id number] -- minutes  [x] Therapeutic activities 63432 15 minutes  [] Therapeutic exercises 34969 -- minutes  [] Neuromuscular reeducation 98741 -- minutes     Ilsa Hough, PT, DPT  [id number]

## 2022-01-12 NOTE — PROGRESS NOTES
Occupational Therapy  OCCUPATIONAL THERAPY INITIAL EVALUATION     Dulce Leevia [address] [address]      VOHP:                                                Patient Name: Leobardo Radford  MRN: [id number]  : 1935  Room: [address] [place] [address] [address][id number]    Referring Provider: Dashawn Britton MD  Specific Provider Orders/Date: OT eval and treat 22    Diagnosis: Altered mental status, unspecified altered mental status type [R41.82]  AMS (altered mental status) [R41.82]   Pt admitted to hospital on 22 for AMS, covid+    Pertinent Medical History:  has a past medical history of Anticoagulated on Coumadin, Atrial fibrillation ([address] Basal cell carcinoma of neck, CAD (coronary artery disease), CHF (congestive heart failure) ([address] Depression, DJD (degenerative joint disease), GERD (gastroesophageal reflux disease), History of cardiovascular stress test, Hyperparathyroidism ([address] Hypertension, Hypothyroidism, Mild mitral regurgitation, Osteoarthritis, Pulmonary hypertension ([address] Renal calculi, Tricuspid regurgitation, and Urinary incontinence.        Precautions:  Fall Risk, bed alarm, severely [place], cognition, continuous pulse ox, droplet+ (covid+)    Assessment of current deficits   [x] Functional mobility  [x]ADLs  [x] Strength               [x]Cognition    [x] Functional transfers   [x] IADLs         [x] Safety Awareness   [x]Endurance    [] Fine Coordination              [x] Balance      [] Vision/perception   []Sensation     []Gross Motor Coordination  [] ROM  [] Delirium                   [] Motor Control     OT PLAN OF CARE   OT POC based on physician orders, patient diagnosis and results of clinical assessment    Frequency/Duration 1-3 days/wk for 2 weeks PRN   Specific OT Treatment Interventions to include:   * Instruction/training on adapted ADL techniques and AE recommendations to increase functional independence within precautions       * Training on energy conservation strategies, correct breathing pattern and techniques to improve independence/tolerance for self-care routine  * Functional transfer/mobility training/DME recommendations for increased independence, safety, and fall prevention  * Patient/Family education to increase follow through with safety techniques and functional independence  * Recommendation of environmental modifications for increased safety with functional transfers/mobility and ADLs  * Cognitive retraining/development of therapeutic activities to improve problem solving, judgement, memory, and attention for increased safety/participation in ADL/IADL tasks  * Therapeutic exercise to improve motor endurance, ROM, and functional strength for ADLs/functional transfers  * Therapeutic activities to facilitate/challenge dynamic balance, stand tolerance for increased safety and independence with ADLs  * Therapeutic activities to facilitate gross/fine motor skills for increased independence with ADLs      Recommended Adaptive Equipment: TBD     Home Living: Pt lives at [facility] w/ spouse. Bathroom setup: bathrm modifcations - walk shower with seat and grab bars   Equipment owned: rollator    Prior Level of Function: assist PRN with ADLs; ambulated w/ rollator in living area and to/from dining room   Driving: no    Pain Level: Pt c/o no pain this session     Cognition: A&O: 2/4 (not to year or situation); Follows 1 step directions  Delayed processing speed.  Increased confusion noted - frequent cues to sequence, attend to and initiate tasks   Memory:  fair -   Sequencing:  fair -   Problem solving:  poor +   Judgement/safety:  poor +     Functional Assessment:  AM-PAC Daily Activity Raw Score: 15/24   Initial Eval Status  Date: 1/12/22 Treatment Status  Date: STGs = LTGs  Time frame: 10-14 days   Feeding Stand by Assist      Grooming Minimal Assist   Washed hands and face while seated. Cues for thoroughness and sequencing  Modified [place]    UB Dressing Minimal Assist   Modified [place]    LB Dressing Maximal Assist   B socks  Minimal Assist    Bathing Moderate Assist  Minimal Assist    Toileting Moderate Assist   Including hygiene task  Stand by Assist    Bed Mobility  Supine to sit: Moderate Assist   Sit to supine: Minimal Assist   Supine to sit: Supervision   Sit to supine: Supervision    Functional Transfers Sit><stand EOB: Minimal Assist  Sit><stand BSC: Mod A   Stand by Assist    Functional Mobility Minimal Assist w/ w/w  Light mobility in room to prep for bathrm mobility  Stand by Assist    Balance Sitting:     Static: SBA    Dynamic:Min A  Standing: Min A w/ w/w     Activity Tolerance Fair-  Noted fatigue w/ min to moderate activity. Frequent rest breaks reinforced. Fair+   Visual/  Perceptual Glasses: yes                  Hand Dominance R   AROM (PROM) Strength Additional Info:    RUE  WFL 3+/5 good  and wfl FMC/dexterity noted during ADL tasks       LUE WFL 3+/5 good  and wfl FMC/dexterity noted during ADL tasks       Hearing: severely [place]  Sensation:  No c/o numbness or tingling  Tone: WFL   Edema: none noted    Comments: Upon arrival patient lying in bed. Pt agreeable to OT session this date. Therapist educated pt on role of OT. At end of session, patient lying in bed (Bed alarm on) with call light and phone within reach, all lines and tubes intact. Overall patient demonstrated decreased independence and safety during completion of ADL/functional transfer/mobility tasks. Pt would benefit from continued skilled OT to increase safety and independence with completion of ADL/IADL tasks for functional independence and quality of life.     Treatment: OT treatment provided this date includes: Facilitation of bed mobility (education/cues for body mechanics, sequencing, attention), unsupported sitting balance (addressing posture, weight shifting, dynamic reaching to prep for ADL's), functional transfers (various surfaces (EOB,BSC) w/ education/cues for safety/hand placement), standing tolerance tasks (addressing posture, balance and activity tolerance while incorporating light functional reaching impacting ADLs and functional activity) and functional ambulation tasks with w/w (w/ education/cuing on posture, w/w management, sequencing and safety). Therapist facilitated self-care retraining: UB/LB self-care tasks (gown, socks), toileting task (including hygiene) and seated grooming tasks while educating/cuing pt on modified techniques, sequencing, posture, safety and energy conservation techniques. Skilled monitoring of HR, O2 sats and pts response to treatment. Vitals:   Pt on room air. At rest: O2 sat=97%, HR=89 bpm  Seated EOB: O2 sat=93-94%  Post steps from EOB>BSC: O2 sat=96-98%, HR=98 bpm  End of session: O2 sat=97%, 100 bpm  Noted fatigue w/ moderate activity. Reinforced rest breaks and pursed lip breathing throughout. Rehab Potential: Good for established goals     Patient / Family Goal: not stated      Patient and/or family were instructed on functional diagnosis, prognosis/goals and OT plan of care. Demonstrated poor understanding. Eval Complexity: Low    Time In: 11:27  Time Out: 11:50  Total Treatment Time: 13 minutes    Min Units   OT Eval Low 97165  X  1   OT Eval Medium 13259      OT Eval High 51130      OT Re-Eval [id number]       Therapeutic Ex 73804       Therapeutic Activities 48883  3  0   ADL/Self Care 18971  10  1   Orthotic Management 85426       Manual [id number]     Neuro Re-Ed 73689       Non-Billable Time          Evaluation Time additionally includes thorough review of current medical information, gathering information on past medical history/social history and prior level of function, interpretation of standardized testing/informal observation of tasks, assessment of data and development of plan of care and goals.         Jodie Pickens OTR/L #[id number]

## 2022-01-12 NOTE — PROGRESS NOTES
[facility]  Family Medicine Attending    S: 81 y.o. female with past medical history of CAD, A. fib anticoagulated with warfarin, CHF, baseline cognitive decline. Apparently, she had a fall and hit her head at her Assisted Living facility. Noted to have altered mental status. Exam and lab were ok, with little change from previous, but family members note significant mental status changes. Today, sleeping, but arouses easily. Responds appropriately to my questions    O: VS- Blood pressure (!) 104/54, pulse 111, temperature 98.4 °F (36.9 °C), temperature source Temporal, resp. rate 18, height 5' 6\" (1.676 m), weight 245 lb (111.1 kg), SpO2 96 %, not currently breastfeeding. Exam is as noted by resident with the following changes, additions or corrections:  Awake, alert, very [place]  Heart - irregular, tachycardic  Lungs - clear  Ext - bilateral edema  Neuro - appropriate responses. Easily confused   No focal findings    Impressions:   Principal Problem:    AMS (altered mental status)  Active Problems:    Hypothyroidism    Permanent atrial fibrillation (HCC)    Essential hypertension    Moderate obesity    Chronic anticoagulation    Chronic diastolic (congestive) heart failure (HCC)    Frequent falls    Cognitive dysfunction  Resolved Problems:    * No resolved hospital problems. *      Plan:   Mental status appears baseline   Consider sleep study for possible RICARDA - did not show RICARDA in 3/2020   PT/OT - may need BENI   Otherwise, could return to assisted living     Attending Physician Statement  I have reviewed the chart and seen the patient with the resident(s). I personally reviewed images, EKG's and similar tests, if present. I personally reviewed and performed key elements of the history and exam.  I have reviewed and confirmed student and/or resident history and exam with changes as indicated above. I agree with the assessment, plan and orders as documented by the resident.   Please refer to the resident and/or student note for additional information.       Alistair Dash MD

## 2022-01-12 NOTE — CARE COORDINATION
1/12/22 Transition of Care: patient presents from [facility] with altered mental status and positive covid. She is on room air. She has resolved her confusion in the ER. She is on iv rocephin due to a uti being found. PT eval 13/24 and OT pending. Message left for daughter to discuss BENI before returning to assisted living. Will await return call.  Electronically signed by Jose Hutson RN CM on 1/12/2022 at 1:44 PM

## 2022-01-12 NOTE — PROGRESS NOTES
[facility]  Family Medicine Residency Program  Progress Note    Patient:  Katey Craig 81 y.o. female MRN: [id number]     Date of Service: 1/12/2022     CC: Fall  Overnight events: No overnight events    Subjective     Patient seen and examined at bedside this morning. She is easily arousable and conversant at the bedside. PT/OT evaluations completed. She was accepted to [facility], discharge complete and awaiting transport. Otherwise stable  No acute complaints at the current time. Objective     Physical Exam:  · Vitals: BP (!) 146/99   Pulse 88   Temp 98 °F (36.7 °C) (Temporal)   Resp 18   Ht 5' 6\" (1.676 m)   Wt 245 lb (111.1 kg)   SpO2 98%   BMI 39.54 kg/m²     · I & O - 24hr: No intake/output data recorded. § General Appearance: well-developed and well nourished, in no acute distress and appears in no distress. Nontoxic. § Skin: warm and dry  § Pulmonary/Chest: Clear to auscultation bilaterally, no chest wall tenderness. § Cardiovascular: Normal rate, irregular rhythm, no murmur  § Abdomen: soft, non-tender  § Extremities: no cyanosis and no clubbing  § Musculoskeletal: 1+ BLE edema  § Neurologic: no new findings and engages in conversation with appropriate answers. Oriented to self, date of birth.   No FND  Subject  Pertinent Labs & Imaging Studies   april  CBC with Differential:    Lab Results   Component Value Date    WBC 3.3 01/12/2022    RBC 3.97 01/12/2022    HGB 11.9 01/12/2022    HCT 37.0 01/12/2022     01/12/2022    MCV 93.2 01/12/2022    MCH 30.0 01/12/2022    MCHC 32.2 01/12/2022    RDW 14.6 01/12/2022    NRBC 0.9 12/29/2016    SEGSPCT 54 03/17/2014    METASPCT 0.9 01/05/2022    LYMPHOPCT 16.6 01/12/2022    PROMYELOPCT 0.9 12/31/2016    MONOPCT 15.7 01/12/2022    MYELOPCT 0.9 01/06/2022    BASOPCT 1.2 01/12/2022    MONOSABS 0.51 01/12/2022    LYMPHSABS 0.54 01/12/2022    EOSABS 0.20 01/12/2022    BASOSABS 0.04 01/12/2022     CMP:    Lab Results   Component Value Date     01/12/2022    K 3.7 01/12/2022     01/12/2022    CO2 31 01/12/2022    BUN 16 01/12/2022    CREATININE 0.9 01/12/2022    GFRAA >60 01/12/2022    LABGLOM 59 01/12/2022    GLUCOSE 79 01/12/2022    GLUCOSE 113 02/14/2012    PROT 5.7 01/12/2022    LABALBU 3.0 01/12/2022    LABALBU 4.4 02/14/2012    CALCIUM 8.6 01/12/2022    BILITOT 0.5 01/12/2022    ALKPHOS 58 01/12/2022    AST 26 01/12/2022    ALT 22 01/12/2022       Resident's Assessment and Marta Avina is a 81 y.o. female    Discharge pending transport to facility. PT/OT evaluations completed. Otherwise medically stable at the current time. 1.  Altered mental status  - Evaluation at bedside patient appears to be at her baseline mental status. She was very easily arousable, able to engage in conversation and able to provide appropriate answers. She is alert and oriented to self, date of birth, city of birth.  - Of note patient does have a chronic history of multiple falls at [place]. - Though she does appear to be at her baseline currently, regardless we will follow-up appropriate imaging and lab work as needed. - Most recent TSH obtained 1/4 was 8.9.  - LFTs were normal, ammonia 11, electrolytes normal, sugar normal, CT imaging of the head also negative and without acute process. - UA was consistent with infection, urine culture sent. Though this is chronic for patient and she does have a chronic history of pathological urine  - Continue Rocephin IV  - She does have a history of nephrolithiasis (nonobstructing) that was noted on previous imaging. Blood not seen on UA, not complaining of any flank pain or severe abdominal groin pain. - Consider further long-term placement options such as BENI     2. History of COVID-19 infection  -CT imaging/x-ray consistent with resolving pneumonia.   At this time she is not oxygen requiring.  - Regardless blood cultures are pending for AMS work-up.     3.  A. fib anticoagulated with warfarin  - We will get a stat PT/INR and then get it daily.  - INR remaining stable on 10 mg nightly. - Resume home dose medication  - Toprol-XL home medication     4.   History of COPD  - Continue home medication Xopenex                      PT/OT evaluation: Pending  DVT prophylaxis/ GI prophylaxis: Warfarin/Protonix  Disposition: Telemetry pending disposition to facility.  Víctor Rios MD,   Attending physician: Dr. Fer Tristan

## 2022-01-12 NOTE — CARE COORDINATION
1/12/22 Update CM Note; Spoke with patients daughter and went over the community facilities that take covid patients. After review she chose [facility]. Referral sent to Isis Rasheed at [facility].  Electronically signed by Torrey Nieves RN CM on 1/12/2022 at 3:11 PM

## 2022-01-13 NOTE — PROGRESS NOTES
[facility]  Family Medicine Attending    S: 81 y.o. female with past medical history of CAD, A. fib anticoagulated with warfarin, CHF, baseline cognitive decline. Apparently, she had a fall and hit her head at her Assisted Living facility. Noted to have altered mental status. Exam and lab were ok, with little change from previous, but family members note significant mental status changes. Today, complains of chronic fatigue, weakness. Says she would be better if she could hear, but thinks that she lost hearing aids    O: VS- Blood pressure (!) 116/49, pulse 76, temperature 98.6 °F (37 °C), temperature source Oral, resp. rate 16, height 5' 6\" (1.676 m), weight 245 lb (111.1 kg), SpO2 94 %, not currently breastfeeding. Exam is as noted by resident with the following changes, additions or corrections:  Awake, alert, very [place]  Heart - irregular, tachycardic  Lungs - clear  Ext - bilateral edema  Neuro - appropriate responses. Easily confused   No focal findings    Impressions:   Principal Problem:    AMS (altered mental status)  Active Problems:    Hypothyroidism    Permanent atrial fibrillation (HCC)    Essential hypertension    Moderate obesity    Chronic anticoagulation    Chronic diastolic (congestive) heart failure (HCC)    Frequent falls    Cognitive dysfunction  Resolved Problems:    * No resolved hospital problems. *      Plan:   Mental status appears baseline   Consider sleep study for possible RICARDA - did not show RICARDA in 3/2020   PT/OT - may need BENI   OK to discharge to [facility] today. Continue warfarin 10/day and other home meds     Attending Physician Statement  I have reviewed the chart and seen the patient with the resident(s). I personally reviewed images, EKG's and similar tests, if present. I personally reviewed and performed key elements of the history and exam.  I have reviewed and confirmed student and/or resident history and exam with changes as indicated above.   I agree with the assessment, plan and orders as documented by the resident. Please refer to the resident and/or student note for additional information.       Walt Floyd MD

## 2022-01-13 NOTE — CARE COORDINATION
1/13/22 Update CM Note; Patient accepted and ok for discharge today. [facility] to transport patient with pickup 2:00pm. Floor notified of above. Envelope/PASSAR/ambulance form completed and sent/andres and destination completed.  Electronically signed by Anna Hodgson RN Cm on 1/13/2022 at 11:37 AM

## 2022-01-13 NOTE — CARE COORDINATION
1/13/22 Update CM note: Patient remains observation . [facility] for today. Perfect serve sent to request possible discharge for today.  Electronically signed by Marisa Esquivel RN CM on 1/13/2022 at 11:05 AM

## 2022-01-13 NOTE — CARE COORDINATION
1/13, SW contacted [facility] to cancel transport due to [facility] coming to pick patient up today. SW to follow for further discharge planning needs.       DIPESH Ross  [facility] Case Management  [phone]

## 2022-01-13 NOTE — PROGRESS NOTES
[facility]  Family Medicine Residency Program  Progress Note    Patient:  Collin Franco 81 y.o. female MRN: [id number]     Date of Service: 1/13/2022     CC: Fall  Overnight events: No overnight events    Subjective     Patient seen and examined at bedside this morning. She is easily arousable and conversant at the bedside. PT/OT evaluations completed. She was accepted to [facility], discharge complete and awaiting transport. Otherwise stable  No acute complaints at the current time. Objective     Physical Exam:  · Vitals: /67   Pulse 86   Temp 97.6 °F (36.4 °C) (Oral)   Resp 16   Ht 5' 6\" (1.676 m)   Wt 245 lb (111.1 kg)   SpO2 94%   BMI 39.54 kg/m²     I & O - 24hr: I/O this shift:  In: 60 [P.O.:60]  · Out: -    § General Appearance: well-developed and well nourished, in no acute distress and appears in no distress. Nontoxic. § Skin: warm and dry  § Pulmonary/Chest: Clear to auscultation bilaterally, no chest wall tenderness. § Cardiovascular: Normal rate, irregular rhythm, no murmur  § Abdomen: soft, non-tender  § Extremities: no cyanosis and no clubbing  § Musculoskeletal: 1+ BLE edema  § Neurologic: no new findings and engages in conversation with appropriate answers. Oriented to self, date of birth.   No FND  Subject  Pertinent Labs & Imaging Studies   april  CBC with Differential:    Lab Results   Component Value Date    WBC 3.5 01/13/2022    RBC 4.64 01/13/2022    HGB 14.6 01/13/2022    HCT 43.8 01/13/2022     01/13/2022    MCV 94.4 01/13/2022    MCH 31.5 01/13/2022    MCHC 33.3 01/13/2022    RDW 14.7 01/13/2022    NRBC 0.9 12/29/2016    SEGSPCT 54 03/17/2014    METASPCT 0.9 01/05/2022    LYMPHOPCT 25.6 01/13/2022    PROMYELOPCT 0.9 12/31/2016    MONOPCT 13.5 01/13/2022    MYELOPCT 0.9 01/06/2022    BASOPCT 1.4 01/13/2022    MONOSABS 0.47 01/13/2022    LYMPHSABS 0.89 01/13/2022    EOSABS 0.16 01/13/2022    BASOSABS 0.05 01/13/2022     CMP:    Lab Results Component Value Date     01/13/2022    K 3.8 01/13/2022     01/13/2022    CO2 26 01/13/2022    BUN 18 01/13/2022    CREATININE 0.9 01/13/2022    GFRAA >60 01/13/2022    LABGLOM 59 01/13/2022    GLUCOSE 76 01/13/2022    GLUCOSE 113 02/14/2012    PROT 6.1 01/13/2022    LABALBU 3.2 01/13/2022    LABALBU 4.4 02/14/2012    CALCIUM 8.8 01/13/2022    BILITOT 0.4 01/13/2022    ALKPHOS 65 01/13/2022    AST 27 01/13/2022    ALT 19 01/13/2022       Resident's Assessment and Jose Encarnacion is a 81 y.o. female    Discharge pending transport to facility. PT/OT evaluations completed. Otherwise medically stable at the current time. 1.  Altered mental status  - Evaluation at bedside patient appears to be at her baseline mental status. She was very easily arousable, able to engage in conversation and able to provide appropriate answers. She is alert and oriented to self, date of birth, city of birth.  - Of note patient does have a chronic history of multiple falls at [facility]. - Though she does appear to be at her baseline currently, regardless we will follow-up appropriate imaging and lab work as needed. - Most recent TSH obtained 1/4 was 8.9.  - LFTs were normal, ammonia 11, electrolytes normal, sugar normal, CT imaging of the head also negative and without acute process. - UA was consistent with infection, urine culture sent. Though this is chronic for patient and she does have a chronic history of pathological urine  - Continue Rocephin IV  - She does have a history of nephrolithiasis (nonobstructing) that was noted on previous imaging. Blood not seen on UA, not complaining of any flank pain or severe abdominal groin pain. - Consider further long-term placement options such as BENI  - Urine culture negative     2. History of COVID-19 infection  -CT imaging/x-ray consistent with resolving pneumonia.   At this time she is not oxygen requiring.  - Regardless blood cultures are pending for AMS

## 2022-01-13 NOTE — DISCHARGE INSTR - COC
Continuity of Care Form    Patient Name: Tuan Gee   :  1935  MRN:  [id number]    Admit date:  2022  Discharge date:  ***    Code Status Order: DNR-CCA   Advance Directives:      Admitting Physician:  Navdeep Flores MD  PCP: Navdeep Flores MD    Discharging Nurse: [facility] Unit/Room#: 3437/3472-Q  Discharging Unit Phone Number: ***    Emergency Contact:   Extended Emergency Contact Information  Primary Emergency Contact: Roscoe Nettles  Address: [address] [place] [address], [address] [place] [address] Phone: [phone]  Mobile Phone: [phone]  Relation: Spouse  Secondary Emergency Contact: Leoncio Tovar  Address: [place] BY PATIENT   [address] Phone: [phone]  Mobile Phone: [phone]  Relation: Child    Past Surgical History:  Past Surgical History:   Procedure Laterality Date    CARDIAC CATHETERIZATION  3/22/14    CARPAL TUNNEL RELEASE      Right hand. CATARACT REMOVAL  2005    right, bilateral cataract surgery. CATARACT REMOVAL WITH IMPLANT Bilateral     CHOLECYSTECTOMY  1972    COLONOSCOPY      FINGER SURGERY  2011    S/P removal of cyst from right index finger. FRACTURE SURGERY Left     arm fractured-casted, Bilateral feet fractures-casted. INCONTINENCE SURGERY  2005    Urinary incontinence, S/P bladder suspension. INNER EAR SURGERY      tube place in right ear    1 hospitals ARTHROPLASTY Bilateral 2005    PARATHYROIDECTOMY      Three times    SHOULDER ARTHROPLASTY Left 2009    SHOULDER ARTHROPLASTY Left      shoulder total replacement.     THYROID SURGERY  2005    recurrent    THYROIDECTOMY  1960       Immunization History:   Immunization History   Administered Date(s) Administered    COVID-19, Pfizer, PF, 30mcg/0.3mL 2021, 02/15/2021    Influenza Vaccine, unspecified formulation 2016    Influenza Virus Vaccine 2014, 10/16/2018    Influenza, MDCK Quadv, IM, PF (Flucelvax 2 yrs and older) 09/21/2017    Influenza, Quadv, adjuvanted, 65 yrs +, IM, PF (Fluad) 12/14/2020    Influenza, Triv, inactivated, subunit, adjuvanted, IM (Fluad 65 yrs and older) 10/31/2019    Pneumococcal Conjugate 13-valent ([id number]) 09/17/2015    Pneumococcal Polysaccharide ([id number]) 01/04/2011    Tdap (Boostrix, Adacel) 09/01/2015       Active Problems:  Patient Active Problem List   Diagnosis Code    Depression F32. A    Hypothyroidism E03.9    Permanent atrial fibrillation (HCC) I48.21    History of artificial lens replacement Z96.1    Essential hypertension I10    Moderate obesity E66.8    Greater trochanteric bursitis M70.60    Chronic anticoagulation Z79.01    Chronic obstructive pulmonary disease ([facility]) J44.9    Mixed stress and urge urinary incontinence N39.46    Fuchs' endothelial dystrophy H18.519    Pseudophakia, both eyes Z96.1    Chronic diastolic (congestive) heart failure ([facility]) I50.32    Pure hypercholesterolemia E78.00    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    Patent foramen ovale Q21.1    Coronary arteriosclerosis in native artery I25.10    Frequent falls R29.6    Dementia ([facility]) F03.90    Ambulatory dysfunction R26.2    Cognitive dysfunction F09    Nephrolithiasis N20.0    AMS (altered mental status) R41.82       Isolation/Infection:   Isolation            No Isolation          Patient Infection Status       Infection Onset Added Last Indicated Last Indicated By Review Planned Expiration Resolved Resolved By    COVID-19 01/04/22 01/04/22 01/12/22 COVID-19 01/20/22 01/26/22      Resolved    COVID-19 (Rule Out) 01/04/22 01/04/22 01/04/22 COVID-19 & Influenza Combo (Ordered)   01/04/22 Rule-Out Test Resulted    COVID-19 (Rule Out) 04/19/21 04/19/21 04/19/21 COVID-19, Rapid (Ordered)   04/19/21 Rule-Out Test Resulted    COVID-19 (Rule Out) 04/16/21 04/16/21 04/16/21 COVID-19, Rapid (Ordered)   04/16/21 Rule-Out Test Resulted    COVID-19 (Rule Out) 05/10/20 05/10/20 05/10/20 COVID-19 (Ordered)   05/10/20 Rule-Out Test Resulted            Nurse Assessment:  Last Vital Signs: /67   Pulse 86   Temp 97.6 °F (36.4 °C) (Oral)   Resp 16   Ht 5' 6\" (1.676 m)   Wt 245 lb (111.1 kg)   SpO2 94%   BMI 39.54 kg/m²     Last documented pain score (0-10 scale): Pain Level: 0  Last Weight:   Wt Readings from Last 1 Encounters:   22 245 lb (111.1 kg)     Mental Status:  {IP PT MENTAL STATUS:}    IV Access:  { ANDRESSA IV ACCESS:164587541}    Nursing Mobility/ADLs:  Walking   {CHP DME QLG}  Transfer  {CHP DME ZYWI:540453085}  Bathing  {CHP DME OPAQ:000923513}  Dressing  {CHP DME ZRIQ:095676506}  Toileting  {CHP DME SMS}  Feeding  {CHP DME WQBK:222519371}  Med Admin  {CHP DME ZDX}  Med Delivery   { ANDRESSA MED Delivery:553849740}    Wound Care Documentation and Therapy:        Elimination:  Continence: Bowel: {YES / CC:83097}  Bladder: {YES / X}  Urinary Catheter: {Urinary Catheter:617015853}   Colostomy/Ileostomy/Ileal Conduit: {YES / JQ:42509}       Date of Last BM: ***    Intake/Output Summary (Last 24 hours) at 2022 1133  Last data filed at 2022 0930  Gross per 24 hour   Intake 180 ml   Output 650 ml   Net -470 ml     I/O last 3 completed shifts:   In: 240 [P.O.:240]  Out: 650 [Urine:650]    Safety Concerns:     [employer] Safety Concerns:602943504}    Impairments/Disabilities:      [employer] Impairments/Disabilities:111494746}    Nutrition Therapy:  Current Nutrition Therapy:   [employer] Diet List:882819510}    Routes of Feeding: {CHP DME Other Feedings:015415844}  Liquids: {Slp liquid thickness:31727}  Daily Fluid Restriction: {CHP DME Yes amt example:667402637}  Last Modified Barium Swallow with Video (Video Swallowing Test): {Done Not Done AZXL:804931811}    Treatments at the Time of Hospital Discharge:   Respiratory Treatments: ***  Oxygen Therapy:  {Therapy; copd oxygen:37664}  Ventilator:    { CC Vent KFXC:699340690}    Rehab Therapies: {THERAPEUTIC INTERVENTION:3852453157}  Weight Bearing Status/Restrictions: { CC Weight Bearin}  Other Medical Equipment (for information only, NOT a DME order):  {EQUIPMENT:653550587}  Other Treatments: ***    Patient's personal belongings (please select all that are sent with patient):  {CHP DME Belongings:411935654}    RN SIGNATURE:  {Esignature:489342182}    CASE MANAGEMENT/SOCIAL WORK SECTION    Inpatient Status Date: ***    Readmission Risk Assessment Score:  Readmission Risk              Risk of Unplanned Readmission:  0           Discharging to Facility/ Agency   Name: [facility]   Address:  Phone:  Fax:    Dialysis Facility (if applicable)   Name:  Address:  Dialysis Schedule:  Phone:  Fax:    / signature: {Esignature:781499799}    PHYSICIAN SECTION    Prognosis: Good    Condition at Discharge: Stable    Rehab Potential (if transferring to Rehab): Good    Recommended Labs or Other Treatments After Discharge:   Obtain an INR in 7 days. Goal INR of 2-3. Currently on warfarin 10mg qd. Discharge INR 2.7  If able please supply hospital bed. Have bed rails applied, patient does have a history of frequent falls from bed. Physician Certification: I certify the above information and transfer of Emelia Villanueva  is necessary for the continuing treatment of the diagnosis listed and that she requires [facility] for greater 30 days.      Update Admission H&P: No change in H&P    PHYSICIAN SIGNATURE:  Electronically signed by Julianne Candelario MD on 22 at 11:35 AM EST

## 2022-01-14 NOTE — DISCHARGE SUMMARY
Discharge Summary    Yue Suero  :  1935  MRN:  [id number]    Admit date:  2022  Discharge date:  2022    Admitting Physician:  Israel Don MD    Discharge Diagnoses:    Patient Active Problem List   Diagnosis    Depression    Hypothyroidism    [facility] [address]    History of artificial lens replacement    Essential hypertension    Moderate obesity    Greater trochanteric bursitis    Chronic anticoagulation    Chronic obstructive pulmonary disease (HCC)    Mixed stress and urge urinary incontinence    Fuchs' endothelial dystrophy    Pseudophakia, both eyes    Chronic diastolic (congestive) heart failure ([address]    Pure hypercholesterolemia    Spinal stenosis of lumbar region with neurogenic claudication    Patent foramen ovale    Coronary arteriosclerosis in native artery    Frequent falls    Dementia ([facility] [address]    Ambulatory dysfunction    Cognitive dysfunction    Nephrolithiasis    AMS (altered mental status)       Admission Condition:  stable    Discharged Condition:  good    Hospital Course:  Yue Suero is a 81 y.o. female with a past medical history of A. fib on warfarin, HTN, baseline cognitive deficit. Presented to the ED from [facility] for concerns of altered mentation and fall. It was noted patient had struck her head. Given that the patient is anticoagulated chronically with warfarin. Imaging of the head was obtained including CT head, CTA head and neck. Imaging was negative for acute process. Further imaging was obtained including CTA chest, CT abdomen and pelvis. This imaging was also negative for acute process. UA was noted to be consistent with infection, 1 dose Rocephin was given. Urine culture was negative for any growth. Rest of the work-up also within normal limits and unremarkable. Patient was able to converse appropriately and appeared to be at her baseline mental status and activity level when examined at the bedside. It was determined that patient would benefit from placement in a [facility]. PT/OT evaluations were completed. Case management followed and patient was disposition to [facility]. Otherwise patient remained hemodynamically stable for the duration of the hospital admission. She was able to tolerate a regular diet without any difficulty. Discharge plan:   1. Follow-up warfarin dosing, maintained currently on 10 mg daily. INR remained therapeutic during the inpatient admission.       Discharge Medications:         Medication List      CONTINUE taking these medications    acetaminophen 325 MG tablet  Commonly known as: TYLENOL  Take 2 tablets by mouth every 6 hours as needed for Pain or Fever     * albuterol sulfate  (90 Base) MCG/ACT inhaler  Commonly known as: Ventolin HFA  Inhale 2 puffs into the lungs every 6 hours as needed for Wheezing     * albuterol (2.5 MG/3ML) 0.083% nebulizer solution  Commonly known as: PROVENTIL  Take 3 mLs by nebulization every 6 hours as needed for Wheezing     B-Complex (Folic Acid) Tabs  TAKE 1 TABLET BY MOUTH DAILY     bisacodyl 10 MG suppository  Commonly known as: DULCOLAX  Place 1 suppository rectally daily as needed for Constipation     escitalopram 10 MG tablet  Commonly known as: Lexapro  Take 1 tablet by mouth daily     famotidine 20 MG tablet  Commonly known as: PEPCID  Take 1 tablet by mouth 2 times daily     furosemide 40 MG tablet  Commonly known as: LASIX  Take 1 tablet by mouth daily     guaiFENesin 600 MG extended release tablet  Commonly known as: MUCINEX     levothyroxine 125 MCG tablet  Commonly known as: SYNTHROID  Take 1 tablet by mouth Daily     lisinopril 5 MG tablet  Commonly known as: PRINIVIL;ZESTRIL  Take 1 tablet by mouth daily     magnesium hydroxide 400 MG/5ML suspension  Commonly known as: MILK OF MAGNESIA  Take 5 mLs by mouth daily as needed for Constipation     meclizine 25 MG tablet  Commonly known as: ANTIVERT  Take 1 tablet by mouth 3 times daily as needed for Dizziness     metoprolol succinate 50 MG extended release tablet  Commonly known as: TOPROL XL  Take 1 tablet by mouth nightly     spironolactone 25 MG tablet  Commonly known as: ALDACTONE  Take 1 tablet by mouth daily     * vitamin C 1000 MG tablet  TAKE 1 TABLET BY MOUTH EVERY DAY     * vitamin C 1000 MG tablet     * Vitamin D3 25 MCG (1000 UT) Caps  TAKE 1 TABLET BY MOUTH EVERY DAY     * Vitamin D3 25 MCG (1000 UT) Caps     warfarin 5 MG tablet  Commonly known as: COUMADIN  Take as directed. If you are unsure how to take this medication, talk to your nurse or doctor. Original instructions: Take 2 tablets by mouth nightly     Xopenex 0.63 MG/3ML nebulization  Generic drug: levalbuterol         * This list has 6 medication(s) that are the same as other medications prescribed for you. Read the directions carefully, and ask your doctor or other care provider to review them with you. Consults:  none    Significant Diagnostic Studies: As above    Labs:  Na/K/Cl/CO2:  140/3.8/102/26 (01/13 7414)  BUN/Cr/glu/ALT/AST/amyl/lip:  18/0.9/--/19/27/--/-- (01/13 5336)  WBC/Hgb/Hct/Plts:  3.5/14.6/43.8/202 (01/13 0594)  [unfilled]  estimated creatinine clearance is 58 mL/min (based on SCr of 0.9 mg/dL). New Imaging:  CT HEAD WO CONTRAST   Final Result   1. No acute intracranial abnormality. 2. Chronic small vessel ischemic disease. CT ABDOMEN PELVIS W IV CONTRAST Additional Contrast? None   Final Result   1. Colonic diverticulosis without evidence of diverticulitis. 2. Small hiatal hernia. CTA PULMONARY W CONTRAST   Final Result   1. There is no pulmonary embolus   2. Very vague patchy bilateral pulmonary infiltrates highly suspicious for   viral pneumonia. 3. Reactive minimal lymphadenopathy within the right hilum. CTA HEAD W CONTRAST   Final Result   No apparent arterial high grade stenosis, occlusion or aneurysm within the   head or neck.          CTA NECK W CONTRAST Final Result   No apparent arterial high grade stenosis, occlusion or aneurysm within the   head or neck. XR CHEST PORTABLE   Final Result   1. Very vague bilateral patchy pulmonary infiltrates slightly more prominent   within the left lower lobe. Treatments:   As above    Discharge Exam:  Review progress note on day of discharge  Disposition:   [facility] facility    Future Appointments   Date Time Provider Hernandez Mcintyre   1/27/2022  2:15 PM Jong Velasco MD [address] [place] [address]       More than 30 minutes was spent in preparation of this patient's discharge including, but not limited to, examination, preparation of documents, prescription preparation, counseling and coordination. Signed:   Pacheco Patrick MD  1/14/2022, 4:16 PM

## 2022-01-27 NOTE — PROGRESS NOTES
OUTPATIENT CARDIOLOGY FOLLOW-UP    Name: Janell Hernadez    Age: 81 y.o. Primary Care Physician: Johann Esparza MD    Date of Service: 1/27/2022    Chief Complaint: Permanent atrial fibrillation, chronic obstructive lung disease, chronic diastolic heart failure, hypertension, moderate obesity, cognitive dysfunction, COVID-19 infection    Interim History: Since her most recent assessment, the patient was recently evaluated following transfer from her extended care facility in the face of altered mental status and in the face of a COVID-19 infection and at the time of hospitalization the documentation of a COVID-19 pneumonia. She was subsequently treated and following stabilization return to her extended care facility. In the face of her chronic cognitive dysfunction, she has limited recollection of these events. With limited historical capabilities, she relates no clear evidence of subsequent decompensation and at the time of assessment remained hemodynamically stable with somewhat marginal rate control of her atrial arrhythmias in the face of her recent viral pneumonia. No clearly defined focal neurologic symptomatology is noted. Review of Systems: The remainder of a complete multisystem review including consitutional, central nervous, respiratory, circulatory, gastrointestinal, genitourinary, endocrinologic, hematologic, musculoskeletal and psychiatric are negative. Past Medical History:  Past Medical History:   Diagnosis Date    Anticoagulated on Coumadin     on Coumadin for this Dr. Mehrdad Martell controls    Atrial fibrillation ([facility]     Basal cell carcinoma of neck     CAD (coronary artery disease)     History of luminal irregularities of the coronary artery, stable.     CHF (congestive heart failure) (HCC)     stage I diastolic dysfunction on 0/30/67    Depression 11/16/2010    DJD (degenerative joint disease)     SEVERE IN RIGHT HAND, IN MULTIPLE OTHER JOINTS    GERD (gastroesophageal reflux disease) 11/16/2010    History of cardiovascular stress test 03/18/2014    lexiscan    Hyperparathyroidism ([facility] [address] 11/16/2010    Had parathyroidectomy    Hypertension 11/16/2010    Hypothyroidism 11/16/2010    Mild mitral regurgitation 7/10/14    Osteoarthritis 11/16/2010    Pulmonary hypertension ([facility] [address] 7/10/14    mild    Renal calculi     x3    Tricuspid regurgitation 7/10/14    mild-moderate    Urinary incontinence 11/16/2010       Past Surgical History:  Past Surgical History:   Procedure Laterality Date    CARDIAC CATHETERIZATION  3/22/14    CARPAL TUNNEL RELEASE      Right hand.  CATARACT REMOVAL  6/2005    right, bilateral cataract surgery.  CATARACT REMOVAL WITH IMPLANT Bilateral     CHOLECYSTECTOMY  1972    COLONOSCOPY      FINGER SURGERY  7/2011    S/P removal of cyst from right index finger.  FRACTURE SURGERY Left     arm fractured-casted, Bilateral feet fractures-casted.  INCONTINENCE SURGERY  5/2005    Urinary incontinence, S/P bladder suspension.  INNER EAR SURGERY  2011    tube place in right ear   [address]    KIDNEY STONE SURGERY  1981    KNEE ARTHROPLASTY Bilateral 9/2005    PARATHYROIDECTOMY      Three times    SHOULDER ARTHROPLASTY Left 2009    SHOULDER ARTHROPLASTY Left      shoulder total replacement.     THYROID SURGERY  5/2005    recurrent    THYROIDECTOMY  1960       Family History:  Family History   Problem Relation Age of Onset    Heart Disease Mother     Heart Attack Mother     Arthritis Mother     Heart Disease Father     Mental Illness Father     Diabetes Father     Heart Disease Brother     Cancer Brother     Cancer Son     Heart Disease Sister     Heart Disease Brother     Heart Surgery Brother        Social History:  Social History     Socioeconomic History    Marital status:      Spouse name: Ruben Sanya Number of children: 3    Years of education: Not on file    Highest education level: High school graduate   Occupational History    Not on file   Tobacco Use    Smoking status: Never Smoker    Smokeless tobacco: Never Used   Vaping Use    Vaping Use: Never used   Substance and Sexual Activity    Alcohol use: Not Currently    Drug use: Never    Sexual activity: Not Currently   Other Topics Concern    Not on file   Social History Narrative    Drinks 1-2 cups of coffee daily     Social Determinants of Health     Financial Resource Strain: Medium Risk    Difficulty of Paying Living Expenses: Somewhat hard   Food Insecurity: No Food Insecurity    Worried About Running Out of Food in the Last Year: Never true    Josiah of Food in the Last Year: Never true   Transportation Needs: Unmet Transportation Needs    Lack of Transportation (Medical): Yes    Lack of Transportation (Non-Medical): No   Physical Activity: Inactive    Days of Exercise per Week: 0 days    Minutes of Exercise per Session: 0 min   Stress: Stress Concern Present    Feeling of Stress : To some extent   Social Connections: Moderately Integrated    Frequency of Communication with Friends and Family: More than three times a week    Frequency of Social Gatherings with Friends and Family: Once a week    Attends Baptism Services: 1 to 4 times per year    Active Member of [address] [place] [address] or Organizations: No    Attends Club or Organization Meetings: Never    Marital Status:    Intimate Partner Violence:     Fear of Current or Ex-Partner: Not on file    Emotionally Abused: Not on file    Physically Abused: Not on file    Sexually Abused: Not on file   Housing Stability:     Unable to Pay for Housing in the Last Year: Not on file    Number of [place] in the Last Year: Not on file    Unstable Housing in the Last Year: Not on file       Allergies:   Allergies   Allergen Reactions    Codeine Other (See Comments)     Hallucination    Penicillins Hives    Vicodin [Hydrocodone-Acetaminophen] Other (See Comments)     Hallucination  Adhesive Tape Rash       Current Medications:  Current Outpatient Medications   Medication Sig Dispense Refill    levalbuterol (XOPENEX) 0.63 MG/3ML nebulization Take 1 ampule by nebulization every 8 hours as needed for Wheezing      warfarin (COUMADIN) 5 MG tablet Take 2 tablets by mouth nightly 30 tablet 5    metoprolol succinate (TOPROL XL) 50 MG extended release tablet Take 1 tablet by mouth nightly 30 tablet 5    meclizine (ANTIVERT) 25 MG tablet Take 1 tablet by mouth 3 times daily as needed for Dizziness 30 tablet 2    magnesium hydroxide (MILK OF MAGNESIA) 400 MG/5ML suspension Take 5 mLs by mouth daily as needed for Constipation      lisinopril (PRINIVIL;ZESTRIL) 5 MG tablet Take 1 tablet by mouth daily 90 tablet 1    levothyroxine (SYNTHROID) 125 MCG tablet Take 1 tablet by mouth Daily 30 tablet 5    furosemide (LASIX) 40 MG tablet Take 1 tablet by mouth daily 60 tablet 2    famotidine (PEPCID) 20 MG tablet Take 1 tablet by mouth 2 times daily 60 tablet 5    escitalopram (LEXAPRO) 10 MG tablet Take 1 tablet by mouth daily 30 tablet 5    Cholecalciferol (VITAMIN D3) 25 MCG (1000 UT) CAPS TAKE 1 TABLET BY MOUTH EVERY DAY 30 capsule 5    bisacodyl (DULCOLAX) 10 MG suppository Place 1 suppository rectally daily as needed for Constipation 30 suppository 2    B-Complex, Folic Acid, TABS TAKE 1 TABLET BY MOUTH DAILY 30 tablet 5    Ascorbic Acid (VITAMIN C) 1000 MG tablet TAKE 1 TABLET BY MOUTH EVERY DAY 30 tablet 5    albuterol (PROVENTIL) (2.5 MG/3ML) 0.083% nebulizer solution Take 3 mLs by nebulization every 6 hours as needed for Wheezing 120 each 5    acetaminophen (TYLENOL) 325 MG tablet Take 2 tablets by mouth every 6 hours as needed for Pain or Fever 120 tablet 5    Cholecalciferol (VITAMIN D3) 25 MCG (1000 UT) CAPS TAKE 1 TABLET BY MOUTH EVERY DAY (Patient not taking: Reported on 1/27/2022)      Ascorbic Acid (VITAMIN C) 1000 MG tablet TAKE 1 TABLET BY MOUTH EVERY DAY (Patient not taking: Reported on 1/27/2022)      spironolactone (ALDACTONE) 25 MG tablet Take 1 tablet by mouth daily (Patient not taking: Reported on 1/27/2022) 30 tablet 6    albuterol sulfate HFA (VENTOLIN HFA) 108 (90 Base) MCG/ACT inhaler Inhale 2 puffs into the lungs every 6 hours as needed for Wheezing (Patient not taking: Reported on 1/27/2022) 18 g 5    guaiFENesin (MUCINEX) 600 MG extended release tablet Take 1,200 mg by mouth daily as needed for Congestion (Patient not taking: Reported on 1/27/2022)       No current facility-administered medications for this visit. Physical Exam:  BP (!) 118/50   Pulse 108   Resp 14   Ht 5' 6\" (1.676 m)   Wt 243 lb (110.2 kg)   BMI 39.22 kg/m²   Wt Readings from Last 3 Encounters:   01/27/22 243 lb (110.2 kg)   01/11/22 245 lb (111.1 kg)   01/06/22 245 lb 4.8 oz (111.3 kg)     The patient is awake, alert and in no discomfort or distress. No gross musculoskeletal deformity is present. No significant skin or nail changes are present. Gross examination of head, eyes, nose and throat are negative. Jugular venous pressure is normal and no carotid bruits are present. Normal respiratory effort is noted with no accessory muscle usage present. Lung fields are clear to ascultation with poor inspiratory effort. Cardiac examination is notable for an irregular rhythm with no palpable thrill. No gallop rhythm or cardiac murmur are identified. A benign abdominal examination is present with the exception of obesity and no masses or organomegaly. Intact pulses are present throughout all extremities and chronic lymphedema is present. No focal neurologic deficits are present.     Laboratory Tests:  Lab Results   Component Value Date    CREATININE 0.9 01/13/2022    BUN 18 01/13/2022     01/13/2022    K 3.8 01/13/2022     01/13/2022    CO2 26 01/13/2022     Lab Results   Component Value Date    BNP 1,607 (H) 03/17/2014     Lab Results   Component Value Date    WBC 3.5 01/13/2022 RBC 4.64 01/13/2022    HGB 14.6 01/13/2022    HCT 43.8 01/13/2022    MCV 94.4 01/13/2022    MCH 31.5 01/13/2022    MCHC 33.3 01/13/2022    RDW 14.7 01/13/2022     01/13/2022    MPV 9.9 01/13/2022     No results for input(s): ALKPHOS, ALT, AST, PROT, BILITOT, BILIDIR, LABALBU in the last 72 hours. Lab Results   Component Value Date    MG 2.2 07/09/2021     Lab Results   Component Value Date    PROTIME 30.4 01/13/2022    PROTIME 25.4 11/02/2021    INR 2.7 01/13/2022     Lab Results   Component Value Date    TSH 1.890 01/04/2022     No components found for: CHLPL  Lab Results   Component Value Date    TRIG 80 11/02/2021    TRIG 46 05/12/2020    TRIG 49 01/03/2019     Lab Results   Component Value Date    HDL 56 11/02/2021    HDL 65 05/12/2020    HDL 51 01/03/2019     Lab Results   Component Value Date    LDLCALC 93 11/02/2021    LDLCALC 77 05/12/2020    LDLCALC 76 01/03/2019       Cardiac Tests:  ECG: A resting electrocardiogram demonstrates evidence of atrial fibrillation with a mean ventricular response of approximately 110 bpm with nonspecific ST changes      ASSESSMENT / PLAN: On clinical basis, the patient presently appears compensated from a cardiovascular standpoint in the face of chronic diastolic heart failure and permanent atrial fibrillation with her condition complicated by her recent COVID-19 pneumonia. Presently, careful monitoring of heart rate control will be necessary in spite of mild elevation relative to her baseline, I have not altered her existing medical regimen. She additionally will benefit from appropriate nutritional support to assist fluid mobilization as well as reducing her risk of progressive debilitation. Careful monitoring of anticoagulation will be necessitated with goals of maintaining prothrombin times in the range of 2.0-2.5 times INR control to appropriately reduce risk of embolic events.   Ongoing careful monitoring of renal function and electrolytes will additionally be necessary to optimize her medical regimen. Based on her overall clinical status, the majority of her care can be provided by primary care and we will further evaluate her should additional cardiovascular difficulties or concerns arise. I would happily reassess her should additional concerns develop. The patient's current medication list, allergies, problem list and results of all previously ordered testing were reviewed at today's visit. Follow-up office visit as needed should additional cardiovascular difficulties or concerns arise    Note: This report was completed using computerized voice recognition software. Every effort has been made to ensure accuracy, however; inadvertent computerized transcription errors may be present. Abiola Bower.  Nas Woden, [address] [place] [address] Cardiology    An electronic copy of this follow-up progress note was forwarded to Dr. Marcello Cifuentes and Prabhu Ply

## 2022-02-07 NOTE — CARE COORDINATION
contacted [facility] and spoke to Kiesha Espinoza who confirmed patient is now at [facility]. Kiesha Espinoza states they are still waiting on patients PCP to confirm medication list and are unable to send at this time.  Kiesha Espinoza requested a call back tomorrow to request med list.

## 2022-02-07 NOTE — CARE COORDINATION
[facility] Transitions Initial Follow Up Call    Call within 2 business days of discharge: Yes    Patient: Kellie Machado Patient : 1935   MRN: <[id number]>  Reason for Admission: AMS  Discharge Date: 22 RARS: Readmission Risk Score: 16.4 ( )      Last Discharge [facility]       Complaint Diagnosis Description Type Department Provider    22 Altered Mental Status Altered mental status, unspecified altered mental status type ED to Hosp-Admission (Discharged) (ADMITTED) [facility] Khadijah Hagan MD; [address] .. Acute Care Course: Admitted to [address] [place] [address] -22 for Acute hypoxemic resp failure d/t COVID 19. She discharged to home. Re-admitted to [address] [place] [address] - for AMS. She discharged to [facility] for -22. She transferred to [facility] from -22. Sig Hx: COVID, Falls, COPD, Dementia    DME:     Conversation: [facility]. Request to  to request updated med list from assisted living. Follow up plan: Will allow time to obtain and fax. Non-face-to-face services provided: Follow Up  No future appointments.     Tiff Quintana RN

## 2022-02-10 NOTE — LETTER
[facility]  [address] [phone]  Phone: [phone]  Fax: [address], DO        February 10, 2022    [address]  [facility] [address]      Dear [facility]: In regards to the recent medication changes. We have reviewed the records sent by [facility], and for the following medication changes:    1) ProAir  mcg/ACT 2 puffs Q6H prn is okay. 2) Aldactone 25 mg QD discontinued- This medication (along with metoprolol 25 mg QD) is managed by cardiology. Please call Dr. Randell Castellon' office to inform them of this change [ [phone]. Patient should also follow up with Dr. Keyla Avila if recommended by his office. Continue the medication change until you hear back from him. 3) Metoprolol 25 mg QD discontinued-Please call Dr. Randell Castellon' office to inform them of this change. Continue the medication change until you hear back from him. 4) Levothyroxine changed from 125 mcg QD to 175 mcg QD. Please keep this change, but we will need to follow up with in office to recheck TSH soon. 5) Warfarin changed from 5 mg BID all days except Wednesday (which is 15 mg total), changed to 7.5 mg QD. We need to recheck the INR to see if this change is acceptable. There is concern that patient was not taking medication correctly before (possible missed doses), so please change to 7.5 mg QD for now with plans to recheck INR in office soon. 6) Dulcolax Suppository 10 mg discontinued-If patient is not having constipation, she does not need to have dulcolax. I would like to address this at her next visit as well, to determine if this change is necessary. 7) New order for loperamide 2 mg QD Q4H for loose stool. If patient is having watery diarrhea, this order may be continued. This also needs to be discussed at follow up visit to determine necessity.      These are a lot of changes that were made at [place] [address], and the best way to address if they are appropriate is an in-person visit at the office. Please have patient (or her daughter) call to schedule follow up visit for the next 1-2 weeks, that we may get some follow up labs and discuss medication changes. Please call and update Dr. Chloe Chacon' office about the changes to Aldactone and Metoprolol made by [facility]. Our phone number is [phone]. Thank you. If you have any questions or concerns, please don't hesitate to call.     Sincerely,        Marquis Ramirez, DO

## 2022-02-16 NOTE — TELEPHONE ENCOUNTER
Daughter calling at the request of [address] - states the facility wants her to be seen re: medication issue re: changes that were made, very vague, she is not sure which meds etc... Please call [facility] for an appropriate apt/recommendations [phone]. Last saw SB in [address] on: 1/27/22.

## 2022-02-16 NOTE — TELEPHONE ENCOUNTER
She should be evaluated by her primary care physician to address issues of appropriateness of alteration of medications while at extended care facility and if issues are present, can discuss status from a cardiovascular standpoint with her primary care physician and for evaluation of

## 2022-02-16 NOTE — TELEPHONE ENCOUNTER
Radha Moe, DO        February 10, 2022    [address] [place] [id number]      Dear [place] [facility]: In regards to the recent medication changes. We have reviewed the records sent by [facility], and for the following medication changes:    1) ProAir  mcg/ACT 2 puffs Q6H prn is okay. 2) Aldactone 25 mg QD discontinued- This medication (along with metoprolol 25 mg QD) is managed by cardiology. Please call Dr. Zhane Carlson' office to inform them of this change [ . Patient should also follow up with Dr. Hank Schroeder if recommended by his office. Continue the medication change until you hear back from him. 3) Metoprolol 25 mg QD discontinued-Please call Dr. Zhane Carlson' office to inform them of this change. Continue the medication change until you hear back from him. 4) Levothyroxine changed from 125 mcg QD to 175 mcg QD. Please keep this change, but we will need to follow up with in office to recheck TSH soon. 5) Warfarin changed from 5 mg BID all days except Wednesday (which is 15 mg total), changed to 7.5 mg QD. We need to recheck the INR to see if this change is acceptable. There is concern that patient was not taking medication correctly before (possible missed doses), so please change to 7.5 mg QD for now with plans to recheck INR in office soon. 6) Dulcolax Suppository 10 mg discontinued-If patient is not having constipation, she does not need to have dulcolax. I would like to address this at her next visit as well, to determine if this change is necessary. 7) New order for loperamide 2 mg QD Q4H for loose stool. If patient is having watery diarrhea, this order may be continued. This also needs to be discussed at follow up visit to determine necessity. These are a lot of changes that were made at [place] [address], and the best way to address if they are appropriate is an in-person visit at the office.  Please have patient (or her daughter) call to schedule follow up visit for the next 1-2 weeks, that we may get some follow up labs and discuss medication changes. Please call and update Dr. Mitchell ' office about the changes to Aldactone and Metoprolol made by [facility]. Our phone number is [phone]. Thank you. If you have any questions or concerns, please don't hesitate to call.     Sincerely,        Nanette Perez, DO

## 2022-02-18 NOTE — PROGRESS NOTES
Labs ordered for hypothyroidism, chronic anticoagulation. Called and spoke with nurse from [facility]. They have talked with Cardiology about medication changes. She will send a list of current medications, and will star the ones that need refills. Does know patient needs levothyroxine 175 mcg refills. Will fax lab orders to them. States she thought she was told someone from this office would come and draw her labs.

## 2022-02-25 NOTE — ED NOTES
Dr. Pita Swift made aware of current vital signs. Order to follow.      Gadiel Lopez RN  02/25/22 [id number]

## 2022-02-25 NOTE — CARE COORDINATION
Pt accepted and insurance auth obtained for [facility]. Transportation set up with [facility] with an ETA of [id number]. They will try to outsource. Nurse will need to call report to [phone]. RN at facility will give a fax number for the pt d/c instructions. Nursing home paperwork and ambulance form placed in chart rack. Updated pt daughter Gabrielle Cori.   Marilyn Whiting RN, CM

## 2022-02-25 NOTE — ED NOTES
Patient incontinent of stool & urine. Hygiene provided, linens changed & patient repositioned.      Alexx Felix RN  02/25/22 [id number]

## 2022-02-25 NOTE — ED NOTES
Spoke segundo Harris at [place] [facility] [phone] for return. DON voiced concern patient has been falling and needs PT/Rehab or may no longer be safe in Assisted Living. DIPESH Russell made aware of same.      Leslee Donovan RN  02/25/22 [id number]

## 2022-02-25 NOTE — ED PROVIDER NOTES
Department of Emergency Medicine   ED  Provider Note  Admit Date/RoomTime: 2/25/2022  6:40 AM  ED Room: 01/01                HPI:  2/25/22, Time: 6:45 AM ZANE Craig is a 81 y.o. female presenting to the ED after a fall, beginning prior to arrival.  The complaint has been constant, moderate in severity, and worsened by nothing. Fall from bed. Complains of head pain and ecchymosis to the left eyebrow. Uncertain if there is any loss of consciousness. She is on Coumadin. She denies any other complaints although when I push on her upper chest and across both sides of her chest this elicits pain. She denies any neck or back pain. She denies any extremity pain or extremity injuries. EMS reports that she fell yesterday as well. [place]ow Coma Scale at time of initial examination  Best Eye Response 4 - Opens eyes on own   Best Verbal Response 5 - Alert and oriented   Best Motor Response 6 - Follows simple motor commands   Total 15       Review of Systems:   A complete review of systems was performed and pertinent positives and negatives are stated within HPI, all other systems reviewed and are negative.            --------------------------------------------- PAST HISTORY ---------------------------------------------  Past Medical History:  has a past medical history of Anticoagulated on Coumadin, Atrial fibrillation (HCC), Basal cell carcinoma of neck, CAD (coronary artery disease), CHF (congestive heart failure) ([address] Depression, DJD (degenerative joint disease), GERD (gastroesophageal reflux disease), History of cardiovascular stress test, Hyperparathyroidism ([address] Hypertension, Hypothyroidism, Mild mitral regurgitation, Osteoarthritis, Pulmonary hypertension ([address] Renal calculi, Tricuspid regurgitation, and Urinary incontinence. Past Surgical History:  has a past surgical history that includes Thyroidectomy (1960); Kidney stone surgery (1972); Cholecystectomy (1972);  Kidney stone surgery (1981); Cataract removal (6/2005); Incontinence surgery (5/2005); Thyroid surgery (5/2005); parathyroidectomy; Knee Arthroplasty (Bilateral, 9/2005); Total shoulder arthroplasty (Left, 2009); fracture surgery (Left); Carpal tunnel release; Finger surgery (7/2011); Total shoulder arthroplasty (Left); Inner ear surgery (2011); Cataract removal with implant (Bilateral); Colonoscopy; and Cardiac catheterization (3/22/14). Social History:  reports that she has never smoked. She has never used smokeless tobacco. She reports previous alcohol use. She reports that she does not use drugs. Family History: family history includes Arthritis in her mother; Cancer in her brother and son; Diabetes in her father; Heart Attack in her mother; Heart Disease in her brother, brother, father, mother, and sister; Heart Surgery in her brother; Mental Illness in her father. The patients home medications have been reviewed. Allergies: Codeine, Penicillins, Vicodin [hydrocodone-acetaminophen], and Adhesive tape            ------------------------- NURSING NOTES AND VITALS REVIEWED ---------------------------   The nursing notes within the ED encounter and vital signs as below have been reviewed. BP (!) 166/94   Pulse 100   Resp 16   Ht 5' 6\" (1.676 m)   Wt 250 lb (113.4 kg)   SpO2 92%   BMI 40.35 kg/m²   Oxygen Saturation Interpretation: Normal    The patients available past medical records and past encounters were reviewed.           -------------------------------------------------- RESULTS -------------------------------------------------    LABS:  Results for orders placed or performed during the hospital encounter of 02/25/22   CBC with Auto Differential   Result Value Ref Range    WBC 10.4 4.5 - 11.5 E9/L    RBC 3.90 3.50 - 5.50 E12/L    Hemoglobin 12.0 11.5 - 15.5 g/dL    Hematocrit 37.1 34.0 - 48.0 %    MCV 95.1 80.0 - 99.9 fL    MCH 30.8 26.0 - 35.0 pg    MCHC 32.3 32.0 - 34.5 %    RDW 15.9 (H) 11.5 - 15.0 fL    Platelets 573 840 - 569 E9/L    MPV 10.0 7.0 - 12.0 fL    Neutrophils % 76.7 43.0 - 80.0 %    Immature Granulocytes % 0.5 0.0 - 5.0 %    Lymphocytes % 11.2 (L) 20.0 - 42.0 %    Monocytes % 10.2 2.0 - 12.0 %    Eosinophils % 0.8 0.0 - 6.0 %    Basophils % 0.6 0.0 - 2.0 %    Neutrophils Absolute 8.01 (H) 1.80 - 7.30 E9/L    Immature Granulocytes # 0.05 E9/L    Lymphocytes Absolute 1.17 (L) 1.50 - 4.00 E9/L    Monocytes Absolute 1.07 (H) 0.10 - 0.95 E9/L    Eosinophils Absolute 0.08 0.05 - 0.50 E9/L    Basophils Absolute 0.06 0.00 - 0.20 E9/L   Protime-INR   Result Value Ref Range    Protime 79.3 (H) 9.3 - 12.4 sec    INR 7.3 (HH)    APTT   Result Value Ref Range    aPTT 41.5 (H) 24.5 - 35.1 sec   Basic Metabolic Panel   Result Value Ref Range    Sodium 140 132 - 146 mmol/L    Potassium 3.8 3.5 - 5.0 mmol/L    Chloride 102 98 - 107 mmol/L    CO2 26 22 - 29 mmol/L    Anion Gap 12 7 - 16 mmol/L    Glucose 110 (H) 74 - 99 mg/dL    BUN 26 (H) 6 - 23 mg/dL    CREATININE 0.9 0.5 - 1.0 mg/dL    GFR Non-African American 59 >=60 mL/min/1.73    GFR African American >60     Calcium 9.5 8.6 - 10.2 mg/dL       RADIOLOGY:  Interpreted by Radiologist.  CT HEAD [address]   Final Result   1. There is no acute intracranial abnormality. Specifically, there is no   intracranial hemorrhage. 2. Atrophy and periventricular leukomalacia,   3. Very large left frontal scalp hematoma which extends inferiorly into the   left periorbital region. The scalp hematoma measures approximately 8 cm x 8   cm x 2.5 cm. .         CT CERVICAL SPINE WO CONTRAST   Final Result   1. There is no acute compression fracture or subluxation of the cervical   spine. 2. Multilevel degenerative disc and degenerative joint disease. CT CHEST WO CONTRAST   Final Result   1. Focal ground-glass opacities in right upper lobe could indicate   interstitial pulmonary edema, expiratory phase of imaging, or viral pneumonia.    2. Small right pleural effusion. 3. Moderate cardiomegaly with pulmonary vascular congestion. CT FACIAL BONES WO CONTRAST   Final Result   No acute traumatic injury of the facial bones. EKG Interpretation  Interpreted by emergency department physician, Dr. Yudy Cotton     2/25/22  Time: 0636    Rhythm: atrial fibrillation - rapid  Rate: normal  Axis: normal  Conduction: normal  ST Segments: no acute change  T Waves: no acute change    Clinical Impression: atrial fibrillation with rapid ventricular response  Comparison to Old EKG  Stable from prior except for rate        ---------------------------------------------------PHYSICAL EXAM--------------------------------------        Constitutional/General: Alert and oriented, well appearing, non toxic in NAD  Head: Normocephalic and ecchymotic area along the left eyebrow. There is some swelling and bruising. Mild tenderness in this region as well. No other signs of facial trauma. Eyes: PERRL, EOMI, globe intact, no hyphema, no pain with extraocular movement  Mouth: Oropharynx clear, handling secretions, no trismus. No dental trauma, no oral trauma  Neck: Immobilized in cervical collar. No crepitus, no lacerations, abrasions, deformities, or stepoffs. Back: No midline cervical, thoracic, lumbar spine tenderness. No Stepoffs, abrasions, lacerations, or deformities. Pulmonary: Lungs clear to auscultation bilaterally, no wheezes, rales, or rhonchi. Not in respiratory distress  Chest: no chest wall tenderness, no crepitus  Cardiovascular: Irregularly irregular no gallops, or rubs. 2+ distal pulses  Abdomen: Soft, non tender, non distended, +BS, no rebound, guarding, or rigidity. No pulsatile masses appreciated  Extremities: Moves all extremities x 4. Warm and well perfused, no clubbing, cyanosis, or edema.  Capillary refill <3 seconds  Skin: warm and dry without rash  Neurologic: GCS 15, CN 2-12 grossly intact, no focal deficits, symmetric strength 5/5 in the upper and lower extremities bilaterally  Psych: Normal Affect        ------------------------------ ED COURSE/MEDICAL DECISION MAKING----------------------  Medications   ondansetron (ZOFRAN) injection 4 mg (4 mg IntraVENous Given 2/25/22 1577)   phytonadione (VITAMIN K) tablet 2.5 mg (2.5 mg Oral Given 2/25/22 3456)   furosemide (LASIX) injection 20 mg (20 mg IntraVENous Given 2/25/22 0856)         Medical Decision Making:   Non syncopal fall from bed. Imaging demonstrates cephalohematoma but no intracranial hemorrhage. No other occult trauma. Supratherapeutic INR and based on her lack of bleeding and chest guidelines, she was given 2.5 mg of oral vitamin K and advised to have her INR rechecked in 2-3 days and to old coumadin for the next day as well. She is in assisted living, they cannot care for her there. They are asking for admission for a skilled/nursing facility.  consulted for assistance in this. Re-Evaluations:             Re-evaluation. Patients symptoms are improving      Consultations:         Case management/social work        This patient's ED course included: a personal history and physicial examination, re-evaluation prior to disposition, multiple bedside re-evaluations, IV medications, complex medical decision making and emergency management and a personal history and physicial eaxmination    This patient has remained hemodynamically stable during their ED course. Counseling: The emergency provider has spoken with the patient and discussed todays results, in addition to providing specific details for the plan of care and counseling regarding the diagnosis and prognosis. Questions are answered at this time and they are agreeable with the plan.       --------------------------------- IMPRESSION AND DISPOSITION ---------------------------------    IMPRESSION  1. Closed head injury, initial encounter    2. Contusion of face, initial encounter    3.  Hematoma of scalp, initial encounter 4. Supratherapeutic INR        DISPOSITION  Disposition: dc pending to [facility]  Patient condition is stable           Mykel Armando MD  02/25/22 8044

## 2022-02-25 NOTE — PROGRESS NOTES
Occupational Therapy  OCCUPATIONAL THERAPY INITIAL EVALUATION    [address] [address] [address], [address] [place] [address]                                                Patient Name: Cleve Rodriguez  MRN: [id number]  : 1935  Room:     Evaluating [address] [place] [address] #[id number]    Referring Provider: Chad Richey MD  Specific Provider Orders/Date: OT eval and treat 22     Diagnosis: Closed head injury, contusion of face, hematoma of scalp  Pt presented to ED on 22 for falls  Facial bone CT (-) fx  Cervical CT: (-) fx    Pertinent Medical History:  has a past medical history of Anticoagulated on Coumadin, Atrial fibrillation ([address] Basal cell carcinoma of neck, CAD (coronary artery disease), CHF (congestive heart failure) ([address] Depression, DJD (degenerative joint disease), GERD (gastroesophageal reflux disease), History of cardiovascular stress test, Hyperparathyroidism ([address] Hypertension, Hypothyroidism, Mild mitral regurgitation, Osteoarthritis, Pulmonary hypertension ([address] Renal calculi, Tricuspid regurgitation, and Urinary incontinence.        Precautions:  Fall Risk, severely [place], L eye ecchymosis and swelling (unable to open), blurred vision R eye    Assessment of current deficits   [x] Functional mobility  [x]ADLs  [x] Strength               [x]Cognition    [x] Functional transfers   [x] IADLs         [x] Safety Awareness   [x]Endurance    [] Fine Coordination              [x] Balance      [] Vision/perception   []Sensation     []Gross Motor Coordination  [] ROM  [] Delirium                   [] Motor Control     OT PLAN OF CARE   OT POC based on physician orders, patient diagnosis and results of clinical assessment    Frequency/Duration 1-3 days/wk for 2 weeks PRN   Specific OT Treatment Interventions to include:   * Instruction/training on adapted ADL techniques and AE recommendations to increase functional independence within precautions       * Training on energy conservation strategies, correct breathing pattern and techniques to improve independence/tolerance for self-care routine  * Functional transfer/mobility training/DME recommendations for increased independence, safety, and fall prevention  * Patient/Family education to increase follow through with safety techniques and functional independence  * Recommendation of environmental modifications for increased safety with functional transfers/mobility and ADLs  * Cognitive retraining/development of therapeutic activities to improve problem solving, judgement, memory, and attention for increased safety/participation in ADL/IADL tasks  * Splinting/positioning for increased function, prevention of contractures, and improve skin integrity  * Therapeutic exercise to improve motor endurance, ROM, and functional strength for ADLs/functional transfers  * Therapeutic activities to facilitate/challenge dynamic balance, stand tolerance for increased safety and independence with ADLs  * Therapeutic activities to facilitate gross/fine motor skills for increased independence with ADLs    Recommended Adaptive Equipment: TBD     Home Living: Pt lives with spouse at [facility] (1st floor)   Bathroom setup: bathroom modifications   Equipment owned: rollator    Prior Level of Function: independent/mod I with ADLs; ambulated w/ rollator   Driving: no    Pain Level: Pt c/o 10/10 mid back and L eye pain this session ; reinforced management strategies    Cognition: A&O: 2/4 (self and place); Follows 1 step directions inconsistently - severely [place], confusion noted  Delayed processing speed.  Increased cues for sequencing, initiation and attention to tasks   Memory:  poor   Sequencing:  poor   Problem solving:  poor   Judgement/safety:  poor     Functional Assessment:  AM-PAC Daily Activity Raw Score: 11/24   Initial Eval Status  Date: 2/25/22 Treatment Status  Date: STGs = LTGs  Time frame: 10-14 days Feeding Minimal Assist   Supervision   Grooming Moderate Assist   Stand by Assist    UB Dressing Moderate Assist   Stand by Assist    LB Dressing Dependent   Moderate Assist    Bathing Maximal Assist  Minimal Assist    Toileting Dependent   Including hygiene - incontinent  Moderate Assist    Bed Mobility  Supine to sit: Moderate Assist x2  Sit to supine: Moderate Assist x2  Supine to sit: Minimal Assist   Sit to supine: Minimal Assist    Functional Transfers Minimal Assist   Stand by Assist    Functional Mobility Moderate Assist x2 w/ w/w  Lateral sidesteps to [facility] only  D/t safety concerns  Minimal Assist    Balance Sitting:     Static:  Min A    Dynamic:Min A  Standing: Mod A x1-2 w/ w/w     Activity Tolerance Poor+  Notable fatigue w/ minimal activity. Frequent rest breaks reinforced  Fair+   Visual/  Perceptual Glasses: yes however not present  Pt unable to open L eye (ecchymosis and swelling)  Pt initially required cues to keep R eye open - improved as progressed. Once open, pt did c/o blurred vision                Hand Dominance R   AROM (PROM) Strength Additional Info:    RUE  WFL 3+/5 good  and wfl FMC/dexterity noted during ADL tasks       LUE WFL 3+/5 good  and wfl FMC/dexterity noted during ADL tasks       Hearing: severely [place]  Sensation:  No c/o numbness or tingling  Tone: WFL   Edema: L eye and forehead    Comments: Upon arrival patient lying in bed. Pt agreeable to OT session this date. Therapist educated pt on role of OT. At end of session, patient lying in bed with call light and phone within reach, all lines and tubes intact. Overall patient demonstrated decreased independence and safety during completion of ADL/functional transfer/mobility tasks. Pt would benefit from continued skilled OT to increase safety and independence with completion of ADL/IADL tasks for functional independence and quality of life.     Treatment: OT treatment provided this date includes: Facilitation of bed mobility (education/cues for body mechanics, sequencing, attention to tasks), unsupported sitting balance (addressing posture, weight shifting, dynamic reaching to prep for ADL's), functional transfers (w/ education/cues for safety/hand placement), standing tolerance tasks (addressing posture, balance and activity tolerance impacting ADLs and functional activity) and lateral sidesteps to [facility] with w/w (w/ education/cuing on posture, w/w management, sequencing and safety). Therapist facilitated self-care retraining: LB self-care tasks, toileting task and standing grooming tasks while educating pt on modified techniques, posture, safety and energy conservation techniques. Skilled monitoring of HR, O2 sats and pts response to treatment. Pt c/o dizziness throughout session - safety maintained. O2 sat=WFL, DH=920 to 110 bpm w/ activity      Rehab Potential: Good for established goals     Patient / Family Goal: not stated     Patient and/or family were instructed on functional diagnosis, prognosis/goals and OT plan of care. Demonstrated poor understanding. Eval Complexity: Low    Time In: 09:52  Time Out: 10:15  Total Treatment Time: 11 minutes    Min Units   OT Eval Low 97165  X  1   OT Eval Medium 35420      OT Eval High 06409      OT Re-Eval [id number]       Therapeutic Ex 33389       Therapeutic Activities 30929  8  1   ADL/Self Care 13791  3  0   Orthotic Management [id number]       Manual [id number]     Neuro Re-Ed [id number]       Non-Billable Time          Evaluation Time additionally includes thorough review of current medical information, gathering information on past medical history/social history and prior level of function, interpretation of standardized testing/informal observation of tasks, assessment of data and development of plan of care and goals.         Melissa OTR/L #[id number]

## 2022-02-25 NOTE — CARE COORDINATION
Social Work /Transition of Care:    Pt presented to the ED secondary to a fall with head injury at [facility]. Per RN, pt was medically clear to return to [facility] but when RN spoke to DON at [facility], she was concerned about pt's safety and falling again. SW spoke to pt who reports if rehab is needed she would be agreeable to go to [place] [id number]. SW made initial referral to [facility] with [facility]. Pt has orders for PT/OT rajwinder. SW to follow.

## 2022-02-25 NOTE — PROGRESS NOTES
Physical Therapy  Physical Therapy Initial Assessment     Name: Yue Suero  : 1935  MRN: [id number]      Date of Service: 2022    Evaluating PT:  Emma Giron PT, DPT  [id number]    Room #:    Diagnosis:  No admission diagnoses are documented for this encounter. PMHx/PSHx:  Hypothyroidism, OA, urinary incontinence, HTN, hyperparathyroidism, GERD, depression, DJD, renal calculi, basal cell carcinoma of neck, Afib, CAD, CHF, tricuspid regurgitation, mild mitral regurgitation, pulmonary HTN, thyroidectomy, B TKA, L total shoulder arthroplasty, R hand carpal tunnel release  Procedure/Surgery:  None this admission  Precautions:  Falls, L facial hematoma, blurred vision  Equipment Needs:  TBD    SUBJECTIVE:    Pt lives with her  in a single story [facility] apartment ([place]) with no stairs to enter. Bed is on first floor and bath is on first floor. Pt ambulated with rollator PTA. OBJECTIVE:   Initial Evaluation  Date: 22 Treatment Short Term/ Long Term   Goals   AM-PAC 6 Clicks 96/04     Was pt agreeable to Eval/treatment? yes     Does pt have pain? 10/10 head/face and midback     Bed Mobility  Rolling: NT  Supine to sit: ModA x2  Sit to supine: ModA x2  Scooting: Senthil  Rolling: Mod Independent   Supine to sit: Mod Independent   Sit to supine: Mod Independent   Scooting: Mod Independent    Transfers Sit to stand: Senthil  Stand to sit: Senthil  Stand pivot: NT  Sit to stand: Supervision   Stand to sit: Supervision   Stand pivot: Supervision    Ambulation    2 side steps towards R with ModA x2 with FWW  >50 feet with AAD with Supervision    Stair negotiation: ascended and descended NT  TBD   ROM BUE:  WNL  BLE:  WNL     Strength BUE:  WNL  BLE:  Moderate functional weakness, difficulty following cues impairing accuracy of MMT     Balance Sitting EOB:  CGA  Dynamic Standing:  ModA x2  Sitting EOB:  Independent   Dynamic Standing:  Supervision with AAD     Pt is A & O x 2 self and place.  Pt very [place] and lethargic throughout session limiting consistency with following single step cues  Sensation:  Pt denies numbness and tingling to extremities  Edema:  Mild B LE edema, significant edema of L frontal region, eyelid and orbit with ecchymosis    Vitals:  SPO2 and HR remained WNL throughout mobility    Patient education  Pt educated on role of therapy, safety with mobility, transfer technique    Patient response to education:   Pt verbalized understanding Pt demonstrated skill Pt requires further education in this area   yes yes yes     ASSESSMENT:    Conditions Requiring Skilled Therapeutic Intervention:    [x]Decreased strength     []Decreased ROM  [x]Decreased functional mobility  [x]Decreased balance   [x]Decreased endurance   [x]Decreased posture  [x]Decreased sensation  []Decreased coordination   [x]Decreased vision  [x]Decreased safety awareness   [x]Increased pain       Comments:  Pt resting semi-supine upon arrival, agreeable to PT eval. Pt completed bed mobility with very slow pace and required manual assist for B LE and trunk negotiation d/t height of bed and difficulty initiating task. Pt reports persistent dizziness in all positions and blurred vision-- she is unable to open L eye due to edema of eyelid and orbit. Pt completed 2x sit<>stand from EOB with light balance assist. Pt completed side stepping towards HOB with manual assist required for balance and progression of ea LE and walker progression. Pt returned to bed upon completion of session with all needs in reach.     Treatment:  Patient practiced and was instructed in the following treatment:     Bed mobility: cues for sequencing and technique to ease transitions, manual assist as noted above for completion of supine<>sit as noted above   Transfer training: cues for safe hand placement, manual assist for lift/lower and balance   Gait training: cues for upright posture and walker management, manual assist for walker progression and initiation of ea LE     Pt's/ family goals   1. To return to PLOF    Prognosis is good for reaching above PT goals. Patient and or family understand(s) diagnosis, prognosis, and plan of care. yes    PHYSICAL THERAPY PLAN OF CARE:    PT POC is established based on physician order and patient diagnosis     Referring provider/PT Order:    02/25/22 0915  PT eval and treat  Start:  02/25/22 0915,   End:  02/25/22 0915,   ONE TIME,   Standing Count:  1 Occurrences,   Ko Luciano MD     Diagnosis:  No admission diagnoses are documented for this encounter. Specific instructions for next treatment:  Progress mobility as tolerated    Current Treatment Recommendations:     [x] Strengthening to improve independence with functional mobility   [] ROM to improve independence with functional mobility   [x] Balance Training to improve static/dynamic balance and to reduce fall risk  [x] Endurance Training to improve activity tolerance during functional mobility   [x] Transfer Training to improve safety and independence with all functional transfers   [x] Gait Training to improve gait mechanics, endurance and asses need for appropriate assistive device  [x] Stair Training in preparation for safe discharge home and/or into the community   [x] Positioning to prevent skin breakdown and contractures  [x] Safety and Education Training   [x] Patient/Caregiver Education   [x] HEP  [] Other     PT long term treatment goals are located in above grid    Frequency of treatments: 2-5x/week x 1-2 weeks. Time in  0955  Time out  1015    Total Treatment Time  10 minutes     Evaluation Time includes thorough review of current medical information, gathering information on past medical history/social history and prior level of function, completion of standardized testing/informal observation of tasks, assessment of data and education on plan of care and goals.     CPT codes:  [x] Low Complexity PT evaluation 73222  [] Moderate Complexity PT evaluation [id number]  [] High Complexity PT evaluation [id number]  [] PT Re-evaluation [id number]  [] Gait training 14447 0 minutes  [] Manual therapy 11283 0 minutes  [x] Therapeutic activities 20247 10 minutes  [] Therapeutic exercises 16448 0 minutes  [] Neuromuscular reeducation 77963 0 minutes     Dwayne Baig, PT, DPT  [id number]

## 2022-02-26 NOTE — ED NOTES
Report called to [facility]. Spoke to WALTER CHIANG. Given opportunity to ask questions. Informed of impending transfer to [facility].       Ursula Maldonado RN  02/25/22 2014

## 2022-03-06 NOTE — ED PROVIDER NOTES
HPI:  3/6/22, Time: 1:44 PM ZANE Hernandez is a 81 y.o. female presenting to the ED for SOB Chest pain SP nonsyncopal fall out of bed striking her head. Patient alert cephalohematoma. Also complaining of upper chest pain. At the time she had a CT of the chest which was unremarkable. Patient is currently on no Coumadin. , beginning several hours ago. The complaint has been persistent, moderate in severity, and worsened by nothing. Patient came from [place] [address] home patient appears confused and complaining of chest pain and shortness of breath. She has no hemoptysis. Patient is severely hard of hearing. Patient was originally seen on February [facility] at [facility] from a fall from bed. ROS:   Pertinent positives and negatives are stated within HPI, all other systems reviewed and are negative.  --------------------------------------------- PAST HISTORY ---------------------------------------------  Past Medical History:  has a past medical history of Anticoagulated on Coumadin, Atrial fibrillation (HCC), Basal cell carcinoma of neck, CAD (coronary artery disease), CHF (congestive heart failure) ([address] Depression, DJD (degenerative joint disease), GERD (gastroesophageal reflux disease), History of cardiovascular stress test, Hyperparathyroidism ([address] Hypertension, Hypothyroidism, Mild mitral regurgitation, Osteoarthritis, Pulmonary hypertension ([address] Renal calculi, Tricuspid regurgitation, and Urinary incontinence. Past Surgical History:  has a past surgical history that includes Thyroidectomy (1960); Kidney stone surgery (1972); Cholecystectomy (1972); Kidney stone surgery (1981); Cataract removal (6/2005); Incontinence surgery (5/2005); Thyroid surgery (5/2005); parathyroidectomy; Knee Arthroplasty (Bilateral, 9/2005); Total shoulder arthroplasty (Left, 2009); fracture surgery (Left); Carpal tunnel release; Finger surgery (7/2011);  Total shoulder arthroplasty (Left); Inner ear surgery (2011); Cataract removal with implant (Bilateral); Colonoscopy; and Cardiac catheterization (3/22/14). Social History:  reports that she has never smoked. She has never used smokeless tobacco. She reports previous alcohol use. She reports that she does not use drugs. Family History: family history includes Arthritis in her mother; Cancer in her brother and son; Diabetes in her father; Heart Attack in her mother; Heart Disease in her brother, brother, father, mother, and sister; Heart Surgery in her brother; Mental Illness in her father. The patients home medications have been reviewed. Allergies: Codeine, Penicillins, Vicodin [hydrocodone-acetaminophen], and Adhesive tape    ---------------------------------------------------PHYSICAL EXAM--------------------------------------    Constitutional/General: Alert and oriented x3, well appearing, non toxic in NAD  Head: Patient is a large left frontal cephalohematoma with eschar, patient has ecchymosis tracking down both sides of her neck with no swelling. There is no soft tissue crepitus or tenderness. Is also ecchymosis noted above and below the clavicles bilaterally. Eyes: PERRL, EOMI  Mouth: Oropharynx clear, handling secretions, no trismus  Neck: Supple, full ROM, non tender to palpation in the midline, no stridor, no crepitus, no meningeal signs  Pulmonary: Lungs clear to auscultation bilaterally, no wheezes, rales, or rhonchi. Not in respiratory distress  Cardiovascular:  Regular rate. Regular rhythm. No murmurs, gallops, or rubs. 2+ distal pulses  Chest: Upper chest wall is tender to palpation. There is ecchymosis noted to the upper chest bilaterally. There is no soft tissue crepitus  Abdomen: Soft. Non tender. Non distended. +BS. No rebound, guarding, or rigidity. No pulsatile masses appreciated. Musculoskeletal: Moves all extremities x 4. Warm and well perfused, no clubbing, cyanosis, or edema.  Capillary refill <3 seconds  Skin: warm and dry. No rashes. Neurologic: GCS 15, CN 2-12 grossly intact, no focal deficits, symmetric strength 5/5 in the upper and lower extremities bilaterally  Psych: Normal Affect    -------------------------------------------------- RESULTS -------------------------------------------------  I have personally reviewed all laboratory and imaging results for this patient. Results are listed below. LABS:  Results for orders placed or performed during the hospital encounter of 03/06/22   Troponin   Result Value Ref Range    Troponin, High Sensitivity 24 (H) 0 - 9 ng/L   Protime-INR   Result Value Ref Range    Protime 13.2 (H) 9.3 - 12.4 sec    INR 1.2    CBC   Result Value Ref Range    WBC 4.7 4.5 - 11.5 E9/L    RBC 3.05 (L) 3.50 - 5.50 E12/L    Hemoglobin 9.4 (L) 11.5 - 15.5 g/dL    Hematocrit 30.4 (L) 34.0 - 48.0 %    MCV 99.7 80.0 - 99.9 fL    MCH 30.8 26.0 - 35.0 pg    MCHC 30.9 (L) 32.0 - 34.5 %    RDW 15.8 (H) 11.5 - 15.0 fL    Platelets 091 391 - 670 E9/L    MPV 9.8 7.0 - 12.0 fL   Basic Metabolic Panel   Result Value Ref Range    Sodium 143 132 - 146 mmol/L    Potassium 3.7 3.5 - 5.0 mmol/L    Chloride 103 98 - 107 mmol/L    CO2 32 (H) 22 - 29 mmol/L    Anion Gap 8 7 - 16 mmol/L    Glucose 112 (H) 74 - 99 mg/dL    BUN 22 6 - 23 mg/dL    CREATININE 0.8 0.5 - 1.0 mg/dL    GFR Non-African American >60 >=60 mL/min/1.73    GFR African American >60     Calcium 8.9 8.6 - 10.2 mg/dL   EKG 12 Lead   Result Value Ref Range    Ventricular Rate 70 BPM    Atrial Rate 43 BPM    QRS Duration 98 ms    Q-T Interval 404 ms    QTc Calculation (Bazett) 436 ms    R Axis 20 degrees    T Axis 22 degrees       RADIOLOGY:  Interpreted by Radiologist.  CT HEAD WO CONTRAST   Final Result   Extensive atrophy and chronic changes seen within the brain with no acute   intracranial abnormality. Large scalp hematoma overlying the left frontal   bone.          CT CHEST W CONTRAST   Final Result   Findings of the chest are stable except for slight interval increase seen in   size of the left pleural effusion. The right pleural effusion is stable and   small with atelectatic changes seen at the lung bases bilaterally. Ground-glass opacity centrally within both the right and left upper lobe   right greater than left suggesting either focal atelectatic change, edema or   infiltrate. Findings are stable. EKG Interpretation  Interpreted by emergency department physician    Rhythm: atrial fibrillation - controlled  Rate: normal  Axis: normal  Conduction: normal  ST Segments: nonspecific changes  T Waves: non specific changes    Clinical Impression: atrial fibrillation (chronic)  Comparison to prior EKG: None      ------------------------- NURSING NOTES AND VITALS REVIEWED ---------------------------   The nursing notes within the ED encounter and vital signs as below have been reviewed by myself. BP (!) 146/82   Pulse 62   Temp 98.3 °F (36.8 °C) (Oral)   Resp 16   Ht 5' 6\" (1.676 m)   Wt 250 lb (113.4 kg)   SpO2 95%   BMI 40.35 kg/m²   Oxygen Saturation Interpretation: Normal    The patients available past medical records and past encounters were reviewed. ------------------------------ ED COURSE/MEDICAL DECISION MAKING----------------------  Medications   iopamidol (ISOVUE-370) 76 % injection 75 mL (75 mLs IntraVENous Given 3/6/22 1455)             Medical Decision Making:    Repeat CT of the head unremarkable for intracranial injury. Patient has a persistent scalp hematoma. CT scan of the chest was unremarkable for any acute injury. Patient be discharged back to nursing home. All labs reviewed and are not worrisome. Re-Evaluations:             Re-evaluation.   Patients symptoms show no change      Consultations:             none    Critical Care: none        This patient's ED course included: a personal history and physicial examination, re-evaluation prior to disposition, multiple bedside re-evaluations, continuous pulse oximetry, complex medical decision making and emergency management and a personal history and physicial eaxmination    This patient has remained hemodynamically stable and been closely monitored during their ED course. Counseling: The emergency provider has spoken with the patient and discussed todays results, in addition to providing specific details for the plan of care and counseling regarding the diagnosis and prognosis. Questions are answered at this time and they are agreeable with the plan.       --------------------------------- IMPRESSION AND DISPOSITION ---------------------------------    IMPRESSION  1. Atypical chest pain    2. Blunt head injury, subsequent encounter        DISPOSITION  Disposition: Discharge to nursing home  Patient condition is stable        NOTE: This report was transcribed using voice recognition software.  Every effort was made to ensure accuracy; however, inadvertent computerized transcription errors may be present        Theodore Werner DO  03/07/22 9949

## 2022-03-14 NOTE — ED TRIAGE NOTES
[facility] ems stated was screaming when arrived, felt she does not like nursing home or staff. Sp fall 3 weeks ago, hematoma left forehead noted.

## 2022-03-15 NOTE — ED NOTES
Called [facility] [phone]- spoke with Ferdinand Leon- report given and eta noted to staff.      Oneyda Hall RN  03/15/22 2556

## 2022-03-15 NOTE — ED NOTES
Attempted to straight cath- unable to obtain sample- incontinent pure wick applied     Charis Hudson, WALTER  03/14/22 8698

## 2022-03-15 NOTE — ED PROVIDER NOTES
Pulses: Normal pulses. Heart sounds: Normal heart sounds. Pulmonary:      Effort: Pulmonary effort is normal. No respiratory distress. Breath sounds: Rales present. No wheezing or rhonchi. Chest:      Chest wall: No tenderness. Abdominal:      General: Abdomen is flat. Bowel sounds are normal. There is no distension. Palpations: Abdomen is soft. There is no mass. Tenderness: There is abdominal tenderness. There is no right CVA tenderness, left CVA tenderness or guarding. Hernia: No hernia is present. Musculoskeletal:         General: Tenderness present. Cervical back: Normal range of motion. Right lower leg: Edema present. Left lower leg: Edema present. Comments: Tenderness on BL knees, hips and cervical region   Skin:     General: Skin is warm and dry. Capillary Refill: Capillary refill takes less than 2 seconds. Findings: Bruising present. Neurological:      General: No focal deficit present. Mental Status: She is alert. Mental status is at baseline. She is disoriented. Sensory: No sensory deficit. Motor: No weakness. Coordination: Coordination normal.   Psychiatric:         Mood and Affect: Mood normal.         Behavior: Behavior normal.         Thought Content: Thought content normal.          Procedures     EKG: This EKG is signed by emergency department physician. Rate:86  Rhythm: Atrial fibrillation  Interpretation: non-specific EKG  Comparison: stable as compared to patient's most recent EKG     MDM    81 y.o. female  from [facility] presents with generalized body aches, 02/25 fell at [facility] and went [facility]. Per nursing facility patient was complaining of chest pain, sharp stabbing pain, she requested to go to hospital. While in the ED patient was hemodynamically stable, nontoxic-appearing, in no respiratory distress.   Physical exam remarkable for scattered bruising likely due to Coumadin, patient is confused alert to self but not time or place. He has abdominal tenderness That is generalized and rales present bilaterally patient has bilateral pitting edema and tenderness on her knees hip and cervical region. Labs remarkable for elevated BNP with a negative troponin, anemia, therapeutic INR, no evidence of UTI. EKG remarkable for A. fib but no signs of acute ischemia. Imaging including CT head, cervical, abdomen, chest,and  x-ray of the knees, hip unremarkable. Patient is requesting to go back to [facility] and is currently denying any pain. She feels comfortable going home and will follow up outpatient with PCP. Patient understands to return to the ED in case of worsening symptoms. ED Course as of 03/18/22 2107   [place] Sai Mar 14, 2022   2208 The patient is in the bed no acute distress. [MT]   8840 Patient is requesting to go back to [facility]. Denying any current chest pain. [TC]      ED Course User Index  [MT] Rocael Bennett DO  [TC] Ren Cedeno MD       --------------------------------------------- PAST HISTORY ---------------------------------------------  Past Medical History:  has a past medical history of Anticoagulated on Coumadin, Atrial fibrillation ([address] Basal cell carcinoma of neck, CAD (coronary artery disease), CHF (congestive heart failure) ([address] Depression, DJD (degenerative joint disease), GERD (gastroesophageal reflux disease), History of cardiovascular stress test, Hyperparathyroidism ([address] Hypertension, Hypothyroidism, Mild mitral regurgitation, Osteoarthritis, Pulmonary hypertension ([address] Renal calculi, Tricuspid regurgitation, and Urinary incontinence. Past Surgical History:  has a past surgical history that includes Thyroidectomy (1960); Kidney stone surgery (1972); Cholecystectomy (1972); Kidney stone surgery (1981); Cataract removal (6/2005); Incontinence surgery (5/2005); Thyroid surgery (5/2005); parathyroidectomy; Knee Arthroplasty (Bilateral, 9/2005);  Total shoulder arthroplasty (Left, 2009); fracture surgery (Left); Carpal tunnel release; Finger surgery (7/2011); Total shoulder arthroplasty (Left); Inner ear surgery (2011); Cataract removal with implant (Bilateral); Colonoscopy; and Cardiac catheterization (3/22/14). Social History:  reports that she has never smoked. She has never used smokeless tobacco. She reports previous alcohol use. She reports that she does not use drugs. Family History: family history includes Arthritis in her mother; Cancer in her brother and son; Diabetes in her father; Heart Attack in her mother; Heart Disease in her brother, brother, father, mother, and sister; Heart Surgery in her brother; Mental Illness in her father. The patients home medications have been reviewed. Allergies: Codeine, Penicillins, Vicodin [hydrocodone-acetaminophen], and Adhesive tape    -------------------------------------------------- RESULTS -------------------------------------------------  Labs:  Results for orders placed or performed during the hospital encounter of 03/14/22   Culture, Urine    Specimen: Urine, clean catch   Result Value Ref Range    Urine Culture, Routine       10 to 100,000 CFU/mL  Mixed [place] isolated. Further workup and sensitivity testing  is not routinely indicated and will not be performed.   Mixed [place] isolated includes:  Mixed gram positive organisms     CBC with Auto Differential   Result Value Ref Range    WBC 5.2 4.5 - 11.5 E9/L    RBC 3.13 (L) 3.50 - 5.50 E12/L    Hemoglobin 9.6 (L) 11.5 - 15.5 g/dL    Hematocrit 30.9 (L) 34.0 - 48.0 %    MCV 98.7 80.0 - 99.9 fL    MCH 30.7 26.0 - 35.0 pg    MCHC 31.1 (L) 32.0 - 34.5 %    RDW 15.7 (H) 11.5 - 15.0 fL    Platelets 642 387 - 102 E9/L    MPV 9.8 7.0 - 12.0 fL    Neutrophils % 73.7 43.0 - 80.0 %    Immature Granulocytes % 0.4 0.0 - 5.0 %    Lymphocytes % 12.3 (L) 20.0 - 42.0 %    Monocytes % 8.8 2.0 - 12.0 %    Eosinophils % 3.8 0.0 - 6.0 %    Basophils % 1.0 0.0 - 2.0 % Neutrophils Absolute 3.84 1.80 - 7.30 E9/L    Immature Granulocytes # 0.02 E9/L    Lymphocytes Absolute 0.64 (L) 1.50 - 4.00 E9/L    Monocytes Absolute 0.46 0.10 - 0.95 E9/L    Eosinophils Absolute 0.20 0.05 - 0.50 E9/L    Basophils Absolute 0.05 0.00 - 0.20 E9/L   Comprehensive Metabolic Panel w/ Reflex to MG   Result Value Ref Range    Sodium 140 132 - 146 mmol/L    Potassium reflex Magnesium 4.0 3.5 - 5.0 mmol/L    Chloride 104 98 - 107 mmol/L    CO2 28 22 - 29 mmol/L    Anion Gap 8 7 - 16 mmol/L    Glucose 112 (H) 74 - 99 mg/dL    BUN 21 6 - 23 mg/dL    CREATININE 0.9 0.5 - 1.0 mg/dL    GFR Non-African American 59 >=60 mL/min/1.73    GFR African American >60     Calcium 9.0 8.6 - 10.2 mg/dL    Total Protein 6.3 (L) 6.4 - 8.3 g/dL    Albumin 3.7 3.5 - 5.2 g/dL    Total Bilirubin 0.5 0.0 - 1.2 mg/dL    Alkaline Phosphatase 111 (H) 35 - 104 U/L    ALT 12 0 - 32 U/L    AST 21 0 - 31 U/L   Troponin   Result Value Ref Range    Troponin, High Sensitivity 20 (H) 0 - 9 ng/L   Brain Natriuretic Peptide   Result Value Ref Range    Pro-BNP 1,715 (H) 0 - 450 pg/mL   Protime-INR   Result Value Ref Range    Protime 15.1 (H) 9.3 - 12.4 sec    INR 1.4    Urinalysis with Microscopic   Result Value Ref Range    Color, UA Yellow Straw/Yellow    Clarity, UA Clear Clear    Glucose, Ur Negative Negative mg/dL    Bilirubin Urine Negative Negative    Ketones, Urine Negative Negative mg/dL    Specific Gravity, UA 1.020 1.005 - 1.030    Blood, Urine SMALL (A) Negative    pH, UA 6.0 5.0 - 9.0    Protein, UA Negative Negative mg/dL    Urobilinogen, Urine 0.2 <2.0 E.U./dL    Nitrite, Urine Negative Negative    Leukocyte Esterase, Urine SMALL (A) Negative    WBC, UA 2-5 0 - 5 /HPF    RBC, UA 2-5 0 - 2 /HPF    Epithelial Cells, UA FEW /HPF    Bacteria, UA NONE SEEN None Seen /HPF   Lactic Acid   Result Value Ref Range    Lactic Acid 0.9 0.5 - 2.2 mmol/L   SPECIMEN REJECTION   Result Value Ref Range    Rejected Test trp5     Reason for Rejection see below    Troponin   Result Value Ref Range    Troponin, High Sensitivity 19 (H) 0 - 9 ng/L   EKG 12 Lead   Result Value Ref Range    Ventricular Rate 86 BPM    Atrial Rate 82 BPM    QRS Duration 102 ms    Q-T Interval 390 ms    QTc Calculation (Bazett) 466 ms    R Axis 60 degrees    T Axis -28 degrees   EKG 12 Lead   Result Value Ref Range    Ventricular Rate 88 BPM    Atrial Rate 241 BPM    QRS Duration 92 ms    Q-T Interval 392 ms    QTc Calculation (Bazett) 474 ms    R Axis 91 degrees    T Axis -23 degrees       Radiology:  XR CHEST PORTABLE   Final Result   Small bilateral pleural effusions with associated atelectasis. Prominence of the pulmonary vasculature, which could suggest pulmonary   hypertension. XR HIP BILATERAL W AP PELVIS (2 VIEWS)   Final Result   No acute osseous abnormality. XR KNEE RIGHT (1-2 VIEWS)   Final Result   No acute osseous abnormality. XR KNEE LEFT (1-2 VIEWS)   Final Result   No acute osseous abnormality. CT Head WO Contrast   Final Result   CT head without contrast:      1. No skull fracture or acute intracranial abnormality. 2. Large left anterior scalp hematoma, extending to the supraorbital region. CT cervical spine without contrast:      1. No fracture or joint dislocation is seen. 2. Degenerative changes, as described. RECOMMENDATIONS:   Unavailable         CT Cervical Spine WO Contrast   Final Result   CT head without contrast:      1. No skull fracture or acute intracranial abnormality. 2. Large left anterior scalp hematoma, extending to the supraorbital region. CT cervical spine without contrast:      1. No fracture or joint dislocation is seen. 2. Degenerative changes, as described. RECOMMENDATIONS:   Unavailable         CT ABDOMEN PELVIS WO CONTRAST Additional Contrast? None   Final Result   1. Within limitations no specific injury is identified.    2. However, there is mild strandy density around the [facility])        Disposition:  Patient's disposition: Discharge to home  Patient's condition is stable.        Vangie Munoz MD  Resident  03/18/22 1723

## 2022-03-23 NOTE — CARE COORDINATION
[facility] Transitions Follow Up Call    3/23/2022    Patient: Rui Christensen  Patient : 1935   MRN: <[id number]>  Reason for Admission:   Discharge Date: 3/15/22 RARS: Readmission Risk Score: 16.4 ( )    Pt d/c from [facility] on 3/21 and went back to her halfway at [facility]. Melecio Kelly requested        Care Transitions Subsequent and Final Call    Subsequent and Final Calls  Care Transitions Interventions  Other Interventions:            Follow Up  Future Appointments   Date Time Provider Hernandez Mcintyre   3/24/2022  2:20 PM MD [place] [facility]       Tommie Brunson, UBFFYN, RN   [address] [place] [address] Transition Nurse  [phone]

## 2022-03-24 NOTE — PROGRESS NOTES
Notified patient of her coumadin change and recheck 1 week  Also notified patients assistant living nurse of the change and faxed over order of the change also

## 2022-03-24 NOTE — TELEPHONE ENCOUNTER
I phoned patient about her appointment that she missed   Patient rescheduled the appointment   Patient also gave me her INR results  1.8   Relayed the message to Dr Donnajean Schwab   Doctor changed patients coumadin therapy  15 mg on Wed and Sun    10 mg all other days  recheck on Friday.   I phoned patients assistant chloe Castaneda at [phone] talked to nurse Corwin Hallmark the plan and change of patients coumadin therapy  Nurse stated with any changes of patients coumadin it needs to be faxed over to the assistant [facility] nursing station   F# [phone]  Doctor placed the order and I faxed the order on 03/24/22   Will scan in patients chart after conformation

## 2022-03-25 NOTE — LETTER
[place] [employer] Medicaid  CERTIFICATION OF NECESSITY  FOR NON-EMERGENCY TRANSPORTATION   BY GROUND AMBULANCE      Individual Information   1. Name: Darin Small 2. [place] Medicaid Billing Number:    3. Address: [place] [address] [address]      Transportation Provider Information   4. Provider Name: [facility]   5. [place] Medicaid Provider Number:  National Provider Identifier (NPI):      Certification  7. Criteria:  During transport, this individual requires:  [x] Medical treatment or continuous     supervision by an EMT. [] The administration or regulation of oxygen by another person. [] Supervised protective restraint. 8. Period Beginning Date: 3/30/22   9. Length  [x] Not more than 5 day(s)  [] One Year     Additional Information Relevant to Certification   10. Comments or Explanations, If Necessary or Appropriate     Scalp Hematoma, S/P Fall out of bed, Dementia, Confusion     Certifying Practitioner Information   11. Name of Practitioner: Dr Horace Carroll MD   12. [place] Medicaid Provider Number, If Applicable:  [address] Provider Identifier (NPI):      Signature Information   14. Date of Signature: 3/28/22 15. Name of Person Signing: Catrina Paris    12.  Signature and Professional Designation: Electronically signed by DIPESH Miller on 3/28/2022 at 2:24 PM       ODM [id number]  Rev. 7/2015

## 2022-03-26 PROBLEM — S09.12XA: Status: ACTIVE | Noted: 2022-01-01

## 2022-03-26 PROBLEM — S09.90XA HEAD INJURY, ACUTE, INITIAL ENCOUNTER: Status: ACTIVE | Noted: 2022-01-01

## 2022-03-26 NOTE — H&P
[address] [facility]  Department of Family Medicine  Family Medicine Residency Program  History and Physical    Patient:  Cesar Ramirez 81 y.o. female MRN: [id number]     Date of Service: 3/26/2022      Chief complaint:   Chief Complaint   Patient presents with    Head Injury     pt fell out of bed tonight. pt broke open old hematoma to head        History of Present Illness   The patient is a 81 y.o. female with PMH of atrial fibrillation (on Coumadin), coronary artery disease, CHF, GERD, hyperparathyroidism, hypertension, hypothyroidism, osteoarthritis and urinary incontinence who presented to [facility] s/p fall. Patient was recently in the ER on 3/14/2022 for a similar presentation, at the time CT head revealed left scalp hematoma, otherwise no acute changes. This time, patient states she was getting out of bed at night , slipped and hit something in her room \"head first\". Was sent to [facility] for follow up. In the ER patient was alert, oriented to self. Noted to be complaining of a headache. Had no other complaints on presentation    ED course:  Patient hemodynamically stable    Pertinent labs include  CBC, CMP stable  ProBNP: 1942, previous 1715   Troponin 23, repeat 22    Pertinent imaging includes:  CT head: Negative for any acute intracranial pathology  CT cervical spine: Chronic degenerative deformities, no acute malalignment or fractures noted  CXR consistent with CHF    Patient was given 1 dose of Lasix 20 mg IV in the ER for CHF    Admitted for acute on chronic heart failure exacerbation as well as for observation of head trauma. Past Medical History:       Diagnosis Date    Anticoagulated on Coumadin     on Coumadin for this Dr. Alissa Johnson controls    Atrial fibrillation ([facility] [address]     Basal cell carcinoma of neck     CAD (coronary artery disease)     History of luminal irregularities of the coronary artery, stable.     CHF (congestive heart failure) ([facility] [address]     stage I diastolic dysfunction on 1/77/96    Depression 11/16/2010    DJD (degenerative joint disease)     SEVERE IN RIGHT HAND, IN MULTIPLE OTHER JOINTS    GERD (gastroesophageal reflux disease) 11/16/2010    History of cardiovascular stress test 03/18/2014    lexiscan    Hyperparathyroidism ([facility] [address] 11/16/2010    Had parathyroidectomy    Hypertension 11/16/2010    Hypothyroidism 11/16/2010    Mild mitral regurgitation 7/10/14    Osteoarthritis 11/16/2010    Pulmonary hypertension ([facility] [address] 7/10/14    mild    Renal calculi     x3    Tricuspid regurgitation 7/10/14    mild-moderate    Urinary incontinence 11/16/2010       Past Surgical History:        Procedure Laterality Date    CARDIAC CATHETERIZATION  3/22/14    CARPAL TUNNEL RELEASE      Right hand.  CATARACT REMOVAL  6/2005    right, bilateral cataract surgery.  CATARACT REMOVAL WITH IMPLANT Bilateral     CHOLECYSTECTOMY  1972    COLONOSCOPY      FINGER SURGERY  7/2011    S/P removal of cyst from right index finger.  FRACTURE SURGERY Left     arm fractured-casted, Bilateral feet fractures-casted.  INCONTINENCE SURGERY  5/2005    Urinary incontinence, S/P bladder suspension.  INNER EAR SURGERY  2011    tube place in right ear   [address]    KIDNEY STONE SURGERY  1981    KNEE ARTHROPLASTY Bilateral 9/2005    PARATHYROIDECTOMY      Three times    SHOULDER ARTHROPLASTY Left 2009    SHOULDER ARTHROPLASTY Left      shoulder total replacement.  THYROID SURGERY  5/2005    recurrent    THYROIDECTOMY  1960       Medications Prior to Admission:    Prior to Admission medications    Medication Sig Start Date End Date Taking? Authorizing Provider   warfarin (COUMADIN) 5 MG tablet 15 mg on Sunday and Wednesday, 10 mg all other days.  3/24/22   Kym Durham MD   acetaminophen (TYLENOL) 325 MG tablet Take 650 mg by mouth every 4 hours as needed for Pain or Fever    Historical Provider, MD   b complex vitamins capsule Take 1 capsule by mouth daily    Historical Provider, MD   vitamin D (CHOLECALCIFEROL) 25 MCG (1000 UT) TABS tablet Take 1,000 Units by mouth daily    Historical Provider, MD   fluticasone (FLONASE) 50 MCG/ACT nasal spray 2 sprays by Nasal route daily    Historical Provider, MD   guaiFENesin (MUCINEX) 600 MG extended release tablet Take 600 mg by mouth daily    Historical Provider, MD   loperamide (IMODIUM) 2 MG capsule Take 4 mg by mouth 4 times daily as needed for Diarrhea    Historical Provider, MD   meclizine (ANTIVERT) 25 MG tablet Take 25 mg by mouth 3 times daily as needed for Dizziness    Historical Provider, MD   albuterol sulfate HFA (VENTOLIN HFA) 108 (90 Base) MCG/ACT inhaler Inhale 2 puffs into the lungs every 6 hours as needed for Wheezing    Historical Provider, MD   ascorbic acid (VITAMIN C) 500 MG tablet Take 1,000 mg by mouth daily    Historical Provider, MD   levothyroxine (SYNTHROID) 175 MCG tablet Take 1 tablet by mouth Daily 2/18/22   López Santana DO   levalbuterol (XOPENEX) 0.63 MG/3ML nebulization Take 1 ampule by nebulization in the morning and at bedtime     Historical Provider, MD   metoprolol succinate (TOPROL XL) 50 MG extended release tablet Take 1 tablet by mouth nightly 12/13/21   Rogerio Abdi MD   magnesium hydroxide (MILK OF MAGNESIA) 400 MG/5ML suspension Take 5 mLs by mouth daily as needed for Constipation 12/13/21   Rogerio Abdi MD   lisinopril (PRINIVIL;ZESTRIL) 5 MG tablet Take 1 tablet by mouth daily 12/13/21   Rogerio Abdi MD   furosemide (LASIX) 40 MG tablet Take 1 tablet by mouth daily 12/13/21   Rogerio Abdi MD   famotidine (PEPCID) 20 MG tablet Take 1 tablet by mouth 2 times daily 12/13/21   Rogerio Abdi MD   escitalopram (LEXAPRO) 10 MG tablet Take 1 tablet by mouth daily 12/13/21   Rogerio Abdi MD   albuterol (PROVENTIL) (2.5 MG/3ML) 0.083% nebulizer solution Take 3 mLs by nebulization every 6 hours as needed for Wheezing 12/13/21   Roberto Carlos Sexton Robbin Ramirez MD   levothyroxine (SYNTHROID) 125 MCG tablet Take 1 tablet by mouth every morning (before breakfast) 7/24/21   Holly Zaragoza MD       Allergies:  Codeine, Penicillins, Vicodin [hydrocodone-acetaminophen], and Adhesive tape    Family History:       Problem Relation Age of Onset    Heart Disease Mother     Heart Attack Mother     Arthritis Mother     Heart Disease Father     Mental Illness Father     Diabetes Father     Heart Disease Brother     Cancer Brother     Cancer Son     Heart Disease Sister     Heart Disease Brother     Heart Surgery Brother        Social History:   TOBACCO:   reports that she has never smoked. She has never used smokeless tobacco.  ETOH:   reports previous alcohol use. OCCUPATION:      REVIEW OF SYSTEMS:  · Constitutional: No fever, no chill or change in weight; good appetite  · HEENT: Double vision, no ear problems, no sore throat, no running nose. · Respiratory: No cough, no sputum, no pleuritic chest pain, no shortness of breath  · Cardiology: No angina, no dyspnea on exertion, no paroxysmal nocturnal dyspnea, no orthopnea, no palpitation, bilateral lower extremity swelling. · Gastroenterology: No dysphagia, no reflux; no abdominal pain, no nausea or vomiting; no constipation or diarrhea. No blood in stool. · Genitourinary: No dysuria, no frequency, hesitancy; no hematuria  · Musculoskeletal: no joint pain, no myalgia, no change in range of movement  · Neurology: no focal weakness in extremities, no slurred speech, no double vision, no tingling or numbness sensation.    · Endocrinology: no temperature intolerance, no polyphagia, polydipsia or polyuria  · Hematology: no increased bleeding, no bruising, no lymphadenopathy  · Skin: no skin change noticed by patient  · Psychology: no depressed mood, no suicidal ideation    Physical Exam   · Vitals: /87   Pulse 87   Temp 98 °F (36.7 °C)   Resp 21   SpO2 99%   · General Appearance: Alert, cooperative, no - 42.0 %    Monocytes % 10.2 2.0 - 12.0 %    Eosinophils % 3.1 0.0 - 6.0 %    Basophils % 1.3 0.0 - 2.0 %    Neutrophils Absolute 4.62 1.80 - 7.30 E9/L    Immature Granulocytes # 0.03 E9/L    Lymphocytes Absolute 0.64 (L) 1.50 - 4.00 E9/L    Monocytes Absolute 0.63 0.10 - 0.95 E9/L    Eosinophils Absolute 0.19 0.05 - 0.50 E9/L    Basophils Absolute 0.08 0.00 - 0.20 E9/L   Comprehensive Metabolic Panel   Result Value Ref Range    Sodium 142 132 - 146 mmol/L    Potassium 3.7 3.5 - 5.0 mmol/L    Chloride 102 98 - 107 mmol/L    CO2 33 (H) 22 - 29 mmol/L    Anion Gap 7 7 - 16 mmol/L    Glucose 120 (H) 74 - 99 mg/dL    BUN 19 6 - 23 mg/dL    CREATININE 1.0 0.5 - 1.0 mg/dL    GFR Non-African American 53 >=60 mL/min/1.73    GFR African American >60     Calcium 9.3 8.6 - 10.2 mg/dL    Total Protein 6.8 6.4 - 8.3 g/dL    Albumin 3.9 3.5 - 5.2 g/dL    Total Bilirubin 0.6 0.0 - 1.2 mg/dL    Alkaline Phosphatase 104 35 - 104 U/L    ALT 18 0 - 32 U/L    AST 33 (H) 0 - 31 U/L   Troponin   Result Value Ref Range    Troponin, High Sensitivity 23 (H) 0 - 9 ng/L   Brain Natriuretic Peptide   Result Value Ref Range    Pro-BNP 1,942 (H) 0 - 450 pg/mL   EKG 12 Lead   Result Value Ref Range    Ventricular Rate 90 BPM    Atrial Rate 138 BPM    QRS Duration 100 ms    Q-T Interval 390 ms    QTc Calculation (Bazett) 477 ms    R Axis 21 degrees    T Axis 26 degrees       Imaging Studies:  Radiology:   CT HEAD WO CONTRAST   Final Result   No acute intracranial abnormality. Large frontal scalp/forehead hematoma. Motion degraded examination of the cervical spine. No definite acute   displaced fracture or traumatic malalignment. CT CERVICAL SPINE WO CONTRAST   Final Result   No acute intracranial abnormality. Large frontal scalp/forehead hematoma. Motion degraded examination of the cervical spine. No definite acute   displaced fracture or traumatic malalignment. XR CHEST PORTABLE   Final Result   CHF.            EKG: Rhythm Strip: atrial fibrillation, rate 90, unchanged from previous tracings. Resident's Assessment and PLan     Head trauma  Secondary to mechanical fall  Patient noted to have multiple falls at home  [facility] [place] [facility] [facility] [place] [facility]  CT scan of the head negative for any acute trauma  Was noted to have a left scalp hematoma during previous visit 3/14/2022, notably ruptured at this time. She is alert and oriented to self, situation, location.   Wound dressing applied  Continue to monitor    Atrial fibrillation  Rate controlled with metoprolol  Continue home dose 50 mg nightly  Patient also on warfarin: Present dose includes 15 mg on Sundays and Wednesdays, 10 mg all other days  Continue warfarin at this time  Pharmacy to dose  INR rate to be between 2-3    Concern for acute on chronic HF exacerbation  Previous echo 5/11/2020  EF noted to be 55 to 60%, with no wall motion abnormalities  Patient asymptomatic during presentation at this time, states that bilateral lower extremity edema is at baseline for her  Denied any crackles, wheezing, shortness of breath  proBNP found to be 1942, elevated from previous 1715 though patient's highest recorded to be 3232 1/4/2022  Given 1 dose 20 mg Lasix IV in the ER  Continue home dose Lasix 40 mg daily    Hypertension  Hypothyroidism  Hyperparathyroidism   Home meds continued    DVT / GI prophylaxis: warfarin and Pepcid    Electronically signed by Gerri Pack MD on 3/26/2022 at 1:26 AM  This case was discussed with attending physician: Dr. Sirena Shore

## 2022-03-26 NOTE — ED NOTES
Pt AOx1-2. Pt knows name, but cannot remember . Pt is confused and expressing aphasia. Vital signs are stable, connected to monitor, on 2L [place] oxygen from home, call bell within reach, cervical collar in place, and will continue to monitor.       Bibiana Vásquez RN  22 0038       Bibiana Vásquez RN  22 0040

## 2022-03-26 NOTE — PROGRESS NOTES
Pharmacy Consultation Note  (Warfarin Dosing and Monitoring)  Initial consult date: 3/26/22  Consulting Provider: Dr Albert Russo is a 81 y.o. female for whom pharmacy has been asked to manage warfarin therapy. Weight:   Wt Readings from Last 1 Encounters:   03/14/22 250 lb (113.4 kg)       TSH:    Lab Results   Component Value Date    TSH 1.890 01/04/2022       Hepatic Function Panel:                            Lab Results   Component Value Date    ALKPHOS 104 03/25/2022    ALT 18 03/25/2022    AST 33 03/25/2022    PROT 6.8 03/25/2022    BILITOT 0.6 03/25/2022    BILIDIR 0.3 08/04/2014    IBILI 0.6 08/04/2014    LABALBU 3.9 03/25/2022    LABALBU 4.4 02/14/2012       Current warfarin drug-drug interactions include: No significant interactions    Recent Labs     03/25/22  2255   HGB 10.4*        Date Warfarin Dose INR Heparin or LMWH Comment   3/26/22 Hold dose 3.7 --                                  Assessment:  · Patient is a 81 y.o. female on warfarin for Atrial Fibrillation. Patient's home warfarin dosing regimen is Warfarin 15 mg on Sunday/Wednesday and 10 mg the rest of the week.    · Admitted after mechanical fall with head trauma; CT head negative for intracranial abnormality  · Goal INR 2 - 3  · INR 3.7 today    Plan:  · Will hold Warfarin tonight; will likely resume tomorrow  · Daily PT/INR until the INR is stable within the therapeutic range  · Pharmacist will follow and monitor/adjust dosing as necessary    Cm Ruffin, Eyal, BCPS, BCCCP  3/26/2022  10:28 AM

## 2022-03-26 NOTE — ED NOTES
Pt back from CT in stable condition and connected back to monitor     Oswaldo Negron, WALTER  03/26/22 0584

## 2022-03-26 NOTE — ED PROVIDER NOTES
HPI     Patient is a 81 y.o. female presents with a chief complaint of head injury. Patient is coming from a nursing facility where she rolled out of bed causing her to fall and hit the left side of her forehead. Per EMS, this has happened multiple times. This has been occurring for shortly prior to arrival.  She complains of mild pain in her head and neck. Patient states that it gets better with nothing. Patient states that it gets worse with nothing. Patient states that it is mild in severity. Patient states it was acute in onset. Patient is on Coumadin for atrial fibrillation. Per EMS there is no change in mental status baseline. Patient denies any chest pain, shortness of breath, nausea, vomiting, loss of consciousness, or abdominal pain. Review of Systems   Constitutional: Negative for chills and fever. HENT: Negative for ear pain and sore throat. Eyes: Negative for pain. Respiratory: Negative for shortness of breath. Cardiovascular: Negative for chest pain. Gastrointestinal: Negative for abdominal pain, diarrhea, nausea and vomiting. Genitourinary: Negative for flank pain. Musculoskeletal: Negative for back pain and neck pain. Skin: Positive for wound. Negative for rash. Neurological: Positive for headaches. Negative for syncope. Psychiatric/Behavioral: Negative for behavioral problems. The patient is not nervous/anxious. Physical Exam  Constitutional:       General: She is in acute distress. Appearance: She is obese. She is ill-appearing. HENT:      Head: Normocephalic. Comments: Hematoma and abrasion of the left forehead with swelling ecchymoses of bilateral eyes. Nose: Nose normal.      Mouth/Throat:      Mouth: Mucous membranes are dry. Pharynx: No posterior oropharyngeal erythema. Eyes:      Extraocular Movements: Extraocular movements intact. Pupils: Pupils are equal, round, and reactive to light.    Neck:      Comments: Cervical collar in place  Cardiovascular:      Rate and Rhythm: Tachycardia present. Rhythm irregular. Pulses: Normal pulses. Pulmonary:      Effort: Pulmonary effort is normal. No respiratory distress. Breath sounds: Decreased breath sounds present. No wheezing. Abdominal:      General: Abdomen is flat. Tenderness: There is no abdominal tenderness. Skin:     General: Skin is warm. Capillary Refill: Capillary refill takes less than 2 seconds. Findings: Lesion present. Neurological:      General: No focal deficit present. Mental Status: She is alert. Mental status is at baseline. Psychiatric:         Mood and Affect: Mood normal.              Procedures     [facility]     ED Course as of 03/26/22 0215   Fri Mar 25, 2022   2256 EKG read and interpreted by me. Rate 90, atrial fibrillation, , normal axis, no ST elevation, stable compared to previous EKG. [TO]   Sat Mar 26, 2022   0123 I spoke with Dr. Deep Sloan who agrees to admit the patient. [TO]      ED Course User Index  [TO] Blayne Double, DO      Patient is a 81 y.o. female presenting with head injury after a fall from bed. Patient is hypertensive, tachycardic and on the baseline 2 L nasal cannula. She has an area of swelling and abrasion over the left forehead. CT of the head and neck were obtained. CT head showed a left sided scalp hematoma with no acute intracranial hemorrhage. Cervical spine showed no acute process. Patient c-collar was cleared. Her wound was cleaned and bandaged. A chest x-ray showed CHF. Patient had slight elevation in her proBNP and troponin. Repeat troponin showed a delta of 1. Case was discussed with Dr. Deep Sloan who agreed to admit the patient for multiple falls in acute CHF exacerbation. Patient was seen and evaluated by myself and my attending Lourdes Pearson MD. Assessment and Plan discussed with attending provider, please see attestation for final plan of care.   This note was done using dictation software and there may be some grammatical errors associated with this. Noam Moulton MD   ED Course as of 03/26/22 0215   Fri Mar 25, 2022   2256 EKG read and interpreted by me. Rate 90, atrial fibrillation, , normal axis, no ST elevation, stable compared to previous EKG. [TO]   Sat Mar 26, 2022   0123 I spoke with Dr. Joslyn Petersen who agrees to admit the patient. [TO]      ED Course User Index  [TO] Nena Simms, DO       --------------------------------------------- PAST HISTORY ---------------------------------------------  Past Medical History:  has a past medical history of Anticoagulated on Coumadin, Atrial fibrillation ([address] Basal cell carcinoma of neck, CAD (coronary artery disease), CHF (congestive heart failure) ([address] Depression, DJD (degenerative joint disease), GERD (gastroesophageal reflux disease), History of cardiovascular stress test, Hyperparathyroidism ([address] Hypertension, Hypothyroidism, Mild mitral regurgitation, Osteoarthritis, Pulmonary hypertension ([address] Renal calculi, Tricuspid regurgitation, and Urinary incontinence. Past Surgical History:  has a past surgical history that includes Thyroidectomy (1960); Kidney stone surgery (1972); Cholecystectomy (1972); Kidney stone surgery (1981); Cataract removal (6/2005); Incontinence surgery (5/2005); Thyroid surgery (5/2005); parathyroidectomy; Knee Arthroplasty (Bilateral, 9/2005); Total shoulder arthroplasty (Left, 2009); fracture surgery (Left); Carpal tunnel release; Finger surgery (7/2011); Total shoulder arthroplasty (Left); Inner ear surgery (2011); Cataract removal with implant (Bilateral); Colonoscopy; and Cardiac catheterization (3/22/14). Social History:  reports that she has never smoked. She has never used smokeless tobacco. She reports previous alcohol use. She reports that she does not use drugs.     Family History: family history includes Arthritis in her mother; Cancer in her brother and son; Diabetes in her father; Heart Attack in her mother; Heart Disease in her brother, brother, father, mother, and sister; Heart Surgery in her brother; Mental Illness in her father. The patients home medications have been reviewed.     Allergies: Codeine, Penicillins, Vicodin [hydrocodone-acetaminophen], and Adhesive tape    -------------------------------------------------- RESULTS -------------------------------------------------    LABS:  Results for orders placed or performed during the hospital encounter of 03/25/22   CBC with Auto Differential   Result Value Ref Range    WBC 6.2 4.5 - 11.5 E9/L    RBC 3.48 (L) 3.50 - 5.50 E12/L    Hemoglobin 10.4 (L) 11.5 - 15.5 g/dL    Hematocrit 33.6 (L) 34.0 - 48.0 %    MCV 96.6 80.0 - 99.9 fL    MCH 29.9 26.0 - 35.0 pg    MCHC 31.0 (L) 32.0 - 34.5 %    RDW 15.2 (H) 11.5 - 15.0 fL    Platelets 542 758 - 393 E9/L    MPV 9.7 7.0 - 12.0 fL    Neutrophils % 74.6 43.0 - 80.0 %    Immature Granulocytes % 0.5 0.0 - 5.0 %    Lymphocytes % 10.3 (L) 20.0 - 42.0 %    Monocytes % 10.2 2.0 - 12.0 %    Eosinophils % 3.1 0.0 - 6.0 %    Basophils % 1.3 0.0 - 2.0 %    Neutrophils Absolute 4.62 1.80 - 7.30 E9/L    Immature Granulocytes # 0.03 E9/L    Lymphocytes Absolute 0.64 (L) 1.50 - 4.00 E9/L    Monocytes Absolute 0.63 0.10 - 0.95 E9/L    Eosinophils Absolute 0.19 0.05 - 0.50 E9/L    Basophils Absolute 0.08 0.00 - 0.20 E9/L   Comprehensive Metabolic Panel   Result Value Ref Range    Sodium 142 132 - 146 mmol/L    Potassium 3.7 3.5 - 5.0 mmol/L    Chloride 102 98 - 107 mmol/L    CO2 33 (H) 22 - 29 mmol/L    Anion Gap 7 7 - 16 mmol/L    Glucose 120 (H) 74 - 99 mg/dL    BUN 19 6 - 23 mg/dL    CREATININE 1.0 0.5 - 1.0 mg/dL    GFR Non-African American 53 >=60 mL/min/1.73    GFR African American >60     Calcium 9.3 8.6 - 10.2 mg/dL    Total Protein 6.8 6.4 - 8.3 g/dL    Albumin 3.9 3.5 - 5.2 g/dL    Total Bilirubin 0.6 0.0 - 1.2 mg/dL    Alkaline Phosphatase 104 35 - 104 U/L    ALT 18 0 - 32 U/L    AST 33 (H) 0 - 31 U/L   Troponin   Result Value Ref Range    Troponin, High Sensitivity 23 (H) 0 - 9 ng/L   Brain Natriuretic Peptide   Result Value Ref Range    Pro-BNP 1,942 (H) 0 - 450 pg/mL   Troponin   Result Value Ref Range    Troponin, High Sensitivity 22 (H) 0 - 9 ng/L   EKG 12 Lead   Result Value Ref Range    Ventricular Rate 90 BPM    Atrial Rate 138 BPM    QRS Duration 100 ms    Q-T Interval 390 ms    QTc Calculation (Bazett) 477 ms    R Axis 21 degrees    T Axis 26 degrees       RADIOLOGY:  CT HEAD WO CONTRAST   Final Result   No acute intracranial abnormality. Large frontal scalp/forehead hematoma. Motion degraded examination of the cervical spine. No definite acute   displaced fracture or traumatic malalignment. CT CERVICAL SPINE WO CONTRAST   Final Result   No acute intracranial abnormality. Large frontal scalp/forehead hematoma. Motion degraded examination of the cervical spine. No definite acute   displaced fracture or traumatic malalignment. XR CHEST PORTABLE   Final Result   CHF.                 ------------------------- NURSING NOTES AND VITALS REVIEWED ---------------------------  Date / Time Roomed:  3/25/2022 10:35 PM  ED Bed Assignment:  05/05    The nursing notes within the ED encounter and vital signs as below have been reviewed. Patient Vitals for the past 24 hrs:   BP Temp Pulse Resp SpO2   03/26/22 0030 134/87 -- 87 21 99 %   03/25/22 2207 (!) 160/116 98 °F (36.7 °C) 130 16 99 %       Oxygen Saturation Interpretation: Normal    ------------------------------------------ PROGRESS NOTES ------------------------------------------  See ED course for reevaluation    Counseling:  I have spoken with the patient and discussed todays results, in addition to providing specific details for the plan of care and counseling regarding the diagnosis and prognosis.   Their questions are answered at this time and they are agreeable with the plan of admission.    --------------------------------- ADDITIONAL PROVIDER NOTES ---------------------------------  Consultations:   Spoke with Dr. Ruddy Thapa. Discussed case. They will admit the patient. This patient's ED course included: a personal history and physicial examination, re-evaluation prior to disposition, multiple bedside re-evaluations, IV medications and cardiac monitoring    This patient has remained hemodynamically stable during their ED course. Diagnosis:  1. Injury of head, initial encounter    2. Fall from bed, initial encounter    3. Scalp hematoma, initial encounter    4. Congestive heart failure, unspecified HF chronicity, unspecified heart failure type ([address]        Disposition:  Patient's disposition: Admit to telemetry  Patient's condition is Stable         Zeina Clifford DO  Resident  03/26/22 0204      ATTENDING PROVIDER ATTESTATION:     I have personally performed and/or participated in the history, exam, medical decision making, and procedures and agree with all pertinent clinical information. I have also reviewed and agree with the past medical, family and social history unless otherwise noted. I have discussed this patient in detail with the resident, and provided the instruction and education regarding fall sob. My findings/Plan: I was the primary provider for patient. Patient presenting her because of fall at nursing home reportedly had fallen out of her bed. Patient also reports mild shortness of breath she is on oxygen. Patient reporting no chest pain she reports no abdominal pain or vomiting. Patient reportedly wheelchair-bound. Patient was seen here over 10 days ago for the same complaint of fall. Patient was treated and released. Patient had multiple x-rays and CTs done at that time. Patient here is awake alert oriented.   Heart patient is regular lungs are clear anteriorly abdomen soft and nontender she has noted edema to her lower extremities she is able to move upper extremities she has noted wound to her forehead mainly on the left side. There appears to be clotted blood. There is a prior fall from her prior evaluation over 10 days ago. Patient labs and EKG and chest x-ray noted reviewed. Chest x-ray does show heart failure. Patient's proBNP is elevated CT of the head and neck show no acute findings. Patient medicated here. Patient's vital signs stable. Patient will be admitted we did speak to family practice as well as resident. Patient will be admitted to monitored bed.        Elizabeth Ye MD  03/26/22 4383       Elizabeth Ye MD  03/26/22 [address] [facility] [address], [place]  03/26/22 9630

## 2022-03-26 NOTE — ED NOTES
Patient checked, changed, & repositioned for transfer to assigned bed.      Bry Whitmore RN  03/26/22 1873

## 2022-03-26 NOTE — ED NOTES
Wound care complete to open area left forehead. Patient tolerated well.      Antonella Joseph RN  03/26/22 3898

## 2022-03-26 NOTE — PROGRESS NOTES
arteriosclerosis in native artery    Frequent falls    Dementia [address] [address]    Ambulatory dysfunction    Cognitive dysfunction    COVID-19 virus infection    Nephrolithiasis    AMS (altered mental status)    Head injury, acute, initial encounter    Laceration of fascia of head         Plan:       Current Facility-Administered Medications   Medication Dose Route Frequency Provider Last Rate Last Admin    albuterol (PROVENTIL) nebulizer solution 2.5 mg  2.5 mg Nebulization Q6H PRN Trey Reveles MD        escitalopram (LEXAPRO) tablet 10 mg  10 mg Oral Daily Trey Reveles MD        famotidine (PEPCID) tablet 20 mg  20 mg Oral BID Trey Reveles MD        furosemide (LASIX) tablet 40 mg  40 mg Oral Daily Trey Reveles MD   40 mg at 03/26/22 1239    levothyroxine (SYNTHROID) tablet 175 mcg  175 mcg Oral Daily Trey Reveles MD        lisinopril (PRINIVIL;ZESTRIL) tablet 5 mg  5 mg Oral Daily Trey Reveles MD        meclizine (ANTIVERT) tablet 25 mg  25 mg Oral TID PRN Trey Reveles MD        metoprolol succinate (TOPROL XL) extended release tablet 50 mg  50 mg Oral Nightly Trey Reveles MD        sodium chloride flush 0.9 % injection 5-40 mL  5-40 mL IntraVENous 2 times per day Trey Reveles MD        sodium chloride flush 0.9 % injection 5-40 mL  5-40 mL IntraVENous PRN Trey Reveles MD        0.9 % sodium chloride infusion  25 mL IntraVENous PRN Trey Reveles MD        ondansetron (ZOFRAN-ODT) disintegrating tablet 4 mg  4 mg Oral Q8H PRN Trey Reveles MD        Or    ondansetron (ZOFRAN) injection 4 mg  4 mg IntraVENous Q6H PRN Trey Reveles MD        polyethylene glycol (GLYCOLAX) packet 17 g  17 g Oral Daily PRN Trey Reveles MD        acetaminophen (TYLENOL) tablet 650 mg  650 mg Oral Q6H PRN Trey Reveles MD   650 mg at 03/26/22 0242    Or    acetaminophen (TYLENOL) suppository 650 mg  650 mg Rectal Q6H PRN Trey Reveles MD        [START ON 3/27/2022] warfarin placeholder: dosing by pharmacy Other Daily Halle Dawn MD                Attending Physician Statement  I have reviewed the chart, including any radiology or labs, and seen the patient with the resident(s). I personally reviewed and performed key elements of the history and exam.  I agree with the assessment, plan and orders as documented by the resident. Please refer to the resident note for additional information.       Manjit Carrillo MD

## 2022-03-26 NOTE — ED NOTES
Judiht notified [facility] faxed, patient placed in transport & POC discussed.      Esther Toure RN  03/26/22 2896

## 2022-03-27 NOTE — PROGRESS NOTES
[facility] Inpatient   Resident Progress Note    S:  Hospital day: 1   Brief Synopsis: Nydia Jackson is a 81 y.o. female with PMH of atrial fibrillation (on Coumadin ), coronary artery disease, CHF, GERD, hyperparathyroidism, hypertension, hypothyroidism, osteoarthritis and urinary incontinence who presented to [facility] ER status post fall. Patient has history of a left scalp hematoma which burst after she tripped on some furniture in her room at a nursing facility. CT head was negative for any acute changes. Patient admitted mostly for monitoring status post fall, concern for acute exacerbation of heart failure, as well as dispo planning. Patient seen and examined this morning. Alert oriented, doing well. No other complaints at this time. Overnight/interim:    No acute events overnight    Cont meds:    sodium chloride       Scheduled meds:    escitalopram  10 mg Oral Daily    famotidine  20 mg Oral BID    furosemide  40 mg Oral Daily    levothyroxine  175 mcg Oral Daily    lisinopril  5 mg Oral Daily    metoprolol succinate  50 mg Oral Nightly    sodium chloride flush  5-40 mL IntraVENous 2 times per day    warfarin placeholder: dosing by pharmacy   Other Daily     PRN meds: albuterol, meclizine, sodium chloride flush, sodium chloride, ondansetron **OR** ondansetron, polyethylene glycol, acetaminophen **OR** acetaminophen     I reviewed the patient's past medical and surgical history, Medications and Allergies. O:  BP (!) 140/80   Pulse 112   Temp 96.2 °F (35.7 °C) (Temporal)   Resp 21   Ht 5' 6\" (1.676 m)   Wt 250 lb (113.4 kg)   SpO2 100%   BMI 40.35 kg/m²   24 hour I&O: I/O last 3 completed shifts: In: 240 [P.O.:240]  Out: 800 [Urine:800]  No intake/output data recorded. Physical Exam  Constitutional:       Appearance: She is obese. She is not ill-appearing or diaphoretic. HENT:      Head:      Comments: Left scalp wound 1 cm wide , bandages clean and dry. Cardiovascular:      Rate and Rhythm: Normal rate and regular rhythm. Pulses: Normal pulses. Heart sounds: Normal heart sounds. No murmur heard. No gallop. Pulmonary:      Effort: Pulmonary effort is normal. No respiratory distress. Breath sounds: No wheezing. Abdominal:      General: Bowel sounds are normal. There is no distension. Tenderness: There is no abdominal tenderness. Musculoskeletal:         General: Normal range of motion. Right lower leg: Edema present. Left lower leg: Edema present. Comments: 1+ Bilateral extremity edema   Neurological:      Mental Status: She is alert and oriented to person, place, and time. Labs:  Na/K/Cl/CO2:  143/3.5/102/34 (03/27 4567)  BUN/Cr/glu/ALT/AST/amyl/lip:  16/0.9/--/--/--/--/-- (03/27 7019)  WBC/Hgb/Hct/Plts:  4.1/9.5/30.8/163 (03/27 0853)  estimated creatinine clearance is 58 mL/min (based on SCr of 0.9 mg/dL). Other pertinent labs as noted below    Radiology:  XR CHEST PORTABLE   Preliminary Result   Cardiac size enlarged with increase in opacifications in the perihilar   regions right greater than left suprahilar involvement of likely worsening   edema with probable small volume right pleural effusion. CT HEAD WO CONTRAST   Final Result   No acute intracranial abnormality. Large frontal scalp/forehead hematoma. Motion degraded examination of the cervical spine. No definite acute   displaced fracture or traumatic malalignment. CT CERVICAL SPINE WO CONTRAST   Final Result   No acute intracranial abnormality. Large frontal scalp/forehead hematoma. Motion degraded examination of the cervical spine. No definite acute   displaced fracture or traumatic malalignment. XR CHEST PORTABLE   Final Result   CHF. A/P:  Principal Problem:    Head injury, acute, initial encounter  Active Problems:    Laceration of fascia of head  Resolved Problems:    * No resolved hospital problems. *    Head trauma  Secondary to mechanical fall  Patient noted to have multiple falls at nursing facility  Presently residing in a nursing home[facility]term care facility [place] [facility]  CT scan of the head negative for any acute trauma  Was noted to have a left scalp hematoma during previous visit 3/14/2022, notably ruptured at this time. Wound dressing applied  Social work consulted: Patient to require new placement site as patient's family refuses to send patient back to [place] [facility]  PT OT consult ; appreciate recs    Atrial fibrillation  Rate controlled with metoprolol  Continue home dose 50 mg nightly  Patient also on warfarin: Present dose includes 15 mg on Sundays and Wednesdays, 10 mg all other days  Continue warfarin at this time  Pharmacy to dose  INR rate to be between 2-3     History of HFpEF  Previous echo 5/11/2020  EF noted to be 55 to 60%, with no wall motion abnormalities  proBNP found to be 1942, elevated from previous 1715 though patient's highest recorded to be 3232 1/4/2022  Given 1 dose 20 mg Lasix IV in the ER  Continue home dose Lasix 40 mg daily  Clinically, patient does not look to be in exacerbation, can monitor for now  Echo can be completed outpatient     Hypertension  Hypothyroidism  Hyperparathyroidism   Home meds continued    GI/DVT ppx: Warfarin dosing, pepcid  Diet: Low sodium  Disposition: Per social work, patient requiring new placement.   PT OT consult ordered    Electronically signed by Lucas Skelton MD  [id number]-[id number] on 3/27/2022 at 7:59 AM  This case was discussed with attending physician: Dr. Juan Luis Hunter

## 2022-03-27 NOTE — PROGRESS NOTES
[address] [place] [address] Attending    S: 81 y.o. female with recurrent fall while in assisted living. Says she slipped on something while getting out of bed during the night. Was sent to the emergency room by her residential facility. Alert and oriented in ED. Evaluation consistent with previously except for open laceration on scalp of left forehead. Says she has chronic pain of legs. Does not complain of headache at this time. Feeling much better    Conversant this AM    O: VS- Blood pressure (!) 140/80, pulse 97, temperature 96.2 °F (35.7 °C), temperature source Temporal, resp. rate 21, height 5' 6\" (1.676 m), weight 250 lb (113.4 kg), SpO2 100 %, not currently breastfeeding. Exam is as noted by resident.    In no acute distress  Oriented and responsive  Neck supple, no bruit  Heart irregularly irregular as previously  Lungs clear to auscultation  Abdomen supple without masses, tenderness organomegaly  No leg tenderness       ED course:  Patient hemodynamically stable    Pertinent labs include  CBC, CMP stable  ProBNP: 1942, previous 1715   Troponin 23, repeat 22     Pertinent imaging includes:  CT head: Negative for any acute intracranial pathology  CT cervical spine: Chronic degenerative deformities, no acute malalignment or fractures noted  CXR consistent with CHF           Impressions:   Principal Problem:    Head injury, acute, initial encounter  Active Problems:    Laceration of fascia of head      Patient Active Problem List   Diagnosis    Depression    Hypothyroidism    Permanent atrial fibrillation ([address]    History of artificial lens replacement    Essential hypertension    Moderate obesity    Greater trochanteric bursitis    Chronic anticoagulation    Chronic obstructive pulmonary disease (HCC)    Mixed stress and urge urinary incontinence    Fuchs' endothelial dystrophy    Pseudophakia, both eyes    Chronic diastolic (congestive) heart failure ([address]    Pure hypercholesterolemia    Spinal stenosis of lumbar region with neurogenic claudication    Patent foramen ovale    Coronary arteriosclerosis in native artery    Frequent falls    Dementia ([facility]    Ambulatory dysfunction    Cognitive dysfunction    COVID-19 virus infection    Nephrolithiasis    AMS (altered mental status)    Head injury, acute, initial encounter    Laceration of fascia of head         Plan: To arrange for higher level of facility care.   Coralyn Lunger fallen often when on assisted living   Social service following    Current Facility-Administered Medications   Medication Dose Route Frequency Provider Last Rate Last Admin    albuterol (PROVENTIL) nebulizer solution 2.5 mg  2.5 mg Nebulization Q6H PRN Natalia Nava MD        escitalopram (LEXAPRO) tablet 10 mg  10 mg Oral Daily Natalia Nava MD   10 mg at 03/27/22 0835    famotidine (PEPCID) tablet 20 mg  20 mg Oral BID Natalia Nava MD   20 mg at 03/27/22 0834    furosemide (LASIX) tablet 40 mg  40 mg Oral Daily Natalia Nava MD   40 mg at 03/27/22 0736    levothyroxine (SYNTHROID) tablet 175 mcg  175 mcg Oral Daily Natalia Nava MD   175 mcg at 03/27/22 7012    lisinopril (PRINIVIL;ZESTRIL) tablet 5 mg  5 mg Oral Daily Natalia Nava MD   5 mg at 03/27/22 1607    meclizine (ANTIVERT) tablet 25 mg  25 mg Oral TID PRN Natalia Nava MD        metoprolol succinate (TOPROL XL) extended release tablet 50 mg  50 mg Oral Nightly Natalia Nava MD   50 mg at 03/26/22 2040    sodium chloride flush 0.9 % injection 5-40 mL  5-40 mL IntraVENous 2 times per day Natalia Nava MD   10 mL at 03/26/22 2102    sodium chloride flush 0.9 % injection 5-40 mL  5-40 mL IntraVENous PRN Natalia Nava MD        0.9 % sodium chloride infusion  25 mL IntraVENous PRN Natalia Nava MD        ondansetron (ZOFRAN-ODT) disintegrating tablet 4 mg  4 mg Oral Q8H PRN Natalia Nava MD        Or    ondansetron [facility]) injection 4 mg  4 mg IntraVENous Q6H PRN Natalia Nava, MD        polyethylene glycol (GLYCOLAX) packet 17 g  17 g Oral Daily PRN Jeffy Brown MD        acetaminophen (TYLENOL) tablet 650 mg  650 mg Oral Q6H PRN Jeffy Brown MD   650 mg at 03/26/22 2040    Or    acetaminophen (TYLENOL) suppository 650 mg  650 mg Rectal Q6H PRN Jeffy Brown MD        warfarin placeholder: dosing by pharmacy   Other Daily Jeffy Brown MD                Attending Physician Statement  I have reviewed the chart, including any radiology or labs, and seen the patient with the resident(s). I personally reviewed and performed key elements of the history and exam.  I agree with the assessment, plan and orders as documented by the resident. Please refer to the resident note for additional information.       Karen Posada MD

## 2022-03-27 NOTE — PROGRESS NOTES
Pharmacy Consultation Note  (Warfarin Dosing and Monitoring)  Initial consult date: 3/26/22  Consulting Provider: Dr Jessica Cisneros is a 81 y.o. female for whom pharmacy has been asked to manage warfarin therapy. Weight:   Wt Readings from Last 1 Encounters:   03/26/22 250 lb (113.4 kg)       TSH:    Lab Results   Component Value Date    TSH 1.890 01/04/2022       Hepatic Function Panel:                            Lab Results   Component Value Date    ALKPHOS 104 03/25/2022    ALT 18 03/25/2022    AST 33 03/25/2022    PROT 6.8 03/25/2022    BILITOT 0.6 03/25/2022    BILIDIR 0.3 08/04/2014    IBILI 0.6 08/04/2014    LABALBU 3.9 03/25/2022    LABALBU 4.4 02/14/2012       Current warfarin drug-drug interactions include: No significant interactions    Recent Labs     03/25/22  2255 03/27/22  0605   HGB 10.4* 9.5*    163     Date Warfarin Dose INR Heparin or LMWH Comment   3/26/22 Hold dose 3.7 --    3/27 Hold dose 3.7                            Assessment:  · Patient is a 81 y.o. female on warfarin for Atrial Fibrillation. Patient's home warfarin dosing regimen is Warfarin 15 mg on Sunday/Wednesday and 10 mg the rest of the week.    · Admitted after mechanical fall with head trauma; CT head negative for intracranial abnormality  · Goal INR 2 - 3  · INR 3.7 again today    Plan:  · Will hold Warfarin again tonight; will possibly resume tomorrow  · Daily PT/INR until the INR is stable within the therapeutic range  · Pharmacist will follow and monitor/adjust dosing as necessary    Luisa Cornejo, PharmD, BCPS, BCCCP  3/27/2022  12:11 PM

## 2022-03-28 PROBLEM — S09.90XA HEAD INJURY, INITIAL ENCOUNTER: Status: ACTIVE | Noted: 2022-01-01

## 2022-03-28 NOTE — CARE COORDINATION
Received return call from Les, daughter. She states ultimately she would like for patient to return to [facility] but if they refuse to take her back she does not want her to go to [facility] again. She would like [facility]. Awaiting Leann's answer from [facility] on if they will accept back. Tentative referral to [facility].

## 2022-03-28 NOTE — PROGRESS NOTES
[address] [place] [address], [address] [place] [address]                                                  Patient Name: Marleni Nur  MRN: [id number]  : 1935  Room: [address] [place] [address]    Evaluating OT: Raphael Irving, [place] #[id number]    Referring Provider[de-identified] Chula Gonzalez MD     Specific Provider Orders/Date: OT evaluation and treatment 3/27/22    Diagnosis:  Fall from bed, initial encounter [W06. XXXA]  Injury of head, initial encounter [S09.90XA]  Scalp hematoma, initial encounter [S00.03XA]  Head injury, acute, initial encounter [S09.90XA]  Laceration of fascia of head [S01.91XA]  Congestive heart failure, unspecified HF chronicity, unspecified heart failure type ([address] [I50.9]      Pertinent Medical History:  has a past medical history of Anticoagulated on Coumadin, Atrial fibrillation ([address] Basal cell carcinoma of neck, CAD (coronary artery disease), CHF (congestive heart failure) ([address] Depression, DJD (degenerative joint disease), GERD (gastroesophageal reflux disease), History of cardiovascular stress test, Hyperparathyroidism ([address] Hypertension, Hypothyroidism, Mild mitral regurgitation, Osteoarthritis, Pulmonary hypertension ([address] Renal calculi, Tricuspid regurgitation, and Urinary incontinence.        Precautions:  Fall Risk, bed alarm, impulsive, incontinent, cognition, O2    Assessment of current deficits   [x] Functional mobility   [x]ADLs  [x] Strength               [x]Cognition   [x] Functional transfers   [] IADLs         [x] Safety Awareness   [x]Endurance   [] Fine Coordination              [x] Balance      [] Vision/perception   []Sensation    [x]Gross Motor Coordination  [x] ROM  [] Delirium                   [] Motor Control     OT PLAN OF CARE   OT POC based on physician orders, patient diagnosis and results of clinical assessment    Frequency/Duration 1-4 days/wk for 2 weeks PRN Specific OT Treatment to include:   * Instruction/training on adapted ADL techniques and AE recommendations to increase functional independence within precautions       * Functional transfer/mobility training/DME recommendations for increased independence, safety, and fall prevention  * Patient/Family education to increase follow through with safety techniques and functional independence  * Cognitive retraining/development of therapeutic activities to improve problem solving, judgement, memory, and attention for increased safety/participation in ADL/IADL tasks  * Therapeutic exercise to improve motor endurance, ROM, and functional strength for ADLs/functional transfers  * Therapeutic activities to facilitate/challenge dynamic balance, stand tolerance for increased safety and independence with ADLs  * Therapeutic activities to facilitate gross/fine motor skills for increased independence with ADLs  * Positioning to improve skin integrity, interaction with environment and functional independence      Recommended Adaptive Equipment:  TBD     Home Living:  Per chart, Pt lives in an assisted with   Prior Level of Function: questionable historian.  Per pt she was indep  with ADLs , assist  with IADLs; ambulated with [employer].    Pain Level: R knee, L hand, not rated    Cognition: A&O: self, month, year, place but demonstrated confusion and very poor safety/judment    Follows multi step directions: fair    Memory:  poor   Sequencing:  poor   Problem solving:  poor   Judgement/safety:  poor    Functional Assessment:   AM-PAC Daily Activity Raw Score: 12/24     Initial Eval Status  Date: 3/28/22 Treatment Status  Date: STG=LTG  Time frame: 10-14 days   Feeding Stand by Assist   Independent    Grooming Minimal Assist   Modified [place]    UB Dressing Moderate Assist   To change hosp. gown  Stand by Assist    LB Dressing dependent  Limited reach  Moderate Assist    Bathing Maximal Assist  Moderate Assist    Toileting Dependent   Moderate Assist    Bed Mobility  Supine to sit: Moderate Assist   Sit to supine: Moderate Assist   Supine to sit: Minimal Assist   Sit to supine: Minimal Assist    Functional Transfers Sit to stand: Moderate Assist   Stand to sit: Moderate Assist   Stand pivot: Moderate Assist   Stand by Assist    Functional Mobility Mod A with ww  3-4 side steps  SBA with ww  Short distance    Balance Sitting: SBA     Standing: min/mod A  Sitting: indep      Standing: SBA with ww   Activity Tolerance Poor+  SpO2 remained >93% with O2  Good-   Visual/  Perceptual Glasses: no          BUE  ROM/Strength/  Fine motor Coordination Hand dominance: R    RUE: ROM 45 degree shoulder flex, WFL distally     Strength: grossly 3-/5      Strength: fair     Coordination:  fair    LUE: ROM 45 degree shoulder flex, c/o L thumb pain     Strength: grossly 3-/5      Strength: NT     Coordination: fair  Increase BUE strength 1/3 muscle grade for improved indep with fucntional transfers     Hearing: [place]  Sensation:  NT, No c/o numbness or tingling   Tone:  WFL   Edema: R knee, L forearm noted, RN notified    Comments:   RN cleared patient for OT. Upon arrival, patient in bed. Pt demonstrating poor safety awareness by attempting to sit EOB without . Therapist facilitated and instructed pt on adapted  techniques & compensatory strategies to improve safety and independence with basic ADLs, bed mobility, , functional transfers & mobility to allow pt to achieve highest level of independence and safely. Patient presents with confusion,  decreased  independence with  ADLs,  bed mobility,  functional transfers, and functional mobility,   At end of session, patient in bed with call light and phone within reach, all lines and tubes intact. Pt would benefit from continued skilled OT to increase safety and independence with completion of ADL tasks and functional mobility for improved quality of life.        Treatment: OT treatment provided this date includes:    ADL-  Instruction/training on safety and adapted techniques for completion of dressing, personal hygiene while standing,     Mobility-  Instruction/training on safety and improved independence with bed mobility , functional transfers , and functional mobility with ww. Therapist facilitated and provided cues for body alignment and hand/feet placement.   Sitting EOB x 10 minutes to improve dynamic sitting balance and activity tolerance during ADLs. Rehab Potential: Good  for established goals     Patient / Family Goal:  Not stated    Patient and/or family were instructed on functional diagnosis, prognosis/goals and OT plan of care. Demonstrated fair- understanding. Eval Complexity: Low    Time In: 10:15  Time Out: 10:46  In/Out: 11:30-11:40  Total Treatment Time: 26 minutes    Min Units   OT Eval Low 02211  x     OT Eval Medium 78544      OT Eval High 67465       OT Re-Eval [id number]       Therapeutic Ex 48499       Therapeutic Activities 53324  16 1   ADL/Self Care 64620  10 1   Orthotic Management 73839       Neuro Re-Ed 64638       Non-Billable Time          Evaluation Time includes thorough review of current medical information, gathering information on past medical history/social history and prior level of function, completion of standardized testing/informal observation of tasks, assessment of data and education on plan of care and goals.             [place], [place] #[id number]

## 2022-03-28 NOTE — PROGRESS NOTES
[address] [place] [address] Attending    S: 81 y.o. female with a PMH of persistent afib on coumadin, CAD, CHF, GERD, hyperparathyroidism, HTN, hypothyroidism, OA and urinary incontinence who presented to [facility] after a fall. She has been admitted recently for similar incident. On 3/14 she had a left scalp hematoma, but no CT evidence of brain bleed. This prior hematoma opened up when the patient was getting out of bed and slipped and hit something in her room \"head first\" per the H&P. The patient had stable labs, and negative CT head and cervical spine. She received 1 dose of lasix for CHF seen on CXR in the ED. Patient has been residing in an assisted living facility-[place] [facility]. Multiple mechanical falls at home. Today patient was unable to state where she was presently. She was oriented to name. O: VS- Blood pressure 139/67, pulse 80, temperature 99.1 °F (37.3 °C), temperature source Temporal, resp. rate 20, height 5' 6\" (1.676 m), weight 268 lb 15.4 oz (122 kg), SpO2 97 %, not currently breastfeeding. Exam is as noted by resident with the following changes, additions or corrections:  Gen:  Awake and alert, oriented to person only during exam  CVS:  Irregularly irregular  Lungs:  CTAB  Head:  There is a dressing on an anterior scalp hematoma at the hairline    Impressions:   Principal Problem:    Head injury, acute, initial encounter  Active Problems:    Laceration of fascia of head  Resolved Problems:    * No resolved hospital problems. *  Permanent afib on chronic anticoagulation  Frequent falls. Plan:     Per patient's daughter, family wants patient returned to assisted living facility  Would recommend PT/OT at the facility  Will discuss potentially stopping anticoagulation based on risk/benefit.   - Has-BLED score of 8.9% (high risk), [id number] score of 7.2%     Attending Physician Statement  I have reviewed the chart and seen the patient with the resident(s). I personally reviewed images, EKG's and similar tests, if present. I personally reviewed and performed key elements of the history and exam.  I have reviewed and confirmed student and/or resident history and exam with changes as indicated above. I agree with the assessment, plan and orders as documented by the resident. Please refer to the resident and/or student note for additional information.       Abran Bennett MD

## 2022-03-28 NOTE — PROGRESS NOTES
Physical Therapy Initial Assessment     Name: Russ Saul  : 1935  MRN: [id number]      Date of Service: 3/28/2022    Evaluating PT:  Sierra Kapoor PT, DPT [id number]    Room #:  0422/0206-V  Diagnosis:  Fall from bed, initial encounter [W06. XXXA]  Injury of head, initial encounter [S09.90XA]  Scalp hematoma, initial encounter [S00.03XA]  Head injury, acute, initial encounter [S09.90XA]  Laceration of fascia of head [S01.91XA]  Congestive heart failure, unspecified HF chronicity, unspecified heart failure type ([facility] [I50.9]  PMHx/PSHx:    Past Medical History:   Diagnosis Date    Anticoagulated on Coumadin     on Coumadin for this Dr. Li Post controls    Atrial fibrillation ([facility]     Basal cell carcinoma of neck     CAD (coronary artery disease)     History of luminal irregularities of the coronary artery, stable.  CHF (congestive heart failure) (HCC)     stage I diastolic dysfunction on 6/10/30    Depression 2010    DJD (degenerative joint disease)     SEVERE IN RIGHT HAND, IN MULTIPLE OTHER JOINTS    GERD (gastroesophageal reflux disease) 2010    History of cardiovascular stress test 2014    lexiscan    Hyperparathyroidism ([facility] 2010    Had parathyroidectomy    Hypertension 2010    Hypothyroidism 2010    Mild mitral regurgitation 7/10/14    Osteoarthritis 2010    Pulmonary hypertension ([facility] 7/10/14    mild    Renal calculi     x3    Tricuspid regurgitation 7/10/14    mild-moderate    Urinary incontinence 2010     Procedure/Surgery:  None this admission  Reason for admission: fall, head injury, 2L O2 via NC  Precautions: fall risk, confusion   Equipment Needs:  FWW    SUBJECTIVE:  Pt came from assisted living facility with . Pt has no stairs to enter and no stairs inside home. Pt ambulated with [employer] PTA. Pt oriented to place, date and person but is confused about situations, is [facility] and has difficulty answering questions.  Pt noted difficulty with vision. OBJECTIVE:   Initial Evaluation  Date: 3/28/2022 Treatment Short Term/ Long Term   Goals   AM-PAC 6 Clicks 14/01     Was pt agreeable to Eval/treatment? Yes     Does pt have pain? None noted today     Bed Mobility  Rolling: mod a   Supine to sit: mod a  Sit to supine: mod a  Scooting: mod a   Rolling: min a   Supine to sit: min a   Sit to supine: min a   Scooting: min a    Transfers Sit to stand: mod a <> min a   Stand to sit: min a   Stand pivot: mod a   Sit to stand: SBA  Stand to sit: SBA  Stand pivot: min a    Ambulation     NT  50 feet with min a FWW   Stair negotiation: ascended and descended NT  TBD   ROM BUE:  Refer to OT eval   BLE:  WFL - difficulty following commands to participate in ROM/MMT     Strength BUE:  Refer to OT eval   BLE:  WFL - difficulty following commands to participate in ROM/MMT  5/5   Balance Sitting EOB:  SBA  Dynamic Standing:  Min a   Sitting EOB:  ind  Dynamic Standing:  SBA     Pt is A & O x 3  Sensation:  Pt denies numbness and tingling to extremities  Edema:  No new edema noted     Vitals:  Blood Pressure at rest -- Blood Pressure post session --   Heart Rate at rest -- Heart Rate post session --   SPO2 at rest 99 SPO2 post session 98     Therapeutic Exercises:  none performed this visit    Patient education  Pt educated on role of PT, safety awareness during bed mobility and transfers    Patient response to education:   Pt verbalized understanding Pt demonstrated skill Pt requires further education in this area   yes yes Reinforce      ASSESSMENT:    Conditions Requiring Skilled Therapeutic Intervention:    [x]Decreased strength     []Decreased ROM  [x]Decreased functional mobility  [x]Decreased balance   [x]Decreased endurance   []Decreased posture  []Decreased sensation  []Decreased coordination   [x]Decreased vision  [x]Decreased safety awareness   []Increased pain     Comments:  RN cleared pt for PT.  Pt in semi-supine in bed on arrival and was drowsy. Pt showing confusion and difficulty answering questions but is grossly oriented. Pt with difficulty following commands but able to participate in eval. Bed mobility performed with mod assist for trunk management from supine to sitting EOB. Pt then performed STS, SPT x2 (to/from chair); pt required min a to stand from bed but mod a to stand from chair/lower surface. Pt able to return to bed with mod assist for LE management. Pt showed fair safety awareness throughout session. Pt left in bed with bed alarm on and call light in reach. Treatment:  Patient practiced and was instructed in the following treatment:     Bed mobility: performed with cues for safety awareness and proper hand placement to promote improved functional independence.  Transfer Training: performed with cuing for sequencing and safety awareness   Gait training: not performed today     Pt's/ family goals   1. Return home safely. Prognosis is good for reaching above PT goals. Patient and or family understand(s) diagnosis, prognosis, and plan of care. yes    PHYSICAL THERAPY PLAN OF CARE:    PT POC is established based on physician order and patient diagnosis     Referring provider/PT Order:    PT eval and treat  Start:  03/27/22 1300,   End:  03/27/22 1300,   ONE TIME,   Standing Count:  1 Occurrences,   Rosa Fulton MD        Diagnosis:  Fall from bed, initial encounter [W06. XXXA]  Injury of head, initial encounter [S09.90XA]  Scalp hematoma, initial encounter [S00.03XA]  Head injury, acute, initial encounter [S09.90XA]  Laceration of fascia of head [S01.91XA]  Congestive heart failure, unspecified HF chronicity, unspecified heart failure type ([facility] [I50.9]  Specific instructions for next treatment:  Practice transfers, attempt ambulation in room    Current Treatment Recommendations:     [x] Strengthening to improve independence with functional mobility   [] ROM to improve independence with functional mobility   [x] Balance Training to improve static/dynamic balance and to reduce fall risk  [x] Endurance Training to improve activity tolerance during functional mobility   [x] Transfer Training to improve safety and independence with all functional transfers   [x] Gait Training to improve gait mechanics, endurance and assess need for appropriate assistive device  [] Stair Training in preparation for safe discharge home and/or into the community   [x] Positioning to prevent skin breakdown and contractures  [x] Safety and Education Training   [] Patient/Caregiver Education   [] HEP  [] Other     PT long term treatment goals are located in above grid    Frequency of treatments: 2-5x/week x 1-2 weeks. Time in  835  Time out  915    Total Treatment Time  25 minutes     Evaluation Time includes thorough review of current medical information, gathering information on past medical history/social history and prior level of function, completion of standardized testing/informal observation of tasks, assessment of data and education on plan of care and goals.     CPT codes:  [x] Low Complexity PT evaluation 60021  [] Moderate Complexity PT evaluation 66580  [] High Complexity PT evaluation 76557  [] PT Re-evaluation 91195  [] Gait training 91587 -- minutes  [] Manual therapy [address] [facility] -- minutes  [x] Therapeutic activities 54275 25 minutes  [] Therapeutic exercises 15614 -- minutes  [] Neuromuscular reeducation 00193 -- minutes     Bailey Hair, PT, DPT  [id number]

## 2022-03-28 NOTE — PROGRESS NOTES
[facility] - [facility]   Resident Progress Note    S:  Hospital day: 2   Brief Synopsis: Ramon García is a 81 y.o. female with PMH of atrial fibrillation (on Coumadin ), coronary artery disease, CHF, GERD, hyperparathyroidism, hypertension, hypothyroidism, osteoarthritis and urinary incontinence who presented to [facility] ER status post fall. Patient has history of a left scalp hematoma which burst after she tripped on some furniture in her room at a nursing facility. CT head was negative for any acute changes. Patient admitted mostly for monitoring status post fall, concern for acute exacerbation of heart failure, as well as dispo planning. Patient seen and examined this morning. Denies chest pain, shortness of breath. Endorses slight headache. Awaiting placement       Cont meds:    sodium chloride       Scheduled meds:    escitalopram  10 mg Oral Daily    famotidine  20 mg Oral BID    furosemide  40 mg Oral Daily    levothyroxine  175 mcg Oral Daily    lisinopril  5 mg Oral Daily    metoprolol succinate  50 mg Oral Nightly    sodium chloride flush  5-40 mL IntraVENous 2 times per day    warfarin placeholder: dosing by pharmacy   Other Daily     PRN meds: albuterol, meclizine, sodium chloride flush, sodium chloride, ondansetron **OR** ondansetron, polyethylene glycol, acetaminophen **OR** acetaminophen     I reviewed the patient's past medical and surgical history, Medications and Allergies. O:  BP (!) 143/82   Pulse 88   Temp 97.2 °F (36.2 °C) (Temporal)   Resp 16   Ht 5' 6\" (1.676 m)   Wt 268 lb 15.4 oz (122 kg)   SpO2 98%   BMI 43.41 kg/m²   24 hour I&O: I/O last 3 completed shifts: In: 480 [P.O.:480]  Out: 1900 [Urine:1900]  No intake/output data recorded. Physical Exam  Constitutional:       Appearance: She is obese. She is not ill-appearing or diaphoretic.    HENT:      Head:      Comments: Left scalp wound 1 cm wide , bandage with dried blood  Cardiovascular: Rate and Rhythm: Normal rate and regular rhythm. Pulses: Normal pulses. Heart sounds: Normal heart sounds. No murmur heard. No gallop. Pulmonary:      Effort: Pulmonary effort is normal. No respiratory distress. Breath sounds: No wheezing. Abdominal:      General: Bowel sounds are normal. There is no distension. Tenderness: There is no abdominal tenderness. Musculoskeletal:         General: Normal range of motion. Right lower leg: No edema. Left lower leg: No edema. Neurological:      Mental Status: She is alert and oriented to person, place, and time. Labs:  Na/K/Cl/CO2:  142/3.8/102/32 (03/28 0441)  BUN/Cr/glu/ALT/AST/amyl/lip:  14/0.8/--/--/--/--/-- (03/28 0441)  WBC/Hgb/Hct/Plts:  5.7/10.1/32.6/185 (03/28 0441)  estimated creatinine clearance is 69 mL/min (based on SCr of 0.8 mg/dL). Other pertinent labs as noted below    Radiology:  XR CHEST PORTABLE   Final Result   Cardiac size enlarged with increase in opacifications in the perihilar   regions right greater than left suprahilar involvement of likely worsening   edema with probable small volume right pleural effusion. CT HEAD WO CONTRAST   Final Result   No acute intracranial abnormality. Large frontal scalp/forehead hematoma. Motion degraded examination of the cervical spine. No definite acute   displaced fracture or traumatic malalignment. CT CERVICAL SPINE WO CONTRAST   Final Result   No acute intracranial abnormality. Large frontal scalp/forehead hematoma. Motion degraded examination of the cervical spine. No definite acute   displaced fracture or traumatic malalignment. XR CHEST PORTABLE   Final Result   CHF. A/P:  Principal Problem:    Head injury, acute, initial encounter  Active Problems:    Laceration of fascia of head  Resolved Problems:    * No resolved hospital problems.  *    Head trauma  Secondary to mechanical fall  Patient noted to have multiple falls at nursing facility  Presently residing in [place] [facility] [facility] [place] [facility]  CT scan of the head negative for any acute trauma  Was noted to have a left scalp hematoma during previous visit 3/14/2022, notably ruptured at this time. Wound dressing applied  Social work consulted: patient not accepted back to [facility], [facility]. PT/OT should be continued upon discharge   PT OT consulted: [facility] 11/24    Atrial fibrillation  Rate controlled with metoprolol  Continue home dose 50 mg nightly  Patient also on warfarin: Present dose includes 15 mg on Sundays and Wednesdays, 10 mg all other days  INR 2.8 Today   Continue warfarin at this time  INR rate to be between 2-2.5 per cardio due to high risk of bleeding  Will need to discuss with family risk/ benefits of stopping warfarin based on CHADSVASC (7%)and HAS-BLED (9%)scores. She may have slightly higher risk of bleeds vs risk of stroke.      History of HFpEF  Previous echo 5/11/2020  EF noted to be 55 to 60%, with no wall motion abnormalities  proBNP found to be 1942, elevated from previous 1715 though patient's highest recorded to be 3232 1/4/2022  Given 1 dose 20 mg Lasix IV in the ER  Continue home dose Lasix 40 mg daily  Clinically, patient does not look to be in exacerbation, can monitor for now  Echo can be completed outpatient     Hypertension  Hypothyroidism  Hyperparathyroidism   Home meds continued    GI/DVT ppx:  Warfarin, pepcid  Diet: Low sodium  Disposition: patient not accepted back [facility]       Electronically signed by Yisel Espinoza MD  PGY-1 on 3/28/2022 at 7:10 AM  This case was discussed with attending physician: Dr. Al Vega

## 2022-03-28 NOTE — PLAN OF CARE
Patient's chart updated to reflect:      .     - HF care plan, HF education points and HF discharge instructions.  -Orders: 2 gram sodium diet, daily weights, I/O.  -PCP and/or Cardiologist appointment to be scheduled within 7 days of hospital discharge.  -History of HF, not primary admission Dx, admitted for fall  Gamaliel Saucedo RN RN, BSN  Heart Failure Navigator

## 2022-03-28 NOTE — PROGRESS NOTES
Pharmacy Consultation Note  (Warfarin Dosing and Monitoring)  Initial consult date: 3/26/22  Consulting Provider: Dr Romayne David is a 81 y.o. female for whom pharmacy has been asked to manage warfarin therapy. Weight:   Wt Readings from Last 1 Encounters:   03/28/22 268 lb 15.4 oz (122 kg)       TSH:    Lab Results   Component Value Date    TSH 1.890 01/04/2022       Hepatic Function Panel:                            Lab Results   Component Value Date    ALKPHOS 104 03/25/2022    ALT 18 03/25/2022    AST 33 03/25/2022    PROT 6.8 03/25/2022    BILITOT 0.6 03/25/2022    BILIDIR 0.3 08/04/2014    IBILI 0.6 08/04/2014    LABALBU 3.9 03/25/2022    LABALBU 4.4 02/14/2012       Current warfarin drug-drug interactions include: No significant interactions    Recent Labs     03/25/22  2255 03/27/22  0605 03/28/22  0441   HGB 10.4* 9.5* 10.1*    163 185     Date Warfarin Dose INR Heparin or LMWH Comment   3/26 Hold dose 3.7 --    3/27 Hold dose 3.7 --    3/28 10mg 2.8 --                    Assessment:  · Patient is a 81 y.o. female on warfarin for Atrial Fibrillation. Patient's home warfarin dosing regimen is Warfarin 15 mg on Sunday/Wednesday and 10 mg the rest of the week. · Admitted after mechanical fall with head trauma; CT head negative for intracranial abnormality  · Goal INR 2 - 3  · INR 2.8, down from 3.7 3/26 and 3/27.     Plan:  · Will resume home dose of 10mg x1 tonight  · Daily PT/INR until the INR is stable within the therapeutic range  · Pharmacist will follow and monitor/adjust dosing as necessary    Charlotte Hunt, PharmD 3/28/2022 10:01 AM

## 2022-03-28 NOTE — CARE COORDINATION
Call placed to patients daughter, Roxy Escalera [phone] to discuss transition of care but she did not answer, left message.

## 2022-03-28 NOTE — CARE COORDINATION
Spoke with [facility], they can not accept patient back with current therapy level. They did speak to daughter and she is agreeable to B[facility]I at [facility]. Spoke with Elizabeth Roberto at [facility], they can accept and start a precert. Will await auth. Have started HENS, needs completed at discharge. Envelope and ambulance form on soft chart.

## 2022-03-29 NOTE — PLAN OF CARE
Messaged SW to let them know peer to peer was completed, and insurance is requesting that patient is walked by physical therapy with documentation. Extension given until 10 AM [place] Time on 3/30/22, please fax PT note to insurance.

## 2022-03-29 NOTE — PROGRESS NOTES
[address] [place] [address] Attending    S: 81 y.o. female with a PMH of persistent afib on coumadin, CAD, CHF, GERD, hyperparathyroidism, HTN, hypothyroidism, OA and urinary incontinence who presented to [facility] after a fall. She has been admitted recently for similar incident. On 3/14 she had a left scalp hematoma, but no CT evidence of brain bleed. This prior hematoma opened up when the patient was getting out of bed and slipped and hit something in her room \"head first\" per the H&P. The patient had stable labs, and negative CT head and cervical spine. She received 1 dose of lasix for CHF seen on CXR in the ED. Patient has been residing in an assisted living facility[facility] [facility]. Multiple mechanical falls at home. Plan is for [facility]I. Patient is hard of hearting. She did appear oriented x3 today. O: VS- Blood pressure (!) 149/80, pulse 82, temperature 98 °F (36.7 °C), temperature source Temporal, resp. rate 20, height 5' 6\" (1.676 m), weight 268 lb 15.4 oz (122 kg), SpO2 97 %, not currently breastfeeding. Exam is as noted by resident with the following changes, additions or corrections:  Gen:  Awake and alert, oriented to person only during exam  CVS:  Irregularly irregular  Lungs:  CTAB  Head:  There is a rather large open wound on an anterior scalp at the hairline without significant bleeding at present. No signs of infection or fluctuance. Impressions:   Principal Problem:    Head injury, acute, initial encounter  Active Problems:    Laceration of fascia of head    Head injury, initial encounter  Resolved Problems:    * No resolved hospital problems. *  Permanent afib on chronic anticoagulation  Frequent falls.     Plan:     Per patient's daughter, family wants patient returned to assisted living facility  Would recommend PT/OT at the facility; BENI planned, will need re-eval by PT in order to get approval by insurance for BENI  Will discuss potentially stopping anticoagulation based on risk/benefit. - Has-BLED score of 8.9% (high risk), [id number] score of 7.2%     Attending Physician Statement  I have reviewed the chart and seen the patient with the resident(s). I personally reviewed images, EKG's and similar tests, if present. I personally reviewed and performed key elements of the history and exam.  I have reviewed and confirmed student and/or resident history and exam with changes as indicated above. I agree with the assessment, plan and orders as documented by the resident. Please refer to the resident and/or student note for additional information.       Darcie Vences MD

## 2022-03-29 NOTE — PROGRESS NOTES
Pharmacy Consultation Note  (Warfarin Dosing and Monitoring)  Initial consult date: 3/26/22  Consulting Provider: Dr Arnulfo Horne is a 81 y.o. female for whom pharmacy has been asked to manage warfarin therapy. Weight:   Wt Readings from Last 1 Encounters:   03/28/22 268 lb 15.4 oz (122 kg)       TSH:    Lab Results   Component Value Date    TSH 1.890 01/04/2022       Hepatic Function Panel:                            Lab Results   Component Value Date    ALKPHOS 104 03/25/2022    ALT 18 03/25/2022    AST 33 03/25/2022    PROT 6.8 03/25/2022    BILITOT 0.6 03/25/2022    BILIDIR 0.3 08/04/2014    IBILI 0.6 08/04/2014    LABALBU 3.9 03/25/2022    LABALBU 4.4 02/14/2012       Current warfarin drug-drug interactions include: No significant interactions    Recent Labs     03/27/22  0605 03/28/22  0441 03/29/22  0612   HGB 9.5* 10.1* 9.5*    185 168     Date Warfarin Dose INR Heparin or LMWH Comment   3/26 Hold dose 3.7 --    3/27 Hold dose 3.7 --    3/28 10mg 2.8 --    3/29 10 mg 2.2   --             Assessment:  · Patient is a 81 y.o. female on warfarin for Atrial Fibrillation. Patient's home warfarin dosing regimen is Warfarin 15 mg on Sunday/Wednesday and 10 mg the rest of the week. · Admitted after mechanical fall with head trauma; CT head negative for intracranial abnormality  · Goal INR 2 - 3  · INR 2.2, down from 2.8 3/28.     Plan:  · Repeat Home dose 10mg x1 tonight  · Daily PT/INR until the INR is stable within the therapeutic range  · Pharmacist will follow and monitor/adjust dosing as necessary    Eliseo GarciaD 3/29/2022 7:36 AM

## 2022-03-29 NOTE — CARE COORDINATION
Patient was denied BENI, gave option for Peer to Peer at [phone] by 3:30p.  Spoke with Dr Da Porter, they plan to complete peer to peer.

## 2022-03-29 NOTE — PROGRESS NOTES
[place] [facility] Medicine Inpatient   Resident Progress Note    S:  Hospital day: 0   Brief Synopsis: Torie Yi is a 81 y.o. female with PMH of atrial fibrillation (on Coumadin ), coronary artery disease, CHF, GERD, hyperparathyroidism, hypertension, hypothyroidism, osteoarthritis and urinary incontinence who presented to [facility] ER status post fall. Patient has history of a left scalp hematoma which burst after she tripped on some furniture in her room at a nursing facility. CT head was negative for any acute changes. Patient admitted mostly for monitoring status post fall, concern for acute exacerbation of heart failure, as well as dispo planning. Patient seen and examined this morning. Denies chest pain, shortness of breath, headache. Awaiting placement       Cont meds:    sodium chloride       Scheduled meds:    warfarin  10 mg Oral Once    escitalopram  10 mg Oral Daily    famotidine  20 mg Oral BID    furosemide  40 mg Oral Daily    levothyroxine  175 mcg Oral Daily    lisinopril  5 mg Oral Daily    metoprolol succinate  50 mg Oral Nightly    sodium chloride flush  5-40 mL IntraVENous 2 times per day    warfarin placeholder: dosing by pharmacy   Other Daily     PRN meds: hydrOXYzine, albuterol, meclizine, sodium chloride flush, sodium chloride, ondansetron **OR** ondansetron, polyethylene glycol, acetaminophen **OR** acetaminophen     I reviewed the patient's past medical and surgical history, Medications and Allergies. O:  BP (!) 149/80   Pulse 82   Temp 98 °F (36.7 °C) (Temporal)   Resp 20   Ht 5' 6\" (1.676 m)   Wt 268 lb 15.4 oz (122 kg)   SpO2 97%   BMI 43.41 kg/m²   24 hour I&O: I/O last 3 completed shifts:  In: -   Out: 200 [Urine:200]  No intake/output data recorded. Physical Exam  Constitutional:       Appearance: She is obese. She is not ill-appearing or diaphoretic.    HENT:      Head:      Comments: Left scalp wound 1 cm wide with dried blood  Cardiovascular: Rate and Rhythm: Normal rate and regular rhythm. Pulses: Normal pulses. Heart sounds: Normal heart sounds. No murmur heard. No gallop. Pulmonary:      Effort: Pulmonary effort is normal. No respiratory distress. Breath sounds: No wheezing. Abdominal:      General: Bowel sounds are normal. There is no distension. Tenderness: There is no abdominal tenderness. Musculoskeletal:         General: Normal range of motion. Right lower leg: No edema. Left lower leg: No edema. Neurological:      Mental Status: She is alert and oriented to person, place, and time. Labs:  Na/K/Cl/CO2:  140/4.2/100/32 (03/29 1153)  BUN/Cr/glu/ALT/AST/amyl/lip:  15/0.8/--/--/--/--/-- (03/29 1964)  WBC/Hgb/Hct/Plts:  7.0/9.5/31.0/168 (03/29 0612)  estimated creatinine clearance is 69 mL/min (based on SCr of 0.8 mg/dL). Other pertinent labs as noted below    Radiology:  XR CHEST PORTABLE   Final Result   Cardiac size enlarged with increase in opacifications in the perihilar   regions right greater than left suprahilar involvement of likely worsening   edema with probable small volume right pleural effusion. CT HEAD WO CONTRAST   Final Result   No acute intracranial abnormality. Large frontal scalp/forehead hematoma. Motion degraded examination of the cervical spine. No definite acute   displaced fracture or traumatic malalignment. CT CERVICAL SPINE WO CONTRAST   Final Result   No acute intracranial abnormality. Large frontal scalp/forehead hematoma. Motion degraded examination of the cervical spine. No definite acute   displaced fracture or traumatic malalignment. XR CHEST PORTABLE   Final Result   CHF. A/P:  Principal Problem:    Head injury, acute, initial encounter  Active Problems:    Laceration of fascia of head    Head injury, initial encounter  Resolved Problems:    * No resolved hospital problems.  *    Head trauma  Secondary to mechanical fall  Patient noted to have multiple falls at nursing facility  Presently residing in a [facility] [facility] [place] [facility]  CT scan of the head negative for any acute trauma  Was noted to have a left scalp hematoma during previous visit 3/14/2022, notably ruptured at this time. Wound dressing applied  Social work consulted: patient not accepted back to [facility], precert started for [facility] at [place]. PT/OT should be continued upon discharge   PT OT consulted: [facility] 11/24    Atrial fibrillation  Rate controlled with metoprolol  Continue home dose 50 mg nightly  Patient also on warfarin: Present dose includes 15 mg on Sundays and Wednesdays, 10 mg all other days  INR 2.8 Today   Continue warfarin at this time  INR rate to be between 2-2.5 per cardio due to high risk of bleeding  Will need to discuss with family risk/ benefits of stopping warfarin based on CHADSVASC (7%)and HAS-BLED (9%)scores. She may have slightly higher risk of bleeds vs risk of stroke.      History of HFpEF  Previous echo 5/11/2020  EF noted to be 55 to 60%, with no wall motion abnormalities  proBNP found to be 1942, elevated from previous 1715 though patient's highest recorded to be 3232 1/4/2022  Given 1 dose 20 mg Lasix IV in the ER  Continue home dose Lasix 40 mg daily  Clinically, patient does not look to be in exacerbation, can monitor for now  Echo can be completed outpatient     Hypertension  Hypothyroidism  Hyperparathyroidism   Home meds continued    GI/DVT ppx:  Warfarin, pepcid  Diet: Low sodium  Disposition: awaiting precert for BENI at [place]       Electronically signed by Braxton Webb MD  PGY-1 on 3/29/2022 at 9:07 AM  This case was discussed with attending physician: Dr. Brandy Dowell

## 2022-03-29 NOTE — PROGRESS NOTES
Physical Therapy     Treatment Note    Name: Cesar Ramirez  : 1935  MRN: [id number]      Date of Service: 3/29/2022    Evaluating PT:  Helga Mehta PT, DPT [id number]    Room #:  9450/1027-P  Diagnosis:  Fall from bed, initial encounter [W06. XXXA]  Injury of head, initial encounter [S09.90XA]  Scalp hematoma, initial encounter [S00.03XA]  Head injury, acute, initial encounter [S09.90XA]  Laceration of fascia of head [S01.91XA]  Congestive heart failure, unspecified HF chronicity, unspecified heart failure type ([facility] [address] [I50.9]  Head injury, initial encounter [S09.90XA]  PMHx/PSHx:    Past Medical History:   Diagnosis Date    Anticoagulated on Coumadin     on Coumadin for this Dr. Alissa Johnson controls    Atrial fibrillation ([facility] [address]     Basal cell carcinoma of neck     CAD (coronary artery disease)     History of luminal irregularities of the coronary artery, stable.  CHF (congestive heart failure) (HCC)     stage I diastolic dysfunction on     Depression 2010    DJD (degenerative joint disease)     SEVERE IN RIGHT HAND, IN MULTIPLE OTHER JOINTS    GERD (gastroesophageal reflux disease) 2010    History of cardiovascular stress test 2014    lexiscan    Hyperparathyroidism ([facility] [address] 2010    Had parathyroidectomy    Hypertension 2010    Hypothyroidism 2010    Mild mitral regurgitation 7/10/14    Osteoarthritis 2010    Pulmonary hypertension ([facility] [address] 7/10/14    mild    Renal calculi     x3    Tricuspid regurgitation 7/10/14    mild-moderate    Urinary incontinence 2010     Procedure/Surgery:  None this admission  Reason for admission: fall, head injury, 2L O2 via NC  Precautions: fall risk, bed alarm, confusion, O2, TAPS, purewick, incontinent, [place]  Equipment Needs:  FWW    SUBJECTIVE:  Pt came from assisted living facility with . Pt has no stairs to enter and no stairs inside home. Pt ambulated with [employer] PTA.  Pt oriented to place, date and person but is confused about situations, is [place] and has difficulty answering questions. Pt noted difficulty with vision. OBJECTIVE:   Initial Evaluation  Date: 3/28/22 Treatment Date:  3/29/22 Short Term/ Long Term   Goals   AM-PAC 6 Clicks 46/91 7/97    Was pt agreeable to Eval/treatment? Yes Yes    Does pt have pain? None noted today Indications of pain by grimacing with movement    Bed Mobility  Rolling: mod a   Supine to sit: mod a  Sit to supine: mod a  Scooting: mod a  Rolling: NT  Supine to sit: Max A  Sit to supine: Max A  Scooting: Max A to EOB  Rolling: min a   Supine to sit: min a   Sit to supine: min a   Scooting: min a    Transfers Sit to stand: mod a <> min a   Stand to sit: min a   Stand pivot: mod a  Sit to stand: Max A  Stand to sit: Max A  Stand pivot: NT Sit to stand: SBA  Stand to sit: SBA  Stand pivot: min a    Ambulation     NT Sidestepped 3-4 feet with Max A with [id number] assist 50 feet with min a FWW   Stair negotiation: ascended and descended NT NT TBD   ROM BUE:  Refer to OT eval   BLE:  WFL - difficulty following commands to participate in ROM/MMT BUE: Refer to OT note  BLE: WFL    Strength BUE:  Refer to OT eval   BLE:  WFL - difficulty following commands to participate in ROM/MMT BUE: Refer to OT note  BLE: NT 5/5   Balance Sitting EOB:  SBA  Dynamic Standing:  Min a  Sitting EOB: Min A progressed to SBA  Dynamic Standing: Max A with [id number] assist Sitting EOB:  ind  Dynamic Standing:  SBA     Pt is A & O x: 1 to name only. Pt was given cues with choices for place, month/year, and situation. Pt is confused. Sensation: NT  Edema: NT      Patient education  Pt educated on role of PT in acute care. Patient response to education:   Pt verbalized understanding Pt demonstrated skill Pt requires further education in this area   No NA Yes     ASSESSMENT:    Comments:    Pt was supine in bed upon entry, and agreeable to PT treatment. Pt's family present in room.  Pt was alert to name only and was confused throughout session. Pt had difficulty following simple one step commands and required consistent cues throughout. O2 sats were at 90% SpO2 and  bpm on 2 L/O2 min in supine. Pt transferred supine<>sit with heavy assistance to EOB. Pt sat at EOB for 5+ minutes. Pt had poor balance and a L posterolateral lean initially, pt was able to correct with tactile cues. O2 sats were at 96% SpO2 and HR 89 bpm on 2 L/O2 min in sitting. Pt reported double vision while sitting at EOB; symptoms continued throughout session. Pt then transferred sit<>stand with szto-dp-ahle assistance and sidestepped 3-4 feet to [facility]. Pt required tactile cues and weightshifting techniques to advance AMNA LE's. Pt had a narrowed ZOIE, decreased step length, and a flexed posture. Pt was then seated due to fatigue. Pt was assisted to supine with trunks and lower extremities to bed. Pt was repositioned for comfort and support. O2 sats were at 97% SpO2 and  on 2L/O2 min in supine. Pt and family's needs and concerns were addressed at conclusion of session. Call light left within reach. RN, CM, and SW notified of pts' status. Treatment:  Patient practiced and was instructed in the following treatment:     Therapeutic Activities:  o Bed mobility: Pt was cued for proper technique with supine<>sit transfers. o Sitting EOB: Pt sat at EOB for 5+ minutes. Pt was cued for maintaining upright posture, balance, and L posterolateral lean.   o Transfers: Pt was cued for proper hand/foot placement with sit<>stand transfers. o Ambulation: Pt was cued for weightshifting techniques, LE limb advancement, narrowed ZOIE, balance, and posture during sidestepping. o Vitals were taken and symptoms were monitored throughout session. PLAN:    Patient is making Fair progress towards established goals. Will continue with current POC.       Time in: 1440  Time out: 1505    Total Treatment Time 25 minutes     CPT codes:  [] Gait training [id number]

## 2022-03-30 PROBLEM — S09.90XA HEAD INJURY, ACUTE, INITIAL ENCOUNTER: Status: RESOLVED | Noted: 2022-01-01 | Resolved: 2022-01-01

## 2022-03-30 NOTE — PROGRESS NOTES
[facility] Inpatient   Resident Progress Note    S:  Hospital day: 0   Brief Synopsis: Manda Noel is a 81 y.o. female with PMH of atrial fibrillation (on Coumadin ), coronary artery disease, CHF, GERD, hyperparathyroidism, hypertension, hypothyroidism, osteoarthritis and urinary incontinence who presented to [facility] ER status post fall. Patient has history of a left scalp hematoma which burst after she tripped on some furniture in her room at a nursing facility. CT head was negative for any acute changes. Patient admitted mostly for monitoring status post fall, concern for acute exacerbation of heart failure, as well as dispo planning. Patient seen and examined this morning. Was more agitated overnight and received ativan 1mg once. Patient appeared to be confused, but this is her baseline where she waxes and wanes. Awaiting placement       Cont meds:    sodium chloride       Scheduled meds:    escitalopram  10 mg Oral Daily    famotidine  20 mg Oral BID    furosemide  40 mg Oral Daily    levothyroxine  175 mcg Oral Daily    lisinopril  5 mg Oral Daily    metoprolol succinate  50 mg Oral Nightly    sodium chloride flush  5-40 mL IntraVENous 2 times per day    warfarin placeholder: dosing by pharmacy   Other Daily     PRN meds: hydrOXYzine, albuterol, meclizine, sodium chloride flush, sodium chloride, ondansetron **OR** ondansetron, polyethylene glycol, acetaminophen **OR** acetaminophen     I reviewed the patient's past medical and surgical history, Medications and Allergies. O:  BP (!) 142/78   Pulse 100   Temp 97.8 °F (36.6 °C) (Temporal)   Resp 18   Ht 5' 6\" (1.676 m)   Wt 270 lb 4.8 oz (122.6 kg)   SpO2 97%   BMI 43.63 kg/m²   24 hour I&O: I/O last 3 completed shifts:  In: -   Out: 1300 [Urine:1300]  No intake/output data recorded. Physical Exam  Constitutional:       Appearance: She is obese. She is not ill-appearing or diaphoretic.    HENT:      Head: Comments: Left scalp wound 1 cm wide with dried blood  Cardiovascular:      Rate and Rhythm: Normal rate and regular rhythm. Pulses: Normal pulses. Heart sounds: Normal heart sounds. No murmur heard. No gallop. Pulmonary:      Effort: Pulmonary effort is normal. No respiratory distress. Breath sounds: No wheezing. Abdominal:      General: Bowel sounds are normal. There is no distension. Tenderness: There is no abdominal tenderness. Musculoskeletal:         General: Normal range of motion. Right lower leg: No edema. Left lower leg: No edema. Neurological:      Mental Status: Mental status is at baseline. She is disoriented. Labs:  Na/K/Cl/CO2:  140/4.2/100/32 (03/29 4685)  BUN/Cr/glu/ALT/AST/amyl/lip:  15/0.8/--/--/--/--/-- (03/29 3126)  WBC/Hgb/Hct/Plts:  7.0/9.5/31.0/168 (03/29 0612)  estimated creatinine clearance is 69 mL/min (based on SCr of 0.8 mg/dL). Other pertinent labs as noted below    Radiology:  XR CHEST PORTABLE   Final Result   Cardiac size enlarged with increase in opacifications in the perihilar   regions right greater than left suprahilar involvement of likely worsening   edema with probable small volume right pleural effusion. CT HEAD WO CONTRAST   Final Result   No acute intracranial abnormality. Large frontal scalp/forehead hematoma. Motion degraded examination of the cervical spine. No definite acute   displaced fracture or traumatic malalignment. CT CERVICAL SPINE WO CONTRAST   Final Result   No acute intracranial abnormality. Large frontal scalp/forehead hematoma. Motion degraded examination of the cervical spine. No definite acute   displaced fracture or traumatic malalignment. XR CHEST PORTABLE   Final Result   CHF.              A/P:  Principal Problem:    Head injury, acute, initial encounter  Active Problems:    Laceration of fascia of head    Head injury, initial encounter  Resolved Problems:    * No resolved hospital problems. *    Head trauma  Secondary to mechanical fall  Patient noted to have multiple falls at nursing facility  Pres[place] residing [place] [facility] [place] [facility]  CT scan of the head negative for any acute trauma  Was noted to have a left scalp hematoma during previous visit 3/14/2022, notably ruptured at this time. Wound dressing applied  Social work consulted: patient not accepted back to [facility], [facility]. PT/OT should be continued upon discharge   PT OT consulted: [facility] 11/24    Atrial fibrillation  Rate controlled with metoprolol  Continue home dose 50 mg nightly  Patient also on warfarin: Present dose includes 15 mg on Sundays and Wednesdays, 10 mg all other days  INR 2.8 Today   Continue warfarin at this time  INR rate to be between 2-2.5 per cardio due to high risk of bleeding  Discuss with family risk/ benefits of stopping warfarin based on CHADSVASC (7%)and HAS-BLED (9%)scores. She may have slightly higher risk of bleeds vs risk of stroke - warfarin will be stopped.      History of HFpEF  Previous echo 5/11/2020  EF noted to be 55 to 60%, with no wall motion abnormalities  proBNP found to be 1942, elevated from previous 1715 though patient's highest recorded to be 3232 1/4/2022  Given 1 dose 20 mg Lasix IV in the ER  Continue home dose Lasix 40 mg daily  Clinically, patient does not look to be in exacerbation, can monitor for now  Echo can be completed outpatient     Hypertension  Hypothyroidism  Hyperparathyroidism   Home meds continued    GI/DVT ppx:  Warfarin stopped, pepcid  Diet: Low sodium  Disposition: accepted to [facility], discharge today        Electronically signed by Neris Jenkins MD  PGY-1 on 3/30/2022 at 7:25 AM  This case was discussed with attending physician: Dr. Zoltan Yost

## 2022-03-30 NOTE — CARE COORDINATION
Received message from [facility], they got auth for patient for [facility]. Have sent Perfect serve message to Dr Kina Arango with [facility] regarding approval and possible discharge.

## 2022-03-30 NOTE — PLAN OF CARE
Called daughter and discussed that patient's risk of bleeding is now higher than risk of having stroke of Warfarin is stopped. Plan to stop warfarin. Discussed that there is a risk of stroke still, but patient has had many recent falls. Also let her know patient is to be discharged to [facility] today. Daughter requests prescription for hospital bed.

## 2022-03-30 NOTE — PLAN OF CARE
Problem: Falls - Risk of:  Goal: Will remain free from falls  Description: Will remain free from falls  Outcome: Met This Shift  Goal: Absence of physical injury  Description: Absence of physical injury  Outcome: Met This Shift     Problem: Skin Integrity:  Goal: Will show no infection signs and symptoms  Description: Will show no infection signs and symptoms  Outcome: Met This Shift  Goal: Absence of new skin breakdown  Description: Absence of new skin breakdown  Outcome: Met This Shift     Problem: Pain:  Goal: Pain level will decrease  Description: Pain level will decrease  Outcome: Met This Shift  Goal: Control of acute pain  Description: Control of acute pain  Outcome: Met This Shift  Goal: Control of chronic pain  Description: Control of chronic pain  Outcome: Met This Shift     Problem: OXYGENATION/RESPIRATORY FUNCTION  Goal: Patient will maintain patent airway  Outcome: Met This Shift

## 2022-03-30 NOTE — DISCHARGE SUMMARY
Discharge Summary    Kayla Monge  :  1935  MRN:  [id number]    Admit date:  3/25/2022  Discharge date:  3/30/2022    Admitting Physician:  Ilana Landa MD    Discharge Diagnoses:    Patient Active Problem List   Diagnosis    Depression    Hypothyroidism    Permanent atrial fibrillation ([facility] [address]    History of artificial lens replacement    Essential hypertension    Moderate obesity    Greater trochanteric bursitis    Chronic anticoagulation    Chronic obstructive pulmonary disease (HCC)    Mixed stress and urge urinary incontinence    Fuchs' endothelial dystrophy    Pseudophakia, both eyes    Chronic diastolic (congestive) heart failure ([facility] [address]    Pure hypercholesterolemia    Spinal stenosis of lumbar region with neurogenic claudication    Patent foramen ovale    Coronary arteriosclerosis in native artery    Frequent falls    Dementia ([facility] [address]    Ambulatory dysfunction    Cognitive dysfunction    COVID-19 virus infection    Nephrolithiasis    AMS (altered mental status)    Laceration of fascia of head    Head injury, initial encounter       Admission Condition:  fair    Discharged Condition:  stable    Hospital Course:     Kayla Monge is a 81 y.o. female with PMH of atrial fibrillation (on Coumadin ), coronary artery disease, CHF, GERD, hyperparathyroidism, hypertension, hypothyroidism, osteoarthritis and urinary incontinence who presented to [facility] ER status post fall. Patient has history of a left scalp hematoma which burst after she tripped on some furniture in her room at a nursing facility. In the ED, she was hemodynamically stable. CT head was negative for any acute changes. CT cervical spine showed chronic degenerative changes. given 1 dose of lasix. Patient admitted mostly for monitoring status post fall, concern for acute exacerbation of heart failure, as well as dispo planning. She did not appear fluid overloaded although CXR consistent with CHF.  Discussed with family risk/ benefits of stopping warfarin based on CHADSVASC (7%)and HAS-BLED (9%)scores. She may have slightly higher risk of bleeds vs risk of stroke. Daughter agreed to stop warfarin and understood the risk of stroke. The patient's course was otherwise uneventful. She progressed well, pain was controlled on PO medications. Physical therapy evaluated and treated the patient and recommended strengthening to improve therapeutic activities such as bed mobility, transfers and ambulation. She was tolerating a regular diet with no nausea or vomiting, and was in a suitable condition for discharge to [facility]. Discharge plan:  1. Stop warfarin   2. Continue with PT/OT to improve strength and balance to prevent falls   3.  Consider repeat Echo     Discharge Medications:         Medication List      CONTINUE taking these medications    acetaminophen 325 MG tablet  Commonly known as: TYLENOL     * Ventolin  (90 Base) MCG/ACT inhaler  Generic drug: albuterol sulfate HFA     * albuterol (2.5 MG/3ML) 0.083% nebulizer solution  Commonly known as: PROVENTIL  Take 3 mLs by nebulization every 6 hours as needed for Wheezing     ascorbic acid 500 MG tablet  Commonly known as: VITAMIN C     b complex vitamins capsule     escitalopram 10 MG tablet  Commonly known as: Lexapro  Take 1 tablet by mouth daily     famotidine 20 MG tablet  Commonly known as: PEPCID  Take 1 tablet by mouth 2 times daily     fluticasone 50 MCG/ACT nasal spray  Commonly known as: FLONASE     furosemide 40 MG tablet  Commonly known as: LASIX  Take 1 tablet by mouth daily     guaiFENesin 600 MG extended release tablet  Commonly known as: MUCINEX     levothyroxine 175 MCG tablet  Commonly known as: SYNTHROID  Take 1 tablet by mouth Daily     lisinopril 5 MG tablet  Commonly known as: PRINIVIL;ZESTRIL  Take 1 tablet by mouth daily     loperamide 2 MG capsule  Commonly known as: IMODIUM     magnesium hydroxide 400 MG/5ML suspension  Commonly known as: MILK OF MAGNESIA  Take 5 mLs by mouth daily as needed for Constipation     meclizine 25 MG tablet  Commonly known as: ANTIVERT     metoprolol succinate 50 MG extended release tablet  Commonly known as: TOPROL XL  Take 1 tablet by mouth nightly     vitamin D 25 MCG (1000 UT) Tabs tablet  Commonly known as: CHOLECALCIFEROL     Xopenex 0.63 MG/3ML nebulization  Generic drug: levalbuterol         * This list has 2 medication(s) that are the same as other medications prescribed for you. Read the directions carefully, and ask your doctor or other care provider to review them with you. STOP taking these medications    warfarin 5 MG tablet  Commonly known as: Coumadin            Consults:  none    Significant Diagnostic Studies: See below    Labs:  Na/K/Cl/CO2:  143/4.5/102/28 (03/30 0741)  BUN/Cr/glu/ALT/AST/amyl/lip:  18/0.7/--/--/--/--/-- (03/30 0741)  WBC/Hgb/Hct/Plts:  6.4/10.3/33.6/180 (03/30 0741)  estimated creatinine clearance is 78 mL/min (based on SCr of 0.7 mg/dL). Treatments:   See hospital course    Discharge Exam:  Constitutional:       Appearance: She is obese. She is not ill-appearing or diaphoretic. HENT:      Head:      Comments: Left scalp wound 1 cm wide with dried blood  Cardiovascular:      Rate and Rhythm: Normal rate and regular rhythm. Pulses: Normal pulses. Heart sounds: Normal heart sounds. No murmur heard. No gallop. Pulmonary:      Effort: Pulmonary effort is normal. No respiratory distress. Breath sounds: No wheezing. Abdominal:      General: Bowel sounds are normal. There is no distension. Tenderness: There is no abdominal tenderness. Musculoskeletal:         General: Normal range of motion. Right lower leg: No edema. Left lower leg: No edema. Neurological:      Mental Status: Mental status is at baseline. She is disoriented.      Disposition:   [facility]    Future Appointments   Date Time Provider Hernandez Mcintyre   4/7/2022  3:00 PM Power Ford MD Jazz Sosa [facility]       More than 30 minutes was spent in preparation of this patient's discharge including, but not limited to, examination, preparation of documents, prescription preparation, counseling and coordination.     Signed:  Kenyatta Prater MD  3/30/2022, 1:09 PM

## 2022-03-30 NOTE — DISCHARGE INSTR - COC
Continuity of Care Form    Patient Name: Donell Ludwig   :  1935  MRN:  [id number]    Admit date:  3/25/2022  Discharge date:  3/30/2022    Code Status Order: DNR-CCA   Advance Directives:      Admitting Physician:  Danial Oates MD  PCP: No primary care provider on file. Discharging Nurse: [facility]-MAIN Unit/Room#: 8298/5119-C  Discharging Unit Phone Number: [phone]    Emergency Contact:   Extended Emergency Contact Information  Primary Emergency Contact: Roscoe Nettles  Address: [address] [place] [address], [address] [place] [address] Phone: [phone]  Mobile Phone: [phone]  Relation: Spouse  Secondary Emergency Contact: Mary Kay Maricel  Address: [place] More[place] BY PATIENT   [address] Phone: [phone]  Mobile Phone: [phone]  Relation: Child    Past Surgical History:  Past Surgical History:   Procedure Laterality Date    CARDIAC CATHETERIZATION  3/22/14    CARPAL TUNNEL RELEASE      Right hand. CATARACT REMOVAL  2005    right, bilateral cataract surgery. CATARACT REMOVAL WITH IMPLANT Bilateral     CHOLECYSTECTOMY  1972    COLONOSCOPY      FINGER SURGERY  2011    S/P removal of cyst from right index finger. FRACTURE SURGERY Left     arm fractured-casted, Bilateral feet fractures-casted. INCONTINENCE SURGERY  2005    Urinary incontinence, S/P bladder suspension. INNER EAR SURGERY      tube place in right ear    1 hospitals ARTHROPLASTY Bilateral 2005    PARATHYROIDECTOMY      Three times    SHOULDER ARTHROPLASTY Left 2009    SHOULDER ARTHROPLASTY Left      shoulder total replacement.     THYROID SURGERY  2005    recurrent    THYROIDECTOMY  1960       Immunization History:   Immunization History   Administered Date(s) Administered    COVID-19, Pfizer Purple top, DILUTE for use, 12+ yrs, 30mcg/0.3mL dose 2021, 02/15/2021    Influenza Vaccine, unspecified formulation 2016    Influenza Virus Vaccine 2014, 10/16/2018    Influenza, MDCK Quadv, IM, PF (Flucelvax 2 yrs and older) 2017    Influenza, Quadv, adjuvanted, 65 yrs +, IM, PF (Fluad) 2020    Influenza, Triv, inactivated, subunit, adjuvanted, IM (Fluad 65 yrs and older) 10/31/2019    Pneumococcal Conjugate 13-valent ([id number]) 2015    Pneumococcal Polysaccharide ([id number]) 2011    Tdap (Boostrix, Adacel) 2015       Active Problems:  Patient Active Problem List   Diagnosis Code    Depression F32. A    Hypothyroidism E03.9    Permanent atrial fibrillation ([facility]) I48.21    History of artificial lens replacement Z96.1    Essential hypertension I10    Moderate obesity E66.8    Greater trochanteric bursitis M70.60    Chronic anticoagulation Z79.01    Chronic obstructive pulmonary disease ([facility]) J44.9    Mixed stress and urge urinary incontinence N39.46    Fuchs' endothelial dystrophy H18.519    Pseudophakia, both eyes Z96.1    Chronic diastolic (congestive) heart failure ([facility]) I50.32    Pure hypercholesterolemia E78.00    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    Patent foramen ovale Q21.1    Coronary arteriosclerosis in native artery I25.10    Frequent falls R29.6    Dementia ([facility]) F03.90    Ambulatory dysfunction R26.2    Cognitive dysfunction F09    COVID-19 virus infection U07.1    Nephrolithiasis N20.0    AMS (altered mental status) R41.82    Head injury, acute, initial encounter S09.90XA    Laceration of fascia of head S01. 91XA    Head injury, initial encounter S09.90XA       Isolation/Infection:   Isolation            No Isolation          Patient Infection Status       Infection Onset Added Last Indicated Last Indicated By Review Planned Expiration Resolved Resolved By    None active    Resolved    COVID-19 22 COVID-19   22     COVID-19 (Rule Out) 22 COVID-19 & Influenza Combo (Ordered)   01/04/22 Rule-Out Test Resulted    COVID-19 (Rule Out) 04/19/21 04/19/21 04/19/21 COVID-19, Rapid (Ordered)   04/19/21 Rule-Out Test Resulted    COVID-19 (Rule Out) 04/16/21 04/16/21 04/16/21 COVID-19, Rapid (Ordered)   04/16/21 Rule-Out Test Resulted    COVID-19 (Rule Out) 05/10/20 05/10/20 05/10/20 COVID-19 (Ordered)   05/10/20 Rule-Out Test Resulted            Nurse Assessment:  Last Vital Signs: BP (!) 144/97   Pulse 110   Temp 96.5 °F (35.8 °C) (Temporal)   Resp 19   Ht 5' 6\" (1.676 m)   Wt 270 lb 4.8 oz (122.6 kg)   SpO2 94%   BMI 43.63 kg/m²     Last documented pain score (0-10 scale): Pain Level: 0  Last Weight:   Wt Readings from Last 1 Encounters:   03/30/22 270 lb 4.8 oz (122.6 kg)     Mental Status:  disoriented and alert    IV Access:  - None    Nursing Mobility/ADLs:  Walking   Dependent  Transfer  Dependent  Bathing  Dependent  Dressing  Dependent  Toileting  Dependent  Feeding  Dependent  Med Admin  Dependent  Med Delivery   crushed and prefers mixed with Applesauce    Wound Care Documentation and Therapy:  Wound 03/26/22 Head Anterior (Active)   Wound Etiology Traumatic 03/30/22 0245   Dressing Status New dressing applied 03/29/22 2019   Wound Cleansed Cleansed with saline 03/29/22 2019   Dressing/Treatment Other (comment) 03/30/22 0245   Drainage Amount Small 03/30/22 0245   Drainage Description Sanguinous 03/30/22 0245   Number of days: 3        Elimination:  Continence:    Bowel: No  Bladder: No  Urinary Catheter: None   Colostomy/Ileostomy/Ileal Conduit: No       Date of Last BM: 3/29/2022    Intake/Output Summary (Last 24 hours) at 3/30/2022 0920  Last data filed at 3/29/2022 1555  Gross per 24 hour   Intake --   Output 1300 ml   Net -1300 ml     I/O last 3 completed shifts:  In: -   Out: 1300 [Urine:1300]    Safety Concerns:     Sundowners [place], History of Falls (last 30 days), and At Risk for Falls    Impairments/Disabilities:      Vision and Hearing    Nutrition Therapy:  Current Nutrition Therapy:   - Oral Diet:  General    Routes of Feeding: Oral  Liquids: Thin Liquids  Daily Fluid Restriction: no  Last Modified Barium Swallow with Video (Video Swallowing Test): not done    Treatments at the Time of Hospital Discharge:   Respiratory Treatments: n/a  Oxygen Therapy:  is on oxygen at 2 L/min per nasal cannula. Ventilator:    - No ventilator support    Rehab Therapies: Physical Therapy and Occupational Therapy  Weight Bearing Status/Restrictions: No weight bearing restrictions  Other Medical Equipment (for information only, NOT a DME order):  wheelchair and hospital bed  Other Treatments: n/a    Patient's personal belongings (please select all that are sent with patient):  Glasses    RN SIGNATURE:  Electronically signed by Linzie Rod Wilms, RN on 3/30/22 at 11:33 AM EDT    CASE MANAGEMENT/SOCIAL WORK SECTION    Inpatient Status Date: ***    Readmission Risk Assessment Score:  Readmission Risk              Risk of Unplanned Readmission:  0           Discharging to Facility/ Agency   Name:   Address:  Phone:  Fax:    Dialysis Facility (if applicable)   Name:  Address:  Dialysis Schedule:  Phone:  Fax:    / signature: {Esignature:[id number]}    PHYSICIAN SECTION    Prognosis: Fair    Condition at Discharge: Stable    Rehab Potential (if transferring to Rehab): Good    Recommended Labs or Other Treatments After Discharge: stop warfarin     Physician Certification: I certify the above information and transfer of Walker Agudelo  is necessary for the continuing treatment of the diagnosis listed and that she requires Acute Rehab for greater 30 days. Update Admission H&P: Changes in H&P as follows - patient came in after fall. Has been on warfarin for afib. CT head was negative  . Do to recurrent falls, warfarin has been discontinued.     PHYSICIAN SIGNATURE:  Electronically signed by Devang Wahl MD on 3/30/22 at 9:21 AM EDT

## 2022-03-30 NOTE — PROGRESS NOTES
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 female for whom pharmacy has been asked to manage warfarin therapy. Weight:   Wt Readings from Last 1 Encounters:   03/30/22 270 lb 4.8 oz (122.6 kg)       TSH:    Lab Results   Component Value Date    TSH 1.890 01/04/2022       Hepatic Function Panel:                            Lab Results   Component Value Date    ALKPHOS 104 03/25/2022    ALT 18 03/25/2022    AST 33 03/25/2022    PROT 6.8 03/25/2022    BILITOT 0.6 03/25/2022    BILIDIR 0.3 08/04/2014    IBILI 0.6 08/04/2014    LABALBU 3.9 03/25/2022    LABALBU 4.4 02/14/2012       Current warfarin drug-drug interactions include: No significant interactions    Recent Labs     03/28/22  0441 03/29/22  0612   HGB 10.1* 9.5*    168     Date Warfarin Dose INR Heparin or LMWH Comment   3/26 Hold dose 3.7 --    3/27 Hold dose 3.7 --    3/28 10mg 2.8 --    3/29 10 mg 2.2   --    3/30 10 mg 2.6 --      Assessment:  · Patient is a 81 y.o. female on warfarin for Atrial Fibrillation. Patient's home warfarin dosing regimen is Warfarin 15 mg on Sunday/Wednesday and 10 mg the rest of the week.    · Admitted after mechanical fall with head trauma; CT head negative for intracranial abnormality  · Goal INR 2 - 3   · INR 2.6  Plan:  · Warfarin 10mg x1 tonight  · Daily PT/INR until the INR is stable within the therapeutic range  · Pharmacist will follow and monitor/adjust dosing as necessary    Viviane Julio, PharmD 3/30/2022 8:14 AM

## 2022-03-30 NOTE — CARE COORDINATION
Patient to discharge to [facility] at 12:30p via [facility]. Have called and notified Daughter Kimi Cannon via [address]. Will complete HENS once discharge order in. Have placed envelope and ambulance form on soft chart.

## 2022-03-30 NOTE — PROGRESS NOTES
[address] [place] [address] Attending    S: 81 y.o. female with a PMH of persistent afib on coumadin, CAD, CHF, GERD, hyperparathyroidism, HTN, hypothyroidism, OA and urinary incontinence who presented to [address] [place] [address] after a fall. She has been admitted recently for similar incident. On 3/14 she had a left scalp hematoma, but no CT evidence of brain bleed. This prior hematoma opened up when the patient was getting out of bed and slipped and hit something in her room \"head first\" per the H&P. The patient had stable labs, and negative CT head and cervical spine. She received 1 dose of lasix for CHF seen on CXR in the ED. Patient has been residing in an assisted living facility[facility] [facility]. Multiple mechanical falls at home. Plan is for [facility]. Patient is hard of hearing. Has hx of dementia. Due to her generalized weakness and frequent falls, patient was seen by PT/OT. It was determined that the patient would do well with [facility]. She was sleeping when we arrived at bedside this morning. She woke up but was quite confused upon awakening. Her dementia waxes and wanes. Per nursing staff, the patient did not sleep well last night. O: VS- Blood pressure (!) 144/97, pulse 110, temperature 96.5 °F (35.8 °C), temperature source Temporal, resp. rate 19, height 5' 6\" (1.676 m), weight 270 lb 4.8 oz (122.6 kg), SpO2 94 %, not currently breastfeeding. Exam is as noted by resident with the following changes, additions or corrections:  Gen:  Drowsy and confused this morning. CVS:  Irregularly irregular  Lungs:  CTAB  Head:  Scalp wound with dressing; no active bleeding    Impressions:   Principal Problem (Resolved):    Head injury, acute, initial encounter  Active Problems:    Laceration of fascia of head    Head injury, initial encounter  Permanent afib on chronic anticoagulation  Frequent falls.     Plan:       Would recommend PT/OT at the facility; [facility] planned  Will discontinue anticoagulation-this was discussed with the patient's daughter  - Has-BLED score of 8.9% (high risk), [id number] score of 7.2%  Discharge today. Attending Physician Statement  I have reviewed the chart and seen the patient with the resident(s). I personally reviewed images, EKG's and similar tests, if present. I personally reviewed and performed key elements of the history and exam.  I have reviewed and confirmed student and/or resident history and exam with changes as indicated above. I agree with the assessment, plan and orders as documented by the resident. Please refer to the resident and/or student note for additional information.       Sue Cain MD

## 2022-04-13 PROBLEM — G93.40 ACUTE ENCEPHALOPATHY: Status: ACTIVE | Noted: 2022-01-01

## 2022-04-13 PROBLEM — E87.0 HYPERNATREMIA: Status: ACTIVE | Noted: 2022-01-01

## 2022-04-13 NOTE — ED NOTES
Received report from [facility]; pt waiting for transport to room     Karen Purdy RN  04/13/22 9836

## 2022-04-13 NOTE — H&P
[facility] - Family Medicine Resident Inpatient  History and Physical      CC: Altered mental status, multiple falls and abnormal lab    HPI: Not able to get history from patient due to acuity of condition    History obtained from electronic medical record. Patient is confused, disoriented. Not able to follow command appropriately. On 3 L oxygen and noticed mild labored breathing. Reviewed document from nursing home and current medication. Vee Rausch is a 81 y.o. female with a PMH of atrial fibrillation (on Coumadin ), coronary artery disease, CHF, GERD, hyperparathyroidism, hypertension, hypothyroidism, osteoarthritis and urinary incontinenc of who presents to ED for Abnormal Lab (pt new pt to [facility], came in for abnormal labs. pt with frequent falls and they state she has a change in mentation.)    As per ER physician note and nursing home document patient presented from [facility] for abnormal lab with sodium of 151. Also reported multiple fall during last 1 week and more confused. On arrival patient was alert ,awake but confused. During exam patient was moaning and opening her eyes to loud voice. Does not follow command. On exam noticed laceration on her left forehead. Last admission was on March 25 for multiple fall. Patient Coumadin was discontinue due to high risks of bleed after discussion with family member. ED Course:     Labs: H/H 11.4/38.0, sodium 152, potassium 3.9, chloride 108, BUN 23, creatinine 0.9, AST 40, alkaline phosphatase 127, INR 2, urine analysis leukocyte esterase with WBC 1-3, troponin 24 x25, normal lactic acid    Imaging    chest x-ray new opacity in right lung base suggestive of pneumonia versus atelectasis versus possible right pleural effusion. ,    CT head left frontal scalp hematoma with chronic cortical atrophy and periventricular leukomalacia with frontal sinusitis and mastoiditis    CT cervical spine without contrast: Multilevel degenerative changes, no interval change    EKG: A. fib with RVR     Pt admitted for hyponatremia and further evaluation of altered mental status. PMH:  has a past medical history of Anticoagulated on Coumadin, Atrial fibrillation (HCC), Basal cell carcinoma of neck, CAD (coronary artery disease), CHF (congestive heart failure) ([address] Depression, DJD (degenerative joint disease), GERD (gastroesophageal reflux disease), History of cardiovascular stress test, Hyperparathyroidism ([address] Hypertension, Hypothyroidism, Mild mitral regurgitation, Osteoarthritis, Pulmonary hypertension ([address] Renal calculi, Tricuspid regurgitation, and Urinary incontinence. PSH:  has a past surgical history that includes Thyroidectomy (1960); Kidney stone surgery (1972); Cholecystectomy (1972); Kidney stone surgery (1981); Cataract removal (6/2005); Incontinence surgery (5/2005); Thyroid surgery (5/2005); parathyroidectomy; Knee Arthroplasty (Bilateral, 9/2005); Total shoulder arthroplasty (Left, 2009); fracture surgery (Left); Carpal tunnel release; Finger surgery (7/2011); Total shoulder arthroplasty (Left); Inner ear surgery (2011); Cataract removal with implant (Bilateral); Colonoscopy; and Cardiac catheterization (3/22/14). FH: family history includes Arthritis in her mother; Cancer in her brother and son; Diabetes in her father; Heart Attack in her mother; Heart Disease in her brother, brother, father, mother, and sister; Heart Surgery in her brother; Mental Illness in her father. Social:  reports that she has never smoked. She has never used smokeless tobacco. She reports previous alcohol use. She reports that she does not use drugs. Allergies:    Allergies   Allergen Reactions    Codeine Other (See Comments)     Hallucination    Penicillins Hives    Vicodin [Hydrocodone-Acetaminophen] Other (See Comments)     Hallucination      Adhesive Tape Rash        Home Medications:   No current facility-administered medications on file prior to encounter.      Current Outpatient Medications on File Prior to Encounter   Medication Sig Dispense Refill    acetaminophen (TYLENOL) 325 MG tablet Take 650 mg by mouth every 4 hours as needed for Pain or Fever      b complex vitamins capsule Take 1 capsule by mouth daily      vitamin D (CHOLECALCIFEROL) 25 MCG (1000 UT) TABS tablet Take 1,000 Units by mouth daily      fluticasone (FLONASE) 50 MCG/ACT nasal spray 2 sprays by Nasal route daily      guaiFENesin (MUCINEX) 600 MG extended release tablet Take 600 mg by mouth daily      loperamide (IMODIUM) 2 MG capsule Take 4 mg by mouth 4 times daily as needed for Diarrhea      meclizine (ANTIVERT) 25 MG tablet Take 25 mg by mouth 3 times daily as needed for Dizziness      albuterol sulfate HFA (VENTOLIN HFA) 108 (90 Base) MCG/ACT inhaler Inhale 2 puffs into the lungs every 6 hours as needed for Wheezing      ascorbic acid (VITAMIN C) 500 MG tablet Take 1,000 mg by mouth daily      levothyroxine (SYNTHROID) 175 MCG tablet Take 1 tablet by mouth Daily 30 tablet 3    levalbuterol (XOPENEX) 0.63 MG/3ML nebulization Take 1 ampule by nebulization in the morning and at bedtime       metoprolol succinate (TOPROL XL) 50 MG extended release tablet Take 1 tablet by mouth nightly 30 tablet 5    magnesium hydroxide (MILK OF MAGNESIA) 400 MG/5ML suspension Take 5 mLs by mouth daily as needed for Constipation      lisinopril (PRINIVIL;ZESTRIL) 5 MG tablet Take 1 tablet by mouth daily 90 tablet 1    furosemide (LASIX) 40 MG tablet Take 1 tablet by mouth daily 60 tablet 2    famotidine (PEPCID) 20 MG tablet Take 1 tablet by mouth 2 times daily 60 tablet 5    escitalopram (LEXAPRO) 10 MG tablet Take 1 tablet by mouth daily 30 tablet 5    albuterol (PROVENTIL) (2.5 MG/3ML) 0.083% nebulizer solution Take 3 mLs by nebulization every 6 hours as needed for Wheezing 120 each 5    [DISCONTINUED] levothyroxine (SYNTHROID) 125 MCG tablet Take 1 tablet by mouth every morning (before breakfast) 24 tablet 2       ROS:  Could not assess as patient obtunded      PE:  Blood pressure 113/69, pulse 126, temperature 98.7 °F (37.1 °C), temperature source Oral, resp. rate 23, height 5' 6\" (1.676 m), weight 270 lb (122.5 kg), SpO2 95 %, not currently breastfeeding. General: Alert, awake, opening eyes to loud voice and continue moaning on exam, does not follow command   HEENT: Wound on left upper frontal lobe with exposure of healthy subcutaneous fat, no drainage with surrounding erythema neck: Supple, symmetrical, no JVD   Lung: Clear to auscultation bilaterally, mildly respiratory distress. No rales/wheezing/rubs  Heart: IrRegular rate and irregular rhythm  Abdomen: SNTND, no masses, no organomegaly, no guarding, rebound or rigidity. Extremities:  Extremities normal, atraumatic, no cyanosis or edema.  Distal pulses equal bilaterally  Skin: Skin color, texture, turgor normal, no rashes or lesions  Musculoskeletal: No joint swelling, no muscle tenderness   Neurologic: alert, awake, disoriented, not able to perform neuro exam due to altered mental status       Labs:   Results for orders placed or performed during the hospital encounter of 04/13/22   Respiratory Panel, Molecular, with COVID-19 (Restricted: peds pts or suitable admitted adults)    Specimen: Nasopharyngeal   Result Value Ref Range    Adenovirus by PCR Not Detected Not Detected    Bordetella parapertussis by PCR Not Detected Not Detected    Bordetella pertussis by PCR Not Detected Not Detected    Chlamydophilia pneumoniae by PCR Not Detected Not Detected    Coronavirus 229E by PCR DETECTED (A) Not Detected    Coronavirus HKU1 by PCR Not Detected Not Detected    Coronavirus NL63 by PCR Not Detected Not Detected    Coronavirus OC43 by PCR Not Detected Not Detected    SARS-CoV-2, PCR DETECTED (A) Not Detected    Human Metapneumovirus by PCR Not Detected Not Detected    Human Rhinovirus/Enterovirus by PCR Not Detected Not Detected    Influenza A by PCR Not Detected Not Detected    Influenza B by PCR Not Detected Not Detected    Mycoplasma pneumoniae by PCR Not Detected Not Detected    Parainfluenza Virus 1 by PCR Not Detected Not Detected    Parainfluenza Virus 2 by PCR Not Detected Not Detected    Parainfluenza Virus 3 by PCR Not Detected Not Detected    Parainfluenza Virus 4 by PCR Not Detected Not Detected    Respiratory Syncytial Virus by PCR Not Detected Not Detected   CBC with Auto Differential   Result Value Ref Range    WBC 5.9 4.5 - 11.5 E9/L    RBC 3.85 3.50 - 5.50 E12/L    Hemoglobin 11.4 (L) 11.5 - 15.5 g/dL    Hematocrit 38.0 34.0 - 48.0 %    MCV 98.7 80.0 - 99.9 fL    MCH 29.6 26.0 - 35.0 pg    MCHC 30.0 (L) 32.0 - 34.5 %    RDW 15.1 (H) 11.5 - 15.0 fL    Platelets 359 858 - 881 E9/L    MPV 10.5 7.0 - 12.0 fL    Neutrophils % 71.9 43.0 - 80.0 %    Immature Granulocytes % 0.3 0.0 - 5.0 %    Lymphocytes % 12.4 (L) 20.0 - 42.0 %    Monocytes % 11.6 2.0 - 12.0 %    Eosinophils % 2.4 0.0 - 6.0 %    Basophils % 1.4 0.0 - 2.0 %    Neutrophils Absolute 4.23 1.80 - 7.30 E9/L    Immature Granulocytes # 0.02 E9/L    Lymphocytes Absolute 0.73 (L) 1.50 - 4.00 E9/L    Monocytes Absolute 0.68 0.10 - 0.95 E9/L    Eosinophils Absolute 0.14 0.05 - 0.50 E9/L    Basophils Absolute 0.08 0.00 - 0.20 E9/L   Comprehensive Metabolic Panel w/ Reflex to MG   Result Value Ref Range    Sodium 152 (H) 132 - 146 mmol/L    Potassium reflex Magnesium 3.9 3.5 - 5.0 mmol/L    Chloride 108 (H) 98 - 107 mmol/L    CO2 35 (H) 22 - 29 mmol/L    Anion Gap 9 7 - 16 mmol/L    Glucose 119 (H) 74 - 99 mg/dL    BUN 23 6 - 23 mg/dL    CREATININE 0.9 0.5 - 1.0 mg/dL    GFR Non-African American 59 >=60 mL/min/1.73    GFR African American >60     Calcium 9.9 8.6 - 10.2 mg/dL    Total Protein 7.2 6.4 - 8.3 g/dL    Albumin 4.2 3.5 - 5.2 g/dL    Total Bilirubin 0.9 0.0 - 1.2 mg/dL    Alkaline Phosphatase 107 (H) 35 - 104 U/L    ALT 23 0 - 32 U/L    AST 40 (H) 0 - 31 U/L Protime-INR   Result Value Ref Range    Protime 22.0 (H) 9.3 - 12.4 sec    INR 2.0    Urinalysis with Microscopic   Result Value Ref Range    Color, UA Yellow Straw/Yellow    Clarity, UA Clear Clear    Glucose, Ur Negative Negative mg/dL    Bilirubin Urine Negative Negative    Ketones, Urine Negative Negative mg/dL    Specific Gravity, UA 1.020 1.005 - 1.030    Blood, Urine SMALL (A) Negative    pH, UA 6.0 5.0 - 9.0    Protein, UA Negative Negative mg/dL    Urobilinogen, Urine 1.0 <2.0 E.U./dL    Nitrite, Urine Negative Negative    Leukocyte Esterase, Urine TRACE (A) Negative    WBC, UA 1-3 0 - 5 /HPF    RBC, UA 2-5 0 - 2 /HPF    Bacteria, UA NONE SEEN None Seen /HPF   Troponin   Result Value Ref Range    Troponin, High Sensitivity 24 (H) 0 - 9 ng/L   Brain Natriuretic Peptide   Result Value Ref Range    Pro-BNP 5,807 (H) 0 - 450 pg/mL   Troponin   Result Value Ref Range    Troponin, High Sensitivity 25 (H) 0 - 9 ng/L   URINE ELECTROLYTES   Result Value Ref Range    Sodium, Ur 142 Not Established mmol/L    Potassium, Ur 27.7 Not Established mmol/L    Chloride 175 Not Established mmol/L   UREA NITROGEN, URINE   Result Value Ref Range    Urea Nitrogen, Ur 433 (L) 800 - 1666 mg/dL   OSMOLALITY, SERUM   Result Value Ref Range    Osmolality 312 (H) 285 - 310 mOsm/Kg   OSMOLALITY, URINE   Result Value Ref Range    Osmolality, Ur 522 300 - 900 mOsm/kg   Procalcitonin   Result Value Ref Range    Procalcitonin 0.05 0.00 - 0.08 ng/mL   Lactic Acid   Result Value Ref Range    Lactic Acid 1.4 0.5 - 2.2 mmol/L   Magnesium   Result Value Ref Range    Magnesium 2.3 1.6 - 2.6 mg/dL   Phosphorus   Result Value Ref Range    Phosphorus 2.9 2.5 - 4.5 mg/dL   SPECIMEN REJECTION   Result Value Ref Range    Rejected Test NH3     Reason for Rejection Not on ice/cold        Imaging:  XR CHEST PORTABLE   Final Result   New opacities in right lung base suggestive of pneumonia, atelectasis, and   possible right pleural effusion.          CT Head WO Contrast   Final Result   No acute intracranial abnormality or hemorrhage. Cortical atrophy and periventricular leukomalacia. Left frontal scalp hematoma. Right frontal sinusitis and right mastoiditis. CT Cervical Spine WO Contrast   Final Result   No acute abnormality of the cervical spine. Multilevel degenerative changes, no significant interval change. Right pleural effusion. XR CHEST PORTABLE    (Results Pending)       Assessment and Plan  Principal Problem:    Hypernatremia  Active Problems:    Permanent atrial fibrillation (HCC)    Essential hypertension    Chronic obstructive pulmonary disease (HCC)    Frequent falls    AMS (altered mental status)    Head injury, initial encounter    Acute encephalopathy  Resolved Problems:    * No resolved hospital problems.  *    Acute Encephalopathy   - pt presented with altered mental status, disoriented and not able follow commands  - Initial imaging showed possible right lobe pneumonia vs pleural effusion, ct head no acute ischemia or bleeding  - likely secondary to  metabolic, hepatic, septic, drug induced, worsening underlying dementia   -  labs including: CBC, PT/INR, CMP, Ca, Mg, Ph, bilirubin, ammonia, serum osmolaity, SDS, UDS  - Pan culture, procal and urine culture    Hypernatremia(Acute vs Chronic)  - 1 week ago Na was within normal limit  - Today is 152  - will start on dextrose 5% @ 50 ml and nephrology consult  - Ordered urine/serum osmolality, urine electrolytes  - Hold on Furosemide   - Neuro vascular check  - BMP every 4-6 hours    Pneumonia(Viral vs bacterial)   - Likely covid induced with secondary bacterial  - Mild leucocytosis, on 3 liters oxygen via nasal canula, Procal hellen risk for sepsis  - Will start on empiric antibiotic for Rocephin and doxy until blood culture result come  - Ordered inflammatory marker including D dimer, CRP, LDH, Ferritin and will decide for Bacitrinib vs tocalizumab  - Will start on oral decadrone 6 mg dialy for 10 days   - Continue oxygen supplement and wean as tolerated     A fib with RVR  - On last admission Coumadin was discontinued due to high for bleeding despite of high CHADVAS score after discussion with patient daughter  - Will continue to hold  - Start home metoprolol 50 mg XR daily  - If not able to take orally consider switching to IV and consult cardio if rate not controlled   - Last Echo in 2018, >55% EF, Interdeterminate diastolic dysfunction  consider repeating during this admission   - Keep mag/k/phos >2/5/4    Multiple fall  - Wound on left forehead  - Wound care consulted  - No need suture  - Fall precaution  - PT/OT eval     Hypothyroidism  - Recent TSH normal  - Continue current regimen     Hypertension  - BP is elevated  - Continue home Lisinopril and metoprolol       DVT / GI prophylaxis: Lovenox 40 mg SC    Dispo - Admit to telemetry       Electronically signed by Venita Mario MD on 4/13/22 at 2:57 PM EDT  This case was discussed with attending physician: Dr. Wilfredo Luciano

## 2022-04-13 NOTE — ED NOTES
Pt becoming increasingly agitated. Dr. Kim [facility] ED physician notified. Verbal order given for 0.5 mg ativan IV.       Lisa Christensen RN  04/13/22 [id number]

## 2022-04-13 NOTE — TELEPHONE ENCOUNTER
Writer contacted Dr Jannet Smith to inform of 30 day readmission risk.     ED provider informed writer more than likely will be admitted

## 2022-04-13 NOTE — ED NOTES
Pt becoming increasingly agitated. Pulling at wires and groaning. Pt HR climbs into the 130's when agitated. Dr. Carole Henry notified. Per Dr. Sierra Mcguire physician is to reassess pt.        Fercho Charles RN  04/13/22 [id number], RN  04/13/22 1981

## 2022-04-13 NOTE — CARE COORDINATION
Social Work /Transition of Care:    Pt presented to the ED secondary to needing medical clearance to go to [facility]. Pt was sent to [facility] from [facility] Staff at [facility] sent pt to ED due to Sodium of 151. Per report from [employer], pt was sent to [facility] due to pt screaming and crying out all day at facility. Pt had ativan ordered but was having no effect on pt. SW/CM to follow.

## 2022-04-13 NOTE — ED PROVIDER NOTES
HPI     Patient is a 81 y.o. female presents with a chief complaint of abnormal labs  This has been occurring for one day. Patient states that it gets better with nothing. Patient states that it gets worse with nothing. Patient states that it is moderate in severity. Patient states it was acute in onset. Patient is an 81-year-old female who presented from [facility] with abnormal labs. Patient was noted to have a sodium 151. At baseline patient is alert but disoriented. Patient during examination is moaning and opens her eyes to commands however she does not follow commands. Per EMS patient has been having multiple falls at the nursing home. Patient has a head laceration that is noted in the EMR. It is unknown when this had laceration had occurred at the nursing home. Patient unable to provide reliable review of systems. Review of Systems   Unable to perform ROS: Mental status change        Physical Exam  Constitutional:       General: She is in acute distress. Appearance: She is well-developed. She is obese. She is ill-appearing. She is not diaphoretic. Comments: Patient only opens eyes to voice and is moaning   HENT:      Head: Normocephalic and atraumatic. Right Ear: External ear normal.      Left Ear: External ear normal.      Nose: Nose normal.      Mouth/Throat:      Mouth: Mucous membranes are dry. Eyes:      General: No scleral icterus. Neck:      Thyroid: No thyromegaly. Vascular: No JVD. Trachea: No tracheal deviation. Cardiovascular:      Rate and Rhythm: Tachycardia present. Rhythm irregular. Heart sounds: Normal heart sounds. No murmur heard. No friction rub. No gallop. Comments: Patient in a fib  Pulmonary:      Effort: Pulmonary effort is normal. No respiratory distress. Breath sounds: Normal breath sounds. No wheezing or rales. Abdominal:      General: Bowel sounds are normal. There is no distension.       Palpations: Abdomen is soft. There is no mass. Tenderness: There is no abdominal tenderness. There is no guarding or rebound. Musculoskeletal:         General: No swelling or deformity. Skin:     General: Skin is warm and dry. Capillary Refill: Capillary refill takes less than 2 seconds. Coloration: Skin is not pale. Findings: Lesion (L forehead) present. Neurological:      Comments: Patient opens eyes to voice       Media Information               Procedures     MDM         Patient is a 81 y.o. female presenting with altered mental status and hypernatremia. All lab and imaging was reviewed. EKG showed patient was in atrial fibrillation with RVR. Patient remained hyponatremic with a sodium 152. Patient continued only open eyes to commands. CT head was obtained and showed no acute intracranial abnormality or hemorrhage however showed a left frontal scalp hematoma. Chest x-ray showed new opacities in right lung base suggestive of pneumonia or atelectasis. Given patient's altered mentation and hypernatremia decision was made to admit the patient to family medicine. Family medicine accepted patient.         --------------------------------------------- PAST HISTORY ---------------------------------------------  Past Medical History:  has a past medical history of Anticoagulated on Coumadin, Atrial fibrillation ([address] Basal cell carcinoma of neck, CAD (coronary artery disease), CHF (congestive heart failure) ([address] Depression, DJD (degenerative joint disease), GERD (gastroesophageal reflux disease), History of cardiovascular stress test, Hyperparathyroidism ([address] Hypertension, Hypothyroidism, Mild mitral regurgitation, Osteoarthritis, Pulmonary hypertension ([address] Renal calculi, Tricuspid regurgitation, and Urinary incontinence. Past Surgical History:  has a past surgical history that includes Thyroidectomy (1960); Kidney stone surgery (1972); Cholecystectomy (1972);  Kidney stone surgery (1981); Cataract removal (6/2005); Incontinence surgery (5/2005); Thyroid surgery (5/2005); parathyroidectomy; Knee Arthroplasty (Bilateral, 9/2005); Total shoulder arthroplasty (Left, 2009); fracture surgery (Left); Carpal tunnel release; Finger surgery (7/2011); Total shoulder arthroplasty (Left); Inner ear surgery (2011); Cataract removal with implant (Bilateral); Colonoscopy; and Cardiac catheterization (3/22/14). Social History:  reports that she has never smoked. She has never used smokeless tobacco. She reports previous alcohol use. She reports that she does not use drugs. Family History: family history includes Arthritis in her mother; Cancer in her brother and son; Diabetes in her father; Heart Attack in her mother; Heart Disease in her brother, brother, father, mother, and sister; Heart Surgery in her brother; Mental Illness in her father. The patients home medications have been reviewed.     Allergies: Codeine, Penicillins, Vicodin [hydrocodone-acetaminophen], and Adhesive tape    -------------------------------------------------- RESULTS -------------------------------------------------    LABS:  Results for orders placed or performed during the hospital encounter of 04/13/22   CBC with Auto Differential   Result Value Ref Range    WBC 5.9 4.5 - 11.5 E9/L    RBC 3.85 3.50 - 5.50 E12/L    Hemoglobin 11.4 (L) 11.5 - 15.5 g/dL    Hematocrit 38.0 34.0 - 48.0 %    MCV 98.7 80.0 - 99.9 fL    MCH 29.6 26.0 - 35.0 pg    MCHC 30.0 (L) 32.0 - 34.5 %    RDW 15.1 (H) 11.5 - 15.0 fL    Platelets 159 466 - 097 E9/L    MPV 10.5 7.0 - 12.0 fL    Neutrophils % 71.9 43.0 - 80.0 %    Immature Granulocytes % 0.3 0.0 - 5.0 %    Lymphocytes % 12.4 (L) 20.0 - 42.0 %    Monocytes % 11.6 2.0 - 12.0 %    Eosinophils % 2.4 0.0 - 6.0 %    Basophils % 1.4 0.0 - 2.0 %    Neutrophils Absolute 4.23 1.80 - 7.30 E9/L    Immature Granulocytes # 0.02 E9/L    Lymphocytes Absolute 0.73 (L) 1.50 - 4.00 E9/L    Monocytes Absolute 0.68 0.10 - 0.95 E9/L    Eosinophils Absolute 0.14 0.05 - 0.50 E9/L    Basophils Absolute 0.08 0.00 - 0.20 E9/L   Comprehensive Metabolic Panel w/ Reflex to MG   Result Value Ref Range    Sodium 152 (H) 132 - 146 mmol/L    Potassium reflex Magnesium 3.9 3.5 - 5.0 mmol/L    Chloride 108 (H) 98 - 107 mmol/L    CO2 35 (H) 22 - 29 mmol/L    Anion Gap 9 7 - 16 mmol/L    Glucose 119 (H) 74 - 99 mg/dL    BUN 23 6 - 23 mg/dL    CREATININE 0.9 0.5 - 1.0 mg/dL    GFR Non-African American 59 >=60 mL/min/1.73    GFR African American >60     Calcium 9.9 8.6 - 10.2 mg/dL    Total Protein 7.2 6.4 - 8.3 g/dL    Albumin 4.2 3.5 - 5.2 g/dL    Total Bilirubin 0.9 0.0 - 1.2 mg/dL    Alkaline Phosphatase 107 (H) 35 - 104 U/L    ALT 23 0 - 32 U/L    AST 40 (H) 0 - 31 U/L   Protime-INR   Result Value Ref Range    Protime 22.0 (H) 9.3 - 12.4 sec    INR 2.0    Urinalysis with Microscopic   Result Value Ref Range    Color, UA Yellow Straw/Yellow    Clarity, UA Clear Clear    Glucose, Ur Negative Negative mg/dL    Bilirubin Urine Negative Negative    Ketones, Urine Negative Negative mg/dL    Specific Gravity, UA 1.020 1.005 - 1.030    Blood, Urine SMALL (A) Negative    pH, UA 6.0 5.0 - 9.0    Protein, UA Negative Negative mg/dL    Urobilinogen, Urine 1.0 <2.0 E.U./dL    Nitrite, Urine Negative Negative    Leukocyte Esterase, Urine TRACE (A) Negative    WBC, UA 1-3 0 - 5 /HPF    RBC, UA 2-5 0 - 2 /HPF    Bacteria, UA NONE SEEN None Seen /HPF   Troponin   Result Value Ref Range    Troponin, High Sensitivity 24 (H) 0 - 9 ng/L   Troponin   Result Value Ref Range    Troponin, High Sensitivity 25 (H) 0 - 9 ng/L       RADIOLOGY:  XR CHEST PORTABLE   Final Result   New opacities in right lung base suggestive of pneumonia, atelectasis, and   possible right pleural effusion. CT Head WO Contrast   Final Result   No acute intracranial abnormality or hemorrhage. Cortical atrophy and periventricular leukomalacia. Left frontal scalp hematoma. Right frontal sinusitis and right mastoiditis. CT Cervical Spine WO Contrast   Final Result   No acute abnormality of the cervical spine. Multilevel degenerative changes, no significant interval change. Right pleural effusion.                 ------------------------- NURSING NOTES AND VITALS REVIEWED ---------------------------  Date / Time Roomed:  4/13/2022 12:09 PM  ED Bed Assignment:  15/15    The nursing notes within the ED encounter and vital signs as below have been reviewed. Patient Vitals for the past 24 hrs:   BP Temp Temp src Pulse Resp SpO2 Height Weight   04/13/22 1212 (!) 154/90 98.7 °F (37.1 °C) Oral 103 20 96 % 5' 6\" (1.676 m) 270 lb (122.5 kg)       Oxygen Saturation Interpretation: Normal    ------------------------------------------ PROGRESS NOTES ------------------------------------------  See ED course for reevaluation    Counseling:  I have spoken with the patient and discussed todays results, in addition to providing specific details for the plan of care and counseling regarding the diagnosis and prognosis. Their questions are answered at this time and they are agreeable with the plan of admission.    --------------------------------- ADDITIONAL PROVIDER NOTES ---------------------------------  Consultations:   Spoke with Dr. Rhoda Lloyd. Discussed case. They will admit the patient. This patient's ED course included: a personal history and physicial examination, re-evaluation prior to disposition and cardiac monitoring     This patient has remained hemodynamically stable during their ED course. Diagnosis:  1. Hypernatremia    2. Altered mental status, unspecified altered mental status type        Disposition:  Patient's disposition: Admit to telemetry  Patient's condition is Stable          Patient was seen and evaluated by myself and my attending  Hang Arriola DO.  Assessment and Plan discussed with attending provider, please see attestation for final plan of care. This note was done using dictation software and there may be some grammatical errors associated with this.     Oneyda Rendon DO, PhD        Oneyda Rendon DO  Resident  04/13/22 4157

## 2022-04-13 NOTE — LETTER
[place] Department Medicaid  CERTIFICATION OF NECESSITY  FOR NON-EMERGENCY TRANSPORTATION   BY GROUND AMBULANCE      Individual Information   1. Name: Gilbert Xiao 2. [place] Medicaid Billing Number:    3. Address: [address]      Transportation Provider Information   4. Provider Name:    5. [place] Medicaid Provider Number:  National Provider Identifier (NPI):      Certification  7. Criteria:  During transport, this individual requires:  [x] Medical treatment or continuous     supervision by an EMT. [x] The administration or regulation of oxygen by another person. [] Supervised protective restraint. 8. Period Beginning Date:     4/19/2022       O2 3 liters     9. Length  [x] Not more than 10 day(s)  [] One Year     Additional Information Relevant to Certification   10. Comments or Explanations, If Necessary or Appropriate     Metabolic encephalopathy: Baseline confusion now aggravated by her metabolic derangement    Frequent falls; Head injury       Certifying Practitioner Information   11. Name of Practitioner: Shelagh Moritz DO   12. [place] Medicaid Provider Number, If Applicable:  [address] Provider Identifier (NPI):      Signature Information   14. Date of Signature: 4/19/2022   15. Name of Person Signing: Yenifer Loredo RN     16. Signature and Professional Designation: Yenifer Loredo RN   [phone]       [facility] [id number]  Rev. 7/2015    [address] Encounter Date/Time: 4/13/2022 [address] [place] [address] Account: [de-identified]    MRN: [id number]    Patient: Della Rod Serial #: [id number]      ENCOUNTER          Patient Class: I Private Enc?   No Unit  BD: [id number] 8410/8410-B   Hospital Service: MED   Encounter DX: Hypernatremia [E87.0]   ADM Provider: Shelagh Moritz, DO   Procedure:     ATT Provider: Shelagh Moritz, DO   REF Provider:        Admission DX: Hypernatremia, Acute encephalopathy, Altered mental status, unspecified altered mental status type and DX codes: E87.0, G93.40, R41.82      PATIENT                 Name: Rei Aimn : 1935 (87 yrs)   Address: [address] [place] [address] Sex: Female   [place] city: [address] [place] [address]         Marital Status:    Employer: RETIRED         Mandaen: [employer]   Primary Care Provider:           Primary Phone: [phone]   EMERGENCY CONTACT   Contact Name Legal Guardian? Relationship to Patient Home Phone Work Phone   1. Roscoe Nettles  2. Nicolasa Celeste      Spouse  Child [phone]              GUARANTOR            Guarantor: Rei Amin     : 1935   Address: [facility]* Sex: [place]     [place] [address]     Relation to Patient: Self       Home Phone: [phone]   Guarantor ID: [id number]       Work Phone:     Guarantor Employer: RETIRED         Status: RETIRED      COVERAGE        PRIMARY INSURANCE   Payor: [id number]: [facility] MEDICARE COMPLETE   Payor Address: ,          Group Number: [id number] Insurance Type: [address] Name: Cheryl Warren : 1935   Subscriber ID: [id number] Pat. Rel. to Sub: Self   SECONDARY INSURANCE   Payor:   Plan:     Payor Address:  ,           Group Number:   Insurance Type:     Subscriber Name:   Subscriber :     Subscriber ID:   Pat.  Rel. to Sub:             CSN: [id number]

## 2022-04-13 NOTE — TELEPHONE ENCOUNTER
----- Message from Anthony France sent at 4/11/2022  3:08 PM EDT -----  Subject: Message to Provider    QUESTIONS  Information for Provider? Pt fell around 9 weeks ago. Since she's been in   the nursing home facility she's started getting irate and is kind of out   of it. She hasn't been able to walk. Pt's daughter wants to know how to   help her and where they should go from here.   ---------------------------------------------------------------------------  --------------  CALL BACK INFO  What is the best way for the office to contact you? OK to leave message on   voicemail  Preferred Call Back Phone Number? [phone]  ---------------------------------------------------------------------------  --------------  SCRIPT ANSWERS  Relationship to Patient?  Self

## 2022-04-13 NOTE — TELEPHONE ENCOUNTER
If she is in a subacute rehab center or nursing home, the house physician should evaluate the patient and make further recommendations on testing, therapy, medication changes, etc.

## 2022-04-14 NOTE — PROGRESS NOTES
Department of Internal Medicine  Nephrology Attending Progress Note        SUBJECTIVE:  Mrs Emigdio Fonseca 81year-old woman who was admitted from nursing facility with altered mentation and confusion. She was found to have a sodium of 152 for which we have been asked to assist in her management. PMH  History of chronic A. fib on Coumadin  Coronary disease  History of heart failure with preserved ejection fraction  RVSP mild 48 mm Hg  Hypertension  History of osteoarthritis status post bilateral knee replacement, total left shoulder  History of urinary incontinence with history of bladder suspension   hyperparathyroid disease status post parathyroidectomy  History of basal cell carcinoma on the neck,   Thyroidectomy for multinodular goiter on replacement therapy 1960  History of renal lithiasis  Cholecystectomy 1972  Bilateral cataract extractions with lens implant  COVID-19 infection  Dementia  COPD      Physical Exam:    Vitals:    04/14/22 0800   BP: (!) 142/102   Pulse: 69   Resp: 20   Temp: 97.7 °F (36.5 °C)   SpO2: 99%       I/O last 24 hours:  Intake/Output not recorded    Weight: Not weighed    General Appearance: Patient not oriented not responding to questions randomly yelling, not clear if she is responding to painful stimuli  Skin: Stasis dermatitis changes lower extremity left upper skull lesion suggestive of skin cancer?   Neck:  neck- supple, no mass, non-tender and no bruits  Lungs: Distant breath sounds no wheezing or rhonchi  Heart: Irregular rhythm rate controlled     Abdominal: Soft nontender, patient has wick device in place  Extremities: Bilateral knee surgical scars right knee seems slightly swollen  Peripheral Pulses:  +2    DATA:    CBC with Differential:    Lab Results   Component Value Date    WBC 4.8 04/14/2022    RBC 3.36 04/14/2022    HGB 10.1 04/14/2022    HCT 34.6 04/14/2022     04/14/2022    .0 04/14/2022    MCH 30.1 04/14/2022    MCHC 29.2 04/14/2022    RDW 15.1 04/14/2022    NRBC 0.9 12/29/2016    SEGSPCT 54 03/17/2014    METASPCT 0.9 01/05/2022    LYMPHOPCT 10.6 04/14/2022    PROMYELOPCT 0.9 12/31/2016    MONOPCT 7.1 04/14/2022    MYELOPCT 0.9 01/06/2022    BASOPCT 2.7 04/14/2022    MONOSABS 0.34 04/14/2022    LYMPHSABS 0.53 04/14/2022    EOSABS 0.12 04/14/2022    BASOSABS 0.13 04/14/2022     CMP:    Lab Results   Component Value Date     04/14/2022    K 3.1 04/14/2022     04/14/2022    CO2 33 04/14/2022    BUN 22 04/14/2022    CREATININE 0.7 04/14/2022    GFRAA >60 04/14/2022    LABGLOM >60 04/14/2022    GLUCOSE 101 04/14/2022    GLUCOSE 113 02/14/2012    PROT 5.8 04/14/2022    LABALBU 3.4 04/14/2022    LABALBU 4.4 02/14/2012    CALCIUM 9.0 04/14/2022    BILITOT 0.7 04/14/2022    ALKPHOS 86 04/14/2022    AST 31 04/14/2022    ALT 18 04/14/2022     Magnesium:    Lab Results   Component Value Date    MG 2.3 04/14/2022     Phosphorus:    Lab Results   Component Value Date    PHOS 2.9 04/13/2022     U/A:    Lab Results   Component Value Date    NITRITE negative 07/05/2019    COLORU Yellow 04/13/2022    PROTEINU Negative 04/13/2022    PHUR 6.0 04/13/2022    LABCAST RARE 09/16/2019    WBCUA 1-3 04/13/2022    RBCUA 2-5 04/13/2022    RBCUA 1-3 03/15/2014    BACTERIA NONE SEEN 04/13/2022    CLARITYU Clear 04/13/2022    SPECGRAV 1.020 04/13/2022    LEUKOCYTESUR TRACE 04/13/2022    UROBILINOGEN 1.0 04/13/2022    BILIRUBINUR Negative 04/13/2022    BILIRUBINUR negative 07/05/2019    BLOODU SMALL 04/13/2022    GLUCOSEU Negative 04/13/2022    AMORPHOUS FEW 09/20/2019     ABG:    Lab Results   Component Value Date    PH 7.385 04/14/2022    PCO2 59.3 04/14/2022    PO2 96.5 04/14/2022    HCO3 34.7 04/14/2022    BE 8.0 04/14/2022    O2SAT 96.8 04/14/2022     Urine osmolarity 522  Urine sodium 142    Small left and moderate size right pleural effusions.       A moderate size area of consolidation is noted involving the right lower   lung, concerning for an underlying airspace disease process. Presence of bilateral renal calculi nonobstructing       doxycycline (VIBRAMYCIN) IV  100 mg IntraVENous Q12H    dexamethasone  6 mg IntraVENous Q24H    potassium chloride  10 mEq IntraVENous Q1H    metoprolol succinate  50 mg Oral BID    levalbuterol  1 ampule Nebulization BID    lisinopril  5 mg Oral Daily    sodium chloride flush  5-40 mL IntraVENous 2 times per day    enoxaparin  40 mg SubCUTAneous Daily    cefTRIAXone (ROCEPHIN) IV  1,000 mg IntraVENous Q24H    levothyroxine  175 mcg Oral Daily      dextrose 50 mL/hr at 04/13/22 1734    sodium chloride       perflutren lipid microspheres, sodium chloride flush, sodium chloride, ondansetron **OR** ondansetron, polyethylene glycol    IMPRESSION/RECOMMENDATIONS:    Hypernatremia data consistent with hypovolemic hyponatremia continue holding diuretic therapy increase free water replacement  Hypokalemia  continue IV potassium replacement  Altered mental mental status may be secondary to hypernatremia, not sure what her baseline is?     History of heart failure with preserved ejection fraction monitor weights as we hold diuretics and give IV fluid, does  have some evidence of third spacing, her initial albumin may be overestimating secondary to volume contraction  COVID infection remains on Decadron therapy which may have contributed to her altered mentation  Hypertension controlled      Alina Todd MD  4/14/2022 12:52 PM

## 2022-04-14 NOTE — PROGRESS NOTES
[facility]   Resident Progress Note    S:  Hospital day: 1   Brief Synopsis: Donaldo Pasco is a 81 y.o. female PMH of atrial fibrillation (on Coumadin ), coronary artery disease, CHF, GERD, hyperparathyroidism, hypertension, hypothyroidism, osteoarthritis and urinary incontinenc of who presents to ED for Abnormal Lab (pt new pt to [facility], came in for abnormal labs. pt with frequent falls and they state she has a change in mentation. ER labs showed H/H 11.4/38.0, sodium 152, potassium 3.9, chloride 108, BUN 23, creatinine 0.9, AST 40, alkaline phosphatase 127, INR 2, urine analysis leukocyte esterase with WBC 1-3, troponin 24 x25, normal lactic acid. EKG showed A. fib with RVR and chest x-ray new opacity in the right lung base.      Overnight/interim: Patient was agitated and received approximately 1.5 mg of Ativan overnight. Patient seen and examined this morning. Patient is on 2 L oxygen. Patient is only moaning while we exam, not opening her eyes with voice command. Not following any command. Respiration was normal.  No fever or chills. Still tachycardic. Cont meds:    IV infusion builder      sodium chloride       Scheduled meds:    doxycycline (VIBRAMYCIN) IV  100 mg IntraVENous Q12H    dexamethasone  6 mg IntraVENous Q24H    metoprolol succinate  50 mg Oral BID    levalbuterol  1 ampule Nebulization BID    lisinopril  5 mg Oral Daily    sodium chloride flush  5-40 mL IntraVENous 2 times per day    enoxaparin  40 mg SubCUTAneous Daily    cefTRIAXone (ROCEPHIN) IV  1,000 mg IntraVENous Q24H    levothyroxine  175 mcg Oral Daily     PRN meds: perflutren lipid microspheres, sodium chloride flush, sodium chloride, ondansetron **OR** ondansetron, polyethylene glycol     I reviewed the patient's past medical and surgical history, Medications and Allergies.     O:  BP (!) 142/102   Pulse 69   Temp 97.7 °F (36.5 °C) (Oral)   Resp 20   Ht 5' 6\" (1.676 m)   Wt 270 lb (122.5 kg)   SpO2 99%   BMI 43.58 kg/m²   24 hour I&O: No intake/output data recorded. No intake/output data recorded. General: Alert, awake, opening eyes to loud voice and continue moaning on exam, does not follow command   neck: Supple, symmetrical, no JVD   Lung: Clear to auscultation bilaterally, mildly respiratory distress. No rales/wheezing/rubs  Heart: IrRegular rate and irregular rhythm  Abdomen: SNTND, Extremities:  Extremities normal, atraumatic, no cyanosis or edema. Distal pulses equal bilaterally  Skin: Skin color, texture, turgor normal, no rashes or lesions  Musculoskeletal: No joint swelling, no muscle tenderness   Neurologic: alert, awake, disoriented, not able to perform neuro exam due to altered mental status       Labs:  Na/K/Cl/CO2:  151/3.1/107/33 (04/14 9820)  BUN/Cr/glu/ALT/AST/amyl/lip:  22/0.7/--/18/31/--/-- (04/14 2739)  WBC/Hgb/Hct/Plts:  4.8/10.1/34.6/166 (04/14 4318)  estimated creatinine clearance is 77 mL/min (based on SCr of 0.7 mg/dL). Other pertinent labs as noted below    Radiology:  XR CHEST PORTABLE   Final Result   Cardiomegaly, pulmonary venous hypertension and small effusions. Findings   suggest volume overload. Mild right basilar atelectasis. CTA PULMONARY W CONTRAST   Final Result   No evidence of pulmonary embolism. Small left and moderate size right pleural effusions. A moderate size area of consolidation is noted involving the right lower   lung, concerning for an underlying airspace disease process. RECOMMENDATIONS:   Unavailable         XR CHEST PORTABLE   Final Result   New opacities in right lung base suggestive of pneumonia, atelectasis, and   possible right pleural effusion. CT Head WO Contrast   Final Result   No acute intracranial abnormality or hemorrhage. Cortical atrophy and periventricular leukomalacia. Left frontal scalp hematoma. Right frontal sinusitis and right mastoiditis.          CT Cervical Spine WO Contrast   Final Result   No acute abnormality of the cervical spine. Multilevel degenerative changes, no significant interval change. Right pleural effusion. A/P:  Principal Problem:    Hypernatremia  Active Problems:    Permanent atrial fibrillation (HCC)    Essential hypertension    Chronic obstructive pulmonary disease (HCC)    Frequent falls    AMS (altered mental status)    Head injury, initial encounter    Acute encephalopathy  Resolved Problems:    * No resolved hospital problems. *    Acute Encephalopathy   - pt presented with altered mental status, disoriented and not able follow commands  - Initial imaging showed possible right lobe pneumonia vs pleural effusion, ct head no acute ischemia or bleeding  - likely secondary to  metabolic, hepatic, septic, drug induced, worsening underlying dementia   -  labs including: CBC, PT/INR, CMP, Ca, Mg, Ph, bilirubin, ammonia, serum osmolaity, SDS, UDS  -Blood culture pending  -Urine culture grew E. coli awaiting for final culture sensitivity.   Continue Rocephin until get final culture sensitivity  -Pro-Salomon level and lactic acid normal      Hypernatremia(Acute vs Chronic)  - 1 week ago Na was within normal limit  - Today is 151, on admission was 152  - will continue dextrose 5% @ 50 ml pending nephrology recommendation  - Ordered urine/serum osmolality, urine electrolytes  - Hold on Furosemide   - Neuro vascular check  - BMP every 4-6 hours     Pneumonia(Viral vs bacterial)   - Likely covid induced with secondary bacterial  - Mild leucocytosis, on 3 liters oxygen via nasal canula, Procal hellen risk for sepsis  - Will start on empiric antibiotic for Rocephin and doxy until blood culture result come  - Ordered inflammatory marker including D dimer, CRP, LDH, Ferritin and will decide for Bacitrinib vs tocalizumab  - decadrone 6 mg dialy for 10 days   - Continue oxygen supplement and wean as tolerated   -Pulmonary consulted and waiting for recommendation  -CT angio no PE     A fib with RVR  - On last admission Coumadin was discontinued due to high for bleeding despite of high CHADVAS score after discussion with patient daughter  - Will continue to hold  -Continue metoprolol 50 mg XR daily  - If not able to take orally consider switching to IV and consult cardio if rate not controlled   - Last Echo in 2018, >55% EF, Interdeterminate diastolic dysfunction  consider repeating during this admission   - Keep mag/k/phos >2/4     Multiple fall  - Wound on left forehead  - Wound care is following patient  - No need suture  - Fall precaution  - PT/OT eval      Hypothyroidism  - Recent TSH normal  - Continue current regimen      Hypertension  - BP is elevated  - Continue home Lisinopril and metoprolol , consider increasing if needed        DVT / GI prophylaxis: Lovenox 40 mg SC     Dispo -continue care in telemetry and close monitoring mental status. Waiting for cardio, nephrology and pulmonary recommendation.     Electronically signed by Mark Gaines MD PGY-3 on 4/14/2022 at 1:37 PM  This case was discussed with attending physician: Dr. Brisa Rodriguez

## 2022-04-14 NOTE — PROGRESS NOTES
Date: 4/14/2022    Time: 2:58 PM    Patient Placed On BIPAP/CPAP/ Non-Invasive Ventilation? Yes    If no must comment. Facial area red/color change? No           If YES are Blister/Lesion present? No   If yes must notify nursing staff  BIPAP/CPAP skin barrier? Yes    Skin barrier type:mepilexlite       Comments:Patient has wound on forehead above left eye. RN placed guaze covering over it before mask straps applied loosely.         Casper Amaro RCP

## 2022-04-14 NOTE — PLAN OF CARE
Problem: Falls - Risk of:  Goal: Will remain free from falls  Description: Will remain free from falls  Outcome: Met This Shift  Goal: Absence of physical injury  Description: Absence of physical injury  Outcome: Met This Shift     Problem: Airway Clearance - Ineffective  Goal: Achieve or maintain patent airway  Outcome: Ongoing     Problem: Gas Exchange - Impaired  Goal: Absence of hypoxia  Outcome: Ongoing  Goal: Promote optimal lung function  Outcome: Ongoing     Problem: Breathing Pattern - Ineffective  Goal: Ability to achieve and maintain a regular respiratory rate  Outcome: Ongoing

## 2022-04-14 NOTE — PLAN OF CARE
Patient's chart updated to reflect:      . - HF care plan, HF education points and HF discharge instructions.  -Orders: 2 gram sodium diet, daily weights, I/O.  -PCP and/or Cardiologist appointment to be scheduled within 7 days of hospital discharge.  -History of HF, not primary admission Dx.   Patient admitted for treatment of Covid-19, hypernatremia, worsening underlying dementia  Due to the need to preserve PPE for other caregivers, a face-to-face encounter with the patient was not performed    Gamaliel Saucedo RN RN, BSN  Heart Failure Navigator

## 2022-04-14 NOTE — PROGRESS NOTES
[address] [facility]  Family Medicine Attending    S: 81 y.o. female with falls at [facility] and encephalopathy, forehead wound, frequent falls, CAD, HF, HTN, hyperPTH s/p parathyroidectomy, atrial fibrillation, COPD. Found to have hypernatremia, COVID positive. Had been agitated overnight. Today, somnolent, responding to verbal stimuli, appears uncomfortable, but respirations unlabored. O: VS- Blood pressure (!) 125/92, pulse 116, temperature 97.4 °F (36.3 °C), temperature source Axillary, resp. rate 22, height 5' 6\" (1.676 m), weight 257 lb 0.9 oz (116.6 kg), SpO2 100 %, not currently breastfeeding. Exam is as noted by resident with the following changes, additions or corrections:  Gen somnolent, limited vocalizations, not responding to commands   CV irreg irreg, mildly tachy  Resp decreased, coarse, unlabored on NC  Abd soft    Impressions:   Principal Problem:    Hypernatremia  Active Problems:    Permanent atrial fibrillation (HCC)    Essential hypertension    Chronic obstructive pulmonary disease (HCC)    Frequent falls    AMS (altered mental status)    Head injury, initial encounter    Acute encephalopathy  Resolved Problems:    * No resolved hospital problems. *      Plan:   Check lactate, ABGs. Follow mentation closely. Pulm consulted. Had been given Decadron for COVID positive. Checking inflammatory markers. Supportive care for forehead wound. Hold diuretics. Given D5 for hypernatremia. Nephrology consulted; their management is appreciated. Cardiology consulted for a fib RVR. Rate control with caution. Follow closely. Cultures pending. Started on antibiotics. Swallow evaluation. Attending Physician Statement  I have reviewed the chart and seen the patient with the resident(s). I personally reviewed images, EKG's and similar tests, if present.   I personally reviewed and performed key elements of the history and exam.  I have reviewed and confirmed student and/or resident history and exam with changes as indicated above. I agree with the assessment, plan and orders as documented by the resident. Please refer to the resident and/or student note for additional information.       Delorise Councilman, DO

## 2022-04-14 NOTE — CONSULTS
Pulmonary [facility]                             Pulmonary Consult/Progress Note :          Patient: Nydia Jackson  MRN: [id number]  : 1935      Date of Admission: .2022 12:09 PM    Consulting Physician :Valarie Sams         Reason for Consultation:COVID Pneumonia   CC : SOB      HPI:   Nydia Jackson is a 81y.o. year old who can not provide history ,given ativan ,most of history taken from chart review and talk to staff. As per ER physician note and nursing home document patient presented from [facility] for abnormal lab with sodium of 151. Also reported multiple fall during last 1 week and more confused. On arrival patient was alert ,awake but confused. During exam patient was moaning and opening her eyes to loud voice. Does not follow command. On exam noticed laceration on her left forehead. Last admission was on  for multiple fall. Patient Coumadin was discontinue due to high risks of bleed after discussion with family member. She was tested + COVID   BNP [address]   Proc 0.05  COVID  +   CRP1   ABG shwos Co2 56    PAST MEDICAL HISTORY:   Past Medical History:   Diagnosis Date    Anticoagulated on Coumadin     on Coumadin for this Dr. Nury Driscoll controls    Atrial fibrillation [facility]     Basal cell carcinoma of neck     CAD (coronary artery disease)     History of luminal irregularities of the coronary artery, stable.     CHF (congestive heart failure) (HCC)     stage I diastolic dysfunction on     Depression 2010    DJD (degenerative joint disease)     SEVERE IN RIGHT HAND, IN MULTIPLE OTHER JOINTS    GERD (gastroesophageal reflux disease) 2010    History of cardiovascular stress test 2014    lexiscan    Hyperparathyroidism ([facility] 2010    Had parathyroidectomy    Hypertension 2010    Hypothyroidism 2010    Mild mitral regurgitation 7/10/14    Osteoarthritis 2010    Pulmonary hypertension ([facility] [facility] 7/10/14    mild    Renal calculi     x3    Tricuspid regurgitation 7/10/14    mild-moderate    Urinary incontinence 11/16/2010       PAST SURGICAL HISTORY:   Past Surgical History:   Procedure Laterality Date    CARDIAC CATHETERIZATION  3/22/14    CARPAL TUNNEL RELEASE      Right hand.  CATARACT REMOVAL  6/2005    right, bilateral cataract surgery.  CATARACT REMOVAL WITH IMPLANT Bilateral     CHOLECYSTECTOMY  1972    COLONOSCOPY      FINGER SURGERY  7/2011    S/P removal of cyst from right index finger.  FRACTURE SURGERY Left     arm fractured-casted, Bilateral feet fractures-casted.  INCONTINENCE SURGERY  5/2005    Urinary incontinence, S/P bladder suspension.  INNER EAR SURGERY  2011    tube place in right ear   [address]    KIDNEY STONE SURGERY  1981    KNEE ARTHROPLASTY Bilateral 9/2005    PARATHYROIDECTOMY      Three times    SHOULDER ARTHROPLASTY Left 2009    SHOULDER ARTHROPLASTY Left      shoulder total replacement.     THYROID SURGERY  5/2005    recurrent    THYROIDECTOMY  1960       FAMILY HISTORY:   Family History   Problem Relation Age of Onset    Heart Disease Mother     Heart Attack Mother     Arthritis Mother     Heart Disease Father     Mental Illness Father     Diabetes Father     Heart Disease Brother     Cancer Brother     Cancer Son     Heart Disease Sister     Heart Disease Brother     Heart Surgery Brother        SOCIAL HISTORY:   Social History     Socioeconomic History    Marital status:      Spouse name: Minal Real Number of children: 3    Years of education: Not on file    Highest education level: High school graduate   Occupational History    Not on file   Tobacco Use    Smoking status: Never Smoker    Smokeless tobacco: Never Used   Vaping Use    Vaping Use: Never used   Substance and Sexual Activity    Alcohol use: Not Currently    Drug use: Never    Sexual activity: Not Currently Other Topics Concern    Not on file   Social History Narrative    Drinks 1-2 cups of coffee daily     Social Determinants of Health     Financial Resource Strain: Medium Risk    Difficulty of Paying Living Expenses: Somewhat hard   Food Insecurity: No Food Insecurity    Worried About Running Out of Food in the Last Year: Never true    Josiah of Food in the Last Year: Never true   Transportation Needs: Unmet Transportation Needs    Lack of Transportation (Medical): Yes    Lack of Transportation (Non-Medical): No   Physical Activity: Inactive    Days of Exercise per Week: 0 days    Minutes of Exercise per Session: 0 min   Stress: Stress Concern Present    Feeling of Stress : To some extent   Social Connections: Moderately Integrated    Frequency of Communication with Friends and Family: More than three times a week    Frequency of Social Gatherings with Friends and Family: Once a week    Attends Taoist Services: 1 to 4 times per year    Active Member of [address] [place] [address] or Organizations: No    Attends Club or Organization Meetings: Never    Marital Status:    Intimate Partner Violence:     Fear of Current or Ex-Partner: Not on file    Emotionally Abused: Not on file    Physically Abused: Not on file    Sexually Abused: Not on file   Housing Stability:     Unable to Pay for Housing in the Last Year: Not on file    Number of [place] in the Last Year: Not on file    Unstable Housing in the Last Year: Not on file     Social History     Tobacco Use   Smoking Status Never Smoker   Smokeless Tobacco Never Used     Social History     Substance and Sexual Activity   Alcohol Use Not Currently     Social History     Substance and Sexual Activity   Drug Use Never     HOME MEDICATIONS:  Prior to Admission medications    Medication Sig Start Date End Date Taking?  Authorizing Provider   acetaminophen (TYLENOL) 325 MG tablet Take 650 mg by mouth every 4 hours as needed for Pain or Fever    Historical Provider, MD   b complex vitamins capsule Take 1 capsule by mouth daily    Historical Provider, MD   vitamin D (CHOLECALCIFEROL) 25 MCG (1000 UT) TABS tablet Take 1,000 Units by mouth daily    Historical Provider, MD   fluticasone (FLONASE) 50 MCG/ACT nasal spray 2 sprays by Nasal route daily    Historical Provider, MD   guaiFENesin (MUCINEX) 600 MG extended release tablet Take 600 mg by mouth daily    Historical Provider, MD   loperamide (IMODIUM) 2 MG capsule Take 4 mg by mouth 4 times daily as needed for Diarrhea    Historical Provider, MD   meclizine (ANTIVERT) 25 MG tablet Take 25 mg by mouth 3 times daily as needed for Dizziness    Historical Provider, MD   albuterol sulfate HFA (VENTOLIN HFA) 108 (90 Base) MCG/ACT inhaler Inhale 2 puffs into the lungs every 6 hours as needed for Wheezing    Historical Provider, MD   ascorbic acid (VITAMIN C) 500 MG tablet Take 1,000 mg by mouth daily    Historical Provider, MD   levothyroxine (SYNTHROID) 175 MCG tablet Take 1 tablet by mouth Daily 2/18/22   Brenda Santana DO   levalbuterol (XOPENEX) 0.63 MG/3ML nebulization Take 1 ampule by nebulization in the morning and at bedtime     Historical Provider, MD   metoprolol succinate (TOPROL XL) 50 MG extended release tablet Take 1 tablet by mouth nightly 12/13/21   Kyle Jansen MD   magnesium hydroxide (MILK OF MAGNESIA) 400 MG/5ML suspension Take 5 mLs by mouth daily as needed for Constipation 12/13/21   Kyle Jansen MD   lisinopril (PRINIVIL;ZESTRIL) 5 MG tablet Take 1 tablet by mouth daily 12/13/21   Kyle Jansen MD   furosemide (LASIX) 40 MG tablet Take 1 tablet by mouth daily 12/13/21   Kyle Jansen MD   famotidine (PEPCID) 20 MG tablet Take 1 tablet by mouth 2 times daily 12/13/21   Kyle Jansen MD   escitalopram (LEXAPRO) 10 MG tablet Take 1 tablet by mouth daily 12/13/21   Kyle Jansen MD   albuterol (PROVENTIL) (2.5 MG/3ML) 0.083% nebulizer solution Take 3 mLs by nebulization every 6 hours as needed for Wheezing 12/13/21   Carlos A Chapman MD   levothyroxine (SYNTHROID) 125 MCG tablet Take 1 tablet by mouth every morning (before breakfast) 7/24/21   Holly Reeys MD       CURRENT MEDICATIONS:  Current Facility-Administered Medications: dextrose 5 % solution, , IntraVENous, Continuous  levalbuterol (XOPENEX) nebulization 0.63 mg, 1 ampule, Nebulization, BID  lisinopril (PRINIVIL;ZESTRIL) tablet 5 mg, 5 mg, Oral, Daily  sodium chloride flush 0.9 % injection 5-40 mL, 5-40 mL, IntraVENous, 2 times per day  sodium chloride flush 0.9 % injection 5-40 mL, 5-40 mL, IntraVENous, PRN  0.9 % sodium chloride infusion, , IntraVENous, PRN  ondansetron (ZOFRAN-ODT) disintegrating tablet 4 mg, 4 mg, Oral, Q8H PRN **OR** ondansetron (ZOFRAN) injection 4 mg, 4 mg, IntraVENous, Q6H PRN  polyethylene glycol (GLYCOLAX) packet 17 g, 17 g, Oral, Daily PRN  metoprolol succinate (TOPROL XL) extended release tablet 50 mg, 50 mg, Oral, Nightly  enoxaparin (LOVENOX) injection 40 mg, 40 mg, SubCUTAneous, Daily  cefTRIAXone (ROCEPHIN) 1,000 mg in sterile water 10 mL IV syringe, 1,000 mg, IntraVENous, Q24H  doxycycline hyclate (VIBRAMYCIN) capsule 100 mg, 100 mg, Oral, 2 times per day  dexamethasone (DECADRON) tablet 6 mg, 6 mg, Oral, Daily  levothyroxine (SYNTHROID) tablet 175 mcg, 175 mcg, Oral, Daily    IV MEDICATIONS:   dextrose 50 mL/hr at 04/13/22 1734    sodium chloride         ALLERGIES:  Allergies   Allergen Reactions    Codeine Other (See Comments)     Hallucination    Penicillins Hives    Vicodin [Hydrocodone-Acetaminophen] Other (See Comments)     Hallucination      Adhesive Tape Rash       REVIEW OF SYSTEMS:  General ROS:  Can not obtain     PHYSICAL EXAMINATION:     VITAL SIGNS:  BP (!) 142/102   Pulse 69   Temp 97.7 °F (36.5 °C) (Oral)   Resp 20   Ht 5' 6\" (1.676 m)   Wt 270 lb (122.5 kg)   SpO2 99%   BMI 43.58 kg/m²   Wt Readings from Last 3 Encounters:   04/13/22 270 lb (122.5 kg)   03/30/22 270 lb 4.8 oz (122.6 kg)   03/14/22 250 lb (113.4 kg)     Temp Readings from Last 3 Encounters:   04/14/22 97.7 °F (36.5 °C) (Oral)   03/30/22 96.5 °F (35.8 °C) (Temporal)   03/14/22 98.5 °F (36.9 °C) (Oral)     TMAX:  BP Readings from Last 3 Encounters:   04/14/22 (!) 142/102   03/30/22 (!) 144/97   03/15/22 118/82     Pulse Readings from Last 3 Encounters:   04/14/22 69   03/30/22 110   03/15/22 71           INTAKE/OUTPUTS:  No intake/output data recorded.   No intake or output data in the 24 hours ending 04/14/22 1003    General Appearance: somnolent ,  Eyes: pupils equal, round, and reactive to light, extraocular eye movements intact, conjunctivae normal and sclera anicteric   Neck: neck supple and non tender without mass, no thyromegaly, no thyroid nodules and no cervical adenopathy   Pulmonary/Chest:Rhonchi bilateral   Cardiovascular: normal rate, regular rhythm, normal S1 and S2, no murmurs, rubs, clicks or gallops, distal pulses intact, no carotid bruits, no murmurs, no gallops, no carotid bruits and no JVD   Abdomen: obese, soft, non-tender, non-distended, normal bowel sounds, no masses or organomegaly   Extremities ,mild edema   Musculoskeletal: normal range of motion, no joint swelling, deformity or tenderness   Neurologic:normal  reflexes normal and symmetric, no cranial nerve deficit noted,lethargic     LABS/IMAGING:    CBC:  Lab Results   Component Value Date    WBC 4.8 04/14/2022    HGB 10.1 (L) 04/14/2022    HCT 34.6 04/14/2022    .0 (H) 04/14/2022     04/14/2022    LYMPHOPCT 10.6 (L) 04/14/2022    RBC 3.36 (L) 04/14/2022    MCH 30.1 04/14/2022    MCHC 29.2 (L) 04/14/2022    RDW 15.1 (H) 04/14/2022    NEUTOPHILPCT 77.0 04/14/2022    MONOPCT 7.1 04/14/2022    BASOPCT 2.7 (H) 04/14/2022    NEUTROABS 3.70 04/14/2022    LYMPHSABS 0.53 (L) 04/14/2022    MONOSABS 0.34 04/14/2022    EOSABS 0.12 04/14/2022    BASOSABS 0.13 04/14/2022       Recent Labs     04/14/22  0552 04/13/22  1228 04/13/22  0508   WBC 4.8 5.9 6. 4   HGB 10.1* 11.4* 11.8   HCT 34.6 38.0 38.6   .0* 98.7 99.5    199 201       BMP:   Recent Labs     04/13/22  0508 04/13/22  1228 04/13/22  1409 04/14/22  0552   * 152*  --  151*   K 3.6 3.9  --  3.1*    108*  --  107   CO2 28 35*  --  33*   PHOS  --   --  2.9  --    BUN 24* 23  --  22   CREATININE 0.8 0.9  --  0.7       MG:   Lab Results   Component Value Date    MG 2.3 04/14/2022     Ca/Phos:   Lab Results   Component Value Date    CALCIUM 9.0 04/14/2022    PHOS 2.9 04/13/2022     Amylase: No results found for: AMYLASE  Lipase:   Lab Results   Component Value Date    LIPASE 29 08/13/2020     LIVER PROFILE:   Recent Labs     04/13/22  1228 04/14/22  0552   AST 40* 31   ALT 23 18   BILITOT 0.9 0.7   ALKPHOS 107* 86       PT/INR:   Recent Labs     04/13/22  0508 04/13/22  1228   PROTIME 22.5* 22.0*   INR 2.1 2.0     APTT: No results for input(s): APTT in the last 72 hours.     Cardiac Enzymes:  Lab Results   Component Value Date    CKTOTAL 92 01/05/2022    CKMB 1.8 03/16/2014    TROPONINI <0.01 04/17/2021               MICROBIOLOGY:      CXR:    PFTs:    2D Echocardiogram:    CT Chest:    PROBLEM LIST:  Patient Active Problem List   Diagnosis    Depression    Hypothyroidism    Permanent atrial fibrillation ([address]    History of artificial lens replacement    Essential hypertension    Moderate obesity    Greater trochanteric bursitis    Chronic anticoagulation    Chronic obstructive pulmonary disease (HCC)    Mixed stress and urge urinary incontinence    Fuchs' endothelial dystrophy    Pseudophakia, both eyes    Chronic diastolic (congestive) heart failure ([address]    Pure hypercholesterolemia    Spinal stenosis of lumbar region with neurogenic claudication    Patent foramen ovale    Coronary arteriosclerosis in native artery    Frequent falls    Dementia ([address]    Ambulatory dysfunction    Cognitive dysfunction    COVID-19 virus infection    Nephrolithiasis    AMS (altered mental status)    Laceration of fascia of head    Head injury, initial encounter    Hypernatremia    Acute encephalopathy               ASSESSMENT:  1.) Acute hypoxic /hypercpnic resp failure  2.)Fluid overload/CHF  3.)Right side effusion > left   4. )COVID pneumonia  5.)Fall /A fib .was on couamdin      PLAN:  *-start Diuresis   *-CRP and pocal N ,hold on steroids and hold on Toci   *-BiPAP as Co2 5  *- avoid fluid overlaod  *-aggressive pulmonary toilet   *_BD   *- may drain fluid at some point   *- DNRCCA status noted  *- deescalte abx if culture negative as no clinical signs of pneumonia and normal CRP,normal procalcitonin  *- if more hypoxia ,GGO then started decadron       Thank you very much for allowing me to participate in the care of this pleasant patient , should you have any questions ,please do not hesitate to contact me      Dc Feliz MD,[facility]  Pulmonary&Critical Care Medicine   Director of [address] [place] [address] Director of [address] [place] [address]    Isaura Stone    NOTE: This report was transcribed using voice recognition software. Every effort was made to ensure accuracy; however, inadvertent computerized transcription errors may be present.

## 2022-04-14 NOTE — CONSULTS
Inpatient [facility] Cardiology Consultation      Reason for Consult: Afib RVR    Consulting Physician: Dr. Kelly Mejia    Requesting Physician: Dr. Janina Vega    Date of Consultation: 4/14/2022    HISTORY OF PRESENT ILLNESS:   Patient is an 81year old WF known to Dr. Bonnie Duong. She is being seen in consultation this hospital admission by Dr. Kelly Mejia for evaluation and recommendations regarding atrial fibrillation with RVR. Of note, patient is currently COVID-19+ and in droplet plus isolation. As a result, interview was attempted via telephone in order to preserve PPE and limit exposure; however, patient remains confused and is unable to answer questions appropriately at this time. Most of history obtained through chart review and discussion with medical staff. 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 Patient presented to [facility] on April 13, 2022 from a NH facility due to abnormal outpatient lab work. · Through chart review and discussion with medical staff, patient presented from a NH facility due to worsening AMS compared to typical baseline, and sodium level of 151. She was noted to be confused and disoriented on ED presentation.   History of recent hospitalization last month (3/2022) with a mechanical fall and large frontal scalp hematoma --> due to recurrent mechanical falls/high risk of bleeding and family request, Coumadin was discontinued that admission (used in setting of Afib). · Patient was noted to have findings on Chest CTA suggestive of PNA, with bilateral pleural effusions. She was noted to be COVID-19+ again on admission, with findings of sinusitis on Head CT. · Telemetry revealed Afib with RVR --> cardiology was consulted for further evaluations and recommendations. Please note: past medical records were reviewed per electronic medical record (EMR) - see detailed reports under Past Medical/ Surgical History. PAST MEDICAL HISTORY:    · DJD. Osteoarthritis. · Basal cell carcinoma of neck. · Depression. · GERD. · COPD. · Dementia. · Hypothyroidism s/p thyroidectomy. On synthroid. · Hyperparathyroidism s/p parathyroidectomy. · History of nephrolithiasis. · Urinary incontinence. · S/p bladder suspension. · Vertigo, on chronic meclizine therapy. · Vitamin D deficiency. · Permanent atrial fibrillation, not on [address]. Prior Coumadin use -- discontinued 3/2022 due to recurrent falls/family decision. · [place].  · HTN. 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 · History of dysphagia. · Chronic HFrEF with improved EF. Non-ischemic cardiomyopathy. VHD. · PFO. · Morbid obesity. · DNR-CCA code status. · History of recurrent mechanical falls. · COVID-19+ with associated PNA January 2022 with hospitalization / acute hypoxic respiratory failure requiring NC. · Chronic respiratory failure, on 2-3L NC.      CARDIAC TESTING    [facility] (2014)  Reported normal coronary arteries and EF 30%. Report not available for review. TTE (2014 post-cath)  EF 50%. TTE (Dr. Carolina Saldana, 2018)  Summary   Left ventricular size is grossly normal.   Borderline global hypokinesis, EF 50%. Indeterminate diastolic function. Left atrium is enlarged. Increased LA volume index. Interatrial septum not well visualized but appears intact. Normal right ventricle structure and function. Right atrium is enlarged. There is mild mitral regurgitation.    No mitral valve prolapse. Mild aortic valve sclerosis. There is trace aortic regurgitation. There is mild to moderate tricuspid regurgitation. Moderate pulmonary hypertension, RVSP 56mmHg. Normal aortic root size. No evidence of pericardial effusion. .   The inferior vena cava is dilated with reduced respiratory variation. No intracardiac mass. Compared to prior study from 4/2016 - TR is more. TTE (Dr. Jose Goldman, 2019)  Summary   Ejection fraction is visually estimated at 45%. Overall ejection fraction mildly decreased. Mild left ventricular concentric hypertrophy noted. Normal right ventricle size with reduced function. TAPSE is 1.4 cm. Left atrial volume index of 57 ml per meters squared BSA. Mild aortic regurgitation is noted. Mild mitral regurgitation is present. Moderate tricuspid regurgitation. Pulmonary hypertension is moderate. RVSP is 54 mmHg. Patent foramen ovale with left-to-right shunt was visualized by color   Doppler. TTE (Dr. Ama Mireles, 5/2020)  Summary   Normal left ventricle size and systolic function. Ejection fraction is visually estimated at 55-60%. Atrial fibrillation may   affect the evaluation of LV systolic function. No regional wall motion abnormalities seen. Normal left ventricle wall thickness. Abnormal diastolic function with normal LV filling pressures (IVRT >60   msec). The left atrium is severely dilated. Patent foramen ovale with bidirectional shunt was visualized on color   Doppler and bubble study. Normal right ventricular size and function. TAPSE 17 mm. No hemodynamically significant aortic stenosis is present. Mild aortic regurgitation is noted. Mild tricuspid regurgitation. RVSP is 48 mmHg. Pulmonary hypertension is mild . No evidence for hemodynamically significant pericardial effusion. Compared to prior echo of 9/18/2019 changes noted. LVEF has improved from   45% to 55-60%.     PAST SURGICAL HISTORY:    Past Surgical History:   Procedure Laterality Date    CARDIAC CATHETERIZATION  3/22/14    CARPAL TUNNEL RELEASE      Right hand.  CATARACT REMOVAL  6/2005    right, bilateral cataract surgery.  CATARACT REMOVAL WITH IMPLANT Bilateral     CHOLECYSTECTOMY  1972    COLONOSCOPY      FINGER SURGERY  7/2011    S/P removal of cyst from right index finger.  FRACTURE SURGERY Left     arm fractured-casted, Bilateral feet fractures-casted.  INCONTINENCE SURGERY  5/2005    Urinary incontinence, S/P bladder suspension.  INNER EAR SURGERY  2011    tube place in right ear   [address]    KIDNEY STONE SURGERY  1981    KNEE ARTHROPLASTY Bilateral 9/2005    PARATHYROIDECTOMY      Three times    SHOULDER ARTHROPLASTY Left 2009    SHOULDER ARTHROPLASTY Left      shoulder total replacement.  THYROID SURGERY  5/2005    recurrent    THYROIDECTOMY  1960       HOME MEDICATIONS:  Prior to Admission medications    Medication Sig Start Date End Date Taking?  Authorizing Provider   acetaminophen (TYLENOL) 325 MG tablet Take 650 mg by mouth every 4 hours as needed for Pain or Fever    Historical Provider, MD   b complex vitamins capsule Take 1 capsule by mouth daily    Historical Provider, MD   vitamin D (CHOLECALCIFEROL) 25 MCG (1000 UT) TABS tablet Take 1,000 Units by mouth daily    Historical Provider, MD   fluticasone (FLONASE) 50 MCG/ACT nasal spray 2 sprays by Nasal route daily    Historical Provider, MD   guaiFENesin (MUCINEX) 600 MG extended release tablet Take 600 mg by mouth daily    Historical Provider, MD   loperamide (IMODIUM) 2 MG capsule Take 4 mg by mouth 4 times daily as needed for Diarrhea    Historical Provider, MD   meclizine (ANTIVERT) 25 MG tablet Take 25 mg by mouth 3 times daily as needed for Dizziness    Historical Provider, MD   albuterol sulfate HFA (VENTOLIN HFA) 108 (90 Base) MCG/ACT inhaler Inhale 2 puffs into the lungs every 6 hours as needed for Wheezing    Historical Provider, MD ascorbic acid (VITAMIN C) 500 MG tablet Take 1,000 mg by mouth daily    Historical Provider, MD   levothyroxine (SYNTHROID) 175 MCG tablet Take 1 tablet by mouth Daily 2/18/22   Wood Santana DO   levalbuterol (XOPENEX) 0.63 MG/3ML nebulization Take 1 ampule by nebulization in the morning and at bedtime     Historical Provider, MD   metoprolol succinate (TOPROL XL) 50 MG extended release tablet Take 1 tablet by mouth nightly 12/13/21   Lisa Gomez MD   magnesium hydroxide (MILK OF MAGNESIA) 400 MG/5ML suspension Take 5 mLs by mouth daily as needed for Constipation 12/13/21   Lisa Gomez MD   lisinopril (PRINIVIL;ZESTRIL) 5 MG tablet Take 1 tablet by mouth daily 12/13/21   Lisa Gomez MD   furosemide (LASIX) 40 MG tablet Take 1 tablet by mouth daily 12/13/21   Lisa Gomez MD   famotidine (PEPCID) 20 MG tablet Take 1 tablet by mouth 2 times daily 12/13/21   Lisa Gomez MD   escitalopram (LEXAPRO) 10 MG tablet Take 1 tablet by mouth daily 12/13/21   Lisa Gomez MD   albuterol (PROVENTIL) (2.5 MG/3ML) 0.083% nebulizer solution Take 3 mLs by nebulization every 6 hours as needed for Wheezing 12/13/21   Lisa Gomez MD   levothyroxine (SYNTHROID) 125 MCG tablet Take 1 tablet by mouth every morning (before breakfast) 7/24/21   Holly Marino MD       CURRENT MEDICATIONS:      Current Facility-Administered Medications:     dextrose 5 % solution, , IntraVENous, Continuous, Holly Sánchez MD, Last Rate: 50 mL/hr at 04/13/22 1734, New Bag at 04/13/22 1734    levalbuterol (XOPENEX) nebulization 0.63 mg, 1 ampule, Nebulization, BID, Holly Sánchez MD, 0.63 mg at 04/13/22 2050    lisinopril (PRINIVIL;ZESTRIL) tablet 5 mg, 5 mg, Oral, Daily, Holly Sánchez MD    sodium chloride flush 0.9 % injection 5-40 mL, 5-40 mL, IntraVENous, 2 times per day, Latricia Sarabia MD    sodium chloride flush 0.9 % injection 5-40 mL, 5-40 mL, IntraVENous, PRN, Holly Sánchez MD    0.9 % sodium chloride infusion, , IntraVENous, PRN, Mark Gaines MD    ondansetron (ZOFRAN-ODT) disintegrating tablet 4 mg, 4 mg, Oral, Q8H PRN **OR** ondansetron (ZOFRAN) injection 4 mg, 4 mg, IntraVENous, Q6H PRN, Holly Sánchez MD    polyethylene glycol (GLYCOLAX) packet 17 g, 17 g, Oral, Daily PRN, Mark Gaines MD    metoprolol succinate (TOPROL XL) extended release tablet 50 mg, 50 mg, Oral, Nightly, Holly Sánchez MD    enoxaparin (LOVENOX) injection 40 mg, 40 mg, SubCUTAneous, Daily, Holly Sánchez MD    cefTRIAXone (ROCEPHIN) 1,000 mg in sterile water 10 mL IV syringe, 1,000 mg, IntraVENous, Q24H, Holly Sánchez MD, 1,000 mg at 04/13/22 1840    doxycycline hyclate (VIBRAMYCIN) capsule 100 mg, 100 mg, Oral, 2 times per day, Holly Sánchez MD    dexamethasone (DECADRON) tablet 6 mg, 6 mg, Oral, Daily, Holly Sánchez MD    levothyroxine (SYNTHROID) tablet 175 mcg, 175 mcg, Oral, Daily, Holly Sánchez MD      ALLERGIES:  Codeine, Penicillins, Vicodin [hydrocodone-acetaminophen], and Adhesive tape    SOCIAL HISTORY:    Unable to obtain at this time due to patient's AMS. FAMILY HISTORY:   Non-contributory at this time due to patient's advanced age. REVIEW OF SYSTEMS:     Unable to obtain at this time due to patient's AMS. PHYSICAL EXAM:   BP (!) 150/96   Pulse 112   Temp 98.9 °F (37.2 °C) (Axillary)   Resp 22   Ht 5' 6\" (1.676 m)   Wt 270 lb (122.5 kg)   SpO2 96%   BMI 43.58 kg/m²   Due to patient's COVID-19+ status and in droplet plus isolation, physical exam was not performed in order to limit exposure and preserve PPE. DATA:    Telemetry: Afib with HR in the low 100s-110s. Diagnostic:  All diagnostic testing and lab work thus far this admission reviewed in detail. CT Head 4/13/2022:  Impression:  No acute intracranial abnormality or hemorrhage. Cortical atrophy and periventricular leukomalacia. Left frontal scalp hematoma. Right frontal sinusitis and right mastoiditis.     CT C-spine 4/13/2022:  Impression:  No acute abnormality of the cervical spine. Multilevel degenerative changes, no significant interval change. Right pleural effusion. CXR 4/13/2022:  Impression:  New opacities in right lung base suggestive of pneumonia, atelectasis, and  possible right pleural effusion. Chest CTA 4/13/2022:  Impression:  No evidence of pulmonary embolism. Small left and moderate size right pleural effusions. A moderate size area of consolidation is noted involving the right lower  lung, concerning for an underlying airspace disease process.       No intake or output data in the 24 hours ending 04/14/22 0730    Labs:   CBC:   Recent Labs     04/13/22  1228 04/14/22  0552   WBC 5.9 4.8   HGB 11.4* 10.1*   HCT 38.0 34.6    166     BMP:   Recent Labs     04/13/22  0508 04/13/22  1228   * 152*   K 3.6 3.9   CO2 28 35*   BUN 24* 23   CREATININE 0.8 0.9   LABGLOM >60 59   CALCIUM 9.6 9.9     Mag:   Recent Labs     04/13/22  1409   MG 2.3     Phos:   Recent Labs     04/13/22  1409   PHOS 2.9     HgA1c:   Lab Results   Component Value Date    LABA1C 5.9 03/17/2014     PT/INR:   Recent Labs     04/13/22  0508 04/13/22  1228   PROTIME 22.5* 22.0*   INR 2.1 2.0     FASTING LIPID PANEL:  Lab Results   Component Value Date    CHOL 165 11/02/2021    HDL 56 11/02/2021    LDLCALC 93 11/02/2021    TRIG 80 11/02/2021     LIVER PROFILE:  Recent Labs     04/13/22  1228   AST 40*   ALT 23   LABALBU 4.2      Ref Range & Units 04/13/22 1549   Lactic Acid 0.5 - 2.2 mmol/L 1.4      Ref Range & Units 04/13/22 1409    Procalcitonin 0.00 - 0.08 ng/mL 0.05       Ref Range & Units 04/13/22 1900    - 214 U/L 336 High        Ref Range & Units 04/13/22 1900   CRP 0.0 - 0.4 mg/dL 1.1 High        Ref Range & Units 04/13/22 1317   Pro-BNP 0 - 450 pg/mL 5,807 High        Ref Range & Units 04/13/22 1409 04/13/22 1228   Troponin, High Sensitivity 0 - 9 ng/L 25 High   24 High  CM       Ref Range & Units 04/13/22 2021   Ethanol Lvl mg/dL <10    Comment: Not Detected Acetaminophen Level 10.0 - 30.0 mcg/mL <8.2 WLK     Salicylate, Serum 0.0 - 30.0 mg/dL <0.3    TCA Scrn Cutoff:300 ng/mL NEGATIVE       Ref Range & Units 04/13/22 0508   Valproic Acid Lvl 50 - 100 mcg/mL 35 Low        Ref Range & Units 04/13/22 1837   D-Dimer, Quant ng/mL        04/13/22 1549    Adenovirus by PCR Not Detected Not Detected    Bordetella parapertussis by PCR Not Detected Not Detected    Bordetella pertussis by PCR Not Detected Not Detected    Chlamydophilia pneumoniae by PCR Not Detected Not Detected    Coronavirus 229E by PCR Not Detected DETECTED Abnormal     Coronavirus HKU1 by PCR Not Detected Not Detected    Coronavirus NL63 by PCR Not Detected Not Detected    Coronavirus OC43 by PCR Not Detected Not Detected    SARS-CoV-2, PCR Not Detected DETECTED Abnormal     Human Metapneumovirus by PCR Not Detected Not Detected    Human Rhinovirus/Enterovirus by PCR Not Detected Not Detected    Influenza A by PCR Not Detected Not Detected    Influenza B by PCR Not Detected Not Detected    Mycoplasma pneumoniae by PCR Not Detected Not Detected    Parainfluenza Virus 1 by PCR Not Detected Not Detected    Parainfluenza Virus 2 by PCR Not Detected Not Detected    Parainfluenza Virus 3 by PCR Not Detected Not Detected    Parainfluenza Virus 4 by PCR Not Detected Not Detected    Respiratory Syncytial Virus by PCR Not Detected Not Detected       04/13/22 1300    Color, UA Straw/Yellow Yellow    Clarity, UA Clear Clear    Glucose, Ur Negative mg/dL Negative    Bilirubin Urine Negative Negative    Ketones, Urine Negative mg/dL Negative    Specific Gravity, UA 1.005 - 1.030 1.020    Blood, Urine Negative SMALL Abnormal     pH, UA 5.0 - 9.0 6.0    Protein, UA Negative mg/dL Negative    Urobilinogen, Urine <2.0 E.U./dL 1.0    Nitrite, Urine Negative Negative    Leukocyte Esterase, Urine Negative TRACE Abnormal     WBC, UA 0 - 5 /HPF 1-3    RBC, UA 0 - 2 /HPF 2-5    Bacteria, UA None Seen /HPF NONE SEEN STREP, LEGIONELLA, BLOOD AND URINE CULTURES PENDING      ASSESSMENT:  · Permanent atrial fibrillation, now with RVR. · Prior Coumadin use -- discontinued 3/2022 due to recurrent falls/high bleed risk/family decision. · AMS. History of dementia. · Hypernatremia and hypokalemia. Nephrology following. · Chronic respiratory failure, on 2-3L NC.   · COVID-19+ with possible RLL PNA on Chest CTA / COPD exacerbation. Sinusitis on Head CT. · Possible component of acute on chronic HFrEF with improved EF vs. HFpEF. · proBNP 1900 March 2022 --> now 5800. · Likely in setting of tachycardia. · TTE 5/2020 with PFO, mild AI, mild TR, mild pulmonary HTN, diastolic dysfunction. · UTI.  · HTN, uncontrolled. · Recurrent mechanical falls. · Hypothyroidism, on replacement therapy. · Vertigo, on chronic Meclizine therapy. · Morbid obesity. · GERD. · Depression. · Urinary incontinence. · [place]. 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 · History of dysphagia. · DNR-CCA code status. RECOMMENDATIONS:  · Increase Toprol-XL to 75 mg daily for better HR and BP control. · If patient is unable to tolerate PO at this time, Lopressor 5 mg IV q6h PRN ordered. · No OAC as noted above in assessment. · K+ being supplemented. · Continue other cardiac medications the same. · No further cardiac evaluation indicated at this time. · Cardiology will sign off, please call with any questions or concerns. The above case and recommendations have been discussed and made in collaboration with Dr. Jasmyn Bah. NOTE: This report was transcribed using voice recognition software. Every effort was made to ensure accuracy; however, inadvertent computerized transcription errors may be present. [facility] [place] [address] Cardiology    Electronically signed by Dorcas Lombardo PA-C on 4/14/2022 at 7:30 AM       Patient chart reviewed with Dorcas Lombardo PA-C. Patient not seen due to COVID-19 infection.   Assessment and plan [facility]. Thank you for allowing me to share in the care of patient.

## 2022-04-14 NOTE — PROGRESS NOTES
Physical Therapy    PT order received and medical chart reviewed 4/14 AM. Attempted PT evaluation however pt was crying and unable to be redirected. Attempted to comfort pt however she continued to cry and resist therapist. Will re-attempt evaluation when pt is following commands. Thank you.     Angelica Storm, PT, DPT  [id number]

## 2022-04-14 NOTE — CARE COORDINATION
Pt failed sallow. On IV rocephin. Pt is form [facility] [address] but was admitted to [facility] out all day. Liaison - Ashley Durant stated that Pt needs to [facility] coming back to their facility. Informed Ashley Durant that Pt was covid positive.      Liliana Mancera, L.S.W.  [phone]

## 2022-04-14 NOTE — FLOWSHEET NOTE
Inpatient Wound Care(initial evaluation)  8410    Admit Date: 4/13/2022 12:09 PM    Reason for consult:  Left forehead    Significant history:  Per H & P  CC: Altered mental status, multiple falls and abnormal lab  PMH of atrial fibrillation (on Coumadin ), coronary artery disease, CHF, GERD, hyperparathyroidism, hypertension, hypothyroidism, osteoarthritis and urinary incontinence of who presents to ED for Abnormal Lab (pt new pt to [facility], came in for abnormal labs. pt with frequent falls and they state she has a change in mentation.)    Wound history:  Admitted with wound from [facility]    Findings:     04/14/22 0945   Skin Integrity   Skin Integrity Bruising   Location BUE   Wound 03/26/22 Left forehead   Date First Assessed/Time First Assessed: 03/26/22 1609   Present on Hospital Admission: Yes  Primary Wound Type: Traumatic  Wound Location Orientation: Left  Wound Description (Comments): forehead   Wound Image    Dressing/Treatment Open to air   Wound Length (cm) 1.6 cm   Wound Width (cm) 3 cm   Wound Depth (cm) 0.1 cm   Wound Surface Area (cm^2) 4.8 cm^2   Wound Volume (cm^3) 0.48 cm^3   Wound Assessment   (dried scabbed old wound)   Drainage Amount None   Kaylen-wound Assessment Dry/flaky   purewick in place    **Informed Consent**    photos taken of wound and inserted into their chart as part of their permanent medical record for purposes of documentation, treatment management and/or medical review. All Images taken on 4/14/22 of patient name: [place] were transmitted and stored on [employer] located within [facility] by a registered [employer] Mobile Application Device.    Plan:  no wound care required to forehead  Will need continued preventative care to include comfort glide, heel protectors, protective barrier    Shira King RN 4/14/2022 9:53 AM

## 2022-04-15 NOTE — PROGRESS NOTES
[place] [facility] Inpatient   Resident Progress Note    S:  Hospital day: 2   Brief Synopsis: Cesar Ramirez is a 81 y.o. female PMH of atrial fibrillation (on Coumadin ), coronary artery disease, CHF, GERD, hyperparathyroidism, hypertension, hypothyroidism, osteoarthritis and urinary incontinenc of who presents to ED for Abnormal Lab (pt new pt to [facility], came in for abnormal labs. pt with frequent falls and they state she has a change in mentation. ER labs showed H/H 11.4/38.0, sodium 152, potassium 3.9, chloride 108, BUN 23, creatinine 0.9, AST 40, alkaline phosphatase 127, INR 2, urine analysis leukocyte esterase with WBC 1-3, troponin 24 x25, normal lactic acid. EKG showed A. fib with RVR and chest x-ray new opacity in the right lung base.      Patient seen and examined today morning. Patient is somnolent, responding to verbal stimuli and continuously moaning. Not following commands. No fever or chills overnight. Cont meds:    IV infusion builder 75 mL/hr at 04/15/22 0200    sodium chloride       Scheduled meds:    [Held by provider] dexamethasone  6 mg IntraVENous Q24H    metoprolol succinate  75 mg Oral Daily    furosemide  20 mg IntraVENous BID    levalbuterol  1 ampule Nebulization BID    lisinopril  5 mg Oral Daily    sodium chloride flush  5-40 mL IntraVENous 2 times per day    enoxaparin  40 mg SubCUTAneous Daily    cefTRIAXone (ROCEPHIN) IV  1,000 mg IntraVENous Q24H    levothyroxine  175 mcg Oral Daily     PRN meds: perflutren lipid microspheres, metoprolol, sodium chloride flush, sodium chloride, ondansetron **OR** ondansetron, polyethylene glycol     I reviewed the patient's past medical and surgical history, Medications and Allergies. O:  BP (!) 156/114   Pulse 111   Temp 96 °F (35.6 °C) (Axillary)   Resp 24   Ht 5' 6\" (1.676 m)   Wt 257 lb 0.9 oz (116.6 kg)   SpO2 100%   BMI 41.49 kg/m²   24 hour I&O: I/O last 3 completed shifts: In: 825.1 [I.V.:725.1;  IV Piggyback:100]  Out: 1300 [Urine:1300]  No intake/output data recorded. General: Alert, awake, opening eyes to loud voice and continue moaning on exam, does not follow command   neck: Supple, symmetrical, no JVD   Lung:  Evidence of some rales bilaterally, also rhonchi   heart: IrRegular rate and irregular rhythm  Abdomen: SNTND, Extremities:  Extremities normal, atraumatic, no cyanosis or edema. Distal pulses equal bilaterally  Skin: Skin color, texture, turgor normal, no rashes or lesions  Musculoskeletal: No joint swelling, no muscle tenderness   Neurologic:  Somnolent, responds to verbal stimuli, not able to perform neuro exam due to altered mental status       Labs:  Na/K/Cl/CO2:  151/3.1/107/33 (04/14 0163)  BUN/Cr/glu/ALT/AST/amyl/lip:  22/0.7/--/18/31/--/-- (04/14 5019)  WBC/Hgb/Hct/Plts:  4.8/10.1/34.6/166 (04/14 1245)  estimated creatinine clearance is 75 mL/min (based on SCr of 0.7 mg/dL). Other pertinent labs as noted below    Radiology:  XR CHEST PORTABLE   Final Result   Cardiomegaly, pulmonary venous hypertension and small effusions. Findings   suggest volume overload. Mild right basilar atelectasis. CTA PULMONARY W CONTRAST   Final Result   No evidence of pulmonary embolism. Small left and moderate size right pleural effusions. A moderate size area of consolidation is noted involving the right lower   lung, concerning for an underlying airspace disease process. RECOMMENDATIONS:   Unavailable         XR CHEST PORTABLE   Final Result   New opacities in right lung base suggestive of pneumonia, atelectasis, and   possible right pleural effusion. CT Head WO Contrast   Final Result   No acute intracranial abnormality or hemorrhage. Cortical atrophy and periventricular leukomalacia. Left frontal scalp hematoma. Right frontal sinusitis and right mastoiditis.          CT Cervical Spine WO Contrast   Final Result   No acute abnormality of the cervical spine.      Multilevel degenerative changes, no significant interval change. Right pleural effusion. A/P:  Principal Problem:    Hypernatremia  Active Problems:    Permanent atrial fibrillation (HCC)    Essential hypertension    Chronic obstructive pulmonary disease (HCC)    Frequent falls    AMS (altered mental status)    Head injury, initial encounter    Acute encephalopathy  Resolved Problems:    * No resolved hospital problems. *    Acute Encephalopathy   - pt presented with altered mental status, disoriented and not able follow commands  -Mental status unchanged since admission  - Initial imaging showed possible right lobe pneumonia vs pleural effusion, ct head no acute ischemia or bleeding  - likely secondary to  metabolic, hepatic, septic, drug induced, worsening underlying dementia   -  labs including: CBC, PT/INR, CMP, Ca, Mg, Ph, bilirubin, ammonia, serum osmolaity, SDS, UDS  -Blood culture no growth so far  -Urine culture grew E. coli . We will continue Rocephin and discontinue doxycycline  -Pro-Salomon level and lactic acid normal  -Chest x-ray consistent with pleural effusion rather than pneumonia  -We will do bladder scan, repeat CAT scan    Hypernatremia(Acute vs Chronic)  - 1 week ago Na was within normal limit  -Today is 151 unchanged, on admission was 152  -Nephro on board and currently on dextrose 75 mL/h.   As per pulmonary recommendation cautious with fluid we will touch base with nephro  -Torsemide restarted by pulmonary due to pleural effusion  - Neuro vascular check  - BMP every 4-6 hours     Pneumonia(Viral vs bacterial)   - Likely covid induced with secondary bacterial  -Rapid Covid is negative  - Mild leucocytosis on admission  -We will continue Rocephin day 2 and discontinue doxycycline  -We will hold on decadrone 6 mg as pulmonary recommendation  -Patient might need thoracocentesis in future  -Resuming furosemide for possible pulmonary effusion  - Continue oxygen supplement and wean as tolerated   -Pulmonary consulted  -CT angio no PE     A fib with RVR  - On last admission Coumadin was discontinued due to high for bleeding despite of high CHADVAS score after discussion with patient daughter  - Will continue to hold Coumadin  -Cardio consulted and increase metoprolol to 75 mg daily  -IV metoprolol 5 mg every 6 hour if not able to take oral   - Last Echo in 2018, >55% EF, Interdeterminate diastolic dysfunction  consider repeating during this admission   - Keep mag/k/phos >2/4     Multiple fall  - Wound on left forehead  - Wound care is following patient  - No need suture  - Fall precaution  - PT/OT eval      Hypothyroidism  - Recent TSH normal  - Continue current regimen      Hypertension  -Able to take any oral medication  -We will do as needed hydralazine 5 mg every 6 hour keeping systolic less than 526   - Continue home Lisinopril and metoprolol , consider increasing if needed        DVT / GI prophylaxis: Lovenox 40 mg SC     Dispo -continue care in telemetry and close monitoring mental status. Waiting for cardio, nephrology and pulmonary recommendation.     Electronically signed by Ita Welch MD PGY-3 on 4/15/2022 at 9:01 AM  This case was discussed with attending physician: Dr. Jeyson Sosa

## 2022-04-15 NOTE — PROGRESS NOTES
Physical Therapy    PT order received and medical chart reviewed 4/15 PM. RN requested PT to hold due to AMS and inability to follow commands. Pt is not currently appropriate or safe for OOB activity. Will re-attempt at a later time/date. Thank you.     Ladarius Michaels, PT, DPT  [id number]

## 2022-04-15 NOTE — PROGRESS NOTES
Department of Internal Medicine  Nephrology Attending Progress Note        SUBJECTIVE:  Mrs Lorne Gastelum 81year-old woman who was admitted from nursing facility with altered mentation and confusion. She was found to have a sodium of 152 for which we have been asked to assist in her management. 4/15/22 Mrs Matilda Lamb continues to [employer]. Her oxygen was increased to 3 L. Remains confused not answering to questions evaluated by pulmonary and started on some diuretic therapy. Chest x-ray essentially unchanged difficult to distinguish how much related to her COVID and how much secondary to fluid retention. Patient remains in A. fib with rapid response      PMH  History of chronic A. fib on Coumadin  Coronary disease  History of heart failure with preserved ejection fraction  RVSP mild 48 mm Hg  Hypertension  History of osteoarthritis status post bilateral knee replacement, total left shoulder  History of urinary incontinence with history of bladder suspension   hyperparathyroid disease status post parathyroidectomy  History of basal cell carcinoma on the neck,   Thyroidectomy for multinodular goiter on replacement therapy 1960  History of renal lithiasis  Cholecystectomy 1972  Bilateral cataract extractions with lens implant  COVID-19 infection  Dementia  COPD  Wound left upper scalp related to fall      Physical Exam:    Vitals:    04/15/22 0815   BP: (!) 156/114   Pulse: 111   Resp: 24   Temp: 96 °F (35.6 °C)   SpO2: 100%       I/O last 24 hours:  Intake/Output not recorded/725    Weight: 257    General Appearance: Patient not oriented not responding to questions randomly yelling, not clear if she is responding to painful stimuli  Skin: Stasis dermatitis changes lower extremity left upper skull lesion suggestive of skin cancer?   Attributed to fall  Neck:  neck- supple, no mass, non-tender and no bruits  Lungs: Distant breath sounds no wheezing or rhonchi  Heart: Irregular rhythm rate less well controlled     Abdominal: Soft nontender, patient has wick device in place  Extremities: Bilateral knee surgical scars right knee seems slightly swollen  Peripheral Pulses:  +2    DATA:    CBC with Differential:    Lab Results   Component Value Date    WBC 5.6 04/15/2022    RBC 3.64 04/15/2022    HGB 10.8 04/15/2022    HCT 36.2 04/15/2022     04/15/2022    MCV 99.5 04/15/2022    MCH 29.7 04/15/2022    MCHC 29.8 04/15/2022    RDW 15.1 04/15/2022    NRBC 0.9 12/29/2016    SEGSPCT 54 03/17/2014    METASPCT 0.9 01/05/2022    LYMPHOPCT 7.9 04/15/2022    PROMYELOPCT 0.9 12/31/2016    MONOPCT 9.1 04/15/2022    MYELOPCT 0.9 01/06/2022    BASOPCT 1.1 04/15/2022    MONOSABS 0.51 04/15/2022    LYMPHSABS 0.44 04/15/2022    EOSABS 0.02 04/15/2022    BASOSABS 0.06 04/15/2022     CMP:    Lab Results   Component Value Date     04/15/2022    K 4.4 04/15/2022     04/15/2022    CO2 30 04/15/2022    BUN 22 04/15/2022    CREATININE 0.7 04/15/2022    GFRAA >60 04/15/2022    LABGLOM >60 04/15/2022    GLUCOSE 134 04/15/2022    GLUCOSE 113 02/14/2012    PROT 6.7 04/15/2022    LABALBU 3.8 04/15/2022    LABALBU 4.4 02/14/2012    CALCIUM 9.4 04/15/2022    BILITOT 0.7 04/15/2022    ALKPHOS 101 04/15/2022    AST 31 04/15/2022    ALT 20 04/15/2022     Magnesium:    Lab Results   Component Value Date    MG 2.3 04/14/2022     Phosphorus:    Lab Results   Component Value Date    PHOS 2.9 04/13/2022     U/A:    Lab Results   Component Value Date    NITRITE negative 07/05/2019    COLORU Yellow 04/13/2022    PROTEINU Negative 04/13/2022    PHUR 6.0 04/13/2022    LABCAST RARE 09/16/2019    WBCUA 1-3 04/13/2022    RBCUA 2-5 04/13/2022    RBCUA 1-3 03/15/2014    BACTERIA NONE SEEN 04/13/2022    CLARITYU Clear 04/13/2022    SPECGRAV 1.020 04/13/2022    LEUKOCYTESUR TRACE 04/13/2022    UROBILINOGEN 1.0 04/13/2022    BILIRUBINUR Negative 04/13/2022    BILIRUBINUR negative 07/05/2019    BLOODU SMALL 04/13/2022    GLUCOSEU Negative 04/13/2022    AMORPHOUS FEW 09/20/2019     ABG:    Lab Results   Component Value Date    PH 7.385 04/14/2022    PCO2 59.3 04/14/2022    PO2 96.5 04/14/2022    HCO3 34.7 04/14/2022    BE 8.0 04/14/2022    O2SAT 96.8 04/14/2022     Urine osmolarity 522  Urine sodium 142    Small left and moderate size right pleural effusions.       A moderate size area of consolidation is noted involving the right lower   lung, concerning for an underlying airspace disease process. Presence of bilateral renal calculi nonobstructing       [Held by provider] dexamethasone  6 mg IntraVENous Q24H    metoprolol succinate  75 mg Oral Daily    furosemide  20 mg IntraVENous BID    levalbuterol  1 ampule Nebulization BID    lisinopril  5 mg Oral Daily    sodium chloride flush  5-40 mL IntraVENous 2 times per day    enoxaparin  40 mg SubCUTAneous Daily    cefTRIAXone (ROCEPHIN) IV  1,000 mg IntraVENous Q24H    levothyroxine  175 mcg Oral Daily      IV infusion builder 75 mL/hr at 04/15/22 0200    sodium chloride       perflutren lipid microspheres, metoprolol, sodium chloride flush, sodium chloride, ondansetron **OR** ondansetron, polyethylene glycol    IMPRESSION/RECOMMENDATIONS:    Hypernatremia data consistent with hypovolemic hyponatremia weight had fallen from 268  from March 28, 2022 to 257, resumed on some diuretic therapy for concerns of worsening pulmonary edema   Hypokalemia improving will discontinue IV potassium supplementation  Altered mental mental status may be secondary to hypernatremia, not sure what her baseline is?     History of heart failure with preserved ejection fraction continue daily weights we will add some Aldactone to her regimen which may blunt further worsening of her sodium from Lasix therapy  COVID infection remains on Decadron therapy which may have contributed to her altered mentation  A. fib not well controlled metoprolol titrate, will discontinue lisinopril while trying to be more aggressive with her diuresis      Hemalatha Pinedo MD  4/15/2022 11:10 AM

## 2022-04-15 NOTE — PLAN OF CARE
Problem: Falls - Risk of:  Goal: Will remain free from falls  Description: Will remain free from falls  Outcome: Ongoing     Problem: Falls - Risk of:  Goal: Absence of physical injury  Description: Absence of physical injury  Outcome: Ongoing     Problem: Airway Clearance - Ineffective  Goal: Achieve or maintain patent airway  Outcome: Ongoing     Problem: Gas Exchange - Impaired  Goal: Absence of hypoxia  Outcome: Ongoing     Problem: Gas Exchange - Impaired  Goal: Promote optimal lung function  Outcome: Ongoing     Problem: Breathing Pattern - Ineffective  Goal: Ability to achieve and maintain a regular respiratory rate  Outcome: Ongoing     Problem: Body Temperature -  Risk of, Imbalanced  Goal: Ability to maintain a body temperature within defined limits  Outcome: Ongoing     Problem: Body Temperature -  Risk of, Imbalanced  Goal: Will regain or maintain usual level of consciousness  Outcome: Ongoing     Problem:  Body Temperature -  Risk of, Imbalanced  Goal: Complications related to the disease process, condition or treatment will be avoided or minimized  Outcome: Ongoing     Problem: Isolation Precautions - Risk of Spread of Infection  Goal: Prevent transmission of infection  Outcome: Ongoing     Problem: Risk for Fluid Volume Deficit  Goal: Maintain normal heart rhythm  Outcome: Ongoing     Problem: Risk for Fluid Volume Deficit  Goal: Maintain absence of muscle cramping  Outcome: Ongoing     Problem: Risk for Fluid Volume Deficit  Goal: Maintain normal serum potassium, sodium, calcium, phosphorus, and pH  Outcome: Ongoing     Problem: Loneliness or Risk for Loneliness  Goal: Demonstrate positive use of time alone when socialization is not possible  Outcome: Ongoing

## 2022-04-15 NOTE — PROGRESS NOTES
[facility]  Family Medicine Attending    S: 81 y.o. female with falls at [employer] and encephalopathy, forehead wound, frequent falls, CAD, HF, HTN, hyperPTH s/p parathyroidectomy, atrial fibrillation, COPD. Found to have hypernatremia, COVID positive. Had been agitated overnight. Today, continues to remain somnolent, but moaning occasionally. Able to be comforted temporarily. Respirations unlabored. O: VS- Blood pressure (!) 156/114, pulse 111, temperature 96 °F (35.6 °C), temperature source Axillary, resp. rate 24, height 5' 6\" (1.676 m), weight 257 lb 0.9 oz (116.6 kg), SpO2 100 %, not currently breastfeeding. Exam is as noted by resident with the following changes, additions or corrections:  Gen somnolent, limited vocalizations, not responding to commands   CV irreg irreg, mildly tachy  Resp decreased, coarse, unlabored on NC  Abd soft    Impressions:   Principal Problem:    Hypernatremia  Active Problems:    Permanent atrial fibrillation (HCC)    Essential hypertension    Chronic obstructive pulmonary disease (HCC)    Frequent falls    AMS (altered mental status)    Head injury, initial encounter    Acute encephalopathy  Resolved Problems:    * No resolved hospital problems. *      Plan:   Follow mentation; consider repeat head imaging. Has underlying dementia, and had COVID in January. Check PVR. Lactate in range, ABG stable. Follow bowel function, order KUB. Discuss with family/facility to better understand baseline. Had received dexamethasone, now held. Had been treated with Ativan on admission, no further doses. Hypernatremia, likely little po intake prior to admission, nephrology management appreciated. Pulm management appreciated. Rate control for A fib. Supportive care for forehead wound. Follow closely. Cultures pending. Started on antibiotics. Swallow evaluation as able; has not been able to participate.            Attending Physician Statement  I have reviewed the chart and seen the patient with the resident(s). I personally reviewed images, EKG's and similar tests, if present. I personally reviewed and performed key elements of the history and exam.  I have reviewed and confirmed student and/or resident history and exam with changes as indicated above. I agree with the assessment, plan and orders as documented by the resident. Please refer to the resident and/or student note for additional information.       Aracely Hardin, DO

## 2022-04-15 NOTE — CARE COORDINATION
Pt failed sallow. On IV rocephin. Pt is form [facility] [address] but was admitted to [facility] out all day. Liaison - Ahsley Durant stated that Pt needs to return to [facility] first before coming back to their facility. Repeat covid test was not detected. Notified Liaison.    Liliana Mancera, ISAAC.S.W.  [phone]

## 2022-04-15 NOTE — PROGRESS NOTES
19 beats asymptomatic Vtach while sleeping this morning. Notified Cardiology - orders received for morning labs. Tele strips placed in paper chart. No complaints of pain. IV antibiotics continued. 24 hour urine completed last night.     Problem: Patient Maddison Pt remains inappropriate for swallowing evaluation.     Please re-order when pt able to participate vasoactive medications to optimize hemodynamic stability  - Monitor arterial and/or venous puncture sites for bleeding and/or hematoma  - Assess quality of pulses, skin color and temperature  - Assess for signs of decreased coronary artery perfusion - ex. to maintain glucose within target range  - Assess barriers to adequate nutritional intake and initiate nutrition consult as needed  - Instruct patient on self management of diabetes  9/24/2021 0637 by Hira Fernández RN  Outcome: Progressing     P

## 2022-04-15 NOTE — PROGRESS NOTES
Dr. Bradley Cavazos, DO,    Your patient is on a medication that requires a renal dose adjustment. Renal Function Assessment:    Date Body Weight IBW  Adjusted BW SCr  CrCl Dialysis status   4/15/2022 257 lb 0.9 oz (116.6 kg)  Ideal body weight: 59.3 kg (130 lb 11.7 oz)  Adjusted ideal body weight: 82.2 kg (181 lb 4.2 oz) Serum creatinine: 0.7 mg/dL 04/15/22 0749  Estimated creatinine clearance: 75 mL/min N/a       Pharmacy has renally dose-adjusted the following medication(s):    Date Original Order Renally Adjusted Order   4/15/2022 Lovenox 40mg daily Lovenox 30mg bid       These changes were made per protocol according to the [facility] Renal Function-Based Dose Adjustments Policy    *Please note this dose may need readjusted if your patient's renal function significantly improves. Please contact pharmacy with any questions regarding these changes.     Eleanor Vilchis, [address] [place] [address]  4/15/2022  11:45 AM

## 2022-04-16 NOTE — PROGRESS NOTES
Pulmonary [facility]    Pulmonary Consult/Progress Note :    Reason for Consultation:COVID Pneumonia     CC : SOB      HPI:   Still somnolent   On 3 L   History limited  BNP 5000 K   Proc 0.05  COVID  +   CRP1   ABG shwos CO2 59    PHYSICAL EXAMINATION:     VITAL SIGNS:  BP (!) 149/113   Pulse 110   Temp 96.4 °F (35.8 °C) (Temporal)   Resp 20   Ht 5' 6\" (1.676 m)   Wt 245 lb 13 oz (111.5 kg)   SpO2 99%   BMI 39.68 kg/m²   Wt Readings from Last 3 Encounters:   04/16/22 245 lb 13 oz (111.5 kg)   03/30/22 270 lb 4.8 oz (122.6 kg)   03/14/22 250 lb (113.4 kg)     Temp Readings from Last 3 Encounters:   04/16/22 96.4 °F (35.8 °C) (Temporal)   03/30/22 96.5 °F (35.8 °C) (Temporal)   03/14/22 98.5 °F (36.9 °C) (Oral)     TMAX:  BP Readings from Last 3 Encounters:   04/16/22 (!) 149/113   03/30/22 (!) 144/97   03/15/22 118/82     Pulse Readings from Last 3 Encounters:   04/16/22 110   03/30/22 110   03/15/22 71     INTAKE/OUTPUTS:  I/O last 3 completed shifts: In: 825.1 [I.V.:725.1;  IV Piggyback:100]  Out: 1900 [Urine:1900]    Intake/Output Summary (Last 24 hours) at 4/16/2022 1633  Last data filed at 4/16/2022 0227  Gross per 24 hour   Intake    Output 600 ml   Net -600 ml     General Appearance: somnolent ,  Eyes: conjunctivae normal and sclera anicteric   Neck: No thyromegaly, no thyroid nodules and no cervical adenopathy   Pulmonary/Chest:Rhonchi bilateral   Cardiovascular: normal rate, regular rhythm, normal   Abdomen: obese, soft, non-tender, non-distended, n  Extremities edema   Musculoskeletal: no deformity or tenderness   Neurologic:normal  reflexes normal and symmetric, no cranial nerve deficit noted,lethargic     LABS/IMAGING:    CBC:   Lab Results   Component Value Date    WBC 7.1 04/16/2022    RBC 3.91 04/16/2022    HGB 11.6 04/16/2022    HCT 38.3 04/16/2022    MCV 98.0 04/16/2022    MCH 29.7 04/16/2022    MCHC 30.3 04/16/2022    RDW 14.9 04/16/2022     04/16/2022    MPV 10.5 04/16/2022     CMP:    Lab Results   Component Value Date     04/16/2022    K 3.1 04/16/2022     04/16/2022    CO2 30 04/16/2022    BUN 24 04/16/2022    CREATININE 0.8 04/16/2022    GFRAA >60 04/16/2022    LABGLOM >60 04/16/2022    GLUCOSE 87 04/16/2022    GLUCOSE 113 02/14/2012    PROT 6.9 04/16/2022    LABALBU 4.1 04/16/2022    LABALBU 4.4 02/14/2012    CALCIUM 9.5 04/16/2022    BILITOT 1.0 04/16/2022    ALKPHOS 97 04/16/2022    AST 32 04/16/2022    ALT 20 04/16/2022     Calcium:    Lab Results   Component Value Date    CALCIUM 9.5 04/16/2022     Phosphorus:    Lab Results   Component Value Date    PHOS 2.9 04/13/2022               ASSESSMENT:  1.) Acute hypoxic /hypercpnic resp failure  2.) Fluid overload/CHF  3.) Right side effusion > left   4.) COVID pneumonia  5.) Fall /A fib .was on couamdin      PLAN:  1. Continue diuresis   2. Hold on steroids and hold on Toci   3. BiPAP at night  4. Avoid fluid overlaod  5. Aggressive pulmonary toilet   6. Bronchodilators      Lisa Encarnacion DO, MPH, [facility], FACP  Director, Saskia Osman  Professor of [address] [facility]  [address] [place] [address]

## 2022-04-16 NOTE — PROGRESS NOTES
[facility]  Family Medicine Attending    S: 81 y.o. female with falls at [facility] and encephalopathy, forehead wound, frequent falls, CAD, HF, HTN, hyperPTH s/p parathyroidectomy, atrial fibrillation, COPD. Found to have hypernatremia, COVID positive. Had been agitated overnight. Today, more awake. Attempts to speak but words are mumbled. Does follow simple commands like squeezing hand and closing eyes. O: VS- Blood pressure (!) 149/113, pulse 110, temperature 96.4 °F (35.8 °C), temperature source Temporal, resp. rate 20, height 5' 6\" (1.676 m), weight 245 lb 13 oz (111.5 kg), SpO2 99 %, not currently breastfeeding. Exam is as noted by resident with the following changes, additions or corrections:  Gen somnolent,, mumbling. Follows some simple commands. CV irreg irreg, mildly tachy  Resp decreased, coarse, unlabored on NC  Abd soft    Impressions:   Principal Problem:    Hypernatremia  Active Problems:    Permanent atrial fibrillation (HCC)    Essential hypertension    Chronic obstructive pulmonary disease (HCC)    Frequent falls    AMS (altered mental status)    Head injury, initial encounter    Acute encephalopathy  Resolved Problems:    * No resolved hospital problems. *      Plan:   Follow mentation - mildly improving today . Neuro consult today Has underlying dementia, and had COVID in January. Lactate in range, ABG stable. Follow bowel function, order KUB. Discuss with family/facility to better understand baseline. Had received dexamethasone, now held. Had been treated with Ativan on admission, no further doses. Hypernatremia, likely little po intake prior to admission, nephrology management appreciated. Pulm management appreciated. Rate control for A fib. Supportive care for forehead wound. Follow closely. Cultures pending. Started on antibiotics. Swallow evaluation as able; has not been able to participate.       Awaiting IV team to replace IV to administer potassium and IV metoprolol as patient tis unable to take PO         Attending Physician Statement  I have reviewed the chart and seen the patient with the resident(s). I personally reviewed images, EKG's and similar tests, if present. I personally reviewed and performed key elements of the history and exam.  I have reviewed and confirmed student and/or resident history and exam with changes as indicated above. I agree with the assessment, plan and orders as documented by the resident. Please refer to the resident and/or student note for additional information. Laurent Castillo.  Simeon Mayfield MD

## 2022-04-16 NOTE — PROGRESS NOTES
Pt placed in bilateral soft wrist restraints per physician order. Pt is rolling over the side rails has pulled out her IV's. Will not keep her 02 or tele on.  Pt's spouse and daughter Roshni Pepper called with no answer.   Will attempt to reach them again later this am.

## 2022-04-16 NOTE — PLAN OF CARE
Problem: Falls - Risk of:  Goal: Will remain free from falls  Description: Will remain free from falls  Outcome: Met This Shift  Goal: Absence of physical injury  Description: Absence of physical injury  Outcome: Met This Shift     Problem: Airway Clearance - Ineffective  Goal: Achieve or maintain patent airway  Outcome: Met This Shift     Problem: Gas Exchange - Impaired  Goal: Absence of hypoxia  Outcome: Met This Shift  Goal: Promote optimal lung function  Outcome: Met This Shift     Problem: Breathing Pattern - Ineffective  Goal: Ability to achieve and maintain a regular respiratory rate  Outcome: Met This Shift     Problem:  Body Temperature -  Risk of, Imbalanced  Goal: Ability to maintain a body temperature within defined limits  Outcome: Met This Shift  Goal: Will regain or maintain usual level of consciousness  Outcome: Met This Shift  Goal: Complications related to the disease process, condition or treatment will be avoided or minimized  Outcome: Met This Shift     Problem: Isolation Precautions - Risk of Spread of Infection  Goal: Prevent transmission of infection  Outcome: Met This Shift     Problem: Nutrition Deficits  Goal: Optimize nutritional status  Outcome: Met This Shift     Problem: Risk for Fluid Volume Deficit  Goal: Maintain normal heart rhythm  Outcome: Met This Shift  Goal: Maintain absence of muscle cramping  Outcome: Met This Shift  Goal: Maintain normal serum potassium, sodium, calcium, phosphorus, and pH  Outcome: Met This Shift     Problem: Loneliness or Risk for Loneliness  Goal: Demonstrate positive use of time alone when socialization is not possible  Outcome: Met This Shift     Problem: Fatigue  Goal: Verbalize increase energy and improved vitality  Outcome: Met This Shift     Problem: Patient Education: Go to Patient Education Activity  Goal: Patient/Family Education  Outcome: Met This Shift     Problem: Skin Integrity:  Goal: Will show no infection signs and symptoms  Description: Will show no infection signs and symptoms  Outcome: Met This Shift  Goal: Absence of new skin breakdown  Description: Absence of new skin breakdown  Outcome: Met This Shift     Problem: HEMODYNAMIC STATUS  Goal: Patient has stable vital signs and fluid balance  Outcome: Met This Shift

## 2022-04-16 NOTE — PROGRESS NOTES
Pulmonary [facility]                             Pulmonary Consult/Progress Note :            Reason for Consultation:COVID Pneumonia   CC : SOB      HPI:   Still somnolent   On 3 L   History limited  BNP 5000 K   Proc 0.05  COVID  +   CRP1   ABG shwos Co2 59        PHYSICAL EXAMINATION:     VITAL SIGNS:  BP (!) 130/95   Pulse 120   Temp 97.3 °F (36.3 °C) (Temporal)   Resp 24   Ht 5' 6\" (1.676 m)   Wt 254 lb 3.1 oz (115.3 kg)   SpO2 100%   BMI 41.03 kg/m²   Wt Readings from Last 3 Encounters:   04/15/22 254 lb 3.1 oz (115.3 kg)   03/30/22 270 lb 4.8 oz (122.6 kg)   03/14/22 250 lb (113.4 kg)     Temp Readings from Last 3 Encounters:   04/15/22 97.3 °F (36.3 °C) (Temporal)   03/30/22 96.5 °F (35.8 °C) (Temporal)   03/14/22 98.5 °F (36.9 °C) (Oral)     TMAX:  BP Readings from Last 3 Encounters:   04/15/22 (!) 130/95   03/30/22 (!) 144/97   03/15/22 118/82     Pulse Readings from Last 3 Encounters:   04/15/22 120   03/30/22 110   03/15/22 71           INTAKE/OUTPUTS:  I/O last 3 completed shifts: In: 825.1 [I.V.:725.1;  IV Piggyback:100]  Out: 1750 [Urine:1750]    Intake/Output Summary (Last 24 hours) at 4/15/2022 2303  Last data filed at 4/15/2022 1522  Gross per 24 hour   Intake    Output 800 ml   Net -800 ml       General Appearance: somnolent ,  Eyes: pupils equal, round, and reactive to light, extraocular eye movements intact, conjunctivae normal and sclera anicteric   Neck: neck supple and non tender without mass, no thyromegaly, no thyroid nodules and no cervical adenopathy   Pulmonary/Chest:Rhonchi bilateral   Cardiovascular: normal rate, regular rhythm, normal S1 and S2, no murmurs, rubs, clicks or gallops, distal pulses intact, no carotid bruits, no murmurs, no gallops, no carotid bruits and no JVD   Abdomen: obese, soft, non-tender, non-distended, normal bowel sounds, no masses or organomegaly   Extremities ,mild edema   Musculoskeletal: normal range of motion, no joint swelling, deformity or tenderness   Neurologic:normal  reflexes normal and symmetric, no cranial nerve deficit noted,lethargic     LABS/IMAGING:    CBC:  Lab Results   Component Value Date    WBC 5.6 04/15/2022    HGB 10.8 (L) 04/15/2022    HCT 36.2 04/15/2022    MCV 99.5 04/15/2022     04/15/2022    LYMPHOPCT 7.9 (L) 04/15/2022    RBC 3.64 04/15/2022    MCH 29.7 04/15/2022    MCHC 29.8 (L) 04/15/2022    RDW 15.1 (H) 04/15/2022    NEUTOPHILPCT 81.3 (H) 04/15/2022    MONOPCT 9.1 04/15/2022    BASOPCT 1.1 04/15/2022    NEUTROABS 4.55 04/15/2022    LYMPHSABS 0.44 (L) 04/15/2022    MONOSABS 0.51 04/15/2022    EOSABS 0.02 (L) 04/15/2022    BASOSABS 0.06 04/15/2022       Recent Labs     04/15/22  0749 04/14/22  0552 04/13/22  1228   WBC 5.6 4.8 5.9   HGB 10.8* 10.1* 11.4*   HCT 36.2 34.6 38.0   MCV 99.5 103.0* 98.7    166 199       BMP:   Recent Labs     04/13/22  1228 04/13/22  1409 04/14/22  0552 04/15/22  0749   *  --  151* 151*   K 3.9  --  3.1* 4.4   *  --  107 106   CO2 35*  --  33* 30*   PHOS  --  2.9  --   --    BUN 23  --  22 22   CREATININE 0.9  --  0.7 0.7       MG:   Lab Results   Component Value Date    MG 2.3 04/14/2022     Ca/Phos:   Lab Results   Component Value Date    CALCIUM 9.4 04/15/2022    PHOS 2.9 04/13/2022     Amylase: No results found for: AMYLASE  Lipase:   Lab Results   Component Value Date    LIPASE 29 08/13/2020     LIVER PROFILE:   Recent Labs     04/13/22  1228 04/14/22  0552 04/15/22  0749   AST 40* 31 31   ALT 23 18 20   BILITOT 0.9 0.7 0.7   ALKPHOS 107* 86 101       PT/INR:   Recent Labs     04/13/22  0508 04/13/22  1228   PROTIME 22.5* 22.0*   INR 2.1 2.0     APTT: No results for input(s): APTT in the last 72 hours.     Cardiac Enzymes:  Lab Results   Component Value Date    CKTOTAL 92 01/05/2022    CKMB 1.8 03/16/2014    TROPONINI <0.01 04/17/2021               MICROBIOLOGY:      CXR:    PFTs:    2D Echocardiogram:    CT Chest:    PROBLEM LIST:  Patient Active Problem List   Diagnosis    Depression    Hypothyroidism    Permanent atrial fibrillation ([address]    History of artificial lens replacement    Essential hypertension    Moderate obesity    Greater trochanteric bursitis    Chronic anticoagulation    Chronic obstructive pulmonary disease (HCC)    Mixed stress and urge urinary incontinence    Fuchs' endothelial dystrophy    Pseudophakia, both eyes    Chronic diastolic (congestive) heart failure ([address]    Pure hypercholesterolemia    Spinal stenosis of lumbar region with neurogenic claudication    Patent foramen ovale    Coronary arteriosclerosis in native artery    Frequent falls    Dementia ([address]    Ambulatory dysfunction    Cognitive dysfunction    COVID-19 virus infection    Nephrolithiasis    AMS (altered mental status)    Laceration of fascia of head    Head injury, initial encounter    Hypernatremia    Acute encephalopathy               ASSESSMENT:  1.) Acute hypoxic /hypercpnic resp failure  2.)Fluid overload/CHF  3.)Right side effusion > left   4. )COVID pneumonia  5.)Fall /A fib .was on couamdin      PLAN:  *-keep gentle Diuresis   *-CRP and pocal N ,hold on steroids and hold on Toci   *-BiPAP as Co2 5  *- avoid fluid overlaod  *-aggressive pulmonary toilet   *_BD   *- may drain fluid at some point   *- DNRCCA status noted  *- deescalte abx if culture negative as no clinical signs of pneumonia and normal CRP,normal procalcitonin  *- if more hypoxia ,GGO then started decadron   CXR in am    Dr Letha Sims will resume care     Thank you very much for allowing me to participate in the care of this pleasant patient , should you have any questions ,please do not hesitate to contact me      Dc Feliz MD,FCCP  Pulmonary&Critical Care Medicine   Director of [address] [place] [address] Director of [address] [place] [address]    Arely Thomas    NOTE: This report was transcribed using voice recognition software. Every effort was made to ensure accuracy; however, inadvertent computerized transcription errors may be present.

## 2022-04-16 NOTE — PROGRESS NOTES
[facility]  Nephrology Attending Progress Note    SUBJECTIVE:     4/16/22- in Covid isolation, not personally see, discussed with staff. PROBLEM LIST:    Patient Active Problem List   Diagnosis    Depression    Hypothyroidism    Permanent atrial fibrillation ([address]    History of artificial lens replacement    Essential hypertension    Moderate obesity    Greater trochanteric bursitis    Chronic anticoagulation    Chronic obstructive pulmonary disease (HCC)    Mixed stress and urge urinary incontinence    Fuchs' endothelial dystrophy    Pseudophakia, both eyes    Chronic diastolic (congestive) heart failure ([address]    Pure hypercholesterolemia    Spinal stenosis of lumbar region with neurogenic claudication    Patent foramen ovale    Coronary arteriosclerosis in native artery    Frequent falls    Dementia ([address]    Ambulatory dysfunction    Cognitive dysfunction    COVID-19 virus infection    Nephrolithiasis    AMS (altered mental status)    Laceration of fascia of head    Head injury, initial encounter    Hypernatremia    Acute encephalopathy        PAST MEDICAL HISTORY:    Past Medical History:   Diagnosis Date    Anticoagulated on Coumadin     on Coumadin for this Dr. Deven Hagen controls    Atrial fibrillation ([address]     Basal cell carcinoma of neck     CAD (coronary artery disease)     History of luminal irregularities of the coronary artery, stable.     CHF (congestive heart failure) ([facility])     stage I diastolic dysfunction on 2/41/55    Depression 11/16/2010    DJD (degenerative joint disease)     SEVERE IN RIGHT HAND, IN MULTIPLE OTHER JOINTS    GERD (gastroesophageal reflux disease) 11/16/2010    History of cardiovascular stress test 03/18/2014    lexiscan    Hyperparathyroidism ([address] 11/16/2010    Had parathyroidectomy    Hypertension 11/16/2010    Hypothyroidism 11/16/2010    Mild mitral regurgitation 7/10/14    Osteoarthritis 11/16/2010    Pulmonary hypertension ([facility] 7/10/14    mild    Renal calculi     x3    Tricuspid regurgitation 7/10/14    mild-moderate    Urinary incontinence 11/16/2010       DIET:    Diet NPO     PHYSICAL EXAM:     Patient Vitals for the past 24 hrs:   BP Temp Temp src Pulse Resp SpO2 Weight   04/16/22 0800 (!) 149/113 96.4 °F (35.8 °C) Temporal 113 20 99 %    04/16/22 0645 (!) 142/94   104      04/16/22 0432       245 lb 13 oz (111.5 kg)   04/15/22 1515 (!) 130/95 97.3 °F (36.3 °C) Temporal 120 24  254 lb 3.1 oz (115.3 kg)   @      Intake/Output Summary (Last 24 hours) at 4/16/2022 0851  Last data filed at 4/16/2022 2815  Gross per 24 hour   Intake    Output 1050 ml   Net -1050 ml         Wt Readings from Last 3 Encounters:   04/16/22 245 lb 13 oz (111.5 kg)   03/30/22 270 lb 4.8 oz (122.6 kg)   03/14/22 250 lb (113.4 kg)       Due to the current efforts to prevent transmission of COVID-19 and also the need to preserve PPE for other caregivers, a face-to-face encounter with the patient was not performed. That being said, all relevant records and diagnostic tests were reviewed, including laboratory results and imaging. Please reference any relevant documentation elsewhere. Care will be coordinated with the primary service.     MEDS (scheduled):    OLANZapine  5 mg IntraMUSCular Once    potassium chloride  10 mEq IntraVENous Q1H    spironolactone  25 mg Oral Daily    enoxaparin  30 mg SubCUTAneous BID    [Held by provider] dexamethasone  6 mg IntraVENous Q24H    metoprolol succinate  75 mg Oral Daily    furosemide  20 mg IntraVENous BID    levalbuterol  1 ampule Nebulization BID    sodium chloride flush  5-40 mL IntraVENous 2 times per day    cefTRIAXone (ROCEPHIN) IV  1,000 mg IntraVENous Q24H    levothyroxine  175 mcg Oral Daily       MEDS (infusions):   dextrose 75 mL/hr at 04/15/22 1156    sodium chloride         MEDS (prn):  hydrALAZINE, perflutren lipid microspheres, metoprolol, sodium chloride flush, sodium chloride, ondansetron **OR** ondansetron, polyethylene glycol    DATA:    Recent Labs     04/14/22  0552 04/15/22  0749 04/16/22  0531   WBC 4.8 5.6 7.1   HGB 10.1* 10.8* 11.6   HCT 34.6 36.2 38.3   .0* 99.5 98.0    167 190     Recent Labs     04/13/22  1228 04/13/22  1409 04/14/22  0552 04/15/22  0749 04/16/22  0531   NA   < >  --  151* 151* 149*   K   < >  --  3.1* 4.4 3.1*   CL   < >  --  107 106 103   CO2   < >  --  33* 30* 30*   BUN   < >  --  22 22 24*   CREATININE   < >  --  0.7 0.7 0.8   LABGLOM   < >  --  >60 >60 >60   GLUCOSE   < >  --  101* 134* 87   CALCIUM   < >  --  9.0 9.4 9.5   ALT   < >  --  18 20 20   AST   < >  --  31 31 32*   BILITOT   < >  --  0.7 0.7 1.0   ALKPHOS   < >  --  86 101 97   MG  --  2.3 2.3  --  2.2   PHOS  --  2.9  --   --   --     < > = values in this interval not displayed. Lab Results   Component Value Date    LABALBU 4.1 04/16/2022    LABALBU 3.8 04/15/2022    LABALBU 3.4 (L) 04/14/2022     Lab Results   Component Value Date    TSH 2.750 04/06/2022       Iron Studies  Lab Results   Component Value Date    IRON 73 02/20/2020    TIBC 323 02/20/2020    FERRITIN 155 04/13/2022     Vitamin B-12   Date Value Ref Range Status   04/06/2022 802 211 - 946 pg/mL Final     Folate   Date Value Ref Range Status   01/12/2022 >20.0 4.8 - 24.2 ng/mL Final       Vit D, 25-Hydroxy   Date Value Ref Range Status   04/14/2022 37 30 - 100 ng/mL Final     Comment:     <20 ng/mL. ........... Robb Salter Deficient  20-30 ng/mL. ......... Robb Salter Insufficient   ng/mL. ........ Robb Salter Sufficient  >100 ng/mL. .......... Robb Salter Toxic       PTH   Date Value Ref Range Status   04/15/2022 112 (H) 15 - 65 pg/mL Final       No components found for: URIC    Lab Results   Component Value Date    COLORU Yellow 04/13/2022    NITRU Negative 04/13/2022    GLUCOSEU Negative 04/13/2022    KETUA Negative 04/13/2022    UROBILINOGEN 1.0 04/13/2022    BILIRUBINUR Negative 04/13/2022    BILIRUBINUR negative 07/05/2019       No results found for: Cheli Sangeeta      IMPRESSION/RECOMMENDATIONS:      1. Hypernatremia-  Improving 151-->149  Initially hypovolemic, but with worsening pulmonary edema  Diuretics restarted and stable. 2. Hypokalemia-   She had KCl IV ordered 10 meq IV times 3 this am.     3. Covid+-  Treatment per pulmonary    4. A-fib-   Followed by cardiology    5. Hypertension-  Above goal <130/80  Will add back her lisinopril as she was on this prior to admission and follow. ARIEL Samuel - CNP      Patient was not seen or examined by me because of COVID-19 isolation status. Chart was reviewed  Case was discussed with the patient's nurse  Case was discussed with [facility] nephrology nurse practitioner who I reviewed her note above and agreed on its contents. Patient has been n.p.o. for at least 3 days. I asked the nurse to check with primary service for further detail why the patient is n.p.o. and if any plans to start feeding? As a result of being n.p.o., patient developed hypernatremia (unable to drink water) and also developed hypokalemia (while receiving loop diuretic) requiring supplementation. She is also on spironolactone    Despite being on a combination of loop diuretic, spironolactone and ACE inhibitor, her kidney function remains stable with creatinine of 0.8. I reviewed the following:    Narrative   EXAMINATION:   CTA OF THE CHEST 4/13/2022        TECHNIQUE:   CTA of the chest was performed after the administration of intravenous   contrast.  Multiplanar reformatted images are provided for review.  MIP   images are provided for review.  Dose modulation, iterative reconstruction,   and/or weight based adjustment of the mA/kV was utilized to reduce the   radiation dose to as low as reasonably achievable.       COMPARISON:   None.       HISTORY:   ORDERING SYSTEM PROVIDED HISTORY: elevated D dimer   TECHNOLOGIST PROVIDED HISTORY:   Reason for exam:->elevated D dimer   What reading provider will be dictating this exam?->CRC       FINDINGS:   Pulmonary Arteries: Pulmonary arteries are adequately opacified for   evaluation.  No evidence of intraluminal filling defect to suggest pulmonary   embolism.  Main pulmonary artery is normal in caliber.       Mediastinum: No evidence of mediastinal lymphadenopathy.  The heart and   pericardium demonstrate no acute abnormality.  There is no acute abnormality   of the thoracic aorta.       Lungs/pleura: Small left and moderate size right pleural effusions.  A   moderate size area of consolidation is noted involving the right lower lung,   concerning for an underlying airspace disease process.       Upper Abdomen: Nonobstructive calculi are noted in the bilateral renal   pelves, the largest on the left measures 12 mm and the largest on the right   measures 5 mm.       Soft Tissues/Bones: No acute bone or soft tissue abnormality.           Impression   No evidence of pulmonary embolism.       Small left and moderate size right pleural effusions.       A moderate size area of consolidation is noted involving the right lower   lung, concerning for an underlying airspace disease process.       RECOMMENDATIONS:   Unavailable         Echocardiogram 5/11/2020   Normal left ventricle size and systolic function. Ejection fraction is visually estimated at 55-60%. Atrial fibrillation may affect the evaluation of LV systolic function. No regional wall motion abnormalities seen. Normal left ventricle wall thickness. Abnormal diastolic function with normal LV filling pressures (IVRT >60 msec). The left atrium is severely dilated. Patent foramen ovale with bidirectional shunt was visualized on color   Doppler and bubble study. Normal right ventricular size and function. TAPSE 17 mm. No hemodynamically significant aortic stenosis is present. Mild aortic regurgitation is noted. Mild tricuspid regurgitation. RVSP is 48 mmHg. Pulmonary hypertension is mild .    No evidence for hemodynamically significant pericardial effusion. Compared to prior echo of 9/18/2019 changes noted. LVEF has improved from 45% to 55-60%. I reviewed intake and output and since admission the patient has been -1.5 L. Based on the above, I am holding diuretics and ACE inhibitor for now await feeding decision. Continue D5W IV fluid to supplement free water while patient is n.p.o. Defer enteral nutrition to PCP    LARRY Clifford

## 2022-04-16 NOTE — PROGRESS NOTES
[facility] Inpatient   Resident Progress Note    S:  Hospital day: 3   Brief Synopsis: Chao Mancera is a 81 y.o. female PMH of atrial fibrillation (on Coumadin ), coronary artery disease, CHF, GERD, hyperparathyroidism, hypertension, hypothyroidism, osteoarthritis and urinary incontinenc of who presents to ED for Abnormal Lab (pt new pt to [facility], came in for abnormal labs. pt with frequent falls and they state she has a change in mentation. ER labs showed H/H 11.4/38.0, sodium 152, potassium 3.9, chloride 108, BUN 23, creatinine 0.9, AST 40, alkaline phosphatase 127, INR 2, urine analysis leukocyte esterase with WBC 1-3, troponin 24 x25, normal lactic acid. EKG showed A. fib with RVR and chest x-ray new opacity in the right lung base. Patient is sleeping comfortably. Seems more alert and awake but does not follow commands. Patient was placed on bilateral soft wrist restraint overnight due to agitation. No fever or chills. Cont meds:    dextrose 75 mL/hr at 04/15/22 1156    sodium chloride       Scheduled meds:    OLANZapine  5 mg IntraMUSCular Once    potassium chloride  10 mEq IntraVENous Q1H    spironolactone  25 mg Oral Daily    enoxaparin  30 mg SubCUTAneous BID    [Held by provider] dexamethasone  6 mg IntraVENous Q24H    metoprolol succinate  75 mg Oral Daily    furosemide  20 mg IntraVENous BID    levalbuterol  1 ampule Nebulization BID    sodium chloride flush  5-40 mL IntraVENous 2 times per day    cefTRIAXone (ROCEPHIN) IV  1,000 mg IntraVENous Q24H    levothyroxine  175 mcg Oral Daily     PRN meds: hydrALAZINE, perflutren lipid microspheres, metoprolol, sodium chloride flush, sodium chloride, ondansetron **OR** ondansetron, polyethylene glycol     I reviewed the patient's past medical and surgical history, Medications and Allergies.     O:  BP (!) 149/113   Pulse 113   Temp 96.4 °F (35.8 °C) (Temporal)   Resp 20   Ht 5' 6\" (1.676 m)   Wt 245 lb 13 oz (111.5 kg)   SpO2 99%   BMI 39.68 kg/m²   24 hour I&O: I/O last 3 completed shifts: In: 825.1 [I.V.:725.1; IV Piggyback:100]  Out: 1900 [Urine:1900]  No intake/output data recorded. General: Alert, awake, opening eyes to loud voice and continue moaning on exam, does not follow command   neck: Supple, symmetrical, no JVD   Lung:  Evidence of some rales bilaterally, also rhonchi   heart: IrRegular rate and irregular rhythm  Abdomen: SNTND, Extremities:  Extremities normal, atraumatic, no cyanosis or edema. Distal pulses equal bilaterally  Skin: Skin color, texture, turgor normal, no rashes or lesions  Musculoskeletal: No joint swelling, no muscle tenderness   Neurologic:  Somnolent, responds to verbal stimuli, not able to perform neuro exam due to altered mental status       Labs:  Na/K/Cl/CO2:  149/3.1/103/30 (04/16 0531)  BUN/Cr/glu/ALT/AST/amyl/lip:  24/0.8/--/20/32/--/-- (04/16 0531)  WBC/Hgb/Hct/Plts:  7.1/11.6/38.3/190 (04/16 0531)  estimated creatinine clearance is 64 mL/min (based on SCr of 0.8 mg/dL). Other pertinent labs as noted below    Radiology:  XR ABDOMEN (KUB) (SINGLE AP VIEW)   Final Result   No acute intra-process seen. Left renal 12 mm calculus. CT HEAD WO CONTRAST   Final Result   1. No acute intracranial abnormality. 2. Chronic small vessel ischemic disease. 3. Left frontal scalp hematoma. XR CHEST PORTABLE   Final Result   Cardiomegaly, pulmonary venous hypertension and small effusions. Findings   suggest volume overload. Mild right basilar atelectasis. CTA PULMONARY W CONTRAST   Final Result   No evidence of pulmonary embolism. Small left and moderate size right pleural effusions. A moderate size area of consolidation is noted involving the right lower   lung, concerning for an underlying airspace disease process.       RECOMMENDATIONS:   Unavailable         XR CHEST PORTABLE   Final Result   New opacities in right lung base suggestive of pneumonia, atelectasis, and   possible right pleural effusion. CT Head WO Contrast   Final Result   No acute intracranial abnormality or hemorrhage. Cortical atrophy and periventricular leukomalacia. Left frontal scalp hematoma. Right frontal sinusitis and right mastoiditis. CT Cervical Spine WO Contrast   Final Result   No acute abnormality of the cervical spine. Multilevel degenerative changes, no significant interval change. Right pleural effusion. A/P:  Principal Problem:    Hypernatremia  Active Problems:    Permanent atrial fibrillation (HCC)    Essential hypertension    Chronic obstructive pulmonary disease (HCC)    Frequent falls    AMS (altered mental status)    Head injury, initial encounter    Acute encephalopathy  Resolved Problems:    * No resolved hospital problems. *    Acute Encephalopathy   - pt presented with altered mental status, disoriented and not able follow commands  -Mental status unchanged since admission  - Initial imaging showed possible right lobe pneumonia vs pleural effusion, ct head no acute ischemia or bleeding  - likely secondary to  metabolic, hepatic, septic, drug induced, worsening underlying dementia   -  Daily labs  -Blood culture no growth so far  -Urine culture grew E. coli . We will continue Rocephin day 4   -Pro-Salomon level and lactic acid normal  -Chest x-ray consistent with pleural effusion rather than pneumonia  -We will do bladder scan,   - repeat CAT scan is unremarkable    Hypernatremia(Acute vs Chronic)  -Improving, today is 149, on admission was 152  -Nephro on board and currently on dextrose 75 mL/h.   As per pulmonary recommendation cautious with fluid we will touch base with nephro  - restarted by pulmonary due to pleural effusion  - Neuro vascular check  - BMP every 4-6 hours     Pneumonia(Viral vs bacterial)   - Likely covid induced with secondary bacterial  -Rapid Covid is negative  - Mild leucocytosis on admission  -We will continue Rocephin day 2 and discontinue doxycycline  -We will hold on decadrone 6 mg as pulmonary recommendation  -Patient might need thoracocentesis in future  -Resuming furosemide for possible pulmonary effusion  - Continue oxygen supplement and wean as tolerated   -Pulmonary consulted  -CT angio no PE     A fib with RVR  - On last admission Coumadin was discontinued due to high for bleeding despite of high CHADVAS score after discussion with patient daughter  - Will continue to hold Coumadin  -Cardio consulted and increase metoprolol to 75 mg daily  -IV metoprolol 5 mg every 6 hour if not able to take oral   - Last Echo in 2018, >55% EF, Interdeterminate diastolic dysfunction  consider repeating during this admission   - Keep mag/k/phos >2/4     Multiple fall  - Wound on left forehead  - Wound care is following patient  - No need suture  - Fall precaution  - PT/OT eval      Hypothyroidism  - Recent TSH normal  - Continue current regimen      Hypertension  -Able to take any oral medication  -We will do as needed hydralazine 5 mg every 6 hour keeping systolic less than 682   - Continue home Lisinopril and metoprolol , consider increasing if needed        DVT / GI prophylaxis: Lovenox 40 mg SC     Dispo -continue care in telemetry and close monitoring mental status. Waiting for cardio, nephrology and pulmonary recommendation.     Electronically signed by Alejo Gelason MD PGY-3 on 4/16/2022 at 8:33 AM     This case was discussed with attending physician: Dr. Lisandra Davila

## 2022-04-17 NOTE — PROGRESS NOTES
[facility] - [facility] Inpatient   Resident Progress Note    S:  Hospital day: 4   Brief Synopsis: Manda Noel is a 81 y.o. female PMH of atrial fibrillation (on Coumadin ), coronary artery disease, CHF, GERD, hyperparathyroidism, hypertension, hypothyroidism, osteoarthritis and urinary incontinenc of who presents to ED for Abnormal Lab (pt new pt to [facility], came in for abnormal labs. pt with frequent falls and they state she has a change in mentation. ER labs showed H/H 11.4/38.0, sodium 152, potassium 3.9, chloride 108, BUN 23, creatinine 0.9, AST 40, alkaline phosphatase 127, INR 2, urine analysis leukocyte esterase with WBC 1-3, troponin 24 x25, normal lactic acid. EKG showed A. fib with RVR and chest x-ray new opacity in the right lung base. Patient is sleeping and calm. On oxygen via nasal canula. No fever but tachycardia with Max HR of 130. Cont meds:    sodium chloride      dextrose 75 mL/hr at 04/17/22 0058     Scheduled meds:    lidocaine  5 mL IntraDERmal Once    sodium chloride flush  5-40 mL IntraVENous 2 times per day    heparin flush  1 mL IntraVENous 2 times per day    [Held by provider] lisinopril  5 mg Oral Daily    [Held by provider] spironolactone  25 mg Oral Daily    enoxaparin  30 mg SubCUTAneous BID    [Held by provider] dexamethasone  6 mg IntraVENous Q24H    metoprolol succinate  75 mg Oral Daily    [Held by provider] furosemide  20 mg IntraVENous BID    levalbuterol  1 ampule Nebulization BID    sodium chloride flush  5-40 mL IntraVENous 2 times per day    cefTRIAXone (ROCEPHIN) IV  1,000 mg IntraVENous Q24H    levothyroxine  175 mcg Oral Daily     PRN meds: sodium chloride flush, sodium chloride, heparin flush, OLANZapine, hydrALAZINE, perflutren lipid microspheres, metoprolol, sodium chloride flush, ondansetron **OR** ondansetron, polyethylene glycol     I reviewed the patient's past medical and surgical history, Medications and Allergies.     O:  BP (!) 115/91   Pulse 110   Temp 98.5 °F (36.9 °C) (Axillary)   Resp 20   Ht 5' 6\" (1.676 m)   Wt 245 lb 13 oz (111.5 kg)   SpO2 99%   BMI 39.68 kg/m²   24 hour I&O: I/O last 3 completed shifts: In: 1005 [P. O.:90; I.V.:915]  Out: 700 [Urine:700]  No intake/output data recorded. General: Alert, awake, opening eyes to loud voice and continue moaning on exam, does not follow command   neck: Supple, symmetrical, no JVD   Lung:  Evidence of some rales bilaterally, also rhonchi   heart: IrRegular rate and irregular rhythm  Abdomen: SNTND, Extremities:  Extremities normal, atraumatic, no cyanosis or edema. Distal pulses equal bilaterally  Skin: Skin color, texture, turgor normal, no rashes or lesions  Musculoskeletal: No joint swelling, no muscle tenderness   Neurologic:  Somnolent, responds to verbal stimuli, not able to perform neuro exam due to altered mental status       Labs:  Na/K/Cl/CO2:  148/3.7/107/30 (04/17 0448)  BUN/Cr/glu/ALT/AST/amyl/lip:  24/0.5/--/16/36/--/-- (04/17 0448)  WBC/Hgb/Hct/Plts:  4.3/9.7/32.6/171 (04/17 0448)  estimated creatinine clearance is 102 mL/min (based on SCr of 0.5 mg/dL). Other pertinent labs as noted below    Radiology:  XR ABDOMEN (KUB) (SINGLE AP VIEW)   Final Result   No acute intra-process seen. Left renal 12 mm calculus. CT HEAD WO CONTRAST   Final Result   1. No acute intracranial abnormality. 2. Chronic small vessel ischemic disease. 3. Left frontal scalp hematoma. XR CHEST PORTABLE   Final Result   Cardiomegaly, pulmonary venous hypertension and small effusions. Findings   suggest volume overload. Mild right basilar atelectasis. CTA PULMONARY W CONTRAST   Final Result   No evidence of pulmonary embolism. Small left and moderate size right pleural effusions. A moderate size area of consolidation is noted involving the right lower   lung, concerning for an underlying airspace disease process.       RECOMMENDATIONS:   Unavailable XR CHEST PORTABLE   Final Result   New opacities in right lung base suggestive of pneumonia, atelectasis, and   possible right pleural effusion. CT Head WO Contrast   Final Result   No acute intracranial abnormality or hemorrhage. Cortical atrophy and periventricular leukomalacia. Left frontal scalp hematoma. Right frontal sinusitis and right mastoiditis. CT Cervical Spine WO Contrast   Final Result   No acute abnormality of the cervical spine. Multilevel degenerative changes, no significant interval change. Right pleural effusion. XR CHEST PORTABLE    (Results Pending)       A/P:  Principal Problem:    Hypernatremia  Active Problems:    Permanent atrial fibrillation (HCC)    Essential hypertension    Chronic obstructive pulmonary disease (HCC)    Frequent falls    AMS (altered mental status)    Head injury, initial encounter    Acute encephalopathy  Resolved Problems:    * No resolved hospital problems. *    Acute Encephalopathy   - pt presented with altered mental status, disoriented and not able follow commands  -Mental status unchanged since admission  - Initial imaging showed possible right lobe pneumonia vs pleural effusion, ct head no acute ischemia or bleeding  - likely secondary to  metabolic, hepatic, septic, drug induced, worsening underlying dementia   -  Daily labs  -Blood culture no growth so far  -Urine culture grew E. coli . We will continue Rocephin day 5  -Pro-Slaomon level and lactic acid normal  -Chest x-ray consistent with pleural effusion rather than pneumonia  -  bladder scan showed 86   - repeat CAT scan head is unremarkable  - neuro consulted, recommneds to EEG if worsening     Hypernatremia(Acute vs Chronic)  -Improving, today is 148 improving, on admission was 152  -Nephro on board and currently on dextrose 75 mL/h.   As per pulmonary recommendation cautious with fluid we will touch base with nephro  - Neuro vascular check  - BMP every 4-6 hours     Pneumonia(Viral vs bacterial) VS  pleural EFFUSION   - Likely covid induced with secondary bacterial  - Lung exam showed scattered rales , received 1 liter fluid yesterday for oliguria might fluid overload, will repeat CXR and might give iv lasix   -Repeat Rapid Covid is negative  - Mild leucocytosis on admission  -We will hold on decadrone 6 mg as pulmonary recommendation  -Patient might need thoracocentesis in future  - Continue oxygen supplement and wean as tolerated , also BIPAP  -Pulmonary is following   -CT angio no PE     A fib with RVR  - On last admission Coumadin was discontinued due to high for bleeding despite of high CHADVAS score after discussion with patient daughter  - Will continue to hold Coumadin  - Tachycardia HR max 130  -Cardio consulted and increase metoprolol to 75 mg daily  -IV metoprolol 5 mg every 6 hour if not able to take oral   - Last Echo in 2018, >55% EF, Interdeterminate diastolic dysfunction  consider repeating during this admission   - Keep mag/k/phos >2/4     Multiple fall  - Wound on left forehead  - Wound care is following patient  - No need suture  - Fall precaution  - PT/OT eval      Hypothyroidism  - Recent TSH normal  - Continue current regimen      Hypertension  -Able to take any oral medication  -We will do as needed hydralazine 5 mg every 6 hour keeping systolic less than 361   - Continue home Lisinopril and metoprolol , consider increasing if needed        DVT / GI prophylaxis: Lovenox 40 mg SC     Dispo -continue care in telemetry and close monitoring mental status.       Electronically signed by Taylor Mera MD PGY-3 on 4/17/2022 at 8:24 AM     This case was discussed with attending physician: Dr. Rebeca Snider

## 2022-04-17 NOTE — PROGRESS NOTES
[address] [facility]  Family Medicine Attending    S: 81 y.o. female with falls at [facility] and encephalopathy, forehead wound, frequent falls, CAD, HF, HTN, hyperPTH s/p parathyroidectomy, atrial fibrillation, COPD. Found to have hypernatremia, COVID positive. Had been agitated overnight. Today, more somnolent. Not following commands. Does not have IV . infiltrated yesterday and not able to be replaced by IV team.     O: VS- Blood pressure 114/85, pulse 111, temperature 98.5 °F (36.9 °C), temperature source Axillary, resp. rate 20, height 5' 6\" (1.676 m), weight 245 lb 13 oz (111.5 kg), SpO2 99 %, not currently breastfeeding. Exam is as noted by resident with the following changes, additions or corrections:  Gen somnolent,, mumbling. Follows some simple commands. CV irreg irreg, mildly tachy  Resp decreased, coarse, unlabored on NC  Abd soft  Ext - 2+ pitting edema right upper extremity    Impressions:   Principal Problem:    Hypernatremia  Active Problems:    Permanent atrial fibrillation (HCC)    Essential hypertension    Chronic obstructive pulmonary disease (HCC)    Frequent falls    AMS (altered mental status)    Head injury, initial encounter    Acute encephalopathy  Resolved Problems:    * No resolved hospital problems. *      Plan:   Follow mentation - mildly improving today . Neuro consult today - recommended possible EEG if no improvement. Has underlying dementia, and had COVID in January. Lactate in range, ABG stable. Follow bowel function, order KUB. Discuss with family/facility to better understand baseline. Had received dexamethasone, now held. Had been treated with Ativan on admission, no further doses. Hypernatremia, likely little po intake prior to admission, nephrology management appreciated. Pulm management appreciated. Rate control for A fib. Supportive care for forehead wound. Follow closely. Started on antibiotics.   Swallow evaluation as able; has not been able to participate. Will have triple lumen placed today. Check DVT US or right upper extremity. Place NGT for nutrition and hydration and medications. \  Give one time dose of lasix 20mg iv due to incresaed lung markings and concern for pulm edema 2/2 IVF      Attending Physician Statement  I have reviewed the chart and seen the patient with the resident(s). I personally reviewed images, EKG's and similar tests, if present. I personally reviewed and performed key elements of the history and exam.  I have reviewed and confirmed student and/or resident history and exam with changes as indicated above. I agree with the assessment, plan and orders as documented by the resident. Please refer to the resident and/or student note for additional information. Helga Reeces.  Harmony uRvalcaba MD

## 2022-04-17 NOTE — CONSULTS
GENERAL SURGERY  CONSULT NOTE  2022    Physician Consulted: Dr. Eric Ramirez  Reason for Consult: central line placement  Referring Physician: Dr. Eli  is a 81 y.o. female who presents for evaluation of COVID pneumonia. General surgery was consulted for central line placement. Patient has no IV access and needs IV for antibiotics. Patient has normal platelet count and is only on prophylactic anticoagulation. Past Medical History:   Diagnosis Date    Anticoagulated on Coumadin     on Coumadin for this Dr. Naty Merino controls    Atrial fibrillation ([facility] [address]     Basal cell carcinoma of neck     CAD (coronary artery disease)     History of luminal irregularities of the coronary artery, stable.  CHF (congestive heart failure) ([facility])     stage I diastolic dysfunction on     Depression 2010    DJD (degenerative joint disease)     SEVERE IN RIGHT HAND, IN MULTIPLE OTHER JOINTS    GERD (gastroesophageal reflux disease) 2010    History of cardiovascular stress test 2014    lexiscan    Hyperparathyroidism ([facility] [address] 2010    Had parathyroidectomy    Hypertension 2010    Hypothyroidism 2010    Mild mitral regurgitation 7/10/14    Osteoarthritis 2010    Pulmonary hypertension ([address] 7/10/14    mild    Renal calculi     x3    Tricuspid regurgitation 7/10/14    mild-moderate    Urinary incontinence 2010       Past Surgical History:   Procedure Laterality Date    CARDIAC CATHETERIZATION  3/22/14    CARPAL TUNNEL RELEASE      Right hand.  CATARACT REMOVAL  2005    right, bilateral cataract surgery.  CATARACT REMOVAL WITH IMPLANT Bilateral     CHOLECYSTECTOMY      COLONOSCOPY      FINGER SURGERY  2011    S/P removal of cyst from right index finger.  FRACTURE SURGERY Left     arm fractured-casted, Bilateral feet fractures-casted.  INCONTINENCE SURGERY  2005    Urinary incontinence, S/P bladder suspension.     INNER EAR SURGERY  2011    tube place in right ear   [address]    KIDNEY STONE SURGERY  1981    KNEE ARTHROPLASTY Bilateral 9/2005    PARATHYROIDECTOMY      Three times    SHOULDER ARTHROPLASTY Left 2009    SHOULDER ARTHROPLASTY Left      shoulder total replacement.  THYROID SURGERY  5/2005    recurrent    THYROIDECTOMY  1960       Medications Prior to Admission:    Prior to Admission medications    Medication Sig Start Date End Date Taking?  Authorizing Provider   acetaminophen (TYLENOL) 325 MG tablet Take 650 mg by mouth every 4 hours as needed for Pain or Fever    Historical Provider, MD   b complex vitamins capsule Take 1 capsule by mouth daily    Historical Provider, MD   vitamin D (CHOLECALCIFEROL) 25 MCG (1000 UT) TABS tablet Take 1,000 Units by mouth daily    Historical Provider, MD   fluticasone (FLONASE) 50 MCG/ACT nasal spray 2 sprays by Nasal route daily    Historical Provider, MD   guaiFENesin (MUCINEX) 600 MG extended release tablet Take 600 mg by mouth daily    Historical Provider, MD   loperamide (IMODIUM) 2 MG capsule Take 4 mg by mouth 4 times daily as needed for Diarrhea    Historical Provider, MD   meclizine (ANTIVERT) 25 MG tablet Take 25 mg by mouth 3 times daily as needed for Dizziness    Historical Provider, MD   albuterol sulfate HFA (VENTOLIN HFA) 108 (90 Base) MCG/ACT inhaler Inhale 2 puffs into the lungs every 6 hours as needed for Wheezing    Historical Provider, MD   ascorbic acid (VITAMIN C) 500 MG tablet Take 1,000 mg by mouth daily    Historical Provider, MD   levothyroxine (SYNTHROID) 175 MCG tablet Take 1 tablet by mouth Daily 2/18/22   Freda Anabel Santana DO   levalbuterol (XOPENEX) 0.63 MG/3ML nebulization Take 1 ampule by nebulization in the morning and at bedtime     Historical Provider, MD   metoprolol succinate (TOPROL XL) 50 MG extended release tablet Take 1 tablet by mouth nightly 12/13/21   Carlos A Chapman MD   magnesium hydroxide (MILK OF MAGNESIA) 400 MG/5ML suspension Take 5 mLs by mouth daily as needed for Constipation 12/13/21   Rosio Soni MD   lisinopril (PRINIVIL;ZESTRIL) 5 MG tablet Take 1 tablet by mouth daily 12/13/21   Rosio Soni MD   furosemide (LASIX) 40 MG tablet Take 1 tablet by mouth daily 12/13/21   Rosio Soni MD   famotidine (PEPCID) 20 MG tablet Take 1 tablet by mouth 2 times daily 12/13/21   Rosio Soni MD   escitalopram (LEXAPRO) 10 MG tablet Take 1 tablet by mouth daily 12/13/21   Rosio Soni MD   albuterol (PROVENTIL) (2.5 MG/3ML) 0.083% nebulizer solution Take 3 mLs by nebulization every 6 hours as needed for Wheezing 12/13/21   Rosio Soni MD   levothyroxine (SYNTHROID) 125 MCG tablet Take 1 tablet by mouth every morning (before breakfast) 7/24/21   Holly Sanchez MD       Allergies   Allergen Reactions    Codeine Other (See Comments)     Hallucination    Penicillins Hives    Vicodin [Hydrocodone-Acetaminophen] Other (See Comments)     Hallucination      Adhesive Tape Rash       Family History   Problem Relation Age of Onset    Heart Disease Mother     Heart Attack Mother     Arthritis Mother     Heart Disease Father     Mental Illness Father     Diabetes Father     Heart Disease Brother     Cancer Brother     Cancer Son     Heart Disease Sister     Heart Disease Brother     Heart Surgery Brother        Social History     Tobacco Use    Smoking status: Never Smoker    Smokeless tobacco: Never Used   Vaping Use    Vaping Use: Never used   Substance Use Topics    Alcohol use: Not Currently    Drug use: Never         Review of Systems- unable to obtain secondary to patient condition      PHYSICAL EXAM:    Vitals:    04/17/22 0830   BP: 114/85   Pulse: 111   Resp:    Temp:    SpO2:        General Appearance:  Combative, confused  Skin:  Skin color, texture, turgor normal. No rashes or lesions. Head/face:  NCAT  Eyes:  No gross abnormalities. Lungs:   On 5 L of O2 nasal cannula  Heart:  Tachycardic, normotensive  Abdomen:  Soft, non-tender, non-distended  Extremities: Extremities warm to touch, pink, with no edema. LABS:    CBC  Recent Labs     04/17/22 0448   WBC 4.3*   HGB 9.7*   HCT 32.6*        BMP  Recent Labs     04/17/22 0448   *   K 3.7      CO2 30*   BUN 24*   CREATININE 0.5   CALCIUM 8.7     Liver Function  Recent Labs     04/17/22 0448   BILITOT 0.7   AST 36*   ALT 16   ALKPHOS 72   PROT 5.2*   LABALBU 3.0*     No results for input(s): LACTATE in the last 72 hours. No results for input(s): INR, PTT in the last 72 hours. Invalid input(s): PT    RADIOLOGY    CT Head WO Contrast    Result Date: 4/13/2022  EXAMINATION: CT OF THE HEAD WITHOUT CONTRAST  4/13/2022 1:39 pm TECHNIQUE: CT of the head was performed without the administration of intravenous contrast. Dose modulation, iterative reconstruction, and/or weight based adjustment of the mA/kV was utilized to reduce the radiation dose to as low as reasonably achievable. COMPARISON: March 26, 2022 HISTORY: ORDERING SYSTEM PROVIDED HISTORY: falls TECHNOLOGIST PROVIDED HISTORY: Reason for exam:->falls Has a \"code stroke\" or \"stroke alert\" been called? ->No Decision Support Exception - unselect if not a suspected or confirmed emergency medical condition->Emergency Medical Condition (MA) What reading provider will be dictating this exam?->CRC FINDINGS: BRAIN/VENTRICLES: There are age related cortical atrophy and periventricular white matter ischemic changes. There is no acute intracranial hemorrhage, mass effect or midline shift. No abnormal extra-axial fluid collection. The gray-white differentiation is maintained without evidence of an acute infarct. There is no evidence of hydrocephalus. ORBITS: The visualized portion of the orbits demonstrate no acute abnormality. SINUSES: There is mucosal thickening in the right frontal sinus. Opacification of the right mastoid air cells.  SOFT TISSUES/SKULL:  No acute abnormality of the visualized skull. There is left frontal scalp hematoma. No acute intracranial abnormality or hemorrhage. Cortical atrophy and periventricular leukomalacia. Left frontal scalp hematoma. Right frontal sinusitis and right mastoiditis. CT Cervical Spine WO Contrast    Result Date: 4/13/2022  EXAMINATION: CT OF THE CERVICAL SPINE WITHOUT CONTRAST 4/13/2022 1:39 pm TECHNIQUE: CT of the cervical spine was performed without the administration of intravenous contrast. Multiplanar reformatted images are provided for review. Dose modulation, iterative reconstruction, and/or weight based adjustment of the mA/kV was utilized to reduce the radiation dose to as low as reasonably achievable. COMPARISON: March 26, 2022 HISTORY: ORDERING SYSTEM PROVIDED HISTORY: falls TECHNOLOGIST PROVIDED HISTORY: Reason for exam:->falls Decision Support Exception - unselect if not a suspected or confirmed emergency medical condition->Emergency Medical Condition (MA) What reading provider will be dictating this exam?->CRC FINDINGS: BONES/ALIGNMENT: The bones are demineralized. There is no acute fracture or traumatic malalignment. DEGENERATIVE CHANGES: There are multilevel degenerative changes and bilateral facet arthrosis. SOFT TISSUES: There is no prevertebral soft tissue swelling. There is right pleural effusion. No acute abnormality of the cervical spine. Multilevel degenerative changes, no significant interval change. Right pleural effusion. XR CHEST PORTABLE    Result Date: 4/14/2022  EXAMINATION: ONE XRAY VIEW OF THE CHEST 4/14/2022 9:06 am COMPARISON: 04/13/2022 HISTORY: ORDERING SYSTEM PROVIDED HISTORY: pneumonia TECHNOLOGIST PROVIDED HISTORY: Reason for exam:->pneumonia What reading provider will be dictating this exam?->CRC FINDINGS: Mild cardiomegaly is noted with slight pulmonary venous hypertension. There appear to be small pleural effusions greater on the right with basilar atelectasis or pneumonia.   Findings are similar to the prior examination. Osseous structures are stable. No evidence of pneumothorax. Cardiomegaly, pulmonary venous hypertension and small effusions. Findings suggest volume overload. Mild right basilar atelectasis. XR CHEST PORTABLE    Result Date: 4/13/2022  EXAMINATION: ONE XRAY VIEW OF THE CHEST 4/13/2022 2:45 pm COMPARISON: March 26, 2022 HISTORY: ORDERING SYSTEM PROVIDED HISTORY: altered TECHNOLOGIST PROVIDED HISTORY: Reason for exam:->altered What reading provider will be dictating this exam?->CRC FINDINGS: Mild cardiomegaly. Interstitial and hazy opacities in right lung base silhouette right hemidiaphragm. There is prominence of pulmonary vasculature. No pneumothorax. New opacities in right lung base suggestive of pneumonia, atelectasis, and possible right pleural effusion. CTA PULMONARY W CONTRAST    Result Date: 4/13/2022  EXAMINATION: CTA OF THE CHEST 4/13/2022 9:53 pm TECHNIQUE: CTA of the chest was performed after the administration of intravenous contrast.  Multiplanar reformatted images are provided for review. MIP images are provided for review. Dose modulation, iterative reconstruction, and/or weight based adjustment of the mA/kV was utilized to reduce the radiation dose to as low as reasonably achievable. COMPARISON: None. HISTORY: ORDERING SYSTEM PROVIDED HISTORY: elevated D dimer TECHNOLOGIST PROVIDED HISTORY: Reason for exam:->elevated D dimer What reading provider will be dictating this exam?->CRC FINDINGS: Pulmonary Arteries: Pulmonary arteries are adequately opacified for evaluation. No evidence of intraluminal filling defect to suggest pulmonary embolism. Main pulmonary artery is normal in caliber. Mediastinum: No evidence of mediastinal lymphadenopathy. The heart and pericardium demonstrate no acute abnormality. There is no acute abnormality of the thoracic aorta. Lungs/pleura: Small left and moderate size right pleural effusions.   A moderate size area of consolidation is noted involving the right lower lung, concerning for an underlying airspace disease process. Upper Abdomen: Nonobstructive calculi are noted in the bilateral renal pelves, the largest on the left measures 12 mm and the largest on the right measures 5 mm. Soft Tissues/Bones: No acute bone or soft tissue abnormality. No evidence of pulmonary embolism. Small left and moderate size right pleural effusions. A moderate size area of consolidation is noted involving the right lower lung, concerning for an underlying airspace disease process. RECOMMENDATIONS: Unavailable         ASSESSMENT:  81 y.o. female with no IV access in need of central line. PLAN:  Will attempt to place central line at bedside. Please see procedure note for more detail.     Electronically signed by Blas Miller MD on 4/17/22 at 1:39 PM EDT

## 2022-04-17 NOTE — PROCEDURES
Sabas Valle is a 81 y.o. female patient. 1. Hypernatremia    2. Altered mental status, unspecified altered mental status type      Past Medical History:   Diagnosis Date    Anticoagulated on Coumadin     on Coumadin for this Dr. Janece Hamman controls    [facility] [address]     Basal cell carcinoma of neck     CAD (coronary artery disease)     History of luminal irregularities of the coronary artery, stable.  CHF (congestive heart failure) ([facility])     stage I diastolic dysfunction on 2/91/66    Depression 11/16/2010    DJD (degenerative joint disease)     SEVERE IN RIGHT HAND, IN MULTIPLE OTHER JOINTS    GERD (gastroesophageal reflux disease) 11/16/2010    History of cardiovascular stress test 03/18/2014    lexiscan    Hyperparathyroidism ([facility] [address] 11/16/2010    Had parathyroidectomy    Hypertension 11/16/2010    Hypothyroidism 11/16/2010    Mild mitral regurgitation 7/10/14    Osteoarthritis 11/16/2010    Pulmonary hypertension ([facility] [address] 7/10/14    mild    Renal calculi     x3    Tricuspid regurgitation 7/10/14    mild-moderate    Urinary incontinence 11/16/2010     Blood pressure 114/85, pulse 111, temperature 98.5 °F (36.9 °C), temperature source Axillary, resp. rate 20, height 5' 6\" (1.676 m), weight 245 lb 13 oz (111.5 kg), SpO2 99 %, not currently breastfeeding. Central Line    Date/Time: 4/17/2022 2:39 PM  Performed by: Barbie Thomas MD  Authorized by: Uzma Maravilla MD   Consent: Verbal consent obtained.   Risks and benefits: risks, benefits and alternatives were discussed  Consent given by: power of   Procedure consent: procedure consent matches procedure scheduled  Relevant documents: relevant documents present and verified  Test results: test results available and properly labeled  Imaging studies: imaging studies available  Required items: required blood products, implants, devices, and special equipment available  Patient identity confirmed: anonymous protocol, patient vented/unresponsive  Time out: Immediately prior to procedure a \"time out\" was called to verify the correct patient, procedure, equipment, support staff and site/side marked as required. Indications: vascular access    Sedation:  Patient sedated: yes  Sedatives: midazolam    Preparation: skin prepped with 2% chlorhexidine  Skin prep agent dried: skin prep agent completely dried prior to procedure  Sterile barriers: all five maximum sterile barriers used - cap, mask, sterile gown, sterile gloves, and large sterile sheet  Hand hygiene: hand hygiene performed prior to central venous catheter insertion  Location details: right femoral  Patient position: flat  Catheter type: triple lumen  Catheter size: 7 Fr  Pre-procedure: landmarks identified  Ultrasound guidance: yes  Sterile ultrasound techniques: sterile gel and sterile probe covers were used  Number of attempts: 1  Successful placement: yes  Post-procedure: line sutured and dressing applied  Assessment: blood return through all ports and free fluid flow  Patient tolerance: patient tolerated the procedure well with no immediate complications      Initially the right IJ was attempted. Patient was combative and the procedure was aborted. Next attention was turned to the right groin. Dr. Justen Banks was readily available throughout the entire procedure.     Hoda Nelson MD  4/17/2022

## 2022-04-17 NOTE — PLAN OF CARE
Problem: Falls - Risk of:  Goal: Will remain free from falls  Description: Will remain free from falls  4/16/2022 2339 by Angelica Cobb RN  Outcome: Met This Shift  4/16/2022 1512 by Page Rucker  Outcome: Met This Shift     Problem: Airway Clearance - Ineffective  Goal: Achieve or maintain patent airway  4/16/2022 2339 by Angelica Cobb RN  Outcome: Met This Shift  4/16/2022 1512 by Page Rucker  Outcome: Ongoing     Problem: Gas Exchange - Impaired  Goal: Absence of hypoxia  4/16/2022 2339 by Angelica Cobb RN  Outcome: Met This Shift  4/16/2022 1512 by Page Rucker  Outcome: Ongoing  Goal: Promote optimal lung function  4/16/2022 1512 by Page Rucker  Outcome: Ongoing

## 2022-04-17 NOTE — CONSULTS
NEUROLOGY CONSULT NOTE      Requesting Physician: Jefferson Escobar MD    Reason for Consult:  Evaluate for worsening mental status. History of Present Illness:  Walker Agudelo is a 81 y.o. female  with h/o CAD, CHF, HTN, Hyperparathyroidism s/p parathyroidectomy, Atrial Fibrillation on Coumadin, COPD, COVID-19+ infection with respiratory failure 1/20, and Hypothyroidism who was admitted to [facility]  on 4/13/2022 with presentation of abnormal labs with altered mentation. NH resident with with h/o baseline confusion and problems with frequent falls. Patient states she was getting out of bed at night, slipped and hit something in her room \"head first\". Subsequent onset headache with no report of focal weakness, numbness, tingling vision change, or vertigo. On ED presentation, she was alert and oriented to name and unable to follow commands. EKG showed atrial fibrillation with RVR. CT head was obtained and showed no acute intracranial abnormality or hemorrhage, but there was a left frontal scalp hematoma c/w a laceration of the left forehead. Chest x-ray showed new opacities in right lung base suggestive of pneumonia or atelectasis  Her sodium on presentation was 151. She was admitted for medical management with d/c of her Coumadin. She had received dexamethasone after admission but it was discontinued. Ativan given on admission, but was discontinued. Patient remains somnolent and poorly responsive with report of worsening clinical course. She has tested positive for COVID-19 and is now on COVID isolation. Past Medical History:        Diagnosis Date    Anticoagulated on Coumadin     on Coumadin for this Dr. Yisel Luna controls    Atrial fibrillation ([facility] [address]     Basal cell carcinoma of neck     CAD (coronary artery disease)     History of luminal irregularities of the coronary artery, stable.     CHF (congestive heart failure) (HCC)     stage I diastolic dysfunction on 5/88/17    Depression 11/16/2010  DJD (degenerative joint disease)     SEVERE IN RIGHT HAND, IN MULTIPLE OTHER JOINTS    GERD (gastroesophageal reflux disease) 11/16/2010    History of cardiovascular stress test 03/18/2014    lexiscan    Hyperparathyroidism ([facility] [address] 11/16/2010    Had parathyroidectomy    Hypertension 11/16/2010    Hypothyroidism 11/16/2010    Mild mitral regurgitation 7/10/14    Osteoarthritis 11/16/2010    Pulmonary hypertension ([address] 7/10/14    mild    Renal calculi     x3    Tricuspid regurgitation 7/10/14    mild-moderate    Urinary incontinence 11/16/2010           Procedure Laterality Date    CARDIAC CATHETERIZATION  3/22/14    CARPAL TUNNEL RELEASE      Right hand.  CATARACT REMOVAL  6/2005    right, bilateral cataract surgery.  CATARACT REMOVAL WITH IMPLANT Bilateral     CHOLECYSTECTOMY  1972    COLONOSCOPY      FINGER SURGERY  7/2011    S/P removal of cyst from right index finger.  FRACTURE SURGERY Left     arm fractured-casted, Bilateral feet fractures-casted.  INCONTINENCE SURGERY  5/2005    Urinary incontinence, S/P bladder suspension.  INNER EAR SURGERY  2011    tube place in right ear   [address]    KIDNEY STONE SURGERY  1981    KNEE ARTHROPLASTY Bilateral 9/2005    PARATHYROIDECTOMY      Three times    SHOULDER ARTHROPLASTY Left 2009    SHOULDER ARTHROPLASTY Left      shoulder total replacement.     THYROID SURGERY  5/2005    recurrent    THYROIDECTOMY  1960       Social History:  Social History     Tobacco Use   Smoking Status Never Smoker   Smokeless Tobacco Never Used     Social History     Substance and Sexual Activity   Alcohol Use Not Currently     Social History     Substance and Sexual Activity   Drug Use Never         Family History:       Problem Relation Age of Onset    Heart Disease Mother     Heart Attack Mother     Arthritis Mother     Heart Disease Father     Mental Illness Father     Diabetes Father     Heart Disease Brother    Srikanth Vaca Cancer Brother     Cancer Son     Heart Disease Sister     Heart Disease Brother     Heart Surgery Brother        Review of Systems:  All systems reviewed are negative except what is mentioned in history of present illness. Allergies:     Allergies   Allergen Reactions    Codeine Other (See Comments)     Hallucination    Penicillins Hives    Vicodin [Hydrocodone-Acetaminophen] Other (See Comments)     Hallucination      Adhesive Tape Rash        Current Medications:   [Held by provider] lisinopril (PRINIVIL;ZESTRIL) tablet 5 mg, Daily  OLANZapine (ZYPREXA) injection 5 mg, Q8H PRN  potassium chloride 10 MEQ/100ML IVPB (Peripheral Line),   potassium chloride 10 MEQ/100ML IVPB (Peripheral Line),   0.9 % sodium chloride bolus, Once  [Held by provider] spironolactone (ALDACTONE) tablet 25 mg, Daily  dextrose 5 % solution, Continuous  enoxaparin (LOVENOX) injection 30 mg, BID  hydrALAZINE (APRESOLINE) injection 5 mg, Q6H PRN  [Held by provider] dexamethasone (DECADRON) injection 6 mg, Q24H  perflutren lipid microspheres (DEFINITY) injection 1.65 mg, ONCE PRN  metoprolol succinate (TOPROL XL) extended release tablet 75 mg, Daily  metoprolol (LOPRESSOR) injection 5 mg, Q6H PRN  [Held by provider] furosemide (LASIX) injection 20 mg, BID  levalbuterol (XOPENEX) nebulization 0.63 mg, BID  sodium chloride flush 0.9 % injection 5-40 mL, 2 times per day  sodium chloride flush 0.9 % injection 5-40 mL, PRN  0.9 % sodium chloride infusion, PRN  ondansetron (ZOFRAN-ODT) disintegrating tablet 4 mg, Q8H PRN   Or  ondansetron (ZOFRAN) injection 4 mg, Q6H PRN  polyethylene glycol (GLYCOLAX) packet 17 g, Daily PRN  cefTRIAXone (ROCEPHIN) 1,000 mg in sterile water 10 mL IV syringe, Q24H  levothyroxine (SYNTHROID) tablet 175 mcg, Daily         Physical Exam:  BP (!) 134/91   Pulse 114   Temp 98.5 °F (36.9 °C) (Axillary)   Resp 20   Ht 5' 6\" (1.676 m)   Wt 245 lb 13 oz (111.5 kg)   SpO2 99%   BMI 39.68 kg/m²  I Body mass index is 39.68 kg/m². I   Wt Readings from Last 1 Encounters:   04/16/22 245 lb 13 oz (111.5 kg)          HEENT: Normocephalic, ulcerated hematoma over the left forehead just above eyebrow. Neck:  no masses, nodes or bruits. Lungs:  clear to auscultation  bilaterally     CV: RRR without gallops or murmurs     Extremities: no c/c; dependent edema in both upper and lower extremities distally       Neurologic Exam     Mental Status: And was extremely somnolent during evaluation and difficult to arouse. She responded verbally to my questions with nonsensical, but fluid speech. Cranial Nerves: Pupils were equal round and reactive to light;    Visual fields were full on confrontation;  Funduscopic exam was normal; no pappilledema   Extraocular movements were intact; no nystagmus; Intact facial sensation to temp, pinprick, and light touch; Symmetric facial movements with good lip and eye closure bilaterally; Hearing was intact; Harmon was midline;    Normal palatal elevation with midline uvula  Sternocleidomastoid  / Trapezius strength of 5/5. Tongue was midline with no atrophy or fasciculations  (CN -II-XII was grossly intact.)  Motor Exam:  Strength was 5/5 throughout; normal tone and bulk; no atrophy or fasiculations noted. (Normal strength bilaterally in all muscle groups tested)    Sensory Exam:  Normal sensation to light touch, pin-prick, and temperature; No sensory extinction. Cerebella Exam:  Intact finger-nose-finger, rapidly alternating movements, fine motor movements, and heel-down-shin maneuvers; No cerebellar rebound or drift; No tremors   Normal tandem gait. Negative Romberg   (Normal cerebellum function on gross exam)    Gait:  Normal gait pattern with intact heel/toe walking. (Gait was not tested. Normal Gait).      Reflexes: normal and symmetric bilaterally; Babinski is negative     Labs:    CBC:   Recent Labs     04/14/22  0552 04/15/22  0749 04/16/22  0531   WBC 4.8 5.6 7. 1   HGB 10.1* 10.8* 11.6    167 190   .0* 99.5 98.0   MCH 30.1 29.7 29.7   MCHC 29.2* 29.8* 30.3*   RDW 15.1* 15.1* 14.9     CMP:  Recent Labs     04/14/22  0552 04/15/22  0749 04/16/22  0531   * 151* 149*   K 3.1* 4.4 3.1*    106 103   CO2 33* 30* 30*   BUN 22 22 24*   CREATININE 0.7 0.7 0.8   GFRAA >60 >60 >60   LABGLOM >60 >60 >60   GLUCOSE 101* 134* 87   CALCIUM 9.0 9.4 9.5     Liver:   Recent Labs     04/16/22  0531   AST 32*   ALT 20   ALKPHOS 97   PROT 6.9   LABALBU 4.1   BILITOT 1.0     INR: No results for input(s): PROTIME, INR in the last 72 hours. ToxicologyNo results for input(s): PHENYTOIN, CARBTOT, PHENOBARB, VALPROATE in the last 72 hours. Invalid input(s): LAMOTRIG,  KEPPRA  No results for input(s): AMPMETHURSCR, BARBTQTU, BDZQTU, CANNABQUANT, COCMETQTU, OPIAU, PCPQUANT in the last 72 hours. Radiology:  XR ABDOMEN (KUB) (SINGLE AP VIEW)    Result Date: 4/15/2022  EXAMINATION: ONE SUPINE XRAY VIEW(S) OF THE ABDOMEN 4/15/2022 3:50 pm HISTORY: ORDERING SYSTEM PROVIDED HISTORY: Evaluate for perforation or evidence of obstruction/ileus. Patient with encephalopathy unable to provide history, moaning, appears uncomfortable. TECHNOLOGIST PROVIDED HISTORY: Reason for exam:->Evaluate for perforation or evidence of obstruction/ileus. Patient with encephalopathy unable to provide history, moaning, appears uncomfortable. What reading provider will be dictating this exam?->CRC FINDINGS: Bowel gas pattern is nonspecific without evidence of obstruction. No free air is seen, but evaluation is limited on supine imaging. Left renal 12 mm calculus is seen. Left total hip arthroplasty is noted. No acute intra-process seen. Left renal 12 mm calculus.      CT HEAD WO CONTRAST    Result Date: 4/15/2022  EXAMINATION: CT OF THE HEAD WITHOUT CONTRAST  4/15/2022 4:44 pm TECHNIQUE: CT of the head was performed without the administration of intravenous contrast. Dose modulation, iterative reconstruction, and/or weight based adjustment of the mA/kV was utilized to reduce the radiation dose to as low as reasonably achievable. COMPARISON: CT head 04/13/2022. HISTORY: ORDERING SYSTEM PROVIDED HISTORY: WORSENING MENTAL STATUS TECHNOLOGIST PROVIDED HISTORY: Reason for exam:->WORSENING MENTAL STATUS Has a \"code stroke\" or \"stroke alert\" been called? ->No What reading provider will be dictating this exam?->CRC FINDINGS: There is patchy hypoattenuation within the white matter of the bilateral cerebral hemispheres. There is no evidence of mass, mass effect, or midline shift. There is enlargement of the ventricles and sulci suggesting generalized cerebral volume loss. No extra-axial fluid collections or acute hemorrhage. The gray-white differentiation appears preserved without evidence of acute cortical ischemia. The calvarium is intact. There is mucosal thickening within the right frontal sinus. There are bilateral mastoid effusions, right greater than left. There are degenerative changes within the cervical spine. There are bilateral lens replacements. There is left frontal scalp hematoma. 1. No acute intracranial abnormality. 2. Chronic small vessel ischemic disease. 3. Left frontal scalp hematoma. CT Head WO Contrast    Result Date: 4/13/2022  EXAMINATION: CT OF THE HEAD WITHOUT CONTRAST  4/13/2022 1:39 pm TECHNIQUE: CT of the head was performed without the administration of intravenous contrast. Dose modulation, iterative reconstruction, and/or weight based adjustment of the mA/kV was utilized to reduce the radiation dose to as low as reasonably achievable. COMPARISON: March 26, 2022 HISTORY: ORDERING SYSTEM PROVIDED HISTORY: falls TECHNOLOGIST PROVIDED HISTORY: Reason for exam:->falls Has a \"code stroke\" or \"stroke alert\" been called? ->No Decision Support Exception - unselect if not a suspected or confirmed emergency medical condition->Emergency Medical Condition ([place]) What reading provider will be dictating this exam?->CRC FINDINGS: BRAIN/VENTRICLES: There are age related cortical atrophy and periventricular white matter ischemic changes. There is no acute intracranial hemorrhage, mass effect or midline shift. No abnormal extra-axial fluid collection. The gray-white differentiation is maintained without evidence of an acute infarct. There is no evidence of hydrocephalus. ORBITS: The visualized portion of the orbits demonstrate no acute abnormality. SINUSES: There is mucosal thickening in the right frontal sinus. Opacification of the right mastoid air cells. SOFT TISSUES/SKULL:  No acute abnormality of the visualized skull. There is left frontal scalp hematoma. No acute intracranial abnormality or hemorrhage. Cortical atrophy and periventricular leukomalacia. Left frontal scalp hematoma. Right frontal sinusitis and right mastoiditis. CT HEAD WO CONTRAST    Result Date: 3/26/2022  EXAMINATION: CT OF THE HEAD WITHOUT CONTRAST; CT OF THE CERVICAL SPINE WITHOUT CONTRAST 3/26/2022 12:05 am TECHNIQUE: CT of the head was performed without the administration of intravenous contrast. Dose modulation, iterative reconstruction, and/or weight based adjustment of the mA/kV was utilized to reduce the radiation dose to as low as reasonably achievable.; CT of the cervical spine was performed without the administration of intravenous contrast. Multiplanar reformatted images are provided for review. Dose modulation, iterative reconstruction, and/or weight based adjustment of the mA/kV was utilized to reduce the radiation dose to as low as reasonably achievable. COMPARISON: 03/14/2022 HISTORY: ORDERING SYSTEM PROVIDED HISTORY: Evaluate intracranial abnormality TECHNOLOGIST PROVIDED HISTORY: Has a \"code stroke\" or \"stroke alert\" been called? ->No Reason for exam:->Evaluate intracranial abnormality Decision Support Exception - unselect if not a suspected or confirmed emergency medical condition->Emergency Medical Condition (MA) What reading provider will be dictating this exam?->CRC; ORDERING SYSTEM PROVIDED HISTORY: fall TECHNOLOGIST PROVIDED HISTORY: Reason for exam:->fall Decision Support Exception - unselect if not a suspected or confirmed emergency medical condition->Emergency Medical Condition (MA) What reading provider will be dictating this exam?->CRC FINDINGS: CT head: BRAIN/VENTRICLES: There is no acute intracranial hemorrhage, mass effect or midline shift. No abnormal extra-axial fluid collection. The gray-white differentiation is maintained without evidence of an acute infarct. There is no evidence of hydrocephalus. There are nonspecific hypoattenuating foci in the subcortical and periventricular white matter that most likely represent chronic microangiopathic ischemic changes in a patient of this age. There is diffuse cortical atrophy. There are atherosclerotic calcifications of the intracranial vessels. 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 ORBITS: The visualized portion of the orbits demonstrate no acute abnormality. SINUSES: The visualized paranasal sinuses demonstrate no acute abnormality. Trace fluid opacification of the right mastoid air cells. SOFT TISSUES/SKULL:  No acute abnormality of the visualized skull. Large frontal scalp/forehead hematoma. CT cervical spine: Motion degraded examination. BONES/ALIGNMENT: There is no definite acute displaced fracture or traumatic malalignment. DEGENERATIVE CHANGES: No significant degenerative changes. SOFT TISSUES: There is no prevertebral soft tissue swelling. No acute intracranial abnormality. Large frontal scalp/forehead hematoma. Motion degraded examination of the cervical spine. No definite acute displaced fracture or traumatic malalignment.      CT Cervical Spine WO Contrast    Result Date: 4/13/2022  EXAMINATION: CT OF THE CERVICAL SPINE WITHOUT CONTRAST 4/13/2022 1:39 pm TECHNIQUE: CT of the cervical spine was performed without the administration of intravenous contrast. Multiplanar reformatted images are provided for review. Dose modulation, iterative reconstruction, and/or weight based adjustment of the mA/kV was utilized to reduce the radiation dose to as low as reasonably achievable. COMPARISON: March 26, 2022 HISTORY: ORDERING SYSTEM PROVIDED HISTORY: falls TECHNOLOGIST PROVIDED HISTORY: Reason for exam:->falls Decision Support Exception - unselect if not a suspected or confirmed emergency medical condition->Emergency Medical Condition (MA) What reading provider will be dictating this exam?->CRC FINDINGS: BONES/ALIGNMENT: The bones are demineralized. There is no acute fracture or traumatic malalignment. DEGENERATIVE CHANGES: There are multilevel degenerative changes and bilateral facet arthrosis. SOFT TISSUES: There is no prevertebral soft tissue swelling. There is right pleural effusion. No acute abnormality of the cervical spine. Multilevel degenerative changes, no significant interval change. Right pleural effusion. CT CERVICAL SPINE WO CONTRAST    Result Date: 3/26/2022  EXAMINATION: CT OF THE HEAD WITHOUT CONTRAST; CT OF THE CERVICAL SPINE WITHOUT CONTRAST 3/26/2022 12:05 am TECHNIQUE: CT of the head was performed without the administration of intravenous contrast. Dose modulation, iterative reconstruction, and/or weight based adjustment of the mA/kV was utilized to reduce the radiation dose to as low as reasonably achievable.; CT of the cervical spine was performed without the administration of intravenous contrast. Multiplanar reformatted images are provided for review. Dose modulation, iterative reconstruction, and/or weight based adjustment of the mA/kV was utilized to reduce the radiation dose to as low as reasonably achievable.  COMPARISON: 03/14/2022 HISTORY: ORDERING SYSTEM PROVIDED HISTORY: Evaluate intracranial abnormality TECHNOLOGIST PROVIDED HISTORY: Has a \"code stroke\" or \"stroke alert\" been called? ->No Reason for exam:->Evaluate intracranial abnormality Decision Support Exception - unselect if not a suspected or confirmed emergency medical condition->Emergency Medical Condition ([place]) What reading provider will be dictating this exam?->CRC; ORDERING SYSTEM PROVIDED HISTORY: fall TECHNOLOGIST PROVIDED HISTORY: Reason for exam:->fall Decision Support Exception - unselect if not a suspected or confirmed emergency medical condition->Emergency Medical Condition (MA) What reading provider will be dictating this exam?->CRC FINDINGS: CT head: BRAIN/VENTRICLES: There is no acute intracranial hemorrhage, mass effect or midline shift. No abnormal extra-axial fluid collection. The gray-white differentiation is maintained without evidence of an acute infarct. There is no evidence of hydrocephalus. There are nonspecific hypoattenuating foci in the subcortical and periventricular white matter that most likely represent chronic microangiopathic ischemic changes in a patient of this age. There is diffuse cortical atrophy. There are atherosclerotic calcifications of the intracranial vessels. 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 ORBITS: The visualized portion of the orbits demonstrate no acute abnormality. SINUSES: The visualized paranasal sinuses demonstrate no acute abnormality. Trace fluid opacification of the right mastoid air cells. SOFT TISSUES/SKULL:  No acute abnormality of the visualized skull. Large frontal scalp/forehead hematoma. CT cervical spine: Motion degraded examination. BONES/ALIGNMENT: There is no definite acute displaced fracture or traumatic malalignment. DEGENERATIVE CHANGES: No significant degenerative changes. SOFT TISSUES: There is no prevertebral soft tissue swelling. No acute intracranial abnormality. Large frontal scalp/forehead hematoma. Motion degraded examination of the cervical spine. No definite acute displaced fracture or traumatic malalignment. XR CHEST PORTABLE    Result Date: 4/14/2022  EXAMINATION: ONE XRAY VIEW OF THE CHEST 4/14/2022 9:06 am COMPARISON: 04/13/2022 HISTORY: ORDERING SYSTEM PROVIDED HISTORY: pneumonia TECHNOLOGIST PROVIDED HISTORY: Reason for exam:->pneumonia What reading provider will be dictating this exam?->CRC FINDINGS: Mild cardiomegaly is noted with slight pulmonary venous hypertension. There appear to be small pleural effusions greater on the right with basilar atelectasis or pneumonia. Findings are similar to the prior examination. Osseous structures are stable. No evidence of pneumothorax. Cardiomegaly, pulmonary venous hypertension and small effusions. Findings suggest volume overload. Mild right basilar atelectasis. XR CHEST PORTABLE    Result Date: 4/13/2022  EXAMINATION: ONE XRAY VIEW OF THE CHEST 4/13/2022 2:45 pm COMPARISON: March 26, 2022 HISTORY: ORDERING SYSTEM PROVIDED HISTORY: altered TECHNOLOGIST PROVIDED HISTORY: Reason for exam:->altered What reading provider will be dictating this exam?->CRC FINDINGS: Mild cardiomegaly. Interstitial and hazy opacities in right lung base silhouette right hemidiaphragm. There is prominence of pulmonary vasculature. No pneumothorax. New opacities in right lung base suggestive of pneumonia, atelectasis, and possible right pleural effusion. XR CHEST PORTABLE    Result Date: 3/27/2022  EXAMINATION: ONE XRAY VIEW OF THE CHEST 3/26/2022 7:31 am COMPARISON: Chest x-ray 03/25/2022. HISTORY: ORDERING SYSTEM PROVIDED HISTORY: chf TECHNOLOGIST PROVIDED HISTORY: Reason for exam:->chf What reading provider will be dictating this exam?->CRC FINDINGS: Cardiac size enlarged with increase in opacifications in the perihilar regions right greater than left suprahilar involvement of likely worsening edema with probable small volume right pleural effusion.      Cardiac size enlarged with increase in opacifications in the perihilar regions right greater than left suprahilar involvement of likely worsening edema with probable small volume right pleural effusion. XR CHEST PORTABLE    Result Date: 3/25/2022  EXAMINATION: ONE XRAY VIEW OF THE CHEST 3/25/2022 11:00 pm COMPARISON: None. HISTORY: ORDERING SYSTEM PROVIDED HISTORY: fall TECHNOLOGIST PROVIDED HISTORY: Reason for exam:->fall What reading provider will be dictating this exam?->CRC FINDINGS: The heart is enlarged and the pulmonary vessels are congested. Mild opacity at the lung bases. No pneumothorax. Mild blunting of the costophrenic angles. There is partially visualized left humeral head prosthesis. CHF. CTA PULMONARY W CONTRAST    Result Date: 4/13/2022  EXAMINATION: CTA OF THE CHEST 4/13/2022 9:53 pm TECHNIQUE: CTA of the chest was performed after the administration of intravenous contrast.  Multiplanar reformatted images are provided for review. MIP images are provided for review. Dose modulation, iterative reconstruction, and/or weight based adjustment of the mA/kV was utilized to reduce the radiation dose to as low as reasonably achievable. COMPARISON: None. HISTORY: ORDERING SYSTEM PROVIDED HISTORY: elevated D dimer TECHNOLOGIST PROVIDED HISTORY: Reason for exam:->elevated D dimer What reading provider will be dictating this exam?->CRC FINDINGS: Pulmonary Arteries: Pulmonary arteries are adequately opacified for evaluation. No evidence of intraluminal filling defect to suggest pulmonary embolism. Main pulmonary artery is normal in caliber. Mediastinum: No evidence of mediastinal lymphadenopathy. The heart and pericardium demonstrate no acute abnormality. There is no acute abnormality of the thoracic aorta. Lungs/pleura: Small left and moderate size right pleural effusions. A moderate size area of consolidation is noted involving the right lower lung, concerning for an underlying airspace disease process.  Upper Abdomen: Nonobstructive calculi are noted in the bilateral renal pelves, the largest on the left measures 12 mm and the largest on the right measures 5 mm. Soft Tissues/Bones: No acute bone or soft tissue abnormality. No evidence of pulmonary embolism. Small left and moderate size right pleural effusions. A moderate size area of consolidation is noted involving the right lower lung, concerning for an underlying airspace disease process. RECOMMENDATIONS: Unavailable         The patient's records from referring provider and available information in the EHR was reviewed. Impression:  1. COVID-19 pneumonia  2. Metabolic encephalopathy: Baseline confusion now aggravated by her metabolic derangement and current COVID-19 infection. Condition complicated by presence of underlying medical conditions including COPD, hypothyroidism, atrial fibrillation, CHF, and suspected baseline dementia. 3. Metabolic arrangement with noted hypernatremia. 4. Cognitive dysfunction: Baseline confusion and impaired cognition concerning for possible underlying dementia    Principal Problem:    Hypernatremia  Active Problems:    Permanent atrial fibrillation (HCC)    Essential hypertension    Chronic obstructive pulmonary disease (HCC)    Frequent falls    AMS (altered mental status)    Head injury, initial encounter    Acute encephalopathy  Resolved Problems:    * No resolved hospital problems. *      Recommendations:                                            1. Treatment of COVID-19 infection and metabolic derangements as per medical team.  2. Consider for EEG to assess CNS status and rule out seizure activity if continued worsening course despite medical therapy. 3. Please reconsult if further need. It was my pleasure to evaluate [place] today. Please call with questions.       Electronically signed by Amy Persaud MD on 4/16/2022 at 8:20 PM

## 2022-04-17 NOTE — PROGRESS NOTES
Discussed case with patient's nurse. Patient needs IV medications because she is not arousable enough to swallow oral medications. Patient in need of central line for poor access. IV team has tried to place IV and were unable. Attempted to call patient's spouse and daughter. I was unsuccessful. I will need consent before placing line. Will try again later in the day. Please page [place] if able to reach family. Electronically signed by Annetta Webber MD on 4/17/2022 at 9:21 AM     I was able to reach her daughter. Risks, benefits, and complications of the procedure discussed with her. She expresses understanding and agrees to proceed. Will place central line.     Electronically signed by Annetta Webber MD on 4/17/2022 at 9:49 AM

## 2022-04-17 NOTE — PROGRESS NOTES
Attempted midline left basilic vein with ultrasound under sterile and max. Barrier prec. Unable to pass guidewire. No appreciated brachial veins noted. Right arm vessels not appreciated.

## 2022-04-17 NOTE — PROGRESS NOTES
Nephrology Progress Note  4/17/2022 6:24 PM  Subjective:   Admit Date: 4/13/2022  PCP: No primary care provider on file. Interval History: Patient was not seen or examined by me because of COVID-19 isolation status. Case was discussed with the patient's nurse outside the patient's room. I reviewed physical examination as documented by general surgery consult note earlier today and as documented by family medicine attending earlier today as well. Patient continues to be n.p.o. Diet: ADULT DIET; Regular; Low Sodium (2 gm)  ADULT TUBE FEEDING; Nasogastric; Standard with Fiber; Continuous; 20; Yes; 10; Q 4 hours; 70; 100; Q 4 hours    Data:     Scheduled Meds:   lidocaine  5 mL IntraDERmal Once    sodium chloride flush  5-40 mL IntraVENous 2 times per day    heparin flush  1 mL IntraVENous 2 times per day    furosemide  20 mg IntraVENous Once    [Held by provider] lisinopril  5 mg Oral Daily    [Held by provider] spironolactone  25 mg Oral Daily    enoxaparin  30 mg SubCUTAneous BID    [Held by provider] dexamethasone  6 mg IntraVENous Q24H    metoprolol succinate  75 mg Oral Daily    [Held by provider] furosemide  20 mg IntraVENous BID    levalbuterol  1 ampule Nebulization BID    sodium chloride flush  5-40 mL IntraVENous 2 times per day    cefTRIAXone (ROCEPHIN) IV  1,000 mg IntraVENous Q24H    levothyroxine  175 mcg Oral Daily     Continuous Infusions:   sodium chloride      dextrose      dextrose 75 mL/hr at 04/17/22 1529     PRN Meds:sodium chloride flush, sodium chloride, heparin flush, glucose, dextrose, glucagon (rDNA), dextrose, hydrALAZINE, perflutren lipid microspheres, metoprolol, sodium chloride flush, ondansetron **OR** ondansetron, polyethylene glycol  I/O last 3 completed shifts: In: 1005 [P. O.:90; I.V.:915]  Out: 700 [Urine:700]  No intake/output data recorded.     Intake/Output Summary (Last 24 hours) at 4/17/2022 1824  Last data filed at 4/17/2022 0524  Gross per 24 hour Intake 60 ml   Output    Net 60 ml     CBC:   Recent Labs     04/15/22  0749 04/16/22  0531 04/17/22  0448   WBC 5.6 7.1 4.3*   HGB 10.8* 11.6 9.7*    190 171     BMP:    Recent Labs     04/15/22  0749 04/16/22  0531 04/17/22  0448   * 149* 148*   K 4.4 3.1* 3.7    103 107   CO2 30* 30* 30*   BUN 22 24* 24*   CREATININE 0.7 0.8 0.5   GLUCOSE 134* 87 107*     Hepatic:   Recent Labs     04/15/22  0749 04/16/22  0531 04/17/22  0448   AST 31 32* 36*   ALT 20 20 16   BILITOT 0.7 1.0 0.7   ALKPHOS 101 97 72     Protein/ Albumin:    Lab Results   Component Value Date    LABALBU 3.0 (L) 04/17/2022      Narrative   EXAMINATION:   CTA OF THE CHEST 4/13/2022        TECHNIQUE:   CTA of the chest was performed after the administration of intravenous   contrast.  Multiplanar reformatted images are provided for review.  MIP   images are provided for review.  Dose modulation, iterative reconstruction,   and/or weight based adjustment of the mA/kV was utilized to reduce the   radiation dose to as low as reasonably achievable.       COMPARISON:   None.       HISTORY:   ORDERING SYSTEM PROVIDED HISTORY: elevated D dimer   TECHNOLOGIST PROVIDED HISTORY:   Reason for exam:->elevated D dimer   What reading provider will be dictating this exam?->CRC       FINDINGS:   Pulmonary Arteries: Pulmonary arteries are adequately opacified for   evaluation.  No evidence of intraluminal filling defect to suggest pulmonary   embolism.  Main pulmonary artery is normal in caliber.       Mediastinum: No evidence of mediastinal lymphadenopathy.  The heart and   pericardium demonstrate no acute abnormality.  There is no acute abnormality   of the thoracic aorta.       Lungs/pleura: Small left and moderate size right pleural effusions.  A   moderate size area of consolidation is noted involving the right lower lung,   concerning for an underlying airspace disease process.       Upper Abdomen: Nonobstructive calculi are noted in the bilateral renal   pelves, the largest on the left measures 12 mm and the largest on the right   measures 5 mm.       Soft Tissues/Bones: No acute bone or soft tissue abnormality.           Impression   No evidence of pulmonary embolism.       Small left and moderate size right pleural effusions.       A moderate size area of consolidation is noted involving the right lower   lung, concerning for an underlying airspace disease process.       RECOMMENDATIONS:   Unavailable         XR CHEST PORTABLE [[id number]] Collected: 04/17/22 1419      Order Status: Completed Updated: 04/17/22 1424     Narrative:       EXAMINATION:   ONE XRAY VIEW OF THE CHEST     4/17/2022 2:16 pm     COMPARISON:   04/17/2022     HISTORY:   ORDERING SYSTEM PROVIDED HISTORY: attempted central line   TECHNOLOGIST PROVIDED HISTORY:   Reason for exam:->attempted central line   What reading provider will be dictating this exam?->CRC     FINDINGS:   The tip of the nasogastric tube projects over the body the stomach     There is increased right lower lung density, suggests increasing effusion. The left basilar consolidation however has improved and the left heart border   and diaphragm are better visualized.  There are no signs of mediastinal   widening.  The heart size is prominent but stable when compared the previous   study.  No pneumothorax is observed.  Note is made of a total left shoulder   arthroplasty.      Impression:       1. Significant interval improvement in the left lung lower lung and lingular   consolidation   2. Increased right pleural effusion and or atelectasis   3. Cardiomegaly, stable   4. The position of the nasogastric tube is within normal range         Echocardiogram 5/11/2020   Normal left ventricle size and systolic function. Ejection fraction is visually estimated at 55-60%. Atrial fibrillation may affect the evaluation of LV systolic function. No regional wall motion abnormalities seen.    Normal left ventricle wall thickness. Abnormal diastolic function with normal LV filling pressures (IVRT >60 msec). The left atrium is severely dilated. Patent foramen ovale with bidirectional shunt was visualized on color   Doppler and bubble study. Normal right ventricular size and function. TAPSE 17 mm. No hemodynamically significant aortic stenosis is present. Mild aortic regurgitation is noted. Mild tricuspid regurgitation. RVSP is 48 mmHg. Pulmonary hypertension is mild . No evidence for hemodynamically significant pericardial effusion. Compared to prior echo of 9/18/2019 changes noted. LVEF has improved from 45% to 55-60%. Objective:     Vitals: /85   Pulse 111   Temp 98.1 °F (36.7 °C) (Temporal)   Resp 19   Ht 5' 6\" (1.676 m)   Wt 245 lb 13 oz (111.5 kg)   SpO2 99%   BMI 39.68 kg/m²   Patient was not seen or examined by me because of COVID-19 isolation status. Case was discussed with the patient's nurse outside the patient's room. I reviewed physical examination as documented by general surgery consult note earlier today and as documented by family medicine attending earlier today as well. Assessment & Plan:     Hypernatremia as a result of being n.p.o. Patient has been complaining of being thirsty. Defer enteral nutrition to PCP. Patient's nurse told me that they are waiting for swallow evaluation. Continue hypotonic IV fluids D5W at 75 cc/h while n.p.o.  Monitor serum sodium and adjust free water supplementation accordingly. Her ACE inhibitor spironolactone and loop diuretic are discontinued while n.p.o. Hypophosphatemia resulting from not eating: Supplement IV and monitor while n.p.o. Anemia: Not related to the patient's kidney function with serum creatinine of 0.5      This note was created using voice recognition software.     Electronically signed by Analisa Sam MD on 4/17/2022 at 6:24 PM

## 2022-04-18 NOTE — CARE COORDINATION
Care Coordination- Covid negative as of 4/14/22  The patient is from [facility] but the plan was for her to go to [facility] due to yelling out at the nursing home. The patient went to [facility] and was not stable so she was never admitted and was then transferred here. Called [facility] this am and they will need all clinical faxed today as she will be a new admit for them. Fax is [phone] and all clinical was faxed. For [facility] to accept the patient she must be out of restraints. The patient also has a sitter and [facility] will call me back if the patient needs to be without a sitter for her to go there. Therapies have been ordered but unable to work with the patient due to being in restraints and not following commands. We are awaiting a swallow eval. The patient pulled her ngt and Triple lumen. Right IJ was attempted but unable to be done due to the patient being combative. Await if accepted to [facility].

## 2022-04-18 NOTE — PROGRESS NOTES
[facility]  Nephrology Progress Note    SUBJECTIVE from Dr. Maren Hall 4/14 Consult Note: \"Mrs Lyssa King 70-year-old woman who was admitted from nursing facility with altered mentation and confusion. She was found to have a sodium of 152 for which we have been asked to assist in her management. \"    Interval History:    4/18: Patient not personally seen or examined due to Covid isolation. Discussed with bedside nurse, who explained that the patient opens her eyes. The bedside nurse also explained that the patient has not been eating very well. PMH:    Past Medical History:   Diagnosis Date    Anticoagulated on Coumadin     on Coumadin for this Dr. Tray Aguilar controls    Atrial fibrillation ([address]     Basal cell carcinoma of neck     CAD (coronary artery disease)     History of luminal irregularities of the coronary artery, stable.     CH[facility]congestive heart failure) ([facility])     stage I diastolic dysfunction on 0/09/70    Depression 11/16/2010    DJD (degenerative joint disease)     SEVERE IN RIGHT HAND, IN MULTIPLE OTHER JOINTS    GERD (gastroesophageal reflux disease) 11/16/2010    History of cardiovascular stress test 03/18/2014    lexiscan    Hyperparathyroidism ([address] 11/16/2010    Had parathyroidectomy    Hypertension 11/16/2010    Hypothyroidism 11/16/2010    Mild mitral regurgitation 7/10/14    Osteoarthritis 11/16/2010    Pulmonary hypertension ([address] 7/10/14    mild    Renal calculi     x3    Tricuspid regurgitation 7/10/14    mild-moderate    Urinary incontinence 11/16/2010     Patient Active Problem List   Diagnosis    Depression    Hypothyroidism    Permanent atrial fibrillation ([address]    History of artificial lens replacement    Essential hypertension    Moderate obesity    Greater trochanteric bursitis    Chronic anticoagulation    Chronic obstructive pulmonary disease (HCC)    Mixed stress and urge urinary incontinence    Fuchs' endothelial dystrophy    Pseudophakia, both eyes  Chronic diastolic (congestive) heart failure (HCC)    Pure hypercholesterolemia    Spinal stenosis of lumbar region with neurogenic claudication    Patent foramen ovale    Coronary arteriosclerosis in native artery    Frequent falls    [facility]    Ambulatory dysfunction    Cognitive dysfunction    COVID-19 virus infection    Nephrolithiasis    AMS (altered mental status)    Laceration of fascia of head    Head injury, initial encounter    Hypernatremia    Acute encephalopathy     Meds:     lidocaine  5 mL IntraDERmal Once    sodium chloride flush  5-40 mL IntraVENous 2 times per day    heparin flush  1 mL IntraVENous 2 times per day    enoxaparin  30 mg SubCUTAneous BID    [Held by provider] dexamethasone  6 mg IntraVENous Q24H    metoprolol succinate  75 mg Oral Daily    levalbuterol  1 ampule Nebulization BID    sodium chloride flush  5-40 mL IntraVENous 2 times per day    cefTRIAXone (ROCEPHIN) IV  1,000 mg IntraVENous Q24H    levothyroxine  175 mcg Oral Daily        sodium chloride      dextrose      dextrose 75 mL/hr at 04/17/22 1529     Meds prn:     sodium chloride flush, sodium chloride, heparin flush, glucose, dextrose, glucagon (rDNA), dextrose, hydrALAZINE, perflutren lipid microspheres, metoprolol, sodium chloride flush, ondansetron **OR** ondansetron, polyethylene glycol    Meds prior to admission:     No current facility-administered medications on file prior to encounter.      Current Outpatient Medications on File Prior to Encounter   Medication Sig Dispense Refill    acetaminophen (TYLENOL) 325 MG tablet Take 650 mg by mouth every 4 hours as needed for Pain or Fever      b complex vitamins capsule Take 1 capsule by mouth daily      vitamin D (CHOLECALCIFEROL) 25 MCG (1000 UT) TABS tablet Take 1,000 Units by mouth daily      fluticasone (FLONASE) 50 MCG/ACT nasal spray 2 sprays by Nasal route daily      guaiFENesin (MUCINEX) 600 MG extended release tablet Take 600 mg by mouth daily      loperamide (IMODIUM) 2 MG capsule Take 4 mg by mouth 4 times daily as needed for Diarrhea      meclizine (ANTIVERT) 25 MG tablet Take 25 mg by mouth 3 times daily as needed for Dizziness      albuterol sulfate HFA (VENTOLIN HFA) 108 (90 Base) MCG/ACT inhaler Inhale 2 puffs into the lungs every 6 hours as needed for Wheezing      ascorbic acid (VITAMIN C) 500 MG tablet Take 1,000 mg by mouth daily      levothyroxine (SYNTHROID) 175 MCG tablet Take 1 tablet by mouth Daily 30 tablet 3    levalbuterol (XOPENEX) 0.63 MG/3ML nebulization Take 1 ampule by nebulization in the morning and at bedtime       metoprolol succinate (TOPROL XL) 50 MG extended release tablet Take 1 tablet by mouth nightly 30 tablet 5    magnesium hydroxide (MILK OF MAGNESIA) 400 MG/5ML suspension Take 5 mLs by mouth daily as needed for Constipation      lisinopril (PRINIVIL;ZESTRIL) 5 MG tablet Take 1 tablet by mouth daily 90 tablet 1    furosemide (LASIX) 40 MG tablet Take 1 tablet by mouth daily 60 tablet 2    famotidine (PEPCID) 20 MG tablet Take 1 tablet by mouth 2 times daily 60 tablet 5    escitalopram (LEXAPRO) 10 MG tablet Take 1 tablet by mouth daily 30 tablet 5    albuterol (PROVENTIL) (2.5 MG/3ML) 0.083% nebulizer solution Take 3 mLs by nebulization every 6 hours as needed for Wheezing 120 each 5    [DISCONTINUED] levothyroxine (SYNTHROID) 125 MCG tablet Take 1 tablet by mouth every morning (before breakfast) 24 tablet 2     Allergies:    Codeine, Penicillins, Vicodin [hydrocodone-acetaminophen], and Adhesive tape    Social History:     reports that she has never smoked. She has never used smokeless tobacco. She reports previous alcohol use. She reports that she does not use drugs.     Family History:         Problem Relation Age of Onset    Heart Disease Mother     Heart Attack Mother     Arthritis Mother     Heart Disease Father     Mental Illness Father     Diabetes Father     Heart Disease Brother     Cancer Brother     Cancer Son     Heart Disease Sister     Heart Disease Brother     Heart Surgery Brother      Physical Exam:    Patient Vitals for the past 24 hrs:   BP Temp Temp src Pulse Resp SpO2   04/18/22 0745 115/71 97.7 °F (36.5 °C) Temporal 99 16 97 %   04/18/22 0630 98/67 -- -- 113 -- 99 %   04/17/22 2000 -- -- -- 114 -- --   04/17/22 1930 120/76 97.7 °F (36.5 °C) Axillary 103 19 99 %   04/17/22 1846 -- -- -- -- -- 97 %       Intake/Output Summary (Last 24 hours) at 4/18/2022 0901  Last data filed at 4/18/2022 7964  Gross per 24 hour   Intake 240 ml   Output 350 ml   Net -110 ml     Patient not personally examined due to COVID isolation. Due to the current efforts to prevent transmission of COVID-19 and also the need to preserve PPE for other caregivers, a face-to-face encounter with the patient was not performed. That being said, all relevant records and diagnostic tests were reviewed, including laboratory results and imaging. Please reference any relevant documentation elsewhere. Care will be coordinated with the primary service. Data:    Recent Labs     04/16/22 0531 04/17/22 0448 04/18/22  0741   WBC 7.1 4.3* 4.6   HGB 11.6 9.7* 10.3*   HCT 38.3 32.6* 34.2   MCV 98.0 99.1 98.6    171 162     Recent Labs     04/16/22 0531 04/16/22 0531 04/17/22 0448 04/18/22  0741   *  --  148* 144  145   K 3.1*   < > 3.7 3.4*  3.4*     --  107 102  104   CO2 30*  --  30* 34*  34*   CREATININE 0.8  --  0.5 0.7  0.7   BUN 24*  --  24* 18  19   LABGLOM >60  --  >60 >60  >60   GLUCOSE 87  --  107* 97  93   CALCIUM 9.5  --  8.7 9.3  9.3   PHOS  --   --  2.4* 3.0   MG 2.2  --  2.1 2.3    < > = values in this interval not displayed. Vit D, 25-Hydroxy   Date Value Ref Range Status   04/14/2022 37 30 - 100 ng/mL Final     Comment:     <20 ng/mL. ........... Kenyetta Dye Deficient  20-30 ng/mL. ......... Kenyetta Dye Insufficient   ng/mL. ........ Kenyetta Dye Sufficient  >100 ng/mL........... Crystal Tashia Toxic       PTH   Date Value Ref Range Status   04/15/2022 112 (H) 15 - 65 pg/mL Final     Recent Labs     04/16/22  0531 04/17/22  0448 04/18/22  0741   ALT 20 16 19   AST 32* 36* 30   ALKPHOS 97 72 84   BILITOT 1.0 0.7 0.7     Recent Labs     04/16/22  0531 04/17/22  0448 04/18/22  0741   LABALBU 4.1 3.0* 3.3*     Ferritin   Date Value Ref Range Status   04/13/2022 155 ng/mL Final     Comment:     FERRITIN Reference Ranges:  Adult Males   20 - 60 years:    30 - 400 ng/mL  Adult females 16 - 60 years:    15 - 150 ng/mL  Adults greater than 60 years:   no established reference range  Pediatrics:                     no established reference range       Iron   Date Value Ref Range Status   02/20/2020 73 37 - 145 mcg/dL Final     TIBC   Date Value Ref Range Status   02/20/2020 323 250 - 450 mcg/dL Final     Vitamin B-12   Date Value Ref Range Status   04/06/2022 802 211 - 946 pg/mL Final     Folate   Date Value Ref Range Status   01/12/2022 >20.0 4.8 - 24.2 ng/mL Final     Lab Results   Component Value Date    COLORU Yellow 04/13/2022    NITRU Negative 04/13/2022    GLUCOSEU Negative 04/13/2022    KETUA Negative 04/13/2022    UROBILINOGEN 1.0 04/13/2022    BILIRUBINUR Negative 04/13/2022    BILIRUBINUR negative 07/05/2019     Lab Results   Component Value Date/Time    OSMOU 522 04/13/2022 03:49 PM     No components found for: URIC    No results found for: LIPIDPAN    Assessment and Plan:    1. Hyponatremia  Originally hypovolemic, now with worsening pulmonary edema  Na 152 on 4/13--> 144 today  On IVF D5 at 75 mL/hour  Follow    2. Hypokalemia  K+ 3.4 today  for potassium supplementation today  Follow    3. COVID pneumonia  Chest x-ray 4/17 \"significant interval improvement in the left lung lower lung and lingular consolidation\" and \"Increased right pleural effusion and or atelectasis\"  Pulmonology following    4. Atrial fibrillation with RVR  On metoprolol  Cardiology previously following    5. Hypertension  BP goal<130/80  BP at goal  Follow    6. Altered mental status  Neurology previously following    7.   Hypophosphatemia  Phos 2.4 on 4/17--> 3.0 today  Follow    ARIEL Thomas - CNP     Patient seen , plan as outlined above     Continue IVF as planned , NA better     Dr Louis Schuster

## 2022-04-18 NOTE — PROGRESS NOTES
ABG drawn x 1 from Left Radial. Patient had NormalAllen's Test.  Patient was on 3 liters/min via nasal cannula  at time of puncture. Pressure held for 5. No bleeding or bruising noted at puncture site.   Patient tolerated procedure well      Performed by Tremaine Guajardo RCP

## 2022-04-18 NOTE — PROGRESS NOTES
Comprehensive Nutrition Assessment    Type and Reason for Visit:  Initial,Consult (TF O&M)    Nutrition Recommendations/Plan:   TF rec provided to better meet estimated needs. Recommend: Standard formula w/ fiber (Jevity 1.5) @50cc/hr x 24hr will provide: 1200ccTV, 1800kcal , 77g pro , 912cc FW. Flush per MD/Renal management     Note:regimen meets 100% estimated protein and calorie needs. Nutrition Assessment:  Pt. presented from SNF for abnormal labs and AMS. Noted Hypernatremia likely 2/2 limited/poor po intake PTA. Pt. is COVID positive and remains w/ AMS likely 2/2 to metabolic/septic/drug induced/worsening underlying dementia. PMHx noted for frequent falls, CAD, CHF, HTN, hyperPTH s/p parathyroidectomy, atrial fibrillation, COPD. Noted poor/varied intake and NG tube placed. Noted pt. pulled NG tube and POC is awaiting speech eval when medically appropriate. will provide TF recs and monitor. Malnutrition Assessment:  Malnutrition Status: At risk for malnutrition (Comment)    Context:  Acute Illness     Findings of the 6 clinical characteristics of malnutrition:  Energy Intake:  1 - 75% or less of estimated energy requirements for 7 or more days  Weight Loss:  No significant weight loss     Body Fat Loss:  Unable to assess (Covid isolation)     Muscle Mass Loss:  Unable to assess (covid isolation)    Fluid Accumulation:  No significant fluid accumulation     Strength:  Not Performed    Estimated Daily Nutrient Needs:  Energy (kcal):  MSJ 1572 x 1.2 SF = 1800-1900kcal; Weight Used for Energy Requirements:  Current     Protein (g):  71g-83g (1.2-1.4g/kg IBW);  Weight Used for Protein Requirements:  Ideal        Fluid (ml/day):  per MD/renal management; Method Used for Fluid Requirements:  Other (Comment)      Nutrition Related Findings:  DisorientedX4, -I/O 110ml, +BS, RUE +1 edema, BL LE +2 Non-pitting edema , 3L NC , NG tube , Hypokalemia      Wounds:   (Forehead , dried scabbed old wound) Current Nutrition Therapies:    ADULT DIET; Dysphagia - Pureed; Low Sodium (2 gm)  ADULT TUBE FEEDING; Nasogastric; Standard with Fiber; Continuous; 20; Yes; 10; Q 4 hours; 50; 30; Q 4 hours  Current Tube Feeding (TF) Orders:  · Feeding Route: Nasogastric  · Formula: Standard with Fiber  · Schedule: Continuous (@70ml/hr x 24 hr)  · Water Flushes: 100cc Q4  Standard formula Jevity 1.5 @70ml/hr x 24hr provides: 1680TV, 2520kcal, 107g pro, 1276ml free water      Anthropometric Measures:  · Height: 5' 6\" (167.6 cm)  · Current Body Weight: 245 lb 13 oz (111.5 kg) (4/16 BS; Actual)   · Admission Body Weight: 257 lb 0.9 oz (116.6 kg) (4/14 First measured)    · Usual Body Weight: 245 lb 9.6 oz (111.4 kg) (5/5/21 no method)     · Ideal Body Weight: 130 lbs; % Ideal Body Weight 189.1 %   · BMI: 39.7  · BMI Categories: Obese Class 2 (BMI 35.0 -39.9)       Nutrition Diagnosis:   · Inadequate oral intake related to cognitive or neurological impairment as evidenced by nutrition support - enteral nutrition,poor intake prior to admission,lab values      Nutrition Interventions:   Food and/or Nutrient Delivery:  Continue Current Diet,Modify Tube Feeding (Modfiy TF to better meet estimated needs: Recommend: Standard formula w/ fiber (Jevity 1.5) @50cc/hr x 24hr  will provide: 1200ml TV , 1800kcal , 77g pro , 912ml FW)  Nutrition Education/Counseling:  Education not appropriate   Coordination of Nutrition Care:  Continue to monitor while inpatient    Goals:  Pt. to tolerate EN at goal       Nutrition Monitoring and Evaluation:   Behavioral-Environmental Outcomes:  None Identified   Food/Nutrient Intake Outcomes:  Diet Advancement/Tolerance,Food and Nutrient Intake,Supplement Intake  Physical Signs/Symptoms Outcomes:  Biochemical Data,Chewing or Swallowing,GI Status,Fluid Status or Edema,Meal Time Behavior,Nutrition Focused Physical Findings,Skin,Weight     Discharge Planning:     Too soon to determine     Electronically signed by Samson Granados RD on 4/18/22 at 12:23 PM EDT    Contact: ext [phone]

## 2022-04-18 NOTE — PROGRESS NOTES
Physical Therapy    PT order received and medical chart reviewed 4/18 AM. Per RN, pt is in restraints and does not follow commands. Pt is not appropriate for skilled PT evaluation. Will hold and re-attempt at a later time/date. Thank you.     Santos Gutierrez, PT, DPT  [id number]

## 2022-04-18 NOTE — PROGRESS NOTES
Pt. Managed to pull out NG tube while in restraints, [facility] med resident notified, monitoring for now

## 2022-04-18 NOTE — PLAN OF CARE
Problem: Falls - Risk of:  Goal: Will remain free from falls  Description: Will remain free from falls  Outcome: Met This Shift     Problem: Airway Clearance - Ineffective  Goal: Achieve or maintain patent airway  Outcome: Met This Shift     Problem: Gas Exchange - Impaired  Goal: Absence of hypoxia  Outcome: Met This Shift     Problem: Risk for Fluid Volume Deficit  Goal: Maintain normal heart rhythm  Outcome: Ongoing     Problem: Skin Integrity:  Goal: Will show no infection signs and symptoms  Description: Will show no infection signs and symptoms  Outcome: Met This Shift     Problem: Non-Violent Restraints  Goal: No harm/injury to patient while restraints in use  Outcome: Met This Shift  Goal: Patient's dignity will be maintained  Outcome: Met This Shift

## 2022-04-18 NOTE — PROGRESS NOTES
SPEECH/LANGUAGE PATHOLOGY  CLINICAL ASSESSMENT OF SWALLOWING FUNCTION   and PLAN OF CARE    PATIENT NAME:  Nu Marshall  (female)     MRN:  [id number]    :  1935  (81 y.o.)  STATUS:  Inpatient: Room 8410/8410-B    TODAY'S DATE:  2022  REFERRING PROVIDER:   Dr. Scooter Delaney: SLP swallowing-dysphagia evaluation and treatment Date of order:  22  REASON FOR REFERRAL: assess for oral diet   EVALUATING THERAPIST: Domenico Davis SLP                 RESULTS:    DYSPHAGIA DIAGNOSIS:   Clinical indicators of moderate-severe oropharyngeal phase dysphagia       DIET RECOMMENDATIONS:  NPO       FEEDING RECOMMENDATIONS:     Assistance level:  Not applicable      Compensatory strategies recommended: Not applicable      Discussed recommendations with nursing and/or faxed report to referring provider: No secondary to no diet/liquid change recommended     SPEECH THERAPY  PLAN OF CARE   The dysphagia POC is established based on physician order, dysphagia diagnosis and results of clinical assessment     Dysphagia therapy is not recommended due to cognitive status    Conditions Requiring Skilled Therapeutic Intervention for dysphagia:    Not applicable    Specific dysphagia interventions to include:     not applicable    Specific instructions for next treatment:  not applicable   Patient Treatment Goals:    Short Term Goals:  Not applicable no therapy warranted     Long Term Goals:   Not applicable no therapy warranted      Patient/family Goal:    not applicable                    ADMITTING DIAGNOSIS: Hypernatremia [E87.0]  Acute encephalopathy [G93.40]  Altered mental status, unspecified altered mental status type [R41.82]    VISIT DIAGNOSIS:      PATIENT REPORT/COMPLAINT: patient not able to accurately report  nursing not available at time of evaluation chart reviewed and patient is not NPO for any procedures per current documentation.      PRIOR LEVEL OF SWALLOW FUNCTION:    PAST HISTORY OF DYSPHAGIA?: yes    Current Diet Order:  ADULT DIET; Dysphagia - Pureed; Low Sodium (2 gm)  ADULT TUBE FEEDING; Nasogastric; Standard with Fiber; Continuous; 20; Yes; 10; Q 4 hours; 50; 30; Q 4 hours    PROCEDURE:  Consistencies Administered During the Evaluation   Liquids: thin liquid   Solids:  pureed foods      Method of Intake:   spoon  Fed by clinician      Position:   Seated, upright    CLINICAL ASSESSMENT:  Oral Stage:       Inadequate labial seal resulting anterior labial spillage from midline  Delayed A-P transit due to: cognitive function       Pharyngeal Stage:    Latent wet cough was noted after presentation of thin liquid and pureed foods  Wet respirations were noted after presentation of thin liquid and pureed foods  Multiple swallows were noted after presentation of thin liquid and pureed foods  Delayed initiation of the pharyngeal swallow noted    Cognition:   Confusion noted    Oral Peripheral Examination   Generalized oral weakness    Current Respiratory Status    3 liters nasal cannula     Parameters of Speech Production  Respiration:  Adequate for speech production  Quality:   Strained  Intensity: Within functional limits    Volitional Swallow: not able to elicit     Volitional Cough:   not able to elicit     Pain: No pain reported. EDUCATION:   The Speech Language Pathologist (SLP) completed education regarding results of evaluation and that intervention is not warranted at this time. Learner: Patient  Education: Reviewed results and recommendations of this evaluation  Evaluation of Education:  No evidence of learning    This plan may be re-evaluated and revised as warranted. Evaluation Time includes thorough review of current medical information, gathering information on past medical history/social history and prior level of function, completion of standardized testing/informal observation of tasks, assessment of data and education on plan of care and goals.     [x]The admitting diagnosis and active problem list, have been reviewed prior to initiation of this evaluation.         ACTIVE PROBLEM LIST:   Patient Active Problem List   Diagnosis    Depression    Hypothyroidism    Permanent atrial fibrillation ([address]    History of artificial lens replacement    Essential hypertension    Moderate obesity    Greater trochanteric bursitis    Chronic anticoagulation    Chronic obstructive pulmonary disease (HCC)    Mixed stress and urge urinary incontinence    Fuchs' endothelial dystrophy    Pseudophakia, both eyes    Chronic diastolic (congestive) heart failure ([address]    Pure hypercholesterolemia    Spinal stenosis of lumbar region with neurogenic claudication    Patent foramen ovale    Coronary arteriosclerosis in native artery    Frequent falls    Dementia ([address]    Ambulatory dysfunction    Cognitive dysfunction    COVID-19 virus infection    Nephrolithiasis    AMS (altered mental status)    Laceration of fascia of head    Head injury, initial encounter    Hypernatremia    Acute encephalopathy         CPT code:  01308  bedside swallow eval

## 2022-04-18 NOTE — CONSULTS
Palliative Care Department  [phone]  Palliative Care Initial Consult  Provider Marielena Arnold, APRN - CNP      PATIENT: Angle Seen  : 1935  MRN: [id number]  ADMISSION DATE: 2022 12:09 PM  Referring Provider: Jf Morales MD    Palliative Medicine was consulted on hospital day 5 for assistance with Goals of care, Code Status Discussion, Family support: Patient is 81years old.  Clinically not improving since admission.  Unable to reach with family. Ovidio Matamoros on her clinical we need to talk with patient family members regarding further goals of care since patient is not able to give decision at this time due to altered mental status and worsening dementia.  I would like palliative to see patient and evaluate. HPI:     Benedict Kohli is a 81 y.o. y/o female with a history of depression, hypothyroidism, A. fib, hypertension, 6, frequent falls, dementia, cognitive dysfunction, COVID-19, altered mental status, who [facility] to [facility]) on 2022 with abnormal labs from [facility]. ASSESSMENT/PLAN:     Pertinent Hospital Diagnoses     · Hypernatremia  · Altered mental status  · Acute encephalopathy  · With falls    Palliative Care Encounter / Counseling Regarding Goals of Care  Please see detailed goals of care discussion as below   At this time, Angle Seen, Does have capacity for medical decision-making.   Capacity is time limited and situation/question specific   During encounter Bervely was surrogate medical decision-maker   Outcome of goals of care meeting:  o Continue with current medical treatment  o That is going to speak with her father and discuss goal of care     Code status DNR-CCA   Advanced Directives: no POA or living will in [facility]   Surrogate/Legal NOK:  o Leo Quiñones (spousePOA) [phone]  o Levar Kc (daugther alternative POA) [phone]    Spiritual assessment: no spiritual distress identified  Bereavement and grief: to be determined  Referrals to: none today    Thank you for the opportunity to participate in the care of Kayla Monge. ARIEL Yuen CNP  Palliative Medicine     SUBJECTIVE:     Details of Conversation:      Chart reviewed. Tried to get a hold of patient's , Oscar Harris, over the phone but was unable. Called second contact, patient's daughter shira, over the phone. Introduced palliative medicine. Shira identified herself as the patient's alternate POA, she stated that her  80years old father, is patient's primary POA. however her father has also dementia and cannot comprehend or make decisions for the patient. We discussed goal of care. Shira, reported having visited the patient yesterday, she noted that she has been on restraints, and had an NG tube for nutrition. She was not aware that her mother had pulled NG tube out last night, and had refused to take medications, and  not able to take in nutrition. Yvonne Rodríguez stated that her mother was sent to a behavioral facility, [facility], and the nursing home, because of her progressive cognitive decline, so her medication to be adjusted. She reported that her mother was able to carry on with conversation with her up to February. After February, she has sustained 2 falls in the last few months, and her cognition has continued to decline since then. We discussed comfort care focus, and briefly touched hospice, Yvonne Rodríguez stated of wanting to speak with her father today regarding goal of care. For support provided. We will continue to follow. Prognosis: Poor    OBJECTIVE:     /71   Pulse 111   Temp 97.7 °F (36.5 °C) (Temporal)   Resp 16   Ht 5' 6\" (1.676 m)   Wt 245 lb 13 oz (111.5 kg)   SpO2 97%   BMI 39.68 kg/m²     Physical Examination:  Due to the current efforts to prevent transmission of COVID-19 and also the need to preserve PPE for other caregivers, a face-to-face encounter with the patient was not performed. That being said, all relevant records and diagnostic tests were reviewed, including laboratory results and imaging. Please reference any relevant documentation elsewhere. Care will be coordinated with the primary service. Objective data reviewed: labs, images, records, medication use, vitals and chart    Time/Communication  Greater than 50% of time spent, total 50 minutes in counseling and coordination of care at the bedside regarding goals of care. Thank you for allowing Palliative Medicine to participate in the care of [place]. Note: This report was completed using computerBrainjuicer voiced recognition software. Every effort has been made to ensure accuracy; however, inadvertent computerized transcription errors may be present.

## 2022-04-18 NOTE — PROGRESS NOTES
Progress Note  4/18/2022 2:54 PM  Subjective:   Admit Date: 4/13/2022  PCP: No primary care provider on file. Interval History: Patient examined , in no distress , lethargic     Diet: ADULT DIET; Dysphagia - Pureed; Low Sodium (2 gm)  ADULT TUBE FEEDING; Nasogastric; Standard with Fiber; Continuous; 20; Yes; 10; Q 4 hours; 50; 30; Q 4 hours    Data:   Scheduled Meds:   lidocaine  5 mL IntraDERmal Once    sodium chloride flush  5-40 mL IntraVENous 2 times per day    heparin flush  1 mL IntraVENous 2 times per day    enoxaparin  30 mg SubCUTAneous BID    [Held by provider] dexamethasone  6 mg IntraVENous Q24H    metoprolol succinate  75 mg Oral Daily    levalbuterol  1 ampule Nebulization BID    sodium chloride flush  5-40 mL IntraVENous 2 times per day    cefTRIAXone (ROCEPHIN) IV  1,000 mg IntraVENous Q24H    levothyroxine  175 mcg Oral Daily     Continuous Infusions:   sodium chloride      dextrose      dextrose 75 mL/hr at 04/17/22 1529     PRN Meds:sodium chloride flush, sodium chloride, heparin flush, glucose, dextrose, glucagon (rDNA), dextrose, hydrALAZINE, perflutren lipid microspheres, metoprolol, sodium chloride flush, ondansetron **OR** ondansetron, polyethylene glycol  I/O last 3 completed shifts: In: 300 [P.O.:300]  Out: 350 [Urine:350]  No intake/output data recorded.     Intake/Output Summary (Last 24 hours) at 4/18/2022 1454  Last data filed at 4/18/2022 0512  Gross per 24 hour   Intake 240 ml   Output 350 ml   Net -110 ml     CBC:   Recent Labs     04/16/22  0531 04/17/22  0448 04/18/22  0741   WBC 7.1 4.3* 4.6   HGB 11.6 9.7* 10.3*    171 162     BMP:    Recent Labs     04/16/22  0531 04/16/22  0531 04/17/22  0448 04/18/22  0741   *  --  148* 144  145   K 3.1*   < > 3.7 3.4*  3.4*  3.4*     --  107 102  104   CO2 30*  --  30* 34*  34*   BUN 24*  --  24* 18  19   CREATININE 0.8  --  0.5 0.7  0.7   GLUCOSE 87  --  107* 97  93    < > = values in this interval not displayed. Hepatic:   Recent Labs     04/16/22  0531 04/17/22  0448 04/18/22  0741   AST 32* 36* 30   ALT 20 16 19   BILITOT 1.0 0.7 0.7   ALKPHOS 97 72 84     Troponin: No results for input(s): TROPONINI in the last 72 hours. BNP: No results for input(s): BNP in the last 72 hours. Lipids: No results for input(s): CHOL, HDL in the last 72 hours. Invalid input(s): LDLCALCU  ABGs: No results found for: PHART, PO2ART, NMZ1SHJ  INR: No results for input(s): INR in the last 72 hours. -----------------------------------------------------------------  RAD: CT Head WO Contrast    Result Date: 4/13/2022  EXAMINATION: CT OF THE HEAD WITHOUT CONTRAST  4/13/2022 1:39 pm TECHNIQUE: CT of the head was performed without the administration of intravenous contrast. Dose modulation, iterative reconstruction, and/or weight based adjustment of the mA/kV was utilized to reduce the radiation dose to as low as reasonably achievable. COMPARISON: March 26, 2022 HISTORY: ORDERING SYSTEM PROVIDED HISTORY: falls TECHNOLOGIST PROVIDED HISTORY: Reason for exam:->falls Has a \"code stroke\" or \"stroke alert\" been called? ->No Decision Support Exception - unselect if not a suspected or confirmed emergency medical condition->Emergency Medical Condition (MA) What reading provider will be dictating this exam?->CRC FINDINGS: BRAIN/VENTRICLES: There are age related cortical atrophy and periventricular white matter ischemic changes. There is no acute intracranial hemorrhage, mass effect or midline shift. No abnormal extra-axial fluid collection. The gray-white differentiation is maintained without evidence of an acute infarct. There is no evidence of hydrocephalus. ORBITS: The visualized portion of the orbits demonstrate no acute abnormality. SINUSES: There is mucosal thickening in the right frontal sinus. Opacification of the right mastoid air cells. SOFT TISSUES/SKULL:  No acute abnormality of the visualized skull.   There is left frontal scalp hematoma. No acute intracranial abnormality or hemorrhage. Cortical atrophy and periventricular leukomalacia. Left frontal scalp hematoma. Right frontal sinusitis and right mastoiditis. CT Cervical Spine WO Contrast    Result Date: 4/13/2022  EXAMINATION: CT OF THE CERVICAL SPINE WITHOUT CONTRAST 4/13/2022 1:39 pm TECHNIQUE: CT of the cervical spine was performed without the administration of intravenous contrast. Multiplanar reformatted images are provided for review. Dose modulation, iterative reconstruction, and/or weight based adjustment of the mA/kV was utilized to reduce the radiation dose to as low as reasonably achievable. COMPARISON: March 26, 2022 HISTORY: ORDERING SYSTEM PROVIDED HISTORY: falls TECHNOLOGIST PROVIDED HISTORY: Reason for exam:->falls Decision Support Exception - unselect if not a suspected or confirmed emergency medical condition->Emergency Medical Condition (MA) What reading provider will be dictating this exam?->CRC FINDINGS: BONES/ALIGNMENT: The bones are demineralized. There is no acute fracture or traumatic malalignment. DEGENERATIVE CHANGES: There are multilevel degenerative changes and bilateral facet arthrosis. SOFT TISSUES: There is no prevertebral soft tissue swelling. There is right pleural effusion. No acute abnormality of the cervical spine. Multilevel degenerative changes, no significant interval change. Right pleural effusion. XR CHEST PORTABLE    Result Date: 4/14/2022  EXAMINATION: ONE XRAY VIEW OF THE CHEST 4/14/2022 9:06 am COMPARISON: 04/13/2022 HISTORY: ORDERING SYSTEM PROVIDED HISTORY: pneumonia TECHNOLOGIST PROVIDED HISTORY: Reason for exam:->pneumonia What reading provider will be dictating this exam?->CRC FINDINGS: Mild cardiomegaly is noted with slight pulmonary venous hypertension. There appear to be small pleural effusions greater on the right with basilar atelectasis or pneumonia. Findings are similar to the prior examination. Osseous structures are stable. No evidence of pneumothorax. Cardiomegaly, pulmonary venous hypertension and small effusions. Findings suggest volume overload. Mild right basilar atelectasis. XR CHEST PORTABLE    Result Date: 4/13/2022  EXAMINATION: ONE XRAY VIEW OF THE CHEST 4/13/2022 2:45 pm COMPARISON: March 26, 2022 HISTORY: ORDERING SYSTEM PROVIDED HISTORY: altered TECHNOLOGIST PROVIDED HISTORY: Reason for exam:->altered What reading provider will be dictating this exam?->CRC FINDINGS: Mild cardiomegaly. Interstitial and hazy opacities in right lung base silhouette right hemidiaphragm. There is prominence of pulmonary vasculature. No pneumothorax. New opacities in right lung base suggestive of pneumonia, atelectasis, and possible right pleural effusion. CTA PULMONARY W CONTRAST    Result Date: 4/13/2022  EXAMINATION: CTA OF THE CHEST 4/13/2022 9:53 pm TECHNIQUE: CTA of the chest was performed after the administration of intravenous contrast.  Multiplanar reformatted images are provided for review. MIP images are provided for review. Dose modulation, iterative reconstruction, and/or weight based adjustment of the mA/kV was utilized to reduce the radiation dose to as low as reasonably achievable. COMPARISON: None. HISTORY: ORDERING SYSTEM PROVIDED HISTORY: elevated D dimer TECHNOLOGIST PROVIDED HISTORY: Reason for exam:->elevated D dimer What reading provider will be dictating this exam?->CRC FINDINGS: Pulmonary Arteries: Pulmonary arteries are adequately opacified for evaluation. No evidence of intraluminal filling defect to suggest pulmonary embolism. Main pulmonary artery is normal in caliber. Mediastinum: No evidence of mediastinal lymphadenopathy. The heart and pericardium demonstrate no acute abnormality. There is no acute abnormality of the thoracic aorta. Lungs/pleura: Small left and moderate size right pleural effusions.   A moderate size area of consolidation is noted involving the right lower lung, concerning for an underlying airspace disease process. Upper Abdomen: Nonobstructive calculi are noted in the bilateral renal pelves, the largest on the left measures 12 mm and the largest on the right measures 5 mm. Soft Tissues/Bones: No acute bone or soft tissue abnormality. No evidence of pulmonary embolism. Small left and moderate size right pleural effusions. A moderate size area of consolidation is noted involving the right lower lung, concerning for an underlying airspace disease process. RECOMMENDATIONS: Unavailable       Objective:   Vitals: /68   Pulse 95   Temp 97.7 °F (36.5 °C) (Temporal)   Resp 21   Ht 5' 6\" (1.676 m)   Wt 245 lb 13 oz (111.5 kg)   SpO2 99%   BMI 39.68 kg/m²   General appearance: appears stated age   Skin:  No rashes or lesions  HEENT: Head: Normocephalic, no lesions, without obvious abnormality.   Neck: no adenopathy, no carotid bruit, no JVD, supple, symmetrical, trachea midline and thyroid not enlarged, symmetric, no tenderness/mass/nodules  Lungs: diminished breath sounds bibasilar  Heart: regular rate and rhythm, S1, S2 normal, no murmur, click, rub or gallop  Abdomen: soft, non-tender; bowel sounds normal; no masses,  no organomegaly  Extremities: extremities normal, atraumatic, no cyanosis or edema  Neurologic: Mental status: alertness: lethargic    Assessment:   Patient Active Problem List:     Depression     Hypothyroidism     Permanent atrial fibrillation (HCC)     History of artificial lens replacement     Essential hypertension     Moderate obesity     Greater trochanteric bursitis     Chronic anticoagulation     Chronic obstructive pulmonary disease (HCC)     Mixed stress and urge urinary incontinence     Fuchs' endothelial dystrophy     Pseudophakia, both eyes     Chronic diastolic (congestive) heart failure ([address]     Pure hypercholesterolemia     Spinal stenosis of lumbar region with neurogenic claudication     Patent foramen ovale Coronary arteriosclerosis in native artery     Frequent falls     Dementia (HCC)     Ambulatory dysfunction     Cognitive dysfunction     COVID-19 virus infection     Nephrolithiasis     AMS (altered mental status)     Laceration of fascia of head     Head injury, initial encounter     Hypernatremia     Acute encephalopathy    Plan:   1)Hypernatremia as a result of being n.p.o.  , NA better      Continue hypotonic IV fluids D5W at 75 cc/h. Monitor serum sodium and adjust free water supplementation accordingly.   Her ACE inhibitor spironolactone and loop diuretic are discontinued while n.p.o.     2) Hypophosphatemia resulting from not eating: Supplement IV and monitor while n.p.o.     3) Anemia: Not related to the patient's kidney function with serum creatinine of 0.5          Sharlene Capellan MD

## 2022-04-18 NOTE — PLAN OF CARE
Problem: Falls - Risk of:  Goal: Will remain free from falls  Description: Will remain free from falls  4/18/2022 6314 by Alli Monge RN  Outcome: Ongoing  4/18/2022 0031 by James Denny RN  Outcome: Met This Shift  Goal: Absence of physical injury  Description: Absence of physical injury  Outcome: Ongoing

## 2022-04-18 NOTE — PROGRESS NOTES
[address] [facility]  Family Medicine Attending    S: 81 y.o. female with falls at [facility] and encephalopathy, forehead wound, frequent falls, CAD, HF, HTN, hyperPTH s/p parathyroidectomy, atrial fibrillation, COPD. Found to have hypernatremia, COVID positive. Had been agitated overnight. Today, sitting up in bed. Appears calm at the moment. Opens eyes but does not follow commands. Overnight pulled out central line as well as NGT. O: VS- Blood pressure 115/71, pulse 99, temperature 97.7 °F (36.5 °C), temperature source Temporal, resp. rate 16, height 5' 6\" (1.676 m), weight 245 lb 13 oz (111.5 kg), SpO2 97 %, not currently breastfeeding. Exam is as noted by resident with the following changes, additions or corrections:  Gen somnolent, open eyes to voice. CV irreg irreg, mildly tachy  Resp decreased, coarse, unlabored on NC  Abd soft  Ext - 2+ pitting edema right upper extremity    Impressions:   Principal Problem:    Hypernatremia  Active Problems:    Permanent atrial fibrillation (HCC)    Essential hypertension    Chronic obstructive pulmonary disease (HCC)    Frequent falls    AMS (altered mental status)    Head injury, initial encounter    Acute encephalopathy  Resolved Problems:    * No resolved hospital problems. *      Plan:   Follow mentation - mildly improving today . Neuro consult today - recommended possible EEG if no improvement. Has underlying dementia, and had COVID in January. Lactate in range, ABG stable. Follow bowel function, order KUB. Discuss with family/facility to better understand baseline. Had received dexamethasone, now held. Had been treated with Ativan on admission, no further doses. Hypernatremia, likely little po intake prior to admission, nephrology management appreciated. This has improved. Pulm management appreciated. Rate control for A fib. On ceftriaxone day 6/7 for UTI. Swallow evaluation as able; has not been able to participate.       Check DVT US or right upper extremity. Place NGT for nutrition and hydration and medications. - patient removed. Plan for goals of care discussion with family as patient has not been improving despite normalization in labs and treatment of UTI. Attending Physician Statement  I have reviewed the chart and seen the patient with the resident(s). I personally reviewed images, EKG's and similar tests, if present. I personally reviewed and performed key elements of the history and exam.  I have reviewed and confirmed student and/or resident history and exam with changes as indicated above. I agree with the assessment, plan and orders as documented by the resident. Please refer to the resident and/or student note for additional information. Lina Vincent.  Kayla Pineda MD

## 2022-04-19 NOTE — PROGRESS NOTES
[facility] Inpatient   Resident Progress Note    S:  Hospital day: 6   Brief Synopsis: Rei Amin is a 81 y.o. female PMH of atrial fibrillation (on Coumadin ), coronary artery disease, CHF, GERD, hyperparathyroidism, hypertension, hypothyroidism, osteoarthritis and urinary incontinenc of who presents to ED for Abnormal Lab (pt new pt to [facility], came in for abnormal labs. pt with frequent falls and they state she has a change in mentation. ER labs showed H/H 11.4/38.0, sodium 152, potassium 3.9, chloride 108, BUN 23, creatinine 0.9, AST 40, alkaline phosphatase 127, INR 2, urine analysis leukocyte esterase with WBC 1-3, troponin 24 x25, normal lactic acid. EKG showed A. fib with RVR and chest x-ray new opacity in the right lung base. Patient is more alert and awake. Opening her eyes spontaneously. Follow command. Patient is disoriented likely underlying dementia. As per nurse patient had soft. Breakfast and did not trouble with swallow. Cont meds:    sodium chloride      dextrose      dextrose 75 mL/hr at 04/19/22 0716     Scheduled meds:    lidocaine  5 mL IntraDERmal Once    sodium chloride flush  5-40 mL IntraVENous 2 times per day    heparin flush  1 mL IntraVENous 2 times per day    enoxaparin  30 mg SubCUTAneous BID    [Held by provider] dexamethasone  6 mg IntraVENous Q24H    metoprolol succinate  75 mg Oral Daily    levalbuterol  1 ampule Nebulization BID    sodium chloride flush  5-40 mL IntraVENous 2 times per day    cefTRIAXone (ROCEPHIN) IV  1,000 mg IntraVENous Q24H    levothyroxine  175 mcg Oral Daily     PRN meds: sodium chloride flush, sodium chloride, heparin flush, glucose, dextrose, glucagon (rDNA), dextrose, hydrALAZINE, perflutren lipid microspheres, metoprolol, sodium chloride flush, ondansetron **OR** ondansetron, polyethylene glycol     I reviewed the patient's past medical and surgical history, Medications and Allergies.     O:  /71 Pulse 115   Temp 97.5 °F (36.4 °C) (Oral)   Resp 21   Ht 5' 6\" (1.676 m)   Wt 248 lb 0.3 oz (112.5 kg)   SpO2 98%   BMI 40.03 kg/m²   24 hour I&O: I/O last 3 completed shifts: In: 2305.1 [P.O.:360; I.V.:1790.5; IV Piggyback:154.6]  Out: 525 [Urine:525]  No intake/output data recorded. General: Alert, awake, opening eyes spontaneously, does not follow command neck: Supple, symmetrical, no JVD   Lung:  Evidence of some rales bilaterally, also rhonchi   heart: IrRegular rate and irregular rhythm  Abdomen: SNTND, Extremities:  Extremities normal, atraumatic, no cyanosis or edema. Distal pulses equal bilaterally  Skin: Skin color, texture, turgor normal, no rashes or lesions  Musculoskeletal: No joint swelling, no muscle tenderness   Neurologic:  Alert, awake but disoriented likely due to underlying dementia       Labs:  Na/K/Cl/CO2:  142/3.6/103/32 (04/19 5053)  BUN/Cr/glu/ALT/AST/amyl/lip:  15/0.7/--/18/27/--/-- (04/19 4218)  WBC/Hgb/Hct/Plts:  4.2/10.7/35.9/168 (04/19 8711)  estimated creatinine clearance is 73 mL/min (based on SCr of 0.7 mg/dL). Other pertinent labs as noted below    Radiology:  US DUP UPPER EXTREMITY RIGHT VENOUS   Final Result   Within the visualized vessels there is no evidence for deep venous   thrombosis               XR CHEST PORTABLE   Final Result   Bibasilar atelectasis and or infiltrate as well as right pleural effusion. Mildly worsened aeration on the left. XR ABDOMEN FOR NG/OG/NE TUBE PLACEMENT   Final Result   Gastric catheter passes into the stomach, tip is in the gastric body. No   complications. XR CHEST PORTABLE   Final Result   1. Significant interval improvement in the left lung lower lung and lingular   consolidation   2. Increased right pleural effusion and or atelectasis   3. Cardiomegaly, stable   4.  The position of the nasogastric tube is within normal range         XR CHEST PORTABLE   Final Result   Increased markings seen in the lung bases bilaterally with small bilateral   pleural effusions not significantly changed in the interval.  No new findings. XR ABDOMEN (KUB) (SINGLE AP VIEW)   Final Result   No acute intra-process seen. Left renal 12 mm calculus. CT HEAD WO CONTRAST   Final Result   1. No acute intracranial abnormality. 2. Chronic small vessel ischemic disease. 3. Left frontal scalp hematoma. XR CHEST PORTABLE   Final Result   Cardiomegaly, pulmonary venous hypertension and small effusions. Findings   suggest volume overload. Mild right basilar atelectasis. CTA PULMONARY W CONTRAST   Final Result   No evidence of pulmonary embolism. Small left and moderate size right pleural effusions. A moderate size area of consolidation is noted involving the right lower   lung, concerning for an underlying airspace disease process. RECOMMENDATIONS:   Unavailable         XR CHEST PORTABLE   Final Result   New opacities in right lung base suggestive of pneumonia, atelectasis, and   possible right pleural effusion. CT Head WO Contrast   Final Result   No acute intracranial abnormality or hemorrhage. Cortical atrophy and periventricular leukomalacia. Left frontal scalp hematoma. Right frontal sinusitis and right mastoiditis. CT Cervical Spine WO Contrast   Final Result   No acute abnormality of the cervical spine. Multilevel degenerative changes, no significant interval change. Right pleural effusion. XR CHEST PORTABLE    (Results Pending)   XR CHEST PORTABLE    (Results Pending)       A/P:  Principal Problem:    Hypernatremia  Active Problems:    Permanent atrial fibrillation (HCC)    Essential hypertension    Chronic obstructive pulmonary disease (HCC)    Frequent falls    AMS (altered mental status)    Head injury, initial encounter    Acute encephalopathy  Resolved Problems:    * No resolved hospital problems.  *    Acute Encephalopathy   - pt presented with altered mental status, disoriented and not able follow commands  -Patient is more alert and awake, spontaneously opening her eyes  - Initial imaging showed possible right lobe pneumonia vs pleural effusion, ct head no acute ischemia or bleeding  - likely secondary to  metabolic, hepatic, septic, drug induced, worsening underlying dementia   -  Daily labs  -Blood culture no growth so far  -Urine culture grew E. coli . We will continue Rocephin day 7, will discontinue today  -Pro-Salomon level and lactic acid normal  -Chest x-ray consistent with pleural effusion rather than pneumonia  -  bladder scan showed 86   - repeat CAT scan head is unremarkable  - neuro consulted, recommneds to EEG if worsening     Hypernatremia(Acute vs Chronic) resolved  -Improving, today is 142, on admission was 152  -Nephro on board -   -We will decrease iv dextrose 75 mL/h.   As per pulmonary recommendation cautious with fluid we will touch base with nephro  - Neuro vascular check       Pneumonia(Viral vs bacterial) VS  pleural EFFUSION   - Likely covid induced with secondary bacterial  - Lung exam showed scattered rales , received 1 liter fluid yesterday for oliguria might fluid overload, will repeat CXR and might give iv lasix   -Repeat Rapid Covid is negative  - Mild leucocytosis on admission, resolved  -We will hold on decadrone 6 mg as pulmonary recommendation  -Patient might need thoracocentesis in future  - Continue oxygen supplement and wean as tolerated , also BIPAP  -Pulmonary is following   -CT angio no PE  - will resume lasix 20 mg daily   - Stop IVF     A fib with RVR  - On last admission Coumadin was discontinued due to high for bleeding despite of high CHADVAS score after discussion with patient daughter  - Will continue to hold Coumadin  -Heart rate improving  -Cardio consulted and increase metoprolol to 75 mg daily  -IV metoprolol 5 mg every 6 hour if not able to take oral   - Last Echo in 2018, >55% EF, Interdeterminate diastolic dysfunction  consider repeating during this admission   - Keep mag/k/phos >2/4     Multiple fall  - Wound on left forehead  - Wound care is following patient  - No need suture  - Fall precaution  - PT/OT eval      Hypothyroidism  - Recent TSH normal  - Continue current regimen      Hypertension  -Able to take any oral medication  -As needed hydralazine 5 mg every 6 hour keeping systolic less than 387   - Continue home Lisinopril and metoprolol , consider increasing if needed        DVT / GI prophylaxis: Lovenox 40 mg SC     Dispo -continue care in telemetry and close monitoring mental status. Plan to talk with family member regarding goals of care. Palliative consulted.     Electronically signed by Sylwia Molina MD PGY-3 on 4/19/2022 at 8:33 AM     This case was discussed with attending physician: Dr. Thang Castellano

## 2022-04-19 NOTE — CARE COORDINATION
Voicemail message left for the admissions office at [facility] to see if they are able to accept patient. Last negative Covid-19 test was last Thursday April 14. Per RN, she is out of restraints and she does not have a patient . She does have a telesitter. Await call back from [facility]. Therapy department notified of need for updated PT/OT. Ambulance form in envelope in soft chart. Alexx Avalos RN CM  [phone]    Spoke to Paulette Hatch at [facility], they are still reviewing for acceptance. Ambulance form in envelope in soft chart.    Alexx Avalos RN CM  [phone]

## 2022-04-19 NOTE — PROGRESS NOTES
[facility] Inpatient   Resident Progress Note    S:  Hospital day: 6   Brief Synopsis: Torie Yi is a 81 y.o. female PMH of atrial fibrillation (on Coumadin ), coronary artery disease, CHF, GERD, hyperparathyroidism, hypertension, hypothyroidism, osteoarthritis and urinary incontinenc of who presents to ED for Abnormal Lab (pt new pt to [facility], came in for abnormal labs. pt with frequent falls and they state she has a change in mentation. ER labs showed H/H 11.4/38.0, sodium 152, potassium 3.9, chloride 108, BUN 23, creatinine 0.9, AST 40, alkaline phosphatase 127, INR 2, urine analysis leukocyte esterase with WBC 1-3, troponin 24 x25, normal lactic acid. EKG showed A. fib with RVR and chest x-ray new opacity in the right lung base. Patient is sleeping comfortably. Currently on oxygen via nasal cannula. More alert but does not follow command. Try to open eyeS     Cont meds:    sodium chloride      dextrose      dextrose 75 mL/hr at 04/19/22 0716     Scheduled meds:    lidocaine  5 mL IntraDERmal Once    sodium chloride flush  5-40 mL IntraVENous 2 times per day    heparin flush  1 mL IntraVENous 2 times per day    enoxaparin  30 mg SubCUTAneous BID    [Held by provider] dexamethasone  6 mg IntraVENous Q24H    metoprolol succinate  75 mg Oral Daily    levalbuterol  1 ampule Nebulization BID    sodium chloride flush  5-40 mL IntraVENous 2 times per day    cefTRIAXone (ROCEPHIN) IV  1,000 mg IntraVENous Q24H    levothyroxine  175 mcg Oral Daily     PRN meds: sodium chloride flush, sodium chloride, heparin flush, glucose, dextrose, glucagon (rDNA), dextrose, hydrALAZINE, perflutren lipid microspheres, metoprolol, sodium chloride flush, ondansetron **OR** ondansetron, polyethylene glycol     I reviewed the patient's past medical and surgical history, Medications and Allergies.     O:  /71   Pulse 104   Temp 97.5 °F (36.4 °C) (Oral)   Resp 21   Ht 5' 6\" (1.676 m) Wt 248 lb 0.3 oz (112.5 kg)   SpO2 98%   BMI 40.03 kg/m²   24 hour I&O: I/O last 3 completed shifts: In: 2305.1 [P.O.:360; I.V.:1790.5; IV Piggyback:154.6]  Out: 525 [Urine:525]  No intake/output data recorded. General: Alert, awake, opening eyes to loud voice and continue moaning on exam, does not follow command   neck: Supple, symmetrical, no JVD   Lung:  Evidence of some rales bilaterally, also rhonchi   heart: IrRegular rate and irregular rhythm  Abdomen: SNTND, Extremities:  Extremities normal, atraumatic, no cyanosis or edema. Distal pulses equal bilaterally  Skin: Skin color, texture, turgor normal, no rashes or lesions  Musculoskeletal: No joint swelling, no muscle tenderness   Neurologic:  Somnolent, responds to verbal stimuli, not able to perform neuro exam due to altered mental status       Labs:  Na/K/Cl/CO2:  144, 145/3.4, 3.4, 3.4/102, 104/34, 34 (04/18 0741)  BUN/Cr/glu/ALT/AST/amyl/lip:  18, 19/0.7, 0.7/--/19/30/--/-- (04/18 6323)  WBC/Hgb/Hct/Plts:  4.2/10.7/35.9/168 (04/19 1315)  estimated creatinine clearance is 73 mL/min (based on SCr of 0.7 mg/dL). Other pertinent labs as noted below    Radiology:  US DUP UPPER EXTREMITY RIGHT VENOUS   Final Result   Within the visualized vessels there is no evidence for deep venous   thrombosis               XR CHEST PORTABLE   Final Result   Bibasilar atelectasis and or infiltrate as well as right pleural effusion. Mildly worsened aeration on the left. XR ABDOMEN FOR NG/OG/NE TUBE PLACEMENT   Final Result   Gastric catheter passes into the stomach, tip is in the gastric body. No   complications. XR CHEST PORTABLE   Final Result   1. Significant interval improvement in the left lung lower lung and lingular   consolidation   2. Increased right pleural effusion and or atelectasis   3. Cardiomegaly, stable   4.  The position of the nasogastric tube is within normal range         XR CHEST PORTABLE   Final Result   Increased markings seen in the lung bases bilaterally with small bilateral   pleural effusions not significantly changed in the interval.  No new findings. XR ABDOMEN (KUB) (SINGLE AP VIEW)   Final Result   No acute intra-process seen. Left renal 12 mm calculus. CT HEAD WO CONTRAST   Final Result   1. No acute intracranial abnormality. 2. Chronic small vessel ischemic disease. 3. Left frontal scalp hematoma. XR CHEST PORTABLE   Final Result   Cardiomegaly, pulmonary venous hypertension and small effusions. Findings   suggest volume overload. Mild right basilar atelectasis. CTA PULMONARY W CONTRAST   Final Result   No evidence of pulmonary embolism. Small left and moderate size right pleural effusions. A moderate size area of consolidation is noted involving the right lower   lung, concerning for an underlying airspace disease process. RECOMMENDATIONS:   Unavailable         XR CHEST PORTABLE   Final Result   New opacities in right lung base suggestive of pneumonia, atelectasis, and   possible right pleural effusion. CT Head WO Contrast   Final Result   No acute intracranial abnormality or hemorrhage. Cortical atrophy and periventricular leukomalacia. Left frontal scalp hematoma. Right frontal sinusitis and right mastoiditis. CT Cervical Spine WO Contrast   Final Result   No acute abnormality of the cervical spine. Multilevel degenerative changes, no significant interval change. Right pleural effusion. XR CHEST PORTABLE    (Results Pending)   XR CHEST PORTABLE    (Results Pending)       A/P:  Principal Problem:    Hypernatremia  Active Problems:    Permanent atrial fibrillation (HCC)    Essential hypertension    Chronic obstructive pulmonary disease (HCC)    Frequent falls    AMS (altered mental status)    Head injury, initial encounter    Acute encephalopathy  Resolved Problems:    * No resolved hospital problems.  *    Acute Encephalopathy - pt presented with altered mental status, disoriented and not able follow commands  -Mental status unchanged since admission  - Initial imaging showed possible right lobe pneumonia vs pleural effusion, ct head no acute ischemia or bleeding  - likely secondary to  metabolic, hepatic, septic, drug induced, worsening underlying dementia   -  Daily labs  -Blood culture no growth so far  -Urine culture grew E. coli . We will continue Rocephin day 6  -Pro-Salomon level and lactic acid normal  -Chest x-ray consistent with pleural effusion rather than pneumonia  -  bladder scan showed 86   - repeat CAT scan head is unremarkable  - neuro consulted, recommneds to EEG if worsening     Hypernatremia(Acute vs Chronic)  -Improving, today is 148 improving, on admission was 152  -Nephro on board and currently on dextrose 75 mL/h.   As per pulmonary recommendation cautious with fluid we will touch base with nephro  - Neuro vascular check  - BMP every 4-6 hours     Pneumonia(Viral vs bacterial) VS  pleural EFFUSION   - Likely covid induced with secondary bacterial  - Lung exam showed scattered rales , received 1 liter fluid yesterday for oliguria might fluid overload, will repeat CXR and might give iv lasix   -Repeat Rapid Covid is negative  - Mild leucocytosis on admission  -We will hold on decadrone 6 mg as pulmonary recommendation  -Patient might need thoracocentesis in future  - Continue oxygen supplement and wean as tolerated , also BIPAP  -Pulmonary is following   -CT angio no PE     A fib with RVR  - On last admission Coumadin was discontinued due to high for bleeding despite of high CHADVAS score after discussion with patient daughter  - Will continue to hold Coumadin  - Tachycardia HR max 130  -Cardio consulted and increase metoprolol to 75 mg daily  -IV metoprolol 5 mg every 6 hour if not able to take oral   - Last Echo in 2018, >55% EF, Interdeterminate diastolic dysfunction  consider repeating during this admission   - Keep mag/k/phos >2/4     Multiple fall  - Wound on left forehead  - Wound care is following patient  - No need suture  - Fall precaution  - PT/OT eval      Hypothyroidism  - Recent TSH normal  - Continue current regimen      Hypertension  -Able to take any oral medication  -We will do as needed hydralazine 5 mg every 6 hour keeping systolic less than 793   - Continue home Lisinopril and metoprolol , consider increasing if needed        DVT / GI prophylaxis: Lovenox 40 mg SC     Dispo -continue care in telemetry and close monitoring mental status.   To talk with family member regarding goals of care    Electronically signed by Venita Mario MD PGY-3 on 4/19/2022 at 7:57 AM     This case was discussed with attending physician: Dr. Tayla Rose

## 2022-04-19 NOTE — PROGRESS NOTES
[address] [facility]  Family Medicine Attending    S: 81 y.o. female with falls at [facility], [place] and encephalopathy, forehead wound, frequent falls, CAD, HF, HTN, hyperPTH s/p parathyroidectomy, atrial fibrillation, COPD. Found to have hypernatremia, COVID positive. Had been agitated overnight. Patient seen and examined at bedside. More conversant today. Wakes up follows commands. Says she was taking a nap and that her family is coming to have lunch with her today. Does not know the year or where she is . O: VS- Blood pressure 133/71, pulse 115, temperature 97.5 °F (36.4 °C), temperature source Oral, resp. rate 21, height 5' 6\" (1.676 m), weight 248 lb 0.3 oz (112.5 kg), SpO2 98 %, not currently breastfeeding. Exam is as noted by resident with the following changes, additions or corrections:  Gen somnolent, open eyes to voice. CV irreg irreg, mildly tachy  Resp decreased, coarse, unlabored on NC  Abd soft  Ext - 2+ pitting edema right upper extremity    Impressions:   Principal Problem:    Hypernatremia  Active Problems:    Permanent atrial fibrillation (HCC)    Essential hypertension    Chronic obstructive pulmonary disease (HCC)    Frequent falls    AMS (altered mental status)    Head injury, initial encounter    Acute encephalopathy  Resolved Problems:    * No resolved hospital problems. *      Plan: Mentation improved today but patient continues to be disoriented most likely 2/2 dementia. Last day of ceftriaxone today. Stop IVF. slp eval done and patient ok for Soft and bite size consistency solids (IDDSI level 6) with  thin liquids (IDDSI level 0)    Appreciate palliative care. Daughter and  have decided on comfort care and hospice for patient. Hospice has been consulted. IVF stopped due to worsening pleural effusions. Continues diuresis for now. Attending Physician Statement  I have reviewed the chart and seen the patient with the resident(s).   I personally reviewed images, EKG's and similar tests, if present. I personally reviewed and performed key elements of the history and exam.  I have reviewed and confirmed student and/or resident history and exam with changes as indicated above. I agree with the assessment, plan and orders as documented by the resident. Please refer to the resident and/or student note for additional information. Lina Vincent.  Kayla Pineda MD

## 2022-04-19 NOTE — PROGRESS NOTES
Palliative Care Department  [phone]  Palliative Care Progress Note  Provider Alvin Hidalgo, APRN - CNP      PATIENT: Merle Garcia  : 1935  MRN: [id number]  ADMISSION DATE: 2022 12:09 PM  Referring Provider: Ita Welch MD    Palliative Medicine was consulted on hospital day 6 for assistance with Goals of care, Code Status Discussion, Family support: Patient is 81years old.  Clinically not improving since admission.  Unable to reach with family. Equblanca Drummond on her clinical we need to talk with patient family members regarding further goals of care since patient is not able to give decision at this time due to altered mental status and worsening dementia.  I would like palliative to see patient and evaluate. HPI:     Daija Somers is a 81 y.o. y/o female with a history of depression, hypothyroidism, A. fib, hypertension, 6, frequent falls, dementia, cognitive dysfunction, COVID-19, altered mental status, who presented to [facility]) on 2022 with abnormal labs from [facility]. ASSESSMENT/PLAN:     Pertinent Hospital Diagnoses     · Hypernatremia  · Altered mental status  · Acute encephalopathy  · With falls    Palliative Care Encounter / Counseling Regarding Goals of Care  Please see detailed goals of care discussion as below   At this time, Merle Garcia, Does have capacity for medical decision-making.   Capacity is time limited and situation/question specific   During encounter Bervely was surrogate medical decision-maker   Outcome of goals of care meeting:  o Continue with current medical treatment  o That is going to speak with her father and discuss goal of care     Code status DNR-CCA   Advanced Directives: no POA or living will in [facility]   Surrogate/Legal NOK:  o Georgina Fang (spouse-POA) [phone]  o Nazia Burciaga (daugther- alternative POA) [phone]    Spiritual assessment: no spiritual distress identified  Bereavement and grief: to be determined  Referrals to: none today    Thank you for the opportunity to participate in the care of [place]. ARIEL Fraser CNP  Palliative Medicine     SUBJECTIVE:     Details of Conversation:      Chart reviewed. Saw patient at the bedside. No family members present. Spoke with bedside nurse reports the patient is a bit alert today, but continues to refuse to take nutrition or take her medications. Attempt to call patient daughter over the phone, left voicemail to call us back. We will continue to follow. Addendum    Patient's daughter called back, she has spoken to her father about the patient's cognitive decline, and decided that hospice would be the best choice for her mother. Comfort support were provided. Hospice consult placed. We will continue to follow. Prognosis: Poor    OBJECTIVE:     /71   Pulse 115   Temp 97.5 °F (36.4 °C) (Oral)   Resp 21   Ht 5' 6\" (1.676 m)   Wt 248 lb 0.3 oz (112.5 kg)   SpO2 98%   BMI 40.03 kg/m²     Physical Examination:  Due to the current efforts to prevent transmission of COVID-19 and also the need to preserve PPE for other caregivers, a face-to-face encounter with the patient was not performed. That being said, all relevant records and diagnostic tests were reviewed, including laboratory results and imaging. Please reference any relevant documentation elsewhere. Care will be coordinated with the primary service. Objective data reviewed: labs, images, records, medication use, vitals and chart    Time/Communication  Greater than 50% of time spent, total 35 minutes in counseling and coordination of care at the bedside regarding goals of care. Thank you for allowing Palliative Medicine to participate in the care of [place]. Note: This report was completed using computerFliptu voiced recognition software.   Every effort has been made to ensure accuracy; however, inadvertent computerized transcription errors may be present.

## 2022-04-19 NOTE — PROGRESS NOTES
[facility]  Nephrology Progress Note    SUBJECTIVE from Dr. Hannah Garsia 4/14 Consult Note: \"Mrs Ran Johnson 81-year-old woman who was admitted from nursing facility with altered mentation and confusion. She was found to have a sodium of 152 for which we have been asked to assist in her management. \"    Interval History:    4/19: Patient was not personally seen or examined due to COVID isolation. Discussed with bedside nurse, who reports that the patient is doing better. The bedside nurse explained that the patient is more awake and alert. No issues noted overnight. PMH:    Past Medical History:   Diagnosis Date    Anticoagulated on Coumadin     on Coumadin for this Dr. Nury Driscoll controls    Atrial fibrillation ([address]     Basal cell carcinoma of neck     CAD (coronary artery disease)     History of luminal irregularities of the coronary artery, stable.     CHF (congestive heart failure) ([facility])     stage I diastolic dysfunction on 3/96/65    Depression 11/16/2010    DJD (degenerative joint disease)     SEVERE IN RIGHT HAND, IN MULTIPLE OTHER JOINTS    GERD (gastroesophageal reflux disease) 11/16/2010    History of cardiovascular stress test 03/18/2014    lexiscan    Hyperparathyroidism ([address] 11/16/2010    Had parathyroidectomy    Hypertension 11/16/2010    Hypothyroidism 11/16/2010    Mild mitral regurgitation 7/10/14    Osteoarthritis 11/16/2010    Pulmonary hypertension ([address] 7/10/14    mild    Renal calculi     x3    Tricuspid regurgitation 7/10/14    mild-moderate    Urinary incontinence 11/16/2010     Patient Active Problem List   Diagnosis    Depression    Hypothyroidism    Permanent atrial fibrillation ([address]    History of artificial lens replacement    Essential hypertension    Moderate obesity    Greater trochanteric bursitis    Chronic anticoagulation    Chronic obstructive pulmonary disease (HCC)    Mixed stress and urge urinary incontinence    Fuchs' endothelial dystrophy    Pseudophakia, both eyes    Chronic diastolic (congestive) heart failure (HCC)    Pure hypercholesterolemia    Spinal stenosis of lumbar region with neurogenic claudication    Patent foramen ovale    Coronary arteriosclerosis in native artery    Frequent falls    Dementia ([address]    Ambulatory dysfunction    Cognitive dysfunction    COVID-19 virus infection    Nephrolithiasis    AMS (altered mental status)    Laceration of fascia of head    Head injury, initial encounter    Hypernatremia    Acute encephalopathy     Meds:     furosemide  20 mg IntraVENous Daily    lidocaine  5 mL IntraDERmal Once    sodium chloride flush  5-40 mL IntraVENous 2 times per day    heparin flush  1 mL IntraVENous 2 times per day    enoxaparin  30 mg SubCUTAneous BID    metoprolol succinate  75 mg Oral Daily    levalbuterol  1 ampule Nebulization BID    sodium chloride flush  5-40 mL IntraVENous 2 times per day    cefTRIAXone (ROCEPHIN) IV  1,000 mg IntraVENous Q24H    levothyroxine  175 mcg Oral Daily        sodium chloride      dextrose       Meds prn:     sodium chloride flush, sodium chloride, heparin flush, glucose, dextrose, glucagon (rDNA), dextrose, hydrALAZINE, perflutren lipid microspheres, metoprolol, sodium chloride flush, ondansetron **OR** ondansetron, polyethylene glycol    Meds prior to admission:     No current facility-administered medications on file prior to encounter.      Current Outpatient Medications on File Prior to Encounter   Medication Sig Dispense Refill    acetaminophen (TYLENOL) 325 MG tablet Take 650 mg by mouth every 4 hours as needed for Pain or Fever      b complex vitamins capsule Take 1 capsule by mouth daily      vitamin D (CHOLECALCIFEROL) 25 MCG (1000 UT) TABS tablet Take 1,000 Units by mouth daily      fluticasone (FLONASE) 50 MCG/ACT nasal spray 2 sprays by Nasal route daily      guaiFENesin (MUCINEX) 600 MG extended release tablet Take 600 mg by mouth daily      loperamide (IMODIUM) 2 MG capsule Take 4 mg by mouth 4 times daily as needed for Diarrhea      meclizine (ANTIVERT) 25 MG tablet Take 25 mg by mouth 3 times daily as needed for Dizziness      albuterol sulfate HFA (VENTOLIN HFA) 108 (90 Base) MCG/ACT inhaler Inhale 2 puffs into the lungs every 6 hours as needed for Wheezing      ascorbic acid (VITAMIN C) 500 MG tablet Take 1,000 mg by mouth daily      levothyroxine (SYNTHROID) 175 MCG tablet Take 1 tablet by mouth Daily 30 tablet 3    levalbuterol (XOPENEX) 0.63 MG/3ML nebulization Take 1 ampule by nebulization in the morning and at bedtime       metoprolol succinate (TOPROL XL) 50 MG extended release tablet Take 1 tablet by mouth nightly 30 tablet 5    magnesium hydroxide (MILK OF MAGNESIA) 400 MG/5ML suspension Take 5 mLs by mouth daily as needed for Constipation      lisinopril (PRINIVIL;ZESTRIL) 5 MG tablet Take 1 tablet by mouth daily 90 tablet 1    furosemide (LASIX) 40 MG tablet Take 1 tablet by mouth daily 60 tablet 2    famotidine (PEPCID) 20 MG tablet Take 1 tablet by mouth 2 times daily 60 tablet 5    escitalopram (LEXAPRO) 10 MG tablet Take 1 tablet by mouth daily 30 tablet 5    albuterol (PROVENTIL) (2.5 MG/3ML) 0.083% nebulizer solution Take 3 mLs by nebulization every 6 hours as needed for Wheezing 120 each 5    [DISCONTINUED] levothyroxine (SYNTHROID) 125 MCG tablet Take 1 tablet by mouth every morning (before breakfast) 24 tablet 2     Allergies:    Codeine, Penicillins, Vicodin [hydrocodone-acetaminophen], and Adhesive tape    Social History:     reports that she has never smoked. She has never used smokeless tobacco. She reports previous alcohol use. She reports that she does not use drugs.     Family History:         Problem Relation Age of Onset    Heart Disease Mother     Heart Attack Mother     Arthritis Mother     Heart Disease Father     Mental Illness Father     Diabetes Father     Heart Disease Brother     Cancer Brother     Cancer Son     Heart Disease Sister     Heart Disease Brother     Heart Surgery Brother      Physical Exam:    Patient Vitals for the past 24 hrs:   BP Temp Temp src Pulse Resp SpO2 Height Weight   04/19/22 0815 -- -- -- 115 -- -- -- --   04/19/22 0745 133/71 97.5 °F (36.4 °C) Oral 104 21 98 % -- --   04/19/22 0612 -- -- -- -- -- -- -- 248 lb 0.3 oz (112.5 kg)   04/19/22 0300 117/69 97.5 °F (36.4 °C) Axillary 105 20 100 % -- --   04/18/22 2000 -- -- -- 103 -- -- -- --   04/18/22 1830 105/71 -- -- 112 20 -- -- --   04/18/22 1818 -- -- -- -- -- 96 % -- --   04/18/22 1415 111/68 -- -- 95 21 99 % -- --   04/18/22 1158 -- -- -- -- -- -- 5' 6\" (1.676 m) --   04/18/22 1015 -- -- -- 111 -- -- -- --       Intake/Output Summary (Last 24 hours) at 4/19/2022 0939  Last data filed at 4/19/2022 0845  Gross per 24 hour   Intake 3175.97 ml   Output 175 ml   Net 3000.97 ml     Patient not personally examined due to COVID isolation. Due to the current efforts to prevent transmission of COVID-19 and also the need to preserve PPE for other caregivers, a face-to-face encounter with the patient was not performed. That being said, all relevant records and diagnostic tests were reviewed, including laboratory results and imaging. Please reference any relevant documentation elsewhere. Care will be coordinated with the primary service.     Data:    Recent Labs     04/17/22  0448 04/18/22  0741 04/19/22  0702   WBC 4.3* 4.6 4.2*   HGB 9.7* 10.3* 10.7*   HCT 32.6* 34.2 35.9   MCV 99.1 98.6 98.9    162 168     Recent Labs     04/17/22  0448 04/17/22  0448 04/18/22  0741 04/19/22  0702   *  --  144  145 142   K 3.7   < > 3.4*  3.4*  3.4* 3.6     --  102  104 103   CO2 30*  --  34*  34* 32*   CREATININE 0.5  --  0.7  0.7 0.7   BUN 24*  --  18  19 15   LABGLOM >60  --  >60  >60 >60   GLUCOSE 107*  --  97  93 106*   CALCIUM 8.7  --  9.3  9.3 9.1   PHOS 2.4*  --  3.0  --    MG 2.1  --  2.3  --     < > = values in this interval not displayed. Vit D, 25-Hydroxy   Date Value Ref Range Status   04/14/2022 37 30 - 100 ng/mL Final     Comment:     <20 ng/mL. ........... Margarette Riosn Deficient  20-30 ng/mL. ......... Margarette Riosn Insufficient   ng/mL. ........ Margarette Riosn Sufficient  >100 ng/mL. .......... Margarette Riosn Toxic       PTH   Date Value Ref Range Status   04/15/2022 112 (H) 15 - 65 pg/mL Final     Recent Labs     04/17/22  0448 04/18/22  0741 04/19/22  0702   ALT 16 19 18   AST 36* 30 27   ALKPHOS 72 84 87   BILITOT 0.7 0.7 0.6     Recent Labs     04/17/22 0448 04/18/22  0741 04/19/22  0702   LABALBU 3.0* 3.3* 3.2*     Ferritin   Date Value Ref Range Status   04/13/2022 155 ng/mL Final     Comment:     FERRITIN Reference Ranges:  Adult Males   20 - 60 years:    30 - 400 ng/mL  Adult females 16 - 60 years:    15 - 150 ng/mL  Adults greater than 60 years:   no established reference range  Pediatrics:                     no established reference range       Iron   Date Value Ref Range Status   02/20/2020 73 37 - 145 mcg/dL Final     TIBC   Date Value Ref Range Status   02/20/2020 323 250 - 450 mcg/dL Final     Vitamin B-12   Date Value Ref Range Status   04/06/2022 802 211 - 946 pg/mL Final     Folate   Date Value Ref Range Status   01/12/2022 >20.0 4.8 - 24.2 ng/mL Final     Lab Results   Component Value Date    COLORU Yellow 04/13/2022    NITRU Negative 04/13/2022    GLUCOSEU Negative 04/13/2022    KETUA Negative 04/13/2022    UROBILINOGEN 1.0 04/13/2022    BILIRUBINUR Negative 04/13/2022    BILIRUBINUR negative 07/05/2019     Lab Results   Component Value Date/Time    OSMOU 522 04/13/2022 03:49 PM     No components found for: URIC    No results found for: LIPIDPAN    Assessment and Plan:    1. Hyponatremia  Originally hypovolemic, now with worsening pulmonary edema  Na 152 on 4/13--> 142 today  On IVF D5 at 75 mL/hour  Follow    2. Hypokalemia  K+ 3.6 today  Follow    3.   COVID pneumonia/ Pleural effusion   Chest x-ray 4/17 \"significant interval improvement in the left lung lower lung and lingular consolidation\" and \"Increased right pleural effusion and or atelectasis\"  On Lasix 20mg IV daily   Pulmonology following    4. Atrial fibrillation with RVR  On metoprolol  Cardiology previously following    5. Hypertension  BP goal<130/80  BP at goal  Follow    6. Altered mental status  Neurology previously following    7.   Hypophosphatemia  Phos 3.0 on 4/18  Follow    ARIEL Bush - CNP     Patient seen , labs noted     Hypernatremia resolved , some edema     Agree with stopping IVF and initiate po diuretics     Plan as above      [facility]

## 2022-04-19 NOTE — PROGRESS NOTES
Pulmonary [facility]    Pulmonary Consult/Progress Note :    Reason for Consultation:COVID Pneumonia     CC : SOB      HPI:   Still somnolent   On 3 L   History limited  BNP 5000 K   Proc 0.05  COVID  +   CRP1   ABG shwos CO2 44  CXR with edema    PHYSICAL EXAMINATION:     VITAL SIGNS:  /71   Pulse 115   Temp 97.5 °F (36.4 °C) (Oral)   Resp 21   Ht 5' 6\" (1.676 m)   Wt 248 lb 0.3 oz (112.5 kg)   SpO2 98%   BMI 40.03 kg/m²   Wt Readings from Last 3 Encounters:   04/19/22 248 lb 0.3 oz (112.5 kg)   03/30/22 270 lb 4.8 oz (122.6 kg)   03/14/22 250 lb (113.4 kg)     Temp Readings from Last 3 Encounters:   04/19/22 97.5 °F (36.4 °C) (Oral)   03/30/22 96.5 °F (35.8 °C) (Temporal)   03/14/22 98.5 °F (36.9 °C) (Oral)     TMAX:  BP Readings from Last 3 Encounters:   04/19/22 133/71   03/30/22 (!) 144/97   03/15/22 118/82     Pulse Readings from Last 3 Encounters:   04/19/22 115   03/30/22 110   03/15/22 71     INTAKE/OUTPUTS:  I/O last 3 completed shifts:   In: 2305.1 [P.O.:360; I.V.:1790.5; IV Piggyback:154.6]  Out: 525 [Urine:525]    Intake/Output Summary (Last 24 hours) at 4/19/2022 0839  Last data filed at 4/18/2022 1751  Gross per 24 hour   Intake 2065.06 ml   Output 175 ml   Net 1890.06 ml     General Appearance: somnolent ,  Eyes: conjunctivae normal and sclera anicteric   Neck: No thyromegaly, no thyroid nodules and no cervical adenopathy   Pulmonary/Chest:Rhonchi bilateral   Cardiovascular: normal rate, regular rhythm, normal   Abdomen: obese, soft, non-tender, non-distended, n  Extremities edema   Musculoskeletal: no deformity or tenderness   Neurologic:normal  reflexes normal and symmetric, no cranial nerve deficit noted,lethargic     LABS/IMAGING:    CBC:   Lab Results   Component Value Date    WBC 4.2 04/19/2022    RBC 3.63 04/19/2022    HGB 10.7 04/19/2022    HCT 35.9 04/19/2022    MCV 98.9 04/19/2022    MCH 29.5 04/19/2022    MCHC 29.8 04/19/2022    RDW 15.1 04/19/2022     04/19/2022    MPV 10.9 04/19/2022     CMP:    Lab Results   Component Value Date     04/19/2022    K 3.6 04/19/2022     04/19/2022    CO2 32 04/19/2022    BUN 15 04/19/2022    CREATININE 0.7 04/19/2022    GFRAA >60 04/19/2022    LABGLOM >60 04/19/2022    GLUCOSE 106 04/19/2022    GLUCOSE 113 02/14/2012    PROT 5.9 04/19/2022    LABALBU 3.2 04/19/2022    LABALBU 4.4 02/14/2012    CALCIUM 9.1 04/19/2022    BILITOT 0.6 04/19/2022    ALKPHOS 87 04/19/2022    AST 27 04/19/2022    ALT 18 04/19/2022     Calcium:    Lab Results   Component Value Date    CALCIUM 9.1 04/19/2022     Phosphorus:    Lab Results   Component Value Date    PHOS 3.0 04/18/2022               ASSESSMENT:  1.) Acute hypoxic /hypercpnic resp failure  2.) Fluid overload/CHF  3.) Right side effusion > left   4.) COVID pneumonia  5.) Fall /A fib .was on couamdin      PLAN:  1. Continue diuresis ! Stop IVF  2. Hold on steroids and hold on Toci -   3. BiPAP at night  4. Avoid fluid overlaod  5. Aggressive pulmonary toilet   6.  Bronchodilators      Earl Devine DO, MPH, FCCP, [place], [place]

## 2022-04-19 NOTE — PROGRESS NOTES
SPEECH/LANGUAGE PATHOLOGY  CLINICAL ASSESSMENT OF SWALLOWING FUNCTION   and PLAN OF CARE  PATIENT NAME:  Marleni Nur  (female)     MRN:  [id number]    :  1935  (81 y.o.)  STATUS:  Inpatient: Room 8410/8410-B    TODAY'S DATE:  2022  REFERRING PROVIDER:   Latricia Sarabia MD  SPECIFIC PROVIDER ORDER: SLP swallowing-dysphagia evaluation and treatment   Date of order:  22  REASON FOR REFERRAL: to assess oropharyngeal function   EVALUATING THERAPIST: JERAD Mcknight                 ASSESSMENT:    DYSPHAGIA DIAGNOSIS:   Clinical indicators of functional swallow for age/premorbid functioning      DIET RECOMMENDATIONS:  Soft and bite size consistency solids (IDDSI level 6) with  thin liquids (IDDSI level 0)     FEEDING RECOMMENDATIONS:     Assistance level:  No assistance needed      Compensatory strategies recommended: No strategies are recommended at this time      Discussed recommendations with nursing?: No secondary to no diet/liquid change recommended     SPEECH THERAPY  PLAN OF CARE   The dysphagia POC is established based on physician order, dysphagia diagnosis and results of clinical assessment     Dysphagia therapy is not recommended     Conditions Requiring Skilled Therapeutic Intervention for dysphagia:    not applicable    Specific dysphagia interventions to include:     Not applicable    Specific instructions for next treatment:  not applicable   Patient Treatment Goals:    Short Term Goals:  Not applicable no therapy warranted     Long Term Goals:   Not applicable no therapy warranted      Patient/family Goal:    not applicable                    ADMITTING DIAGNOSIS: Hypernatremia [E87.0]  Acute encephalopathy [G93.40]  Altered mental status, unspecified altered mental status type [R41.82]    VISIT DIAGNOSIS:      PATIENT REPORT/COMPLAINT: denies difficulty swallowing  RN cleared patient for participation in assessment     yes     PRIOR LEVEL OF SWALLOW FUNCTION:    PAST HISTORY OF DYSPHAGIA?: yes    Home diet: Soft and bite size consistency solids (IDDSI level 6) with  thin liquids (IDDSI level 0)    Current Diet Order:  ADULT DIET; Dysphagia - Pureed; Low Sodium (2 gm)  ADULT TUBE FEEDING; Nasogastric; Standard with Fiber; Continuous; 20; Yes; 10; Q 4 hours; 50; 30; Q 4 hours    PROCEDURE:  Consistencies Administered During the Evaluation   Liquids: thin liquid   Solids:  pureed foods and soft solid foods      Method of Intake:   cup, straw, spoon  Self fed      Position:   Seated, upright    CLINICAL ASSESSMENT  Oral Stage: The oral stage of swallowing was within functional limits      Pharyngeal Stage:    No signs of aspiration were noted during this evaluation however, silent aspiration cannot be ruled out at bedside. If silent aspiration is suspected, a Videofluoroscopic Study of Swallowing (MBS) is recommended and requires a physician order. Cognition:   Confusion noted    Oral Peripheral Examination   Adequate lingual/labial strength     Current Respiratory Status    3 liters nasal cannula     Parameters of Speech Production  Respiration:  Adequate for speech production  Quality:   Within functional limits  Intensity: Within functional limits    Volitional Swallow: Present    Volitional Cough:    Present    Pain: No pain reported. EDUCATION:   The Speech Language Pathologist (SLP) completed education regarding results of evaluation and that intervention is not warranted at this time. Learner: Patient  Education:  Reviewed results and recommendations of this evaluation  Evaluation of Education: No evidence of learning    This plan will be re-evaluated and revised in 1 week  if warranted. CPT code:  68210  bedside swallow eval      [x]The admitting diagnosis and active problem list, have been reviewed prior to initiation of this evaluation.         ACTIVE PROBLEM LIST:   Patient Active Problem List   Diagnosis    Depression    Hypothyroidism    Permanent [facility] fibrillation ([facility]    History of artificial lens replacement    Essential hypertension    Moderate obesity    Greater trochanteric bursitis    Chronic anticoagulation    Chronic obstructive pulmonary disease (HCC)    Mixed stress and urge urinary incontinence    Fuchs' endothelial dystrophy    Pseudophakia, both eyes    Chronic diastolic (congestive) heart failure (HCC)    Pure hypercholesterolemia    Spinal stenosis of lumbar region with neurogenic claudication    Patent foramen ovale    Coronary arteriosclerosis in native artery    Frequent falls    Dementia ([facility]    Ambulatory dysfunction    Cognitive dysfunction    COVID-19 virus infection    Nephrolithiasis    AMS (altered mental status)    Laceration of fascia of head    Head injury, initial encounter    Hypernatremia    Acute encephalopathy           Anmol Fu [address], CCC-SLP  [place]. [id number]

## 2022-04-19 NOTE — PROGRESS NOTES
Physical Therapy  Physical Therapy Initial Evaluation    Name: Vee Rausch  : 1935  MRN: [id number]      Date of Service: 2022    Evaluating PT:  Shruthi Chavis, PT [id number]      Room #:  8410/8410-B  Diagnosis:  Hypernatremia [E87.0]  Acute encephalopathy [G93.40]  Altered mental status, unspecified altered mental status type [R41.82]  PMHx/PSHx:     has a past medical history of Anticoagulated on Coumadin, Atrial fibrillation ([address] Basal cell carcinoma of neck, CAD (coronary artery disease), CHF (congestive heart failure) ([address] Depression, DJD (degenerative joint disease), GERD (gastroesophageal reflux disease), History of cardiovascular stress test, Hyperparathyroidism ([address] Hypertension, Hypothyroidism, Mild mitral regurgitation, Osteoarthritis, Pulmonary hypertension ([address] Renal calculi, Tricuspid regurgitation, and Urinary incontinence. has a past surgical history that includes Thyroidectomy (); Kidney stone surgery (); Cholecystectomy (); Kidney stone surgery (); Cataract removal (2005); Incontinence surgery (2005); Thyroid surgery (2005); parathyroidectomy; Knee Arthroplasty (Bilateral, 2005); Total shoulder arthroplasty (Left, ); fracture surgery (Left); Carpal tunnel release; Finger surgery (2011); Total shoulder arthroplasty (Left); Inner ear surgery (); Cataract removal with implant (Bilateral); Colonoscopy; and Cardiac catheterization (3/22/14). Procedure/Surgery:  none  Precautions:  Falls, bed alarm , FWB (full weight bearing), Currently still under COVID-19 Droplet Plus precautions. Equipment Needs: Equipment at [address] [address] [facility]:    Patient admitted from SNF. PLOF to this admission unknown. Patient unable to provide history. Equipment owned: Equipment at [address],      OBJECTIVE:   Initial Evaluation  Date: 22 Treatment Short Term/ Long Term   Goals   AM-PAC 6 Clicks 63/99     Was pt agreeable to Eval/treatment?  yes Does pt have pain? None indicated. Bed Mobility  Rolling: Mod    Supine to sit:   Max    Sit to supine: Max    Scooting: Mod    Rolling: Min  Supine to sit: Min  Sit to supine: Min  Scooting: Min   Transfers Sit to stand: Max    Stand to sit: Max   Stand pivot: NT unsafe this date. Sit to stand: Mod   Stand to sit: Mod    Stand pivot: Max     Ambulation    NT  TBD   Stair negotiation: ascended and descended  NT     ROM BUE:  wfl   BLE:  wfl     Strength BUE: 3+/5  RLE:  Grossly 3+/5  LLE:  Grossly  3+/5  4-/5   Balance Sitting EOB: Mod constant contact. Dynamic Standing: Max A  Sitting EOB:  SBA  Dynamic Standing:  Max     Patient is Alert & Oriented x person reacts to her name and does not follow directions   Sensation:  Pt denies numbness and tingling to extremities  Edema:  none    Vitals:  Seated   Blood Pressure at rest -   Heart Rate at rest - bpm   SPO2 at rest 96%      Therapeutic Exercises:  Functional activity     Patient education  Pt educated regarding role of PT evaluation    Patient response to education:   Pt verbalized understanding Pt demonstrated skill Pt requires further education in this area   no no      ASSESSMENT:    Conditions Requiring Skilled Therapeutic Intervention:    [x]Decreased strength     [x]Decreased ROM  [x]Decreased functional mobility  [x]Decreased balance   [x]Decreased endurance   [x]Decreased posture  []Decreased sensation  []Decreased coordination   []Decreased vision  [x]Decreased safety awareness   []Increased pain       Comments:    RN cleared patient for participation in therapy session. Patient was seen this date for PT evaluation. . Patient was agreeable to intervention. Results of the functional assessment are noted above. Upon entering the room patient was found supine in bed. Constant cues to complete mobility. Transitioned to [address] [place] [address]. Sat EOB x 5 minutes to increase dynamic sitting balance and activity tolerance. STS completed x 2 reps.  Linen pads exchanged. No side steps this date. At end of session, patient in bed with alarm activated call light and phone within reach,  all lines and tubes intact, nursing notified. This patient can benefit from the continuation of skilled PT  to maximize functional level and return to PLOF. Treatment:  Patient practiced and was instructed in the following treatment:    · Bed mobility training - pt given verbal and tactile cues to facilitate proper sequencing and safety during rolling and supine>sit as well as provided with physical assistance to complete task   · STS and transfer training - educated on hand/foot placement, safety, and sequencing during STS and pivot transfers using assistive device  · Gait training - NT  o Skilled repositioning in bed. Pt's/ family goals      1. None stated    Prognosis is fair  for reaching above PT goals.     Patient and or family understand(s) diagnosis, prognosis, and plan of care.  yes,     PHYSICAL THERAPY PLAN OF CARE:    PT POC is established based on physician order and patient diagnosis     Referring provider/PT Order:  PT Eval and Treat   Electronically Signed By  Latricia Sarabia MD Date  Apr 13, 2022  4:04 PM       Diagnosis:  Hypernatremia [E87.0]  Acute encephalopathy [G93.40]  Altered mental status, unspecified altered mental status type [R41.82]  Specific instructions for next treatment:  Transfer to bedside chair and Increase ambulation distance  Current Treatment Recommendations:     [x] Strengthening to improve independence with functional mobility   [x] ROM to improve independence with functional mobility   [x] Balance Training to improve static/dynamic balance and to reduce fall risk  [x] Endurance Training to improve activity tolerance during functional mobility   [x] Transfer Training to improve safety and independence with all functional transfers   [x] Gait Training to improve gait mechanics, endurance and asses need for appropriate assistive device  [x] Stair Training in preparation for safe discharge home and/or into the community   [] Positioning to prevent skin breakdown and contractures  [x] Safety and Education Training   [] Patient/Caregiver Education   [] HEP  [] Other     PT long term treatment goals are located in above grid    Frequency of treatments: 2-5x/week x 1-2 weeks. Time in  1240  Time out  1305    Total Treatment Time  25 minutes     Evaluation Time includes thorough review of current medical information, gathering information on past medical history/social history and prior level of function, completion of standardized testing/informal observation of tasks, assessment of data and education on plan of care and goals.     CPT codes:  [x] Low Complexity PT evaluation 08635  [] Moderate Complexity PT evaluation 80535  [] High Complexity PT evaluation 90618  [] PT Re-evaluation 77543  [] Gait training 83492 - minutes  [] Manual therapy 33092 - minutes  [x] Therapeutic activities 13539 10 minutes  [] Therapeutic exercises 44868 - minutes  [] Neuromuscular reeducation 56292 - minutes     Diaz Quivers, [address] [facility]

## 2022-04-19 NOTE — PROGRESS NOTES
Pulmonary [address] [facility]    Pulmonary Consult/Progress Note :    Reason for Consultation:COVID Pneumonia     CC : SOB      HPI:       ABG shwos CO2 44  CXR with edema    PHYSICAL EXAMINATION:     VITAL SIGNS:  /71   Pulse 115   Temp 97.5 °F (36.4 °C) (Oral)   Resp 21   Ht 5' 6\" (1.676 m)   Wt 248 lb 0.3 oz (112.5 kg)   SpO2 98%   BMI 40.03 kg/m²   Wt Readings from Last 3 Encounters:   04/19/22 248 lb 0.3 oz (112.5 kg)   03/30/22 270 lb 4.8 oz (122.6 kg)   03/14/22 250 lb (113.4 kg)     Temp Readings from Last 3 Encounters:   04/19/22 97.5 °F (36.4 °C) (Oral)   03/30/22 96.5 °F (35.8 °C) (Temporal)   03/14/22 98.5 °F (36.9 °C) (Oral)     TMAX:  BP Readings from Last 3 Encounters:   04/19/22 133/71   03/30/22 (!) 144/97   03/15/22 118/82     Pulse Readings from Last 3 Encounters:   04/19/22 115   03/30/22 110   03/15/22 71     INTAKE/OUTPUTS:  I/O last 3 completed shifts:   In: 2305.1 [P.O.:360; I.V.:1790.5; IV Piggyback:154.6]  Out: 525 [Urine:525]    Intake/Output Summary (Last 24 hours) at 4/19/2022 0840  Last data filed at 4/18/2022 1751  Gross per 24 hour   Intake 2065.06 ml   Output 175 ml   Net 1890.06 ml     General Appearance: somnolent ,  Eyes: conjunctivae normal and sclera anicteric   Neck:and noadenopathy   Pulmonary/Chest:Course  Cardiovascular: normal rate, regular rhythm, normal   Abdomen: obese, soft, non-tender, non-distended, n  Extremities edema   Musculoskeletal: no deformity or tenderness   Neurologic:normal  reflexes normal and symmetric, no cranial nerve deficit noted,lethargic     LABS/IMAGING:    CBC:   Lab Results   Component Value Date    WBC 4.2 04/19/2022    RBC 3.63 04/19/2022    HGB 10.7 04/19/2022    HCT 35.9 04/19/2022    MCV 98.9 04/19/2022    MCH 29.5 04/19/2022    MCHC 29.8 04/19/2022    RDW 15.1 04/19/2022     04/19/2022    MPV 10.9 04/19/2022     CMP:    Lab Results   Component Value Date     04/19/2022    K 3.6 04/19/2022     04/19/2022    CO2 32 04/19/2022    BUN 15 04/19/2022    CREATININE 0.7 04/19/2022    GFRAA >60 04/19/2022    LABGLOM >60 04/19/2022    GLUCOSE 106 04/19/2022    GLUCOSE 113 02/14/2012    PROT 5.9 04/19/2022    LABALBU 3.2 04/19/2022    LABALBU 4.4 02/14/2012    CALCIUM 9.1 04/19/2022    BILITOT 0.6 04/19/2022    ALKPHOS 87 04/19/2022    AST 27 04/19/2022    ALT 18 04/19/2022     Calcium:    Lab Results   Component Value Date    CALCIUM 9.1 04/19/2022     Phosphorus:    Lab Results   Component Value Date    PHOS 3.0 04/18/2022               ASSESSMENT:  1.) Acute hypoxic /hypercpnic resp failure  2.) Fluid overload/CHF  3.) Right side effusion > left   4.) COVID pneumonia  5.) Fall /A fib .was on couamdin      PLAN:  1. Continue diuresis ! Stop IVF  2. Hold on steroids and hold on Toci -   3. BiPAP at night  4. Avoid fluid overlaod  5. Aggressive pulmonary toilet   6. Bronchodilators  7.  No plan on thoracenetsis        Candace Doll DO, MPH, FCCP, [facility], Claribel Galaviz

## 2022-04-19 NOTE — PLAN OF CARE
Problem: Falls - Risk of:  Goal: Will remain free from falls  Description: Will remain free from falls  Outcome: Ongoing  Goal: Absence of physical injury  Description: Absence of physical injury  Outcome: Ongoing     Problem: Isolation Precautions - Risk of Spread of Infection  Goal: Prevent transmission of infection  Outcome: Ongoing     Problem: Risk for Fluid Volume Deficit  Goal: Maintain normal heart rhythm  Outcome: Ongoing  Goal: Maintain absence of muscle cramping  Outcome: Ongoing  Goal: Maintain normal serum potassium, sodium, calcium, phosphorus, and pH  Outcome: Ongoing     Problem: HEMODYNAMIC STATUS  Goal: Patient has stable vital signs and fluid balance  Outcome: Ongoing

## 2022-04-20 NOTE — PROGRESS NOTES
Pulmonary [address] [facility]    Pulmonary Consult/Progress Note :    Reason for Consultation:COVID Pneumonia     CC : SOB      SUBJECTIVE:    Pallative to hospice evaluation  Same clinically     PHYSICAL EXAMINATION:     VITAL SIGNS:  BP (!) 146/93   Pulse 128   Temp 97.6 °F (36.4 °C) (Axillary)   Resp 22   Ht 5' 6\" (1.676 m)   Wt 255 lb 4.7 oz (115.8 kg)   SpO2 94%   BMI 41.21 kg/m²   Wt Readings from Last 3 Encounters:   04/20/22 255 lb 4.7 oz (115.8 kg)   03/30/22 270 lb 4.8 oz (122.6 kg)   03/14/22 250 lb (113.4 kg)     Temp Readings from Last 3 Encounters:   04/20/22 97.6 °F (36.4 °C) (Axillary)   03/30/22 96.5 °F (35.8 °C) (Temporal)   03/14/22 98.5 °F (36.9 °C) (Oral)     TMAX:  BP Readings from Last 3 Encounters:   04/20/22 (!) 146/93   03/30/22 (!) 144/97   03/15/22 118/82     Pulse Readings from Last 3 Encounters:   04/20/22 128   03/30/22 110   03/15/22 71     INTAKE/OUTPUTS:  I/O last 3 completed shifts:   In: 1110.9 [I.V.:1110.9]  Out: 1525 [Urine:1525]    Intake/Output Summary (Last 24 hours) at 4/20/2022 1523  Last data filed at 4/19/2022 2027  Gross per 24 hour   Intake    Output 1525 ml   Net -1525 ml     General Appearance: somnolent,  Eyes: conjunctivae normal and sclera anicteric   Neck:and noadenopathy   Pulmonary/Chest:Course  Cardiovascular: normal rate, regular rhythm, normal   Abdomen: obese, soft, non-tender, non-distended, n  Extremities edema   Musculoskeletal: no deformity or tenderness   Neurologic:normal  reflexes normal and symmetric, no cranial nerve deficit noted,lethargic     LABS/IMAGING:    CBC:   Lab Results   Component Value Date    WBC 4.2 04/19/2022    RBC 3.63 04/19/2022    HGB 10.7 04/19/2022    HCT 35.9 04/19/2022    MCV 98.9 04/19/2022    MCH 29.5 04/19/2022    MCHC 29.8 04/19/2022    RDW 15.1 04/19/2022     04/19/2022    MPV 10.9 04/19/2022     CMP:    Lab Results   Component Value Date     04/20/2022    K 3.8 04/20/2022    K 3.6 04/19/2022  04/20/2022    CO2 30 04/20/2022    BUN 13 04/20/2022    CREATININE 0.6 04/20/2022    GFRAA >60 04/20/2022    LABGLOM >60 04/20/2022    GLUCOSE 144 04/20/2022    GLUCOSE 113 02/14/2012    PROT 5.9 04/19/2022    LABALBU 3.2 04/19/2022    LABALBU 4.4 02/14/2012    CALCIUM 9.4 04/20/2022    BILITOT 0.6 04/19/2022    ALKPHOS 87 04/19/2022    AST 27 04/19/2022    ALT 18 04/19/2022     Calcium:    Lab Results   Component Value Date    CALCIUM 9.4 04/20/2022     Phosphorus:    Lab Results   Component Value Date    PHOS 2.9 04/19/2022               ASSESSMENT:  1.) Acute hypoxic /hypercpnic resp failure  2.) Fluid overload/CHF  3.) Right side effusion > left   4.) COVID pneumonia  5.) Fall /A fib .was on couamdin      PLAN:  1. Pallative care - hospice -   2. No steroids and hold on Toci -   3. BiPAP at night  4. Avoid fluid overlaod  5. Aggressive pulmonary toilet   6. Bronchodilators  7.  No plan on thoracenetsis        Delon Osuna DO, MPH, [facility], Marcus Wilks

## 2022-04-20 NOTE — PROGRESS NOTES
[facility]  Nephrology Progress Note    SUBJECTIVE from Dr. Carmell Romberg 4/14 Consult Note: \"Mrs Nunes Scales 31-year-old woman who was admitted from nursing facility with altered mentation and confusion. She was found to have a sodium of 152 for which we have been asked to assist in her management. \"    Interval History:    4/20: Patient was not personally seen or examined due to COVID isolation. Discussed with bedside nurse, who reports that the patient is alert and talking. The bedside nurse did explain however that the patient is still not eating very well. PMH:    Past Medical History:   Diagnosis Date    Anticoagulated on Coumadin     on Coumadin for this Dr. Concha Patel controls    Atrial fibrillation ([address]     Basal cell carcinoma of neck     CAD (coronary artery disease)     History of luminal irregularities of the coronary artery, stable.     CHF (congestive heart failure) ([facility])     stage I diastolic dysfunction on 4/97/32    Depression 11/16/2010    DJD (degenerative joint disease)     SEVERE IN RIGHT HAND, IN MULTIPLE OTHER JOINTS    GERD (gastroesophageal reflux disease) 11/16/2010    History of cardiovascular stress test 03/18/2014    lexiscan    Hyperparathyroidism ([address] 11/16/2010    Had parathyroidectomy    Hypertension 11/16/2010    Hypothyroidism 11/16/2010    Mild mitral regurgitation 7/10/14    Osteoarthritis 11/16/2010    Pulmonary hypertension ([address] 7/10/14    mild    Renal calculi     x3    Tricuspid regurgitation 7/10/14    mild-moderate    Urinary incontinence 11/16/2010     Patient Active Problem List   Diagnosis    Depression    Hypothyroidism    Permanent atrial fibrillation ([address]    History of artificial lens replacement    Essential hypertension    Moderate obesity    Greater trochanteric bursitis    Chronic anticoagulation    Chronic obstructive pulmonary disease (HCC)    Mixed stress and urge urinary incontinence    Fuchs' endothelial dystrophy    Pseudophakia, both eyes    Chronic diastolic (congestive) heart failure (HCC)    Pure hypercholesterolemia    Spinal stenosis of lumbar region with neurogenic claudication    Patent foramen ovale    Coronary arteriosclerosis in native artery    Frequent falls    Dementia ([facility]    Ambulatory dysfunction    Cognitive dysfunction    COVID-19 virus infection    Nephrolithiasis    AMS (altered mental status)    Laceration of fascia of head    Head injury, initial encounter    Hypernatremia    Acute encephalopathy     Meds:     lidocaine  5 mL IntraDERmal Once    heparin flush  1 mL IntraVENous 2 times per day    metoprolol succinate  75 mg Oral Daily    levalbuterol  1 ampule Nebulization BID    levothyroxine  175 mcg Oral Daily        sodium chloride      dextrose       Meds prn:     sodium chloride flush, sodium chloride, heparin flush, glucose, dextrose, glucagon (rDNA), dextrose, sodium chloride flush, ondansetron **OR** ondansetron, polyethylene glycol    Meds prior to admission:     No current facility-administered medications on file prior to encounter.      Current Outpatient Medications on File Prior to Encounter   Medication Sig Dispense Refill    acetaminophen (TYLENOL) 325 MG tablet Take 650 mg by mouth every 4 hours as needed for Pain or Fever      b complex vitamins capsule Take 1 capsule by mouth daily      vitamin D (CHOLECALCIFEROL) 25 MCG (1000 UT) TABS tablet Take 1,000 Units by mouth daily      fluticasone (FLONASE) 50 MCG/ACT nasal spray 2 sprays by Nasal route daily      guaiFENesin (MUCINEX) 600 MG extended release tablet Take 600 mg by mouth daily      loperamide (IMODIUM) 2 MG capsule Take 4 mg by mouth 4 times daily as needed for Diarrhea      meclizine (ANTIVERT) 25 MG tablet Take 25 mg by mouth 3 times daily as needed for Dizziness      albuterol sulfate HFA (VENTOLIN HFA) 108 (90 Base) MCG/ACT inhaler Inhale 2 puffs into the lungs every 6 hours as needed for Wheezing      ascorbic acid (VITAMIN C) 500 MG tablet Take 1,000 mg by mouth daily      levothyroxine (SYNTHROID) 175 MCG tablet Take 1 tablet by mouth Daily 30 tablet 3    levalbuterol (XOPENEX) 0.63 MG/3ML nebulization Take 1 ampule by nebulization in the morning and at bedtime       metoprolol succinate (TOPROL XL) 50 MG extended release tablet Take 1 tablet by mouth nightly 30 tablet 5    magnesium hydroxide (MILK OF MAGNESIA) 400 MG/5ML suspension Take 5 mLs by mouth daily as needed for Constipation      lisinopril (PRINIVIL;ZESTRIL) 5 MG tablet Take 1 tablet by mouth daily 90 tablet 1    furosemide (LASIX) 40 MG tablet Take 1 tablet by mouth daily 60 tablet 2    famotidine (PEPCID) 20 MG tablet Take 1 tablet by mouth 2 times daily 60 tablet 5    escitalopram (LEXAPRO) 10 MG tablet Take 1 tablet by mouth daily 30 tablet 5    albuterol (PROVENTIL) (2.5 MG/3ML) 0.083% nebulizer solution Take 3 mLs by nebulization every 6 hours as needed for Wheezing 120 each 5    [DISCONTINUED] levothyroxine (SYNTHROID) 125 MCG tablet Take 1 tablet by mouth every morning (before breakfast) 24 tablet 2     Allergies:    Codeine, Penicillins, Vicodin [hydrocodone-acetaminophen], and Adhesive tape    Social History:     reports that she has never smoked. She has never used smokeless tobacco. She reports previous alcohol use. She reports that she does not use drugs.     Family History:         Problem Relation Age of Onset    Heart Disease Mother     Heart Attack Mother     Arthritis Mother     Heart Disease Father     Mental Illness Father     Diabetes Father     Heart Disease Brother     Cancer Brother     Cancer Son     Heart Disease Sister     Heart Disease Brother     Heart Surgery Brother      Physical Exam:    Patient Vitals for the past 24 hrs:   BP Temp Temp src Pulse Resp SpO2 Weight   04/20/22 0745 (!) 146/93 97.6 °F (36.4 °C) Axillary 128 22 94 % --   04/20/22 0427 -- -- -- -- -- -- 255 lb 4.7 oz (115.8 kg)   04/19/22 2000 124/66 97.8 °F (36.6 °C) Oral 122 20 90 % --   04/19/22 1530 (!) 111/92 -- -- 111 20 -- --       Intake/Output Summary (Last 24 hours) at 4/20/2022 0955  Last data filed at 4/19/2022 2027  Gross per 24 hour   Intake --   Output 1525 ml   Net -1525 ml     Patient not personally examined due to [address]. Due to the current efforts to prevent transmission of COVID-19 and also the need to preserve PPE for other caregivers, a face-to-face encounter with the patient was not performed. That being said, all relevant records and diagnostic tests were reviewed, including laboratory results and imaging. Please reference any relevant documentation elsewhere. Care will be coordinated with the primary service. Data:    Recent Labs     04/18/22  0741 04/19/22  0702   WBC 4.6 4.2*   HGB 10.3* 10.7*   HCT 34.2 35.9   MCV 98.6 98.9    168     Recent Labs     04/18/22  0741 04/19/22  0702     145 142   K 3.4*  3.4*  3.4* 3.6     104 103   CO2 34*  34* 32*   CREATININE 0.7  0.7 0.7   BUN 18  19 15   LABGLOM >60  >60 >60   GLUCOSE 97  93 106*   CALCIUM 9.3  9.3 9.1   PHOS 3.0 2.9   MG 2.3 2.2     Vit D, 25-Hydroxy   Date Value Ref Range Status   04/14/2022 37 30 - 100 ng/mL Final     Comment:     <20 ng/mL. ........... [place]low Deficient  20-30 ng/mL. ......... [place]low Insufficient   ng/mL. ........ [place]low Sufficient  >100 ng/mL. .......... [place]low Toxic       PTH   Date Value Ref Range Status   04/15/2022 112 (H) 15 - 65 pg/mL Final     Recent Labs     04/18/22  0741 04/19/22  0702   ALT 19 18   AST 30 27   ALKPHOS 84 87   BILITOT 0.7 0.6     Recent Labs     04/18/22  0741 04/19/22  0702   LABALBU 3.3* 3.2*     Ferritin   Date Value Ref Range Status   04/13/2022 155 ng/mL Final     Comment:     FERRITIN Reference Ranges:  Adult Males   20 - 60 years:    30 - 400 ng/mL  Adult females 16 - 60 years:    15 - 150 ng/mL  Adults greater than 60 years:   no established reference range  Pediatrics: no established reference range       Iron   Date Value Ref Range Status   02/20/2020 73 37 - 145 mcg/dL Final     TIBC   Date Value Ref Range Status   02/20/2020 323 250 - 450 mcg/dL Final     Vitamin B-12   Date Value Ref Range Status   04/06/2022 802 211 - 946 pg/mL Final     Folate   Date Value Ref Range Status   01/12/2022 >20.0 4.8 - 24.2 ng/mL Final     Lab Results   Component Value Date    COLORU Yellow 04/13/2022    NITRU Negative 04/13/2022    GLUCOSEU Negative 04/13/2022    KETUA Negative 04/13/2022    UROBILINOGEN 1.0 04/13/2022    BILIRUBINUR Negative 04/13/2022    BILIRUBINUR negative 07/05/2019     Lab Results   Component Value Date/Time    OSMOU 522 04/13/2022 03:49 PM     No components found for: URIC    No results found for: LIPIDPAN    Assessment and Plan:    1. Hyponatremia  Originally hypovolemic, now with worsening pulmonary edema  Na 152 on 4/13--> 142 on 4/19  Off IVF   Follow    2. Hypokalemia  K+ 3.6 on 4/19  Follow    3. COVID pneumonia/ Pleural effusion   Chest x-ray 4/17 \"significant interval improvement in the left lung lower lung and lingular consolidation\" and \"Increased right pleural effusion and or atelectasis\"  Pulmonology following    4. Atrial fibrillation with RVR  On metoprolol  Cardiology previously following    5. Hypertension  BP goal<130/80  BP near goal  Follow    6. Altered mental status  Neurology previously following  Palliative care following     7. Hypophosphatemia  Phos 2.9 on 4/19  Follow    No lab work available today at the time of this note. Await BMP, notify renal of results.      Сергей George, APRN - CNP     Patient doing ok , labs noted , improved   Little to add will sign off today     Dr Melvi Lopez

## 2022-04-20 NOTE — PROGRESS NOTES
[facility]  Family Medicine Attending    S: 81 y.o. female with falls at [facility] and encephalopathy, forehead wound, frequent falls, CAD, HF, HTN, hyperPTH s/p parathyroidectomy, atrial fibrillation, COPD. Found to have hypernatremia, COVID positive. Had been agitated overnight. Patient seen and examined at bedside. More conversant today. Is continuously trying to get out of bed and is hard to redirect. Answers questions but does not know where she is and does not know why she is in the hospital. She has no pain complaints. And no shortness of breath. O: VS- Blood pressure (!) 146/93, pulse 128, temperature 97.6 °F (36.4 °C), temperature source Axillary, resp. rate 22, height 5' 6\" (1.676 m), weight 255 lb 4.7 oz (115.8 kg), SpO2 94 %, not currently breastfeeding. Exam is as noted by resident with the following changes, additions or corrections:  Gen somnolent, open eyes to voice. CV irreg irreg, mildly tachy  Resp decreased, coarse, unlabored on NC  Abd soft  Ext - no lower extremity edema. Impressions:   Principal Problem:    Hypernatremia  Active Problems:    Permanent atrial fibrillation (HCC)    Essential hypertension    Chronic obstructive pulmonary disease (HCC)    Frequent falls    AMS (altered mental status)    Head injury, initial encounter    Acute encephalopathy  Resolved Problems:    * No resolved hospital problems. *      Plan: Mentation improved today but patient continues to be disoriented most likely 2/2 dementia. Last day of ceftriaxone today. On metoprolol     slp eval done and patient ok for Soft and bite size consistency solids (IDDSI level 6) with  thin liquids (IDDSI level 0)    Appreciate palliative care. Daughter has changed her mind about hospice. Would like for mom to go to skilled nursing. Will need updated PT/OT. Attending Physician Statement  I have reviewed the chart and seen the patient with the resident(s).   I personally reviewed images, EKG's and similar tests, if present. I personally reviewed and performed key elements of the history and exam.  I have reviewed and confirmed student and/or resident history and exam with changes as indicated above. I agree with the assessment, plan and orders as documented by the resident. Please refer to the resident and/or student note for additional information. Ana Granados.  Tayla Rose MD

## 2022-04-20 NOTE — PROGRESS NOTES
Met with daughter Luis F Brantley, she expresses that she would like for her mother to go skilled. She states that she has spoken to [facility] and they are agreeable to take patient back in the rehab facility. [employer] phone numbers and let her know that if she requires hospice in the future all they need to do is call.   CM updated on families request.

## 2022-04-20 NOTE — PROGRESS NOTES
Call placed to patient's son in law, Melo Stover. Offered to schedule time to meet to discuss hospice services. However, Melo Stover declined to meet today and elected to receive information over the phone. Discussed Hospice philosophy and services offered. Melo Stover unsure if they are ready for hospice or if they would like patient to get therapy first. Melo Stover states he would like to talk to his wife when she gets off work at 3 pm today. [facility] contact information provided to Melo Stover. He will call [facility] back today when decision is made.

## 2022-04-20 NOTE — PLAN OF CARE
Problem: Falls - Risk of:  Goal: Will remain free from falls  Description: Will remain free from falls  4/20/2022 0810 by Archibald Nyhan, RN  Outcome: Progressing  4/20/2022 0153 by Omid Alberto RN  Outcome: Met This Shift  Goal: Absence of physical injury  Description: Absence of physical injury  4/20/2022 0810 by Archibald Nyhan, RN  Outcome: Progressing  4/20/2022 0153 by Omid Alberto RN  Outcome: Met This Shift     Problem: Airway Clearance - Ineffective  Goal: Achieve or maintain patent airway  4/20/2022 0810 by Archibald Nyhan, RN  Outcome: Progressing  4/20/2022 0153 by Omid Alberto RN  Outcome: Met This Shift     Problem: Gas Exchange - Impaired  Goal: Absence of hypoxia  4/20/2022 0153 by Omid Alberto RN  Outcome: Met This Shift  Goal: Promote optimal lung function  4/20/2022 0153 by Omid Alberto RN  Outcome: Met This Shift     Problem: Breathing Pattern - Ineffective  Goal: Ability to achieve and maintain a regular respiratory rate  4/20/2022 0153 by Omid Alberto RN  Outcome: Met This Shift     Problem:  Body Temperature -  Risk of, Imbalanced  Goal: Ability to maintain a body temperature within defined limits  4/20/2022 0153 by Omid Alberto RN  Outcome: Met This Shift  Goal: Will regain or maintain usual level of consciousness  4/20/2022 0153 by Omid Alberto RN  Outcome: Met This Shift  Goal: Complications related to the disease process, condition or treatment will be avoided or minimized  4/20/2022 0153 by Omid Alberto RN  Outcome: Met This Shift     Problem: Isolation Precautions - Risk of Spread of Infection  Goal: Prevent transmission of infection  4/20/2022 0153 by Omid Alberto RN  Outcome: Met This Shift     Problem: Nutrition Deficits  Goal: Optimize nutritional status  4/20/2022 0153 by Omid Alberto RN  Outcome: Met This Shift     Problem: Risk for Fluid Volume Deficit  Goal: Maintain normal heart rhythm  4/20/2022 0153 by Omid Alberto RN  Outcome: Met This Shift  Goal: Maintain absence of muscle cramping  4/20/2022 0153 by Bam Haines RN  Outcome: Met This Shift  Goal: Maintain normal serum potassium, sodium, calcium, phosphorus, and pH  4/20/2022 0153 by Bam Haines RN  Outcome: Met This Shift     Problem: Loneliness or Risk for Loneliness  Goal: Demonstrate positive use of time alone when socialization is not possible  4/20/2022 0153 by Bam Haines RN  Outcome: Met This Shift     Problem: Fatigue  Goal: Verbalize increase energy and improved vitality  4/20/2022 0153 by Bam Haines RN  Outcome: Met This Shift     Problem: Patient Education: Go to Patient Education Activity  Goal: Patient/Family Education  4/20/2022 0153 by Bam Haines RN  Outcome: Met This Shift     Problem: Non-Violent Restraints  Goal: Removal from restraints as soon as assessed to be safe  4/20/2022 0153 by Bam Haines RN  Outcome: Met This Shift  Goal: No harm/injury to patient while restraints in use  4/20/2022 0153 by Bam Haines RN  Outcome: Met This Shift  Goal: Patient's dignity will be maintained  4/20/2022 0153 by Bam Haines RN  Outcome: Met This Shift     Problem: Pain:  Goal: Pain level will decrease  Description: Pain level will decrease  4/20/2022 0153 by Bam Haines RN  Outcome: Met This Shift  Goal: Control of acute pain  Description: Control of acute pain  4/20/2022 0153 by Bam Haines RN  Outcome: Met This Shift  Goal: Control of chronic pain  Description: Control of chronic pain  4/20/2022 0153 by Bam Haines RN  Outcome: Met This Shift

## 2022-04-20 NOTE — PROGRESS NOTES
[facility] Inpatient   Resident Progress Note    S:  Hospital day: 7   Brief Synopsis: Angeli Vides is a 81 y.o. female PMH of atrial fibrillation (on Coumadin ), coronary artery disease, CHF, GERD, hyperparathyroidism, hypertension, hypothyroidism, osteoarthritis and urinary incontinenc of who presents to ED for Abnormal Lab (pt new pt to [facility], came in for abnormal labs. pt with frequent falls and they state she has a change in mentation. ER labs showed H/H 11.4/38.0, sodium 152, potassium 3.9, chloride 108, BUN 23, creatinine 0.9, AST 40, alkaline phosphatase 127, INR 2, urine analysis leukocyte esterase with WBC 1-3, troponin 24 x25, normal lactic acid. EKG showed A. fib with RVR and chest x-ray new opacity in the right lung base. Patient seen and examined this morning. Patient is alert and awake. Patient is completely disoriented, making nonsensible conversation. Cont meds:    sodium chloride      dextrose       Scheduled meds: Patient is awake and alert.  lidocaine  5 mL IntraDERmal Once    heparin flush  1 mL IntraVENous 2 times per day    metoprolol succinate  75 mg Oral Daily    levalbuterol  1 ampule Nebulization BID    levothyroxine  175 mcg Oral Daily     PRN meds: sodium chloride flush, sodium chloride, heparin flush, glucose, dextrose, glucagon (rDNA), dextrose, sodium chloride flush, ondansetron **OR** ondansetron, polyethylene glycol     I reviewed the patient's past medical and surgical history, Medications and Allergies. O:  BP (!) 146/93   Pulse 128   Temp 97.6 °F (36.4 °C) (Axillary)   Resp 22   Ht 5' 6\" (1.676 m)   Wt 255 lb 4.7 oz (115.8 kg)   SpO2 94%   BMI 41.21 kg/m²   24 hour I&O: I/O last 3 completed shifts: In: 1110.9 [I.V.:1110.9]  Out: 1525 [Urine:1525]  No intake/output data recorded.        General: Alert, awake but disoriented x3 neck: Supple, symmetrical, no JVD   Lung:  Clear to auscultation   heart: IrRegular rate and irregular rhythm  Abdomen: SNTND, good bowel sound extremities:  Extremities normal, atraumatic, no cyanosis or edema. Distal pulses equal bilaterally  Skin: Skin color, texture, turgor normal, no rashes or lesions  Musculoskeletal: No joint swelling, no muscle tenderness   Neurologic:  Alert, awake but disoriented likely due to underlying dementia       Labs:  Na/K/Cl/CO2:  142/3.6/103/32 (04/19 6286)  BUN/Cr/glu/ALT/AST/amyl/lip:  15/0.7/--/18/27/--/-- (04/19 9919)  WBC/Hgb/Hct/Plts:  4.2/10.7/35.9/168 (04/19 1224)  estimated creatinine clearance is 75 mL/min (based on SCr of 0.7 mg/dL). Other pertinent labs as noted below    Radiology:  XR CHEST PORTABLE   Final Result   Bilateral pulmonary opacifications right greater than left with probable   small right and small to moderate left potential layering pleural effusions. US DUP UPPER EXTREMITY RIGHT VENOUS   Final Result   Within the visualized vessels there is no evidence for deep venous   thrombosis               XR CHEST PORTABLE   Final Result   Bibasilar atelectasis and or infiltrate as well as right pleural effusion. Mildly worsened aeration on the left. XR ABDOMEN FOR NG/OG/NE TUBE PLACEMENT   Final Result   Gastric catheter passes into the stomach, tip is in the gastric body. No   complications. XR CHEST PORTABLE   Final Result   1. Significant interval improvement in the left lung lower lung and lingular   consolidation   2. Increased right pleural effusion and or atelectasis   3. Cardiomegaly, stable   4. The position of the nasogastric tube is within normal range         XR CHEST PORTABLE   Final Result   Increased markings seen in the lung bases bilaterally with small bilateral   pleural effusions not significantly changed in the interval.  No new findings. XR ABDOMEN (KUB) (SINGLE AP VIEW)   Final Result   No acute intra-process seen. Left renal 12 mm calculus. CT HEAD WO CONTRAST   Final Result   1.  No acute intracranial abnormality. 2. Chronic small vessel ischemic disease. 3. Left frontal scalp hematoma. XR CHEST PORTABLE   Final Result   Cardiomegaly, pulmonary venous hypertension and small effusions. Findings   suggest volume overload. Mild right basilar atelectasis. CTA PULMONARY W CONTRAST   Final Result   No evidence of pulmonary embolism. Small left and moderate size right pleural effusions. A moderate size area of consolidation is noted involving the right lower   lung, concerning for an underlying airspace disease process. RECOMMENDATIONS:   Unavailable         XR CHEST PORTABLE   Final Result   New opacities in right lung base suggestive of pneumonia, atelectasis, and   possible right pleural effusion. CT Head WO Contrast   Final Result   No acute intracranial abnormality or hemorrhage. Cortical atrophy and periventricular leukomalacia. Left frontal scalp hematoma. Right frontal sinusitis and right mastoiditis. CT Cervical Spine WO Contrast   Final Result   No acute abnormality of the cervical spine. Multilevel degenerative changes, no significant interval change. Right pleural effusion. A/P:  Principal Problem:    Hypernatremia  Active Problems:    Permanent atrial fibrillation (HCC)    Essential hypertension    Chronic obstructive pulmonary disease (HCC)    Frequent falls    AMS (altered mental status)    Head injury, initial encounter    Acute encephalopathy  Resolved Problems:    * No resolved hospital problems.  *    Acute Encephalopathy   - pt presented with altered mental status, disoriented and not able follow commands  -Patient is alert awake but disoriented  - Initial imaging showed possible right lobe pneumonia vs pleural effusion, ct head no acute ischemia or bleeding  - likely secondary to  metabolic, hepatic, septic, drug induced, worsening underlying dementia   -  Daily labs  -Blood culture no growth so far  -Urine culture grew E. coli . Completed total 7 days of Rocephin  -Pro-Salomon level and lactic acid normal  -Chest x-ray consistent with pleural effusion rather than pneumonia  -  bladder scan showed 86   - repeat CAT scan head is unremarkable  - neuro consulted, recommneds to EEG if worsening     Hypernatremia(Acute vs Chronic) resolved       Pneumonia(Viral vs bacterial) VS  pleural EFFUSION   - Likely covid induced with secondary bacterial  - Lung exam showed scattered rales , received 1 liter fluid yesterday for oliguria might fluid overload, will repeat CXR and might give iv lasix   -Repeat Rapid Covid is negative  - Mild leucocytosis on admission, resolved  -We will hold on decadrone 6 mg as pulmonary recommendation  -Patient might need thoracocentesis in future  - Continue oxygen supplement and wean as tolerated , also BIPAP  -Pulmonary is following   -CT angio no PE  - will resume lasix 20 mg daily   - Stop IVF     A fib with RVR  - On last admission Coumadin was discontinued due to high for bleeding despite of high CHADVAS score after discussion with patient daughter  - Will continue to hold Coumadin  -Heart rate improving  -Cardio consulted and increase metoprolol to 75 mg daily  -IV metoprolol 5 mg every 6 hour if not able to take oral   - Last Echo in 2018, >55% EF, Interdeterminate diastolic dysfunction  consider repeating during this admission   - Keep mag/k/phos >2/4     Multiple fall  - Wound on left forehead  - Wound care is following patient  - No need suture  - Fall precaution  - PT/OT eval      Hypothyroidism  - Recent TSH normal  - Continue current regimen      Hypertension  -Able to take any oral medication  -As needed hydralazine 5 mg every 6 hour keeping systolic less than 989   - Continue home Lisinopril and metoprolol , consider increasing if needed        DVT / GI prophylaxis: Lovenox 40 mg SC     Dispo  CODE STATUS changed to comfort care. Palliative on board.   Planning for hospice.   Hospice consulted       Electronically signed by Latricia Sarabia MD PGY-3 on 4/20/2022 at 8:53 AM     This case was discussed with attending physician: Dr. Zina Yin

## 2022-04-20 NOTE — PLAN OF CARE
Problem: Falls - Risk of:  Goal: Will remain free from falls  Description: Will remain free from falls  Outcome: Met This Shift  Goal: Absence of physical injury  Description: Absence of physical injury  Outcome: Met This Shift     Problem: Airway Clearance - Ineffective  Goal: Achieve or maintain patent airway  Outcome: Met This Shift     Problem: Gas Exchange - Impaired  Goal: Absence of hypoxia  Outcome: Met This Shift  Goal: Promote optimal lung function  Outcome: Met This Shift     Problem: Breathing Pattern - Ineffective  Goal: Ability to achieve and maintain a regular respiratory rate  Outcome: Met This Shift     Problem:  Body Temperature -  Risk of, Imbalanced  Goal: Ability to maintain a body temperature within defined limits  Outcome: Met This Shift  Goal: Will regain or maintain usual level of consciousness  Outcome: Met This Shift  Goal: Complications related to the disease process, condition or treatment will be avoided or minimized  Outcome: Met This Shift     Problem: Isolation Precautions - Risk of Spread of Infection  Goal: Prevent transmission of infection  Outcome: Met This Shift     Problem: Nutrition Deficits  Goal: Optimize nutritional status  Outcome: Met This Shift     Problem: Risk for Fluid Volume Deficit  Goal: Maintain normal heart rhythm  Outcome: Met This Shift  Goal: Maintain absence of muscle cramping  Outcome: Met This Shift  Goal: Maintain normal serum potassium, sodium, calcium, phosphorus, and pH  Outcome: Met This Shift     Problem: Loneliness or Risk for Loneliness  Goal: Demonstrate positive use of time alone when socialization is not possible  Outcome: Met This Shift     Problem: Fatigue  Goal: Verbalize increase energy and improved vitality  Outcome: Met This Shift     Problem: Patient Education: Go to Patient Education Activity  Goal: Patient/Family Education  Outcome: Met This Shift     Problem: Skin Integrity:  Goal: Will show no infection signs and symptoms  Description: Will show no infection signs and symptoms  Outcome: Met This Shift  Goal: Absence of new skin breakdown  Description: Absence of new skin breakdown  Outcome: Met This Shift     Problem: HEMODYNAMIC STATUS  Goal: Patient has stable vital signs and fluid balance  Outcome: Met This Shift     Problem: Non-Violent Restraints  Goal: Removal from restraints as soon as assessed to be safe  Outcome: Met This Shift  Goal: No harm/injury to patient while restraints in use  Outcome: Met This Shift  Goal: Patient's dignity will be maintained  Outcome: Met This Shift     Problem: Pain:  Goal: Pain level will decrease  Description: Pain level will decrease  Outcome: Met This Shift  Goal: Control of acute pain  Description: Control of acute pain  Outcome: Met This Shift  Goal: Control of chronic pain  Description: Control of chronic pain  Outcome: Met This Shift

## 2022-04-20 NOTE — CARE COORDINATION
Hospice consult order noted. I called and left a voicemail message for the patient's daughter Harper Booth to [employer]. In the meantime, patient's son in law was in the room and I met with him. He said he would like to use [facility]. Referral made to Elva at [facility]. Await input and plan. Patient is from [facility] originally. Cherry Bond from [facility] will check to see if they can accept the patient back with Hospice if that's what the family chooses. Ambulance form in envelope in soft chart. Benjie Nash RN CM  [phone]      Received call back from Cherry Bond at [facility]. She said that since patient was Covid positive on 4/13, she would need to go to [facility] first and then can transfer to [facility] on 4/24. They can only take her under Hospice Care. If family would want skilled and hold off on Hospice, she would have to go to [facility] first to have her medications adjusted to be able to participate in therapy at the SNF. Arpita from [facility] notified of this information and will discuss this with the family today. Ambulance form in envelope in soft chart. Benjie Nash RN CM  [phone]       Per Arpita from [facility], she met with patient's daughter and son in law. They would like to hold off on Hospice for now and would like her to go skilled at [facility]. Referral made to Leann at [facility]. Await acceptance. Therapy department notified of need for updated PT/OT notes in the am. Ambulance form in envelope in soft chart. Benjie Nash RN CM  [phone]           The Plan for Transition of Care is related to the following treatment goals: comfort care    The Patient and/or patient representative daughter jessica and son in law Al was provided with a choice of provider and agrees   with the discharge plan.  [x] Yes [] No    Freedom of choice list was provided with basic dialogue that supports the patient's individualized plan of care/goals, treatment preferences and shares the quality data associated with the providers.  [x] Yes [] No

## 2022-04-20 NOTE — PROGRESS NOTES
Chart reviewed. Family has decided to proceed with hospice of the [place]. No immediate needs identified from a palliative medicine standpoint. We will continue to follow.

## 2022-04-21 NOTE — CARE COORDINATION
Message left with Leann from [facility] to see if they are able to accept patient. Await call back. Updated PT/OT notes are in. Per internal med note today, patient is not taking any oral medications since yesterday because she is somnolent and very difficult to arouse. We will order ABG today. Ambulance form in envelope in soft chart.   Howie eHbert RN CM  [phone]      Per Leann Nichole, they are not able to accept. I called and spoke to patient's daughter Lavonne Rivera. I reviewed the list of [facility] in [facility]. She said she will discuss this with her  and call me back with choices.  Ambulance form in envelope in soft chart.    Howie Hebert RN CM  [phone]

## 2022-04-21 NOTE — PROGRESS NOTES
Occupational Therapy  OCCUPATIONAL THERAPY INITIAL EVALUATION    [address] [facility] [address] [facility]  [facility]      Date:2022                                                Patient Name: Vee Rausch  MRN: [id number]  : 1935  Room: 8410/8410-[address]    Referring Provider: Oswaldo Sweeney MD  Specific Provider Orders/Date: OT eval and treat 22    Diagnosis: Hypernatremia [E87.0]  Acute encephalopathy [G93.40]  Altered mental status, unspecified altered mental status type [R41.82]   Pt admitted to hospital on 22 for abnormal labs, AMS, frequent falls    Pertinent Medical History:  has a past medical history of Anticoagulated on Coumadin, Atrial fibrillation ([address] Basal cell carcinoma of neck, CAD (coronary artery disease), CHF (congestive heart failure) ([address] Depression, DJD (degenerative joint disease), GERD (gastroesophageal reflux disease), History of cardiovascular stress test, Hyperparathyroidism ([address] Hypertension, Hypothyroidism, Mild mitral regurgitation, Osteoarthritis, Pulmonary hypertension ([address] Renal calculi, Tricuspid regurgitation, and Urinary incontinence.    B wrist restraints d/c'd    Precautions:  Fall Risk, TAPS, cognition, O2, [place], sitter, droplet+ (Covid+)    Assessment of current deficits   [x] Functional mobility  [x]ADLs  [x] Strength               [x]Cognition    [x] Functional transfers   [x] IADLs         [x] Safety Awareness   [x]Endurance    [x] Fine Coordination              [x] Balance      [] Vision/perception   []Sensation     []Gross Motor Coordination  [x] ROM  [] Delirium                   [] Motor Control     OT PLAN OF CARE   OT POC based on physician orders, patient diagnosis and results of clinical assessment    Frequency/Duration 1-3 days/wk for 2 weeks PRN   Specific OT Treatment Interventions to include:   * Instruction/training on adapted ADL techniques and AE recommendations to increase functional independence within precautions       * Training on energy conservation strategies, correct breathing pattern and techniques to improve independence/tolerance for self-care routine  * Functional transfer/mobility training/DME recommendations for increased independence, safety, and fall prevention  * Patient/Family education to increase follow through with safety techniques and functional independence  * Recommendation of environmental modifications for increased safety with functional transfers/mobility and ADLs  * Cognitive retraining/development of therapeutic activities to improve problem solving, judgement, memory, and attention for increased safety/participation in ADL/IADL tasks  * Splinting/positioning for increased function, prevention of contractures, and improve skin integrity  * Therapeutic exercise to improve motor endurance, ROM, and functional strength for ADLs/functional transfers  * Therapeutic activities to facilitate/challenge dynamic balance, stand tolerance for increased safety and independence with ADLs  * Therapeutic activities to facilitate gross/fine motor skills for increased independence with ADLs  * Positioning to improve skin integrity, interaction with environment and functional independence      Recommended Adaptive Equipment: TBD     Home Living: Pt admitted from [facility] prior (per chart)    Prior Level of Function: unable to obtain from pt d/t cognition. Family/caregiver not present    Pain Level: No c/o or observed pain this session     Cognition: A&O: 1/4 (to self. Pt unable to recall place when cues given); Follows 1 step directions ~25% of session. Lethargic.  Increased cues provided to sequence, initiate and attend to tasks   Memory:  poor   Sequencing:  poor   Problem solving:  poor   Judgement/safety:  poor     Functional Assessment:  AM-PAC Daily Activity Raw Score: 6/24   Initial Eval Status  Date: 4/21/22 Treatment Status  Date: STGs = LTGs  Time frame: 10-14 days   Feeding Dependent   Moderate Assist    Grooming Dependent   Moderate Assist    UB Dressing Dependent   Moderate Assist    LB Dressing Dependent   -   Bathing Dependent  -  Will continue to assess   Toileting Dependent   -   Bed Mobility  Rolling to Left: Mod A  Rolling to Right: Max A x2    Supine to sit: NT  Secondary to safety concerns  Rolling: Min A  Supine to sit: Maximal Assist   Sit to supine: Maximal Assist    Functional Transfers NT  Maximal Assist    Functional Mobility NT  -  Will continue to assess   Balance Sitting: NT  Standing: NT  Sitting: Min A   Activity Tolerance Poor  Fair   Visual/  Perceptual Glasses: yes                  Hand Dominance R   AROM (PROM) Strength Additional Info:    RUE  PROM: WFL  Distal AROM: WFL Pt unable to follow commands/cues for formal MMT [place]     LUE PROM: WFL  Distal AROM: WFL Pt unable to follow commands/cues for formal MMT [place]       Hearing: [place]  Sensation:  Unable to assess  Tone: WFL   Edema: B UE's/LE's    Comments: Upon arrival patient lying in bed. Therapist educated pt on role of OT. At end of session, patient lying in bed (sitter present. Bed alarm on) with call light and phone within reach, all lines and tubes intact. Overall patient demonstrated decreased independence and safety during completion of ADL/functional transfer/mobility tasks. Pt would benefit from continued skilled OT to increase safety and independence with completion of ADL/IADL tasks for functional independence and quality of life. Treatment: OT treatment provided this date includes: Facilitation of bed mobility (rolling L/R 2x w/ education/cues for body mechanics, rest breaks and sequencing tasks. Increased time required, extensive assist for roll to right).   Therapist facilitated self-care retraining: toileting and grooming tasks while educating pt on modified techniques, posture, safety and energy conservation techniques. At end of session, facilitated optimal bed repositioning for comfort onto L side w/ TAPS and pillow props (B UE/LE's elevated). Skilled monitoring of HR, O2 sats and pts response to treatment. Pt on O2 via nasal cannula. O2 sat=^93% at rest and w/ bedlevel tasks. Rest breaks reinforced to conserve energy d/t noted fatigue    Rehab Potential: Good for established goals     Patient / Family Goal: not stated      Patient and/or family were instructed on functional diagnosis, prognosis/goals and OT plan of care. Demonstrated poor understanding. Eval Complexity: Low    Time In: 10:45  Time Out: 11:10  Total Treatment Time: 10 minutes    Min Units   OT Eval Low 97165  X  1   OT Eval Medium 56121      OT Eval High 23602      OT Re-Eval [id number]       Therapeutic Ex 77835       Therapeutic Activities 05874  10  1   ADL/Self Care 11799       Orthotic Management 16896       Manual [id number]     Neuro Re-Ed 15179       Non-Billable Time          Evaluation Time additionally includes thorough review of current medical information, gathering information on past medical history/social history and prior level of function, interpretation of standardized testing/informal observation of tasks, assessment of data and development of plan of care and goals.         LIZETT TorresR/L #[id number]

## 2022-04-21 NOTE — PROGRESS NOTES
[facility] - [facility] Inpatient   Resident Progress Note    S:  Hospital day: 8   Brief Synopsis: Abundio Treviño is a 81 y.o. female PMH of atrial fibrillation (on Coumadin ), coronary artery disease, CHF, GERD, hyperparathyroidism, hypertension, hypothyroidism, osteoarthritis and urinary incontinenc of who presents to ED for Abnormal Lab (pt [facility], came in for abnormal labs. pt with frequent falls and they state she has a change in mentation. ER labs showed H/H 11.4/38.0, sodium 152, potassium 3.9, chloride 108, BUN 23, creatinine 0.9, AST 40, alkaline phosphatase 127, INR 2, urine analysis leukocyte esterase with WBC 1-3, troponin 24 x25, normal lactic acid. EKG showed A. fib with RVR and chest x-ray new opacity in the right lung base. Patient seen and examined this morning. Patient seems more somnolent and difficult to arouse. Breathing comfortably on nasal cannula. Cont meds:    sodium chloride      dextrose       Scheduled meds: Patient is awake and alert.  divalproex  125 mg Oral 2 times per day    furosemide  20 mg Oral Daily    LORazepam  0.5 mg IntraMUSCular Once    lidocaine  5 mL IntraDERmal Once    heparin flush  1 mL IntraVENous 2 times per day    metoprolol succinate  75 mg Oral Daily    levalbuterol  1 ampule Nebulization BID    levothyroxine  175 mcg Oral Daily     PRN meds: acetaminophen, sodium chloride flush, sodium chloride, heparin flush, glucose, dextrose, glucagon (rDNA), dextrose, sodium chloride flush, ondansetron **OR** ondansetron, polyethylene glycol     I reviewed the patient's past medical and surgical history, Medications and Allergies. O:  BP (!) 149/106   Pulse 125   Temp 97.4 °F (36.3 °C) (Axillary)   Resp 24   Ht 5' 6\" (1.676 m)   Wt 249 lb 1.9 oz (113 kg)   SpO2 96%   BMI 40.21 kg/m²   24 hour I&O: I/O last 3 completed shifts:  In: -   Out: 3325 [Urine:1525]  No intake/output data recorded.        General:  Difficult to arouse and somnolent neck: Supple, symmetrical, no JVD   Lung:  Clear to auscultation   heart: IrRegular rate and irregular rhythm  Abdomen: SNTND, good bowel sound extremities:  Extremities normal, atraumatic, no cyanosis or edema. Distal pulses equal bilaterally  Skin: Skin color, texture, turgor normal, no rashes or lesions  Musculoskeletal: No joint swelling, no muscle tenderness   Neurologic:  Difficult to arouse, somnolent  Labs:  Na/K/Cl/CO2:  142/3.8/101/30 (04/20 1033)  BUN/Cr/glu/ALT/AST/amyl/lip:  13/0.6/--/--/--/--/-- (04/20 1033)  WBC/Hgb/Hct/Plts:  4.2/10.7/35.9/168 (04/19 0869)  estimated creatinine clearance is 86 mL/min (based on SCr of 0.6 mg/dL). Other pertinent labs as noted below    Radiology:  XR CHEST PORTABLE   Final Result   Bilateral pulmonary opacifications right greater than left with probable   small right and small to moderate left potential layering pleural effusions. US DUP UPPER EXTREMITY RIGHT VENOUS   Final Result   Within the visualized vessels there is no evidence for deep venous   thrombosis               XR CHEST PORTABLE   Final Result   Bibasilar atelectasis and or infiltrate as well as right pleural effusion. Mildly worsened aeration on the left. XR ABDOMEN FOR NG/OG/NE TUBE PLACEMENT   Final Result   Gastric catheter passes into the stomach, tip is in the gastric body. No   complications. XR CHEST PORTABLE   Final Result   1. Significant interval improvement in the left lung lower lung and lingular   consolidation   2. Increased right pleural effusion and or atelectasis   3. Cardiomegaly, stable   4. The position of the nasogastric tube is within normal range         XR CHEST PORTABLE   Final Result   Increased markings seen in the lung bases bilaterally with small bilateral   pleural effusions not significantly changed in the interval.  No new findings. XR ABDOMEN (KUB) (SINGLE AP VIEW)   Final Result   No acute intra-process seen.   Left renal 12 mm calculus. CT HEAD WO CONTRAST   Final Result   1. No acute intracranial abnormality. 2. Chronic small vessel ischemic disease. 3. Left frontal scalp hematoma. XR CHEST PORTABLE   Final Result   Cardiomegaly, pulmonary venous hypertension and small effusions. Findings   suggest volume overload. Mild right basilar atelectasis. CTA PULMONARY W CONTRAST   Final Result   No evidence of pulmonary embolism. Small left and moderate size right pleural effusions. A moderate size area of consolidation is noted involving the right lower   lung, concerning for an underlying airspace disease process. RECOMMENDATIONS:   Unavailable         XR CHEST PORTABLE   Final Result   New opacities in right lung base suggestive of pneumonia, atelectasis, and   possible right pleural effusion. CT Head WO Contrast   Final Result   No acute intracranial abnormality or hemorrhage. Cortical atrophy and periventricular leukomalacia. Left frontal scalp hematoma. Right frontal sinusitis and right mastoiditis. CT Cervical Spine WO Contrast   Final Result   No acute abnormality of the cervical spine. Multilevel degenerative changes, no significant interval change. Right pleural effusion. A/P:  Principal Problem:    Hypernatremia  Active Problems:    Permanent atrial fibrillation (HCC)    Essential hypertension    Chronic obstructive pulmonary disease (HCC)    Frequent falls    AMS (altered mental status)    Head injury, initial encounter    Acute encephalopathy  Resolved Problems:    * No resolved hospital problems. *    Acute Encephalopathy   - pt presented with altered mental status, disoriented and not able follow commands  -Patient mental status is waxing and waning. Today is very difficult to arouse and somnolent.   - Initial imaging showed possible right lobe pneumonia vs pleural effusion, ct head no acute ischemia or bleeding  - likely secondary to  metabolic, hepatic, septic, drug induced, worsening underlying dementia   -  Daily labs  -Blood culture no growth so far  -Urine culture grew E. coli . Completed total 7 days of Rocephin  -Pro-Salomon level and lactic acid normal  -Repeat mild to moderate pleural effusion showed mild to moderate bilateral pleural effusion, no worsening  -  bladder scan showed 86 few days ago  - repeat CAT scan head is unremarkable  - neuro consulted, recommneds to EEG if worsening   -We will order ABG today    Hypernatremia(Acute vs Chronic) resolved       Pneumonia(Viral vs bacterial) VS  pleural EFFUSION   - Likely covid induced with secondary bacterial  - Lung exam showed scattered rales , received 1 liter fluid yesterday for oliguria might fluid overload, will repeat CXR and might give iv lasix   -Repeat Rapid Covid is negative  - Mild leucocytosis on admission, resolved  -We will hold on decadrone 6 mg as pulmonary recommendation  -Chest x-ray mild to moderate pleural effusion, no worsening.   Vitals stable  -No plan for thoracocentesis at this time  - Continue oxygen supplement and wean as tolerated , also BIPAP  -Pulmonary is following   -CT angio no PE  - will resume lasix 20 mg daily        A fib with RVR  - On last admission Coumadin was discontinued due to high for bleeding despite of high CHADVAS score after discussion with patient daughter  - Will continue to hold Coumadin  -Heart rate improving  -Cardio consulted and increase metoprolol to 75 mg daily  -IV metoprolol 5 mg every 6 hour if not able to take oral   - Last Echo in 2018, >55% EF, Interdeterminate diastolic dysfunction  consider repeating during this admission   - Keep mag/k/phos >2/4     Multiple fall  - Wound on left forehead  - Wound care is following patient  - No need suture  - Fall precaution  - PT/OT eval      Hypothyroidism  - Recent TSH normal  - Continue current regimen      Hypertension  -As per nurse patient is not taking any oral medications since yesterday because she is somnolent and difficult to arous  - Continue home metoprolol , consider increasing if needed  -If needed will do IVP Toprol 5 mg for heart rate more than 105        DVT / GI prophylaxis: Lovenox 40 mg SC     Dispo  CODE STATUS changed to comfort care. H per conversation with hospice family member does not want to proceed with hospice. They are leaning toward rehab.   Need to talk with family member again and reach out with palliative        Electronically signed by Edgard Arellano MD PGY-3 on 4/21/2022 at 9:08 AM     This case was discussed with attending physician: Dr. Abiodun Pritchard

## 2022-04-21 NOTE — PLAN OF CARE
Problem: Falls - Risk of:  Goal: Will remain free from falls  Description: Will remain free from falls  4/21/2022 1117 by Valentino Jews, RN  Outcome: Progressing  4/20/2022 2136 by Haven Ascencio RN  Outcome: Progressing  Goal: Absence of physical injury  Description: Absence of physical injury  4/21/2022 1117 by Valentino Jews, RN  Outcome: Progressing  4/20/2022 2136 by Haven Ascencio RN  Outcome: Progressing     Problem: Airway Clearance - Ineffective  Goal: Achieve or maintain patent airway  4/21/2022 1117 by Valentino Jews, RN  Outcome: Progressing  4/20/2022 2136 by Haven Ascencio RN  Outcome: Progressing     Problem: Gas Exchange - Impaired  Goal: Absence of hypoxia  4/21/2022 1117 by Valentino Jews, RN  Outcome: Progressing  4/20/2022 2136 by Haven Ascencio RN  Outcome: Progressing  Goal: Promote optimal lung function  4/21/2022 1117 by Valentino Jews, RN  Outcome: Progressing  4/20/2022 2136 by Haven Ascencio RN  Outcome: Progressing

## 2022-04-21 NOTE — PROGRESS NOTES
Pulmonary [address]    Pulmonary Consult/Progress Note :    Reason for Consultation:COVID Pneumonia     CC : SOB      SUBJECTIVE:    Pallative to hospice evaluation  ABG done chronic compensated resp acidosis       PHYSICAL EXAMINATION:     VITAL SIGNS:  /61   Pulse 103   Temp 97.2 °F (36.2 °C) (Temporal)   Resp 15   Ht 5' 6\" (1.676 m)   Wt 249 lb 1.9 oz (113 kg)   SpO2 97%   BMI 40.21 kg/m²   Wt Readings from Last 3 Encounters:   04/21/22 249 lb 1.9 oz (113 kg)   03/30/22 270 lb 4.8 oz (122.6 kg)   03/14/22 250 lb (113.4 kg)     Temp Readings from Last 3 Encounters:   04/21/22 97.2 °F (36.2 °C) (Temporal)   03/30/22 96.5 °F (35.8 °C) (Temporal)   03/14/22 98.5 °F (36.9 °C) (Oral)     TMAX:  BP Readings from Last 3 Encounters:   04/21/22 116/61   03/30/22 (!) 144/97   03/15/22 118/82     Pulse Readings from Last 3 Encounters:   04/21/22 103   03/30/22 110   03/15/22 71     INTAKE/OUTPUTS:  I/O last 3 completed shifts:  In: -   Out: 1525 [Urine:1525]  No intake or output data in the 24 hours ending 04/21/22 1455  General Appearance: somnolent,  Eyes: conjunctivae normal and sclera anicteric   Neck:and noadenopathy   Pulmonary/Chest:Course  Cardiovascular: normal rate, regular rhythm, normal   Abdomen: obese, soft, non-tender, non-distended, n  Extremities edema   Musculoskeletal: no deformity or tenderness   Neurologic:normal  reflexes normal and symmetric, no cranial nerve deficit noted,lethargic     LABS/IMAGING:    CBC:   Lab Results   Component Value Date    WBC 4.2 04/19/2022    RBC 3.63 04/19/2022    HGB 10.7 04/19/2022    HCT 35.9 04/19/2022    MCV 98.9 04/19/2022    MCH 29.5 04/19/2022    MCHC 29.8 04/19/2022    RDW 15.1 04/19/2022     04/19/2022    MPV 10.9 04/19/2022     CMP:    Lab Results   Component Value Date     04/20/2022    K 3.8 04/20/2022    K 3.6 04/19/2022     04/20/2022    CO2 30 04/20/2022    BUN 13 04/20/2022    CREATININE 0.6 04/20/2022 GFRAA >60 04/20/2022    LABGLOM >60 04/20/2022    GLUCOSE 144 04/20/2022    GLUCOSE 113 02/14/2012    PROT 5.9 04/19/2022    LABALBU 3.2 04/19/2022    LABALBU 4.4 02/14/2012    CALCIUM 9.4 04/20/2022    BILITOT 0.6 04/19/2022    ALKPHOS 87 04/19/2022    AST 27 04/19/2022    ALT 18 04/19/2022     Calcium:    Lab Results   Component Value Date    CALCIUM 9.4 04/20/2022     Phosphorus:    Lab Results   Component Value Date    PHOS 2.9 04/19/2022               ASSESSMENT:  1.) Acute hypoxic /hypercpnic resp failure  2.) Fluid overload/CHF  3.) Right side effusion > left   4.) COVID pneumonia  5.) Fall /A fib .was on couamdin      PLAN:  1. Pallative care - hospice ? 2. BiPAP at night  3. Avoid fluid overlaod  4. Aggressive pulmonary toilet   5. Bronchodilators for COPD  6.  No plan on thoracenetsis        Elisabeth Telles DO, MPH, FCCP, Jerie Osgood, [address]

## 2022-04-21 NOTE — PROGRESS NOTES
[address] [facility]  Family Medicine Attending    S: 81 y.o. female with falls at [facility] and encephalopathy, forehead wound, frequent falls, CAD, HF, HTN, hyperPTH s/p parathyroidectomy, atrial fibrillation, COPD. Found to have hypernatremia, COVID positive. Had been agitated overnight. Patient seen and examined at bedside. Very difficult to arouse today. Groans and opens eyes when asked after sternal rub. Not following commands     O: VS- Blood pressure 116/61, pulse 103, temperature 97.2 °F (36.2 °C), temperature source Temporal, resp. rate 15, height 5' 6\" (1.676 m), weight 249 lb 1.9 oz (113 kg), SpO2 97 %, not currently breastfeeding. Exam is as noted by resident with the following changes, additions or corrections:  Gen somnolent, open eyes to voice. CV irreg irreg, mildly tachy  Resp decreased, coarse, unlabored on NC  Abd soft  Ext - no lower extremity edema. Impressions:   Principal Problem:    Hypernatremia  Active Problems:    Permanent atrial fibrillation (HCC)    Essential hypertension    Chronic obstructive pulmonary disease (HCC)    Frequent falls    AMS (altered mental status)    Head injury, initial encounter    Acute encephalopathy  Resolved Problems:    * No resolved hospital problems. *      Plan: Mentation worse today. Choice was made to not go to hospice and to try rehab , however today patient is very somnelant and cannot meaningfully participate in rehab. ABG done due to change in mental status and showed hypercapnia. Recommend BiPap at night and prn.   depakote had been started yesterday for agitation however patient had not received any doses and therefore depakote is not the cause of current mental status changes. On metoprolol for afib. slp eval done and patient ok for Soft and bite size consistency solids (IDDSI level 6) with  thin liquids (IDDSI level 0) - NPO while patient is somnolent. Appreciate palliative care.  Will discuss updated goals of care with patient today. Seen by PT/OT today. Attending Physician Statement  I have reviewed the chart and seen the patient with the resident(s). I personally reviewed images, EKG's and similar tests, if present. I personally reviewed and performed key elements of the history and exam.  I have reviewed and confirmed student and/or resident history and exam with changes as indicated above. I agree with the assessment, plan and orders as documented by the resident. Please refer to the resident and/or student note for additional information. Sarah Starr.  Yonis Reyes MD

## 2022-04-21 NOTE — PROGRESS NOTES
Physical Therapy  Physical Therapy Treatment     Name: Manda Noel  : 1935  MRN: [id number]      Date of Service: 2022    Evaluating PT:  Neda Colindres, PT [id number]      Room #:  8410/8410-B  Diagnosis:  Hypernatremia [E87.0]  Acute encephalopathy [G93.40]  Altered mental status, unspecified altered mental status type [R41.82]  PMHx/PSHx:     has a past medical history of Anticoagulated on Coumadin, Atrial fibrillation ([address] Basal cell carcinoma of neck, CAD (coronary artery disease), CHF (congestive heart failure) ([address] Depression, DJD (degenerative joint disease), GERD (gastroesophageal reflux disease), History of cardiovascular stress test, Hyperparathyroidism ([address] Hypertension, Hypothyroidism, Mild mitral regurgitation, Osteoarthritis, Pulmonary hypertension ([address] Renal calculi, Tricuspid regurgitation, and Urinary incontinence. has a past surgical history that includes Thyroidectomy (); Kidney stone surgery (); Cholecystectomy (); Kidney stone surgery (); Cataract removal (2005); Incontinence surgery (2005); Thyroid surgery (2005); parathyroidectomy; Knee Arthroplasty (Bilateral, 2005); Total shoulder arthroplasty (Left, ); fracture surgery (Left); Carpal tunnel release; Finger surgery (2011); Total shoulder arthroplasty (Left); Inner ear surgery (); Cataract removal with implant (Bilateral); Colonoscopy; and Cardiac catheterization (3/22/14). Procedure/Surgery:  none  Precautions:  Falls, bed alarm , FWB (full weight bearing), Currently still under COVID-19 Droplet Plus precautions. Equipment Needs: Equipment at [address] [address] [facility]:    Patient admitted from SNF. PLOF to this admission unknown. Patient unable to provide history. Equipment owned: Equipment at [address],      OBJECTIVE:   Initial Evaluation  Date: 22 Treatment  22 Short Term/ Long Term   Goals   AM-PAC 6 Clicks 39/87 8/15    Was pt agreeable to Eval/treatment? yes     Does pt have pain? None indicated. Bed Mobility  Rolling: Mod    Supine to sit:   Max    Sit to supine: Max    Scooting: Mod   Rolling: Mod A L, dependent roll R  Supine to sit:   NT  Sit to supine: NT    Scooting: NT  Rolling: Min  Supine to sit: Min  Sit to supine: Min  Scooting: Min   Transfers Sit to stand: Max    Stand to sit: Max   Stand pivot: NT unsafe this date. Sit to stand: Mod   Stand to sit: Mod    Stand pivot: Max     Ambulation    NT  TBD   Stair negotiation: ascended and descended  NT     ROM BUE:  wfl   BLE:  wfl     Strength BUE: 3+/5  RLE:  Grossly 3+/5  LLE:  Grossly  3+/5  4-/5   Balance Sitting EOB: Mod constant contact. Dynamic Standing: Max A  Sitting EOB:  SBA  Dynamic Standing:  Max     Patient is Alert & Oriented x person reacts to her name and does not follow directions   Sensation:  Pt denies numbness and tingling to extremities  Edema:  none    Vitals:  Seated   Blood Pressure at rest -   Heart Rate at rest - bpm   SPO2 at rest 96%      Therapeutic Exercises:  Functional activity     Patient education  Pt educated regarding role of PT evaluation    Patient response to education:   Pt verbalized understanding Pt demonstrated skill Pt requires further education in this area   no no      ASSESSMENT:    Conditions Requiring Skilled Therapeutic Intervention:    [x]Decreased strength     [x]Decreased ROM  [x]Decreased functional mobility  [x]Decreased balance   [x]Decreased endurance   [x]Decreased posture  []Decreased sensation  []Decreased coordination   []Decreased vision  [x]Decreased safety awareness   []Increased pain       Comments:  RN cleared patient for participation in therapy session. Patient was agreeable to intervention. Pt lethargic but able to be aroused with questions/touch. Pt able to communicate with yes/no and some verbal communication. Pt with very limited mobility but able to assist with rolling L to change soiled bedding, provide dependent pericare.  Pt then dependently positioned to protect skin integrity. At end of session, patient in bed with alarm activated call light and phone within reach,  all lines and tubes intact, nursing notified. This patient can benefit from the continuation of skilled PT  to maximize functional level and return to PLOF. Treatment:  Patient practiced and was instructed in the following treatment:    · Bed mobility training - pt given verbal and tactile cues to facilitate proper sequencing and safety during rolling and supine>sit as well as provided with physical assistance to complete task   · STS and transfer training - educated on hand/foot placement, safety, and sequencing during STS and pivot transfers using assistive device  · Gait training - NT  o Skilled repositioning in bed. Pt's/ family goals      1. None stated    Prognosis is fair  for reaching above PT goals.     Patient and or family understand(s) diagnosis, prognosis, and plan of care.  yes,     PHYSICAL THERAPY PLAN OF CARE:    PT POC is established based on physician order and patient diagnosis     Referring provider/PT Order:  PT Eval and Treat   Electronically Signed By  Ita Welch MD Date  Apr 13, 2022  4:04 PM       Diagnosis:  Hypernatremia [E87.0]  Acute encephalopathy [G93.40]  Altered mental status, unspecified altered mental status type [R41.82]  Specific instructions for next treatment:  Transfer to bedside chair and Increase ambulation distance  Current Treatment Recommendations:     [x] Strengthening to improve independence with functional mobility   [x] ROM to improve independence with functional mobility   [x] Balance Training to improve static/dynamic balance and to reduce fall risk  [x] Endurance Training to improve activity tolerance during functional mobility   [x] Transfer Training to improve safety and independence with all functional transfers   [x] Gait Training to improve gait mechanics, endurance and asses need for appropriate assistive device  [x] Stair Training in preparation for safe discharge home and/or into the community   [] Positioning to prevent skin breakdown and contractures  [x] Safety and Education Training   [] Patient/Caregiver Education   [] HEP  [] Other     PT long term treatment goals are located in above grid    Frequency of treatments: 2-5x/week x 1-2 weeks. Time in  1045  Time out  1108    Total Treatment Time  23 minutes     Evaluation Time includes thorough review of current medical information, gathering information on past medical history/social history and prior level of function, completion of standardized testing/informal observation of tasks, assessment of data and education on plan of care and goals.     CPT codes:  [] Low Complexity PT evaluation 60932  [] Moderate Complexity PT evaluation 98037  [] High Complexity PT evaluation 22667  [] PT Re-evaluation 05781  [] Gait training 78958 - minutes  [] Manual therapy 49510 - minutes  [x] Therapeutic activities 42628 23 minutes  [] Therapeutic exercises 28321 - minutes  [] Neuromuscular reeducation 70127 - minutes     Ninfa Jacobson, PT, DPT  [id number]

## 2022-04-21 NOTE — PROGRESS NOTES
Date: 4/21/2022    Time: 12:00 PM    Patient Placed On BIPAP/CPAP/ Non-Invasive Ventilation? Yes    If no must comment. Facial area red/color change? No           If YES are Blister/Lesion present? No   If yes must notify nursing staff  BIPAP/CPAP skin barrier? Yes    Skin barrier type:mepilexlite       Comments:RN placed guaze covering on forehead above left eye. Straps from mask  Placed loosely over it.           Casper Amaro RCP

## 2022-04-22 NOTE — PROGRESS NOTES
Date: 4/22/2022    Time: 7:45 PM    Patient Placed On BIPAP/CPAP/ Non-Invasive Ventilation? No pt already on bipap    If no must comment. Facial area red/color change? No           If YES are Blister/Lesion present? No   If yes must notify nursing staff  BIPAP/CPAP skin barrier?   Yes    Skin barrier type:mepilexlite       Comments:        Tremaine Guajardo RCP

## 2022-04-22 NOTE — PROGRESS NOTES
[facility]  Nephrology Progress Note    SUBJECTIVE from Dr. Mathieu Milton 4/14 Consult Note: \"Mrs Js Mares 67-year-old woman who was admitted from nursing facility with altered mentation and confusion. She was found to have a sodium of 152 for which we have been asked to assist in her management. \"    Interval History:    4/22: Patient was not personally seen or examined due to COVID isolation. Discussed with the bedside nurse, who explained that the patient is more alert and eating a little better. PMH:    Past Medical History:   Diagnosis Date    Anticoagulated on Coumadin     on Coumadin for this Dr. Laina Lopez controls    Atrial fibrillation ([address]     Basal cell carcinoma of neck     CAD (coronary artery disease)     History of luminal irregularities of the coronary artery, stable.     CH[facility]congest[facility]) [facility])     stage I diastolic dysfunction on 3/08/73    Depression 11/16/2010    DJD (degenerative joint disease)     SEVERE IN RIGHT HAND, IN MULTIPLE OTHER JOINTS    GERD (gastroesophageal reflux disease) 11/16/2010    History of cardiovascular stress test 03/18/2014    lexiscan    Hyperparathyroidism ([address] 11/16/2010    Had parathyroidectomy    Hypertension 11/16/2010    Hypothyroidism 11/16/2010    Mild mitral regurgitation 7/10/14    Osteoarthritis 11/16/2010    Pulmonary hypertension ([address] 7/10/14    mild    Renal calculi     x3    Tricuspid regurgitation 7/10/14    mild-moderate    Urinary incontinence 11/16/2010     Patient Active Problem List   Diagnosis    Depression    Hypothyroidism    Permanent atrial fibrillation ([address]    History of artificial lens replacement    Essential hypertension    Moderate obesity    Greater trochanteric bursitis    Chronic anticoagulation    Chronic obstructive pulmonary disease (HCC)    Mixed stress and urge urinary incontinence    Fuchs' endothelial dystrophy    Pseudophakia, both eyes    [facility]    Pure hypercholesterolemia    Spinal stenosis of lumbar region with neurogenic claudication    Patent foramen ovale    Coronary arteriosclerosis in native artery    Frequent falls    Dementia ([facility]    Ambulatory dysfunction    Cognitive dysfunction    COVID-19 virus infection    Nephrolithiasis    AMS (altered mental status)    Laceration of fascia of head    Head injury, initial encounter    Hypernatremia    Acute encephalopathy     Meds:     metoprolol  5 mg IntraVENous Q6H    divalproex  125 mg Oral 2 times per day    [Held by provider] furosemide  20 mg Oral Daily    LORazepam  0.5 mg IntraMUSCular Once    lidocaine  5 mL IntraDERmal Once    heparin flush  1 mL IntraVENous 2 times per day    metoprolol succinate  75 mg Oral Daily    levalbuterol  1 ampule Nebulization BID    levothyroxine  175 mcg Oral Daily        sodium chloride      sodium chloride      dextrose       Meds prn:     acetaminophen, sodium chloride flush, sodium chloride, heparin flush, glucose, dextrose, glucagon (rDNA), dextrose, sodium chloride flush, ondansetron **OR** ondansetron, polyethylene glycol    Meds prior to admission:     No current facility-administered medications on file prior to encounter.      Current Outpatient Medications on File Prior to Encounter   Medication Sig Dispense Refill    acetaminophen (TYLENOL) 325 MG tablet Take 650 mg by mouth every 4 hours as needed for Pain or Fever      b complex vitamins capsule Take 1 capsule by mouth daily      vitamin D (CHOLECALCIFEROL) 25 MCG (1000 UT) TABS tablet Take 1,000 Units by mouth daily      fluticasone (FLONASE) 50 MCG/ACT nasal spray 2 sprays by Nasal route daily      guaiFENesin (MUCINEX) 600 MG extended release tablet Take 600 mg by mouth daily      loperamide (IMODIUM) 2 MG capsule Take 4 mg by mouth 4 times daily as needed for Diarrhea      meclizine (ANTIVERT) 25 MG tablet Take 25 mg by mouth 3 times daily as needed for Dizziness      albuterol sulfate HFA (VENTOLIN HFA) 108 (90 Base) MCG/ACT inhaler Inhale 2 puffs into the lungs every 6 hours as needed for Wheezing      ascorbic acid (VITAMIN C) 500 MG tablet Take 1,000 mg by mouth daily      levothyroxine (SYNTHROID) 175 MCG tablet Take 1 tablet by mouth Daily 30 tablet 3    levalbuterol (XOPENEX) 0.63 MG/3ML nebulization Take 1 ampule by nebulization in the morning and at bedtime       metoprolol succinate (TOPROL XL) 50 MG extended release tablet Take 1 tablet by mouth nightly 30 tablet 5    magnesium hydroxide (MILK OF MAGNESIA) 400 MG/5ML suspension Take 5 mLs by mouth daily as needed for Constipation      lisinopril (PRINIVIL;ZESTRIL) 5 MG tablet Take 1 tablet by mouth daily 90 tablet 1    furosemide (LASIX) 40 MG tablet Take 1 tablet by mouth daily 60 tablet 2    famotidine (PEPCID) 20 MG tablet Take 1 tablet by mouth 2 times daily 60 tablet 5    escitalopram (LEXAPRO) 10 MG tablet Take 1 tablet by mouth daily 30 tablet 5    albuterol (PROVENTIL) (2.5 MG/3ML) 0.083% nebulizer solution Take 3 mLs by nebulization every 6 hours as needed for Wheezing 120 each 5    [DISCONTINUED] levothyroxine (SYNTHROID) 125 MCG tablet Take 1 tablet by mouth every morning (before breakfast) 24 tablet 2     Allergies:    Codeine, Penicillins, Vicodin [hydrocodone-acetaminophen], and Adhesive tape    Social History:     reports that she has never smoked. She has never used smokeless tobacco. She reports previous alcohol use. She reports that she does not use drugs.     Family History:         Problem Relation Age of Onset    Heart Disease Mother     Heart Attack Mother     Arthritis Mother     Heart Disease Father     Mental Illness Father     Diabetes Father     Heart Disease Brother     Cancer Brother     Cancer Son     Heart Disease Sister     Heart Disease Brother     Heart Surgery Brother      Physical Exam:    Patient Vitals for the past 24 hrs:   BP Temp Temp src Pulse Resp SpO2 Weight   04/22/22 0846 -- -- -- -- -- 96 % --   04/22/22 0838 (!) 140/63 98 °F (36.7 °C) Axillary 96 16 97 % --   04/22/22 0240 -- -- -- -- -- -- 248 lb 11.2 oz (112.8 kg)   04/21/22 2315 124/76 -- -- 109 -- -- --   04/21/22 2033 (!) 128/96 98.1 °F (36.7 °C) Axillary 122 17 97 % --   04/21/22 2031 -- -- -- -- 16 97 % --   04/21/22 1740 (!) 129/55 98.1 °F (36.7 °C) Axillary 81 18 98 % --   04/21/22 1624 -- -- -- -- 15 -- --   04/21/22 1315 116/61 97.2 °F (36.2 °C) Temporal 103 15 -- --   04/21/22 1145 -- -- -- -- 18 -- --     No intake or output data in the 24 hours ending 04/22/22 1027     Patient not personally examined due to COVID isolation. Due to the current efforts to prevent transmission of COVID-19 and also the need to preserve PPE for other caregivers, a face-to-face encounter with the patient was not performed. That being said, all relevant records and diagnostic tests were reviewed, including laboratory results and imaging. Please reference any relevant documentation elsewhere. Care will be coordinated with the primary service. Data:    No results for input(s): WBC, HGB, HCT, MCV, PLT in the last 72 hours. Recent Labs     04/20/22  1033      K 3.8      CO2 30*   CREATININE 0.6   BUN 13   LABGLOM >60   GLUCOSE 144*   CALCIUM 9.4     Vit D, 25-Hydroxy   Date Value Ref Range Status   04/14/2022 37 30 - 100 ng/mL Final     Comment:     <20 ng/mL. ........... Marnell Moises Deficient  20-30 ng/mL. ......... Marnell Moises Insufficient   ng/mL. ........ Marnell Moises Sufficient  >100 ng/mL. .......... Marnell Moises Toxic       PTH   Date Value Ref Range Status   04/15/2022 112 (H) 15 - 65 pg/mL Final     No results for input(s): ALT, AST, ALKPHOS, BILITOT, BILIDIR in the last 72 hours. No results for input(s): LABALBU in the last 72 hours.   Ferritin   Date Value Ref Range Status   04/13/2022 155 ng/mL Final     Comment:     FERRITIN Reference Ranges:  Adult Males   20 - 60 years:    27 - 400 ng/mL  Adult females 16 - 61 years: 13 - 150 ng/mL  Adults greater than 60 years:   no established reference range  Pediatrics:                     no established reference range       Iron   Date Value Ref Range Status   02/20/2020 73 37 - 145 mcg/dL Final     TIBC   Date Value Ref Range Status   02/20/2020 323 250 - 450 mcg/dL Final     Vitamin B-12   Date Value Ref Range Status   04/06/2022 802 211 - 946 pg/mL Final     Folate   Date Value Ref Range Status   01/12/2022 >20.0 4.8 - 24.2 ng/mL Final     Lab Results   Component Value Date    COLORU Yellow 04/13/2022    NITRU Negative 04/13/2022    GLUCOSEU Negative 04/13/2022    KETUA Negative 04/13/2022    UROBILINOGEN 1.0 04/13/2022    BILIRUBINUR Negative 04/13/2022    BILIRUBINUR negative 07/05/2019     Lab Results   Component Value Date/Time    OSMOU 522 04/13/2022 03:49 PM     No components found for: URIC    No results found for: LIPIDPAN    Assessment and Plan:    1. Hyponatremia  Originally hypovolemic, now with worsening pulmonary edema  Na 152 on 4/13--> 144 today  Off IVF   Follow    2. Hypokalemia  K+ 3.7 today   Follow    3. COVID pneumonia/ Pleural effusion   Chest x-ray 4/17 \"significant interval improvement in the left lung lower lung and lingular consolidation\" and \"Increased right pleural effusion and or atelectasis\"  Pulmonology following    4. Atrial fibrillation with RVR  On metoprolol  Cardiology previously following    5. Hypertension  BP goal<130/80  BP near goal  Follow    6. Altered mental status  Neurology previously following  Palliative care following     7. Hypophosphatemia  Phos 2.9 on 4/19  Follow    8.   Oliguria  Per nursing notes patient had no urine output overnight  Bladder scan now  Insert Peralta if bladder scan greater than 200 mL  Stat CMP  Monitor urine output on IVF  Follow    Lu Boyd, APRN - CNP     Patient seen and examined all key components of the physical performed independently , case discussed with NP, all pertinent labs and radiologic tests personally reviewed agree with above.       Mahesh Rowe MD

## 2022-04-22 NOTE — PROGRESS NOTES
Pulmonary [facility]    Pulmonary Consult/Progress Note :    Reason for Consultation:COVID Pneumonia     CC : SOB      SUBJECTIVE:    Pallative to hospice evaluation? On BIPAP CO2 improved  ABG done chronic compensated resp acidosis       PHYSICAL EXAMINATION:     VITAL SIGNS:  /75   Pulse 108   Temp 98 °F (36.7 °C)   Resp 18   Ht 5' 6\" (1.676 m)   Wt 248 lb 11.2 oz (112.8 kg)   SpO2 98%   BMI 40.14 kg/m²   Wt Readings from Last 3 Encounters:   04/22/22 248 lb 11.2 oz (112.8 kg)   03/30/22 270 lb 4.8 oz (122.6 kg)   03/14/22 250 lb (113.4 kg)     Temp Readings from Last 3 Encounters:   04/22/22 98 °F (36.7 °C)   03/30/22 96.5 °F (35.8 °C) (Temporal)   03/14/22 98.5 °F (36.9 °C) (Oral)     TMAX:  BP Readings from Last 3 Encounters:   04/22/22 126/75   03/30/22 (!) 144/97   03/15/22 118/82     Pulse Readings from Last 3 Encounters:   04/22/22 108   03/30/22 110   03/15/22 71     INTAKE/OUTPUTS:  No intake/output data recorded.   No intake or output data in the 24 hours ending 04/22/22 1454  General Appearance: somnolent,  Eyes: conjunctivae normal and sclera anicteric   Neck:and noadenopathy   Pulmonary/Chest:Course  Cardiovascular: normal rate, regular rhythm, normal   Abdomen: obese, soft, non-tender, non-distended, n  Extremities edema   Musculoskeletal: no deformity or tenderness   Neurologic:normal  reflexes normal and symmetric, no cranial nerve deficit noted,lethargic     LABS/IMAGING:    CBC:   Lab Results   Component Value Date    WBC 4.2 04/19/2022    RBC 3.63 04/19/2022    HGB 10.7 04/19/2022    HCT 35.9 04/19/2022    MCV 98.9 04/19/2022    MCH 29.5 04/19/2022    MCHC 29.8 04/19/2022    RDW 15.1 04/19/2022     04/19/2022    MPV 10.9 04/19/2022     CMP:    Lab Results   Component Value Date     04/22/2022    K 3.7 04/22/2022    K 3.6 04/19/2022     04/22/2022    CO2 33 04/22/2022    BUN 13 04/22/2022    CREATININE 0.7 04/22/2022    GFRAA >60 04/22/2022 LABGLOM >60 04/22/2022    GLUCOSE 141 04/22/2022    GLUCOSE 113 02/14/2012    PROT 5.8 04/22/2022    LABALBU 3.1 04/22/2022    LABALBU 4.4 02/14/2012    CALCIUM 9.0 04/22/2022    BILITOT 0.7 04/22/2022    ALKPHOS 86 04/22/2022    AST 26 04/22/2022    ALT 15 04/22/2022     Calcium:    Lab Results   Component Value Date    CALCIUM 9.0 04/22/2022     Phosphorus:    Lab Results   Component Value Date    PHOS 2.9 04/19/2022               ASSESSMENT:  1.) Acute hypoxic /hypercpnic resp failure  2.) Fluid overload/CHF  3.) Right side effusion > left   4.) COVID pneumonia  5.) Fall /A fib .was on couamdin      PLAN:  1. Pallative care - hospice ? 2. BiPAP cycle Q 6 hours  3. Avoid fluid overlaod  4. Aggressive pulmonary toilet   5. Bronchodilators for COPD  6.  No plan on thoracenetsis        Oleg Cowart DO, MPH, FCCP, [facility], [address]

## 2022-04-22 NOTE — PROGRESS NOTES
[facility] Inpatient   Resident Progress Note    S:  Hospital day: 9   Brief Synopsis: Torie Yi is a 81 y.o. female PMH of atrial fibrillation (on Coumadin ), coronary artery disease, CHF, GERD, hyperparathyroidism, hypertension, hypothyroidism, osteoarthritis and urinary incontinenc of who presents to ED for Abnormal Lab (pt new pt to [facility], came in for abnormal labs. pt with frequent falls and they state she has a change in mentation. ER labs showed H/H 11.4/38.0, sodium 152, potassium 3.9, chloride 108, BUN 23, creatinine 0.9, AST 40, alkaline phosphatase 127, INR 2, urine analysis leukocyte esterase with WBC 1-3, troponin 24 x25, normal lactic acid. EKG showed A. fib with RVR and chest x-ray new opacity in the right lung base. Overnight: Nurse reported patient did not have any urine output since last night. Perform bladder scan which is 0. Patient seen and examined this morning. Is awake, alert but confused. She is having insensible conversation. Breathing is  normal on nasal cannula. Cont meds:    sodium chloride      dextrose       Scheduled meds: Patient is awake and alert.  metoprolol  5 mg IntraVENous Q6H    divalproex  125 mg Oral 2 times per day    [Held by provider] furosemide  20 mg Oral Daily    LORazepam  0.5 mg IntraMUSCular Once    lidocaine  5 mL IntraDERmal Once    heparin flush  1 mL IntraVENous 2 times per day    metoprolol succinate  75 mg Oral Daily    levalbuterol  1 ampule Nebulization BID    levothyroxine  175 mcg Oral Daily     PRN meds: acetaminophen, sodium chloride flush, sodium chloride, heparin flush, glucose, dextrose, glucagon (rDNA), dextrose, sodium chloride flush, ondansetron **OR** ondansetron, polyethylene glycol     I reviewed the patient's past medical and surgical history, Medications and Allergies.     O:  BP (!) 140/63   Pulse 96   Temp 98 °F (36.7 °C) (Axillary)   Resp 16   Ht 5' 6\" (1.676 m)   Wt 248 lb 11.2 oz (112.8 kg)   SpO2 96%   BMI 40.14 kg/m²   24 hour I&O: No intake/output data recorded. No intake/output data recorded. General:  Difficult to arouse and somnolent neck: Supple, symmetrical, no JVD   Lung:  Clear to auscultation   heart: IrRegular rate and irregular rhythm  Abdomen: SNTND, good bowel sound extremities:   1+ leg edema and upper extremity puffiness noted  Skin: Skin color, texture, turgor normal, no rashes or lesions  Musculoskeletal: No joint swelling, no muscle tenderness   Neurologic:  Difficult to arouse, somnolent  Labs:  Na/K/Cl/CO2:  142/3.8/101/30 (04/20 1033)  BUN/Cr/glu/ALT/AST/amyl/lip:  13/0.6/--/--/--/--/-- (04/20 1033)     estimated creatinine clearance is 86 mL/min (based on SCr of 0.6 mg/dL). Other pertinent labs as noted below    Radiology:  XR CHEST PORTABLE   Final Result   No change in small to moderate bilateral pleural effusions. XR CHEST PORTABLE   Final Result   Bilateral pulmonary opacifications right greater than left with probable   small right and small to moderate left potential layering pleural effusions. US DUP UPPER EXTREMITY RIGHT VENOUS   Final Result   Within the visualized vessels there is no evidence for deep venous   thrombosis               XR CHEST PORTABLE   Final Result   Bibasilar atelectasis and or infiltrate as well as right pleural effusion. Mildly worsened aeration on the left. XR ABDOMEN FOR NG/OG/NE TUBE PLACEMENT   Final Result   Gastric catheter passes into the stomach, tip is in the gastric body. No   complications. XR CHEST PORTABLE   Final Result   1. Significant interval improvement in the left lung lower lung and lingular   consolidation   2. Increased right pleural effusion and or atelectasis   3. Cardiomegaly, stable   4.  The position of the nasogastric tube is within normal range         XR CHEST PORTABLE   Final Result   Increased markings seen in the lung bases bilaterally with small bilateral pleural effusions not significantly changed in the interval.  No new findings. XR ABDOMEN (KUB) (SINGLE AP VIEW)   Final Result   No acute intra-process seen. Left renal 12 mm calculus. CT HEAD WO CONTRAST   Final Result   1. No acute intracranial abnormality. 2. Chronic small vessel ischemic disease. 3. Left frontal scalp hematoma. XR CHEST PORTABLE   Final Result   Cardiomegaly, pulmonary venous hypertension and small effusions. Findings   suggest volume overload. Mild right basilar atelectasis. CTA PULMONARY W CONTRAST   Final Result   No evidence of pulmonary embolism. Small left and moderate size right pleural effusions. A moderate size area of consolidation is noted involving the right lower   lung, concerning for an underlying airspace disease process. RECOMMENDATIONS:   Unavailable         XR CHEST PORTABLE   Final Result   New opacities in right lung base suggestive of pneumonia, atelectasis, and   possible right pleural effusion. CT Head WO Contrast   Final Result   No acute intracranial abnormality or hemorrhage. Cortical atrophy and periventricular leukomalacia. Left frontal scalp hematoma. Right frontal sinusitis and right mastoiditis. CT Cervical Spine WO Contrast   Final Result   No acute abnormality of the cervical spine. Multilevel degenerative changes, no significant interval change. Right pleural effusion. A/P:  Principal Problem:    Hypernatremia  Active Problems:    Permanent atrial fibrillation (HCC)    Essential hypertension    Chronic obstructive pulmonary disease (HCC)    Frequent falls    AMS (altered mental status)    Head injury, initial encounter    Acute encephalopathy  Resolved Problems:    * No resolved hospital problems. *    Acute Encephalopathy   - pt presented with altered mental status, disoriented and not able follow commands  -Patient mental status is waxing and waning. Today is patient alert awake but making insensible conversation  - Initial imaging showed possible right lobe pneumonia vs pleural effusion, ct head no acute ischemia or bleeding  - likely secondary to  metabolic, hepatic, septic, drug induced, worsening underlying dementia   -  Daily labs  -Blood culture no growth so far  -Urine culture grew E. coli . Completed total 7 days of Rocephin  -Pro-Salomon level and lactic acid normal  -Repeat mild to moderate pleural effusion showed mild to moderate bilateral pleural effusion, no worsening  -Jose bladder scan for low urine output which is normal  -Discussed with nephrology and will hold on Lasix and start on very mild hydration with normal saline AT 75 mL/h  - repeat CAT scan head is unremarkable  - neuro consulted, recommneds to EEG if worsening     Hypernatremia(Acute vs Chronic) resolved       Pneumonia(Viral vs bacterial) VS  pleural EFFUSION   - Likely covid induced with secondary bacterial  - Lung exam showed scattered rales , received 1 liter fluid yesterday for oliguria might fluid overload, will repeat CXR and might give iv lasix   -Repeat Rapid Covid is negative  - Mild leucocytosis on admission, resolved  -We will hold on decadrone 6 mg as pulmonary recommendation  -Chest x-ray mild to moderate pleural effusion, no worsening.   Vitals stable  -No plan for thoracocentesis at this time  - Continue oxygen supplement and wean as tolerated , also BIPAP  -Pulmonary is following   -CT angio no PE  - will resume lasix 20 mg daily        A fib with RVR  - On last admission Coumadin was discontinued due to high for bleeding despite of high CHADVAS score after discussion with patient daughter  - Will continue to hold Coumadin  -Heart rate improving  -Cardio consulted and increase metoprolol to 75 mg daily  -IV metoprolol 5 mg every 6 hour if not able to take oral   - Last Echo in 2018, >55% EF, Interdeterminate diastolic dysfunction  consider repeating during this admission - Keep mag/k/phos >2/4     Multiple fall  - Wound on left forehead  - Wound care is following patient  - No need suture  - Fall precaution  - PT/OT eval      Hypothyroidism  - Recent TSH normal  - Continue current regimen      Hypertension  -As per nurse patient is not taking any oral medications since yesterday because she is somnolent and difficult to arous  - Continue home metoprolol , consider increasing if needed  -If needed will do IVP Toprol 5 mg for heart rate more than 105        DVT / GI prophylaxis: Lovenox 40 mg SC     Dispo  CODE STATUS changed to comfort care. I had a conversation with family member yesterday evening. Continue to be comfort care and H per updated palliative plan to take patient to skilled nursing facility at this time, they will think about hospice later if needed.  on board.   If placement accepted patient can be discharged with comfort care     Electronically signed by Sylwia Molina MD PGY-3 on 4/22/2022 at 9:03 AM     This case was discussed with attending physician: Dr. Jennifer Arroyo

## 2022-04-22 NOTE — PROGRESS NOTES
Comprehensive Nutrition Assessment    Type and Reason for Visit:  Reassess    Nutrition Recommendations/Plan:   1. Continue current nutrition regimen. No further nutrition intervention indicated at this time. Will monitor for po intake/EN tolerance and nutrition progression. Consult RD as needed. Malnutrition Assessment:  Malnutrition Status: At risk for malnutrition (Comment) (04/18/22 1221)    Context:  Acute Illness     Findings of the 6 clinical characteristics of malnutrition:  Energy Intake:  1 - 75% or less of estimated energy requirements for 7 or more days  Weight Loss:  No significant weight loss     Body Fat Loss:  Unable to assess (Covid isolation)     Muscle Mass Loss:  Unable to assess (covid isolation)    Fluid Accumulation:  No significant fluid accumulation     Strength:  Not Performed    Nutrition Assessment:    Pt. remains at risk d/t poor intake w/ ongoing EN support. Pt. is w/ acute resp failure 2/2 COVID PNA/Pleural effusion and remains w/ AMS likely 2/2 to metabolic/septic/drug induced/worsening underlying dementia. Pt. admit from SNF for abnormal labs and AMS; Noted PMHx noted for frequent falls, CAD, CHF, HTN, hyperPTH s/p parathyroidectomy, atrial fibrillation, COPD. Per speech rec patient ok for Soft and bite size diet with thin liquids / NPO while patient is somnolent. Pt. appears to be tolerating TF at goal. Will monitor. Nutrition Related Findings:    AMS , -I/O 414ml, +BS, Gen edema, , 4L NC , NG tube Wound Type:  (Forehead , dried scabbed old wound)       Current Nutrition Intake & Therapies:    Average Meal Intake: 26-50% (intakes x2)  Average Supplements Intake: None Ordered  ADULT TUBE FEEDING; Nasogastric; Standard with Fiber; Continuous; 20; Yes; 10; Q 4 hours; 50; 30; Q 4 hours  ADULT DIET; Dysphagia - Soft and Bite Sized;  Low Sodium (2 gm)    Anthropometric Measures:  Height: 5' 6\" (167.6 cm)  Ideal Body Weight (IBW): 130 lbs (59 kg)    Admission Body Weight: 257 lb 0.9 oz (116.6 kg) (4/14 FM)  Current Body Weight: 245 lb 13 oz (111.5 kg), 189.1 % IBW. Weight Source: Bed Scale  Current BMI (kg/m2): 39.7  Usual Body Weight: 245 lb 9.6 oz (111.4 kg) (5/5/21 no method)  % Weight Change (Calculated): 0.1  Weight Adjustment For: No Adjustment                 BMI Categories: Obese Class 2 (BMI 35.0 -39.9)    Estimated Daily Nutrient Needs:  Energy Requirements Based On: [place]  Weight Used for [employer] Requirements: Current  Energy (kcal/day): MSJ 1572 x 1.2 SF = 1800-1900kcal  Weight Used for Protein Requirements: Ideal  Protein (g/day): 71g-83g (1.2-1.4g/kg IBW)  Method Used for Fluid Requirements: Other (Comment)  Fluid (ml/day): per MD/renal management    Nutrition Diagnosis:   · Inadequate oral intake related to cognitive or neurological impairment as evidenced by nutrition support - enteral nutrition,poor intake prior to admission,lab values      Nutrition Interventions:   Food and/or Nutrient Delivery: Continue Current Diet,Continue Current Tube Feeding  Nutrition Education/Counseling: Education not appropriate  Coordination of Nutrition Care: Continue to monitor while inpatient       Goals:     Goals: PO intake 50% or greater,Tolerate nutrition support at goal rate       Nutrition Monitoring and Evaluation:   Behavioral-Environmental Outcomes: None Identified  Food/Nutrient Intake Outcomes: Diet Advancement/Tolerance,Food and Nutrient Intake,Supplement Intake  Physical Signs/Symptoms Outcomes: Biochemical Data,Chewing or Swallowing,GI Status,Fluid Status or Edema,Meal Time Behavior,Nutrition Focused Physical Findings,Skin,Weight    Discharge Planning:     Too soon to determine     Ivis Gordon RD  Contact: ext [phone]

## 2022-04-22 NOTE — PROGRESS NOTES
Chart reviewed. Patient was seen at the bedside. Patient was awake but confused, unable to express her needs or participate in meaningful conversation. Got updated from bedside nurse, patient has not voided since last night. Bladder scan performed with 0 residual.  IV fluids were order. CODE STATUS tablets to DNR CC. Patient's daughter wishes to take patient to SNF, if no improvement she would then transition her to hospice. Goal of care and CODE STATUS has been established. We will sign off for now. Please reconsult if new p.m. needs arises.

## 2022-04-22 NOTE — PROGRESS NOTES
[address] [facility]  Family Medicine Attending    S: 81 y.o. female with falls at [facility] and encephalopathy, forehead wound, frequent falls, CAD, HF, HTN, hyperPTH s/p parathyroidectomy, atrial fibrillation, COPD. Found to have hypernatremia, COVID positive. Has been intermittently agitated or somnolent      Patient seen and examined at bedside. Markedly confused, but pleasant     O: VS- Blood pressure (!) 140/63, pulse 96, temperature 98 °F (36.7 °C), temperature source Axillary, resp. rate 16, height 5' 6\" (1.676 m), weight 248 lb 11.2 oz (112.8 kg), SpO2 96 %, not currently breastfeeding. Exam is as noted by resident   Gen awake and responsive but confused  CV irreg irreg, mildly tachy  Resp decreased, coarse, unlabored on NC  Abd soft  Ext - no lower extremity edema. Impressions:   Principal Problem:    Hypernatremia  Active Problems:    Permanent atrial fibrillation (HCC)    Essential hypertension    Chronic obstructive pulmonary disease (HCC)    Frequent falls    AMS (altered mental status)    Head injury, initial encounter    Acute encephalopathy        Plan:   Family discussing CC vs rehab      On metoprolol for afib. slp eval done and patient ok for Soft and bite size consistency solids (IDDSI level 6) with  thin liquids (IDDSI level 0) - NPO while patient is somnolent. Appreciate palliative care. Will discuss updated goals of care     Seen by PT/OT yesterday    rx cefdinir    Nephrology recs re fluid management       Attending Physician Statement  I have reviewed the chart and seen the patient with the resident(s). I personally reviewed images, EKG's and similar tests, if present. I personally reviewed and performed key elements of the history and exam.  I have reviewed and confirmed student and/or resident history and exam with changes as indicated above. I agree with the assessment, plan and orders as documented by the resident.   Please refer to the resident and/or student note for additional information.       Karri Duarte MD

## 2022-04-22 NOTE — CARE COORDINATION
I attempted to call the patient's daughter Jade Horn to discuss transition of care and obtain [place] choices if that still remains the plan but the phone went directly to [employer]. I then called the patient's son in law Jose Mayes who was unsure of the plan and will try to reach his wife at work to call me. PT/OT notes are in from yesterday, ans patient placed on the Sunday therapy list. Ambulance form in envelope in soft chart.   Krista Franks RN CM  [phone]      I received a call back from the patient's daughter Jade Horn. She said that they still want to hold off on Hospice for now and want her to go to a facility skilled. She would like [place] [phone] and t-[phone]. Referral faxed, await acceptance. Ambulance form in envelope in soft chart.  Krista Fransk RN CM  [phone]        The Plan for Transition of Care is related to the following treatment goals: increase functional ability    The Patient and/or patient representative shayla Horn was provided with a choice of provider and agrees   with the discharge plan. [x] Yes [] No    Freedom of choice list was provided with basic dialogue that supports the patient's individualized plan of care/goals, treatment preferences and shares the quality data associated with the providers.  [x] Yes [] No

## 2022-04-23 NOTE — PROGRESS NOTES
Pulmonary [address] [facility]    Pulmonary Consult/Progress Note :    Reason for Consultation:COVID Pneumonia     CC : SOB      SUBJECTIVE:    Clinically no changes  Drinking water  Combative for brief period this AM  Cycle NIV    PHYSICAL EXAMINATION:     VITAL SIGNS:  /80   Pulse 120   Temp 97.8 °F (36.6 °C)   Resp 18   Ht 5' 6\" (1.676 m)   Wt 255 lb (115.7 kg)   SpO2 99%   BMI 41.16 kg/m²   Wt Readings from Last 3 Encounters:   04/23/22 255 lb (115.7 kg)   03/30/22 270 lb 4.8 oz (122.6 kg)   03/14/22 250 lb (113.4 kg)     Temp Readings from Last 3 Encounters:   04/23/22 97.8 °F (36.6 °C)   03/30/22 96.5 °F (35.8 °C) (Temporal)   03/14/22 98.5 °F (36.9 °C) (Oral)     TMAX:  BP Readings from Last 3 Encounters:   04/23/22 133/80   03/30/22 (!) 144/97   03/15/22 118/82     Pulse Readings from Last 3 Encounters:   04/23/22 120   03/30/22 110   03/15/22 71     INTAKE/OUTPUTS:  I/O last 3 completed shifts:   In: 368 [I.V.:368]  Out: 1025 [Urine:1025]    Intake/Output Summary (Last 24 hours) at 4/23/2022 1333  Last data filed at 4/23/2022 0006  Gross per 24 hour   Intake 368 ml   Output 1025 ml   Net -657 ml     General Appearance: Alert confused  Eyes: conjunctivae normal and sclera anicteric   Neck:and noadenopathy   Pulmonary/Chest:Course  Cardiovascular: Afib,   Abdomen: obese, soft, non-tender, non-distended, n  Extremities edema trace  Musculoskeletal: no deformity or tenderness   Neurologic:No  cranial nerve deficits     LABS/IMAGING:    CBC:   Lab Results   Component Value Date    WBC 4.2 04/19/2022    RBC 3.63 04/19/2022    HGB 10.7 04/19/2022    HCT 35.9 04/19/2022    MCV 98.9 04/19/2022    MCH 29.5 04/19/2022    MCHC 29.8 04/19/2022    RDW 15.1 04/19/2022     04/19/2022    MPV 10.9 04/19/2022     CMP:    Lab Results   Component Value Date     04/23/2022    K 3.9 04/23/2022    K 3.6 04/19/2022     04/23/2022    CO2 31 04/23/2022    BUN 11 04/23/2022    CREATININE 0.6 04/23/2022    GFRAA >60 04/23/2022    LABGLOM >60 04/23/2022    GLUCOSE 94 04/23/2022    GLUCOSE 113 02/14/2012    PROT 5.8 04/22/2022    LABALBU 3.1 04/22/2022    LABALBU 4.4 02/14/2012    CALCIUM 9.0 04/23/2022    BILITOT 0.7 04/22/2022    ALKPHOS 86 04/22/2022    AST 26 04/22/2022    ALT 15 04/22/2022     Calcium:    Lab Results   Component Value Date    CALCIUM 9.0 04/23/2022     Phosphorus:    Lab Results   Component Value Date    PHOS 2.9 04/19/2022               ASSESSMENT:  1.) Acute hypoxic /hypercpnic resp failure  2.) Fluid overload/CHF  3.) Right side effusion > left   4.) COVID pneumonia  5.) Fall /A fib .was on couamdin      PLAN:  1. Pallative care - hospice ? 2. BiPAP cycle Q 6 hours  3. Avoid fluid overload  4. No narcotics or benzodiaz  5. Aggressive pulmonary toilet   6. Bronchodilators for COPD  7. No plan on thoracentesis  8. PT OT   9.  Monitor I/O          Mena Gilliam DO, MPH, FCCP, [place], [place]

## 2022-04-23 NOTE — PROGRESS NOTES
[facility]  Nephrology Progress Note      Interval History:    4/23/22- Patient confused with sitter at the bedside. PMH:    Past Medical History:   Diagnosis Date    Anticoagulated on Coumadin     on Coumadin for this Dr. Janece Hamman controls    Atrial fibrillation ([address]     Basal cell carcinoma of neck     CAD (coronary artery disease)     History of luminal irregularities of the coronary artery, stable.     CHF (congestive heart failure) ([facility])     stage I diastolic dysfunction on 1/53/56    Depression 11/16/2010    DJD (degenerative joint disease)     SEVERE IN RIGHT HAND, IN MULTIPLE OTHER JOINTS    GERD (gastroesophageal reflux disease) 11/16/2010    History of cardiovascular stress test 03/18/2014    lexiscan    Hyperparathyroidism ([address] 11/16/2010    Had parathyroidectomy    Hypertension 11/16/2010    Hypothyroidism 11/16/2010    Mild mitral regurgitation 7/10/14    Osteoarthritis 11/16/2010    Pulmonary hypertension ([address] 7/10/14    mild    Renal calculi     x3    Tricuspid regurgitation 7/10/14    mild-moderate    Urinary incontinence 11/16/2010     Patient Active Problem List   Diagnosis    Depression    Hypothyroidism    Permanent atrial fibrillation ([address]    History of artificial lens replacement    Essential hypertension    Moderate obesity    Greater trochanteric bursitis    Chronic anticoagulation    Chronic obstructive pulmonary disease (HCC)    Mixed stress and urge urinary incontinence    Fuchs' endothelial dystrophy    Pseudophakia, both eyes    Chronic diastolic (congestive) heart failure ([address]    Pure hypercholesterolemia    Spinal stenosis of lumbar region with neurogenic claudication    Patent foramen ovale    Coronary arteriosclerosis in native artery    Frequent falls    Dementia ([address]    Ambulatory dysfunction    Cognitive dysfunction    COVID-19 virus infection    Nephrolithiasis    AMS (altered mental status)    Laceration of fascia of head  Head injury, initial encounter    Hypernatremia    Acute encephalopathy     Meds:     metoprolol succinate  100 mg Oral Daily    budesonide  0.5 mg Nebulization BID    metoprolol  5 mg IntraVENous Q6H    divalproex  125 mg Oral 2 times per day    [Held by provider] furosemide  20 mg Oral Daily    LORazepam  0.5 mg IntraMUSCular Once    lidocaine  5 mL IntraDERmal Once    heparin flush  1 mL IntraVENous 2 times per day    levalbuterol  1 ampule Nebulization BID    levothyroxine  175 mcg Oral Daily        sodium chloride      dextrose       Meds prn:     LORazepam, acetaminophen, sodium chloride flush, sodium chloride, heparin flush, glucose, dextrose, glucagon (rDNA), dextrose, sodium chloride flush, ondansetron **OR** ondansetron, polyethylene glycol    Meds prior to admission:     No current facility-administered medications on file prior to encounter.      Current Outpatient Medications on File Prior to Encounter   Medication Sig Dispense Refill    acetaminophen (TYLENOL) 325 MG tablet Take 650 mg by mouth every 4 hours as needed for Pain or Fever      b complex vitamins capsule Take 1 capsule by mouth daily      vitamin D (CHOLECALCIFEROL) 25 MCG (1000 UT) TABS tablet Take 1,000 Units by mouth daily      fluticasone (FLONASE) 50 MCG/ACT nasal spray 2 sprays by Nasal route daily      guaiFENesin (MUCINEX) 600 MG extended release tablet Take 600 mg by mouth daily      loperamide (IMODIUM) 2 MG capsule Take 4 mg by mouth 4 times daily as needed for Diarrhea      meclizine (ANTIVERT) 25 MG tablet Take 25 mg by mouth 3 times daily as needed for Dizziness      albuterol sulfate HFA (VENTOLIN HFA) 108 (90 Base) MCG/ACT inhaler Inhale 2 puffs into the lungs every 6 hours as needed for Wheezing      ascorbic acid (VITAMIN C) 500 MG tablet Take 1,000 mg by mouth daily      levothyroxine (SYNTHROID) 175 MCG tablet Take 1 tablet by mouth Daily 30 tablet 3    levalbuterol (XOPENEX) 0.63 MG/3ML nebulization Take 1 ampule by nebulization in the morning and at bedtime       metoprolol succinate (TOPROL XL) 50 MG extended release tablet Take 1 tablet by mouth nightly 30 tablet 5    magnesium hydroxide (MILK OF MAGNESIA) 400 MG/5ML suspension Take 5 mLs by mouth daily as needed for Constipation      lisinopril (PRINIVIL;ZESTRIL) 5 MG tablet Take 1 tablet by mouth daily 90 tablet 1    furosemide (LASIX) 40 MG tablet Take 1 tablet by mouth daily 60 tablet 2    famotidine (PEPCID) 20 MG tablet Take 1 tablet by mouth 2 times daily 60 tablet 5    escitalopram (LEXAPRO) 10 MG tablet Take 1 tablet by mouth daily 30 tablet 5    albuterol (PROVENTIL) (2.5 MG/3ML) 0.083% nebulizer solution Take 3 mLs by nebulization every 6 hours as needed for Wheezing 120 each 5    [DISCONTINUED] levothyroxine (SYNTHROID) 125 MCG tablet Take 1 tablet by mouth every morning (before breakfast) 24 tablet 2     Allergies:    Codeine, Penicillins, Vicodin [hydrocodone-acetaminophen], and Adhesive tape    Social History:     reports that she has never smoked. She has never used smokeless tobacco. She reports previous alcohol use. She reports that she does not use drugs.     Family History:         Problem Relation Age of Onset    Heart Disease Mother     Heart Attack Mother     Arthritis Mother     Heart Disease Father     Mental Illness Father     Diabetes Father     Heart Disease Brother     Cancer Brother     Cancer Son     Heart Disease Sister     Heart Disease Brother     Heart Surgery Brother      Physical Exam:    Patient Vitals for the past 24 hrs:   BP Temp Temp src Pulse Resp SpO2 Weight   04/23/22 1015 -- -- -- -- 18 99 % --   04/23/22 1000 133/80 97.8 °F (36.6 °C) -- 120 19 99 % --   04/23/22 0600 (!) 152/76 -- -- 120 -- -- --   04/23/22 0342 -- -- -- -- -- -- 255 lb (115.7 kg)   04/23/22 0100 124/78 98.2 °F (36.8 °C) Axillary 78 16 96 % --   04/22/22 2030 134/84 98.4 °F (36.9 °C) Axillary 98 16 97 % -- 04/22/22 1944 -- -- -- -- 15 -- --   04/22/22 1943 -- -- -- -- -- 99 % --   04/22/22 1400 -- -- -- -- -- 97 % --   04/22/22 1345 126/75 98 °F (36.7 °C) -- 108 18 98 % --       Intake/Output Summary (Last 24 hours) at 4/23/2022 1142  Last data filed at 4/23/2022 0006  Gross per 24 hour   Intake 368 ml   Output 1025 ml   Net -657 ml      General: awake and confused  Neck: no JVD  Heart: S1 S2 no S3 o rrub  Lungs: clear upper, diminished in bases  Abdominal: soft NT with + BS  Extremities: no edema      Data:    No results for input(s): WBC, HGB, HCT, MCV, PLT in the last 72 hours. Recent Labs     04/22/22  1039 04/23/22  0850    138   K 3.7 3.9    101   CO2 33* 31*   CREATININE 0.7 0.6   BUN 13 11   LABGLOM >60 >60   GLUCOSE 141* 94   CALCIUM 9.0 9.0     Vit D, 25-Hydroxy   Date Value Ref Range Status   04/14/2022 37 30 - 100 ng/mL Final     Comment:     <20 ng/mL. ........... Carley Graven Deficient  20-30 ng/mL. ......... Carley Graven Insufficient   ng/mL. ........ Carley Graven Sufficient  >100 ng/mL. .......... Carley Graven Toxic       PTH   Date Value Ref Range Status   04/15/2022 112 (H) 15 - 65 pg/mL Final     Recent Labs     04/22/22  1039   ALT 15   AST 26   ALKPHOS 86   BILITOT 0.7     Recent Labs     04/22/22  1039   LABALBU 3.1*     Ferritin   Date Value Ref Range Status   04/13/2022 155 ng/mL Final     Comment:     FERRITIN Reference Ranges:  Adult Males   20 - 60 years:    30 - 400 ng/mL  Adult females 16 - 61 years:    15 - 150 ng/mL  Adults greater than 60 years:   no established reference range  Pediatrics:                     no established reference range       Iron   Date Value Ref Range Status   02/20/2020 73 37 - 145 mcg/dL Final     TIBC   Date Value Ref Range Status   02/20/2020 323 250 - 450 mcg/dL Final     Vitamin B-12   Date Value Ref Range Status   04/06/2022 802 211 - 946 pg/mL Final     Folate   Date Value Ref Range Status   01/12/2022 >20.0 4.8 - 24.2 ng/mL Final     Lab Results   Component Value Date    COLORU Yellow 04/13/2022    NITRU Negative 04/13/2022    GLUCOSEU Negative 04/13/2022    KETUA Negative 04/13/2022    UROBILINOGEN 1.0 04/13/2022    BILIRUBINUR Negative 04/13/2022    BILIRUBINUR negative 07/05/2019     Lab Results   Component Value Date/Time    ALBERT Hernandez2 04/13/2022 03:49 PM     No components found for: URIC    No results found for: LIPIDPAN    Assessment and Plan:    1. Hyponatremia  Originally hypovolemic, now with worsening pulmonary edema  Na 152 on 4/13-->138  Off IVF   Follow    2. Hypokalemia  K+ 3.9  Follow    3. COVID pneumonia/ Pleural effusion   Chest x-ray 4/17 \"significant interval improvement in the left lung lower lung and lingular consolidation\" and \"Increased right pleural effusion and or atelectasis\"  Pulmonology following    4. Atrial fibrillation with RVR  On metoprolol  Cardiology previously following    5. Hypertension  BP goal<130/80  BP near goal  Follow    6. Altered mental status  Neurology previously following  Palliative care following     7. Hypophosphatemia  Phos 2.9 on 4/19      8. Oliguria  Bladder scan insignificant urine PVR  She is now incontinent, no pur-wick  IVF stopped, taking po. Fernando Gilmore, APRN - CNP     Patient seen and examined all key components of the physical performed independently , case discussed with NP, all pertinent labs and radiologic tests personally reviewed agree with above.       Mateo Dickinson MD

## 2022-04-23 NOTE — PROGRESS NOTES
[address] [facility]  Family Medicine Attending    S: 81 y.o. female with falls at [facility] and encephalopathy, forehead wound, frequent falls, CAD, HF, HTN, hyperPTH s/p parathyroidectomy, atrial fibrillation, COPD. Found to have hypernatremia, COVID positive. Has been intermittently agitated or somnolent      O/n pulled out IV. Drinking water  Combative for brief period this AM  C/o some sob. O: VS- Blood pressure (!) 152/76, pulse 120, temperature 98.2 °F (36.8 °C), temperature source Axillary, resp. rate 16, height 5' 6\" (1.676 m), weight 255 lb (115.7 kg), SpO2 96 %, not currently breastfeeding. Exam is as noted by resident   Gen awake and responsive but confused  CV irreg irreg, mildly tachy  Resp decreased, coarse, unlabored on NC  Abd soft  Ext - no lower extremity edema. Impressions:   Principal Problem:    Hypernatremia  Active Problems:    Permanent atrial fibrillation (HCC)    Essential hypertension    Chronic obstructive pulmonary disease (HCC)    Frequent falls    AMS (altered mental status)    Head injury, initial encounter    Acute encephalopathy        Plan:   DNR CC, daughter planning discharge to SNF. Discharge pending placement. Hypernatremia. Improved. No further labs at this time. On metoprolol for afib. RVR. Increase metoprolol. slp eval done and patient ok for Soft and bite size consistency solids (IDDSI level 6) with  thin liquids (IDDSI level 0) - NPO while patient is somnolent. Appreciate palliative care. Will discuss updated goals of care    Nephrology recs re fluid management    BTx prn for now. Continue NC O2 prn. Coninue bipap prn       Attending Physician Statement  I have reviewed the chart and seen the patient with the resident(s). I personally reviewed images, EKG's and similar tests, if present.   I personally reviewed and performed key elements of the history and exam.  I have reviewed and confirmed student and/or resident history and exam with changes as indicated above. I agree with the assessment, plan and orders as documented by the resident. Please refer to the resident and/or student note for additional information.       Balbir Lackey MD

## 2022-04-23 NOTE — PROGRESS NOTES
[id number] [facility]   [address] [address], [address] [place] [address] Street  Phone: [phone]  [phone]      2020      UMM Aleman - CNP  [address] [id number]    Patient: Wiley Lowe  YOB: 2011  Date of Visit: 2020   MRN:  [id number]      Dear Dr. Con Colon,      2020    TELEHEALTH EVALUATION -- Audio/Visual (During BQBWG-27 public health emergency)    Patient and physician are located in their individual homes    Wiley Lowe (:  2011) has requested an audio/video evaluation for the following concern(s): Worsening tics    It was a pleasure to see Wiley Lowe who is a 7 y.o. male who came here today accompanied by his mother for a follow up visit. Wiley Lowe was last seen in our clinic on 2020. As you know, he has tic disorder, ADD and anxiety. Interim history: The mother reported that in the past 3 weeks, Wiley Lowe has worse tics , he has sudden forced cough, sometimes he got nose bleeding, also he has more head extension suddenly, sometimes caused neck pain  Currently he is continuing on Tenex 0.75 mg in the morning and 1 mg every night, no side efect    Past Medical History:     Past Medical History:   Diagnosis Date    Speech problem         Past Surgical History:     History reviewed. No pertinent surgical history.      Medications:       Current Outpatient Medications:     guanFACINE (TENEX) 1 MG tablet, 1 Tab BID for one week, then 1 Tab qAM and 1.5 Tab qhs for one week, then 1.5 Tab BID thereafter, Disp: 90 tablet, Rfl: 0    VENTOLIN  (90 Base) MCG/ACT inhaler, INHALE 1 PUFF ORALLY EVERY SIX HOURS AS NEEDED FOR WHEEZING, Disp: 18 g, Rfl: 5    albuterol (ACCUNEB) 1.25 MG/3ML nebulizer solution, Inhale 3 mLs into the lungs every 6 hours as needed for Wheezing, Disp: 60 vial, Rfl: 3    Spacer/Aero-Holding Chambers (E-Z SPACER) BOBBY, 1 Device by Does not [facility]   Resident Progress Note    S:  Hospital day: 10   Brief Synopsis: Yury Mitchell is a 81 y.o. female PMH of atrial fibrillation (on Coumadin ), coronary artery disease, CHF, GERD, hyperparathyroidism, hypertension, hypothyroidism, osteoarthritis and urinary incontinenc of who presents to ED for Abnormal Lab (pt [facility], came in for abnormal labs. pt with frequent falls and they state she has a change in mentation. ER labs showed H/H 11.4/38.0, sodium 152, potassium 3.9, chloride 108, BUN 23, creatinine 0.9, AST 40, alkaline phosphatase 127, INR 2, urine analysis leukocyte esterase with WBC 1-3, troponin 24 x25, normal lactic acid. EKG showed A. fib with RVR and chest x-ray new opacity in the right lung base. Patient seen and examined this morning. Pulled out IV line overnight. Patient hit MA this morning. Patient awake and confused. Asking for water. Breathing comfortably on nasal cannula. Cont meds:    sodium chloride      dextrose       Scheduled meds: Patient is awake and alert.  budesonide  0.5 mg Nebulization BID    metoprolol  5 mg IntraVENous Q6H    divalproex  125 mg Oral 2 times per day    [Held by provider] furosemide  20 mg Oral Daily    LORazepam  0.5 mg IntraMUSCular Once    lidocaine  5 mL IntraDERmal Once    heparin flush  1 mL IntraVENous 2 times per day    metoprolol succinate  75 mg Oral Daily    levalbuterol  1 ampule Nebulization BID    levothyroxine  175 mcg Oral Daily     PRN meds: acetaminophen, sodium chloride flush, sodium chloride, heparin flush, glucose, dextrose, glucagon (rDNA), dextrose, sodium chloride flush, ondansetron **OR** ondansetron, polyethylene glycol     I reviewed the patient's past medical and surgical history, Medications and Allergies.     O:  BP (!) 152/76   Pulse 120   Temp 98.2 °F (36.8 °C) (Axillary)   Resp 16   Ht 5' 6\" (1.676 m)   Wt 255 lb (115.7 kg)   SpO2 96%   BMI 41.16 kg/m²   24 apply route daily as needed (wheezing chest tightness), Disp: 1 Device, Rfl: 0      Allergies:     Patient has no known allergies. Social History:     Tobacco:    reports that he has never smoked. He has never used smokeless tobacco.  Alcohol:      reports no history of alcohol use. Drug Use:  has no history on file for drug. Lives with parents    Family History:     Family History   Problem Relation Age of Onset    Asthma Father     Anemia Maternal Grandmother    The father has dyslexia    Review of Systems:     Review of Systems:  CONSTITUTIONAL: negative for fever, sweats, malaise and weight loss   HEENT: negative for trauma, earaches, nasal congestion and sore throat   VISION and HEARING:  negative for diplopia, blurry vision, hearing loss  RESPIRATORY: negative for dry cough, dyspnea and wheezing, difficulty in breathing   CARDIOVASCULAR: negative for chest pain, dyspnea, palpitations   GASTROINTESTINAL:  Negative for nausea, vomiting, diarrhea, constipation   MUSCULOSKELETAL: negative for muscle pain, joint swelling  SKIN: negative for rashes or other skin lesions  HEMATOLOGY: negative for bleeding, anemia, blood clotting  ENDOCRINOLOGY: negative temperature instability, precocious puberty, short statue. PSYCHIATRICS: negative for mood swing, suicidal idea, aggressive, self injury    All other systems reviewed and are negative    Physical Exam:     There were no vitals taken for this visit. Constitutional: [x] Appears well-developed and well-nourished. [] Abnormal  Mental status  [x] Alert and awake  [] Oriented to person/place/time [x]Able to follow commands    [x] No apparent distress      Eyes:  EOM    [x]  Normal  [] Abnormal-  Sclera  [x]  Normal  [] Abnormal -         Discharge [x]  None visible  [] Abnormal -    HENT:   [x] Normocephalic, atraumatic.   [] Abnormal shaped head   [x] Mouth/Throat: Mucous membranes are moist.     Ears [x] Normal  [] Abnormal- hour I&O: I/O last 3 completed shifts: In: 368 [I.V.:368]  Out: 1025 [Urine:1025]  No intake/output data recorded. General:  Difficult to arouse and somnolent neck: Supple, symmetrical, no JVD   Lung:  Clear to auscultation   heart: Irregular rate and irregular rhythm, tachycardia  Abdomen: SNTND, good bowel sound extremities:  Extremities normal, atraumatic, no cyanosis or edema. Distal pulses equal bilaterally  Skin: Skin color, texture, turgor normal, no rashes or lesions  Musculoskeletal: No joint swelling, no muscle tenderness   Neurologic:  Difficult to arouse, somnolent  Labs:  Na/K/Cl/CO2:  144/3.7/105/33 (04/22 1039)  BUN/Cr/glu/ALT/AST/amyl/lip:  13/0.7/--/15/26/--/-- (04/22 1039)     estimated creatinine clearance is 75 mL/min (based on SCr of 0.7 mg/dL). Other pertinent labs as noted below    Radiology:  XR CHEST PORTABLE   Final Result   No change in small to moderate bilateral pleural effusions. XR CHEST PORTABLE   Final Result   Bilateral pulmonary opacifications right greater than left with probable   small right and small to moderate left potential layering pleural effusions. US DUP UPPER EXTREMITY RIGHT VENOUS   Final Result   Within the visualized vessels there is no evidence for deep venous   thrombosis               XR CHEST PORTABLE   Final Result   Bibasilar atelectasis and or infiltrate as well as right pleural effusion. Mildly worsened aeration on the left. XR ABDOMEN FOR NG/OG/NE TUBE PLACEMENT   Final Result   Gastric catheter passes into the stomach, tip is in the gastric body. No   complications. XR CHEST PORTABLE   Final Result   1. Significant interval improvement in the left lung lower lung and lingular   consolidation   2. Increased right pleural effusion and or atelectasis   3. Cardiomegaly, stable   4.  The position of the nasogastric tube is within normal range         XR CHEST PORTABLE   Final Result   Increased markings seen in the lung Neck: [x] Normal range of motion [x] Supple [x] No visualized mass. Pulmonary/Chest: [x] Respiratory effort normal.  [x] No visualized signs of difficulty breathing or respiratory distress        [] Abnormal      Musculoskeletal:   [x] Normal range of motion. [] Normal gait with no signs of ataxia. [x]  No signs of cyanosis of the peripheral portions of extremities. [] Abnormal       Neurological:        [x] Normal cranial nerve (limited exam to video visit) [x] No focal weakness observed       [] Abnormal          Speech       [x] Normal   [] Abnormal     Skin:        [x] No rash on visible skin  [x] Normal  [] Abnormal     Psychiatric:       [x] Not happy  [] Abnormal        [] Normal Mood  [] Anxious appearing      Due to this being a TeleHealth encounter, evaluation of the following organ systems is limited: Vitals/Constitutional/EENT/Resp/CV/GI//MS/Neuro/Skin/Heme-Lymph-Imm. RECORD REVIEW: Previous medical records were reviewed at today's visit. Investigations:      Laboratory Testing:  Results for orders placed or performed in visit on 12/04/19   POCT rapid strep A   Result Value Ref Range    Strep A Ag None Detected None Detected        Assessment :      Hamida Rivas is a 7 y.o. male with:     Diagnosis Orders   1. Tic disorder  guanFACINE (TENEX) 1 MG tablet   2. Attention deficit disorder (ADD) without hyperactivity         Plan:       RECOMMENDATIONS:  1. Discussed with the mother regarding the child's condition, and answered the questions the mother had. 2. Continue Tenex but gradually increase the dose up to 1.5 mg twice a day. .  3. Continue to monitor his symptoms. 4. Continue school educational program.   5. The mother ws instructed to notify our clinic if the child has any worsening symptoms or problem with medication.     6. I plan to see the child back in 4 weeks. An  electronic signature was used to authenticate this note. bases bilaterally with small bilateral   pleural effusions not significantly changed in the interval.  No new findings. XR ABDOMEN (KUB) (SINGLE AP VIEW)   Final Result   No acute intra-process seen. Left renal 12 mm calculus. CT HEAD WO CONTRAST   Final Result   1. No acute intracranial abnormality. 2. Chronic small vessel ischemic disease. 3. Left frontal scalp hematoma. XR CHEST PORTABLE   Final Result   Cardiomegaly, pulmonary venous hypertension and small effusions. Findings   suggest volume overload. Mild right basilar atelectasis. CTA PULMONARY W CONTRAST   Final Result   No evidence of pulmonary embolism. Small left and moderate size right pleural effusions. A moderate size area of consolidation is noted involving the right lower   lung, concerning for an underlying airspace disease process. RECOMMENDATIONS:   Unavailable         XR CHEST PORTABLE   Final Result   New opacities in right lung base suggestive of pneumonia, atelectasis, and   possible right pleural effusion. CT Head WO Contrast   Final Result   No acute intracranial abnormality or hemorrhage. Cortical atrophy and periventricular leukomalacia. Left frontal scalp hematoma. Right frontal sinusitis and right mastoiditis. CT Cervical Spine WO Contrast   Final Result   No acute abnormality of the cervical spine. Multilevel degenerative changes, no significant interval change. Right pleural effusion. A/P:  Principal Problem:    Hypernatremia  Active Problems:    Permanent atrial fibrillation (HCC)    Essential hypertension    Chronic obstructive pulmonary disease (HCC)    Frequent falls    AMS (altered mental status)    Head injury, initial encounter    Acute encephalopathy  Resolved Problems:    * No resolved hospital problems.  *    Acute Encephalopathy   - pt presented with altered mental status, disoriented and not able follow commands  -Patient mental status is waxing and waning. Today is very difficult to arouse and somnolent. - Initial imaging showed possible right lobe pneumonia vs pleural effusion, ct head no acute ischemia or bleeding  - likely secondary to  metabolic, hepatic, septic, drug induced, worsening underlying dementia   -  Daily labs  -Blood culture no growth so far  -Urine culture grew E. coli . Completed total 7 days of Rocephin  -Pro-Salomon level and lactic acid normal  -Repeat mild to moderate pleural effusion showed mild to moderate bilateral pleural effusion, no worsening  -  bladder scan showed 86 few days ago  - repeat CAT scan head is unremarkable  - neuro consulted, recommneds to EEG if worsening     Hypernatremia(Acute vs Chronic) resolved       Pneumonia(Viral vs bacterial) VS  pleural EFFUSION   - Likely covid induced with secondary bacterial  - Lung exam showed scattered rales , received 1 liter fluid yesterday for oliguria might fluid overload, will repeat CXR and might give iv lasix   -Repeat Rapid Covid is negative  - Mild leucocytosis on admission, resolved  -We will hold on decadrone 6 mg as pulmonary recommendation  -Chest x-ray mild to moderate pleural effusion, no worsening.   Vitals stable  -No plan for thoracocentesis at this time  - Continue oxygen supplement and wean as tolerated , also BIPAP  -Pulmonary is following   -CT angio no PE  - will hold lasix 20 mg daily due to oliguria       A fib with RVR  - On last admission Coumadin was discontinued due to high for bleeding despite of high CHADVAS score after discussion with patient daughter  - Will continue to hold Coumadin  -Heart rate improving  -Cardio consulted and increase metoprolol to 75 mg daily  -IV metoprolol 5 mg every 6 hour if not able to take oral   - Last Echo in 2018, >55% EF, Interdeterminate diastolic dysfunction  consider repeating during this admission   - Keep mag/k/phos >2/4     Multiple fall  - Wound on left forehead  - Wound care is following patient  - No need suture  - Fall precaution  - PT/OT eval      Hypothyroidism  - Recent TSH normal  - Continue current regimen      Hypertension  -As per nurse patient is not taking any oral medications since yesterday because she is somnolent and difficult to arous  - Continue home metoprolol , consider increasing if needed  -If needed will do IVP Toprol 5 mg for heart rate more than 105        DVT / GI prophylaxis: Lovenox 40 mg SC     Dispo  CODE STATUS changed to comfort care. H per conversation with hospice family member does not want to proceed with hospice. They are leaning toward rehab.   Need to talk with family member again and reach out with palliative        Electronically signed by Makenna Haji MD PGY-2 on 4/23/2022 at 7:15 AM     This case was discussed with attending physician: Dr. Doreen Collins

## 2022-04-24 NOTE — PROGRESS NOTES
[facility]  Nephrology Progress Note      Interval History:    4/24/22- Patient confused with sitter at the bedside. PMH:    Past Medical History:   Diagnosis Date    Anticoagulated on Coumadin     on Coumadin for this Dr. Delia Kay controls    Atrial fibrillation ([address]     Basal cell carcinoma of neck     CAD (coronary artery disease)     History of luminal irregularities of the coronary artery, stable.     CHF (congestive heart failure) ([facility])     stage I diastolic dysfunction on 1/43/05    Depression 11/16/2010    DJD (degenerative joint disease)     SEVERE IN RIGHT HAND, IN MULTIPLE OTHER JOINTS    GERD (gastroesophageal reflux disease) 11/16/2010    History of cardiovascular stress test 03/18/2014    lexiscan    Hyperparathyroidism ([address] 11/16/2010    Had parathyroidectomy    Hypertension 11/16/2010    Hypothyroidism 11/16/2010    Mild mitral regurgitation 7/10/14    Osteoarthritis 11/16/2010    Pulmonary hypertension ([address] 7/10/14    mild    Renal calculi     x3    Tricuspid regurgitation 7/10/14    mild-moderate    Urinary incontinence 11/16/2010     Patient Active Problem List   Diagnosis    Depression    Hypothyroidism    Permanent atrial fibrillation ([address]    History of artificial lens replacement    Essential hypertension    Moderate obesity    Greater trochanteric bursitis    Chronic anticoagulation    Chronic obstructive pulmonary disease (HCC)    Mixed stress and urge urinary incontinence    Fuchs' endothelial dystrophy    Pseudophakia, both eyes    Chronic diastolic (congestive) heart failure ([address]    Pure hypercholesterolemia    Spinal stenosis of lumbar region with neurogenic claudication    Patent foramen ovale    Coronary arteriosclerosis in native artery    Frequent falls    Dementia ([address]    Ambulatory dysfunction    Cognitive dysfunction    COVID-19 virus infection    Nephrolithiasis    AMS (altered mental status)    Laceration of fascia of head  Head injury, initial encounter    Hypernatremia    Acute encephalopathy     Meds:     metoprolol succinate  100 mg Oral Daily    budesonide  0.5 mg Nebulization BID    metoprolol  5 mg IntraVENous Q6H    divalproex  125 mg Oral 2 times per day    [Held by provider] furosemide  20 mg Oral Daily    LORazepam  0.5 mg IntraMUSCular Once    lidocaine  5 mL IntraDERmal Once    heparin flush  1 mL IntraVENous 2 times per day    levalbuterol  1 ampule Nebulization BID    levothyroxine  175 mcg Oral Daily        sodium chloride      dextrose       Meds prn:     LORazepam, acetaminophen, sodium chloride flush, sodium chloride, heparin flush, glucose, dextrose, glucagon (rDNA), dextrose, sodium chloride flush, ondansetron **OR** ondansetron, polyethylene glycol    Meds prior to admission:     No current facility-administered medications on file prior to encounter.      Current Outpatient Medications on File Prior to Encounter   Medication Sig Dispense Refill    acetaminophen (TYLENOL) 325 MG tablet Take 650 mg by mouth every 4 hours as needed for Pain or Fever      b complex vitamins capsule Take 1 capsule by mouth daily      vitamin D (CHOLECALCIFEROL) 25 MCG (1000 UT) TABS tablet Take 1,000 Units by mouth daily      fluticasone (FLONASE) 50 MCG/ACT nasal spray 2 sprays by Nasal route daily      guaiFENesin (MUCINEX) 600 MG extended release tablet Take 600 mg by mouth daily      loperamide (IMODIUM) 2 MG capsule Take 4 mg by mouth 4 times daily as needed for Diarrhea      meclizine (ANTIVERT) 25 MG tablet Take 25 mg by mouth 3 times daily as needed for Dizziness      albuterol sulfate HFA (VENTOLIN HFA) 108 (90 Base) MCG/ACT inhaler Inhale 2 puffs into the lungs every 6 hours as needed for Wheezing      ascorbic acid (VITAMIN C) 500 MG tablet Take 1,000 mg by mouth daily      levothyroxine (SYNTHROID) 175 MCG tablet Take 1 tablet by mouth Daily 30 tablet 3    levalbuterol (XOPENEX) 0.63 MG/3ML nebulization Take 1 ampule by nebulization in the morning and at bedtime       metoprolol succinate (TOPROL XL) 50 MG extended release tablet Take 1 tablet by mouth nightly 30 tablet 5    magnesium hydroxide (MILK OF MAGNESIA) 400 MG/5ML suspension Take 5 mLs by mouth daily as needed for Constipation      lisinopril (PRINIVIL;ZESTRIL) 5 MG tablet Take 1 tablet by mouth daily 90 tablet 1    furosemide (LASIX) 40 MG tablet Take 1 tablet by mouth daily 60 tablet 2    famotidine (PEPCID) 20 MG tablet Take 1 tablet by mouth 2 times daily 60 tablet 5    escitalopram (LEXAPRO) 10 MG tablet Take 1 tablet by mouth daily 30 tablet 5    albuterol (PROVENTIL) (2.5 MG/3ML) 0.083% nebulizer solution Take 3 mLs by nebulization every 6 hours as needed for Wheezing 120 each 5    [DISCONTINUED] levothyroxine (SYNTHROID) 125 MCG tablet Take 1 tablet by mouth every morning (before breakfast) 24 tablet 2     Allergies:    Codeine, Penicillins, Vicodin [hydrocodone-acetaminophen], and Adhesive tape    Social History:     reports that she has never smoked. She has never used smokeless tobacco. She reports previous alcohol use. She reports that she does not use drugs.     Family History:         Problem Relation Age of Onset    Heart Disease Mother     Heart Attack Mother     Arthritis Mother     Heart Disease Father     Mental Illness Father     Diabetes Father     Heart Disease Brother     Cancer Brother     Cancer Son     Heart Disease Sister     Heart Disease Brother     Heart Surgery Brother      Physical Exam:    Patient Vitals for the past 24 hrs:   BP Temp Temp src Pulse Resp SpO2   04/23/22 2330 -- -- -- 102 -- --   04/23/22 2137 -- -- -- -- 20 --   04/23/22 2030 130/77 97.9 °F (36.6 °C) Temporal 106 16 98 %   04/23/22 1015 -- -- -- -- 18 99 %   04/23/22 1000 133/80 97.8 °F (36.6 °C) -- 120 19 99 %       Intake/Output Summary (Last 24 hours) at 4/24/2022 0865  Last data filed at 4/23/2022 2157  Gross per 24 hour Intake 490 ml   Output --   Net 490 ml      General: awake and confused  Neck: no JVD  Heart: S1 S2 no S3 or rub  Lungs: coarse  Abdominal: soft NT with + BS  Extremities: no edema      Data:    Recent Labs     04/24/22  0444   WBC 4.6   HGB 12.1   HCT 41.6   MCV 98.8        Recent Labs     04/22/22  1039 04/23/22  0850 04/24/22  0444    138 143   K 3.7 3.9 3.9    101 104   CO2 33* 31* 31*   CREATININE 0.7 0.6 0.7   BUN 13 11 9   LABGLOM >60 >60 >60   GLUCOSE 141* 94 91   CALCIUM 9.0 9.0 9.2   PHOS  --   --  2.4*   MG  --   --  2.2     Vit D, 25-Hydroxy   Date Value Ref Range Status   04/14/2022 37 30 - 100 ng/mL Final     Comment:     <20 ng/mL. ........... [place]ounds Deficient  20-30 ng/mL. ......... [place] Founds Insufficient   ng/mL. ........ [place] Founds Sufficient  >100 ng/mL. .......... [place] Founds Toxic       PTH   Date Value Ref Range Status   04/15/2022 112 (H) 15 - 65 pg/mL Final     Recent Labs     04/22/22  1039   ALT 15   AST 26   ALKPHOS 86   BILITOT 0.7     Recent Labs     04/22/22  1039   LABALBU 3.1*     Ferritin   Date Value Ref Range Status   04/13/2022 155 ng/mL Final     Comment:     FERRITIN Reference Ranges:  Adult Males   20 - 60 years:    30 - 400 ng/mL  Adult females 16 - 61 years:    15 - 150 ng/mL  Adults greater than 60 years:   no established reference range  Pediatrics:                     no established reference range       Iron   Date Value Ref Range Status   02/20/2020 73 37 - 145 mcg/dL Final     TIBC   Date Value Ref Range Status   02/20/2020 323 250 - 450 mcg/dL Final     Vitamin B-12   Date Value Ref Range Status   04/06/2022 802 211 - 946 pg/mL Final     Folate   Date Value Ref Range Status   01/12/2022 >20.0 4.8 - 24.2 ng/mL Final     Lab Results   Component Value Date    COLORU Yellow 04/13/2022    NITRU Negative 04/13/2022    GLUCOSEU Negative 04/13/2022    KETUA Negative 04/13/2022    UROBILINOGEN 1.0 04/13/2022    BILIRUBINUR Negative 04/13/2022    BILIRUBINUR negative 07/05/2019     Lab Results Component Value Date/Time    ALBERT Oliveros 04/13/2022 03:49 PM     No components found for: URIC    No results found for: LIPIDPAN    Assessment and Plan:    1. Hyponatremia  Originally hypovolemic, now with worsening pulmonary edema  Na 152 on 4/13-->138-->143  Off IVF   Follow    2. Hypokalemia  K+ 3.9  Follow    3. COVID pneumonia/ Pleural effusion   Chest x-ray 4/17 \"significant interval improvement in the left lung lower lung and lingular consolidation\" and \"Increased right pleural effusion and or atelectasis\"  Pulmonology following    4. Atrial fibrillation with RVR  On metoprolol  Cardiology previously following    5. Hypertension  BP goal<130/80  BP near goal  Follow    6. Altered mental status  Neurology previously following  Palliative care following     7. Hypophosphatemia  Phos 2.4  Will give 2 doses phos-nak and follow in the am    8. Oliguria  Bladder scan insignificant urine PVR  She is now incontinent, no pur-wick  IVF stopped, taking po. ARIEL Spann - CNP     Patient seen and examined all key components of the physical performed independently , case discussed with NP, all pertinent labs and radiologic tests personally reviewed agree with above.       Lakeisha Munoz MD

## 2022-04-24 NOTE — PROGRESS NOTES
[address] [facility]  Family Medicine Attending    S: 81 y.o. female with falls at [place] and encephalopathy, forehead wound, frequent falls, CAD, HF, HTN, hyperPTH s/p parathyroidectomy, atrial fibrillation, COPD. Found to have hypernatremia, COVID positive. Has been intermittently agitated or somnolent      pulled out IV. Drinking water  Combative for brief periods      O: VS- Blood pressure (!) 125/99, pulse 86, temperature 97.6 °F (36.4 °C), temperature source Axillary, resp. rate 24, height 5' 6\" (1.676 m), weight 255 lb (115.7 kg), SpO2 98 %, not currently breastfeeding. Exam is as noted by resident   Gen somnolent  CV irreg irreg, mildly tachy  Resp decreased, coarse, unlabored on NC  Abd soft  Ext - no lower extremity edema. Impressions:   Principal Problem:    Hypernatremia  Active Problems:    Permanent atrial fibrillation (HCC)    Essential hypertension    Chronic obstructive pulmonary disease (HCC)    Frequent falls    AMS (altered mental status)    Head injury, initial encounter    Acute encephalopathy        Plan:   DNR CC, daughter planning discharge to SNF. Discharge pending placement. Hypernatremia. Resolved. No further labs at this time. Increased metoprolol for afib. RVR. slp eval done and patient ok for Soft and bite size consistency solids (IDDSI level 6) with  thin liquids (IDDSI level 0) - NPO while patient is somnolent. Agitation - increased depakote, prn zyprexa to avoid benzos    Appreciate palliative care. Will discuss updated goals of care    Nephrology recs re fluid management    Pleural effusion, hypercapnia, covid - BTx prn for now. Continue NC O2 prn. Coninue bipap prn       Attending Physician Statement  I have reviewed the chart and seen the patient with the resident(s). I personally reviewed images, EKG's and similar tests, if present.   I personally reviewed and performed key elements of the history and exam.  I have reviewed and confirmed student and/or resident history and exam with changes as indicated above. I agree with the assessment, plan and orders as documented by the resident. Please refer to the resident and/or student note for additional information.       Fátima Hill MD

## 2022-04-24 NOTE — PROGRESS NOTES
[facility] Inpatient   Resident Progress Note    S:  Hospital day: 6   Brief Synopsis: Abundio Treviño is a 81 y.o. female PMH of atrial fibrillation (on Coumadin ), coronary artery disease, CHF, GERD, hyperparathyroidism, hypertension, hypothyroidism, osteoarthritis and urinary incontinenc of who presents to ED for Abnormal Lab (pt new pt to [facility], came in for abnormal labs. pt with frequent falls and they state she has a change in mentation. ER labs showed H/H 11.4/38.0, sodium 152, potassium 3.9, chloride 108, BUN 23, creatinine 0.9, AST 40, alkaline phosphatase 127, INR 2, urine analysis leukocyte esterase with WBC 1-3, troponin 24 x25, normal lactic acid. EKG showed A. fib with RVR and chest x-ray new opacity in the right lung base. Patient seen and examined this morning. Patient agitated overnight and refused bipap. Got ativan. Breathing comfortably on nasal cannula. Cont meds:    sodium chloride      dextrose       Scheduled meds: Patient is awake and alert.  metoprolol succinate  100 mg Oral Daily    budesonide  0.5 mg Nebulization BID    metoprolol  5 mg IntraVENous Q6H    divalproex  125 mg Oral 2 times per day    [Held by provider] furosemide  20 mg Oral Daily    LORazepam  0.5 mg IntraMUSCular Once    lidocaine  5 mL IntraDERmal Once    heparin flush  1 mL IntraVENous 2 times per day    levalbuterol  1 ampule Nebulization BID    levothyroxine  175 mcg Oral Daily     PRN meds: LORazepam, acetaminophen, sodium chloride flush, sodium chloride, heparin flush, glucose, dextrose, glucagon (rDNA), dextrose, sodium chloride flush, ondansetron **OR** ondansetron, polyethylene glycol     I reviewed the patient's past medical and surgical history, Medications and Allergies.     O:  /77   Pulse 102   Temp 97.9 °F (36.6 °C) (Temporal)   Resp 20   Ht 5' 6\" (1.676 m)   Wt 255 lb (115.7 kg)   SpO2 98%   BMI 41.16 kg/m²   24 hour I&O: I/O last 3 completed shifts: In: 56 [P.O.:490]  Out: 500 [Urine:500]  No intake/output data recorded. General:  Difficult to arouse and somnolent neck: Supple, symmetrical, no JVD   Lung:  Clear to auscultation   heart: Irregular rate and irregular rhythm, tachycardia  Abdomen: SNTND, good bowel sound extremities:  Extremities normal, atraumatic, no cyanosis or edema. Distal pulses equal bilaterally  Skin: Skin color, texture, turgor normal, no rashes or lesions  Musculoskeletal: No joint swelling, no muscle tenderness   Neurologic:  Difficult to arouse, somnolent  Labs:  Na/K/Cl/CO2:  143/3.9/104/31 (04/24 0444)  BUN/Cr/glu/ALT/AST/amyl/lip:  9/0.7/--/--/--/--/-- (04/24 0444)  WBC/Hgb/Hct/Plts:  4.6/12.1/41.6/177 (04/24 0444)  estimated creatinine clearance is 75 mL/min (based on SCr of 0.7 mg/dL). Other pertinent labs as noted below    Radiology:  XR CHEST PORTABLE   Final Result   No change in small to moderate bilateral pleural effusions. XR CHEST PORTABLE   Final Result   Bilateral pulmonary opacifications right greater than left with probable   small right and small to moderate left potential layering pleural effusions. US DUP UPPER EXTREMITY RIGHT VENOUS   Final Result   Within the visualized vessels there is no evidence for deep venous   thrombosis               XR CHEST PORTABLE   Final Result   Bibasilar atelectasis and or infiltrate as well as right pleural effusion. Mildly worsened aeration on the left. XR ABDOMEN FOR NG/OG/NE TUBE PLACEMENT   Final Result   Gastric catheter passes into the stomach, tip is in the gastric body. No   complications. XR CHEST PORTABLE   Final Result   1. Significant interval improvement in the left lung lower lung and lingular   consolidation   2. Increased right pleural effusion and or atelectasis   3. Cardiomegaly, stable   4.  The position of the nasogastric tube is within normal range         XR CHEST PORTABLE   Final Result   Increased markings seen in the lung bases bilaterally with small bilateral   pleural effusions not significantly changed in the interval.  No new findings. XR ABDOMEN (KUB) (SINGLE AP VIEW)   Final Result   No acute intra-process seen. Left renal 12 mm calculus. CT HEAD WO CONTRAST   Final Result   1. No acute intracranial abnormality. 2. Chronic small vessel ischemic disease. 3. Left frontal scalp hematoma. XR CHEST PORTABLE   Final Result   Cardiomegaly, pulmonary venous hypertension and small effusions. Findings   suggest volume overload. Mild right basilar atelectasis. CTA PULMONARY W CONTRAST   Final Result   No evidence of pulmonary embolism. Small left and moderate size right pleural effusions. A moderate size area of consolidation is noted involving the right lower   lung, concerning for an underlying airspace disease process. RECOMMENDATIONS:   Unavailable         XR CHEST PORTABLE   Final Result   New opacities in right lung base suggestive of pneumonia, atelectasis, and   possible right pleural effusion. CT Head WO Contrast   Final Result   No acute intracranial abnormality or hemorrhage. Cortical atrophy and periventricular leukomalacia. Left frontal scalp hematoma. Right frontal sinusitis and right mastoiditis. CT Cervical Spine WO Contrast   Final Result   No acute abnormality of the cervical spine. Multilevel degenerative changes, no significant interval change. Right pleural effusion. A/P:  Principal Problem:    Hypernatremia  Active Problems:    Permanent atrial fibrillation (HCC)    Essential hypertension    Chronic obstructive pulmonary disease (HCC)    Frequent falls    AMS (altered mental status)    Head injury, initial encounter    Acute encephalopathy  Resolved Problems:    * No resolved hospital problems.  *    Acute Encephalopathy   -Patient mental status is waxing and waning.   - Initial imaging showed possible right lobe pneumonia vs pleural effusion  -Ct head no acute ischemia or bleeding  - likely secondary to  metabolic, hepatic, septic, drug induced, worsening underlying dementia   - Daily labs  -Blood culture no growth so far  -Urine culture grew E. coli . Completed total 7 days of Rocephin  -Pro-Salomon level and lactic acid normal  -Repeat mild to moderate pleural effusion showed mild to moderate bilateral pleural effusion, no worsening  -  bladder scan showed 86 few days ago  - repeat CAT scan head is unremarkable  - neuro consulted, recommneds to EEG if worsening   - Stop ativan, increase depakote, PRN zyprexa for agitation  - If worsens, will repeat ABG and would need to go on bipap    Hypernatremia(Acute vs Chronic) resolved    Hypoxic respiratory failure 2/2 Pneumonia (Viral vs bacterial) VS  Pleural EFFUSION   - Likely covid induced with secondary bacterial  - Lung exam showed scattered rales , received 1 liter fluid yesterday for oliguria might fluid overload, will repeat CXR and might give iv lasix   -Repeat Rapid Covid is negative  - Mild leucocytosis on admission, resolved  -We will hold on decadrone 6 mg as pulmonary recommendation  -Chest x-ray mild to moderate pleural effusion, no worsening.   Vitals stable  -No plan for thoracocentesis at this time  - Continue oxygen supplement and wean as tolerated , also BIPAP  -Pulmonary is following, bipap Q6 hours, patient intermittently tolerates  -CT angio no PE  - will hold lasix 20 mg daily due to oliguria    A fib with RVR  - On last admission Coumadin was discontinued due to high for bleeding despite of high CHADVAS score after discussion with patient daughter  - Will continue to hold Coumadin  -Heart rate improving  -Cardio consulted and increase metoprolol to 75 mg daily  -IV metoprolol 5 mg every 6 hour if not able to take oral   - Last Echo in 2018, >55% EF, Interdeterminate diastolic dysfunction  consider repeating during this admission   - Keep mag/k/phos >2/4     Multiple fall  - Wound on left forehead  - Wound care is following patient  - No need suture  - Fall precaution  - PT/OT eval      Hypothyroidism  - Recent TSH normal  - Continue current regimen      Hypertension  -Increase metoprolol to 100mg daily  -If not taking PO, will do IVP Toprol 5 mg for heart rate more than 105        DVT / GI prophylaxis: Lovenox 40 mg SC     Dispo  CODE STATUS changed to comfort care. H per conversation with hospice family member does not want to proceed with hospice. They are leaning toward rehab.   Need to talk with family member again and reach out with palliative        Electronically signed by Melonie Meyer MD PGY-2 on 4/24/2022 at 7:22 AM     This case was discussed with attending physician: Dr. Chelsey Muñiz

## 2022-04-24 NOTE — PROGRESS NOTES
OT SESSION ATTEMPT     Date:2022  Patient Name: Abad Teresa  MRN: [id number]  : 1935  Room: [address] [place] [address]     OCCUPATIONAL THERAPY TREATMENT NOTE    [address]      Attempted OT session this date:    [] unavailable due to other medical staff currently with pt   [] on hold per nursing staff   [] on hold per nursing staff secondary to lab / radiology results    [] declined treatment  this date due to ____. Benefits of participation in therapy reviewed with pt.    [] off unit   [x] Other: pt too lethargic to participate safely in therapy session. Continue with current OT [facility] at a later date/time.       Geneva HARDWICK/ISAAC [id number]

## 2022-04-24 NOTE — PROGRESS NOTES
Pulmonary [facility]    Pulmonary Consult/Progress Note :    Reason for Consultation:COVID Pneumonia     CC : SOB      SUBJECTIVE:    Clinically no changes  Combative for brief period this AM  Cycle NIV    PHYSICAL EXAMINATION:     VITAL SIGNS:  BP (!) 125/99   Pulse 86   Temp 97.6 °F (36.4 °C) (Axillary)   Resp 20   Ht 5' 6\" (1.676 m)   Wt 255 lb (115.7 kg)   SpO2 96%   BMI 41.16 kg/m²   Wt Readings from Last 3 Encounters:   04/23/22 255 lb (115.7 kg)   03/30/22 270 lb 4.8 oz (122.6 kg)   03/14/22 250 lb (113.4 kg)     Temp Readings from Last 3 Encounters:   04/24/22 97.6 °F (36.4 °C) (Axillary)   03/30/22 96.5 °F (35.8 °C) (Temporal)   03/14/22 98.5 °F (36.9 °C) (Oral)     TMAX:  BP Readings from Last 3 Encounters:   04/24/22 (!) 125/99   03/30/22 (!) 144/97   03/15/22 118/82     Pulse Readings from Last 3 Encounters:   04/24/22 86   03/30/22 110   03/15/22 71     INTAKE/OUTPUTS:  I/O last 3 completed shifts:   In: 490 [P.O.:490]  Out: 500 [Urine:500]    Intake/Output Summary (Last 24 hours) at 4/24/2022 1326  Last data filed at 4/23/2022 2157  Gross per 24 hour   Intake 490 ml   Output    Net 490 ml     General Appearance: Alert confused  Eyes: conjunctivae normal and sclera anicteric   Neck:and noadenopathy   Pulmonary/Chest:Course  Cardiovascular: Afib,   Abdomen: obese, soft, non-tender, non-distended, n  Extremities edema trace  Musculoskeletal: no deformity or tenderness   Neurologic:No  cranial nerve deficits     LABS/IMAGING:    CBC:   Lab Results   Component Value Date    WBC 4.6 04/24/2022    RBC 4.21 04/24/2022    HGB 12.1 04/24/2022    HCT 41.6 04/24/2022    MCV 98.8 04/24/2022    MCH 28.7 04/24/2022    MCHC 29.1 04/24/2022    RDW 14.7 04/24/2022     04/24/2022    MPV 10.9 04/24/2022     CMP:    Lab Results   Component Value Date     04/24/2022    K 3.9 04/24/2022    K 3.6 04/19/2022     04/24/2022    CO2 31 04/24/2022    BUN 9 04/24/2022    CREATININE 0.7 04/24/2022    GFRAA >60 04/24/2022    LABGLOM >60 04/24/2022    GLUCOSE 91 04/24/2022    GLUCOSE 113 02/14/2012    PROT 5.8 04/22/2022    LABALBU 3.1 04/22/2022    LABALBU 4.4 02/14/2012    CALCIUM 9.2 04/24/2022    BILITOT 0.7 04/22/2022    ALKPHOS 86 04/22/2022    AST 26 04/22/2022    ALT 15 04/22/2022     Calcium:    Lab Results   Component Value Date    CALCIUM 9.2 04/24/2022     Phosphorus:    Lab Results   Component Value Date    PHOS 2.4 04/24/2022               ASSESSMENT:  1.) Acute hypoxic /hypercpnic resp failure  2.) Fluid overload/CHF  3.) Right side effusion > left   4.) COVID pneumonia  5.) Fall /A fib .was on couamdin      PLAN:  1. Pallative care - hospice ? 2. BiPAP cycle Q 6 hours  3. No narcotics or benzodiaz  4. Aggressive pulmonary toilet   5. Bronchodilators for COPD  6. No plan on thoracentesis  7. PT OT   8. Monitor I/O  9.  Increased BB for afib        Michael Campos DO, MPH, FCCP, [facility], [address]

## 2022-04-24 NOTE — PROGRESS NOTES
Dr Mann Prior notified of patients decreased urine output for majority of today. Orders received to start fluids. See MAR     Also made Dr. Mann Prior aware that the patient is still as sleepy as she was this morning after the ativan dose given by previous nurse. Can arouse patient with sternal rub. Patient will open eyes slightly and does follow commands but sluggish. BIPAP placed on patient at 1230 and then patient placed back on nasal cannula at 1630 to eat. Vitals have been stable throughout my shift. Decision made to hold off on obtaining ABG due to patients code status of DNR-CC. Will continue to monitor.

## 2022-04-24 NOTE — PROGRESS NOTES
Physical Therapy  Facility/Department: [facility]      Name: Gilbert Dameon  : 1935  MRN: [id number]  Date of Service: 2022    Attempted PT treatment. Pt's sitter reporting pt received medication this AM which caused increased lethargy. Attempted to arouse pt, however, she would not open her eyes or respond appropriately to commands at this time. Will attempt again as schedule permits.     Destinee Hughes, PT, DPT  [id number]

## 2022-04-24 NOTE — PROGRESS NOTES
Date: 4/23/2022    Time: 9:48 PM    Patient Placed On BIPAP/CPAP/ Non-Invasive Ventilation? No      If no must comment. Facial area red/color change? No               Comments:  Patient is agitated and will not tolerate BiPAP mask without ripping it off.  WATLER Roche RCP

## 2022-04-25 NOTE — PROGRESS NOTES
Date: 4/24/2022    Time: 10:03 PM    Patient Placed On BIPAP/CPAP/ Non-Invasive Ventilation? Yes    If no must comment. Facial area red/color change? No           If YES are Blister/Lesion present? No   If yes must notify nursing staff  BIPAP/CPAP skin barrier?   Yes    Skin barrier type:mepilexlite       Comments:        Saji Au RCP

## 2022-04-25 NOTE — PROGRESS NOTES
Occupational Therapy  OCCUPATIONAL THERAPY TREATMENT SESSION    [facility] [address] [address] [address], [facility], [address] TREATMENT NOTE      Date:2022  Patient Name: Manda Noel  MRN: [id number]  : 1935  Room: [facility]84-     Per OT Eval:         Evaluating [address], [place] #[id number]     Referring Provider: Edgard Arellano MD  Specific Provider Orders/Date: OT eval and treat 22     Diagnosis: Hypernatremia [E87.0]  Acute encephalopathy [G93.40]  Altered mental status, unspecified altered mental status type [R41.82]   Pt admitted to hospital on 22 for abnormal labs, AMS, frequent falls     Pertinent Medical History:  has a past medical history of Anticoagulated on Coumadin, Atrial fibrillation ([address] Basal cell carcinoma of neck, CAD (coronary artery disease), CHF (congestive heart failure) ([address] Depression, DJD (degenerative joint disease), GERD (gastroesophageal reflux disease), History of cardiovascular stress test, Hyperparathyroidism ([address] Hypertension, Hypothyroidism, Mild mitral regurgitation, Osteoarthritis, Pulmonary hypertension ([address] Renal calculi, Tricuspid regurgitation, and Urinary incontinence. B wrist restraints d/c'd     Precautions:  Fall Risk, TAPS, cognition, O2, [place],      Assessment of current deficits   [x]? Functional mobility         [x]? ADLs           [x]? Strength                  [x]? Cognition    [x]? Functional transfers       [x]? IADLs         [x]? Safety Awareness   [x]? Endurance    [x]? Fine Coordination                      [x]? Balance      []? Vision/perception   []? Sensation      []? Gross Motor Coordination          [x]? ROM           []?  Delirium                   []? Motor Control      OT PLAN OF CARE   OT POC based on physician orders, patient diagnosis and results of clinical assessment     Frequency/Duration 1-3 days/wk for 2 weeks PRN   Specific OT Treatment Interventions to include:   * Instruction/training on adapted ADL techniques and AE recommendations to increase functional independence within precautions       * Training on energy conservation strategies, correct breathing pattern and techniques to improve independence/tolerance for self-care routine  * Functional transfer/mobility training/DME recommendations for increased independence, safety, and fall prevention  * Patient/Family education to increase follow through with safety techniques and functional independence  * Recommendation of environmental modifications for increased safety with functional transfers/mobility and ADLs  * Cognitive retraining/development of therapeutic activities to improve problem solving, judgement, memory, and attention for increased safety/participation in ADL/IADL tasks  * Splinting/positioning for increased function, prevention of contractures, and improve skin integrity  * Therapeutic exercise to improve motor endurance, ROM, and functional strength for ADLs/functional transfers  * Therapeutic activities to facilitate/challenge dynamic balance, stand tolerance for increased safety and independence with ADLs  * Therapeutic activities to facilitate gross/fine motor skills for increased independence with ADLs  * Positioning to improve skin integrity, interaction with environment and functional independence        Recommended Adaptive Equipment: TBD      Home Living: Pt admitted from [facility] prior (per chart)     Prior Level of Function: unable to obtain from pt d/t cognition. Family/caregiver not present     Pain Level: No c/o pain this session      Cognition: A&O: 2/4 (to self and place); Follows 1 step directions ~75% of session. Pleasantly confused - cues provided to initiate and sequence tasks.   Pt cooperative and eager to participate           Memory:  fair-           Sequencing:  fair-           Problem solving:  poor           Judgement/safety:  poor             Functional Assessment:  AM-PAC Daily Activity Raw Score: 11/24    Initial Eval Status  Date: 4/21/22 Treatment Status  Date:4/25/22 STGs = LTGs  Time frame: 10-14 days   Feeding Dependent   Min A  Breakfast tray (including grasping and maintaining utensils and cups w/ L/R UE as well as for excursion to mouth) Moderate Assist    Grooming Dependent   Mod A  Including washed face, hands and combed hair   Moderate Assist    UB Dressing Dependent  Max A  Donned/doffed gown  Moderate Assist    LB Dressing Dependent  Dependent  -   Bathing Dependent Max A   Mod A  Goal added by Melissa OTR/L #[id number] on 4/25   Toileting Dependent  Dependent  Incontinent  Including hygiene  -   Bed Mobility  Rolling to Left: Mod A  Rolling to Right: Max A x2     Supine to sit: NT  Secondary to safety concerns  Rolling: Mod A  Supine>Sit EOB: Max A    Sit EOB>supine: Max A Rolling: Min A  Supine to sit: Maximal Assist   Sit to supine: Maximal Assist    Functional Transfers NT  NT  Deferred d/t noted fatigue w/ EOB activities Maximal Assist    Functional Mobility NT  NT -  Will continue to assess   Balance Sitting: NT  Standing: NT  Sitting:  Static: Min A  Dynamic: Mod A   Standing: NT Sitting: Min A   Activity Tolerance Poor  Fair-  Pt c/o increased fatigue as EOB activity progressed. Pt did tolerate ~20 minutes EOB. Rest breaks provided Fair   Visual/  Perceptual Glasses: yes               Therapist facilitated B UE ROM exercises (various planes, 5 reps/2 sets) w/ mod cues for sequencing, pace and technique.  Pt will complete B UE exercises w/ min cues to maximize independence w/ ADL's       *Re-assessed B UE ROM and strength this date (4/25) as pt able to follow 1 step commands. Hand Dominance R    AROM (PROM) Strength Additional Info:    RUE  WFL Distal: 3+/5  Proximal: 3-/5 Fair , FMC/dexterity: fair   LUE WFL Distal: 3+/5  Proximal: 3-/5 Fair , FMC/dexterity:fair         Comments: Upon arrival pt lying in bed. RN clearance.  At end of session pt lying in bed (Bed alarm on) all lines and tubes intact, call light within reach. Treatment: Therapist facilitated B UE ROM exercises (educated on benefits. Cues for technique and pace. 5 reps/2 sets). Therapist then facilitated bed mobility (supine><Sit EOB w/ education/cues for body mechanics, safety and sequencing. Increased time for supine>sit EOB) and unsupported sitting balance (addressing safety, posture, weight shifting and light dynamic reaching. Pt tolerated ~20 minutes EOB). Therapist facilitated lateral scooting to [facility] for optimal positioning upon return to supine position. Therapist then facilitated self-care tasks including: UB/LB self-care tasks (bathing task, gown, socks), toileting task (rolled L/R for toileting task) and grooming tasks while educating/cuing pt on modified strategies, posture, safety and techniques to conserve energy. Therapist also facilitated bed rolling L/R 2x for pad change and toileting followed by repositioning for feeding task. Education/cues provided for modified strategies during feeding activity. Skilled monitoring of HR, O2 sats and pts response to treatment. Pt on 4L O2 via nasal cannula. At rest: O2 sat=95%, O2 sat remained ^93% during activity. Increased time required for all tasks d/t c/o fatigue and c/o mild SOB. Reinforced rest and pursed lip breathing. Patient demonstrated decreased independence and safety during completion of ADL/functional transfer/mobility tasks. Pt would benefit from continued skilled OT to increase safety and independence with ADL/IADL tasks for functional independence and quality of life. · Pt has made good progress towards set goals.        Treatment Time In:08:36          Treatment Time Out: 09:33             Treatment Charges: Mins Units   Ther Ex  [id number]     Manual Therapy Florentino Dupree [id number] [phone]   ADL/Home Mgt [id number] 30 2   Neuro Re-ed [id number]     Group Therapy      Orthotic manage/training  [id number] Non-Billable Time     Total Timed Treatment 57 10 [facility], OTR/L #[id number]

## 2022-04-25 NOTE — PROGRESS NOTES
[facility] Inpatient   Resident Progress Note    S:  Hospital day: 15   Brief Synopsis: Vee Rausch is a 81 y.o. female PMH of atrial fibrillation (on Coumadin ), coronary artery disease, CHF, GERD, hyperparathyroidism, hypertension, hypothyroidism, osteoarthritis and urinary incontinenc of who presents to ED for Abnormal Lab (pt new pt to [facility], came in for abnormal labs. pt with frequent falls and they state she has a change in mentation. ER labs showed H/H 11.4/38.0, sodium 152, potassium 3.9, chloride 108, BUN 23, creatinine 0.9, AST 40, alkaline phosphatase 127, INR 2, urine analysis leukocyte esterase with WBC 1-3, troponin 24 x25, normal lactic acid. EKG showed A. fib with RVR and chest x-ray new opacity in the right lung base. Patient seen and examined this morning. She is breathing comfortably on 4 L nasal cannula. She is well-appearing in no acute distress. Was able to have a very short conversation this morning. Pending disposition. Cont meds:    dextrose 5 % and 0.45 % NaCl 75 mL/hr at 04/25/22 0523    sodium chloride      dextrose       Scheduled meds: Patient is awake and alert.    potassium & sodium phosphates  1 packet Oral BID WC    divalproex  250 mg Oral 2 times per day    metoprolol succinate  100 mg Oral Daily    budesonide  0.5 mg Nebulization BID    metoprolol  5 mg IntraVENous Q6H    [Held by provider] furosemide  20 mg Oral Daily    LORazepam  0.5 mg IntraMUSCular Once    lidocaine  5 mL IntraDERmal Once    heparin flush  1 mL IntraVENous 2 times per day    levalbuterol  1 ampule Nebulization BID    levothyroxine  175 mcg Oral Daily     PRN meds: OLANZapine, acetaminophen, sodium chloride flush, sodium chloride, heparin flush, glucose, dextrose, glucagon (rDNA), dextrose, sodium chloride flush, ondansetron **OR** ondansetron, polyethylene glycol     I reviewed the patient's past medical and surgical history, Medications and Allergies. O:  /79   Pulse 106   Temp 97 °F (36.1 °C) (Temporal)   Resp 19   Ht 5' 6\" (1.676 m)   Wt 255 lb (115.7 kg)   SpO2 97%   BMI 41.16 kg/m²   24 hour I&O: I/O last 3 completed shifts: In: 56 [P.O.:490]  Out: -   No intake/output data recorded. General:  Difficult to arouse and somnolent neck: Supple, symmetrical, no JVD   Lung:  Clear to auscultation   heart: Irregular rate and irregular rhythm, tachycardia  Abdomen: SNTND, good bowel sound extremities:  Extremities normal, atraumatic, no cyanosis or edema. Distal pulses equal bilaterally  Skin: Skin color, texture, turgor normal, no rashes or lesions  Musculoskeletal: No joint swelling, no muscle tenderness   Neurologic:  Difficult to arouse, somnolent  Labs:  Na/K/Cl/CO2:  143/3.6/103/34 (04/25 0424)  BUN/Cr/glu/ALT/AST/amyl/lip:  8/0.7/--/--/--/--/-- (04/25 0424)  WBC/Hgb/Hct/Plts:  5.9/10.9/37.9/180 (04/25 0424)  estimated creatinine clearance is 75 mL/min (based on SCr of 0.7 mg/dL). Other pertinent labs as noted below    Radiology:  XR CHEST PORTABLE   Final Result   No change in small to moderate bilateral pleural effusions. XR CHEST PORTABLE   Final Result   Bilateral pulmonary opacifications right greater than left with probable   small right and small to moderate left potential layering pleural effusions. US DUP UPPER EXTREMITY RIGHT VENOUS   Final Result   Within the visualized vessels there is no evidence for deep venous   thrombosis               XR CHEST PORTABLE   Final Result   Bibasilar atelectasis and or infiltrate as well as right pleural effusion. Mildly worsened aeration on the left. XR ABDOMEN FOR NG/OG/NE TUBE PLACEMENT   Final Result   Gastric catheter passes into the stomach, tip is in the gastric body. No   complications. XR CHEST PORTABLE   Final Result   1. Significant interval improvement in the left lung lower lung and lingular   consolidation   2.  Increased right pleural effusion and or atelectasis   3. Cardiomegaly, stable   4. The position of the nasogastric tube is within normal range         XR CHEST PORTABLE   Final Result   Increased markings seen in the lung bases bilaterally with small bilateral   pleural effusions not significantly changed in the interval.  No new findings. XR ABDOMEN (KUB) (SINGLE AP VIEW)   Final Result   No acute intra-process seen. Left renal 12 mm calculus. CT HEAD WO CONTRAST   Final Result   1. No acute intracranial abnormality. 2. Chronic small vessel ischemic disease. 3. Left frontal scalp hematoma. XR CHEST PORTABLE   Final Result   Cardiomegaly, pulmonary venous hypertension and small effusions. Findings   suggest volume overload. Mild right basilar atelectasis. CTA PULMONARY W CONTRAST   Final Result   No evidence of pulmonary embolism. Small left and moderate size right pleural effusions. A moderate size area of consolidation is noted involving the right lower   lung, concerning for an underlying airspace disease process. RECOMMENDATIONS:   Unavailable         XR CHEST PORTABLE   Final Result   New opacities in right lung base suggestive of pneumonia, atelectasis, and   possible right pleural effusion. CT Head WO Contrast   Final Result   No acute intracranial abnormality or hemorrhage. Cortical atrophy and periventricular leukomalacia. Left frontal scalp hematoma. Right frontal sinusitis and right mastoiditis. CT Cervical Spine WO Contrast   Final Result   No acute abnormality of the cervical spine. Multilevel degenerative changes, no significant interval change. Right pleural effusion.              A/P:  Principal Problem:    Hypernatremia  Active Problems:    Permanent atrial fibrillation (HCC)    Essential hypertension    Chronic obstructive pulmonary disease (HCC)    Frequent falls    AMS (altered mental status)    Head injury, initial encounter    Acute encephalopathy  Resolved Problems:    * No resolved hospital problems. *      States the mental status of also resulted in significantly decreased p.o. intake with resulting drops in sugar. Started on D5 though taking to account pleural effusions, fluid overload. Will only run fluids for short while. Ending today. Acute Encephalopathy   -Patient mental status is waxing and waning. This is appear to be a chronic problem for her. She is able to occasionally have very minimal conversations and follow commands. Other times she has poor mentation, becomes lethargic or agitated.  Likely this could be secondary to age-related dementia changes with waxing and waning of this. - Initial imaging showed possible right lobe pneumonia vs pleural effusion  -Ct head no acute ischemia or bleeding  - likely secondary to  metabolic, hepatic, septic, drug induced, worsening underlying dementia   - Daily labs  -Blood culture no growth so far  -Urine culture grew E. coli . Completed total 7 days of Rocephin  -Pro-Salomon level and lactic acid normal  -Repeat mild to moderate pleural effusion showed mild to moderate bilateral pleural effusion, no worsening  - neuro consulted, recommneds to EEG if worsening   - Stop ativan, increase depakote, PRN zyprexa for agitation  - If worsens, will repeat ABG and would need to go on bipap    Hypoxic respiratory failure 2/2 Pneumonia (Viral vs bacterial) VS  Pleural EFFUSION   - Likely covid induced with secondary bacterial  - Lung exam showed scattered rales , received 1 liter fluid yesterday for oliguria might fluid overload, will repeat CXR and might give iv lasix   -Repeat Rapid Covid is negative  - Mild leucocytosis on admission, resolved  -We will hold on decadrone 6 mg as pulmonary recommendation  -Chest x-ray mild to moderate pleural effusion, no worsening.   Vitals stable  -No plan for thoracocentesis at this time  - Continue oxygen supplement and wean as tolerated , also BIPAP  -Pulmonary is following, bipap Q6 hours, patient intermittently tolerates  -CT angio no PE  - will hold lasix 20 mg daily due to oliguria    A fib with RVR  - On last admission Coumadin was discontinued due to high for bleeding despite of high CHADVAS score after discussion with patient daughter  - Will continue to hold Coumadin  -Heart rate improving  -Cardio consulted and increase metoprolol to 75 mg daily  -IV metoprolol 5 mg every 6 hour if not able to take oral   - Last Echo in 2018, >55% EF, Interdeterminate diastolic dysfunction  consider repeating during this admission   - Keep mag/k/phos >2/4     Multiple fall  - Wound on left forehead  - Wound care is following patient  - No need suture  - Fall precaution  - PT/OT eval      Hypothyroidism  - Recent TSH normal  - Continue current regimen      Hypertension  -Increase metoprolol to 100mg daily  -If not taking PO, will do IVP Toprol 5 mg for heart rate more than 105        DVT / GI prophylaxis: Lovenox 40 mg SC     Dispo  CODE STATUS changed to comfort care. H per conversation with hospice family member does not want to proceed with hospice. They are leaning toward rehab.   Need to talk with family member again and reach out with palliative        Electronically signed by Phyllis Quezada MD PGY-2 on 4/25/2022 at 12:41 PM     This case was discussed with attending physician: Dr. Maxi Finney

## 2022-04-25 NOTE — PROGRESS NOTES
Pulmonary [facility]    Pulmonary Consult/Progress Note :    Reason for Consultation:COVID Pneumonia     CC : SOB      SUBJECTIVE:    Clinically no changes  Combative for brief period this AM  Cycle NIV    PHYSICAL EXAMINATION:     VITAL SIGNS:  /79   Pulse 106   Temp 97 °F (36.1 °C) (Temporal)   Resp 19   Ht 5' 6\" (1.676 m)   Wt 255 lb (115.7 kg)   SpO2 97%   BMI 41.16 kg/m²   Wt Readings from Last 3 Encounters:   04/23/22 255 lb (115.7 kg)   03/30/22 270 lb 4.8 oz (122.6 kg)   03/14/22 250 lb (113.4 kg)     Temp Readings from Last 3 Encounters:   04/25/22 97 °F (36.1 °C) (Temporal)   03/30/22 96.5 °F (35.8 °C) (Temporal)   03/14/22 98.5 °F (36.9 °C) (Oral)     TMAX:  BP Readings from Last 3 Encounters:   04/25/22 130/79   03/30/22 (!) 144/97   03/15/22 118/82     Pulse Readings from Last 3 Encounters:   04/25/22 106   03/30/22 110   03/15/22 71     INTAKE/OUTPUTS:  I/O last 3 completed shifts:   In: 490 [P.O.:490]  Out: -     Intake/Output Summary (Last 24 hours) at 4/25/2022 1454  Last data filed at 4/24/2022 2138  Gross per 24 hour   Intake 240 ml   Output    Net 240 ml     General Appearance: Alert confused  Eyes: conjunctivae normal and sclera anicteric   Neck:and noadenopathy   Pulmonary/Chest:Course  Cardiovascular: Afib,   Abdomen: obese, soft, non-tender, non-distended, n  Extremities edema trace  Musculoskeletal: no deformity or tenderness   Neurologic:No  cranial nerve deficits     LABS/IMAGING:    CBC:   Lab Results   Component Value Date    WBC 5.9 04/25/2022    RBC 3.79 04/25/2022    HGB 10.9 04/25/2022    HCT 37.9 04/25/2022    .0 04/25/2022    MCH 28.8 04/25/2022    MCHC 28.8 04/25/2022    RDW 14.6 04/25/2022     04/25/2022    MPV 10.5 04/25/2022     CMP:    Lab Results   Component Value Date     04/25/2022    K 3.6 04/25/2022    K 3.6 04/19/2022     04/25/2022    CO2 34 04/25/2022    BUN 8 04/25/2022    CREATININE 0.7 04/25/2022    GFRAA >60 04/25/2022    LABGLOM >60 04/25/2022    GLUCOSE 107 04/25/2022    GLUCOSE 113 02/14/2012    PROT 5.8 04/22/2022    LABALBU 3.1 04/22/2022    LABALBU 4.4 02/14/2012    CALCIUM 9.0 04/25/2022    BILITOT 0.7 04/22/2022    ALKPHOS 86 04/22/2022    AST 26 04/22/2022    ALT 15 04/22/2022     Calcium:    Lab Results   Component Value Date    CALCIUM 9.0 04/25/2022     Phosphorus:    Lab Results   Component Value Date    PHOS 2.2 04/25/2022               ASSESSMENT:  1.) Acute hypoxic /hypercpnic resp failure  2.) Fluid overload/CHF  3.) Right side effusion > left   4.) COVID pneumonia  5.) Fall /A fib .was on couamdin      PLAN:  1. BiPAP cycle Q 12 hours  2. No narcotics or benzodiaz  3. Increased BB for afib  4. Need BIPAP or AVARPS at night  5.  Need to remain on Bronchodilators for COPD after discharge        Benny Toro DO, MPH, [facility], [facility] [place], [address]

## 2022-04-25 NOTE — PROGRESS NOTES
not available this shift. Bed alarm in place, pt resting comfortably in bed at this time.     Electronically signed by Jana Barrios RN on 4/24/2022 at 10:38 PM

## 2022-04-25 NOTE — PLAN OF CARE
Problem: Falls - Risk of:  Goal: Will remain free from falls  Description: Will remain free from falls  4/25/2022 1045 by Poonam Munguia RN  Outcome: Progressing  4/25/2022 0305 by Lauren Smith RN  Outcome: Progressing  Goal: Absence of physical injury  Description: Absence of physical injury  4/25/2022 1045 by Poonam Munguia RN  Outcome: Progressing  4/25/2022 0305 by Lauren Smith RN  Outcome: Progressing     Problem: Airway Clearance - Ineffective  Goal: Achieve or maintain patent airway  4/25/2022 0305 by Lauren Smith RN  Outcome: Progressing     Problem: Gas Exchange - Impaired  Goal: Absence of hypoxia  4/25/2022 0305 by Lauren Smith RN  Outcome: Progressing  Goal: Promote optimal lung function  4/25/2022 0305 by Lauren Smith RN  Outcome: Not Progressing     Problem: Breathing Pattern - Ineffective  Goal: Ability to achieve and maintain a regular respiratory rate  4/25/2022 0305 by Lauren Smith RN  Outcome: Progressing     Problem:  Body Temperature -  Risk of, Imbalanced  Goal: Ability to maintain a body temperature within defined limits  4/25/2022 0305 by Lauren Smith RN  Outcome: Progressing  Goal: Will regain or maintain usual level of consciousness  4/25/2022 0305 by Lauren Smith RN  Outcome: Progressing  Goal: Complications related to the disease process, condition or treatment will be avoided or minimized  4/25/2022 0305 by Lauren Smith RN  Outcome: Progressing     Problem: Isolation Precautions - Risk of Spread of Infection  Goal: Prevent transmission of infection  4/25/2022 0305 by Lauren Smith RN  Outcome: Progressing     Problem: Nutrition Deficits  Goal: Optimize nutritional status  4/25/2022 1045 by Poonam Munguia RN  Outcome: Progressing  4/25/2022 0305 by Lauren Smith RN  Outcome: Progressing     Problem: Risk for Fluid Volume Deficit  Goal: Maintain normal heart rhythm  4/25/2022 1045 by Poonam Munguia RN  Outcome: Progressing  4/25/2022 0305 by Lauren Smith RN  Outcome: Progressing  Goal: Maintain absence of muscle cramping  4/25/2022 1045 by Allen Sherwood RN  Outcome: Progressing  4/25/2022 0305 by Porter Younger RN  Outcome: Progressing  Goal: Maintain normal serum potassium, sodium, calcium, phosphorus, and pH  4/25/2022 1045 by Allen Sherwood RN  Outcome: Progressing  4/25/2022 0305 by Porter Younger RN  Outcome: Progressing     Problem: Loneliness or Risk for Loneliness  Goal: Demonstrate positive use of time alone when socialization is not possible  4/25/2022 0305 by Porter Younger RN  Outcome: Progressing     Problem: Fatigue  Goal: Verbalize increase energy and improved vitality  4/25/2022 0305 by Porter Younger RN  Outcome: Not Progressing     Problem: Patient Education: Go to Patient Education Activity  Goal: Patient/Family Education  4/25/2022 0305 by Porter Younger RN  Outcome: Progressing     Problem: Skin Integrity:  Goal: Will show no infection signs and symptoms  Description: Will show no infection signs and symptoms  4/25/2022 1045 by Allen Sherwood RN  Outcome: Progressing  4/25/2022 0305 by Porter Younger RN  Outcome: Progressing  Goal: Absence of new skin breakdown  Description: Absence of new skin breakdown  4/25/2022 1045 by Allen Sherwood RN  Outcome: Progressing  4/25/2022 0305 by Porter Younger RN  Outcome: Progressing     Problem: HEMODYNAMIC STATUS  Goal: Patient has stable vital signs and fluid balance  4/25/2022 0305 by Porter Younger RN  Outcome: Not Progressing     Problem: Non-Violent Restraints  Goal: Removal from restraints as soon as assessed to be safe  4/25/2022 0305 by Porter Younger RN  Outcome: Progressing  Goal: No harm/injury to patient while restraints in use  4/25/2022 0305 by Porter Younger RN  Outcome: Progressing  Goal: Patient's dignity will be maintained  4/25/2022 0305 by Porter Younger RN  Outcome: Progressing     Problem: Pain:  Goal: Pain level will decrease  Description: Pain level will decrease  4/25/2022 0305 by Porter Younger RN  Outcome: Progressing  Goal: Control of acute pain  Description: Control of acute pain  4/25/2022 0305 by Milly Vu RN  Outcome: Progressing  Goal: Control of chronic pain  Description: Control of chronic pain  4/25/2022 0305 by Milly Vu RN  Outcome: Progressing     Problem: Chronic Conditions and Co-morbidities  Goal: Patient's chronic conditions and co-morbidity symptoms are monitored and maintained or improved  4/25/2022 0305 by Milly Vu RN  Outcome: Progressing     Problem: Safety - Medical Restraint  Goal: Remains free of injury from restraints (Restraint for Interference with Medical Device)  Description: INTERVENTIONS:  1. Determine that other, less restrictive measures have been tried or would not be effective before applying the restraint  2. Evaluate the patient's condition at the time of restraint application  3. Inform patient/family regarding the reason for restraint  4.  Q2H: Monitor safety, psychosocial status, comfort, nutrition and hydration  4/25/2022 0305 by Milly Vu RN  Outcome: Progressing     Problem: ABCDS Injury Assessment  Goal: Absence of physical injury  4/25/2022 0305 by Milly Vu RN  Outcome: Progressing

## 2022-04-25 NOTE — PROGRESS NOTES
Called for telesitter observer camera to be at bedside. None available at this time, placed patient on list to get camera. Bed alarm on, patient resting with eyes closed at this time.

## 2022-04-25 NOTE — CARE COORDINATION
Faxed referral to Joan Alejandro at [facility] is reviewing. [facility] has accepted Pt but DON is off today. [facility] reported that Dtr is considering [facility] again.     Daniel Sutherland, ISAAC.S.W.  [phone]

## 2022-04-25 NOTE — PROGRESS NOTES
[facility] Attending    Charlotte Montenegro course: 81 y.o. female with falls at [facility] and encephalopathy, forehead wound, frequent falls, CAD, HF, HTN, hyperPTH s/p parathyroidectomy, atrial fibrillation, COPD. Found to have hypernatremia, COVID positive. Has been intermittently agitated or somnolent          S: Started on gentle IVF overnight. Was able to take PO this AM. No c/o pain. O: VS- Blood pressure 130/79, pulse 106, temperature 97 °F (36.1 °C), temperature source Temporal, resp. rate 19, height 5' 6\" (1.676 m), weight 255 lb (115.7 kg), SpO2 97 %, not currently breastfeeding. Exam is as noted by resident   Gen somnolent still responds with 1-2 answers with arousal.   CV irreg irreg, mildly tachy  Resp decreased, coarse, unlabored on NC  Abd soft  Ext - no lower extremity edema. Impressions:   Principal Problem:    Hypernatremia  Active Problems:    Permanent atrial fibrillation (HCC)    Essential hypertension    Chronic obstructive pulmonary disease (HCC)    Frequent falls    AMS (altered mental status)    Head injury, initial encounter    Acute encephalopathy        Plan:   DNR CC, daughter planning discharge to SNF. Discharge pending placement. Hypernatremia. Resolved. No further labs at this time. Increased metoprolol for afib. RVR. slp eval done and patient ok for Soft and bite size consistency solids (IDDSI level 6) with  thin liquids (IDDSI level 0) - NPO while patient is somnolent but she does awaken to take PO. Agitation - increased depakote, prn zyprexa to avoid benzos    Appreciate palliative care. Cont to discuss goals of care    Nephrology recs re fluid management    Pleural effusion, hypercapnia, covid - BTx prn for now. Continue NC O2 prn. Coninue bipap prn       Attending Physician Statement  I have reviewed the chart and seen the patient with the resident(s). I personally reviewed images, EKG's and similar tests, if present.   I personally reviewed and performed key elements of the history and exam.  I have reviewed and confirmed student and/or resident history and exam with changes as indicated above. I agree with the assessment, plan and orders as documented by the resident. Please refer to the resident and/or student note for additional information.       Gurdeep Griffin MD

## 2022-04-25 NOTE — PROGRESS NOTES
Physical Therapy  Treatment Note    Name: Ron Gracia  : 1935  MRN: [id number]      Date of Service: 2022    Evaluating PT:  Wilver Watts, PT [id number]      Room #:  7626/4372-K  Diagnosis:  Hypernatremia [E87.0]  Acute encephalopathy [G93.40]  Altered mental status, unspecified altered mental status type [R41.82]  PMHx/PSHx:     has a past medical history of Anticoagulated on Coumadin, Atrial fibrillation ([address] Basal cell carcinoma of neck, CAD (coronary artery disease), CHF (congestive heart failure) ([address] Depression, DJD (degenerative joint disease), GERD (gastroesophageal reflux disease), History of cardiovascular stress test, Hyperparathyroidism ([address] Hypertension, Hypothyroidism, Mild mitral regurgitation, Osteoarthritis, Pulmonary hypertension ([address] Renal calculi, Tricuspid regurgitation, and Urinary incontinence. has a past surgical history that includes Thyroidectomy (); Kidney stone surgery (); Cholecystectomy (); Kidney stone surgery (); Cataract removal (2005); Incontinence surgery (2005); Thyroid surgery (2005); parathyroidectomy; Knee Arthroplasty (Bilateral, 2005); Total shoulder arthroplasty (Left, ); fracture surgery (Left); Carpal tunnel release; Finger surgery (2011); Total shoulder arthroplasty (Left); Inner ear surgery (); Cataract removal with implant (Bilateral); Colonoscopy; and Cardiac catheterization (3/22/14). Procedure/Surgery:  none  Precautions:  Falls, bed alarm , FWB (full weight bearing),  O2  Equipment Needs: Equipment at [employer],    SUBJECTIVE:    Patient admitted from SNF. PLOF to this admission unknown. Patient unable to provide history. Equipment owned: Equipment at [employer],      OBJECTIVE:   Initial Evaluation  Date: 22 Treatment  22 Short Term/ Long Term   Goals   AM-PAC 6 Clicks  93/13    Was pt agreeable to Eval/treatment? yes Yes    Does pt have pain? None indicated.   No current complaints of pain    Bed Mobility  Rolling: Mod    Supine to sit:   Max    Sit to supine: Max    Scooting: Mod   Rolling: NT  Supine to sit: Mod A  Sit to supine: Max A    Scooting: Min A to HOB Rolling: Min  Supine to sit: Min  Sit to supine: Min  Scooting: Min   Transfers Sit to stand: Max    Stand to sit: Max   Stand pivot: NT unsafe this date. Sit to stand: Mod A    Stand to sit: Mod A  Stand pivot: NT  Sit to stand: Mod   Stand to sit: Mod    Stand pivot: Max     Ambulation    NT NT TBD   Stair negotiation: ascended and descended  NT NT    ROM BUE:  wfl   BLE:  wfl NT    Strength BUE: 3+/5  RLE:  Grossly 3+/5  LLE:  Grossly  3+/5 NT 4-/5   Balance Sitting EOB: Mod constant contact. Dynamic Standing: Max A Sitting EOB: SBA  Dynamic Standing: Mod A without AD Sitting EOB:  SBA  Dynamic Standing:  Max     Pt is A & O x: 3 grossly to person, place, and year. Sensation: Pt denies numbness and tingling of extremities. Edema: Unremarkable. Therapeutic Exercises: Ankle pumps x20  LAQ x20    Patient education  Pt educated on hand placement when sitting EOB. Patient response to education:   Pt verbalized understanding Pt demonstrated skill Pt requires further education in this area   Yes Yes Reinforce      ASSESSMENT:    Comments:    Pt was in bed upon room entry; agreeable to PT treatment. Pt is very [place] and mildly confused. Pt is able to follow simple, one step commands. Pt required assistance for trunk mobility and scoot to EOB during supine to sit transfer. Pt has fair sitting balance at EOB and sat for 15+ minutes total. Pt completed several sit to stands from EOB. Pt was unable to sidestep today. Pt completed therapeutic exercises noted above. O2 sat was 95% on 4 L O2/min with all activity. Pt was assisted back to bed and positioned comfortably. Pt was left in bed with all needs met at conclusion of session.     Treatment:  Patient practiced and was instructed in the following treatment:  Therapeutic activities:  o Bed mobility: Pt was cued for technique during bed mobility transfers. o Transfers: Pt was cued for hand placement during sit <> stand transfers. Pt completed x2 transfers from EOB. o Sitting EOB: Pt sat at EOB for 15+ minutes. Pt was cued for posture and hand placement to improve balance. o Vitals and symptoms were closely monitored throughout session. o Skillful positioning in bed to protect skin/joint integrity. PLAN:    Patient is making Fair progress towards established goals. Will continue with current POC.       Time in: 1300  Time out: 1325    Total Treatment Time 25 minutes     CPT codes:  [] Gait training 43870 0 minutes  [] Manual therapy 62990 0 minutes  [x] Therapeutic activities 69523 25 minutes  [] Therapeutic exercises 17497 0 minutes  [] Neuromuscular reeducation 43654 0 minutes      Chadwick Herman PT, DPT   [id number]

## 2022-04-25 NOTE — PROGRESS NOTES
[facility]  Nephrology Progress Note    SUBJECTIVE from Dr. Lobito Lau 4/14 Consult Note: \"Mrs Kate Etienne 60-year-old woman who was admitted from nursing facility with altered mentation and confusion. She was found to have a sodium of 152 for which we have been asked to assist in her management. \"    Interval History:    4/25: Patient was seen and examined. She is lethargic. She is not answering questions for me at this time. PMH:    Past Medical History:   Diagnosis Date    Anticoagulated on Coumadin     on Coumadin for this Dr. Bourgeois Dub controls    Atrial fibrillation ([address]     Basal cell carcinoma of neck     CAD (coronary artery disease)     History of luminal irregularities of the coronary artery, stable.     CHF (congestive heart failure) ([facility])     stage I diastolic dysfunction on 7/31/33    Depression 11/16/2010    DJD (degenerative joint disease)     SEVERE IN RIGHT HAND, IN MULTIPLE OTHER JOINTS    GERD (gastroesophageal reflux disease) 11/16/2010    History of cardiovascular stress test 03/18/2014    lexiscan    Hyperparathyroidism ([address] 11/16/2010    Had parathyroidectomy    Hypertension 11/16/2010    Hypothyroidism 11/16/2010    Mild mitral regurgitation 7/10/14    Osteoarthritis 11/16/2010    Pulmonary hypertension ([address] 7/10/14    mild    Renal calculi     x3    Tricuspid regurgitation 7/10/14    mild-moderate    Urinary incontinence 11/16/2010     Patient Active Problem List   Diagnosis    Depression    Hypothyroidism    Permanent atrial fibrillation ([address]    History of artificial lens replacement    Essential hypertension    Moderate obesity    Greater trochanteric bursitis    Chronic anticoagulation    Chronic obstructive pulmonary disease (HCC)    Mixed stress and urge urinary incontinence    Fuchs' endothelial dystrophy    Pseudophakia, both eyes    Chronic diastolic (congestive) heart failure ([address]    Pure hypercholesterolemia    Spinal stenosis of lumbar region with neurogenic claudication    Patent foramen ovale    Coronary arteriosclerosis in native artery    Frequent falls    Dementia [facility]    Ambulatory dysfunction    Cognitive dysfunction    COVID-19 virus infection    Nephrolithiasis    AMS (altered mental status)    Laceration of fascia of head    Head injury, initial encounter    Hypernatremia    Acute encephalopathy     Meds:     potassium & sodium phosphates  1 packet Oral BID WC    divalproex  250 mg Oral 2 times per day    metoprolol succinate  100 mg Oral Daily    budesonide  0.5 mg Nebulization BID    metoprolol  5 mg IntraVENous Q6H    [Held by provider] furosemide  20 mg Oral Daily    LORazepam  0.5 mg IntraMUSCular Once    lidocaine  5 mL IntraDERmal Once    heparin flush  1 mL IntraVENous 2 times per day    levalbuterol  1 ampule Nebulization BID    levothyroxine  175 mcg Oral Daily      dextrose 5 % and 0.45 % NaCl 75 mL/hr at 04/25/22 0523    sodium chloride      dextrose       Meds prn:     OLANZapine, acetaminophen, sodium chloride flush, sodium chloride, heparin flush, glucose, dextrose, glucagon (rDNA), dextrose, sodium chloride flush, ondansetron **OR** ondansetron, polyethylene glycol    Meds prior to admission:     No current facility-administered medications on file prior to encounter.      Current Outpatient Medications on File Prior to Encounter   Medication Sig Dispense Refill    acetaminophen (TYLENOL) 325 MG tablet Take 650 mg by mouth every 4 hours as needed for Pain or Fever      b complex vitamins capsule Take 1 capsule by mouth daily      vitamin D (CHOLECALCIFEROL) 25 MCG (1000 UT) TABS tablet Take 1,000 Units by mouth daily      fluticasone (FLONASE) 50 MCG/ACT nasal spray 2 sprays by Nasal route daily      guaiFENesin (MUCINEX) 600 MG extended release tablet Take 600 mg by mouth daily      loperamide (IMODIUM) 2 MG capsule Take 4 mg by mouth 4 times daily as needed for Diarrhea      meclizine (ANTIVERT) 25 MG tablet Take 25 mg by mouth 3 times daily as needed for Dizziness      albuterol sulfate HFA (VENTOLIN HFA) 108 (90 Base) MCG/ACT inhaler Inhale 2 puffs into the lungs every 6 hours as needed for Wheezing      ascorbic acid (VITAMIN C) 500 MG tablet Take 1,000 mg by mouth daily      levothyroxine (SYNTHROID) 175 MCG tablet Take 1 tablet by mouth Daily 30 tablet 3    levalbuterol (XOPENEX) 0.63 MG/3ML nebulization Take 1 ampule by nebulization in the morning and at bedtime       metoprolol succinate (TOPROL XL) 50 MG extended release tablet Take 1 tablet by mouth nightly 30 tablet 5    magnesium hydroxide (MILK OF MAGNESIA) 400 MG/5ML suspension Take 5 mLs by mouth daily as needed for Constipation      lisinopril (PRINIVIL;ZESTRIL) 5 MG tablet Take 1 tablet by mouth daily 90 tablet 1    furosemide (LASIX) 40 MG tablet Take 1 tablet by mouth daily 60 tablet 2    famotidine (PEPCID) 20 MG tablet Take 1 tablet by mouth 2 times daily 60 tablet 5    escitalopram (LEXAPRO) 10 MG tablet Take 1 tablet by mouth daily 30 tablet 5    albuterol (PROVENTIL) (2.5 MG/3ML) 0.083% nebulizer solution Take 3 mLs by nebulization every 6 hours as needed for Wheezing 120 each 5    [DISCONTINUED] levothyroxine (SYNTHROID) 125 MCG tablet Take 1 tablet by mouth every morning (before breakfast) 24 tablet 2     Allergies:    Codeine, Penicillins, Vicodin [hydrocodone-acetaminophen], and Adhesive tape    Social History:     reports that she has never smoked. She has never used smokeless tobacco. She reports previous alcohol use. She reports that she does not use drugs.     Family History:         Problem Relation Age of Onset    Heart Disease Mother     Heart Attack Mother     Arthritis Mother     Heart Disease Father     Mental Illness Father     Diabetes Father     Heart Disease Brother     Cancer Brother     Cancer Son     Heart Disease Sister     Heart Disease Brother     Heart Surgery Brother Physical Exam:    Patient Vitals for the past 24 hrs:   BP Temp Temp src Pulse Resp SpO2   04/25/22 0906 130/79 -- -- 106 -- --   04/25/22 0751 -- -- -- -- -- 94 %   04/24/22 2354 -- -- -- -- -- 96 %   04/24/22 2202 -- -- -- -- 16 --   04/24/22 1930 117/60 97.9 °F (36.6 °C) Temporal 111 18 97 %   04/24/22 1922 -- -- -- -- 21 --   04/24/22 1227 -- -- -- -- 20 --   04/24/22 1223 -- -- -- -- -- 96 %       Intake/Output Summary (Last 24 hours) at 4/25/2022 2793  Last data filed at 4/24/2022 2138  Gross per 24 hour   Intake 240 ml   Output --   Net 240 ml      General: Lethargic, no acute distress  Neck: No JVD noted  Cardiovascular: Tachycardic   Respiratory: Clear bilaterally upper, diminished to the bases bilaterally. Unlabored  Abdomen: Soft, nontender, nondistended. Active bowel sounds  Extremities: No edema  Skin: Warm/dry; no rash on exposed extremities  Neuro: Lethargic, not answering questions at this time    Data:    Recent Labs     04/24/22  0444 04/25/22  0424   WBC 4.6 5.9   HGB 12.1 10.9*   HCT 41.6 37.9   MCV 98.8 100.0*    180     Recent Labs     04/23/22  0850 04/24/22  0444 04/25/22  0424    143 143   K 3.9 3.9 3.6    104 103   CO2 31* 31* 34*   CREATININE 0.6 0.7 0.7   BUN 11 9 8   LABGLOM >60 >60 >60   GLUCOSE 94 91 107*   CALCIUM 9.0 9.2 9.0   PHOS  --  2.4* 2.2*   MG  --  2.2 2.2     Vit D, 25-Hydroxy   Date Value Ref Range Status   04/14/2022 37 30 - 100 ng/mL Final     Comment:     <20 ng/mL. ........... [facility] Deficient  20-30 ng/mL. ......... [facility] Insufficient   ng/mL. ........ [facility] Sufficient  >100 ng/mL. .......... [facility] Toxic       PTH   Date Value Ref Range Status   04/15/2022 112 (H) 15 - 65 pg/mL Final     Recent Labs     04/22/22  1039   ALT 15   AST 26   ALKPHOS 86   BILITOT 0.7     Recent Labs     04/22/22  1039   LABALBU 3.1*     Ferritin   Date Value Ref Range Status   04/13/2022 155 ng/mL Final     Comment:     FERRITIN Reference Ranges:  Adult Males   20 - 60 years:    27 - 400 ng/mL  Adult females 16 - 60 years:    13 - 150 ng/mL  Adults greater than 60 years:   no established reference range  Pediatrics:                     no established reference range       Iron   Date Value Ref Range Status   02/20/2020 73 37 - 145 mcg/dL Final     TIBC   Date Value Ref Range Status   02/20/2020 323 250 - 450 mcg/dL Final     Vitamin B-12   Date Value Ref Range Status   04/06/2022 802 211 - 946 pg/mL Final     Folate   Date Value Ref Range Status   01/12/2022 >20.0 4.8 - 24.2 ng/mL Final     Lab Results   Component Value Date    COLORU Yellow 04/13/2022    NITRU Negative 04/13/2022    GLUCOSEU Negative 04/13/2022    KETUA Negative 04/13/2022    UROBILINOGEN 1.0 04/13/2022    BILIRUBINUR Negative 04/13/2022    BILIRUBINUR negative 07/05/2019     Lab Results   Component Value Date/Time    OSMOU 522 04/13/2022 03:49 PM     No components found for: URIC    No results found for: LIPIDPAN    Assessment and Plan:    1. Hyponatremia  Originally hypovolemic, now with worsening pulmonary edema  Na 152 on 4/13-->143 today  On IVF   Follow    2. Hypokalemia  K+ 3.6 today   Follow    3. COVID pneumonia/ Pleural effusion   Chest x-ray 4/17 \"significant interval improvement in the left lung lower lung and lingular consolidation\" and \"Increased right pleural effusion and or atelectasis\"  Pulmonology following    4. Atrial fibrillation with RVR  On metoprolol  Cardiology previously following    5. Hypertension  BP goal<130/80  BP near goal  Follow    6. Altered mental status  Neurology previously following    7. Hypophosphatemia  Phos 2.2 today  Supplement Phos  Recheck Phos in the a.m. Follow    8.   Oliguria  Incontinent now  Monitor urine output on IVF  Follow    Latrell Cruz, APRN - CNP     Pt seen and examined agree with above  Cr at 0.7  Na 143  Replace p  On ivf  Summer Chavez MD

## 2022-04-26 NOTE — PROGRESS NOTES
[facility]  Nephrology Progress Note    SUBJECTIVE from Dr. Guillermina Limon 4/14 Consult Note: \"Mrs Yenifer Armas 81-year-old woman who was admitted from nursing facility with altered mentation and confusion. She was found to have a sodium of 152 for which we have been asked to assist in her management. \"    Interval History:    4/26: Patient was seen and examined. She is awake, alert, but confused. PMH:    Past Medical History:   Diagnosis Date    Anticoagulated on Coumadin     on Coumadin for this Dr. Johana Toussaint controls    Atrial fibrillation ([address]     Basal cell carcinoma of neck     CAD (coronary artery disease)     History of luminal irregularities of the coronary artery, stable.     CHF (congestive heart failure) (formerly [facility])     stage I diastolic dysfunction on 5/16/94    Depression 11/16/2010    DJD (degenerative joint disease)     SEVERE IN RIGHT HAND, IN MULTIPLE OTHER JOINTS    GERD (gastroesophageal reflux disease) 11/16/2010    History of cardiovascular stress test 03/18/2014    lexiscan    Hyperparathyroidism ([address] 11/16/2010    Had parathyroidectomy    Hypertension 11/16/2010    Hypothyroidism 11/16/2010    Mild mitral regurgitation 7/10/14    Osteoarthritis 11/16/2010    Pulmonary hypertension ([address] 7/10/14    mild    Renal calculi     x3    Tricuspid regurgitation 7/10/14    mild-moderate    Urinary incontinence 11/16/2010     Patient Active Problem List   Diagnosis    Depression    Hypothyroidism    Permanent atrial fibrillation ([address]    History of artificial lens replacement    Essential hypertension    Moderate obesity    Greater trochanteric bursitis    Chronic anticoagulation    Chronic obstructive pulmonary disease (HCC)    Mixed stress and urge urinary incontinence    Fuchs' endothelial dystrophy    Pseudophakia, both eyes    Chronic diastolic (congestive) heart failure ([address]    Pure hypercholesterolemia    Spinal stenosis of lumbar region with neurogenic claudication    Patent foramen ovale    Coronary arteriosclerosis in native artery    Frequent falls    Dementia [facility]    Ambulatory dysfunction    Cognitive dysfunction    COVID-19 virus infection    Nephrolithiasis    AMS (altered mental status)    Laceration of fascia of head    Head injury, initial encounter    Hypernatremia    Acute encephalopathy     Meds:     divalproex  250 mg Oral 2 times per day    metoprolol succinate  100 mg Oral Daily    budesonide  0.5 mg Nebulization BID    metoprolol  5 mg IntraVENous Q6H    [Held by provider] furosemide  20 mg Oral Daily    LORazepam  0.5 mg IntraMUSCular Once    lidocaine  5 mL IntraDERmal Once    heparin flush  1 mL IntraVENous 2 times per day    levalbuterol  1 ampule Nebulization BID    levothyroxine  175 mcg Oral Daily      sodium chloride      dextrose       Meds prn:     OLANZapine, acetaminophen, sodium chloride flush, sodium chloride, heparin flush, glucose, dextrose, glucagon (rDNA), dextrose, sodium chloride flush, ondansetron **OR** ondansetron, polyethylene glycol    Meds prior to admission:     No current facility-administered medications on file prior to encounter.      Current Outpatient Medications on File Prior to Encounter   Medication Sig Dispense Refill    acetaminophen (TYLENOL) 325 MG tablet Take 650 mg by mouth every 4 hours as needed for Pain or Fever      b complex vitamins capsule Take 1 capsule by mouth daily      vitamin D (CHOLECALCIFEROL) 25 MCG (1000 UT) TABS tablet Take 1,000 Units by mouth daily      fluticasone (FLONASE) 50 MCG/ACT nasal spray 2 sprays by Nasal route daily      guaiFENesin (MUCINEX) 600 MG extended release tablet Take 600 mg by mouth daily      loperamide (IMODIUM) 2 MG capsule Take 4 mg by mouth 4 times daily as needed for Diarrhea      meclizine (ANTIVERT) 25 MG tablet Take 25 mg by mouth 3 times daily as needed for Dizziness      albuterol sulfate HFA (VENTOLIN HFA) 108 (90 Base) MCG/ACT inhaler Inhale 2 puffs into the lungs every 6 hours as needed for Wheezing      ascorbic acid (VITAMIN C) 500 MG tablet Take 1,000 mg by mouth daily      levothyroxine (SYNTHROID) 175 MCG tablet Take 1 tablet by mouth Daily 30 tablet 3    levalbuterol (XOPENEX) 0.63 MG/3ML nebulization Take 1 ampule by nebulization in the morning and at bedtime       metoprolol succinate (TOPROL XL) 50 MG extended release tablet Take 1 tablet by mouth nightly 30 tablet 5    magnesium hydroxide (MILK OF MAGNESIA) 400 MG/5ML suspension Take 5 mLs by mouth daily as needed for Constipation      lisinopril (PRINIVIL;ZESTRIL) 5 MG tablet Take 1 tablet by mouth daily 90 tablet 1    furosemide (LASIX) 40 MG tablet Take 1 tablet by mouth daily 60 tablet 2    famotidine (PEPCID) 20 MG tablet Take 1 tablet by mouth 2 times daily 60 tablet 5    escitalopram (LEXAPRO) 10 MG tablet Take 1 tablet by mouth daily 30 tablet 5    albuterol (PROVENTIL) (2.5 MG/3ML) 0.083% nebulizer solution Take 3 mLs by nebulization every 6 hours as needed for Wheezing 120 each 5    [DISCONTINUED] levothyroxine (SYNTHROID) 125 MCG tablet Take 1 tablet by mouth every morning (before breakfast) 24 tablet 2     Allergies:    Codeine, Penicillins, Vicodin [hydrocodone-acetaminophen], and Adhesive tape    Social History:     reports that she has never smoked. She has never used smokeless tobacco. She reports previous alcohol use. She reports that she does not use drugs.     Family History:         Problem Relation Age of Onset    Heart Disease Mother     Heart Attack Mother     Arthritis Mother     Heart Disease Father     Mental Illness Father     Diabetes Father     Heart Disease Brother     Cancer Brother     Cancer Son     Heart Disease Sister     Heart Disease Brother     Heart Surgery Brother      Physical Exam:    Patient Vitals for the past 24 hrs:   BP Temp Temp src Pulse Resp SpO2   04/26/22 0815 134/85 97.2 °F (36.2 °C) Temporal 107 22 99 % 04/25/22 2015 (!) 148/67 96.6 °F (35.9 °C) Temporal 92 24 --     No intake or output data in the 24 hours ending 04/26/22 0916     General: Awake, no acute distress  Neck: No JVD noted  Cardiovascular: Tachycardic   Respiratory: Clear bilaterally upper, diminished to the bases bilaterally. Unlabored  Abdomen: Soft, nontender, nondistended. Active bowel sounds  Extremities: No edema  Skin: Warm/dry; no rash on exposed extremities  Neuro: Awake, confused    Data:    Recent Labs     04/24/22 0444 04/25/22 0424   WBC 4.6 5.9   HGB 12.1 10.9*   HCT 41.6 37.9   MCV 98.8 100.0*    180     Recent Labs     04/24/22 0444 04/25/22 0424 04/26/22  0523    143  --    K 3.9 3.6  --     103  --    CO2 31* 34*  --    CREATININE 0.7 0.7  --    BUN 9 8  --    LABGLOM >60 >60  --    GLUCOSE 91 107*  --    CALCIUM 9.2 9.0  --    PHOS 2.4* 2.2* 2.7   MG 2.2 2.2  --      Vit D, 25-Hydroxy   Date Value Ref Range Status   04/14/2022 37 30 - 100 ng/mL Final     Comment:     <20 ng/mL. ........... Marnell Moises Deficient  20-30 ng/mL. ......... Marnell Moises Insufficient   ng/mL. ........ Marnell Moises Sufficient  >100 ng/mL. .......... Marnell Moises Toxic       PTH   Date Value Ref Range Status   04/15/2022 112 (H) 15 - 65 pg/mL Final     No results for input(s): ALT, AST, ALKPHOS, BILITOT, BILIDIR in the last 72 hours. No results for input(s): LABALBU in the last 72 hours.   Ferritin   Date Value Ref Range Status   04/13/2022 155 ng/mL Final     Comment:     FERRITIN Reference Ranges:  Adult Males   20 - 60 years:    27 - 400 ng/mL  Adult females 16 - 61 years:    15 - 150 ng/mL  Adults greater than 60 years:   no established reference range  Pediatrics:                     no established reference range       Iron   Date Value Ref Range Status   02/20/2020 73 37 - 145 mcg/dL Final     TIBC   Date Value Ref Range Status   02/20/2020 323 250 - 450 mcg/dL Final     Vitamin B-12   Date Value Ref Range Status   04/06/2022 802 211 - 946 pg/mL Final     Folate   Date Value Ref Range Status   01/12/2022 >20.0 4.8 - 24.2 ng/mL Final     Lab Results   Component Value Date    COLORU Yellow 04/13/2022    NITRU Negative 04/13/2022    GLUCOSEU Negative 04/13/2022    KETUA Negative 04/13/2022    UROBILINOGEN 1.0 04/13/2022    BILIRUBINUR Negative 04/13/2022    BILIRUBINUR negative 07/05/2019     Lab Results   Component Value Date/Time    ALBERT 522 04/13/2022 03:49 PM     No components found for: URIC    No results found for: LIPIDPAN    Assessment and Plan:    1. Hyponatremia  Originally hypovolemic, now with worsening pulmonary edema  Na 152 on 4/13--> 143 on 4/25  Off IVF   Follow    2. Hypokalemia  K+ 3.6 on 4/25   Follow    3. COVID pneumonia/ Pleural effusion   Chest x-ray 4/17 \"significant interval improvement in the left lung lower lung and lingular consolidation\" and \"Increased right pleural effusion and or atelectasis\"  Pulmonology following    4. Atrial fibrillation with RVR  On metoprolol  Cardiology previously following    5. Hypertension  BP goal<130/80  BP near goal  Follow    6. Altered mental status  Neurology previously following    7. Hypophosphatemia  Phos 2.7 today  Follow    8. Oliguria  Incontinent now  Monitor urine output off IVF  Follow    No BMP available at the time of this note. Await BMP, notify renal of results.      ARIEL Ross - CNP   Pt seen and examined agree with above  Await todays labs  Na improving  Brayan Fritz MD

## 2022-04-26 NOTE — PLAN OF CARE
Problem: Falls - Risk of:  Goal: Will remain free from falls  Description: Will remain free from falls  Outcome: Progressing  Goal: Absence of physical injury  Description: Absence of physical injury  Outcome: Progressing     Problem: Gas Exchange - Impaired  Goal: Absence of hypoxia  Outcome: Progressing  Goal: Promote optimal lung function  Outcome: Progressing     Problem: Breathing Pattern - Ineffective  Goal: Ability to achieve and maintain a regular respiratory rate  Outcome: Progressing     Problem: Body Temperature -  Risk of, Imbalanced  Goal: Ability to maintain a body temperature within defined limits  Outcome: Progressing  Goal: Will regain or maintain usual level of consciousness  Outcome: Progressing  Goal: Complications related to the disease process, condition or treatment will be avoided or minimized  Outcome: Progressing     Problem: Nutrition Deficits  Goal: Optimize nutritional status  Outcome: Progressing     Problem: Risk for Fluid Volume Deficit  Goal: Maintain normal heart rhythm  Outcome: Progressing  Goal: Maintain absence of muscle cramping  Outcome: Progressing  Goal: Maintain normal serum potassium, sodium, calcium, phosphorus, and pH  Outcome: Progressing     Problem: Inadequate oral food/beverage intake (NI-2.1)  Goal: Food and/or Nutrient Delivery  Description: Individualized approach for food/nutrient provision.   4/26/2022 1103 by Gagandeep Gonzales RD  Outcome: Progressing

## 2022-04-26 NOTE — CARE COORDINATION
Spoke with Alda mcgregor from [facility] and they have not made a decision regarding patient's acceptance at that

## 2022-04-26 NOTE — PROGRESS NOTES
Pulmonary [facility]    Pulmonary Consult/Progress Note :    Reason for Consultation:COVID Pneumonia     CC : SOB      SUBJECTIVE:    Combative now  Cycle NIV    PHYSICAL EXAMINATION:     VITAL SIGNS:  /85   Pulse 107   Temp 97.2 °F (36.2 °C) (Temporal)   Resp 22   Ht 5' 6\" (1.676 m)   Wt 255 lb (115.7 kg)   SpO2 99%   BMI 41.16 kg/m²   Wt Readings from Last 3 Encounters:   04/23/22 255 lb (115.7 kg)   03/30/22 270 lb 4.8 oz (122.6 kg)   03/14/22 250 lb (113.4 kg)     Temp Readings from Last 3 Encounters:   04/26/22 97.2 °F (36.2 °C) (Temporal)   03/30/22 96.5 °F (35.8 °C) (Temporal)   03/14/22 98.5 °F (36.9 °C) (Oral)     TMAX:  BP Readings from Last 3 Encounters:   04/26/22 134/85   03/30/22 (!) 144/97   03/15/22 118/82     Pulse Readings from Last 3 Encounters:   04/26/22 107   03/30/22 110   03/15/22 71     INTAKE/OUTPUTS:  I/O last 3 completed shifts:   In: 240 [P.O.:240]  Out: -   No intake or output data in the 24 hours ending 04/26/22 1145  General Appearance: Alert confused  Eyes: conjunctivae normal and sclera anicteric   Neck:and noadenopathy   Pulmonary/Chest:Course  Cardiovascular: Afib,   Abdomen: obese, soft, non-tender, non-distended, n  Extremities edema trace, vasc insuff  Musculoskeletal: no deformity or tenderness   Neurologic:No  cranial nerve deficits     LABS/IMAGING:    CBC:   Lab Results   Component Value Date    WBC 5.9 04/25/2022    RBC 3.79 04/25/2022    HGB 10.9 04/25/2022    HCT 37.9 04/25/2022    .0 04/25/2022    MCH 28.8 04/25/2022    MCHC 28.8 04/25/2022    RDW 14.6 04/25/2022     04/25/2022    MPV 10.5 04/25/2022     CMP:    Lab Results   Component Value Date     04/25/2022    K 3.6 04/25/2022    K 3.6 04/19/2022     04/25/2022    CO2 34 04/25/2022    BUN 8 04/25/2022    CREATININE 0.7 04/25/2022    GFRAA >60 04/25/2022    LABGLOM >60 04/25/2022    GLUCOSE 107 04/25/2022    GLUCOSE 113 02/14/2012    PROT 5.8 04/22/2022 LABALBU 3.1 04/22/2022    LABALBU 4.4 02/14/2012    CALCIUM 9.0 04/25/2022    BILITOT 0.7 04/22/2022    ALKPHOS 86 04/22/2022    AST 26 04/22/2022    ALT 15 04/22/2022     Calcium:    Lab Results   Component Value Date    CALCIUM 9.0 04/25/2022     Phosphorus:    Lab Results   Component Value Date    PHOS 2.7 04/26/2022               ASSESSMENT:  1.) Acute hypoxic /hypercpnic resp failure  2.) Fluid overload/CHF  3.) Right side effusion > left   4.) COVID pneumonia  5.) Fall /A fib .was on couamdin      PLAN:  1. BiPAP nightly  2. Increase depakote  3. Add seraqual 75 bid  4. No narcotics or benzodiaz  5. Increased BB for afib  6. Need to remain on Bronchodilators for COPD after discharge  7.  DANELLE Rodriguez DO, MPH, [facility], [place], [place]

## 2022-04-26 NOTE — CARE COORDINATION
Spoke with patient's Daughter, Fred Charlton regarding transition of care. If patient could go to [facility] for short term placement if she qualifies. Information faxed to [facility] at [phone]. Await determination. Per guerrero if [facility] cannot accept will be willing to have patient go to [facility] with Hospice. Arabella Vaughan informed of this and she will check if family would need to provide and payment up front. CM/SW will continue to follow.

## 2022-04-26 NOTE — PROGRESS NOTES
[facility] Inpatient   Resident Progress Note    S:  Hospital day: 15   Brief Synopsis: Jerson Shaffer is a 81 y.o. female PMH of atrial fibrillation (on Coumadin ), coronary artery disease, CHF, GERD, hyperparathyroidism, hypertension, hypothyroidism, osteoarthritis and urinary incontinenc of who presents to ED for Abnormal Lab (pt [facility] to [facility], came in for abnormal labs. pt with frequent falls and they state she has a change in mentation. ER labs showed H/H 11.4/38.0, sodium 152, potassium 3.9, chloride 108, BUN 23, creatinine 0.9, AST 40, alkaline phosphatase 127, INR 2, urine analysis leukocyte esterase with WBC 1-3, troponin 24 x25, normal lactic acid. EKG showed A. fib with RVR and chest x-ray new opacity in the right lung base. Patient seen and examined this morning. She is breathing comfortably on 4 L nasal cannula. Patient is alert awake, spontaneous opening her eyes. Not following commands. Cont meds:    sodium chloride      dextrose       Scheduled meds: Patient is awake and alert.  divalproex  250 mg Oral 2 times per day    metoprolol succinate  100 mg Oral Daily    budesonide  0.5 mg Nebulization BID    metoprolol  5 mg IntraVENous Q6H    [Held by provider] furosemide  20 mg Oral Daily    LORazepam  0.5 mg IntraMUSCular Once    lidocaine  5 mL IntraDERmal Once    heparin flush  1 mL IntraVENous 2 times per day    levalbuterol  1 ampule Nebulization BID    levothyroxine  175 mcg Oral Daily     PRN meds: OLANZapine, acetaminophen, sodium chloride flush, sodium chloride, heparin flush, glucose, dextrose, glucagon (rDNA), dextrose, sodium chloride flush, ondansetron **OR** ondansetron, polyethylene glycol     I reviewed the patient's past medical and surgical history, Medications and Allergies.     O:  /85   Pulse 107   Temp 97.2 °F (36.2 °C) (Temporal)   Resp 22   Ht 5' 6\" (1.676 m)   Wt 255 lb (115.7 kg)   SpO2 99%   BMI 41.16 kg/m²   24 hour I&O: No intake/output data recorded. No intake/output data recorded. General:  Difficult to arouse and somnolent neck: Supple, symmetrical, no JVD   Lung:  Clear to auscultation   heart: Irregular rate and irregular rhythm, tachycardia  Abdomen: SNTND, good bowel sound extremities:  Extremities normal, atraumatic, no cyanosis or edema. Distal pulses equal bilaterally  Skin: Skin color, texture, turgor normal, no rashes or lesions  Musculoskeletal: No joint swelling, no muscle tenderness   Neurologic:  Difficult to arouse, somnolent  Labs:  Na/K/Cl/CO2:  143/3.6/103/34 (04/25 0424)  BUN/Cr/glu/ALT/AST/amyl/lip:  8/0.7/--/--/--/--/-- (04/25 0424)  WBC/Hgb/Hct/Plts:  5.9/10.9/37.9/180 (04/25 0424)  estimated creatinine clearance is 75 mL/min (based on SCr of 0.7 mg/dL). Other pertinent labs as noted below    Radiology:  XR CHEST PORTABLE   Final Result   No change in small to moderate bilateral pleural effusions. XR CHEST PORTABLE   Final Result   Bilateral pulmonary opacifications right greater than left with probable   small right and small to moderate left potential layering pleural effusions. US DUP UPPER EXTREMITY RIGHT VENOUS   Final Result   Within the visualized vessels there is no evidence for deep venous   thrombosis               XR CHEST PORTABLE   Final Result   Bibasilar atelectasis and or infiltrate as well as right pleural effusion. Mildly worsened aeration on the left. XR ABDOMEN FOR NG/OG/NE TUBE PLACEMENT   Final Result   Gastric catheter passes into the stomach, tip is in the gastric body. No   complications. XR CHEST PORTABLE   Final Result   1. Significant interval improvement in the left lung lower lung and lingular   consolidation   2. Increased right pleural effusion and or atelectasis   3. Cardiomegaly, stable   4.  The position of the nasogastric tube is within normal range         XR CHEST PORTABLE   Final Result   Increased markings seen in the lung bases bilaterally with small bilateral   pleural effusions not significantly changed in the interval.  No new findings. XR ABDOMEN (KUB) (SINGLE AP VIEW)   Final Result   No acute intra-process seen. Left renal 12 mm calculus. CT HEAD WO CONTRAST   Final Result   1. No acute intracranial abnormality. 2. Chronic small vessel ischemic disease. 3. Left frontal scalp hematoma. XR CHEST PORTABLE   Final Result   Cardiomegaly, pulmonary venous hypertension and small effusions. Findings   suggest volume overload. Mild right basilar atelectasis. CTA PULMONARY W CONTRAST   Final Result   No evidence of pulmonary embolism. Small left and moderate size right pleural effusions. A moderate size area of consolidation is noted involving the right lower   lung, concerning for an underlying airspace disease process. RECOMMENDATIONS:   Unavailable         XR CHEST PORTABLE   Final Result   New opacities in right lung base suggestive of pneumonia, atelectasis, and   possible right pleural effusion. CT Head WO Contrast   Final Result   No acute intracranial abnormality or hemorrhage. Cortical atrophy and periventricular leukomalacia. Left frontal scalp hematoma. Right frontal sinusitis and right mastoiditis. CT Cervical Spine WO Contrast   Final Result   No acute abnormality of the cervical spine. Multilevel degenerative changes, no significant interval change. Right pleural effusion. A/P:  Principal Problem:    Hypernatremia  Active Problems:    Permanent atrial fibrillation (HCC)    Essential hypertension    Chronic obstructive pulmonary disease (HCC)    Frequent falls    AMS (altered mental status)    Head injury, initial encounter    Acute encephalopathy  Resolved Problems:    * No resolved hospital problems. *      States the mental status of also resulted in significantly decreased p.o. intake with resulting drops in sugar. Started on D5 though taking to account pleural effusions, fluid overload. Will only run fluids for short while. Ending today. Acute Encephalopathy   -Patient mental status is waxing and waning. This is appear to be a chronic problem for her. She is able to occasionally have very minimal conversations and follow commands. Other times she has poor mentation, becomes lethargic or agitated.  Likely this could be secondary to age-related dementia changes with waxing and waning of this. - Initial imaging showed possible right lobe pneumonia vs pleural effusion  -Ct head no acute ischemia or bleeding  - likely secondary to  metabolic, hepatic, septic, drug induced, worsening underlying dementia   - Daily labs  -Blood culture no growth so far  -Urine culture grew E. coli . Completed total 7 days of Rocephin  -Pro-Salomon level and lactic acid normal  -Repeat mild to moderate pleural effusion showed mild to moderate bilateral pleural effusion, no worsening  - neuro consulted, recommneds to EEG if worsening   - Stop ativan, increase depakote, PRN zyprexa for agitation  - If worsens, will repeat ABG and would need to go on bipap    Hypoxic respiratory failure 2/2 Pneumonia (Viral vs bacterial) VS  Pleural EFFUSION   - Likely covid induced with secondary bacterial  - Lung exam showed scattered rales , received 1 liter fluid yesterday for oliguria might fluid overload, will repeat CXR and might give iv lasix   -Repeat Rapid Covid is negative  - Mild leucocytosis on admission, resolved  -We will hold on decadrone 6 mg as pulmonary recommendation  -Chest x-ray mild to moderate pleural effusion, no worsening.   Vitals stable  -No plan for thoracocentesis at this time  - Continue oxygen supplement and wean as tolerated , also BIPAP  -Pulmonary is following, bipap Q6 hours, patient intermittently tolerates  -CT angio no PE  - will hold lasix 20 mg daily due to oliguria    A fib with RVR  - On last admission Coumadin was discontinued due to high for bleeding despite of high CHADVAS score after discussion with patient daughter  - Will continue to hold Coumadin  -Heart rate improving  -Cardio consulted and increase metoprolol to 75 mg daily  -IV metoprolol 5 mg every 6 hour if not able to take oral   - Last Echo in 2018, >55% EF, Interdeterminate diastolic dysfunction  consider repeating during this admission   - Keep mag/k/phos >2/4     Multiple fall  - Wound on left forehead  - Wound care is following patient  - No need suture  - Fall precaution  - PT/OT eval      Hypothyroidism  - Recent TSH normal  - Continue current regimen      Hypertension  -Increase metoprolol to 100mg daily  -If not taking PO, will do IVP Toprol 5 mg for heart rate more than 105        DVT / GI prophylaxis: Lovenox 40 mg SC     Dispo  CODE STATUS changed to comfort care. H per conversation with hospice family member does not want to proceed with hospice. They are leaning toward rehab. Waiting for placement.      Electronically signed by Bakari Toscano MD PGY-3 on 4/26/2022 at 8:58 AM     This case was discussed with attending physician: Dr. Miguel Sanderson

## 2022-04-26 NOTE — PROGRESS NOTES
Call received yesterday from patients daughter stating that she is reconsidering hospice for her mother. She states that she understands a facility would be private pay and states that she started the medicaid process at [facility], but she is not sure where it is at in the process. Follow up visit made today to see pt. Pt in bed, in restraints d/t pt attempting to get out of bed. Pt is confused and restless. Pt requested this nurse get her her knife and \" free her from her tethers\". Efforts made to comfort patient. Medications review, pt currently not receiving any medications for comfort. Patient does not meet criteria for the hospice house. Call placed to daughter Brigitte to discuss plans, her phone is going straight to .      1307 Call received from Brigitte, she states that she is looking [facility]. [facility] will continue to follow.

## 2022-04-26 NOTE — PROGRESS NOTES
[facility] Attending    YING HERNANDZE - ANDREW course: 81 y.o. female with falls at [facility] and encephalopathy, forehead wound, frequent falls, CAD, HF, HTN, hyperPTH s/p parathyroidectomy, atrial fibrillation, COPD. Found to have hypernatremia, COVID positive. Has been intermittently agitated or somnolent . Repeat COVID negative. S: Resting in bed, conversant but disoriented. Soft restraints in place. O: VS- Blood pressure 134/85, pulse 107, temperature 97.2 °F (36.2 °C), temperature source Temporal, resp. rate 22, height 5' 6\" (1.676 m), weight 255 lb (115.7 kg), SpO2 94 %, not currently breastfeeding. Exam is as noted by resident   Gen, Awake, Alert, x1  CV irreg irreg, mildly tachy  Resp decreased, coarse, unlabored on NC  Abd soft  Ext - no lower extremity edema. Impressions:   Principal Problem:    Hypernatremia  Active Problems:    Permanent atrial fibrillation (HCC)    Essential hypertension    Chronic obstructive pulmonary disease (HCC)    Frequent falls    AMS (altered mental status)    Head injury, initial encounter    Acute encephalopathy        Plan:   DNR CC, Discharge pending placement. Appreciate palliative care. Cont to discuss goals of care    Hypernatremia. Resolved. Afib RVR -rate control -  metoprolol 100mg po qday. slp eval done and patient ok for Soft and bite size consistency solids (IDDSI level 6) with  thin liquids (IDDSI level 0) - NPO while patient is somnolent but she does awaken to take PO. Agitation - increased depakote, Lexapro. prn zyprexa to avoid benzos. Pleural effusion, hypercapnia, covid - BTx prn for now. Continue NC O2 prn. Coninue bipap prn       Attending Physician Statement  I have reviewed the chart and seen the patient with the resident(s). I personally reviewed images, EKG's and similar tests, if present.   I personally reviewed and performed key elements of the history and exam.  I have reviewed and confirmed student and/or resident history and exam with changes as indicated above. I agree with the assessment, plan and orders as documented by the resident. Please refer to the resident and/or student note for additional information.       Moody Juan MD

## 2022-04-26 NOTE — PROGRESS NOTES
Comprehensive Nutrition Assessment    Type and Reason for Visit:  Reassess    Nutrition Recommendations/Plan:   1. Continue current diet order. Recommend and start Ensure Enlive BID to optimize intake. Malnutrition Assessment:  Malnutrition Status: At risk for malnutrition (Comment) (04/18/22 1221)    Context:  Acute Illness     Findings of the 6 clinical characteristics of malnutrition:  Energy Intake:  1 - 75% or less of estimated energy requirements for 7 or more days  Weight Loss:  No significant weight loss     Body Fat Loss:  Unable to assess (Covid isolation)     Muscle Mass Loss:  Unable to assess (covid isolation)    Fluid Accumulation:  No significant fluid accumulation     Strength:  Not Performed    Nutrition Assessment:    Pt. nutritional status improving. Noted pt. remains w/ hypoxic resp failure 2/2 COVID PNA vs Pleural effusion and remains w/ acute encephalopathy. Pt. admit from SNF for abnormal labs (resolved) and AMS; Noted PMHx noted for frequent falls, CAD, CHF, HTN, hyperPTH s/p parathyroidectomy, atrial fibrillation, COPD. Per speech rec patient ok for Soft and bite size diet with thin liquids / NPO while patient is somnolent. Noted intakes improving and EN discontinued. Will start ONS and monitor. Nutrition Related Findings:    oriented to person , +I/O 480ml, +BS, Gen non-pitting edema, , 4L NC (weaning) loss of appetite Wound Type:  (Forehead , dried scabbed old wound)       Current Nutrition Intake & Therapies:    Average Meal Intake: 51-75% (~average of last meals)  Average Supplements Intake: None Ordered  ADULT DIET; Dysphagia - Soft and Bite Sized; Low Sodium (2 gm)    Anthropometric Measures:  Height: 5' 6\" (167.6 cm)  Ideal Body Weight (IBW): 130 lbs (59 kg)    Admission Body Weight: 255 lb (115.7 kg) (4/23 BS actual)  Current Body Weight: 245 lb 13 oz (111.5 kg), 189.1 % IBW.  Weight Source: Bed Scale  Current BMI (kg/m2): 39.7  Usual Body Weight: 245 lb 9.6 oz (111.4 kg) (5/5/21 no method)  % Weight Change (Calculated): 0.1  Weight Adjustment For: No Adjustment                 BMI Categories: Obese Class 2 (BMI 35.0 -39.9)    Estimated Daily Nutrient Needs:  Energy Requirements Based On: Jayden Joby  Weight Used for [employer] Requirements: Current  Energy (kcal/day): MSJ 1615 x 1.2 SF = 1900-2000kcal  Weight Used for Protein Requirements: Ideal  Protein (g/day): 71g-83g (1.2-1.4g/kg IBW)  Method Used for Fluid Requirements: Other (Comment)  Fluid (ml/day): per MD/renal management    Nutrition Diagnosis:   · Inadequate oral intake (improving) related to cognitive or neurological impairment as evidenced by nutrition support - enteral nutrition,poor intake prior to admission,lab values      Nutrition Interventions:   Food and/or Nutrient Delivery: Continue Current Diet,Start Oral Nutrition Supplement (Ensure Enlive BID)  Nutrition Education/Counseling: Education not appropriate  Coordination of Nutrition Care: Continue to monitor while inpatient       Goals:  Previous Goal Met: Progressing toward Goal(s)  Goals: PO intake 75% or greater       Nutrition Monitoring and Evaluation:   Behavioral-Environmental Outcomes: None Identified  Food/Nutrient Intake Outcomes: Diet Advancement/Tolerance,Food and Nutrient Intake,Supplement Intake  Physical Signs/Symptoms Outcomes: Biochemical Data,Chewing or Swallowing,GI Status,Fluid Status or Edema,Meal Time Behavior,Nutrition Focused Physical Findings,Skin,Weight    Discharge Planning:     Too soon to determine     Nunu Daigle RD  Contact: ext [phone]

## 2022-04-27 NOTE — PROGRESS NOTES
Pulmonary [facility]    Pulmonary Consult/Progress Note :    Reason for Consultation:COVID Pneumonia     CC : SOB      SUBJECTIVE:    Labs are stable  Disoriented but not kicking things which is good right  [facility]    PHYSICAL EXAMINATION:     VITAL SIGNS:  BP (!) 149/98   Pulse 112   Temp 97.6 °F (36.4 °C) (Oral)   Resp 20   Ht 5' 6\" (1.676 m)   Wt 255 lb (115.7 kg)   SpO2 99%   BMI 41.16 kg/m²   Wt Readings from Last 3 Encounters:   04/27/22 255 lb (115.7 kg)   03/30/22 270 lb 4.8 oz (122.6 kg)   03/14/22 250 lb (113.4 kg)     Temp Readings from Last 3 Encounters:   04/27/22 97.6 °F (36.4 °C) (Oral)   03/30/22 96.5 °F (35.8 °C) (Temporal)   03/14/22 98.5 °F (36.9 °C) (Oral)     TMAX:  BP Readings from Last 3 Encounters:   04/27/22 (!) 149/98   03/30/22 (!) 144/97   03/15/22 118/82     Pulse Readings from Last 3 Encounters:   04/27/22 112   03/30/22 110   03/15/22 71     INTAKE/OUTPUTS:  No intake/output data recorded.   No intake or output data in the 24 hours ending 04/27/22 1121  General Appearance: Alert confused  Eyes: conjunctivae normal and sclera anicteric   Neck:and noadenopathy   Pulmonary/Chest:Course  Cardiovascular: Afib,   Abdomen: obese, soft, non-tender, non-distended, n  Extremities edema trace, vasc insuff  Musculoskeletal: no deformity or tenderness   Neurologic:No  cranial nerve deficits     LABS/IMAGING:    CBC:   Lab Results   Component Value Date    WBC 5.6 04/26/2022    RBC 4.56 04/26/2022    HGB 13.1 04/26/2022    HCT 45.2 04/26/2022    MCV 99.1 04/26/2022    MCH 28.7 04/26/2022    MCHC 29.0 04/26/2022    RDW 14.8 04/26/2022     04/26/2022    MPV 11.4 04/26/2022     CMP:    Lab Results   Component Value Date     04/26/2022    K 5.0 04/26/2022    K 3.6 04/19/2022     04/26/2022    CO2 26 04/26/2022    BUN 8 04/26/2022    CREATININE 0.6 04/26/2022    GFRAA >60 04/26/2022    LABGLOM >60 04/26/2022    GLUCOSE 84 04/26/2022    GLUCOSE 113 02/14/2012    PROT 5.8 04/22/2022    LABALBU 3.1 04/22/2022    LABALBU 4.4 02/14/2012    CALCIUM 9.5 04/26/2022    BILITOT 0.7 04/22/2022    ALKPHOS 86 04/22/2022    AST 26 04/22/2022    ALT 15 04/22/2022     Calcium:    Lab Results   Component Value Date    CALCIUM 9.5 04/26/2022     Phosphorus:    Lab Results   Component Value Date    PHOS 2.7 04/26/2022               ASSESSMENT:  1.) Acute hypoxic /hypercpnic resp failure  2.) Fluid overload/CHF  3.) Right side effusion > left   4.) COVID pneumonia  5.) Fall /A fib .was on couamdin      PLAN:  1. BiPAP nightly  2. Increase depakote  3. Add lexapro 10 mg consider increasing  4. No narcotics or benzodiaz  5. HR stable on BB  6. Need to remain on Bronchodilators for COPD after discharge  7.  OOB   8. PT OT      Mary Carmen Burns DO, MPH, [facility], [place], [place]

## 2022-04-27 NOTE — PROGRESS NOTES
[facility]  Nephrology Progress Note    SUBJECTIVE from Dr. Stephanie Kim 4/14 Consult Note: \"Mrs Bambi Sharp 60-year-old woman who was admitted from nursing facility with altered mentation and confusion. She was found to have a sodium of 152 for which we have been asked to assist in her management. \"    Interval History:    4/27: Patient was seen and examined. She is lethargic, she does open her eyes. She is not answering questions for me at this time. PMH:    Past Medical History:   Diagnosis Date    Anticoagulated on Coumadin     on Coumadin for this Dr. Keya Garcia controls    Atrial fibrillation ([address]     Basal cell carcinoma of neck     CAD (coronary artery disease)     History of luminal irregularities of the coronary artery, stable.     CHF (congestive heart failure) ([facility])     stage I diastolic dysfunction on 7/88/93    Depression 11/16/2010    DJD (degenerative joint disease)     SEVERE IN RIGHT HAND, IN MULTIPLE OTHER JOINTS    GERD (gastroesophageal reflux disease) 11/16/2010    History of cardiovascular stress test 03/18/2014    lexiscan    Hyperparathyroidism ([address] 11/16/2010    Had parathyroidectomy    Hypertension 11/16/2010    Hypothyroidism 11/16/2010    Mild mitral regurgitation 7/10/14    Osteoarthritis 11/16/2010    Pulmonary hypertension ([address] 7/10/14    mild    Renal calculi     x3    Tricuspid regurgitation 7/10/14    mild-moderate    Urinary incontinence 11/16/2010     Patient Active Problem List   Diagnosis    Depression    Hypothyroidism    Permanent atrial fibrillation ([address]    History of artificial lens replacement    Essential hypertension    Moderate obesity    Greater trochanteric bursitis    Chronic anticoagulation    Chronic obstructive pulmonary disease (HCC)    Mixed stress and urge urinary incontinence    Fuchs' endothelial dystrophy    Pseudophakia, both eyes    Chronic diastolic (congestive) heart failure ([address]    Pure hypercholesterolemia    Spinal stenosis of lumbar region with neurogenic claudication    Patent foramen ovale    Coronary arteriosclerosis in native artery    Frequent falls    D[facility]    Ambulatory dysfunction    Cognitive dysfunction    COVID-19 virus infection    Nephrolithiasis    AMS (altered mental status)    Laceration of fascia of head    Head injury, initial encounter    Hypernatremia    Acute encephalopathy     Meds:     divalproex  500 mg Oral 2 times per day    escitalopram  10 mg Oral Daily    metoprolol succinate  100 mg Oral Daily    budesonide  0.5 mg Nebulization BID    metoprolol  5 mg IntraVENous Q6H    [Held by provider] furosemide  20 mg Oral Daily    LORazepam  0.5 mg IntraMUSCular Once    lidocaine  5 mL IntraDERmal Once    heparin flush  1 mL IntraVENous 2 times per day    levalbuterol  1 ampule Nebulization BID    levothyroxine  175 mcg Oral Daily      sodium chloride      dextrose       Meds prn:     OLANZapine, acetaminophen, sodium chloride flush, sodium chloride, heparin flush, glucose, dextrose, glucagon (rDNA), dextrose, sodium chloride flush, ondansetron **OR** ondansetron, polyethylene glycol    Meds prior to admission:     No current facility-administered medications on file prior to encounter.      Current Outpatient Medications on File Prior to Encounter   Medication Sig Dispense Refill    acetaminophen (TYLENOL) 325 MG tablet Take 650 mg by mouth every 4 hours as needed for Pain or Fever      b complex vitamins capsule Take 1 capsule by mouth daily      vitamin D (CHOLECALCIFEROL) 25 MCG (1000 UT) TABS tablet Take 1,000 Units by mouth daily      fluticasone (FLONASE) 50 MCG/ACT nasal spray 2 sprays by Nasal route daily      guaiFENesin (MUCINEX) 600 MG extended release tablet Take 600 mg by mouth daily      loperamide (IMODIUM) 2 MG capsule Take 4 mg by mouth 4 times daily as needed for Diarrhea      meclizine (ANTIVERT) 25 MG tablet Take 25 mg by mouth 3 times daily as needed for Dizziness      albuterol sulfate HFA (VENTOLIN HFA) 108 (90 Base) MCG/ACT inhaler Inhale 2 puffs into the lungs every 6 hours as needed for Wheezing      ascorbic acid (VITAMIN C) 500 MG tablet Take 1,000 mg by mouth daily      levothyroxine (SYNTHROID) 175 MCG tablet Take 1 tablet by mouth Daily 30 tablet 3    levalbuterol (XOPENEX) 0.63 MG/3ML nebulization Take 1 ampule by nebulization in the morning and at bedtime       metoprolol succinate (TOPROL XL) 50 MG extended release tablet Take 1 tablet by mouth nightly 30 tablet 5    magnesium hydroxide (MILK OF MAGNESIA) 400 MG/5ML suspension Take 5 mLs by mouth daily as needed for Constipation      lisinopril (PRINIVIL;ZESTRIL) 5 MG tablet Take 1 tablet by mouth daily 90 tablet 1    furosemide (LASIX) 40 MG tablet Take 1 tablet by mouth daily 60 tablet 2    famotidine (PEPCID) 20 MG tablet Take 1 tablet by mouth 2 times daily 60 tablet 5    escitalopram (LEXAPRO) 10 MG tablet Take 1 tablet by mouth daily 30 tablet 5    albuterol (PROVENTIL) (2.5 MG/3ML) 0.083% nebulizer solution Take 3 mLs by nebulization every 6 hours as needed for Wheezing 120 each 5    [DISCONTINUED] levothyroxine (SYNTHROID) 125 MCG tablet Take 1 tablet by mouth every morning (before breakfast) 24 tablet 2     Allergies:    Codeine, Penicillins, Vicodin [hydrocodone-acetaminophen], and Adhesive tape    Social History:     reports that she has never smoked. She has never used smokeless tobacco. She reports previous alcohol use. She reports that she does not use drugs.     Family History:         Problem Relation Age of Onset    Heart Disease Mother     Heart Attack Mother     Arthritis Mother     Heart Disease Father     Mental Illness Father     Diabetes Father     Heart Disease Brother     Cancer Brother     Cancer Son     Heart Disease Sister     Heart Disease Brother     Heart Surgery Brother      Physical Exam:    Patient Vitals for the past 24 hrs:   BP Temp Temp src Pulse SpO2 Weight   04/27/22 0521 -- -- -- -- -- 255 lb (115.7 kg)   04/26/22 2030 (!) 143/84 97.5 °F (36.4 °C) Oral 109 100 % --   04/26/22 1800 (!) 170/65 -- -- 112 -- --   04/26/22 1415 122/78 -- -- 102 -- --     No intake or output data in the 24 hours ending 04/27/22 1006     General: Lethargic, no acute distress  Neck: No JVD noted  Cardiovascular: Tachycardic   Respiratory: Clear bilaterally upper, diminished to the bases bilaterally. Unlabored  Abdomen: Soft, nontender, nondistended. Active bowel sounds  Extremities: No edema  Skin: Warm/dry; no rash on exposed extremities  Neuro: Lethargic    Data:    Recent Labs     04/25/22  0424 04/26/22  1125   WBC 5.9 5.6   HGB 10.9* 13.1   HCT 37.9 45.2   .0* 99.1    155     Recent Labs     04/25/22  0424 04/26/22  0523 04/26/22  1500     --  141   K 3.6  --  5.0     --  106   CO2 34*  --  26   CREATININE 0.7  --  0.6   BUN 8  --  8   LABGLOM >60  --  >60   GLUCOSE 107*  --  84   CALCIUM 9.0  --  9.5   PHOS 2.2* 2.7  --    MG 2.2  --   --      Vit D, 25-Hydroxy   Date Value Ref Range Status   04/14/2022 37 30 - 100 ng/mL Final     Comment:     <20 ng/mL. ........... Benedetta Ou Deficient  20-30 ng/mL. ......... Benedetta Ou Insufficient   ng/mL. ........ Benedetta Ou Sufficient  >100 ng/mL. .......... Benedetta Ou Toxic       PTH   Date Value Ref Range Status   04/15/2022 112 (H) 15 - 65 pg/mL Final     No results for input(s): ALT, AST, ALKPHOS, BILITOT, BILIDIR in the last 72 hours. No results for input(s): LABALBU in the last 72 hours.   Ferritin   Date Value Ref Range Status   04/13/2022 155 ng/mL Final     Comment:     FERRITIN Reference Ranges:  Adult Males   20 - 60 years:    30 - 400 ng/mL  Adult females 16 - 61 years:    15 - 150 ng/mL  Adults greater than 60 years:   no established reference range  Pediatrics:                     no established reference range       Iron   Date Value Ref Range Status   02/20/2020 73 37 - 145 mcg/dL Final     TIBC   Date Value Ref Range Status   02/20/2020 323 250 - 450 mcg/dL Final     Vitamin B-12   Date Value Ref Range Status   04/06/2022 802 211 - 946 pg/mL Final     Folate   Date Value Ref Range Status   01/12/2022 >20.0 4.8 - 24.2 ng/mL Final     Lab Results   Component Value Date    COLORU Yellow 04/13/2022    NITRU Negative 04/13/2022    GLUCOSEU Negative 04/13/2022    KETUA Negative 04/13/2022    UROBILINOGEN 1.0 04/13/2022    BILIRUBINUR Negative 04/13/2022    BILIRUBINUR negative 07/05/2019     Lab Results   Component Value Date/Time    OSMOU 522 04/13/2022 03:49 PM     No components found for: URIC    No results found for: LIPIDPAN    Assessment and Plan:    1. Hyponatremia  Originally hypovolemic, now with worsening pulmonary edema  Na 152 on 4/13--> 142 today  Off IVF   Follow    2. Hypokalemia  K+ 4.6 today  Follow    3. COVID pneumonia/ Pleural effusion   Chest x-ray 4/17 \"significant interval improvement in the left lung lower lung and lingular consolidation\" and \"Increased right pleural effusion and or atelectasis\"  Pulmonology following    4. Atrial fibrillation with RVR  On metoprolol  Cardiology previously following    5. Hypertension  BP goal<130/80  BP near goal  Follow    6. Altered mental status  Neurology previously following    7. Hypophosphatemia  Phos 2.7 on 4/26  Follow    8.   Oliguria  Incontinent now  Monitor urine output off IVF  Follow    ARIEL Garg - CNP     Pt seen and examined agree with above  Follow off ivf  Na 142  Dwight Gilman MD

## 2022-04-27 NOTE — PROGRESS NOTES
[place] [facility]   Resident Progress Note    S:  Hospital day: 15   Brief Synopsis: Cristiano Aguilar is a 81 y.o. female PMH of atrial fibrillation (on Coumadin ), coronary artery disease, CHF, GERD, hyperparathyroidism, hypertension, hypothyroidism, osteoarthritis and urinary incontinenc of who presents to ED for Abnormal Lab (pt new pt to [facility], came in for abnormal labs. pt with frequent falls and they state she has a change in mentation. ER labs showed H/H 11.4/38.0, sodium 152, potassium 3.9, chloride 108, BUN 23, creatinine 0.9, AST 40, alkaline phosphatase 127, INR 2, urine analysis leukocyte esterase with WBC 1-3, troponin 24 x25, normal lactic acid. EKG showed A. fib with RVR and chest x-ray new opacity in the right lung base. Patient is sleeping comfortably. On 4 L nasal cannula, breathing without any difficulty. Patient responding to deep sternal stimuli but  not opening her eyes. Unable to arouse or wake up. Cont meds:    sodium chloride      dextrose       Scheduled meds: Patient is awake and alert.  divalproex  500 mg Oral 2 times per day    escitalopram  10 mg Oral Daily    metoprolol succinate  100 mg Oral Daily    budesonide  0.5 mg Nebulization BID    metoprolol  5 mg IntraVENous Q6H    [Held by provider] furosemide  20 mg Oral Daily    LORazepam  0.5 mg IntraMUSCular Once    lidocaine  5 mL IntraDERmal Once    heparin flush  1 mL IntraVENous 2 times per day    levalbuterol  1 ampule Nebulization BID    levothyroxine  175 mcg Oral Daily     PRN meds: OLANZapine, acetaminophen, sodium chloride flush, sodium chloride, heparin flush, glucose, dextrose, glucagon (rDNA), dextrose, sodium chloride flush, ondansetron **OR** ondansetron, polyethylene glycol     I reviewed the patient's past medical and surgical history, Medications and Allergies.     O:  BP (!) 143/84   Pulse 109   Temp 97.5 °F (36.4 °C) (Oral)   Resp 22   Ht 5' 6\" (1.676 m)   Wt 255 lb (115.7 kg)   SpO2 100%   BMI 41.16 kg/m²   24 hour I&O: No intake/output data recorded. No intake/output data recorded. General:  Difficult to arouse and somnolent neck: Supple, symmetrical, no JVD   Lung:  Clear to auscultation   heart: Irregular rate and irregular rhythm, tachycardia  Abdomen: SNTND, good bowel sound extremities:  Extremities normal, atraumatic, no cyanosis or edema. Distal pulses equal bilaterally  Skin: Skin color, texture, turgor normal, no rashes or lesions  Musculoskeletal: No joint swelling, no muscle tenderness   Neurologic:  Difficult to arouse, somnolent  Labs:  Na/K/Cl/CO2:  141/5.0/106/26 (04/26 1500)  BUN/Cr/glu/ALT/AST/amyl/lip:  8/0.6/--/--/--/--/-- (04/26 1500)  WBC/Hgb/Hct/Plts:  5.6/13.1/45.2/155 (04/26 1125)  estimated creatinine clearance is 87 mL/min (based on SCr of 0.6 mg/dL). Other pertinent labs as noted below    Radiology:  XR CHEST PORTABLE   Final Result   No change in small to moderate bilateral pleural effusions. XR CHEST PORTABLE   Final Result   Bilateral pulmonary opacifications right greater than left with probable   small right and small to moderate left potential layering pleural effusions. US DUP UPPER EXTREMITY RIGHT VENOUS   Final Result   Within the visualized vessels there is no evidence for deep venous   thrombosis               XR CHEST PORTABLE   Final Result   Bibasilar atelectasis and or infiltrate as well as right pleural effusion. Mildly worsened aeration on the left. XR ABDOMEN FOR NG/OG/NE TUBE PLACEMENT   Final Result   Gastric catheter passes into the stomach, tip is in the gastric body. No   complications. XR CHEST PORTABLE   Final Result   1. Significant interval improvement in the left lung lower lung and lingular   consolidation   2. Increased right pleural effusion and or atelectasis   3. Cardiomegaly, stable   4.  The position of the nasogastric tube is within normal range         XR CHEST PORTABLE Final Result   Increased markings seen in the lung bases bilaterally with small bilateral   pleural effusions not significantly changed in the interval.  No new findings. XR ABDOMEN (KUB) (SINGLE AP VIEW)   Final Result   No acute intra-process seen. Left renal 12 mm calculus. CT HEAD WO CONTRAST   Final Result   1. No acute intracranial abnormality. 2. Chronic small vessel ischemic disease. 3. Left frontal scalp hematoma. XR CHEST PORTABLE   Final Result   Cardiomegaly, pulmonary venous hypertension and small effusions. Findings   suggest volume overload. Mild right basilar atelectasis. CTA PULMONARY W CONTRAST   Final Result   No evidence of pulmonary embolism. Small left and moderate size right pleural effusions. A moderate size area of consolidation is noted involving the right lower   lung, concerning for an underlying airspace disease process. RECOMMENDATIONS:   Unavailable         XR CHEST PORTABLE   Final Result   New opacities in right lung base suggestive of pneumonia, atelectasis, and   possible right pleural effusion. CT Head WO Contrast   Final Result   No acute intracranial abnormality or hemorrhage. Cortical atrophy and periventricular leukomalacia. Left frontal scalp hematoma. Right frontal sinusitis and right mastoiditis. CT Cervical Spine WO Contrast   Final Result   No acute abnormality of the cervical spine. Multilevel degenerative changes, no significant interval change. Right pleural effusion. A/P:  Principal Problem:    Hypernatremia  Active Problems:    Permanent atrial fibrillation (HCC)    Essential hypertension    Chronic obstructive pulmonary disease (HCC)    Frequent falls    AMS (altered mental status)    Head injury, initial encounter    Acute encephalopathy  Resolved Problems:    * No resolved hospital problems.  *        Acute Encephalopathy   -Patient mental status is waxing and waning. This is appear to be a chronic problem for her. She is able to occasionally have very minimal conversations and follow commands. Other times she has poor mentation, becomes lethargic or agitated.  Likely this could be secondary to age-related dementia changes with waxing and waning of this. - Initial imaging showed possible right lobe pneumonia vs pleural effusion  -Ct head no acute ischemia or bleeding  - likely secondary to  metabolic, hepatic, septic, drug induced, worsening underlying dementia   -Urine culture grew E. coli .   Completed total 7 days of Rocephin  -  - neuro consulted, recommneds to EEG if worsening   - Stop ativan, as per pulmonary increase Depakote to 500 twice daily and started on Lexapro 10 Mg daily    Hypoxic respiratory failure 2/2 Pneumonia (Viral vs bacterial) VS  Pleural EFFUSION   - Likely covid induced with secondary bacterial  - Repeat chest x-ray showed to moderate bilateral pleural effusion, no worsening  -Repeat Rapid Covid is negative  - Mild leucocytosis on admission, resolved  -No plan for thoracocentesis at this time  - Continue oxygen supplement and wean as tolerated  -Pulmonary is following,   -CT angio no PE  - will resume lasix 20 mg daily    A fib with RVR  - On last admission Coumadin was discontinued due to high for bleeding despite of high CHADVAS score after discussion with patient daughter  - Will continue to hold Coumadin  -Heart rate improving  -Cardio consulted on admission and increase metoprolol to 100 mg daily on 4/23/2022  -Will hold on iv medication because of  - Last Echo in 2018, >55% EF, Interdeterminate diastolic dysfunction  consider repeating during this admission   - Keep mag/k/phos >2/4     Multiple fall  - Wound on left forehead  - Wound care is following patient  - No need suture  - Fall precaution  - PT/OT eval      Hypothyroidism  - Recent TSH normal  - Continue current regimen      Hypertension  -Increase metoprolol to 100mg daily        DVT / GI prophylaxis: Lovenox 40 mg SC     Dispo  CODE STATUS comfort care. Waiting for placement.  on board.      Electronically signed by Andrew Lou MD PGY-3 on 4/27/2022 at 8:42 AM     This case was discussed with attending physician: Dr. Francisco Mckinney

## 2022-04-27 NOTE — PROGRESS NOTES
Physician Progress Note      Gianna Sweeney  [facility] #:                  [id number]  :                       1935  ADMIT DATE:       2022 12:09 PM  [address] [place] DATE:  RESPONDING  PROVIDER #:        Arlinjaimeeherbie Desir DO          QUERY TEXT:    Dear Dr. Martha Friedman,    Pt admitted with abnormal labs, confusion, respiratory failure and noted to   have (+) COVID test.  Please document in progress notes and discharge summary   if you are evaluating or treating any of the following: The medical record reflects the following:  Risk Factors: COVID-19 initial screening was positive  Clinical Indicators: On admission patient noted to have Na 151, Due to level   of confusion frequent falls and pt's respiratory failure status COVID testing   performed and pt found to be SARS CoV 2 (COVID 19) positive by PCR test, Later   rapid testing was negative, CXR  \"New opacities in right lung base   suggestive of pneumonia, atelectasis, and possible right pleural effusion\",   CTA pulmonary  \"Small left and moderate size right pleural effusions. A   moderate size area of consolidation is noted involving the right lower lung,   concerning for an underlying airspace disease p  Treatment: Pt placed in droplet precautions on admission, Ceftriaxone,   Decadron, Doxycycline, Lasix, Duonebs,    Thank you,  Mike ALLISON, RN  Clinical Documentation Improvement  [email or web address]  Cell phone: [phone]  Options provided:  -- [id number] is applicable diagnosis to this admission, patient treated for   pneumonia due to COVID 19  -- [id number] is not applicable to this admission, patient treated for other   pneumonia  -- CPVOD-19 infection alone, no pneumonia  -- Patient admitted for hypernatremia only, no COVID-19 nor Pneumonia noted   this admission  -- Other - I will add my own diagnosis  -- Disagree - Not applicable / Not valid  -- Disagree - Clinically unable to determine / Unknown  -- Refer to Clinical Documentation Reviewer    PROVIDER RESPONSE TEXT:    Provider is clinically unable to determine a response to this query.     Query created by: Zac Hernández on 4/27/2022 3:01 PM      Electronically signed by:  Romeo Lea DO 4/27/2022 3:08 PM

## 2022-04-27 NOTE — PLAN OF CARE
Problem: Falls - Risk of:  Goal: Will remain free from falls  Description: Will remain free from falls  4/26/2022 2347 by Lori Wagoner RN  Outcome: Progressing  4/26/2022 1226 by Vinnie Anne RN  Outcome: Progressing     Problem: Gas Exchange - Impaired  Goal: Absence of hypoxia  4/26/2022 2347 by Lori Wagoner RN  Outcome: Progressing  4/26/2022 1226 by Vinnie Anne RN  Outcome: Progressing  Goal: Promote optimal lung function  4/26/2022 2347 by Lori Wagoner RN  Outcome: Progressing  4/26/2022 1226 by Vinnie Anne RN  Outcome: Progressing     Problem: Breathing Pattern - Ineffective  Goal: Ability to achieve and maintain a regular respiratory rate  4/26/2022 2347 by Lori Wagoner RN  Outcome: Progressing  4/26/2022 1226 by Vinnie Anne RN  Outcome: Progressing     Problem:  Body Temperature -  Risk of, Imbalanced  Goal: Ability to maintain a body temperature within defined limits  4/26/2022 1226 by Vinnie Anne RN  Outcome: Progressing

## 2022-04-27 NOTE — PLAN OF CARE
Problem: Falls - Risk of:  Goal: Will remain free from falls  Description: Will remain free from falls  4/27/2022 1121 by Naima Ferrell RN  Outcome: Progressing  4/26/2022 2347 by Jaya Alicea RN  Outcome: Progressing  Goal: Absence of physical injury  Description: Absence of physical injury  Outcome: Progressing

## 2022-04-27 NOTE — CARE COORDINATION
Care coordination: Remains in 2 point resrtainnts. Restless and confused but less agitation and not calling out. Spoke to admissions at [facility] this am.. Per admission staff, they  will review again with physician this am as he initially refused admission based on the medical stability issues that patient pesented on first referral.   The admissions person will call me back after  second review. 2 pont restraints are  barrier to placement at [facility]. Tono Weems Spouse lives in [address]. Patient was at [facility]. [facility] is willing to readmit under Hospice once behavior medications adjustments are completed here or at [facility]. They will not accept as skilled. Will require private pay for room and board with Hospice if returning to [facility]. [facility] has accepted patient but pre cert is needed for skilled admission and needs to be restraint free. Spoke to daughter on phone. She asked  that referral be called to [facility]. They have a dementia and behavioral unit so this is a good option. Referral called to [facility] this date. PT OT called to request re-evaluation. Educated daughter regarding Medicaid and long term care planning. She and father will seek legal advice. SW left communication for MD regarding questionable status to admit to [facility] and  also discussed case with RN regarding weaning plan for restraints. .  Will follow closely. 1600:  [facility] willing to accept for dementia /behavior unit and submit for pre cert once 2 point restraints have been discontinued. They can accept and start pre cert  with sitter if patient demonstrates safety with sitter or telesitter. Spoke to daughter Rain Mora who agrees with this plan. Will need envelop and transport form and 7000. Will follow.

## 2022-04-27 NOTE — PLAN OF CARE
Problem: Respiratory - Adult  Goal: Achieves optimal ventilation and oxygenation  4/27/2022 1533 by Claudia Grande RCP  Flowsheets (Taken 4/27/2022 1533)  Achieves optimal ventilation and oxygenation:   Assess and instruct to report shortness of breath or any respiratory difficulty   Assess for changes in respiratory status  4/27/2022 1531 by Claudia Grande RCP  Flowsheets (Taken 4/27/2022 1531)  Achieves optimal ventilation and oxygenation:   Assess for changes in respiratory status   Respiratory therapy support as indicated

## 2022-04-27 NOTE — PROGRESS NOTES
[facility]  Family Medicine Attending      S: Resting in bed. O: VS- Blood pressure (!) 149/98, pulse 112, temperature 97.6 °F (36.4 °C), temperature source Oral, resp. rate 20, height 5' 6\" (1.676 m), weight 255 lb (115.7 kg), SpO2 99 %, not currently breastfeeding. Exam is as noted by resident   Gen, Asleep,   CV irreg irreg  Resp decreased, coarse, unlabored on NC  Abd soft  Ext - no lower extremity edema. Impressions:   Principal Problem:    Hypernatremia  Active Problems:    Permanent atrial fibrillation (HCC)    Essential hypertension    Chronic obstructive pulmonary disease (HCC)    Frequent falls    AMS (altered mental status)    Head injury, initial encounter    Acute encephalopathy        Plan:   DNR CC, Discharge pending placement. Afib RVR -rate control -  metoprolol 100mg po qday. Agitation - increased depakote, Lexapro. Seems to have responded well. prn zyprexa to avoid benzos. Pleural effusion, hypercapnia, covid - BTx prn for now. Continue NC O2 prn. Coninue bipap qHS       Attending Physician Statement  I have reviewed the chart and seen the patient with the resident(s). I personally reviewed images, EKG's and similar tests, if present. I personally reviewed and performed key elements of the history and exam.  I have reviewed and confirmed student and/or resident history and exam with changes as indicated above. I agree with the assessment, plan and orders as documented by the resident. Please refer to the resident and/or student note for additional information.       Gloria Escalante MD

## 2022-04-28 NOTE — CARE COORDINATION
Care Coordination:  No  Further response from [facility] so denial to admitt received yesterday stands. Discharge plan is [facility] to dementia. behavior unit. Will need pre cert. Facility will start precert when restraints free. Bi lateral restraints discharged at 8 am .  Psych consult for medication management ordered. MINA communicated with Eunice at [facility] . Can start pre cert after 24 hours off restraints. Need updated PT OT. PT unable to see this am as patient lethargic. They will try later or tomorrow. Will follow   .   Will need envelop and 7000 and transport form

## 2022-04-28 NOTE — PROGRESS NOTES
Physical Therapy    Medical chart reviewed for PT treatment 4/28 AM. Pt remains in 2-point restraints and is very lethargic/difficult to arouse. CM aware that PT will re-attempt at a later time today. Will follow. Thank you.     Noble More, PT, DPT  [id number]

## 2022-04-28 NOTE — PROGRESS NOTES
Physical Therapy  Treatment Note    Name: Chao Mancera  : 1935  MRN: [id number]      Date of Service: 2022    Evaluating PT:  Graham Doss, PT [id number]      Room #:  5926/6859-T  Diagnosis:  Hypernatremia [E87.0]  Acute encephalopathy [G93.40]  Altered mental status, unspecified altered mental status type [R41.82]  PMHx/PSHx:     has a past medical history of Anticoagulated on Coumadin, Atrial fibrillation ([address] Basal cell carcinoma of neck, CAD (coronary artery disease), CHF (congestive heart failure) ([address] Depression, DJD (degenerative joint disease), GERD (gastroesophageal reflux disease), History of cardiovascular stress test, Hyperparathyroidism ([address] Hypertension, Hypothyroidism, Mild mitral regurgitation, Osteoarthritis, Pulmonary hypertension ([address] Renal calculi, Tricuspid regurgitation, and Urinary incontinence. has a past surgical history that includes Thyroidectomy (); Kidney stone surgery (); Cholecystectomy (); Kidney stone surgery (); Cataract removal (2005); Incontinence surgery (2005); Thyroid surgery (2005); parathyroidectomy; Knee Arthroplasty (Bilateral, 2005); Total shoulder arthroplasty (Left, ); fracture surgery (Left); Carpal tunnel release; Finger surgery (2011); Total shoulder arthroplasty (Left); Inner ear surgery (); Cataract removal with implant (Bilateral); Colonoscopy; and Cardiac catheterization (3/22/14). Procedure/Surgery:  none  Precautions:  Falls, bed alarm , FWB (full weight bearing),  O2,   Equipment Needs: Equipment at [address],    SUBJECTIVE:    Patient admitted from SNF. PLOF to this admission unknown. Patient unable to provide history. Equipment owned: Equipment at [employer],      OBJECTIVE:   Initial Evaluation  Date: 22 Treatment  22 Short Term/ Long Term   Goals   AM-PAC 6 Clicks 84/79 9    Was pt agreeable to Eval/treatment? yes Yes    Does pt have pain? None indicated.   No current complaints of pain    Bed Mobility  Rolling: Mod    Supine to sit:   Max    Sit to supine: Max    Scooting: Mod   Rolling: NT  Supine to sit: Max A  Sit to supine: Max A    Scooting: Mod A to EOB Rolling: Min  Supine to sit: Min  Sit to supine: Min  Scooting: Min   Transfers Sit to stand: Max    Stand to sit: Max   Stand pivot: NT unsafe this date. Sit to stand: Max A    Stand to sit: Max A  Stand pivot: NT  Sit to stand: Mod   Stand to sit: Mod    Stand pivot: Max     Ambulation    NT Unable to sidestep TBD   Stair negotiation: ascended and descended  NT NT    ROM BUE:  wfl   BLE:  wfl NT    Strength BUE: 3+/5  RLE:  Grossly 3+/5  LLE:  Grossly  3+/5 NT 4-/5   Balance Sitting EOB: Mod constant contact. Dynamic Standing: Max A Sitting EOB: SBA  Dynamic Standing: NT Sitting EOB:  SBA  Dynamic Standing:  Max     Pt is A & O x: 3 grossly to person, place, and year. Sensation: Pt denies numbness and tingling of extremities. Edema: Unremarkable. Patient education  Pt educated on hand placement during sit to stand transfer. Patient response to education:   Pt verbalized understanding Pt demonstrated skill Pt requires further education in this area   Yes Yes Reinforce      ASSESSMENT:    Comments:    Pt was in bed upon room entry; agreeable to PT treatment. Pt is pleasantly confused and delayed but able to follow simple commands. Pt completed supine to sit transfer with heavy assistance required for B LE and trunk mobility. Pt has fair sitting balance at EOB and sat for 15+ minutes. Pt completed sit to stand transfer. Pt was able to stand for up to 1 minute but could not follow commands to sidestep. Pt was assisted back to bed and positioned comfortably with pillows. O2 sat was % on 2.5 L O2/min throughout session. Pt was left in bed with  present at conclusion of session.     Treatment:  Patient practiced and was instructed in the following treatment:     Therapeutic activities:  o Bed mobility: Pt was cued for technique during bed mobility transfers. o Transfers: Pt was cued for hand placement during sit <> stand transfers. o Sitting EOB: Pt sat at EOB for 15+ minutes. Pt was cued for posture and hand placement to improve balance.  o Standing: Pt stood for 1+ minute without AD (standing balance, B LE strength, endurance, posture). o Vitals and symptoms were closely monitored throughout session. o Skillful positioning in bed to protect skin/joint integrity. PLAN:    Patient is making Fair progress towards established goals. Will continue with current POC.       Time in: 1330  Time out: 1355    Total Treatment Time 25 minutes     CPT codes:  [] Gait training 64148 0 minutes  [] Manual therapy 66134 0 minutes  [x] Therapeutic activities 00393 25 minutes  [] Therapeutic exercises 40318 0 minutes  [] Neuromuscular reeducation 27232 0 minutes      Laura Duke, PT, DPT   [id number]

## 2022-04-28 NOTE — PLAN OF CARE
Problem: Falls - Risk of:  Goal: Will remain free from falls  Description: Will remain free from falls  4/28/2022 1149 by Madhu Mane RN  Outcome: Progressing  4/27/2022 2358 by Agapito Carrillo RN  Outcome: Progressing  Goal: Absence of physical injury  Description: Absence of physical injury  Outcome: Progressing

## 2022-04-28 NOTE — PLAN OF CARE
Problem: Falls - Risk of:  Goal: Will remain free from falls  Description: Will remain free from falls  4/27/2022 2358 by Kina Vega RN  Outcome: Progressing  4/27/2022 1121 by Angelica Tay RN  Outcome: Progressing     Problem: Airway Clearance - Ineffective  Goal: Achieve or maintain patent airway  4/27/2022 2358 by Kina Vega RN  Outcome: Progressing  4/27/2022 1121 by Angelica Tay RN  Outcome: Progressing     Problem: Gas Exchange - Impaired  Goal: Absence of hypoxia  4/27/2022 2358 by Kina Vega RN  Outcome: Progressing  4/27/2022 1121 by Angelica Tay RN  Outcome: Progressing     Problem: Non-Violent Restraints  Goal: No harm/injury to patient while restraints in use  4/27/2022 2358 by Kina Vega RN  Outcome: Progressing  4/27/2022 1121 by Angelica Tay RN  Outcome: Progressing     Problem: Non-Violent Restraints  Goal: Patient's dignity will be maintained  4/27/2022 2358 by Kina Vega RN  Outcome: Progressing

## 2022-04-28 NOTE — PROGRESS NOTES
[facility]  Nephrology Progress Note    SUBJECTIVE from Dr. Nathalie Trejo 4/14 Consult Note: \"Mrs Farias Heal 50-year-old woman who was admitted from nursing facility with altered mentation and confusion. She was found to have a sodium of 152 for which we have been asked to assist in her management. \"    Interval History:    4/28: Patient was seen and examined. She is lethargic, she does wake up but falls back asleep. PMH:    Past Medical History:   Diagnosis Date    Anticoagulated on Coumadin     on Coumadin for this Dr. Trung Tapia controls    Atrial fibrillation ([address]     Basal cell carcinoma of neck     CAD (coronary artery disease)     History of luminal irregularities of the coronary artery, stable.     CHF (congestive heart failure) ([facility])     stage I diastolic dysfunction on 0/38/64    Depression 11/16/2010    DJD (degenerative joint disease)     SEVERE IN RIGHT HAND, IN MULTIPLE OTHER JOINTS    GERD (gastroesophageal reflux disease) 11/16/2010    History of cardiovascular stress test 03/18/2014    lexiscan    Hyperparathyroidism ([address] 11/16/2010    Had parathyroidectomy    Hypertension 11/16/2010    Hypothyroidism 11/16/2010    Mild mitral regurgitation 7/10/14    Osteoarthritis 11/16/2010    Pulmonary hypertension ([address] 7/10/14    mild    Renal calculi     x3    Tricuspid regurgitation 7/10/14    mild-moderate    Urinary incontinence 11/16/2010     Patient Active Problem List   Diagnosis    Depression    Hypothyroidism    Permanent atrial fibrillation ([address]    History of artificial lens replacement    Essential hypertension    Moderate obesity    Greater trochanteric bursitis    Chronic anticoagulation    Chronic obstructive pulmonary disease (HCC)    Mixed stress and urge urinary incontinence    Fuchs' endothelial dystrophy    Pseudophakia, both eyes    Chronic diastolic (congestive) heart failure ([address]    Pure hypercholesterolemia    Spinal stenosis of lumbar region with neurogenic claudication    Patent foramen ovale    Coronary arteriosclerosis in native artery    Frequent falls    Dementia (HCC)    Ambulatory dysfunction    Cognitive dysfunction    COVID-19 virus infection    Nephrolithiasis    AMS (altered mental status)    Laceration of fascia of head    Head injury, initial encounter    Hypernatremia    Acute encephalopathy     Meds:     divalproex  500 mg Oral 2 times per day    escitalopram  10 mg Oral Daily    metoprolol succinate  100 mg Oral Daily    budesonide  0.5 mg Nebulization BID    metoprolol  5 mg IntraVENous Q6H    furosemide  20 mg Oral Daily    LORazepam  0.5 mg IntraMUSCular Once    lidocaine  5 mL IntraDERmal Once    heparin flush  1 mL IntraVENous 2 times per day    levalbuterol  1 ampule Nebulization BID    levothyroxine  175 mcg Oral Daily      sodium chloride      dextrose       Meds prn:     OLANZapine, acetaminophen, sodium chloride flush, sodium chloride, heparin flush, glucose, dextrose, glucagon (rDNA), dextrose, sodium chloride flush, ondansetron **OR** ondansetron, polyethylene glycol    Meds prior to admission:     No current facility-administered medications on file prior to encounter.      Current Outpatient Medications on File Prior to Encounter   Medication Sig Dispense Refill    acetaminophen (TYLENOL) 325 MG tablet Take 650 mg by mouth every 4 hours as needed for Pain or Fever      b complex vitamins capsule Take 1 capsule by mouth daily      vitamin D (CHOLECALCIFEROL) 25 MCG (1000 UT) TABS tablet Take 1,000 Units by mouth daily      fluticasone (FLONASE) 50 MCG/ACT nasal spray 2 sprays by Nasal route daily      guaiFENesin (MUCINEX) 600 MG extended release tablet Take 600 mg by mouth daily      loperamide (IMODIUM) 2 MG capsule Take 4 mg by mouth 4 times daily as needed for Diarrhea      meclizine (ANTIVERT) 25 MG tablet Take 25 mg by mouth 3 times daily as needed for Dizziness      albuterol sulfate HFA (VENTOLIN HFA) 108 (90 Base) MCG/ACT inhaler Inhale 2 puffs into the lungs every 6 hours as needed for Wheezing      ascorbic acid (VITAMIN C) 500 MG tablet Take 1,000 mg by mouth daily      levothyroxine (SYNTHROID) 175 MCG tablet Take 1 tablet by mouth Daily 30 tablet 3    levalbuterol (XOPENEX) 0.63 MG/3ML nebulization Take 1 ampule by nebulization in the morning and at bedtime       metoprolol succinate (TOPROL XL) 50 MG extended release tablet Take 1 tablet by mouth nightly 30 tablet 5    magnesium hydroxide (MILK OF MAGNESIA) 400 MG/5ML suspension Take 5 mLs by mouth daily as needed for Constipation      lisinopril (PRINIVIL;ZESTRIL) 5 MG tablet Take 1 tablet by mouth daily 90 tablet 1    furosemide (LASIX) 40 MG tablet Take 1 tablet by mouth daily 60 tablet 2    famotidine (PEPCID) 20 MG tablet Take 1 tablet by mouth 2 times daily 60 tablet 5    escitalopram (LEXAPRO) 10 MG tablet Take 1 tablet by mouth daily 30 tablet 5    albuterol (PROVENTIL) (2.5 MG/3ML) 0.083% nebulizer solution Take 3 mLs by nebulization every 6 hours as needed for Wheezing 120 each 5    [DISCONTINUED] levothyroxine (SYNTHROID) 125 MCG tablet Take 1 tablet by mouth every morning (before breakfast) 24 tablet 2     Allergies:    Codeine, Penicillins, Vicodin [hydrocodone-acetaminophen], and Adhesive tape    Social History:     reports that she has never smoked. She has never used smokeless tobacco. She reports previous alcohol use. She reports that she does not use drugs.     Family History:         Problem Relation Age of Onset    Heart Disease Mother     Heart Attack Mother     Arthritis Mother     Heart Disease Father     Mental Illness Father     Diabetes Father     Heart Disease Brother     Cancer Brother     Cancer Son     Heart Disease Sister     Heart Disease Brother     Heart Surgery Brother      Physical Exam:    Patient Vitals for the past 24 hrs:   BP Temp Temp src Pulse Resp SpO2 Weight   04/28/22 0752 136/84 96.8 °F (36 °C) Temporal 102 20 -- --   04/28/22 0544 -- -- -- -- -- -- 254 lb (115.2 kg)   04/27/22 2104 -- -- -- -- 18 95 % --   04/27/22 1930 126/82 97.4 °F (36.3 °C) Oral 108 -- 100 % --   04/27/22 1830 (!) 150/92 -- -- 116 -- -- --   04/27/22 1045 (!) 149/98 97.6 °F (36.4 °C) Oral 112 20 99 % --     No intake or output data in the 24 hours ending 04/28/22 1025     General: Lethargic, no acute distress  Neck: No JVD noted  Cardiovascular: Tachycardic   Respiratory: Clear bilaterally upper, diminished to the bases bilaterally. Unlabored  Abdomen: Soft, nontender, nondistended. Active bowel sounds  Extremities: No edema  Skin: Warm/dry; no rash on exposed extremities  Neuro: Lethargic    Data:    Recent Labs     04/26/22  1125   WBC 5.6   HGB 13.1   HCT 45.2   MCV 99.1        Recent Labs     04/26/22  0523 04/26/22  1500 04/27/22  1031   NA  --  141 142   K  --  5.0 4.6   CL  --  106 102   CO2  --  26 36*   CREATININE  --  0.6 0.7   BUN  --  8 10   LABGLOM  --  >60 >60   GLUCOSE  --  84 87   CALCIUM  --  9.5 9.5   PHOS 2.7  --   --      Vit D, 25-Hydroxy   Date Value Ref Range Status   04/14/2022 37 30 - 100 ng/mL Final     Comment:     <20 ng/mL. ........... Pamalee Bucker Deficient  20-30 ng/mL. ......... Pamalee Bucker Insufficient   ng/mL. ........ Pamalee Bucker Sufficient  >100 ng/mL. .......... Pamalee Bucker Toxic       PTH   Date Value Ref Range Status   04/15/2022 112 (H) 15 - 65 pg/mL Final     No results for input(s): ALT, AST, ALKPHOS, BILITOT, BILIDIR in the last 72 hours. No results for input(s): LABALBU in the last 72 hours.   Ferritin   Date Value Ref Range Status   04/13/2022 155 ng/mL Final     Comment:     FERRITIN Reference Ranges:  Adult Males   20 - 60 years:    30 - 400 ng/mL  Adult females 16 - 61 years:    15 - 150 ng/mL  Adults greater than 60 years:   no established reference range  Pediatrics:                     no established reference range       Iron   Date Value Ref Range Status   02/20/2020 73 37 - 145 mcg/dL Final TIBC   Date Value Ref Range Status   02/20/2020 323 250 - 450 mcg/dL Final     Vitamin B-12   Date Value Ref Range Status   04/06/2022 802 211 - 946 pg/mL Final     Folate   Date Value Ref Range Status   01/12/2022 >20.0 4.8 - 24.2 ng/mL Final     Lab Results   Component Value Date    COLORU Yellow 04/13/2022    NITRU Negative 04/13/2022    GLUCOSEU Negative 04/13/2022    KETUA Negative 04/13/2022    UROBILINOGEN 1.0 04/13/2022    BILIRUBINUR Negative 04/13/2022    BILIRUBINUR negative 07/05/2019     Lab Results   Component Value Date/Time    OSMOU 522 04/13/2022 03:49 PM     No components found for: URIC    No results found for: LIPIDPAN    Assessment and Plan:    1. Hypernatremia  Originally hypovolemic, now with worsening pulmonary edema  Na 152 on 4/13--> 145 today   Off IVF   Follow    2. Hypokalemia  K+ 4.5 today   Follow    3. COVID pneumonia/ Pleural effusion   Chest x-ray 4/17 \"significant interval improvement in the left lung lower lung and lingular consolidation\" and \"Increased right pleural effusion and or atelectasis\"  Pulmonology following    4. Atrial fibrillation with RVR  On metoprolol  Cardiology previously following    5. Hypertension  BP goal<130/80  BP near goal  Follow    6. Altered mental status  Neurology previously following    7. Hypophosphatemia  Phos 2.7 on 4/26  Follow    8.   Oliguria  Incontinent now  Monitor urine output off IVF  Follow    ARIEL Faulkner - CNP     Pt seen and examined agree with above  Hypernatremia resolved  Follow off Sheeba Pugh MD

## 2022-04-28 NOTE — CONSULTS
Psychiatry was consulted for \"Dementia plan to discharge generation would like you to evaluate and medications recommendation for intermittent agitation\" chart was reviewed with and case was discussed with Dr. Ashley Aranda. Patient has a diagnosis of acute encephalopathy and is currently going through a phase of delirium. Patient is already on Depakote 500 mg twice daily. Psychiatry has no further medication recommendations for this patient. Psychiatry recommends continuing to treat the underlying medical conditions, frequent ambulation and avoiding the use of restraints whenever possible to help clear the delirium. The risks of treating aggressively with psychotropic medications outweighs any possible benefits.

## 2022-04-28 NOTE — PROGRESS NOTES
[address] [facility]  Family Medicine Attending      S: Resting in bed. Denies pain. O: VS- Blood pressure 135/85, pulse 119, temperature 96.8 °F (36 °C), temperature source Temporal, resp. rate 18, height 5' 6\" (1.676 m), weight 254 lb (115.2 kg), SpO2 96 %, not currently breastfeeding. Gen: Alert, confused, does not appear uncomfortable. CV irreg irreg, tachy   Resp decreased, coarse, unlabored   Abd soft  Ext - no lower extremity edema. Impressions:   Principal Problem:    Hypernatremia  Active Problems:    Permanent atrial fibrillation (HCC)    Essential hypertension    Chronic obstructive pulmonary disease (HCC)    Frequent falls    AMS (altered mental status)    Head injury, initial encounter    Acute encephalopathy        Plan:   DNR CC, Discharge pending placement. Afib RVR -rate control - fluctuates,   metoprolol 100mg po qday. Agitation - increased depakote, Lexapro. Seems to have responded well. Psychiatry consulted to assist w med management for safety. Pleural effusion, hypercapnia, covid - BTx prn for now. Continue NC O2 prn. Coninue bipap qHS       Attending Physician Statement  I have reviewed the chart and seen the patient with the resident(s). I personally reviewed images, EKG's and similar tests, if present. I personally reviewed and performed key elements of the history and exam.  I have reviewed and confirmed student and/or resident history and exam with changes as indicated above. I agree with the assessment, plan and orders as documented by the resident. Please refer to the resident and/or student note for additional information.       Casa Correia MD

## 2022-04-28 NOTE — PROGRESS NOTES
Pulmonary [facility]    Pulmonary Consult/Progress Note :    Reason for Consultation:COVID Pneumonia     CC : SOB      SUBJECTIVE:    Labs are stable  Disoriented but not kicking things which is good right  [facility] to [facility]    PHYSICAL EXAMINATION:     VITAL SIGNS:  /85   Pulse 119   Temp 96.8 °F (36 °C) (Temporal)   Resp 18   Ht 5' 6\" (1.676 m)   Wt 254 lb (115.2 kg)   SpO2 96%   BMI 41.00 kg/m²   Wt Readings from Last 3 Encounters:   04/28/22 254 lb (115.2 kg)   03/30/22 270 lb 4.8 oz (122.6 kg)   03/14/22 250 lb (113.4 kg)     Temp Readings from Last 3 Encounters:   04/28/22 96.8 °F (36 °C) (Temporal)   03/30/22 96.5 °F (35.8 °C) (Temporal)   03/14/22 98.5 °F (36.9 °C) (Oral)     TMAX:  BP Readings from Last 3 Encounters:   04/28/22 135/85   03/30/22 (!) 144/97   03/15/22 118/82     Pulse Readings from Last 3 Encounters:   04/28/22 119   03/30/22 110   03/15/22 71     INTAKE/OUTPUTS:  No intake/output data recorded.   No intake or output data in the 24 hours ending 04/28/22 1326  General Appearance: Alert confused  Eyes: conjunctivae normal and sclera anicteric   Neck:and noadenopathy   Pulmonary/Chest:Course  Cardiovascular: Afib,   Abdomen: obese, soft, non-tender, non-distended, n  Extremities edema trace, vasc insuff  Musculoskeletal: no deformity or tenderness   Neurologic:No  cranial nerve deficits     LABS/IMAGING:    CBC:   Lab Results   Component Value Date    WBC 5.6 04/26/2022    RBC 4.56 04/26/2022    HGB 13.1 04/26/2022    HCT 45.2 04/26/2022    MCV 99.1 04/26/2022    MCH 28.7 04/26/2022    MCHC 29.0 04/26/2022    RDW 14.8 04/26/2022     04/26/2022    MPV 11.4 04/26/2022     CMP:    Lab Results   Component Value Date     04/28/2022    K 4.5 04/28/2022    K 3.6 04/19/2022     04/28/2022    CO2 37 04/28/2022    BUN 14 04/28/2022    CREATININE 0.7 04/28/2022    GFRAA >60 04/28/2022    LABGLOM >60 04/28/2022    GLUCOSE 98 04/28/2022    GLUCOSE 113 02/14/2012    PROT 5.8 04/22/2022    LABALBU 3.1 04/22/2022    LABALBU 4.4 02/14/2012    CALCIUM 9.4 04/28/2022    BILITOT 0.7 04/22/2022    ALKPHOS 86 04/22/2022    AST 26 04/22/2022    ALT 15 04/22/2022     Calcium:    Lab Results   Component Value Date    CALCIUM 9.4 04/28/2022     Phosphorus:    Lab Results   Component Value Date    PHOS 2.7 04/26/2022               ASSESSMENT:  1.) Acute hypoxic /hypercpnic resp failure  2.) Fluid overload/CHF  3.) Right side effusion > left   4.) COVID pneumonia  5.) Fall /A fib .was on couamdin      PLAN:  1. BiPAP nightly  2. Increase depakote  3. Add lexapro 10 mg consider increasing  4. No narcotics or benzodiaz  5. HR stable on BB  6. Need to remain on Bronchodilators for COPD after discharge  7.  OOB   8. PT OT      Earl Devine DO, MPH, FCCP, [place], [place]

## 2022-04-28 NOTE — PROGRESS NOTES
Occupational Therapy  OCCUPATIONAL THERAPY TREATMENT SESSION    [facility] [address]  [address] [place] [address],  sheryl, [address] TREATMENT NOTE      Date:2022  Patient Name: Awais Adan  MRN: [id number]  : 1935  Room: [address] [place] [address]     Per OT Eval:    Evaluating [address], OTR/L #[id number]     Referring Provider: Robby Santiago MD  Specific Provider Orders/Date: OT eval and treat 22     Diagnosis: Hypernatremia [E87.0]  Acute encephalopathy [G93.40]  Altered mental status, unspecified altered mental status type [R41.82]   Pt admitted to hospital on 22 for abnormal labs, AMS, frequent falls     Pertinent Medical History:  has a past medical history of Anticoagulated on Coumadin, Atrial fibrillation ([address] Basal cell carcinoma of neck, CAD (coronary artery disease), CHF (congestive heart failure) ([address] Depression, DJD (degenerative joint disease), GERD (gastroesophageal reflux disease), History of cardiovascular stress test, Hyperparathyroidism ([address] Hypertension, Hypothyroidism, Mild mitral regurgitation, Osteoarthritis, Pulmonary hypertension ([address] Renal calculi, Tricuspid regurgitation, and Urinary incontinence. B wrist restraints d/c'd     Precautions:  Fall Risk, TAPS, cognition, O2, Ramona, sitter     Assessment of current deficits   [x]? Functional mobility         [x]? ADLs           [x]? Strength                  [x]? Cognition    [x]? Functional transfers       [x]? IADLs         [x]? Safety Awareness   [x]? Endurance    [x]? Fine Coordination                      [x]? Balance      []? Vision/perception   []? Sensation      []? Gross Motor Coordination          [x]? ROM           []?  Delirium                   []? Motor Control      OT PLAN OF CARE   OT POC based on physician orders, patient diagnosis and results of clinical assessment     Frequency/Duration 1-3 days/wk for 2 weeks PRN   Specific OT Treatment Interventions to include:   * Instruction/training on adapted ADL techniques and AE recommendations to increase functional independence within precautions       * Training on energy conservation strategies, correct breathing pattern and techniques to improve independence/tolerance for self-care routine  * Functional transfer/mobility training/DME recommendations for increased independence, safety, and fall prevention  * Patient/Family education to increase follow through with safety techniques and functional independence  * Recommendation of environmental modifications for increased safety with functional transfers/mobility and ADLs  * Cognitive retraining/development of therapeutic activities to improve problem solving, judgement, memory, and attention for increased safety/participation in ADL/IADL tasks  * Splinting/positioning for increased function, prevention of contractures, and improve skin integrity  * Therapeutic exercise to improve motor endurance, ROM, and functional strength for ADLs/functional transfers  * Therapeutic activities to facilitate/challenge dynamic balance, stand tolerance for increased safety and independence with ADLs  * Therapeutic activities to facilitate gross/fine motor skills for increased independence with ADLs  * Positioning to improve skin integrity, interaction with environment and functional independence     Recommended Adaptive Equipment: TBD      Home Living: Pt admitted from [facility] prior (per chart)     Prior Level of Function: unable to obtain from pt d/t cognition. Family/caregiver not present     Pain Level: 0/10      Cognition: A&O: 2/4 (to self and place); Follows 1 step directions ~75% of session. Pleasantly confused - cues provided to initiate and sequence tasks.   Pt cooperative and eager to participate           Memory:  fair-           Sequencing:  fair-           Problem solving:  fair-           Judgement/safety: fair-             Functional Assessment:  AM-PAC Daily Activity Raw Score: 11/24    Initial Eval Status  Date: 4/21/22 Treatment Status  Date:4/28/22 STGs = LTGs  Time frame: 10-14 days   Feeding Dependent  Min A (positioning in bed, assist with packages and vc's for sequencing) Moderate Assist    Grooming Dependent  Min A (seated EOB for oral care, vc's for sequencing) Moderate Assist    UB Dressing Dependent  Max A Moderate Assist    LB Dressing Dependent  Dep (assist with socks, deferred edu on AE due to cognition) -   Bathing Dependent Dep (simulated, 2 person) Mod A  Goal added by Madison Miller, OTR/L #[id number] on 4/25   Toileting Dependent  Dep (bed level) -   Bed Mobility  Rolling to Left: Mod A  Rolling to Right: Max A x2     Supine to sit: NT  Secondary to safety concerns Max x 2 Rolling: Min A  Supine to sit: Maximal Assist   Sit to supine: Maximal Assist    Functional Transfers NT Max x 2 Maximal Assist    Functional Mobility NT NT -  Will continue to assess   Balance Sitting: NT  Standing: NT Sitting: Min A (progressed to SBA) Sitting: Min A   Activity Tolerance Poor Fair- Fair   Visual/  Perceptual Glasses: yes               Pt will complete B UE exercises w/ min cues to maximize independence w/ ADL's       *Re-assessed B UE ROM and strength this date (4/25) as pt able to follow 1 step commands. Hand Dominance R    AROM (PROM) Strength Additional Info:    RUE  WFL Distal: 3+/5  Proximal: 3-/5 Fair , FMC/dexterity: fair   LUE WFL Distal: 3+/5  Proximal: 3-/5 Fair , FMC/dexterity:fair         Comments: Upon arrival pt lying in bed. At end of session pt upright in bed (Bed alarm on) all lines and tubes intact, call light within reach. Treatment: Cleared by RN to see pt. Pt pleasantly confused. Pt appeared Pt required vc's and physical assist for proper technique/safety with hand placement/body mechanics/posture for bed mobility/ADLs/functional tranfers. Pt required vc's for sequencing/initiation of ADLs/functional transfers.  Pt able to sit EOB ~15 mins to increase core strength/balance/activity tolerance for ease with ADLs. Pt required increased time to complete ADLs/functional transfers due to management of multiple lines, physical assist, and cognition. Pt required skilled monitoring of SpO2 during session, which was >95% 2.5L. Pt required rest breaks during session. Pt appeared to have tolerated session fairly and appears motivated/cooperative/pleasant . Pt instructed on use of call light for assistance and fall prevention. Pt demo'ing fair understanding of education provided.  Continue to educate.     -pt has made fair progress toward goals  -continue current POC    Treatment Time In: 1:35          Treatment Time Out: 2:00            Treatment Charges: Mins Units   Ther Ex  [id number]     Manual Therapy 01021     Thera Activities [id number]     ADL/Home Mgt [id number] 15 10   Neuro Re-ed [id number]     Group Therapy      Orthotic manage/training  [id number]     Non-Billable Time     Total Timed Treatment 25 2       ANAHY Hernandez, OTR/L [id number]

## 2022-04-28 NOTE — PROGRESS NOTES
[facility] - [facility] Inpatient   Resident Progress Note    S:  Hospital day: 15   Brief Synopsis: Sonya De Leon is a 81 y.o. female PMH of atrial fibrillation (on Coumadin ), coronary artery disease, CHF, GERD, hyperparathyroidism, hypertension, hypothyroidism, osteoarthritis and urinary incontinenc of who presents to ED for Abnormal Lab (pt new pt to [facility], came in for abnormal labs. pt with frequent falls and they state she has a change in mentation. ER labs showed H/H 11.4/38.0, sodium 152, potassium 3.9, chloride 108, BUN 23, creatinine 0.9, AST 40, alkaline phosphatase 127, INR 2, urine analysis leukocyte esterase with WBC 1-3, troponin 24 x25, normal lactic acid. EKG showed A. fib with RVR and chest x-ray new opacity in the right lung base. Patient is lying comfortably. On nasal cannula. Patient is alert awake but disoriented. Cont meds:    sodium chloride      dextrose       Scheduled meds: Patient is awake and alert.  divalproex  500 mg Oral 2 times per day    escitalopram  10 mg Oral Daily    metoprolol succinate  100 mg Oral Daily    budesonide  0.5 mg Nebulization BID    metoprolol  5 mg IntraVENous Q6H    furosemide  20 mg Oral Daily    LORazepam  0.5 mg IntraMUSCular Once    lidocaine  5 mL IntraDERmal Once    heparin flush  1 mL IntraVENous 2 times per day    levalbuterol  1 ampule Nebulization BID    levothyroxine  175 mcg Oral Daily     PRN meds: OLANZapine, acetaminophen, sodium chloride flush, sodium chloride, heparin flush, glucose, dextrose, glucagon (rDNA), dextrose, sodium chloride flush, ondansetron **OR** ondansetron, polyethylene glycol     I reviewed the patient's past medical and surgical history, Medications and Allergies. O:  /84   Pulse 102   Temp 96.8 °F (36 °C) (Temporal)   Resp 20   Ht 5' 6\" (1.676 m)   Wt 254 lb (115.2 kg)   SpO2 95%   BMI 41.00 kg/m²   24 hour I&O: No intake/output data recorded. No intake/output data recorded. General:  Difficult to arouse and somnolent neck: Supple, symmetrical, no JVD   Lung:  Clear to auscultation   heart: Irregular rate and irregular rhythm, tachycardia  Abdomen: SNTND, good bowel sound extremities:  Extremities normal, atraumatic, no cyanosis or edema. Distal pulses equal bilaterally  Skin: Skin color, texture, turgor normal, no rashes or lesions  Musculoskeletal: No joint swelling, no muscle tenderness   Neurologic:  Difficult to arouse, somnolent  Labs:  Na/K/Cl/CO2:  142/4.6/102/36 (04/27 1031)  BUN/Cr/glu/ALT/AST/amyl/lip:  10/0.7/--/--/--/--/-- (04/27 1031)  WBC/Hgb/Hct/Plts:  5.6/13.1/45.2/155 (04/26 1125)  estimated creatinine clearance is 74 mL/min (based on SCr of 0.7 mg/dL). Other pertinent labs as noted below    Radiology:  XR CHEST PORTABLE   Final Result   No change in small to moderate bilateral pleural effusions. XR CHEST PORTABLE   Final Result   Bilateral pulmonary opacifications right greater than left with probable   small right and small to moderate left potential layering pleural effusions. US DUP UPPER EXTREMITY RIGHT VENOUS   Final Result   Within the visualized vessels there is no evidence for deep venous   thrombosis               XR CHEST PORTABLE   Final Result   Bibasilar atelectasis and or infiltrate as well as right pleural effusion. Mildly worsened aeration on the left. XR ABDOMEN FOR NG/OG/NE TUBE PLACEMENT   Final Result   Gastric catheter passes into the stomach, tip is in the gastric body. No   complications. XR CHEST PORTABLE   Final Result   1. Significant interval improvement in the left lung lower lung and lingular   consolidation   2. Increased right pleural effusion and or atelectasis   3. Cardiomegaly, stable   4.  The position of the nasogastric tube is within normal range         XR CHEST PORTABLE   Final Result   Increased markings seen in the lung bases bilaterally with small bilateral   pleural effusions not significantly changed in the interval.  No new findings. XR ABDOMEN (KUB) (SINGLE AP VIEW)   Final Result   No acute intra-process seen. Left renal 12 mm calculus. CT HEAD WO CONTRAST   Final Result   1. No acute intracranial abnormality. 2. Chronic small vessel ischemic disease. 3. Left frontal scalp hematoma. XR CHEST PORTABLE   Final Result   Cardiomegaly, pulmonary venous hypertension and small effusions. Findings   suggest volume overload. Mild right basilar atelectasis. CTA PULMONARY W CONTRAST   Final Result   No evidence of pulmonary embolism. Small left and moderate size right pleural effusions. A moderate size area of consolidation is noted involving the right lower   lung, concerning for an underlying airspace disease process. RECOMMENDATIONS:   Unavailable         XR CHEST PORTABLE   Final Result   New opacities in right lung base suggestive of pneumonia, atelectasis, and   possible right pleural effusion. CT Head WO Contrast   Final Result   No acute intracranial abnormality or hemorrhage. Cortical atrophy and periventricular leukomalacia. Left frontal scalp hematoma. Right frontal sinusitis and right mastoiditis. CT Cervical Spine WO Contrast   Final Result   No acute abnormality of the cervical spine. Multilevel degenerative changes, no significant interval change. Right pleural effusion. A/P:  Principal Problem:    Hypernatremia  Active Problems:    Permanent atrial fibrillation (HCC)    Essential hypertension    Chronic obstructive pulmonary disease (HCC)    Frequent falls    AMS (altered mental status)    Head injury, initial encounter    Acute encephalopathy  Resolved Problems:    * No resolved hospital problems. *        Acute Encephalopathy   -Patient mental status is waxing and waning. This is appear to be a chronic problem for her.   She is able to occasionally have very minimal conversations and follow commands. Other times she has poor mentation, becomes lethargic or agitated.  Likely this could be secondary to age-related dementia changes with waxing and waning of this. - Initial imaging showed possible right lobe pneumonia vs pleural effusion  -Ct head no acute ischemia or bleeding  - likely secondary to  metabolic, hepatic, septic, drug induced, worsening underlying dementia   -Urine culture grew E. coli . Completed total 7 days of Rocephin  -  - neuro consulted, recommneds to EEG if worsening   - Stop ativan, as per pulmonary increase Depakote to 500 twice daily and started on Lexapro 10 Mg daily    Hypoxic respiratory failure 2/2 Pneumonia (Viral vs bacterial) VS  Pleural EFFUSION   - Likely covid induced with secondary bacterial  - Repeat chest x-ray showed to moderate bilateral pleural effusion, no worsening  -Repeat Rapid Covid is negative  - Mild leucocytosis on admission, resolved  -No plan for thoracocentesis at this time  - Continue oxygen supplement and wean as tolerated  -Pulmonary is following,   -CT angio no PE  - will resume lasix 20 mg daily    A fib with RVR  - On last admission Coumadin was discontinued due to high for bleeding despite of high CHADVAS score after discussion with patient daughter  - Will continue to hold Coumadin  -Heart rate improving  -Cardio consulted on admission and increase metoprolol to 100 mg daily on 4/23/2022  -Will hold on iv medication because of  - Last Echo in 2018, >55% EF, Interdeterminate diastolic dysfunction  consider repeating during this admission   - Keep mag/k/phos >2/4     Multiple fall  - Wound on left forehead  - Wound care is following patient  - No need suture  - Fall precaution  - PT/OT eval      Hypothyroidism  - Recent TSH normal  - Continue current regimen      Hypertension  -Increase metoprolol to 100mg daily        DVT / GI prophylaxis: Lovenox 40 mg SC     Dispo  CODE STATUS comfort care. Waiting for placement.  on board.   Consulted psychiatry as per  requested for medication evaluation for undergoing agitation associated with dementia prior to discharge to facility     Electronically signed by Ayan Box MD PGY-3 on 4/28/2022 at 9:06 AM     This case was discussed with attending physician: Dr. Diaz Alexis

## 2022-04-29 NOTE — PROGRESS NOTES
[address] [facility]  Family Medicine Attending      S: Resting in bed. Minimally responsive, smiles to questioning but answers inappropriately doesn't localize and pain or discomfort. O: VS- Blood pressure (!) 143/92, pulse 118, temperature 98.8 °F (37.1 °C), temperature source Oral, resp. rate 18, height 5' 6\" (1.676 m), weight 254 lb (115.2 kg), SpO2 97 %, not currently breastfeeding. Gen: Alert, confused, does not appear uncomfortable. CV irreg irreg, tachy   Resp decreased, coarse, unlabored distant 2/2 habitus. Abd soft nt nd   Ext - trace lower extremity edema. Impressions:   Principal Problem:    Hypernatremia  Active Problems:    Permanent atrial fibrillation (HCC)    Essential hypertension    Chronic obstructive pulmonary disease (HCC)    Frequent falls    AMS (altered mental status)    Head injury, initial encounter    Acute encephalopathy        Plan:   DNR CC, Discharge pending placement. Agitation - increased depakote, Lexapro. Seems to have responded well. Psychiatry consulted to assist w med management for safety. No further recs per their service given good response to depakote/lexapro. Pleural effusion, hypercapnia, covid - BTx prn for now. Continue NC O2 prn. Coninue bipap qHS as tolerated. Attending Physician Statement  I have reviewed the chart and seen the patient with the resident(s). I personally reviewed images, EKG's and similar tests, if present. I personally reviewed and performed key elements of the history and exam.  I have reviewed and confirmed student and/or resident history and exam with changes as indicated above. I agree with the assessment, plan and orders as documented by the resident. Please refer to the resident and/or student note for additional information.       Ankit Dye MD

## 2022-04-29 NOTE — PROGRESS NOTES
Pulmonary [facility]    Pulmonary Consult/Progress Note :    Reason for Consultation:COVID Pneumonia     CC : SOB      SUBJECTIVE:    Echo noted with New LV dysfucntion    PHYSICAL EXAMINATION:     VITAL SIGNS:  /88   Pulse 105   Temp 98.8 °F (37.1 °C) (Oral)   Resp 18   Ht 5' 6\" (1.676 m)   Wt 254 lb (115.2 kg)   SpO2 97%   BMI 41.00 kg/m²   Wt Readings from Last 3 Encounters:   04/28/22 254 lb (115.2 kg)   03/30/22 270 lb 4.8 oz (122.6 kg)   03/14/22 250 lb (113.4 kg)     Temp Readings from Last 3 Encounters:   04/29/22 98.8 °F (37.1 °C) (Oral)   03/30/22 96.5 °F (35.8 °C) (Temporal)   03/14/22 98.5 °F (36.9 °C) (Oral)     TMAX:  BP Readings from Last 3 Encounters:   04/29/22 139/88   03/30/22 (!) 144/97   03/15/22 118/82     Pulse Readings from Last 3 Encounters:   04/29/22 105   03/30/22 110   03/15/22 71     INTAKE/OUTPUTS:  I/O last 3 completed shifts: In: 340 [P.O.:340]  Out: -     Intake/Output Summary (Last 24 hours) at 4/29/2022 1129  Last data filed at 4/29/2022 0915  Gross per 24 hour   Intake 400 ml   Output    Net 400 ml     General Appearance: Alert confused  Eyes: conjunctivae normal and sclera anicteric   Neck:and noadenopathy   Pulmonary/Chest:Course  Cardiovascular: Afib,   Abdomen: obese, soft, non-tender, non-distended, n  Extremities edema trace, vasc insuff  Musculoskeletal: no deformity or tenderness   Neurologic:No  cranial nerve deficits     LABS/IMAGING:    ECHOCARDIOGRAM:  Mild aortic regurgitation.    Severe pulmonary hypertension.    Large left pleural effusion.    Cardiomyopathy with an EF 25-30%.          CBC:   Lab Results   Component Value Date    WBC 5.6 04/26/2022    RBC 4.56 04/26/2022    HGB 13.1 04/26/2022    HCT 45.2 04/26/2022    MCV 99.1 04/26/2022    MCH 28.7 04/26/2022    MCHC 29.0 04/26/2022    RDW 14.8 04/26/2022     04/26/2022    MPV 11.4 04/26/2022     CMP:    Lab Results   Component Value Date     04/28/2022    K 4.5 04/28/2022    K 3.6 04/19/2022     04/28/2022    CO2 37 04/28/2022    BUN 14 04/28/2022    CREATININE 0.7 04/28/2022    GFRAA >60 04/28/2022    LABGLOM >60 04/28/2022    GLUCOSE 98 04/28/2022    GLUCOSE 113 02/14/2012    PROT 5.8 04/22/2022    LABALBU 3.1 04/22/2022    LABALBU 4.4 02/14/2012    CALCIUM 9.4 04/28/2022    BILITOT 0.7 04/22/2022    ALKPHOS 86 04/22/2022    AST 26 04/22/2022    ALT 15 04/22/2022     Calcium:    Lab Results   Component Value Date    CALCIUM 9.4 04/28/2022     Phosphorus:    Lab Results   Component Value Date    PHOS 2.7 04/26/2022               ASSESSMENT:  1.) Acute hypoxic /hypercpnic resp failure  2.) Fluid overload/CHF  3.) Right side effusion > left   4.) COVID pneumonia  5.) Fall /A fib .was on coumadin  6.) New LV dysfunction      PLAN:  1. BiPAP nightly  2. Continue lexapro 10 mg consider increasing  3. No narcotics or benzodiaz  4. HR stable on BB  5. Need to remain on Bronchodilators for COPD after discharge  6. OOB   7. PT OT  8. Defer to cardiology   9.  Reassess CXR for effusion after diuresis       Carmelita Beal DO, MPH, [facility], [place], [place]

## 2022-04-29 NOTE — CARE COORDINATION
Care Coordination:  Patient is off restraints since 8 am yesterday. PT OT evaluations complete. Message to Xavier Duverney at [facility] and she will send pre cert request today,  Discharge plan remains [facility] . Envelop and ambulance form are in soft chart. Phone call and left VM for daughter this am.  Provided update re plan and requested she call back for further discussion of discharge alternative plan should there be a denial from insurance. Will follow. Received call back from patient's daughter Diya Babb. She said that she is working on a pending Medicaid  application with an . Spoke with Toshia from [facility], she said that if patient is denied skilled, their business office would review to see if patient would possibly meet criteria to go there under pending Medicaid.   Catalina Silver RN   [phone]

## 2022-04-29 NOTE — PLAN OF CARE
Problem: Falls - Risk of:  Goal: Will remain free from falls  Description: Will remain free from falls  Outcome: Completed  Goal: Absence of physical injury  Description: Absence of physical injury  Outcome: Completed     Problem: Airway Clearance - Ineffective  Goal: Achieve or maintain patent airway  Outcome: Completed     Problem: Gas Exchange - Impaired  Goal: Absence of hypoxia  Outcome: Completed  Goal: Promote optimal lung function  Outcome: Completed     Problem: Breathing Pattern - Ineffective  Goal: Ability to achieve and maintain a regular respiratory rate  Outcome: Completed     Problem:  Body Temperature -  Risk of, Imbalanced  Goal: Ability to maintain a body temperature within defined limits  Outcome: Completed  Goal: Will regain or maintain usual level of consciousness  Outcome: Completed  Goal: Complications related to the disease process, condition or treatment will be avoided or minimized  Outcome: Completed     Problem: Isolation Precautions - Risk of Spread of Infection  Goal: Prevent transmission of infection  Outcome: Completed     Problem: Nutrition Deficits  Goal: Optimize nutritional status  Outcome: Completed     Problem: Risk for Fluid Volume Deficit  Goal: Maintain normal heart rhythm  Outcome: Completed  Goal: Maintain absence of muscle cramping  Outcome: Completed  Goal: Maintain normal serum potassium, sodium, calcium, phosphorus, and pH  Outcome: Completed     Problem: Loneliness or Risk for Loneliness  Goal: Demonstrate positive use of time alone when socialization is not possible  Outcome: Completed     Problem: Fatigue  Goal: Verbalize increase energy and improved vitality  Outcome: Completed     Problem: Patient Education: Go to Patient Education Activity  Goal: Patient/Family Education  Outcome: Completed     Problem: Skin Integrity:  Goal: Will show no infection signs and symptoms  Description: Will show no infection signs and symptoms  Outcome: Completed  Goal: Absence of new skin breakdown  Description: Absence of new skin breakdown  Outcome: Completed     Problem: HEMODYNAMIC STATUS  Goal: Patient has stable vital signs and fluid balance  Outcome: Completed     Problem: Non-Violent Restraints  Goal: Removal from restraints as soon as assessed to be safe  Outcome: Completed  Goal: No harm/injury to patient while restraints in use  Outcome: Completed  Goal: Patient's dignity will be maintained  Outcome: Completed     Problem: Pain:  Goal: Pain level will decrease  Description: Pain level will decrease  Outcome: Completed  Goal: Control of acute pain  Description: Control of acute pain  Outcome: Completed  Goal: Control of chronic pain  Description: Control of chronic pain  Outcome: Completed     Problem: Chronic Conditions and Co-morbidities  Goal: Patient's chronic conditions and co-morbidity symptoms are monitored and maintained or improved  Outcome: Completed     Problem: Safety - Medical Restraint  Goal: Remains free of injury from restraints (Restraint for Interference with Medical Device)  Description: INTERVENTIONS:  1. Determine that other, less restrictive measures have been tried or would not be effective before applying the restraint  2. Evaluate the patient's condition at the time of restraint application  3. Inform patient/family regarding the reason for restraint  4.  Q2H: Monitor safety, psychosocial status, comfort, nutrition and hydration  Outcome: Completed     Problem: ABCDS Injury Assessment  Goal: Absence of physical injury  Outcome: Completed     Problem: Respiratory - Adult  Goal: Achieves optimal ventilation and oxygenation  Outcome: Completed

## 2022-04-29 NOTE — PROGRESS NOTES
[facility] - [facility] Inpatient   Resident Progress Note    S:  Hospital day: 12   Brief Synopsis: Walker Agudelo is a 81 y.o. female PMH of atrial fibrillation (on Coumadin ), coronary artery disease, CHF, GERD, hyperparathyroidism, hypertension, hypothyroidism, osteoarthritis and urinary incontinenc of who presents to ED for Abnormal Lab (pt new pt to [facility], came in for abnormal labs. pt with frequent falls and they state she has a change in mentation. ER labs showed H/H 11.4/38.0, sodium 152, potassium 3.9, chloride 108, BUN 23, creatinine 0.9, AST 40, alkaline phosphatase 127, INR 2, urine analysis leukocyte esterase with WBC 1-3, troponin 24 x25, normal lactic acid. EKG showed A. fib with RVR and chest x-ray new opacity in the right lung base. Unchanged from yesterday. Patient is easily arousable and alert. Patient is not on soft restraint. Sitter is present at room. As per sitter patient is drinking water. Eating not good but fair    Cont meds:    sodium chloride      dextrose       Scheduled meds: Patient is awake and alert.  divalproex  500 mg Oral 2 times per day    escitalopram  10 mg Oral Daily    metoprolol succinate  100 mg Oral Daily    budesonide  0.5 mg Nebulization BID    metoprolol  5 mg IntraVENous Q6H    furosemide  20 mg Oral Daily    LORazepam  0.5 mg IntraMUSCular Once    lidocaine  5 mL IntraDERmal Once    heparin flush  1 mL IntraVENous 2 times per day    levalbuterol  1 ampule Nebulization BID    levothyroxine  175 mcg Oral Daily     PRN meds: OLANZapine, acetaminophen, sodium chloride flush, sodium chloride, heparin flush, glucose, dextrose, glucagon (rDNA), dextrose, sodium chloride flush, ondansetron **OR** ondansetron, polyethylene glycol     I reviewed the patient's past medical and surgical history, Medications and Allergies.     O:  /88   Pulse 105   Temp 98.8 °F (37.1 °C) (Oral)   Resp 18   Ht 5' 6\" (1.676 m)   Wt 254 lb (115.2 kg)   SpO2 97%   BMI 41.00 kg/m²   24 hour I&O: I/O last 3 completed shifts: In: 340 [P.O.:340]  Out: -   No intake/output data recorded. General:  Difficult to arouse and somnolent neck: Supple, symmetrical, no JVD   Lung:  Clear to auscultation   heart: Irregular rate and irregular rhythm, tachycardia  Abdomen: SNTND, good bowel sound extremities:  Extremities normal, atraumatic, no cyanosis or edema. Distal pulses equal bilaterally  Skin: Skin color, texture, turgor normal, no rashes or lesions  Musculoskeletal: No joint swelling, no muscle tenderness   Neurologic:  Difficult to arouse, somnolent  Labs:  Na/K/Cl/CO2:  145/4.5/104/37 (04/28 1051)  BUN/Cr/glu/ALT/AST/amyl/lip:  14/0.7/--/--/--/--/-- (04/28 1051)  WBC/Hgb/Hct/Plts:  5.6/13.1/45.2/155 (04/26 1125)  estimated creatinine clearance is 74 mL/min (based on SCr of 0.7 mg/dL). Other pertinent labs as noted below    Radiology:  XR CHEST PORTABLE   Final Result   No change in small to moderate bilateral pleural effusions. XR CHEST PORTABLE   Final Result   Bilateral pulmonary opacifications right greater than left with probable   small right and small to moderate left potential layering pleural effusions. US DUP UPPER EXTREMITY RIGHT VENOUS   Final Result   Within the visualized vessels there is no evidence for deep venous   thrombosis               XR CHEST PORTABLE   Final Result   Bibasilar atelectasis and or infiltrate as well as right pleural effusion. Mildly worsened aeration on the left. XR ABDOMEN FOR NG/OG/NE TUBE PLACEMENT   Final Result   Gastric catheter passes into the stomach, tip is in the gastric body. No   complications. XR CHEST PORTABLE   Final Result   1. Significant interval improvement in the left lung lower lung and lingular   consolidation   2. Increased right pleural effusion and or atelectasis   3. Cardiomegaly, stable   4.  The position of the nasogastric tube is within normal range         XR CHEST PORTABLE   Final Result   Increased markings seen in the lung bases bilaterally with small bilateral   pleural effusions not significantly changed in the interval.  No new findings. XR ABDOMEN (KUB) (SINGLE AP VIEW)   Final Result   No acute intra-process seen. Left renal 12 mm calculus. CT HEAD WO CONTRAST   Final Result   1. No acute intracranial abnormality. 2. Chronic small vessel ischemic disease. 3. Left frontal scalp hematoma. XR CHEST PORTABLE   Final Result   Cardiomegaly, pulmonary venous hypertension and small effusions. Findings   suggest volume overload. Mild right basilar atelectasis. CTA PULMONARY W CONTRAST   Final Result   No evidence of pulmonary embolism. Small left and moderate size right pleural effusions. A moderate size area of consolidation is noted involving the right lower   lung, concerning for an underlying airspace disease process. RECOMMENDATIONS:   Unavailable         XR CHEST PORTABLE   Final Result   New opacities in right lung base suggestive of pneumonia, atelectasis, and   possible right pleural effusion. CT Head WO Contrast   Final Result   No acute intracranial abnormality or hemorrhage. Cortical atrophy and periventricular leukomalacia. Left frontal scalp hematoma. Right frontal sinusitis and right mastoiditis. CT Cervical Spine WO Contrast   Final Result   No acute abnormality of the cervical spine. Multilevel degenerative changes, no significant interval change. Right pleural effusion. A/P:  Principal Problem:    Hypernatremia  Active Problems:    Permanent atrial fibrillation (HCC)    Essential hypertension    Chronic obstructive pulmonary disease (HCC)    Frequent falls    AMS (altered mental status)    Head injury, initial encounter    Acute encephalopathy  Resolved Problems:    * No resolved hospital problems.  *        Acute Encephalopathy   -Patient mental status is waxing and waning. This is appear to be a chronic problem for her. She is able to occasionally have very minimal conversations and follow commands. Other times she has poor mentation, becomes lethargic or agitated.  Likely this could be secondary to age-related dementia changes with waxing and waning of this. - Initial imaging showed possible right lobe pneumonia vs pleural effusion  -Ct head no acute ischemia or bleeding  - likely secondary to  metabolic, hepatic, septic, drug induced, worsening underlying dementia   -Urine culture grew E. coli .   Completed total 7 days of Rocephin  -  - neuro consulted, recommneds to EEG if worsening   - Stop ativan, as per pulmonary increase Depakote to 500 twice daily and started on Lexapro 10 Mg daily    Hypoxic respiratory failure 2/2 Pneumonia (Viral vs bacterial) VS  Pleural EFFUSION   - Likely covid induced with secondary bacterial  - Repeat chest x-ray showed to moderate bilateral pleural effusion, no worsening  -Repeat Rapid Covid is negative  - Mild leucocytosis on admission, resolved  -No plan for thoracocentesis at this time  - Continue oxygen supplement and wean as tolerated  -Pulmonary is following,   -CT angio no PE  - will resume lasix 20 mg daily    A fib with RVR  - On last admission Coumadin was discontinued due to high for bleeding despite of high CHADVAS score after discussion with patient daughter  - Will continue to hold Coumadin  -Heart rate improving  -Cardio consulted on admission and increase metoprolol to 100 mg daily on 4/23/2022  -Will hold on iv medication because of  - Last Echo in 2018, >55% EF, Interdeterminate diastolic dysfunction  consider repeating during this admission   - Keep mag/k/phos >2/4     Multiple fall  - Wound on left forehead  - Wound care is following patient  - No need suture  - Fall precaution  - PT/OT eval      Hypothyroidism  - Recent TSH normal  - Continue current regimen      Hypertension  -Increase metoprolol to 100mg daily        DVT / GI prophylaxis: Lovenox 40 mg SC     Dispo  CODE STATUS comfort care. Waiting for placement.  on board. Consulted psychiatry as per  requested for medication evaluation for undergoing agitation associated with dementia prior to discharge to facility. As per psych no further recommendation on medication.      Electronically signed by Vandana Saunders MD PGY-3 on 4/29/2022 at 8:58 AM     This case was discussed with attending physician: Dr. Shankar Hollow

## 2022-04-29 NOTE — PROGRESS NOTES
Department of Internal Medicine  Nephrology Attending Progress Note        SUBJECTIVE:  Mrs Brian Hall resting in bed p.o. intake remains poor. Verbalizing but not answering questions well.     PMH  History of chronic A. fib on Coumadin  Coronary disease  History of heart failure with preserved ejection fraction  RVSP mild 48 mm Hg  Hypertension  History of osteoarthritis status post bilateral knee replacement, total left shoulder  History of urinary incontinence with history of bladder suspension   hyperparathyroid disease status post parathyroidectomy  History of basal cell carcinoma on the neck,   Thyroidectomy for multinodular goiter on replacement therapy 1960  History of renal lithiasis  Cholecystectomy 1972  Bilateral cataract extractions with lens implant  COVID-19 infection  Dementia  COPD      Physical Exam:    Vitals:    04/29/22 0834   BP: 139/88   Pulse: 105   Resp: 18   Temp: 98.8 °F (37.1 °C)   SpO2: 97%       I/O last 24 hours:  Intake/Output inaccurate    Weight: Not weighed    General Appearance: Patient not oriented answering to some questioning   Skin: Stasis dermatitis changes lower extremity left upper skull lesion has improved  Neck:  neck- supple, no mass, non-tender and no bruits  Lungs: Distant breath sounds no wheezing or rhonchi  Heart: Irregular rhythm rate controlled     Abdominal: Soft nontender, patient has wick device in place  Extremities: Bilateral knee surgical scars right knee seems slightly swollen  Peripheral Pulses:  +2    DATA:    CBC with Differential:    Lab Results   Component Value Date    WBC 5.6 04/26/2022    RBC 4.56 04/26/2022    HGB 13.1 04/26/2022    HCT 45.2 04/26/2022     04/26/2022    MCV 99.1 04/26/2022    MCH 28.7 04/26/2022    MCHC 29.0 04/26/2022    RDW 14.8 04/26/2022    NRBC 0.9 12/29/2016    SEGSPCT 54 03/17/2014    METASPCT 0.9 01/05/2022    LYMPHOPCT 17.5 04/19/2022    PROMYELOPCT 0.9 12/31/2016    MONOPCT 11.2 04/19/2022    MYELOPCT 0.9 01/06/2022 BASOPCT 1.7 04/19/2022    MONOSABS 0.47 04/19/2022    LYMPHSABS 0.73 04/19/2022    EOSABS 0.29 04/19/2022    BASOSABS 0.07 04/19/2022     CMP:    Lab Results   Component Value Date     04/28/2022    K 4.5 04/28/2022    K 3.6 04/19/2022     04/28/2022    CO2 37 04/28/2022    BUN 14 04/28/2022    CREATININE 0.7 04/28/2022    GFRAA >60 04/28/2022    LABGLOM >60 04/28/2022    GLUCOSE 98 04/28/2022    GLUCOSE 113 02/14/2012    PROT 5.8 04/22/2022    LABALBU 3.1 04/22/2022    LABALBU 4.4 02/14/2012    CALCIUM 9.4 04/28/2022    BILITOT 0.7 04/22/2022    ALKPHOS 86 04/22/2022    AST 26 04/22/2022    ALT 15 04/22/2022     Magnesium:    Lab Results   Component Value Date    MG 2.2 04/25/2022     Phosphorus:    Lab Results   Component Value Date    PHOS 2.7 04/26/2022     U/A:    Lab Results   Component Value Date    NITRITE negative 07/05/2019    COLORU Yellow 04/13/2022    PROTEINU Negative 04/13/2022    PHUR 6.0 04/13/2022    LABCAST RARE 09/16/2019    WBCUA 1-3 04/13/2022    RBCUA 2-5 04/13/2022    RBCUA 1-3 03/15/2014    BACTERIA NONE SEEN 04/13/2022    CLARITYU Clear 04/13/2022    SPECGRAV 1.020 04/13/2022    LEUKOCYTESUR TRACE 04/13/2022    UROBILINOGEN 1.0 04/13/2022    BILIRUBINUR Negative 04/13/2022    BILIRUBINUR negative 07/05/2019    BLOODU SMALL 04/13/2022    GLUCOSEU Negative 04/13/2022    AMORPHOUS FEW 09/20/2019     ABG:    Lab Results   Component Value Date    PH 7.416 04/22/2022    PCO2 47.0 04/22/2022    PO2 81.8 04/22/2022    HCO3 29.5 04/22/2022    BE 4.3 04/22/2022    O2SAT 95.9 04/22/2022     Urine osmolarity 522  Urine sodium 142      Presence of bilateral renal calculi nonobstructing       divalproex  500 mg Oral 2 times per day    escitalopram  10 mg Oral Daily    metoprolol succinate  100 mg Oral Daily    budesonide  0.5 mg Nebulization BID    metoprolol  5 mg IntraVENous Q6H    furosemide  20 mg Oral Daily    LORazepam  0.5 mg IntraMUSCular Once    lidocaine  5 mL IntraDERmal Once    heparin flush  1 mL IntraVENous 2 times per day    levalbuterol  1 ampule Nebulization BID    levothyroxine  175 mcg Oral Daily      sodium chloride      dextrose       OLANZapine, acetaminophen, sodium chloride flush, sodium chloride, heparin flush, glucose, dextrose, glucagon (rDNA), dextrose, sodium chloride flush, ondansetron **OR** ondansetron, polyethylene glycol    IMPRESSION/RECOMMENDATIONS:    Hypernatremia , sodium worsening reflecting decreased p.o. intake but wts are slowly increasing will try some HCTZ  Hypokalemia replete   History of heart failure with preserved ejection fraction monitor weights as we hold diuretics   COVID infection s/p Decadron  Hypertension controlled  A. fib with controlled rate      Katerin Tsai MD  4/29/2022 9:22 AM

## 2022-04-30 NOTE — PROGRESS NOTES
[address] [facility]  Family Medicine Attending      S: patient is sleeping on arrival.  She did not sleep well at all last night. Was very agitated overnight. Sitter at bedside. Patient appears comfortable. O: VS- Blood pressure (!) 134/94, pulse 116, temperature 98.1 °F (36.7 °C), temperature source Temporal, resp. rate 20, height 5' 6\" (1.676 m), weight 251 lb 2 oz (113.9 kg), SpO2 98 %,   Gen:sleeping; resting comfortably. CV irreg irreg, tachy   Resp decreased, coarse, unlabored distant 2/2 habitus. Abd soft nt nd   Ext - trace lower extremity edema. Impressions:   Principal Problem:    Hypernatremia  Active Problems:    Permanent atrial fibrillation (HCC)    Essential hypertension    Chronic obstructive pulmonary disease (HCC)    Frequent falls    AMS (altered mental status)    Head injury, initial encounter    Acute encephalopathy        Plan:   DNR CC, Discharge pending placement. Agitation - increased depakote, Lexapro. Seems to have responded well. Psychiatry consulted to assist w med management for safety. No further recs per their service given good response to depakote/lexapro. Pleural effusion, hypercapnia, covid - BTx prn for now. Continue NC O2 prn. Coninue bipap qHS as tolerated. Attending Physician Statement  I have reviewed the chart and seen the patient with the resident(s). I personally reviewed images, EKG's and similar tests, if present. I personally reviewed and performed key elements of the history and exam.  I have reviewed and confirmed student and/or resident history and exam with changes as indicated above. I agree with the assessment, plan and orders as documented by the resident. Please refer to the resident and/or student note for additional information.       Jacquelyn Beck MD

## 2022-04-30 NOTE — PROGRESS NOTES
Date: 4/29/2022    Time: 10:33 PM    Patient Placed On BIPAP/CPAP/ Non-Invasive Ventilation? Yes    If no must comment. Facial area red/color change? No           If YES are Blister/Lesion present? If yes must notify nursing staff  BIPAP/CPAP skin barrier?  Yes   Skin barrier type: Mepilex       Comments:        Vidhya Oquendo RCP

## 2022-04-30 NOTE — PROGRESS NOTES
for: PHART, PO2ART, FVU7ONP  INR: No results for input(s): INR in the last 72 hours. -----------------------------------------------------------------  RAD: CT Head WO Contrast    Result Date: 4/13/2022  EXAMINATION: CT OF THE HEAD WITHOUT CONTRAST  4/13/2022 1:39 pm TECHNIQUE: CT of the head was performed without the administration of intravenous contrast. Dose modulation, iterative reconstruction, and/or weight based adjustment of the mA/kV was utilized to reduce the radiation dose to as low as reasonably achievable. COMPARISON: March 26, 2022 HISTORY: ORDERING SYSTEM PROVIDED HISTORY: falls TECHNOLOGIST PROVIDED HISTORY: Reason for exam:->falls Has a \"code stroke\" or \"stroke alert\" been called? ->No Decision Support Exception - unselect if not a suspected or confirmed emergency medical condition->Emergency Medical Condition (MA) What reading provider will be dictating this exam?->CRC FINDINGS: BRAIN/VENTRICLES: There are age related cortical atrophy and periventricular white matter ischemic changes. There is no acute intracranial hemorrhage, mass effect or midline shift. No abnormal extra-axial fluid collection. The gray-white differentiation is maintained without evidence of an acute infarct. There is no evidence of hydrocephalus. ORBITS: The visualized portion of the orbits demonstrate no acute abnormality. SINUSES: There is mucosal thickening in the right frontal sinus. Opacification of the right mastoid air cells. SOFT TISSUES/SKULL:  No acute abnormality of the visualized skull. There is left frontal scalp hematoma. No acute intracranial abnormality or hemorrhage. Cortical atrophy and periventricular leukomalacia. Left frontal scalp hematoma. Right frontal sinusitis and right mastoiditis.      CT Cervical Spine WO Contrast    Result Date: 4/13/2022  EXAMINATION: CT OF THE CERVICAL SPINE WITHOUT CONTRAST 4/13/2022 1:39 pm TECHNIQUE: CT of the cervical spine was performed without the administration of intravenous contrast. Multiplanar reformatted images are provided for review. Dose modulation, iterative reconstruction, and/or weight based adjustment of the mA/kV was utilized to reduce the radiation dose to as low as reasonably achievable. COMPARISON: March 26, 2022 HISTORY: ORDERING SYSTEM PROVIDED HISTORY: falls TECHNOLOGIST PROVIDED HISTORY: Reason for exam:->falls Decision Support Exception - unselect if not a suspected or confirmed emergency medical condition->Emergency Medical Condition (MA) What reading provider will be dictating this exam?->CRC FINDINGS: BONES/ALIGNMENT: The bones are demineralized. There is no acute fracture or traumatic malalignment. DEGENERATIVE CHANGES: There are multilevel degenerative changes and bilateral facet arthrosis. SOFT TISSUES: There is no prevertebral soft tissue swelling. There is right pleural effusion. No acute abnormality of the cervical spine. Multilevel degenerative changes, no significant interval change. Right pleural effusion. XR CHEST PORTABLE    Result Date: 4/14/2022  EXAMINATION: ONE XRAY VIEW OF THE CHEST 4/14/2022 9:06 am COMPARISON: 04/13/2022 HISTORY: ORDERING SYSTEM PROVIDED HISTORY: pneumonia TECHNOLOGIST PROVIDED HISTORY: Reason for exam:->pneumonia What reading provider will be dictating this exam?->CRC FINDINGS: Mild cardiomegaly is noted with slight pulmonary venous hypertension. There appear to be small pleural effusions greater on the right with basilar atelectasis or pneumonia. Findings are similar to the prior examination. Osseous structures are stable. No evidence of pneumothorax. Cardiomegaly, pulmonary venous hypertension and small effusions. Findings suggest volume overload. Mild right basilar atelectasis.      XR CHEST PORTABLE    Result Date: 4/13/2022  EXAMINATION: ONE XRAY VIEW OF THE CHEST 4/13/2022 2:45 pm COMPARISON: March 26, 2022 HISTORY: ORDERING SYSTEM PROVIDED HISTORY: altered TECHNOLOGIST PROVIDED HISTORY: Reason for exam:->altered What reading provider will be dictating this exam?->CRC FINDINGS: Mild cardiomegaly. Interstitial and hazy opacities in right lung base silhouette right hemidiaphragm. There is prominence of pulmonary vasculature. No pneumothorax. New opacities in right lung base suggestive of pneumonia, atelectasis, and possible right pleural effusion. CTA PULMONARY W CONTRAST    Result Date: 4/13/2022  EXAMINATION: CTA OF THE CHEST 4/13/2022 9:53 pm TECHNIQUE: CTA of the chest was performed after the administration of intravenous contrast.  Multiplanar reformatted images are provided for review. MIP images are provided for review. Dose modulation, iterative reconstruction, and/or weight based adjustment of the mA/kV was utilized to reduce the radiation dose to as low as reasonably achievable. COMPARISON: None. HISTORY: ORDERING SYSTEM PROVIDED HISTORY: elevated D dimer TECHNOLOGIST PROVIDED HISTORY: Reason for exam:->elevated D dimer What reading provider will be dictating this exam?->CRC FINDINGS: Pulmonary Arteries: Pulmonary arteries are adequately opacified for evaluation. No evidence of intraluminal filling defect to suggest pulmonary embolism. Main pulmonary artery is normal in caliber. Mediastinum: No evidence of mediastinal lymphadenopathy. The heart and pericardium demonstrate no acute abnormality. There is no acute abnormality of the thoracic aorta. Lungs/pleura: Small left and moderate size right pleural effusions. A moderate size area of consolidation is noted involving the right lower lung, concerning for an underlying airspace disease process. Upper Abdomen: Nonobstructive calculi are noted in the bilateral renal pelves, the largest on the left measures 12 mm and the largest on the right measures 5 mm. Soft Tissues/Bones: No acute bone or soft tissue abnormality. No evidence of pulmonary embolism. Small left and moderate size right pleural effusions.  A moderate size area of consolidation is noted involving the right lower lung, concerning for an underlying airspace disease process. RECOMMENDATIONS: Unavailable       Objective:   Vitals: /68   Pulse 130   Temp 98.1 °F (36.7 °C) (Temporal)   Resp 20   Ht 5' 6\" (1.676 m)   Wt 251 lb 2 oz (113.9 kg)   SpO2 98%   BMI 40.53 kg/m²   General appearance: appears stated age   Skin:  No rashes or lesions  HEENT: Head: Normocephalic, no lesions, without obvious abnormality.   Neck: no adenopathy, no carotid bruit, no JVD, supple, symmetrical, trachea midline and thyroid not enlarged, symmetric, no tenderness/mass/nodules  Lungs: clear to auscultation bilaterally  Heart: regular rate and rhythm, S1, S2 normal, no murmur, click, rub or gallop  Abdomen: soft, non-tender; bowel sounds normal; no masses,  no organomegaly  Extremities: extremities normal, atraumatic, no cyanosis or edema  Neurologic: Mental status: Alert, oriented, thought content appropriate    Assessment:   Patient Active Problem List:     Depression     Hypothyroidism     Permanent atrial fibrillation (HCC)     History of artificial lens replacement     Essential hypertension     Moderate obesity     Greater trochanteric bursitis     Chronic anticoagulation     Chronic obstructive pulmonary disease (HCC)     Mixed stress and urge urinary incontinence     Fuchs' endothelial dystrophy     Pseudophakia, both eyes     Chronic diastolic (congestive) heart failure ([address]     Pure hypercholesterolemia     Spinal stenosis of lumbar region with neurogenic claudication     Patent foramen ovale     Coronary arteriosclerosis in native artery     Frequent falls     Dementia (HCC)     Ambulatory dysfunction     Cognitive dysfunction     COVID-19 virus infection     Nephrolithiasis     AMS (altered mental status)     Laceration of fascia of head     Head injury, initial encounter     Hypernatremia     Acute encephalopathy    Plan:     IMPRESSION/RECOMMENDATIONS:    1) Hypernatremia resolved     2) Hypokalemia replete as needed      3) History of heart failure with preserved ejection fraction monitor weights as we hold diuretics     4) COVID infection s/p Decadron - improved     5)Hypertension controlled    6) A. fib with controlled rate    Labs look acceptable , continue present care     Elda Abbott MD

## 2022-04-30 NOTE — PROGRESS NOTES
[facility] - [facility] Inpatient   Resident Progress Note    S:  Hospital day: 16   Brief Synopsis: Nydia Jackson is a 81 y.o. female PMH of atrial fibrillation (on Coumadin ), coronary artery disease, CHF, GERD, hyperparathyroidism, hypertension, hypothyroidism, osteoarthritis and urinary incontinenc of who presents to ED for Abnormal Lab (pt new pt to [facility], came in for abnormal labs. pt with frequent falls and they state she has a change in mentation. ER labs showed H/H 11.4/38.0, sodium 152, potassium 3.9, chloride 108, BUN 23, creatinine 0.9, AST 40, alkaline phosphatase 127, INR 2, urine analysis leukocyte esterase with WBC 1-3, troponin 24 x25, normal lactic acid. EKG showed A. fib with RVR and chest x-ray new opacity in the right lung base. Unchanged from yesterday. Patient is easily arousable and alert. Patient is not on soft restraint. Sitter is present at room. Per sitter, she is more alert then few days ago. Did have restless night. Cont meds:    sodium chloride      dextrose       Scheduled meds: Patient is awake and alert.  hydroCHLOROthiazide  12.5 mg Oral Daily    divalproex  500 mg Oral 2 times per day    escitalopram  10 mg Oral Daily    metoprolol succinate  100 mg Oral Daily    budesonide  0.5 mg Nebulization BID    metoprolol  5 mg IntraVENous Q6H    furosemide  20 mg Oral Daily    LORazepam  0.5 mg IntraMUSCular Once    lidocaine  5 mL IntraDERmal Once    heparin flush  1 mL IntraVENous 2 times per day    levalbuterol  1 ampule Nebulization BID    levothyroxine  175 mcg Oral Daily     PRN meds: OLANZapine, acetaminophen, sodium chloride flush, sodium chloride, heparin flush, glucose, dextrose, glucagon (rDNA), dextrose, sodium chloride flush, ondansetron **OR** ondansetron, polyethylene glycol     I reviewed the patient's past medical and surgical history, Medications and Allergies.     O:  BP (!) 142/100   Pulse 127   Temp 98 °F (36.7 °C) (Temporal) Resp 26   Ht 5' 6\" (1.676 m)   Wt 251 lb 2 oz (113.9 kg)   SpO2 93%   BMI 40.53 kg/m²   24 hour I&O: I/O last 3 completed shifts: In: 26 [P.O.:520]  Out: -   I/O this shift:  In: 61 [P.O.:60]  Out: -        General:  Alert, confused, not answering questions   neck: Supple, symmetrical, no JVD   Lung:  Clear to auscultation   heart: Irregular rate and irregular rhythm  Abdomen: SNTND, good bowel sound extremities:  Extremities normal, atraumatic, no cyanosis or edema. Distal pulses equal bilaterally  Skin: Skin color, texture, turgor normal, no rashes or lesions  Musculoskeletal: No joint swelling, no muscle tenderness   Neurologic:  Difficult to arouse, somnolent  Labs:  Na/K/Cl/CO2:  145/4.5/104/37 (04/28 1051)  BUN/Cr/glu/ALT/AST/amyl/lip:  14/0.7/--/--/--/--/-- (04/28 1051)     estimated creatinine clearance is 74 mL/min (based on SCr of 0.7 mg/dL). Other pertinent labs as noted below    Radiology:  XR CHEST PORTABLE   Final Result   No change in small to moderate bilateral pleural effusions. XR CHEST PORTABLE   Final Result   Bilateral pulmonary opacifications right greater than left with probable   small right and small to moderate left potential layering pleural effusions. US DUP UPPER EXTREMITY RIGHT VENOUS   Final Result   Within the visualized vessels there is no evidence for deep venous   thrombosis               XR CHEST PORTABLE   Final Result   Bibasilar atelectasis and or infiltrate as well as right pleural effusion. Mildly worsened aeration on the left. XR ABDOMEN FOR NG/OG/NE TUBE PLACEMENT   Final Result   Gastric catheter passes into the stomach, tip is in the gastric body. No   complications. XR CHEST PORTABLE   Final Result   1. Significant interval improvement in the left lung lower lung and lingular   consolidation   2. Increased right pleural effusion and or atelectasis   3. Cardiomegaly, stable   4.  The position of the nasogastric tube is within normal range         XR CHEST PORTABLE   Final Result   Increased markings seen in the lung bases bilaterally with small bilateral   pleural effusions not significantly changed in the interval.  No new findings. XR ABDOMEN (KUB) (SINGLE AP VIEW)   Final Result   No acute intra-process seen. Left renal 12 mm calculus. CT HEAD WO CONTRAST   Final Result   1. No acute intracranial abnormality. 2. Chronic small vessel ischemic disease. 3. Left frontal scalp hematoma. XR CHEST PORTABLE   Final Result   Cardiomegaly, pulmonary venous hypertension and small effusions. Findings   suggest volume overload. Mild right basilar atelectasis. CTA PULMONARY W CONTRAST   Final Result   No evidence of pulmonary embolism. Small left and moderate size right pleural effusions. A moderate size area of consolidation is noted involving the right lower   lung, concerning for an underlying airspace disease process. RECOMMENDATIONS:   Unavailable         XR CHEST PORTABLE   Final Result   New opacities in right lung base suggestive of pneumonia, atelectasis, and   possible right pleural effusion. CT Head WO Contrast   Final Result   No acute intracranial abnormality or hemorrhage. Cortical atrophy and periventricular leukomalacia. Left frontal scalp hematoma. Right frontal sinusitis and right mastoiditis. CT Cervical Spine WO Contrast   Final Result   No acute abnormality of the cervical spine. Multilevel degenerative changes, no significant interval change. Right pleural effusion. A/P:  Principal Problem:    Hypernatremia  Active Problems:    Permanent atrial fibrillation (HCC)    Essential hypertension    Chronic obstructive pulmonary disease (HCC)    Frequent falls    AMS (altered mental status)    Head injury, initial encounter    Acute encephalopathy  Resolved Problems:    * No resolved hospital problems.  *        Acute Encephalopathy   -Patient mental status is waxing and waning. This is appear to be a chronic problem for her. She is able to occasionally have very minimal conversations and follow commands. Other times she has poor mentation, becomes lethargic or agitated.  Likely this could be secondary to age-related dementia changes with waxing and waning of this. - Initial imaging showed possible right lobe pneumonia vs pleural effusion  -Ct head no acute ischemia or bleeding  - likely secondary to  metabolic, hepatic, septic, drug induced, worsening underlying dementia   -Urine culture grew E. coli .   Completed total 7 days of Rocephin  - neuro consulted, recommneds to EEG if worsening   - Stop ativan, as per pulmonary increase Depakote to 500 twice daily and started on Lexapro 10 Mg daily    Hypoxic respiratory failure 2/2 Pneumonia (Viral vs bacterial) VS  Pleural EFFUSION   - Likely covid induced with secondary bacterial  - Repeat chest x-ray showed to moderate bilateral pleural effusion, no worsening  -Repeat Rapid Covid is negative  - Mild leucocytosis on admission, resolved  -No plan for thoracocentesis at this time  - Continue oxygen supplement and wean as tolerated  -Pulmonary is following,   -CT angio no PE  - will resume lasix 20 mg daily    A fib with RVR  - On last admission Coumadin was discontinued due to high for bleeding despite of high CHADVAS score after discussion with patient daughter  - Will continue to hold Coumadin  -Heart rate improving  -Cardio consulted on admission and increase metoprolol to 100 mg daily on 4/23/2022  -Will hold on iv medication because of  - Last Echo in 2018, >55% EF, Interdeterminate diastolic dysfunction  consider repeating during this admission   - Keep mag/k/phos >2/4     Multiple fall  - Wound on left forehead  - Wound care is following patient  - No need suture  - Fall precaution  - PT/OT eval      Hypothyroidism  - Recent TSH normal  - Continue current regimen    Hypertension  -Increase metoprolol to 100mg daily        DVT / GI prophylaxis: Lovenox 40 mg SC     Dispo  CODE STATUS comfort care. Waiting for placement.  on board. Awaiting further clarification with placement regarding insurance.       Electronically signed by Alexis Lopez MD PGY-3 on 4/30/2022 at 6:42 AM     This case was discussed with attending physician: Dr. Sanjana Mccann

## 2022-05-01 NOTE — PROGRESS NOTES
Physical Therapy  Treatment Note    Name: Abad Teresa  : 1935  MRN: [id number]      Date of Service: 2022    Evaluating PT:  Hany Apple, PT [id number]      Room #:  8180/1930-H  Diagnosis:  Hypernatremia [E87.0]  Acute encephalopathy [G93.40]  Altered mental status, unspecified altered mental status type [R41.82]  PMHx/PSHx:     has a past medical history of Anticoagulated on Coumadin, Atrial fibrillation ([address] Basal cell carcinoma of neck, CAD (coronary artery disease), CHF (congestive heart failure) ([address] Depression, DJD (degenerative joint disease), GERD (gastroesophageal reflux disease), History of cardiovascular stress test, Hyperparathyroidism ([address] Hypertension, Hypothyroidism, Mild mitral regurgitation, Osteoarthritis, Pulmonary hypertension ([address] Renal calculi, Tricuspid regurgitation, and Urinary incontinence. has a past surgical history that includes Thyroidectomy (); Kidney stone surgery (); Cholecystectomy (); Kidney stone surgery (); Cataract removal (2005); Incontinence surgery (2005); Thyroid surgery (2005); parathyroidectomy; Knee Arthroplasty (Bilateral, 2005); Total shoulder arthroplasty (Left, ); fracture surgery (Left); Carpal tunnel release; Finger surgery (2011); Total shoulder arthroplasty (Left); Inner ear surgery (); Cataract removal with implant (Bilateral); Colonoscopy; and Cardiac catheterization (3/22/14). Procedure/Surgery:  none  Precautions:  Falls, bed alarm , FWB (full weight bearing),  O2,   Equipment Needs: Equipment at [facility]    SUBJECTIVE:    Patient admitted from SNF. PLOF to this admission unknown. Patient unable to provide history. Equipment owned: Equipment at [facility],      OBJECTIVE:   Initial Evaluation  Date: 22 Treatment  22 Short Term/ Long Term   Goals   AM-PAC 6 Clicks 33/98 41/17    Was pt agreeable to Eval/treatment? yes Yes    Does pt have pain? None indicated.   No current complaints of pain    Bed Mobility  Rolling: Mod    Supine to sit:   Max    Sit to supine: Max    Scooting: Mod   Rolling: mod A to L  Supine to sit: mod Ax2  Sit to supine: mod Ax2   Scooting: mod A Rolling: Min  Supine to sit: Min  Sit to supine: Min  Scooting: Min   Transfers Sit to stand: Max    Stand to sit: Max   Stand pivot: NT unsafe this date. Sit to stand: mod Ax2   Stand to sit: mod Ax2  Stand pivot: NT Sit to stand: Mod   Stand to sit: Mod    Stand pivot: Max     Ambulation    NT 2' with ww mod Ax2  (side steps to [facility]) TBD   Stair negotiation: ascended and descended  NT NT    ROM BUE:  wfl   BLE:  wfl NT    Strength BUE: 3+/5  RLE:  Grossly 3+/5  LLE:  Grossly  3+/5 NT 4-/5   Balance Sitting EOB: Mod constant contact. Dynamic Standing: Max A Sitting EOB: SBA  Dynamic Standing: mod Ax2 with ww Sitting EOB:  SBA  Dynamic Standing:  Max     Pt is A & O x 1, difficulty assessing 2/2 confusion  Sensation: Pt denies numbness and tingling of extremities. Edema: Unremarkable. Patient education  Pt educated on safety with functional mobility    Patient response to education:   Pt verbalized understanding Pt demonstrated skill Pt requires further education in this area   yes partial yes      ASSESSMENT:    Comments:    Pt supine in bed upon entering, pt agreeable to participate. Pt with sitter present. Pt lethargic this date, requiring increased cueing and instruction to maintain pt safety. Pt assisted with hygiene prior to sitting upright. Pt cued for log rolling, turning better to L than R. Pt then instructed to sit up on EOB, assistance provided with trunk and BLE. Pt sitting upright with flexed trunk, fair sitting balance, no c/o dizziness with position change. Pt cued for hand placement and instructed to stand from EOB. Pt standing with increased assistance, cueing provided for posture and hand placement on ww.  Pt instructed to side step to [facility], completing steps with increased cues for sequencing. Pt was then transferred back to supine and positioned for comfort, all needs met and sitter at bedside prior to exiting. Treatment:  Patient practiced and was instructed in the following treatment:    · Bed mobility training - pt given verbal and tactile cues to facilitate proper sequencing and safety during rolling and supine<>sit as well as provided with physical assistance to complete task   · STS and pivot transfer training - pt educated on proper hand and foot placement, safety and sequencing, and use of verbal and tactile cues to safely complete sit<>stand and pivot transfers with physical assistance to complete task safely   · Skilled positioning - Pt placed in the chair position with pillows utilized to facilitate upright posture, joint and skin integrity, and interaction with environment. PLAN:    Patient is making fair progress towards established goals. Will continue with current POC.       Time in: 1130  Time out: 1153    Total Treatment Time 23 minutes     CPT codes:  [] Gait training 67038 0 minutes  [] Manual therapy 60119 0 minutes  [x] Therapeutic activities 79274 23 minutes  [] Therapeutic exercises 24581 0 minutes  [] Neuromuscular reeducation 18681 0 minutes      Aurelia Box PT, DPT  [id number]

## 2022-05-01 NOTE — PROGRESS NOTES
Date: 4/30/2022    Time: 10:47 PM    Patient Placed On BIPAP/CPAP/ Non-Invasive Ventilation? Yes    If no must comment. Facial area red/color change? No           If YES are Blister/Lesion present? No   If yes must notify nursing staff  BIPAP/CPAP skin barrier? Yes    Skin barrier type:mepilexlite       Comments:patient has wound on forehead above left eye. Gauze placed over wound . Straps to mask placed loosely over gauze.         David Santacruz RCP

## 2022-05-01 NOTE — PROGRESS NOTES
Progress Note  5/1/2022 11:05 AM  Subjective:   Admit Date: 4/13/2022  PCP: No primary care provider on file. Interval History: patient examined doing well po intake better     Diet: ADULT DIET; Dysphagia - Soft and Bite Sized; Low Sodium (2 gm)  ADULT ORAL NUTRITION SUPPLEMENT; Lunch, Dinner; Standard High Calorie/High Protein Oral Supplement    Data:   Scheduled Meds:   hydroCHLOROthiazide  12.5 mg Oral Daily    divalproex  500 mg Oral 2 times per day    escitalopram  10 mg Oral Daily    metoprolol succinate  100 mg Oral Daily    budesonide  0.5 mg Nebulization BID    metoprolol  5 mg IntraVENous Q6H    furosemide  20 mg Oral Daily    LORazepam  0.5 mg IntraMUSCular Once    lidocaine  5 mL IntraDERmal Once    heparin flush  1 mL IntraVENous 2 times per day    levalbuterol  1 ampule Nebulization BID    levothyroxine  175 mcg Oral Daily     Continuous Infusions:   sodium chloride      dextrose       PRN Meds:metoprolol tartrate, OLANZapine, acetaminophen, sodium chloride flush, sodium chloride, heparin flush, glucose, dextrose, glucagon (rDNA), dextrose, sodium chloride flush, ondansetron **OR** ondansetron, polyethylene glycol  I/O last 3 completed shifts: In: 61 [P.O.:60]  Out: -   I/O this shift:  In: 120 [P.O.:120]  Out: -     Intake/Output Summary (Last 24 hours) at 5/1/2022 1105  Last data filed at 5/1/2022 0925  Gross per 24 hour   Intake 120 ml   Output    Net 120 ml     CBC: No results for input(s): WBC, HGB, PLT in the last 72 hours. BMP:  No results for input(s): NA, K, CL, CO2, BUN, CREATININE, GLUCOSE in the last 72 hours. Hepatic: No results for input(s): AST, ALT, ALB, BILITOT, ALKPHOS in the last 72 hours. Troponin: No results for input(s): TROPONINI in the last 72 hours. BNP: No results for input(s): BNP in the last 72 hours. Lipids: No results for input(s): CHOL, HDL in the last 72 hours.     Invalid input(s): LDLCALCU  ABGs: No results found for: PHART, PO2ART, YAG6HAF  INR: No results for input(s): INR in the last 72 hours. -----------------------------------------------------------------  RAD: CT Head WO Contrast    Result Date: 4/13/2022  EXAMINATION: CT OF THE HEAD WITHOUT CONTRAST  4/13/2022 1:39 pm TECHNIQUE: CT of the head was performed without the administration of intravenous contrast. Dose modulation, iterative reconstruction, and/or weight based adjustment of the mA/kV was utilized to reduce the radiation dose to as low as reasonably achievable. COMPARISON: March 26, 2022 HISTORY: ORDERING SYSTEM PROVIDED HISTORY: falls TECHNOLOGIST PROVIDED HISTORY: Reason for exam:->falls Has a \"code stroke\" or \"stroke alert\" been called? ->No Decision Support Exception - unselect if not a suspected or confirmed emergency medical condition->Emergency Medical Condition (MA) What reading provider will be dictating this exam?->CRC FINDINGS: BRAIN/VENTRICLES: There are age related cortical atrophy and periventricular white matter ischemic changes. There is no acute intracranial hemorrhage, mass effect or midline shift. No abnormal extra-axial fluid collection. The gray-white differentiation is maintained without evidence of an acute infarct. There is no evidence of hydrocephalus. ORBITS: The visualized portion of the orbits demonstrate no acute abnormality. SINUSES: There is mucosal thickening in the right frontal sinus. Opacification of the right mastoid air cells. SOFT TISSUES/SKULL:  No acute abnormality of the visualized skull. There is left frontal scalp hematoma. No acute intracranial abnormality or hemorrhage. Cortical atrophy and periventricular leukomalacia. Left frontal scalp hematoma. Right frontal sinusitis and right mastoiditis.      CT Cervical Spine WO Contrast    Result Date: 4/13/2022  EXAMINATION: CT OF THE CERVICAL SPINE WITHOUT CONTRAST 4/13/2022 1:39 pm TECHNIQUE: CT of the cervical spine was performed without the administration of intravenous contrast. Multiplanar reformatted images are provided for review. Dose modulation, iterative reconstruction, and/or weight based adjustment of the mA/kV was utilized to reduce the radiation dose to as low as reasonably achievable. COMPARISON: March 26, 2022 HISTORY: ORDERING SYSTEM PROVIDED HISTORY: falls TECHNOLOGIST PROVIDED HISTORY: Reason for exam:->falls Decision Support Exception - unselect if not a suspected or confirmed emergency medical condition->Emergency Medical Condition (MA) What reading provider will be dictating this exam?->CRC FINDINGS: BONES/ALIGNMENT: The bones are demineralized. There is no acute fracture or traumatic malalignment. DEGENERATIVE CHANGES: There are multilevel degenerative changes and bilateral facet arthrosis. SOFT TISSUES: There is no prevertebral soft tissue swelling. There is right pleural effusion. No acute abnormality of the cervical spine. Multilevel degenerative changes, no significant interval change. Right pleural effusion. XR CHEST PORTABLE    Result Date: 4/14/2022  EXAMINATION: ONE XRAY VIEW OF THE CHEST 4/14/2022 9:06 am COMPARISON: 04/13/2022 HISTORY: ORDERING SYSTEM PROVIDED HISTORY: pneumonia TECHNOLOGIST PROVIDED HISTORY: Reason for exam:->pneumonia What reading provider will be dictating this exam?->CRC FINDINGS: Mild cardiomegaly is noted with slight pulmonary venous hypertension. There appear to be small pleural effusions greater on the right with basilar atelectasis or pneumonia. Findings are similar to the prior examination. Osseous structures are stable. No evidence of pneumothorax. Cardiomegaly, pulmonary venous hypertension and small effusions. Findings suggest volume overload. Mild right basilar atelectasis.      XR CHEST PORTABLE    Result Date: 4/13/2022  EXAMINATION: ONE XRAY VIEW OF THE CHEST 4/13/2022 2:45 pm COMPARISON: March 26, 2022 HISTORY: ORDERING SYSTEM PROVIDED HISTORY: altered TECHNOLOGIST PROVIDED HISTORY: Reason for exam:->altered What reading provider will be dictating this exam?->CRC FINDINGS: Mild cardiomegaly. Interstitial and hazy opacities in right lung base silhouette right hemidiaphragm. There is prominence of pulmonary vasculature. No pneumothorax. New opacities in right lung base suggestive of pneumonia, atelectasis, and possible right pleural effusion. CTA PULMONARY W CONTRAST    Result Date: 4/13/2022  EXAMINATION: CTA OF THE CHEST 4/13/2022 9:53 pm TECHNIQUE: CTA of the chest was performed after the administration of intravenous contrast.  Multiplanar reformatted images are provided for review. MIP images are provided for review. Dose modulation, iterative reconstruction, and/or weight based adjustment of the mA/kV was utilized to reduce the radiation dose to as low as reasonably achievable. COMPARISON: None. HISTORY: ORDERING SYSTEM PROVIDED HISTORY: elevated D dimer TECHNOLOGIST PROVIDED HISTORY: Reason for exam:->elevated D dimer What reading provider will be dictating this exam?->CRC FINDINGS: Pulmonary Arteries: Pulmonary arteries are adequately opacified for evaluation. No evidence of intraluminal filling defect to suggest pulmonary embolism. Main pulmonary artery is normal in caliber. Mediastinum: No evidence of mediastinal lymphadenopathy. The heart and pericardium demonstrate no acute abnormality. There is no acute abnormality of the thoracic aorta. Lungs/pleura: Small left and moderate size right pleural effusions. A moderate size area of consolidation is noted involving the right lower lung, concerning for an underlying airspace disease process. Upper Abdomen: Nonobstructive calculi are noted in the bilateral renal pelves, the largest on the left measures 12 mm and the largest on the right measures 5 mm. Soft Tissues/Bones: No acute bone or soft tissue abnormality. No evidence of pulmonary embolism. Small left and moderate size right pleural effusions.  A moderate size area of consolidation is noted involving the right lower lung, concerning for an underlying airspace disease process. RECOMMENDATIONS: Unavailable       Objective:   Vitals: BP (!) 147/86   Pulse 138   Temp 98.3 °F (36.8 °C) (Temporal)   Resp 18   Ht 5' 6\" (1.676 m)   Wt 249 lb 14.4 oz (113.4 kg)   SpO2 95%   BMI 40.33 kg/m²   General appearance: appears stated age   Skin:  No rashes or lesions  HEENT: Head: Normocephalic, no lesions, without obvious abnormality.   Neck: no adenopathy, no carotid bruit, no JVD, supple, symmetrical, trachea midline and thyroid not enlarged, symmetric, no tenderness/mass/nodules  Lungs: clear to auscultation bilaterally  Heart: regular rate and rhythm, S1, S2 normal, no murmur, click, rub or gallop  Abdomen: soft, non-tender; bowel sounds normal; no masses,  no organomegaly  Extremities: extremities normal, atraumatic, no cyanosis or edema  Neurologic: Mental status: Alert, oriented, thought content appropriate    Assessment:   Patient Active Problem List:     Depression     Hypothyroidism     Permanent atrial fibrillation (HCC)     History of artificial lens replacement     Essential hypertension     Moderate obesity     Greater trochanteric bursitis     Chronic anticoagulation     Chronic obstructive pulmonary disease (HCC)     Mixed stress and urge urinary incontinence     Fuchs' endothelial dystrophy     Pseudophakia, both eyes     Chronic diastolic (congestive) heart failure ([address]     Pure hypercholesterolemia     Spinal stenosis of lumbar region with neurogenic claudication     Patent foramen ovale     Coronary arteriosclerosis in native artery     Frequent falls     Dementia (HCC)     Ambulatory dysfunction     Cognitive dysfunction     COVID-19 virus infection     Nephrolithiasis     AMS (altered mental status)     Laceration of fascia of head     Head injury, initial encounter     Hypernatremia     Acute encephalopathy    Plan:     IMPRESSION/RECOMMENDATIONS:    1) Hypernatremia resolved      2) Hypokalemia replete as needed      3) History of heart failure with preserved ejection fraction - stable      4) COVID infection s/p Decadron - improved      5)Hypertension controlled     6) A. fib with controlled rate     Labs look acceptable , continue present care ,will stop following ,  will see on prn basis     Mara Medina MD

## 2022-05-01 NOTE — PROGRESS NOTES
[facility] Inpatient   Resident Progress Note    S:  Hospital day: 25   Brief Synopsis: Torie Yi is a 81 y.o. female PMH of atrial fibrillation (on Coumadin ), coronary artery disease, CHF, GERD, hyperparathyroidism, hypertension, hypothyroidism, osteoarthritis and urinary incontinenc of who presents to ED for Abnormal Lab (pt [facility] pt [facility], came in for abnormal labs. pt with frequent falls and they state she has a change in mentation. ER labs showed H/H 11.4/38.0, sodium 152, potassium 3.9, chloride 108, BUN 23, creatinine 0.9, AST 40, alkaline phosphatase 127, INR 2, urine analysis leukocyte esterase with WBC 1-3, troponin 24 x25, normal lactic acid. EKG showed A. fib with RVR and chest x-ray new opacity in the right lung base. Tachycardic. Exhibiting increased work of breathing. Receiving breathing treatment. Cont meds:    sodium chloride      dextrose       Scheduled meds: Patient is awake and alert.  hydroCHLOROthiazide  12.5 mg Oral Daily    divalproex  500 mg Oral 2 times per day    escitalopram  10 mg Oral Daily    metoprolol succinate  100 mg Oral Daily    budesonide  0.5 mg Nebulization BID    metoprolol  5 mg IntraVENous Q6H    furosemide  20 mg Oral Daily    LORazepam  0.5 mg IntraMUSCular Once    lidocaine  5 mL IntraDERmal Once    heparin flush  1 mL IntraVENous 2 times per day    levalbuterol  1 ampule Nebulization BID    levothyroxine  175 mcg Oral Daily     PRN meds: metoprolol tartrate, OLANZapine, acetaminophen, sodium chloride flush, sodium chloride, heparin flush, glucose, dextrose, glucagon (rDNA), dextrose, sodium chloride flush, ondansetron **OR** ondansetron, polyethylene glycol     I reviewed the patient's past medical and surgical history, Medications and Allergies.     O:  BP (!) 147/96   Pulse 115   Temp 97.3 °F (36.3 °C) (Oral)   Resp 21   Ht 5' 6\" (1.676 m)   Wt 249 lb 14.4 oz (113.4 kg)   SpO2 97%   BMI 40.33 kg/m²   24 hour I&O: I/O last 3 completed shifts: In: 240 [P.O.:240]  Out: -   No intake/output data recorded. General:  Alert, confused, not answering questions   neck: Supple, symmetrical, no JVD   Lung:  Clear to auscultation   heart: Irregular rate and irregular rhythm  Abdomen: SNTND, good bowel sound extremities:  Extremities normal, atraumatic, no cyanosis or edema. Distal pulses equal bilaterally  Skin: Skin color, texture, turgor normal, no rashes or lesions  Musculoskeletal: No joint swelling, no muscle tenderness   Neurologic:  Difficult to arouse, somnolent  Labs:  Na/K/Cl/CO2:  145/4.5/104/37 (04/28 1051)  BUN/Cr/glu/ALT/AST/amyl/lip:  14/0.7/--/--/--/--/-- (04/28 1051)     estimated creatinine clearance is 74 mL/min (based on SCr of 0.7 mg/dL). Other pertinent labs as noted below    Radiology:  XR CHEST PORTABLE   Final Result   No change in small to moderate bilateral pleural effusions. XR CHEST PORTABLE   Final Result   Bilateral pulmonary opacifications right greater than left with probable   small right and small to moderate left potential layering pleural effusions. US DUP UPPER EXTREMITY RIGHT VENOUS   Final Result   Within the visualized vessels there is no evidence for deep venous   thrombosis               XR CHEST PORTABLE   Final Result   Bibasilar atelectasis and or infiltrate as well as right pleural effusion. Mildly worsened aeration on the left. XR ABDOMEN FOR NG/OG/NE TUBE PLACEMENT   Final Result   Gastric catheter passes into the stomach, tip is in the gastric body. No   complications. XR CHEST PORTABLE   Final Result   1. Significant interval improvement in the left lung lower lung and lingular   consolidation   2. Increased right pleural effusion and or atelectasis   3. Cardiomegaly, stable   4.  The position of the nasogastric tube is within normal range         XR CHEST PORTABLE   Final Result   Increased markings seen in the lung bases bilaterally with small bilateral   pleural effusions not significantly changed in the interval.  No new findings. XR ABDOMEN (KUB) (SINGLE AP VIEW)   Final Result   No acute intra-process seen. Left renal 12 mm calculus. CT HEAD WO CONTRAST   Final Result   1. No acute intracranial abnormality. 2. Chronic small vessel ischemic disease. 3. Left frontal scalp hematoma. XR CHEST PORTABLE   Final Result   Cardiomegaly, pulmonary venous hypertension and small effusions. Findings   suggest volume overload. Mild right basilar atelectasis. CTA PULMONARY W CONTRAST   Final Result   No evidence of pulmonary embolism. Small left and moderate size right pleural effusions. A moderate size area of consolidation is noted involving the right lower   lung, concerning for an underlying airspace disease process. RECOMMENDATIONS:   Unavailable         XR CHEST PORTABLE   Final Result   New opacities in right lung base suggestive of pneumonia, atelectasis, and   possible right pleural effusion. CT Head WO Contrast   Final Result   No acute intracranial abnormality or hemorrhage. Cortical atrophy and periventricular leukomalacia. Left frontal scalp hematoma. Right frontal sinusitis and right mastoiditis. CT Cervical Spine WO Contrast   Final Result   No acute abnormality of the cervical spine. Multilevel degenerative changes, no significant interval change. Right pleural effusion. A/P:  Principal Problem:    Hypernatremia  Active Problems:    Permanent atrial fibrillation (HCC)    Essential hypertension    Chronic obstructive pulmonary disease (HCC)    Frequent falls    AMS (altered mental status)    Head injury, initial encounter    Acute encephalopathy  Resolved Problems:    * No resolved hospital problems. *    Acute Encephalopathy   -Patient mental status is waxing and waning. This is appear to be a chronic problem for her.   She is able to occasionally have very minimal conversations and follow commands. Other times she has poor mentation, becomes lethargic or agitated.  Likely this could be secondary to age-related dementia changes with waxing and waning of this. - Initial imaging showed possible right lobe pneumonia vs pleural effusion  -Ct head no acute ischemia or bleeding  - likely secondary to  metabolic, hepatic, septic, drug induced, worsening underlying dementia   -Urine culture grew E. coli .   Completed total 7 days of Rocephin  - neuro consulted, recommneds to EEG if worsening   - Stop ativan, as per pulmonary increase Depakote to 500 twice daily and started on Lexapro 10 Mg daily    Hypoxic respiratory failure 2/2 Pneumonia (Viral vs bacterial) VS  Pleural EFFUSION   - Likely covid induced with secondary bacterial  - Repeat chest x-ray showed to moderate bilateral pleural effusion, no worsening  -Repeat Rapid Covid is negative  - Mild leucocytosis on admission, resolved  -No plan for thoracocentesis at this time  - Continue oxygen supplement and wean as tolerated  -Pulmonary is following,   -CT angio no PE  - will resume lasix 20 mg daily    A fib with RVR  - On last admission Coumadin was discontinued due to high for bleeding despite of high CHADVAS score after discussion with patient daughter  - Will continue to hold Coumadin  -Heart rate improving  -Cardio consulted on admission and increase metoprolol to 100 mg daily on 4/23/2022  -Will hold on iv medication because of  - Last Echo in 2018, >55% EF, Interdeterminate diastolic dysfunction  consider repeating during this admission   - Keep mag/k/phos >2/4     Multiple fall  - Wound on left forehead  - Wound care is following patient  - No need suture  - Fall precaution  - PT/OT eval      Hypothyroidism  - Recent TSH normal  - Continue current regimen      Hypertension  -Increase metoprolol to 100mg daily        DVT / GI prophylaxis: Lovenox 40 mg SC     Dispo  CODE STATUS comfort care.  Waiting for placement. Discuss with fam regarding increased work of breathing.      Electronically signed by Marquise Preston MD PGY-3 on 5/1/2022 at 6:51 AM     This case was discussed with attending physician: Dr. Cruzito Jernigan

## 2022-05-01 NOTE — PROGRESS NOTES
Occupational Therapy  OCCUPATIONAL THERAPY TREATMENT SESSION    [facility] [address] [address] [address], [facility], [address] TREATMENT NOTE      Date:2022  Patient Name: Avelino Blair  MRN: [id number]  : 1935  Room: [address] [place] [address]     Per OT Eval:    Evaluating [facility], OTR/L #[id number]     Referring Provider: Ruy Kang MD  Specific Provider Orders/Date: OT eval and treat 22     Diagnosis: Hypernatremia [E87.0]  Acute encephalopathy [G93.40]  Altered mental status, unspecified altered mental status type [R41.82]   Pt admitted to hospital on 22 for abnormal labs, AMS, frequent falls     Pertinent Medical History:  has a past medical history of Anticoagulated on Coumadin, Atrial fibrillation ([address] Basal cell carcinoma of neck, CAD (coronary artery disease), CHF (congestive heart failure) ([address] Depression, DJD (degenerative joint disease), GERD (gastroesophageal reflux disease), History of cardiovascular stress test, Hyperparathyroidism ([address] Hypertension, Hypothyroidism, Mild mitral regurgitation, Osteoarthritis, Pulmonary hypertension ([address] Renal calculi, Tricuspid regurgitation, and Urinary incontinence. B wrist restraints d/c'd     Precautions:  Fall Risk, TAPS, cognition, O2, [place], sitter     Assessment of current deficits   [x]? Functional mobility         [x]? ADLs           [x]? Strength                  [x]? Cognition    [x]? Functional transfers       [x]? IADLs         [x]? Safety Awareness   [x]? Endurance    [x]? Fine Coordination                      [x]? Balance      []? Vision/perception   []? Sensation      []? Gross Motor Coordination          [x]? ROM           []?  Delirium                   []? Motor Control      OT PLAN OF CARE   OT POC based on physician orders, patient diagnosis and results of clinical assessment     Frequency/Duration 1-3 days/wk for 2 weeks PRN   Specific OT Treatment Interventions to include:   * Instruction/training on adapted ADL techniques and AE recommendations to increase functional independence within precautions       * Training on energy conservation strategies, correct breathing pattern and techniques to improve independence/tolerance for self-care routine  * Functional transfer/mobility training/DME recommendations for increased independence, safety, and fall prevention  * Patient/Family education to increase follow through with safety techniques and functional independence  * Recommendation of environmental modifications for increased safety with functional transfers/mobility and ADLs  * Cognitive retraining/development of therapeutic activities to improve problem solving, judgement, memory, and attention for increased safety/participation in ADL/IADL tasks  * Splinting/positioning for increased function, prevention of contractures, and improve skin integrity  * Therapeutic exercise to improve motor endurance, ROM, and functional strength for ADLs/functional transfers  * Therapeutic activities to facilitate/challenge dynamic balance, stand tolerance for increased safety and independence with ADLs  * Therapeutic activities to facilitate gross/fine motor skills for increased independence with ADLs  * Positioning to improve skin integrity, interaction with environment and functional independence     Recommended Adaptive Equipment: TBD      Home Living: Pt admitted from [facility], [place], operated by [facility] prior (per chart)     Prior Level of Function: unable to obtain from pt d/t cognition. Family/caregiver not present     Pain Level: 0/10      Cognition: A&O: 2/4 (to self and place);  Pleasantly confused - cues provided to initiate and sequence tasks, pt was non-verbal for some reason this date, but was aware but appeared very fatigued, shook her head appropriately & followed commands           Memory:  fair-           Sequencing:  fair-           Problem solving: Poor           Judgement/safety: Poor no insight              Functional Assessment:  AM-PAC Daily Activity Raw Score: 9/24    Initial Eval Status  Date: 4/21/22 Treatment Status  Date:  5/1/22 STGs = LTGs  Time frame: 10-14 days   Feeding Dependent  Mod/Min A  Holding cup to take a drink of coffee, pt unsteady with cup & fatigued Moderate Assist    Grooming Dependent  Mod A  Simple tasks, seated at EOB Moderate Assist    UB Dressing Dependent  Max A  Seated at EOB Moderate Assist    LB Dressing Dependent  Dep  Javi/doff socks bed level  -   Bathing Dependent Max/Dep  simulated Mod A  Goal added by FERNANDO Torres/L #[id number] on 4/25   Toileting Dependent  Dep  Incontinent of urine, upon arrival, dep for hygiene -   Bed Mobility  Rolling to Left: Mod A  Rolling to Right: Max A x2     Supine to sit: NT  Secondary to safety concerns Rolling: Mod   Mod x 2  Supine to sit  Rolling: Min A  Supine to sit: Maximal Assist   Sit to supine: Maximal Assist    Functional Transfers NT Mod x 2  Sit < > stand Maximal Assist    Functional Mobility NT Mod A x 2  Side stepping with walker, assist for weight shifting & max cues, verbal & tactile  -  Will continue to assess   Balance Sitting: NT  Standing: NT Sitting: Min/SBA  Standing: Mod A x 2  With walker Sitting: Min A   Activity Tolerance Poor Fair-  Pt was fatigued, lethargic  Fair   Visual/  Perceptual Glasses: yes               Pt will complete B UE exercises w/ min cues to maximize independence w/ ADL's       *Re-assessed B UE ROM and strength this date (4/25) as pt able to follow 1 step commands. Hand Dominance R    AROM (PROM) Strength Additional Info:    RUE  WFL Distal: 3+/5  Proximal: 3-/5 Fair , FMC/dexterity: fair   LUE WFL Distal: 3+/5  Proximal: 3-/5 Fair , FMC/dexterity:fair     Comments: Upon arrival pt lying in bed & agreeable for therapy, sitter present. At end of session pt upright in bed (Bed alarm on) all lines and tubes intact, call light within reach, sitter still present.      Treatment: Cleared by RN to see pt. Pt pleasantly confused. Pt appeared Pt required vc's and physical assist for proper technique/safety with hand placement/body mechanics/posture for bed mobility/ADLs/functional tranfers. Pt required vc's for sequencing/initiation of ADLs/functional transfers. Pt able to sit EOB ~15 mins to increase core strength/balance/activity tolerance for ease with ADLs. Pt required increased time to complete ADLs/functional transfers due decreased processing, sequencing & cognition. Pt required rest breaks during session. Pt appeared to have tolerated session fairly and appears motivated/cooperative/pleasant . Pt instructed on use of call light for assistance and fall prevention. -pt has made slow progress toward goals  -continue current POC    Treatment Time In: 11:30        Treatment Time Out: 11:53          Treatment Charges: Mins Units   Ther Ex  85920     Manual Therapy 25391     Thera Activities 66825 13 1   ADL/Home Mgt 38536 10 1   Neuro Re-ed 88872     Group Therapy      Orthotic manage/training  [id number]     Non-Billable Time     Total Timed Treatment 23 2       Alma VEGA  [address] [place] [address], [address] [place] [address]

## 2022-05-01 NOTE — PROGRESS NOTES
[address] [facility]  Family Medicine Attending      S:  Sitter at bedside. Patient is retracting and wheezing. Receiving albuterol treatment when we arrived. Tachycardic. Patient remains DNR CC. Awaiting placement. O: VS- Blood pressure (!) 134/94, pulse 116, temperature 98.1 °F (36.7 °C), temperature source Temporal, resp. rate 20, height 5' 6\" (1.676 m), weight 251 lb 2 oz (113.9 kg), SpO2 98 %,   Gen:sleeping; resting comfortably. CV irreg irreg, tachy   Resp decreased, coarse, unlabored distant 2/2 habitus. Abd soft nt nd   Ext - trace lower extremity edema. Impressions:   Principal Problem:    Hypernatremia  Active Problems:    Permanent atrial fibrillation (HCC)    Essential hypertension    Chronic obstructive pulmonary disease (HCC)    Frequent falls    AMS (altered mental status)    Head injury, initial encounter    Acute encephalopathy        Plan:   DNR CC, Discharge pending placement. Agitation - improved; increased depakote, Lexapro. Seems to have responded well. Psychiatry consulted to assist w med management for safety. No further recs per their service given good response to depakote/lexapro. Pleural effusion, hypercapnia, covid - BTx prn for now. Continue NC O2 prn. Coninue bipap qHS as tolerated.; wheezing-continue breathing treatment; palliative protocols for comfort meds. Attending Physician Statement  I have reviewed the chart and seen the patient with the resident(s). I personally reviewed images, EKG's and similar tests, if present. I personally reviewed and performed key elements of the history and exam.  I have reviewed and confirmed student and/or resident history and exam with changes as indicated above. I agree with the assessment, plan and orders as documented by the resident. Please refer to the resident and/or student note for additional information.       Joshua Porter MD

## 2022-05-02 NOTE — PROGRESS NOTES
Department of Internal Medicine  Nephrology Attending Progress Note        SUBJECTIVE:  Mrs Bambi Sharp did not wear her BiPAP during the night, did not have breakfast, is more responsive to questions today.   Seems anxious to get out of the hospital.    PMH  History of chronic A. fib on Coumadin  Coronary disease  History of heart failure with preserved ejection fraction  RVSP mild 48 mm Hg  Hypertension  History of osteoarthritis status post bilateral knee replacement, total left shoulder  History of urinary incontinence with history of bladder suspension   hyperparathyroid disease status post parathyroidectomy  History of basal cell carcinoma on the neck,   Thyroidectomy for multinodular goiter on replacement therapy 1960  History of renal lithiasis  Cholecystectomy 1972  Bilateral cataract extractions with lens implant  COVID-19 infection  Dementia  COPD      Physical Exam:    Vitals:    05/02/22 0830   BP: (!) 144/96   Pulse: 113   Resp: 20   Temp: 98.4 °F (36.9 °C)   SpO2: 96%       I/O last 24 hours:  Intake/Output inaccurate    Weight: 248    General Appearance: Awakens answering to some questioning   Skin: Stasis dermatitis changes lower extremity,  left upper skull lesion has improved  Neck:  neck- supple, no mass, non-tender and no bruits  Lungs: Distant breath sounds no wheezing or rhonchi  Heart: Irregular rhythm rate controlled     Abdominal: Soft nontender, patient has wick device in place  Extremities: Bilateral knee surgical scars right knee seems slightly swollen  Peripheral Pulses:  +2    DATA:    CBC with Differential:    Lab Results   Component Value Date    WBC 5.6 04/26/2022    RBC 4.56 04/26/2022    HGB 13.1 04/26/2022    HCT 45.2 04/26/2022     04/26/2022    MCV 99.1 04/26/2022    MCH 28.7 04/26/2022    MCHC 29.0 04/26/2022    RDW 14.8 04/26/2022    NRBC 0.9 12/29/2016    SEGSPCT 54 03/17/2014    METASPCT 0.9 01/05/2022    LYMPHOPCT 17.5 04/19/2022    PROMYELOPCT 0.9 12/31/2016    MONOPCT 11.2 04/19/2022    MYELOPCT 0.9 01/06/2022    BASOPCT 1.7 04/19/2022    MONOSABS 0.47 04/19/2022    LYMPHSABS 0.73 04/19/2022    EOSABS 0.29 04/19/2022    BASOSABS 0.07 04/19/2022     CMP:    Lab Results   Component Value Date     04/28/2022    K 4.5 04/28/2022    K 3.6 04/19/2022     04/28/2022    CO2 37 04/28/2022    BUN 14 04/28/2022    CREATININE 0.7 04/28/2022    GFRAA >60 04/28/2022    LABGLOM >60 04/28/2022    GLUCOSE 98 04/28/2022    GLUCOSE 113 02/14/2012    PROT 5.8 04/22/2022    LABALBU 3.1 04/22/2022    LABALBU 4.4 02/14/2012    CALCIUM 9.4 04/28/2022    BILITOT 0.7 04/22/2022    ALKPHOS 86 04/22/2022    AST 26 04/22/2022    ALT 15 04/22/2022     Magnesium:    Lab Results   Component Value Date    MG 2.2 04/25/2022     Phosphorus:    Lab Results   Component Value Date    PHOS 2.7 04/26/2022     U/A:    Lab Results   Component Value Date    NITRITE negative 07/05/2019    COLORU Yellow 04/13/2022    PROTEINU Negative 04/13/2022    PHUR 6.0 04/13/2022    LABCAST RARE 09/16/2019    WBCUA 1-3 04/13/2022    RBCUA 2-5 04/13/2022    RBCUA 1-3 03/15/2014    BACTERIA NONE SEEN 04/13/2022    CLARITYU Clear 04/13/2022    SPECGRAV 1.020 04/13/2022    LEUKOCYTESUR TRACE 04/13/2022    UROBILINOGEN 1.0 04/13/2022    BILIRUBINUR Negative 04/13/2022    BILIRUBINUR negative 07/05/2019    BLOODU SMALL 04/13/2022    GLUCOSEU Negative 04/13/2022    AMORPHOUS FEW 09/20/2019     ABG:    Lab Results   Component Value Date    PH 7.416 04/22/2022    PCO2 47.0 04/22/2022    PO2 81.8 04/22/2022    HCO3 29.5 04/22/2022    BE 4.3 04/22/2022    O2SAT 95.9 04/22/2022     Urine osmolarity 522  Urine sodium 142      Presence of bilateral renal calculi nonobstructing       hydroCHLOROthiazide  12.5 mg Oral Daily    divalproex  500 mg Oral 2 times per day    escitalopram  10 mg Oral Daily    metoprolol succinate  100 mg Oral Daily    budesonide  0.5 mg Nebulization BID    metoprolol  5 mg IntraVENous Q6H    furosemide  20 mg Oral Daily    LORazepam  0.5 mg IntraMUSCular Once    lidocaine  5 mL IntraDERmal Once    heparin flush  1 mL IntraVENous 2 times per day    levalbuterol  1 ampule Nebulization BID    levothyroxine  175 mcg Oral Daily      sodium chloride      dextrose       metoprolol tartrate, OLANZapine, acetaminophen, sodium chloride flush, sodium chloride, heparin flush, glucose, dextrose, glucagon (rDNA), dextrose, sodium chloride flush, ondansetron **OR** ondansetron, polyethylene glycol    IMPRESSION/RECOMMENDATIONS:    Hypernatremia , no recent labs check BMP this morning  Hypokalemia replete   History of heart failure with preserved ejection fraction monitor weights   COVID infection s/p Decadron  Hypertension controlled  A. fib with controlled rate      Ama Erickson MD  5/2/2022 10:47 AM

## 2022-05-02 NOTE — CARE COORDINATION
FM resident notified via PS regarding insurance request for peer to peer.     BUFFY FournierN, RN  [facility] Case Management   Cell: [phone]

## 2022-05-02 NOTE — CARE COORDINATION
5/2 Update CM note. Spoke with attending regarding peer to peer. Patient's insurance company stated patient is not rehab candidate and is appropriate for long term care with possible memory unit and denial was upheld. Spoke with Toshia at [facility], informed her that previous CM documentation stated patient's daughter Roxy Escalera has pending Medicaid application through ? Left VM with Roxy Escalera to confirm. Toshia states patient's daughter will need to meet with business office to determine if patient can come LTC and under pending Medicaid.  is Sammy Wu, number is [phone]. Will await callback.     BUFFY PetitN, RN  [facility] Case Management   Cell: [phone]

## 2022-05-02 NOTE — PROGRESS NOTES
Comprehensive Nutrition Assessment    Type and Reason for Visit:  Reassess    Nutrition Recommendations/Plan:   1. Continue current nutrition regimen. Add magic cup ONS once daily to further encourage/optimize intake. Malnutrition Assessment:  Malnutrition Status: At risk for malnutrition (Comment) (04/18/22 1221)    Context:  Acute Illness     Findings of the 6 clinical characteristics of malnutrition:  Energy Intake:  1 - 75% or less of estimated energy requirements for 7 or more days  Weight Loss:  No significant weight loss     Body Fat Loss:  Unable to assess (Covid isolation)     Muscle Mass Loss:  Unable to assess (covid isolation)    Fluid Accumulation:  No significant fluid accumulation     Strength:  Not Performed    Nutrition Assessment:    Pt. remains at nutritional risk d/t varied/limited intakes since advancement to po diet. Noted pt. remains w/ hypoxic resp failure 2/2 COVID PNA vs Pleural effusion and remains w/ acute encephalopathy. Pt. admit from SNF for abnormal labs (resolved) and AMS; Noted PMHx noted for frequent falls, CAD, CHF, HTN, hyperPTH s/p parathyroidectomy, atrial fibrillation, COPD. Per speech rec patient ok for Soft and bite size diet with thin liquids / NPO while patient is somnolent. Noted intakes are limited/varied. Will continue/increase ONS and monitor. Nutrition Related Findings:    oriented to person/AMS , +I/O 240ml, +BS, +1 non-pitting edema of BL LE, , 4L NC,  loss of appetite Wound Type:  (Forehead , dried scabbed old wound)       Current Nutrition Intake & Therapies:    Average Meal Intake: 26-50% (Varied intakes)  Average Supplements Intake: Unable to assess (no ONS intake to assess)  ADULT DIET; Dysphagia - Soft and Bite Sized;  Low Sodium (2 gm)  ADULT ORAL NUTRITION SUPPLEMENT; Lunch, Dinner; Standard High Calorie/High Protein Oral Supplement    Anthropometric Measures:  Height: 5' 6\" (167.6 cm)  Ideal Body Weight (IBW): 130 lbs (59 kg)    Admission Body Weight: 255 lb (115.7 kg) (4/23 BS actual)  Current Body Weight: 250 lb (113.4 kg) (5/02 BS), 192.3 % IBW. Weight Source: Bed Scale  Current BMI (kg/m2): 40.4  Usual Body Weight: 245 lb 9.6 oz (111.4 kg) (5/5/21 no method)  % Weight Change (Calculated): 0.1  Weight Adjustment For: No Adjustment                 BMI Categories: Obese Class 2 (BMI 35.0 -39.9)    Estimated Daily Nutrient Needs:  Energy Requirements Based On: Cloyce Bitter  Weight Used for Energy Requirements: Admission  Energy (kcal/day): MSJ 1615 x 1.2 SF = 1900-2000kcal  Weight Used for Protein Requirements: Ideal  Protein (g/day): 71g-83g (1.2-1.4g/kg IBW)  Method Used for Fluid Requirements: Other (Comment)  Fluid (ml/day): per MD/renal management    Nutrition Diagnosis:   · Inadequate oral intake related to cognitive or neurological impairment as evidenced by intake 0-25%,poor intake prior to admission      Nutrition Interventions:   Food and/or Nutrient Delivery: Continue Current Diet,Modify Oral Nutrition Supplement (Ensure Enlive BID , magic cup once daily)  Nutrition Education/Counseling: Education not appropriate  Coordination of Nutrition Care: Continue to monitor while inpatient       Goals:  Previous Goal Met: No Progress toward Goal(s)  Goals: PO intake 75% or greater       Nutrition Monitoring and Evaluation:   Behavioral-Environmental Outcomes: None Identified  Food/Nutrient Intake Outcomes: Diet Advancement/Tolerance,Food and Nutrient Intake,Supplement Intake  Physical Signs/Symptoms Outcomes: Biochemical Data,Chewing or Swallowing,GI Status,Fluid Status or Edema,Meal Time Behavior,Nutrition Focused Physical Findings,Skin,Weight    Discharge Planning:     Too soon to determine     Annita Bradley RD  Contact: ext [phone]

## 2022-05-02 NOTE — PROGRESS NOTES
Pulmonary [address] [facility]    Pulmonary Consult/Progress Note :    Reason for Consultation:COVID Pneumonia     CC : SOB      SUBJECTIVE:  Reviewed changes       PHYSICAL EXAMINATION:     VITAL SIGNS:  BP (!) 144/96   Pulse 113   Temp 98.4 °F (36.9 °C) (Oral)   Resp 20   Ht 5' 6\" (1.676 m)   Wt 250 lb (113.4 kg)   SpO2 96%   BMI 40.35 kg/m²   Wt Readings from Last 3 Encounters:   05/02/22 250 lb (113.4 kg)   03/30/22 270 lb 4.8 oz (122.6 kg)   03/14/22 250 lb (113.4 kg)     Temp Readings from Last 3 Encounters:   05/02/22 98.4 °F (36.9 °C) (Oral)   03/30/22 96.5 °F (35.8 °C) (Temporal)   03/14/22 98.5 °F (36.9 °C) (Oral)     TMAX:  BP Readings from Last 3 Encounters:   05/02/22 (!) 144/96   03/30/22 (!) 144/97   03/15/22 118/82     Pulse Readings from Last 3 Encounters:   05/02/22 113   03/30/22 110   03/15/22 71     INTAKE/OUTPUTS:  I/O last 3 completed shifts: In: 240 [P.O.:240]  Out: -     Intake/Output Summary (Last 24 hours) at 5/2/2022 1435  Last data filed at 5/2/2022 0546  Gross per 24 hour   Intake 120 ml   Output    Net 120 ml     General Appearance: Alert confused  Eyes: conjunctivae normal and sclera anicteric   Neck:and noadenopathy   Pulmonary/Chest:Course  Cardiovascular: Afib,   Abdomen: obese, soft, non-tender, non-distended, n  Extremities edema trace, vasc insuff  Musculoskeletal: no deformity or tenderness   Neurologic:No  cranial nerve deficits     LABS/IMAGING:    ECHOCARDIOGRAM:  Mild aortic regurgitation.    Severe pulmonary hypertension.    Large left pleural effusion.    Cardiomyopathy with an EF 25-30%.          CBC:   Lab Results   Component Value Date    WBC 5.6 04/26/2022    RBC 4.56 04/26/2022    HGB 13.1 04/26/2022    HCT 45.2 04/26/2022    MCV 99.1 04/26/2022    MCH 28.7 04/26/2022    MCHC 29.0 04/26/2022    RDW 14.8 04/26/2022     04/26/2022    MPV 11.4 04/26/2022     CMP:    Lab Results   Component Value Date     05/02/2022    K 4.1 05/02/2022    K 3.6 04/19/2022     05/02/2022    CO2 33 05/02/2022    BUN 17 05/02/2022    CREATININE 0.7 05/02/2022    GFRAA >60 05/02/2022    LABGLOM >60 05/02/2022    GLUCOSE 107 05/02/2022    GLUCOSE 113 02/14/2012    PROT 5.8 04/22/2022    LABALBU 3.1 04/22/2022    LABALBU 4.4 02/14/2012    CALCIUM 10.1 05/02/2022    BILITOT 0.7 04/22/2022    ALKPHOS 86 04/22/2022    AST 26 04/22/2022    ALT 15 04/22/2022     Calcium:    Lab Results   Component Value Date    CALCIUM 10.1 05/02/2022     Phosphorus:    Lab Results   Component Value Date    PHOS 2.7 04/26/2022               ASSESSMENT:  1.) Acute hypoxic /hypercpnic resp failure  2.) Fluid overload/CHF  3.) Right side effusion > left   4.) COVID pneumonia  5.) Fall /A fib .was on coumadin  6.) New LV dysfunction      PLAN:  1. BiPAP nightly  2. Continue lexapro 10 mg consider increasing  3. No narcotics or benzodiaz  4. HR stable on BB  5. Need to remain on Bronchodilators for COPD after discharge  6.  Waiting placement      Elisabeth Telles DO, MPH, FCCP, [place], [place]

## 2022-05-02 NOTE — PROGRESS NOTES
Date: 5/1/2022    Time: 10:47 PM    Patient Placed On BIPAP/CPAP/ Non-Invasive Ventilation? Yes    If no must comment. Facial area red/color change? No           If YES are Blister/Lesion present? No   If yes must notify nursing staff  BIPAP/CPAP skin barrier? Yes    Skin barrier type:mepilexlite       Comments:patient has wound on forehead above left eye. Gauze placed over wound and straps from mask placed loosely over gauze.         Adilene Pearson RCP

## 2022-05-02 NOTE — CONSULTS
Palliative Care Department  [phone]  Palliative Care Progress Note  Provider Yovani Husbands, ARIEL - CNP      PATIENT: Abundio Treviño  : 1935  MRN: [id number]  ADMISSION DATE: 2022 12:09 PM  Referring Provider: Cindy Gomez MD    Palliative Medicine was consulted on hospital day 19 for assistance with Goals of care, and to management. HPI:     Jasmyn Davila is a 81 y.o. y/o female with a history of depression, hypothyroidism, A. fib, hypertension, 6, frequent falls, dementia, cognitive dysfunction, COVID-19, altered mental status, who presented to [facility] ([facility]) on 2022 with abnormal labs from [facility]. ASSESSMENT/PLAN:     Pertinent Hospital Diagnoses     · Hypernatremia  · Altered mental status  · Acute encephalopathy  · With falls    Palliative Care Encounter / Counseling Regarding Goals of Care  Please see detailed goals of care discussion as below   At this time, Abundio Treviño, Does Not have capacity for medical decision-making. Capacity is time limited and situation/question specific   During encounter Bervely was surrogate medical decision-maker   Outcome of goals of care meeting:  o Plan is for patient to transition to [facility] care in [place], ideally with hospice, if no short-term rehab until [facility] insurance is approved for hospice. o CODE STATUS remains DNR CC     Code status DNR-CC   Advanced Directives: no POA or living will in [facility]   Surrogate/Legal NOK:  o Kenisha Fritz (spousePOA) [phone]  o Brandi Abeldeisy (daugther alternative POA) [phone]    Spiritual assessment: no spiritual distress identified  Bereavement and grief: to be determined  Referrals to: none today    Thank you for the opportunity to participate in the care of Abundio Treviño. Armando Dandy, APRN CNP  Palliative Medicine     SUBJECTIVE:     Details of Conversation:      Chart reviewed.   As needed Ativan was discontinued by attending following pulmonology recommendation for no narcotics or benzos. Saw patient at the bedside. Patient is confused and unable to participate in the meaningful conversation. Spoke with patient daughter over the phone. We discussed goal of care. She stated that she has been working closely with  regarding insurance issues for hospice at [facility]. Ideally, Lori Hernandez, wants hospice to follow patient to [facility] from this hospital stay, but she may need short-term rehab, until Medicaid has been approved. Both Shira and her father wants patient to transition to hospice. CODE STATUS has been already established to DNR CC. Comfort support was provided. We will continue to follow. Prognosis: Poor    OBJECTIVE:     BP (!) 144/96   Pulse 113   Temp 98.4 °F (36.9 °C) (Oral)   Resp 20   Ht 5' 6\" (1.676 m)   Wt 250 lb (113.4 kg)   SpO2 96%   BMI 40.35 kg/m²     Physical Examination:  Gen: elderly, restless, confused  HEENT: normocephalic, atraumatic, PERRL, EOMI,   Neck: trachea midline, no JVD  Lungs: respirations easy and not labored,   Heart: Tachycardic  Abdomen: normoactive bowel sounds, soft, non-tender  Extremities: no clubbing, cyanosis or edema, moving all extremities    Skin: warm, dry without rashes, lesions, bruising  Neuro: awake, confused, restless       Objective data reviewed: labs, images, records, medication use, vitals and chart    Time/Communication  Greater than 50% of time spent, total 35 minutes in counseling and coordination of care at the bedside regarding goals of care. Thank you for allowing Palliative Medicine to participate in the care of [place]. Note: This report was completed using [employer] voiced recognition software. Every effort has been made to ensure accuracy; however, inadvertent computerized transcription errors may be present.

## 2022-05-02 NOTE — PLAN OF CARE
Problem: Inadequate oral food/beverage intake (NI-2.1)  Goal: Food and/or Nutrient Delivery  Description: Individualized approach for food/nutrient provision.   Outcome: Not Progressing

## 2022-05-02 NOTE — PROGRESS NOTES
[address] [facility]  Family Medicine Attending      S:  Sitter at bedside. Patient is resting comfortably. Patient remains DNR CC. Awaiting placement. O: VS- Blood pressure (!) 134/94, pulse 116, temperature 98.1 °F (36.7 °C), temperature source Temporal, resp. rate 20, height 5' 6\" (1.676 m), weight 251 lb 2 oz (113.9 kg), SpO2 98 %,   Gen:sleeping; resting comfortably. CV irreg irreg, tachy   Resp decreased, no wheezing. Abd soft nt nd   Ext - trace lower extremity edema. Impressions:   Principal Problem:    Hypernatremia  Active Problems:    Permanent atrial fibrillation (HCC)    Essential hypertension    Chronic obstructive pulmonary disease (HCC)    Frequent falls    AMS (altered mental status)    Head injury, initial encounter    Acute encephalopathy        Plan:   DNR CC, Discharge pending placement. Agitation - improved with increased depakote, Lexapro. Seems to have responded well. Psychiatry consulted to assist w med management for safety. No further recs per their service given good response to depakote/lexapro. Pleural effusion, hypercapnia, covid - BTx prn for now. Continue NC O2 prn. Coninue bipap qHS as tolerated.; wheezing-continue breathing treatment; palliative protocols for comfort meds. Called for peer to peer. Patient did not qualify for the SNF that was being attempted. Would qualify more for ECF with memory unit. Discussed with , Doris Briones. Attending Physician Statement  I have reviewed the chart and seen the patient with the resident(s). I personally reviewed images, EKG's and similar tests, if present. I personally reviewed and performed key elements of the history and exam.  I have reviewed and confirmed student and/or resident history and exam with changes as indicated above. I agree with the assessment, plan and orders as documented by the resident. Please refer to the resident and/or student note for additional information. Elzbieta Donis MD

## 2022-05-02 NOTE — PROGRESS NOTES
[facility]   Resident Progress Note    S:  Hospital day: 23   Brief Synopsis: Sonya De Leon is a 81 y.o. female PMH of atrial fibrillation (on Coumadin ), coronary artery disease, CHF, GERD, hyperparathyroidism, hypertension, hypothyroidism, osteoarthritis and urinary incontinenc of who presents to ED for Abnormal Lab (pt new pt to [facility], came in for abnormal labs. pt with frequent falls and they state she has a change in mentation. ER labs showed H/H 11.4/38.0, sodium 152, potassium 3.9, chloride 108, BUN 23, creatinine 0.9, AST 40, alkaline phosphatase 127, INR 2, urine analysis leukocyte esterase with WBC 1-3, troponin 24 x25, normal lactic acid. EKG showed A. fib with RVR and chest x-ray new opacity in the right lung base. Still with some increased WOB. Following simple commands. Non verbal.    Cont meds:    sodium chloride      dextrose       Scheduled meds: Patient is awake and alert.  hydroCHLOROthiazide  12.5 mg Oral Daily    divalproex  500 mg Oral 2 times per day    escitalopram  10 mg Oral Daily    metoprolol succinate  100 mg Oral Daily    budesonide  0.5 mg Nebulization BID    metoprolol  5 mg IntraVENous Q6H    furosemide  20 mg Oral Daily    LORazepam  0.5 mg IntraMUSCular Once    lidocaine  5 mL IntraDERmal Once    heparin flush  1 mL IntraVENous 2 times per day    levalbuterol  1 ampule Nebulization BID    levothyroxine  175 mcg Oral Daily     PRN meds: metoprolol tartrate, OLANZapine, acetaminophen, sodium chloride flush, sodium chloride, heparin flush, glucose, dextrose, glucagon (rDNA), dextrose, sodium chloride flush, ondansetron **OR** ondansetron, polyethylene glycol     I reviewed the patient's past medical and surgical history, Medications and Allergies.     O:  BP (!) 143/85   Pulse 120   Temp 97.4 °F (36.3 °C) (Axillary)   Resp 23   Ht 5' 6\" (1.676 m)   Wt 250 lb (113.4 kg)   SpO2 97%   BMI 40.35 kg/m²   24 hour I&O: I/O last 3 completed shifts: In: 120 [P.O.:120]  Out: -   I/O this shift:  In: 120 [P.O.:120]  Out: -        General:  Alert, confused, not answering questions   neck: Supple, symmetrical, no JVD   Lung:  Clear to auscultation   heart: Irregular rate and irregular rhythm  Abdomen: SNTND, good bowel sound extremities:  Extremities normal, atraumatic, no cyanosis or edema. Distal pulses equal bilaterally  Skin: Skin color, texture, turgor normal, no rashes or lesions  Musculoskeletal: No joint swelling, no muscle tenderness   Neurologic:  Difficult to arouse, somnolent  Labs:           estimated creatinine clearance is 74 mL/min (based on SCr of 0.7 mg/dL). Other pertinent labs as noted below    Radiology:  XR CHEST PORTABLE   Final Result   No change in small to moderate bilateral pleural effusions. XR CHEST PORTABLE   Final Result   Bilateral pulmonary opacifications right greater than left with probable   small right and small to moderate left potential layering pleural effusions. US DUP UPPER EXTREMITY RIGHT VENOUS   Final Result   Within the visualized vessels there is no evidence for deep venous   thrombosis               XR CHEST PORTABLE   Final Result   Bibasilar atelectasis and or infiltrate as well as right pleural effusion. Mildly worsened aeration on the left. XR ABDOMEN FOR NG/OG/NE TUBE PLACEMENT   Final Result   Gastric catheter passes into the stomach, tip is in the gastric body. No   complications. XR CHEST PORTABLE   Final Result   1. Significant interval improvement in the left lung lower lung and lingular   consolidation   2. Increased right pleural effusion and or atelectasis   3. Cardiomegaly, stable   4. The position of the nasogastric tube is within normal range         XR CHEST PORTABLE   Final Result   Increased markings seen in the lung bases bilaterally with small bilateral   pleural effusions not significantly changed in the interval.  No new findings.          XR ABDOMEN (KUB) (SINGLE AP VIEW)   Final Result   No acute intra-process seen. Left renal 12 mm calculus. CT HEAD WO CONTRAST   Final Result   1. No acute intracranial abnormality. 2. Chronic small vessel ischemic disease. 3. Left frontal scalp hematoma. XR CHEST PORTABLE   Final Result   Cardiomegaly, pulmonary venous hypertension and small effusions. Findings   suggest volume overload. Mild right basilar atelectasis. CTA PULMONARY W CONTRAST   Final Result   No evidence of pulmonary embolism. Small left and moderate size right pleural effusions. A moderate size area of consolidation is noted involving the right lower   lung, concerning for an underlying airspace disease process. RECOMMENDATIONS:   Unavailable         XR CHEST PORTABLE   Final Result   New opacities in right lung base suggestive of pneumonia, atelectasis, and   possible right pleural effusion. CT Head WO Contrast   Final Result   No acute intracranial abnormality or hemorrhage. Cortical atrophy and periventricular leukomalacia. Left frontal scalp hematoma. Right frontal sinusitis and right mastoiditis. CT Cervical Spine WO Contrast   Final Result   No acute abnormality of the cervical spine. Multilevel degenerative changes, no significant interval change. Right pleural effusion. A/P:  Principal Problem:    Hypernatremia  Active Problems:    Permanent atrial fibrillation (HCC)    Essential hypertension    Chronic obstructive pulmonary disease (HCC)    Frequent falls    AMS (altered mental status)    Head injury, initial encounter    Acute encephalopathy  Resolved Problems:    * No resolved hospital problems. *    Acute Encephalopathy   -Patient mental status is waxing and waning. This is appear to be a chronic problem for her. She is able to occasionally have very minimal conversations and follow commands.   Other times she has poor mentation, becomes lethargic or agitated.  Likely this could be secondary to age-related dementia changes with waxing and waning of this. - Initial imaging showed possible right lobe pneumonia vs pleural effusion  -Ct head no acute ischemia or bleeding  - likely secondary to  metabolic, hepatic, septic, drug induced, worsening underlying dementia   -Urine culture grew E. coli . Completed total 7 days of Rocephin  - neuro consulted, recommneds to EEG if worsening   - Stop ativan, as per pulmonary increase Depakote to 500 twice daily and started on Lexapro 10 Mg daily    Hypoxic respiratory failure 2/2 Pneumonia (Viral vs bacterial) VS  Pleural EFFUSION   - Likely covid induced with secondary bacterial  - Repeat chest x-ray showed to moderate bilateral pleural effusion, no worsening  -Repeat Rapid Covid is negative  - Mild leucocytosis on admission, resolved  -No plan for thoracocentesis at this time  - Continue oxygen supplement and wean as tolerated  -Pulmonary is following,   -CT angio no PE  - will resume lasix 20 mg daily    A fib with RVR  - On last admission Coumadin was discontinued due to high for bleeding despite of high CHADVAS score after discussion with patient daughter  - Will continue to hold Coumadin  -Heart rate improving  -Cardio consulted on admission and increase metoprolol to 100 mg daily on 4/23/2022  -Will hold on iv medication because of  - Last Echo in 2018, >55% EF, Interdeterminate diastolic dysfunction  consider repeating during this admission   - Keep mag/k/phos >2/4     Multiple fall  - Wound on left forehead  - Wound care is following patient  - No need suture  - Fall precaution  - PT/OT eval      Hypothyroidism  - Recent TSH normal  - Continue current regimen      Hypertension  -Increase metoprolol to 100mg daily        DVT / GI prophylaxis: Lovenox 40 mg SC     Dispo  CODE STATUS comfort care. Waiting for placement.  Discuss with fam regarding increased work of breathing- palliative reconsulted Electronically signed by Awa Wood MD PGY-3 on 5/2/2022 at 6:28 AM     This case was discussed with attending physician: Dr. Mathieu Kilgore

## 2022-05-03 NOTE — PROGRESS NOTES
[facility]   Resident Progress Note    S:  Hospital day: 21   Brief Synopsis: Chao Mancera is a 81 y.o. female PMH of atrial fibrillation (on Coumadin ), coronary artery disease, CHF, GERD, hyperparathyroidism, hypertension, hypothyroidism, osteoarthritis and urinary incontinenc of who presents to ED for Abnormal Lab (pt new pt to [facility], came in for abnormal labs. pt with frequent falls and they state she has a change in mentation. ER labs showed H/H 11.4/38.0, sodium 152, potassium 3.9, chloride 108, BUN 23, creatinine 0.9, AST 40, alkaline phosphatase 127, INR 2, urine analysis leukocyte esterase with WBC 1-3, troponin 24 x25, normal lactic acid. EKG showed A. fib with RVR and chest x-ray new opacity in the right lung base. Minimally responsive to sternal rub. Appearing overall comfortable. Cont meds:    sodium chloride      dextrose       Scheduled meds: Patient is awake and alert.  hydroCHLOROthiazide  12.5 mg Oral Daily    divalproex  500 mg Oral 2 times per day    escitalopram  10 mg Oral Daily    metoprolol succinate  100 mg Oral Daily    budesonide  0.5 mg Nebulization BID    metoprolol  5 mg IntraVENous Q6H    furosemide  20 mg Oral Daily    LORazepam  0.5 mg IntraMUSCular Once    lidocaine  5 mL IntraDERmal Once    heparin flush  1 mL IntraVENous 2 times per day    levalbuterol  1 ampule Nebulization BID    levothyroxine  175 mcg Oral Daily     PRN meds: metoprolol tartrate, OLANZapine, acetaminophen, sodium chloride flush, sodium chloride, heparin flush, glucose, dextrose, glucagon (rDNA), dextrose, sodium chloride flush, ondansetron **OR** ondansetron, polyethylene glycol     I reviewed the patient's past medical and surgical history, Medications and Allergies.     O:  BP (!) 140/105   Pulse 137   Temp 98.7 °F (37.1 °C) (Temporal)   Resp 19   Ht 5' 6\" (1.676 m)   Wt 250 lb (113.4 kg)   SpO2 96%   BMI 40.35 kg/m²   24 hour I&O: I/O last 3 completed shifts: In: 440 [P.O.:440]  Out: -   I/O this shift:  In: 30 [P.O.:30]  Out: -        General:  Alert, confused, not answering questions   neck: Supple, symmetrical, no JVD   Lung:  Clear to auscultation   heart: Irregular rate and irregular rhythm  Abdomen: SNTND, good bowel sound extremities:  Extremities normal, atraumatic, no cyanosis or edema. Distal pulses equal bilaterally  Skin: Skin color, texture, turgor normal, no rashes or lesions  Musculoskeletal: No joint swelling, no muscle tenderness   Neurologic:  Difficult to arouse, somnolent  Labs:  Na/K/Cl/CO2:  145/4.1/103/33 (05/02 1104)  BUN/Cr/glu/ALT/AST/amyl/lip:  17/0.7/--/--/--/--/-- (05/02 1104)     estimated creatinine clearance is 74 mL/min (based on SCr of 0.7 mg/dL). Other pertinent labs as noted below    Radiology:  XR CHEST PORTABLE   Final Result   No change in small to moderate bilateral pleural effusions. XR CHEST PORTABLE   Final Result   Bilateral pulmonary opacifications right greater than left with probable   small right and small to moderate left potential layering pleural effusions. US DUP UPPER EXTREMITY RIGHT VENOUS   Final Result   Within the visualized vessels there is no evidence for deep venous   thrombosis               XR CHEST PORTABLE   Final Result   Bibasilar atelectasis and or infiltrate as well as right pleural effusion. Mildly worsened aeration on the left. XR ABDOMEN FOR NG/OG/NE TUBE PLACEMENT   Final Result   Gastric catheter passes into the stomach, tip is in the gastric body. No   complications. XR CHEST PORTABLE   Final Result   1. Significant interval improvement in the left lung lower lung and lingular   consolidation   2. Increased right pleural effusion and or atelectasis   3. Cardiomegaly, stable   4.  The position of the nasogastric tube is within normal range         XR CHEST PORTABLE   Final Result   Increased markings seen in the lung bases bilaterally with small bilateral   pleural effusions not significantly changed in the interval.  No new findings. XR ABDOMEN (KUB) (SINGLE AP VIEW)   Final Result   No acute intra-process seen. Left renal 12 mm calculus. CT HEAD WO CONTRAST   Final Result   1. No acute intracranial abnormality. 2. Chronic small vessel ischemic disease. 3. Left frontal scalp hematoma. XR CHEST PORTABLE   Final Result   Cardiomegaly, pulmonary venous hypertension and small effusions. Findings   suggest volume overload. Mild right basilar atelectasis. CTA PULMONARY W CONTRAST   Final Result   No evidence of pulmonary embolism. Small left and moderate size right pleural effusions. A moderate size area of consolidation is noted involving the right lower   lung, concerning for an underlying airspace disease process. RECOMMENDATIONS:   Unavailable         XR CHEST PORTABLE   Final Result   New opacities in right lung base suggestive of pneumonia, atelectasis, and   possible right pleural effusion. CT Head WO Contrast   Final Result   No acute intracranial abnormality or hemorrhage. Cortical atrophy and periventricular leukomalacia. Left frontal scalp hematoma. Right frontal sinusitis and right mastoiditis. CT Cervical Spine WO Contrast   Final Result   No acute abnormality of the cervical spine. Multilevel degenerative changes, no significant interval change. Right pleural effusion. A/P:  Principal Problem:    Hypernatremia  Active Problems:    Permanent atrial fibrillation (HCC)    Essential hypertension    Chronic obstructive pulmonary disease (HCC)    Frequent falls    AMS (altered mental status)    Head injury, initial encounter    Acute encephalopathy  Resolved Problems:    * No resolved hospital problems. *    Acute Encephalopathy   -Patient mental status is waxing and waning. This is appear to be a chronic problem for her.   She is able to occasionally have very minimal conversations and follow commands. Other times she has poor mentation, becomes lethargic or agitated.  Likely this could be secondary to age-related dementia changes with waxing and waning of this. - Initial imaging showed possible right lobe pneumonia vs pleural effusion  -Ct head no acute ischemia or bleeding  - likely secondary to  metabolic, hepatic, septic, drug induced, worsening underlying dementia   -Urine culture grew E. coli .   Completed total 7 days of Rocephin  - neuro consulted, recommneds to EEG if worsening   - Stop ativan, as per pulmonary increase Depakote to 500 twice daily and started on Lexapro 10 Mg daily    Hypoxic respiratory failure 2/2 Pneumonia (Viral vs bacterial) VS  Pleural EFFUSION   - Likely covid induced with secondary bacterial  - Repeat chest x-ray showed to moderate bilateral pleural effusion, no worsening  -Repeat Rapid Covid is negative  - Mild leucocytosis on admission, resolved  -No plan for thoracocentesis at this time  - Continue oxygen supplement and wean as tolerated  -Pulmonary is following,   -CT angio no PE  - will resume lasix 20 mg daily    A fib with RVR  - On last admission Coumadin was discontinued due to high for bleeding despite of high CHADVAS score after discussion with patient daughter  - Will continue to hold Coumadin  -Heart rate improving  -Cardio consulted on admission and increase metoprolol to 100 mg daily on 4/23/2022  -Will hold on iv medication because of  - Last Echo in 2018, >55% EF, Interdeterminate diastolic dysfunction  consider repeating during this admission   - Keep mag/k/phos >2/4     Multiple fall  - Wound on left forehead  - Wound care is following patient  - No need suture  - Fall precaution  - PT/OT eval      Hypothyroidism  - Recent TSH normal  - Continue current regimen      Hypertension  -Increase metoprolol to 100mg daily        DVT / GI prophylaxis: Lovenox 40 mg SC     Dispo  CODE STATUS comfort care.  Waiting for placement.  Discuss with fam regarding increased work of breathing- palliative reconsulted     Electronically signed by Alexis Lopez MD PGY-3 on 5/3/2022 at 6:43 AM     This case was discussed with attending physician: Dr. Sanjana Mccann

## 2022-05-03 NOTE — CARE COORDINATION
5/3 Update CM note. Received after hours call from patient's daughter Rain Mora. CM informed her of insurance denial for patient to go to [facility] skilled and would require LTC. Rain Mora states she has appointment with  on Saturday, 5/7 to start Medicaid application. CM provided Rain Mora with [facility], [phone] and instructed her to call this AM to get process started as patient cannot remain in acute care setting if medically stable for discharge, she verbalized understanding. Message sent to be Juan at [facility] regarding above and to contact CM if she does not hear from Rain Mora. Patient remains restraint free but with sitter at bedside for safety.      ESTEFANY Xiong, RN  [facility] Case Management   Cell: [phone]

## 2022-05-03 NOTE — CARE COORDINATION
Per Gerardo from [facility] @ the [place], a [facility] application was started when patient was at [facility]. The business office at [facility] asked the family to bring in the paperwork that was used for the application. If the patient meets the criteria for Medicaid then they can accept the patient today. I did speak with the patient's daughter Adrián Ruvalcaba who said she is meeting with the business office at [facility] today. Ambulance form in envelope in soft chart.   Amanda Diallo RN   [phone]

## 2022-05-03 NOTE — PROGRESS NOTES
Department of Internal Medicine  Nephrology Attending Progress Note        SUBJECTIVE:  Mrs Orourke Overall did not wear her BiPAP during the night, did eat breakfast today, is less responsive to questions today.       PMH  History of chronic A. fib on Coumadin  Coronary disease  History of heart failure with preserved ejection fraction  RVSP mild 48 mm Hg  Hypertension  History of osteoarthritis status post bilateral knee replacement, total left shoulder  History of urinary incontinence with history of bladder suspension   hyperparathyroid disease status post parathyroidectomy  History of basal cell carcinoma on the neck,   Thyroidectomy for multinodular goiter on replacement therapy 1960  History of renal lithiasis  Cholecystectomy 1972  Bilateral cataract extractions with lens implant  COVID-19 infection  Dementia  COPD      Physical Exam:    Vitals:    05/03/22 1104   BP: (!) 152/90   Pulse: 118   Resp: 20   Temp: 97.8 °F (36.6 °C)   SpO2:        I/O last 24 hours:  Intake/Output inaccurate    Weight: 249 with 2 blankets    General Appearance: less awake today  Skin: Stasis dermatitis changes lower extremity,  left upper skull lesion has improved  Neck:  neck- supple, no mass, non-tender and no bruits  Lungs: Distant breath sounds no wheezing or rhonchi  Heart: Irregular rhythm rate controlled     Abdominal: Soft nontender, patient has wick device in place  Extremities: Bilateral knee surgical scars right knee seems slightly swollen  Peripheral Pulses:  +2    DATA:    CBC with Differential:    Lab Results   Component Value Date    WBC 5.6 04/26/2022    RBC 4.56 04/26/2022    HGB 13.1 04/26/2022    HCT 45.2 04/26/2022     04/26/2022    MCV 99.1 04/26/2022    MCH 28.7 04/26/2022    MCHC 29.0 04/26/2022    RDW 14.8 04/26/2022    NRBC 0.9 12/29/2016    SEGSPCT 54 03/17/2014    METASPCT 0.9 01/05/2022    LYMPHOPCT 17.5 04/19/2022    PROMYELOPCT 0.9 12/31/2016    MONOPCT 11.2 04/19/2022    MYELOPCT 0.9 01/06/2022 BASOPCT 1.7 04/19/2022    MONOSABS 0.47 04/19/2022    LYMPHSABS 0.73 04/19/2022    EOSABS 0.29 04/19/2022    BASOSABS 0.07 04/19/2022     CMP:    Lab Results   Component Value Date     05/02/2022    K 4.1 05/02/2022    K 3.6 04/19/2022     05/02/2022    CO2 33 05/02/2022    BUN 17 05/02/2022    CREATININE 0.7 05/02/2022    GFRAA >60 05/02/2022    LABGLOM >60 05/02/2022    GLUCOSE 107 05/02/2022    GLUCOSE 113 02/14/2012    PROT 5.8 04/22/2022    LABALBU 3.1 04/22/2022    LABALBU 4.4 02/14/2012    CALCIUM 10.1 05/02/2022    BILITOT 0.7 04/22/2022    ALKPHOS 86 04/22/2022    AST 26 04/22/2022    ALT 15 04/22/2022     Magnesium:    Lab Results   Component Value Date    MG 2.2 04/25/2022     Phosphorus:    Lab Results   Component Value Date    PHOS 2.7 04/26/2022     U/A:    Lab Results   Component Value Date    NITRITE negative 07/05/2019    COLORU Yellow 04/13/2022    PROTEINU Negative 04/13/2022    PHUR 6.0 04/13/2022    LABCAST RARE 09/16/2019    WBCUA 1-3 04/13/2022    RBCUA 2-5 04/13/2022    RBCUA 1-3 03/15/2014    BACTERIA NONE SEEN 04/13/2022    CLARITYU Clear 04/13/2022    SPECGRAV 1.020 04/13/2022    LEUKOCYTESUR TRACE 04/13/2022    UROBILINOGEN 1.0 04/13/2022    BILIRUBINUR Negative 04/13/2022    BILIRUBINUR negative 07/05/2019    BLOODU SMALL 04/13/2022    GLUCOSEU Negative 04/13/2022    AMORPHOUS FEW 09/20/2019     ABG:    Lab Results   Component Value Date    PH 7.416 04/22/2022    PCO2 47.0 04/22/2022    PO2 81.8 04/22/2022    HCO3 29.5 04/22/2022    BE 4.3 04/22/2022    O2SAT 95.9 04/22/2022     Urine osmolarity 522  Urine sodium 142      Presence of bilateral renal calculi nonobstructing       hydroCHLOROthiazide  12.5 mg Oral Daily    divalproex  500 mg Oral 2 times per day    escitalopram  10 mg Oral Daily    metoprolol succinate  100 mg Oral Daily    budesonide  0.5 mg Nebulization BID    metoprolol  5 mg IntraVENous Q6H    furosemide  20 mg Oral Daily    LORazepam  0.5 mg IntraMUSCular Once    lidocaine  5 mL IntraDERmal Once    heparin flush  1 mL IntraVENous 2 times per day    levalbuterol  1 ampule Nebulization BID    levothyroxine  175 mcg Oral Daily      sodium chloride      dextrose       metoprolol tartrate, OLANZapine, acetaminophen, sodium chloride flush, sodium chloride, heparin flush, glucose, dextrose, glucagon (rDNA), dextrose, sodium chloride flush, ondansetron **OR** ondansetron, polyethylene glycol    IMPRESSION/RECOMMENDATIONS:    Hypernatremia , sodium stable at 145, will repeat cxr may be able to decrease diuretics as po intake poor   Hypokalemia replete   History of heart failure with preserved ejection fraction monitor weights   COVID infection s/p Decadron  Hypertension controlled  A. fib with controlled rate      Katerin Tsai MD  5/3/2022 11:19 AM

## 2022-05-03 NOTE — PROGRESS NOTES
[address] [facility]  Family Medicine Attending      S:  Sitter at bedside. Patient is resting comfortably. Only groans to sternal rub. Patient remains DNR CC. Awaiting placement at [facility]. Patient would benefit from hospice. This will need discussed again with patient's POA. O: VS- Blood pressure (!) 134/94, pulse 116, temperature 98.1 °F (36.7 °C), temperature source Temporal, resp. rate 20, height 5' 6\" (1.676 m), weight 251 lb 2 oz (113.9 kg), SpO2 98 %,   Gen:sleeping; resting comfortably. CV irreg irreg, tachy   Resp decreased (more reduced on the left), no wheezing. Abd soft nt nd   Ext - trace lower extremity edema. Impressions:   Principal Problem:    Hypernatremia  Active Problems:    Permanent atrial fibrillation (HCC)    Essential hypertension    Chronic obstructive pulmonary disease (HCC)    Frequent falls    AMS (altered mental status)    Head injury, initial encounter    Acute encephalopathy        Plan:   DNR CC, Discharge pending placement. Agitation - improved with increased depakote, Lexapro. Seems to have responded well. Psychiatry consulted to assist w med management for safety. No further recs per their service given good response to depakote/lexapro. Pleural effusion, hypercapnia, covid - BTx prn for now. Continue NC O2 prn. Coninue bipap qHS as tolerated.; wheezing-continue breathing treatment; palliative protocols for comfort meds. Called for peer to peer on 5/2/22. Patient did not qualify for the SNF that was being attempted. Would qualify more for ECF with memory unit. Discussed with , Nanette Alvarez, and they are trying to get patient on Medicaid. Attending Physician Statement  I have reviewed the chart and seen the patient with the resident(s). I personally reviewed images, EKG's and similar tests, if present.   I personally reviewed and performed key elements of the history and exam.  I have reviewed and confirmed student and/or resident history and exam with changes as indicated above. I agree with the assessment, plan and orders as documented by the resident. Please refer to the resident and/or student note for additional information.       José Luis Claros MD

## 2022-05-03 NOTE — PROGRESS NOTES
Date: 5/2/2022    Time: 9:59 PM    Patient Placed On BIPAP/CPAP/ Non-Invasive Ventilation? Yes    If no must comment. Facial area red/color change? No           If YES are Blister/Lesion present? No   If yes must notify nursing staff  BIPAP/CPAP skin barrier?   No    Skin barrier type: aspiration risk       Comments:        Lei Mac

## 2022-05-04 PROBLEM — S09.90XA HEAD INJURY, INITIAL ENCOUNTER: Status: RESOLVED | Noted: 2022-01-01 | Resolved: 2022-01-01

## 2022-05-04 PROBLEM — E87.0 HYPERNATREMIA: Status: RESOLVED | Noted: 2022-01-01 | Resolved: 2022-01-01

## 2022-05-04 NOTE — PROGRESS NOTES
[address]   Family Medicine Attending      S:  Sitter at bedside. Patient is resting comfortably. Patient is awake. Does not squeeze my hands to command. Shakes her head, but is not comprehending our conversation. CXR ordered by nephro yesterday with mild worsening of CHF picture. Patient remains DNR CC. Awaiting placement at [facility]. Patient would benefit from hospice. This will need discussed again with patient's POA. O: VS- Blood pressure (!) 134/94, pulse 116, temperature 98.1 °F (36.7 °C), temperature source Temporal, resp. rate 20, height 5' 6\" (1.676 m), weight 251 lb 2 oz (113.9 kg), SpO2 98 %,   Gen:awake and confused; does not answer by questions or follow commends. CV irreg irreg, tachy   Resp a little better today   Abd soft nt nd   Ext - trace lower extremity edema. Impressions:   Principal Problem:    Hypernatremia  Active Problems:    Permanent atrial fibrillation (HCC)    Essential hypertension    Chronic obstructive pulmonary disease (HCC)    Frequent falls    AMS (altered mental status)    Head injury, initial encounter    Acute encephalopathy        Plan:   DNR CC, Discharge pending placement. Agitation - improved with increased depakote, Lexapro. Seems to have responded well. Psychiatry consulted to assist w med management for safety. No further recs per their service given good response to depakote/lexapro. Pleural effusion, hypercapnia, covid - BTx prn for now. Continue NC O2 prn. Coninue bipap qHS as tolerated.; wheezing-continue breathing treatment; palliative protocols for comfort meds. Called for peer to peer on 5/2/22. Patient did not qualify for the SNF that was being attempted. Would qualify more for ECF with memory unit. Discussed with , Daniel Simmons, and they are trying to get patient on Medicaid. Patient remains ready for discharge.      Attending Physician Statement  I have reviewed the chart and seen the patient with the resident(s). I personally reviewed images, EKG's and similar tests, if present. I personally reviewed and performed key elements of the history and exam.  I have reviewed and confirmed student and/or resident history and exam with changes as indicated above. I agree with the assessment, plan and orders as documented by the resident. Please refer to the resident and/or student note for additional information.       Darcie Vences MD

## 2022-05-04 NOTE — PROGRESS NOTES
Per [facility] with CM, patient can discharge to [facility] @ the [facility]. Called [facility] for transportation, no availabilities. Called [facility], due to high volume of calls earliest time for pickup is 0700 tomorrow morning. Attempted [facility], no availabilities.   Called [facility], patient to be transported between 830-9pm.

## 2022-05-04 NOTE — PROGRESS NOTES
Nurse to nurse called to [facility] @ [facility].  All questions answered

## 2022-05-04 NOTE — CARE COORDINATION
Spoke with Sana from [facility] at the [place]. She is checking with their business office to see if patent qualifies and can discharge their pending Medicaid. Await call back. Ambulance form in envelope in soft chart. Melly Garg RN CM  [phone]      Per Sana at [phone], the business office said they have not received the paperwork form the patient's daughter Rolanda Riddles regarding the Medicaid application. I called and left a voicemail message for Rolanda Riddles stressing the importance and urgency of getting that paperwork there.   Melly Garg RN CM  [phone]

## 2022-05-04 NOTE — PROGRESS NOTES
Pulmonary [facility]    Pulmonary Consult/Progress Note :    Reason for Consultation:COVID Pneumonia     CC : SOB      SUBJECTIVE:    No CPAP used  Confusion noted /reproted  CXR with HF consistent with new low EF      PHYSICAL EXAMINATION:     VITAL SIGNS:  BP (!) 153/94   Pulse 125   Temp 98.1 °F (36.7 °C) (Axillary)   Resp 18   Ht 5' 6\" (1.676 m)   Wt 243 lb 4 oz (110.3 kg)   SpO2 98%   BMI 39.26 kg/m²   Wt Readings from Last 3 Encounters:   05/04/22 243 lb 4 oz (110.3 kg)   03/30/22 270 lb 4.8 oz (122.6 kg)   03/14/22 250 lb (113.4 kg)     Temp Readings from Last 3 Encounters:   05/04/22 98.1 °F (36.7 °C) (Axillary)   03/30/22 96.5 °F (35.8 °C) (Temporal)   03/14/22 98.5 °F (36.9 °C) (Oral)     TMAX:  BP Readings from Last 3 Encounters:   05/04/22 (!) 153/94   03/30/22 (!) 144/97   03/15/22 118/82     Pulse Readings from Last 3 Encounters:   05/04/22 125   03/30/22 110   03/15/22 71     INTAKE/OUTPUTS:  I/O last 3 completed shifts: In: 30 [P.O.:30]  Out: -     Intake/Output Summary (Last 24 hours) at 5/4/2022 1331  Last data filed at 5/4/2022 1017  Gross per 24 hour   Intake 120 ml   Output    Net 120 ml     General Appearance: Alert confused  Eyes: conjunctivae normal and sclera anicteric   Neck:and noadenopathy   Pulmonary/Chest:Course  Cardiovascular: Afib,   Abdomen: obese, soft, non-tender, non-distended, n  Extremities edema trace, vasc insuff  Musculoskeletal: no deformity or tenderness   Neurologic:No  cranial nerve deficits     LABS/IMAGING:    ECHOCARDIOGRAM:  Mild aortic regurgitation.    Severe pulmonary hypertension.    Large left pleural effusion.    Cardiomyopathy with an EF 25-30%.          CBC:   Lab Results   Component Value Date    WBC 5.6 04/26/2022    RBC 4.56 04/26/2022    HGB 13.1 04/26/2022    HCT 45.2 04/26/2022    MCV 99.1 04/26/2022    MCH 28.7 04/26/2022    MCHC 29.0 04/26/2022    RDW 14.8 04/26/2022     04/26/2022    MPV 11.4 04/26/2022     CMP: Lab Results   Component Value Date     05/02/2022    K 4.1 05/02/2022    K 3.6 04/19/2022     05/02/2022    CO2 33 05/02/2022    BUN 17 05/02/2022    CREATININE 0.7 05/02/2022    GFRAA >60 05/02/2022    LABGLOM >60 05/02/2022    GLUCOSE 107 05/02/2022    GLUCOSE 113 02/14/2012    PROT 5.8 04/22/2022    LABALBU 3.1 04/22/2022    LABALBU 4.4 02/14/2012    CALCIUM 10.1 05/02/2022    BILITOT 0.7 04/22/2022    ALKPHOS 86 04/22/2022    AST 26 04/22/2022    ALT 15 04/22/2022     Calcium:    Lab Results   Component Value Date    CALCIUM 10.1 05/02/2022     Phosphorus:    Lab Results   Component Value Date    PHOS 2.7 04/26/2022               ASSESSMENT:  1.) Acute hypoxic /hypercpnic resp failure  2.) Fluid overload/CHF  3.) Right side effusion > left   4.) COVID pneumonia  5.) Fall /A fib .was on coumadin  6.) New LV dysfunction      PLAN:  1. BiPAP nightly  2. No narcotics or benzodiaz  3. Bronchodilators for COPD   4. Waiting placement  5.  Diuresis      Oleg Cowart DO, MPH, [facility], [facility], [address]

## 2022-05-04 NOTE — PROGRESS NOTES
[facility] Inpatient   Resident Progress Note    S:  Hospital day: 24   Brief Synopsis: Iggy Baker is a 81 y.o. female PMH of atrial fibrillation (on Coumadin ), coronary artery disease, CHF, GERD, hyperparathyroidism, hypertension, hypothyroidism, osteoarthritis and urinary incontinenc of who presents to ED for Abnormal Lab (pt new pt to [facility], came in for abnormal labs. pt with frequent falls and they state she has a change in mentation. ER labs showed H/H 11.4/38.0, sodium 152, potassium 3.9, chloride 108, BUN 23, creatinine 0.9, AST 40, alkaline phosphatase 127, INR 2, urine analysis leukocyte esterase with WBC 1-3, troponin 24 x25, normal lactic acid. EKG showed A. fib with RVR and chest x-ray new opacity in the right lung base. Opens eyes, not following commands really. Better air movement today. Cont meds:    sodium chloride      dextrose       Scheduled meds: Patient is awake and alert.  hydroCHLOROthiazide  12.5 mg Oral Daily    divalproex  500 mg Oral 2 times per day    escitalopram  10 mg Oral Daily    metoprolol succinate  100 mg Oral Daily    budesonide  0.5 mg Nebulization BID    metoprolol  5 mg IntraVENous Q6H    furosemide  20 mg Oral Daily    LORazepam  0.5 mg IntraMUSCular Once    lidocaine  5 mL IntraDERmal Once    heparin flush  1 mL IntraVENous 2 times per day    levalbuterol  1 ampule Nebulization BID    levothyroxine  175 mcg Oral Daily     PRN meds: metoprolol tartrate, OLANZapine, acetaminophen, sodium chloride flush, sodium chloride, heparin flush, glucose, dextrose, glucagon (rDNA), dextrose, sodium chloride flush, ondansetron **OR** ondansetron, polyethylene glycol     I reviewed the patient's past medical and surgical history, Medications and Allergies.     O:  BP (!) 134/97   Pulse 102   Temp 97.6 °F (36.4 °C) (Temporal)   Resp 18   Ht 5' 6\" (1.676 m)   Wt 243 lb 4 oz (110.3 kg)   SpO2 99%   BMI 39.26 kg/m²   24 hour I&O: I/O last 3 completed shifts: In: 230 [P.O.:230]  Out: -   No intake/output data recorded. General:  Alert, confused, not answering questions   neck: Supple, symmetrical, no JVD   Lung:  Clear to auscultation   heart: Irregular rate and irregular rhythm  Abdomen: SNTND, good bowel sound extremities:  Extremities normal, atraumatic, no cyanosis or edema. Distal pulses equal bilaterally  Skin: Skin color, texture, turgor normal, no rashes or lesions  Musculoskeletal: No joint swelling, no muscle tenderness   Neurologic:  Difficult to arouse, somnolent  Labs:  Na/K/Cl/CO2:  145/4.1/103/33 (05/02 1104)  BUN/Cr/glu/ALT/AST/amyl/lip:  17/0.7/--/--/--/--/-- (05/02 1104)     estimated creatinine clearance is 73 mL/min (based on SCr of 0.7 mg/dL). Other pertinent labs as noted below    Radiology:  XR CHEST PORTABLE   Final Result   Interval worsening of the CHF pattern, when compared to the previous study   performed 04/19/2022. XR CHEST PORTABLE   Final Result   No change in small to moderate bilateral pleural effusions. XR CHEST PORTABLE   Final Result   Bilateral pulmonary opacifications right greater than left with probable   small right and small to moderate left potential layering pleural effusions. US DUP UPPER EXTREMITY RIGHT VENOUS   Final Result   Within the visualized vessels there is no evidence for deep venous   thrombosis               XR CHEST PORTABLE   Final Result   Bibasilar atelectasis and or infiltrate as well as right pleural effusion. Mildly worsened aeration on the left. XR ABDOMEN FOR NG/OG/NE TUBE PLACEMENT   Final Result   Gastric catheter passes into the stomach, tip is in the gastric body. No   complications. XR CHEST PORTABLE   Final Result   1. Significant interval improvement in the left lung lower lung and lingular   consolidation   2. Increased right pleural effusion and or atelectasis   3. Cardiomegaly, stable   4.  The position of the nasogastric tube is within normal range         XR CHEST PORTABLE   Final Result   Increased markings seen in the lung bases bilaterally with small bilateral   pleural effusions not significantly changed in the interval.  No new findings. XR ABDOMEN (KUB) (SINGLE AP VIEW)   Final Result   No acute intra-process seen. Left renal 12 mm calculus. CT HEAD WO CONTRAST   Final Result   1. No acute intracranial abnormality. 2. Chronic small vessel ischemic disease. 3. Left frontal scalp hematoma. XR CHEST PORTABLE   Final Result   Cardiomegaly, pulmonary venous hypertension and small effusions. Findings   suggest volume overload. Mild right basilar atelectasis. CTA PULMONARY W CONTRAST   Final Result   No evidence of pulmonary embolism. Small left and moderate size right pleural effusions. A moderate size area of consolidation is noted involving the right lower   lung, concerning for an underlying airspace disease process. RECOMMENDATIONS:   Unavailable         XR CHEST PORTABLE   Final Result   New opacities in right lung base suggestive of pneumonia, atelectasis, and   possible right pleural effusion. CT Head WO Contrast   Final Result   No acute intracranial abnormality or hemorrhage. Cortical atrophy and periventricular leukomalacia. Left frontal scalp hematoma. Right frontal sinusitis and right mastoiditis. CT Cervical Spine WO Contrast   Final Result   No acute abnormality of the cervical spine. Multilevel degenerative changes, no significant interval change. Right pleural effusion. A/P:  Principal Problem:    Hypernatremia  Active Problems:    Permanent atrial fibrillation (HCC)    Essential hypertension    Chronic obstructive pulmonary disease (HCC)    Frequent falls    AMS (altered mental status)    Head injury, initial encounter    Acute encephalopathy  Resolved Problems:    * No resolved hospital problems. *    Acute Encephalopathy   -Patient mental status is waxing and waning. This is appear to be a chronic problem for her. She is able to occasionally have very minimal conversations and follow commands. Other times she has poor mentation, becomes lethargic or agitated.  Likely this could be secondary to age-related dementia changes with waxing and waning of this. - Initial imaging showed possible right lobe pneumonia vs pleural effusion  -Ct head no acute ischemia or bleeding  - likely secondary to  metabolic, hepatic, septic, drug induced, worsening underlying dementia   -Urine culture grew E. coli .   Completed total 7 days of Rocephin  - neuro consulted, recommneds to EEG if worsening   - Stop ativan, as per pulmonary increase Depakote to 500 twice daily and started on Lexapro 10 Mg daily    Hypoxic respiratory failure 2/2 Pneumonia (Viral vs bacterial) VS  Pleural EFFUSION   - Likely covid induced with secondary bacterial  - Repeat chest x-ray showed to moderate bilateral pleural effusion, no worsening  -Repeat Rapid Covid is negative  - Mild leucocytosis on admission, resolved  -No plan for thoracocentesis at this time  - Continue oxygen supplement and wean as tolerated  -Pulmonary is following,   -CT angio no PE  - will resume lasix 20 mg daily    A fib with RVR  - On last admission Coumadin was discontinued due to high for bleeding despite of high CHADVAS score after discussion with patient daughter  - Will continue to hold Coumadin  -Heart rate improving  -Cardio consulted on admission and increase metoprolol to 100 mg daily on 4/23/2022  -Will hold on iv medication because of  - Last Echo in 2018, >55% EF, Interdeterminate diastolic dysfunction  consider repeating during this admission   - Keep mag/k/phos >2/4     Multiple fall  - Wound on left forehead  - Wound care is following patient  - No need suture  - Fall precaution  - PT/OT eval      Hypothyroidism  - Recent TSH normal  - Continue current regimen      Hypertension  -Increase metoprolol to 100mg daily        DVT / GI prophylaxis: Lovenox 40 mg SC     Dispo  Await placement     Electronically signed by Crystal Dumont MD PGY-3 on 5/4/2022 at 6:41 AM     This case was discussed with attending physician: Dr. Markus Chairez

## 2022-05-04 NOTE — PROGRESS NOTES
Department of Internal Medicine  Nephrology Attending Progress Note        SUBJECTIVE:  Mrs Yenifer Armas did not wear her BiPAP during the night, did eat breakfast today when fed, is complaining today but not expressing what is bothering her .   Her chest x-ray is showing worsening fluid retention      PMH  History of chronic A. fib on Coumadin  Coronary disease  History of heart failure with decreased ejection fraction EF 20-25%  RVSP mild 48 mm Hg  Hypertension  History of osteoarthritis status post bilateral knee replacement, total left shoulder  History of urinary incontinence with history of bladder suspension   hyperparathyroid disease status post parathyroidectomy  History of basal cell carcinoma on the neck,   Thyroidectomy for multinodular goiter on replacement therapy 1960  History of renal lithiasis  Cholecystectomy 1972  Bilateral cataract extractions with lens implant  COVID-19 infection  Dementia  COPD      Physical Exam:    Vitals:    05/04/22 0806   BP:    Pulse:    Resp: 18   Temp:    SpO2: 98%       I/O last 24 hours:  Intake/Output inaccurate    Weight: 243    General Appearance: less awake today  Skin: Stasis dermatitis changes lower extremity,  left upper skull lesion has improved  Neck:  neck- supple, no mass, non-tender and no bruits  Lungs: Distant breath sounds no wheezing or rhonchi  Heart: Irregular rhythm rate controlled     Abdominal: Soft nontender, patient has wick device in place  Extremities: Bilateral knee surgical scars right knee seems slightly swollen  Peripheral Pulses:  +2    DATA:    CBC with Differential:    Lab Results   Component Value Date    WBC 5.6 04/26/2022    RBC 4.56 04/26/2022    HGB 13.1 04/26/2022    HCT 45.2 04/26/2022     04/26/2022    MCV 99.1 04/26/2022    MCH 28.7 04/26/2022    MCHC 29.0 04/26/2022    RDW 14.8 04/26/2022    NRBC 0.9 12/29/2016    SEGSPCT 54 03/17/2014    METASPCT 0.9 01/05/2022    LYMPHOPCT 17.5 04/19/2022    PROMYELOPCT 0.9 12/31/2016 MONOPCT 11.2 04/19/2022    MYELOPCT 0.9 01/06/2022    BASOPCT 1.7 04/19/2022    MONOSABS 0.47 04/19/2022    LYMPHSABS 0.73 04/19/2022    EOSABS 0.29 04/19/2022    BASOSABS 0.07 04/19/2022     CMP:    Lab Results   Component Value Date     05/02/2022    K 4.1 05/02/2022    K 3.6 04/19/2022     05/02/2022    CO2 33 05/02/2022    BUN 17 05/02/2022    CREATININE 0.7 05/02/2022    GFRAA >60 05/02/2022    LABGLOM >60 05/02/2022    GLUCOSE 107 05/02/2022    GLUCOSE 113 02/14/2012    PROT 5.8 04/22/2022    LABALBU 3.1 04/22/2022    LABALBU 4.4 02/14/2012    CALCIUM 10.1 05/02/2022    BILITOT 0.7 04/22/2022    ALKPHOS 86 04/22/2022    AST 26 04/22/2022    ALT 15 04/22/2022     Magnesium:    Lab Results   Component Value Date    MG 2.2 04/25/2022     Phosphorus:    Lab Results   Component Value Date    PHOS 2.7 04/26/2022     U/A:    Lab Results   Component Value Date    NITRITE negative 07/05/2019    COLORU Yellow 04/13/2022    PROTEINU Negative 04/13/2022    PHUR 6.0 04/13/2022    LABCAST RARE 09/16/2019    WBCUA 1-3 04/13/2022    RBCUA 2-5 04/13/2022    RBCUA 1-3 03/15/2014    BACTERIA NONE SEEN 04/13/2022    CLARITYU Clear 04/13/2022    SPECGRAV 1.020 04/13/2022    LEUKOCYTESUR TRACE 04/13/2022    UROBILINOGEN 1.0 04/13/2022    BILIRUBINUR Negative 04/13/2022    BILIRUBINUR negative 07/05/2019    BLOODU SMALL 04/13/2022    GLUCOSEU Negative 04/13/2022    AMORPHOUS FEW 09/20/2019     ABG:    Lab Results   Component Value Date    PH 7.416 04/22/2022    PCO2 47.0 04/22/2022    PO2 81.8 04/22/2022    HCO3 29.5 04/22/2022    BE 4.3 04/22/2022    O2SAT 95.9 04/22/2022     Urine osmolarity 522  Urine sodium 142      Presence of bilateral renal calculi nonobstructing       hydroCHLOROthiazide  12.5 mg Oral Daily    divalproex  500 mg Oral 2 times per day    escitalopram  10 mg Oral Daily    metoprolol succinate  100 mg Oral Daily    budesonide  0.5 mg Nebulization BID    metoprolol  5 mg IntraVENous Q6H    furosemide  20 mg Oral Daily    LORazepam  0.5 mg IntraMUSCular Once    lidocaine  5 mL IntraDERmal Once    heparin flush  1 mL IntraVENous 2 times per day    levalbuterol  1 ampule Nebulization BID    levothyroxine  175 mcg Oral Daily      sodium chloride      dextrose       metoprolol tartrate, OLANZapine, acetaminophen, sodium chloride flush, sodium chloride, heparin flush, glucose, dextrose, glucagon (rDNA), dextrose, sodium chloride flush, ondansetron **OR** ondansetron, polyethylene glycol    IMPRESSION/RECOMMENDATIONS:    Hypernatremia , sodium stable at 145, will continue present doses of diuretics as chest x-ray still suggesting fluid overload , but will change from Lasix to Demadex for better p.o. absorption.    hypokalemia replete   History of heart failure with decreased ejection fraction monitor weights   COVID infection s/p Decadron  Hypertension controlled  A. fib with controlled rate      Isauro Romano MD  5/4/2022 12:15 PM

## 2022-05-04 NOTE — DISCHARGE INSTR - COC
Continuity of Care Form    Patient Name: Abad Teresa   :  1935  MRN:  [id number]    Admit date:  2022  Discharge date:  22    Code Status Order: DNR-CC   Advance Directives:      Admitting Physician:  Srini Garduno DO  PCP: No primary care provider on file. Discharging Nurse: [facility] Unit/Room#: 7973/4305-J  Discharging Unit Phone Number: [phone]    Emergency Contact:   Extended Emergency Contact Information  Primary Emergency Contact: Nicolasa Celeste  Address: UNKNOWN BY PATIENT   [address] Phone: [phone]  Mobile Phone: [phone]  Relation: Child  Secondary Emergency Contact: Yessy Smith  Mobile Phone: [phone]  Relation: Son-in-Law  Preferred language: [place]   needed? No    Past Surgical History:  Past Surgical History:   Procedure Laterality Date    CARDIAC CATHETERIZATION  3/22/14    CARPAL TUNNEL RELEASE      Right hand. CATARACT REMOVAL  2005    right, bilateral cataract surgery. CATARACT REMOVAL WITH IMPLANT Bilateral     CHOLECYSTECTOMY      COLONOSCOPY      FINGER SURGERY  2011    S/P removal of cyst from right index finger. FRACTURE SURGERY Left     arm fractured-casted, Bilateral feet fractures-casted. INCONTINENCE SURGERY  2005    Urinary incontinence, S/P bladder suspension. INNER EAR SURGERY      tube place in right ear    [facility] ARTHROPLASTY Bilateral 2005    PARATHYROIDECTOMY      Three times    SHOULDER ARTHROPLASTY Left 2009    SHOULDER ARTHROPLASTY Left      shoulder total replacement.     THYROID SURGERY  2005    recurrent    THYROIDECTOMY  1960       Immunization History:   Immunization History   Administered Date(s) Administered    COVID-19, Pfizer Purple top, DILUTE for use, 12+ yrs, 30mcg/0.3mL dose 2021, 02/15/2021    Influenza Vaccine, unspecified formulation 2016    Influenza Virus Vaccine 09/29/2014, 10/16/2018    Influenza, MDCK Quadv, IM, PF (Flucelvax 2 yrs and older) 09/21/2017    Influenza, Quadv, adjuvanted, 65 yrs +, IM, PF (Fluad) 12/14/2020    Influenza, Triv, inactivated, subunit, adjuvanted, IM (Fluad 65 yrs and older) 10/31/2019    Pneumococcal Conjugate 13-valent ([id number]) 09/17/2015    Pneumococcal Polysaccharide ([id number]) 01/04/2011    Tdap (Boostrix, Adacel) 09/01/2015       Active Problems:  Patient Active Problem List   Diagnosis Code    Depression F32. A    Hypothyroidism E03.9    Permanent atrial fibrillation (HCC) I48.21    History of artificial lens replacement Z96.1    Essential hypertension I10    Moderate obesity E66.8    Greater trochanteric bursitis M70.60    Chronic anticoagulation Z79.01    Chronic obstructive pulmonary disease (HCC) J44.9    Mixed stress and urge urinary incontinence N39.46    Fuchs' endothelial dystrophy H18.519    Pseudophakia, both eyes Z96.1    Chronic diastolic (congestive) heart failure (HCC) I50.32    Pure hypercholesterolemia E78.00    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    Patent foramen ovale Q21.1    Coronary arteriosclerosis in native artery I25.10    Frequent falls R29.6    Dementia (HCC) F03.90    Ambulatory dysfunction R26.2    Cognitive dysfunction F09    COVID-19 virus infection U07.1    Nephrolithiasis N20.0    AMS (altered mental status) R41.82    Laceration of fascia of head S01. 91XA    Head injury, initial encounter S09.90XA    Hypernatremia E87.0    Acute encephalopathy G93.40       Isolation/Infection:   Isolation            No Isolation          Patient Infection Status       Infection Onset Added Last Indicated Last Indicated By Review Planned Expiration Resolved Resolved By    None active    Resolved    COVID-19 04/13/22 04/13/22 04/13/22 Respiratory Panel, Molecular, with COVID-19 (Restricted: peds pts or suitable admitted adults)   04/25/22 Noel Jackson RN    Standard precautions    COVID-19 (Rule Out) 22 Respiratory Panel, Molecular, with COVID-19 (Restricted: peds pts or suitable admitted adults) (Ordered)   22 Rule-Out Test Resulted    COVID-19 22 COVID-19   22     COVID-19 (Rule Out) 22 COVID-19 & Influenza Combo (Ordered)   22 Rule-Out Test Resulted    COVID-19 (Rule Out) 21 COVID-19, Rapid (Ordered)   21 Rule-Out Test Resulted    COVID-19 (Rule Out) 21 COVID-19, Rapid (Ordered)   21 Rule-Out Test Resulted    COVID-19 (Rule Out) 05/10/20 05/10/20 05/10/20 COVID-19 (Ordered)   05/10/20 Rule-Out Test Resulted            Nurse Assessment:  Last Vital Signs: BP (!) 153/94   Pulse 125   Temp 98.1 °F (36.7 °C) (Axillary)   Resp 18   Ht 5' 6\" (1.676 m)   Wt 243 lb 4 oz (110.3 kg)   SpO2 98%   BMI 39.26 kg/m²     Last documented pain score (0-10 scale): Pain Level: 0  Last Weight:   Wt Readings from Last 1 Encounters:   22 243 lb 4 oz (110.3 kg)     Mental Status:  disoriented    IV Access:  - None    Nursing Mobility/ADLs:  Walking   Dependent  Transfer  Dependent  Bathing  Dependent  Dressing  Dependent  Toileting  Dependent  Feeding  Assisted  Med Admin  Assisted  Med Delivery   whole in yogurt/applesauce     Wound Care Documentation and Therapy:  Wound 22 Left forehead (Active)   Wound Image   22 0945   Wound Etiology Traumatic 22 0800   Dressing Status Clean;Dry; Intact 22 0123   Wound Cleansed Cleansed with saline 22 0123   Dressing/Treatment Open to air 22 1046   Wound Length (cm) 1.6 cm 22 0945   Wound Width (cm) 3 cm 22 0945   Wound Depth (cm) 0.1 cm 2245   Wound Surface Area (cm^2) 4.8 cm^2 22 0945   Wound Volume (cm^3) 0.48 cm^3 22 0945   Wound Assessment Other (Comment) 22 0800   Drainage Amount None 22 1219   Drainage Description Other (Comment) 04/24/22 2115   Kaylen-wound Assessment Dry/flaky 04/26/22 1219   Number of days: 38        Elimination:  Continence: Bowel: No  Bladder: No  Urinary Catheter: None   Colostomy/Ileostomy/Ileal Conduit: No       Date of Last BM: 5/4/22    Intake/Output Summary (Last 24 hours) at 5/4/2022 1238  Last data filed at 5/4/2022 1017  Gross per 24 hour   Intake 120 ml   Output --   Net 120 ml     I/O last 3 completed shifts: In: 30 [P.O.:30]  Out: -     Safety Concerns:     History of Falls (last 30 days) and At Risk for Falls    Impairments/Disabilities:      Vision    Nutrition Therapy:  Current Nutrition Therapy: Dysphagia soft and bite sized, low sodium - frozen oral supplement - 3x daily     Routes of Feeding: Oral  Liquids: No Restrictions  Daily Fluid Restriction: no  Last Modified Barium Swallow with Video (Video Swallowing Test): not done     Treatments at the Time of Hospital Discharge:   Respiratory Treatments: ***  Oxygen Therapy:  is on oxygen at 4 L/min per nasal cannula.   Ventilator:    - BiPAP   IPAP: 15 cmH20, CPAP/EPAP: 5 cmH2O only when sleeping    Rehab Therapies: Physical Therapy and Occupational Therapy  Weight Bearing Status/Restrictions: No weight bearing restrictions  Other Medical Equipment (for information only, NOT a DME order):  ***  Other Treatments: ***    Patient's personal belongings (please select all that are sent with patient):  None    RN SIGNATURE:  Electronically signed by Merlene Novoa on 5/4/22 at 6:06 PM EDT    CASE MANAGEMENT/SOCIAL WORK SECTION    Inpatient Status Date: ***    Readmission Risk Assessment Score:  Readmission Risk              Risk of Unplanned Readmission:  47           Discharging to Facility/ Agency   Name:   Address:  Phone:  Fax:    Dialysis Facility (if applicable)   Name:  Address:  Dialysis Schedule:  Phone:  Fax:    / signature: {Esignature:[id number]}    PHYSICIAN SECTION    Prognosis: Poor    Condition at Discharge: Stable    Rehab Potential (if transferring to Rehab): Poor    Recommended Labs or Other Treatments After Discharge: comfort care    Physician Certification: I certify the above information and transfer of Kayla Monge  is necessary for the continuing treatment of the diagnosis listed and that she requires [place] for greater 30 days.      Update Admission H&P: Changes in H&P as follows - DNR CC    PHYSICIAN SIGNATURE:  Electronically signed by Rafaela Brennan MD on 5/4/22 at 12:39 PM EDT

## 2022-05-05 NOTE — DISCHARGE SUMMARY
Physician Discharge Summary     Avelino Blair  [id number]    Admit date: 4/13/2022    Discharge date and time: 5/5/2022    Admitting Physician: Tono Siu DO     Admission Diagnoses:   Patient Active Problem List   Diagnosis    Depression    Hypothyroidism    Permanent atrial fibrillation ([address]    History of artificial lens replacement    Essential hypertension    Moderate obesity    Greater trochanteric bursitis    Chronic anticoagulation    Chronic obstructive pulmonary disease (HCC)    Mixed stress and urge urinary incontinence    Fuchs' endothelial dystrophy    Pseudophakia, both eyes    Chronic diastolic (congestive) heart failure ([address]    Pure hypercholesterolemia    Spinal stenosis of lumbar region with neurogenic claudication    Patent foramen ovale    Coronary arteriosclerosis in native artery    Frequent falls    Dementia ([address]    Ambulatory dysfunction    Cognitive dysfunction    COVID-19 virus infection    Nephrolithiasis    AMS (altered mental status)    Laceration of fascia of head    Acute encephalopathy       Discharge Diagnoses:   Patient Active Problem List   Diagnosis    Depression    Hypothyroidism    Permanent atrial fibrillation ([address]    History of artificial lens replacement    Essential hypertension    Moderate obesity    Greater trochanteric bursitis    Chronic anticoagulation    Chronic obstructive pulmonary disease (HCC)    Mixed stress and urge urinary incontinence    Fuchs' endothelial dystrophy    Pseudophakia, both eyes    Chronic diastolic (congestive) heart failure ([address]    Pure hypercholesterolemia    Spinal stenosis of lumbar region with neurogenic claudication    Patent foramen ovale    Coronary arteriosclerosis in native artery    Frequent falls    Dementia ([address]    Ambulatory dysfunction    Cognitive dysfunction    COVID-19 virus infection    Nephrolithiasis    AMS (altered mental status)    Laceration of fascia of head  Acute encephalopathy         Hospital Course: Russ Saul is a 81 y.o. female with past medical history of A. fib on Coumadin, CAD, CHF, GERD, hypertension who presented for evaluation of abnormal labs and altered mental status. She was sent from [facility] facility due to sodium of 151. Also multiple falls during the week prior. Coumadin had been discontinued at prior admission due to multiple falls at that time. Sodium 152 on admission, started on D5 with nephrology consulted. Started on Rocephin and Doxy for pneumonia. Sodium did correct with treatment. COVID test positive. Pulmonology was consulted, did place patient on BiPAP overnight that would not tolerate due to agitation. Continued to decline during hospitalization with waxing and waning mentation, hospice and palliative was consulted. Family initially considering hospice, then deciding for patient to try for rehab or back to [facility]. Insurance denial for this due to lack of ability to rehab. Ongoing work by case management to have patient placed at [facility] for extended care. Ultimately received approval 5/4 and was discharged to facility. She had an otherwise uneventful course and progressed well. Pain was controlled. She was tolerating a regular diet with no nausea or vomiting, was ambulating well, and was in a suitable condition for discharge to extended-care facility.      Lab Results   Component Value Date    WBC 5.6 04/26/2022    HGB 13.1 04/26/2022     04/26/2022     05/02/2022     05/02/2022    K 4.1 05/02/2022    K 3.6 04/19/2022    BUN 17 05/02/2022    CREATININE 0.7 05/02/2022    GLUCOSE 107 05/02/2022    GLUCOSE 113 02/14/2012    LABGLOM >60 05/02/2022    PROTIME 22.0 04/13/2022    PROTIME 25.4 11/02/2021    INR 2.0 04/13/2022    LABALBU 3.1 04/22/2022    LABALBU 4.4 02/14/2012    PROT 5.8 04/22/2022    CALCIUM 10.1 05/02/2022    MG 2.2 04/25/2022    PHOS 2.7 04/26/2022    BILITOT 0.7 04/22/2022    BILIDIR 0.3 08/04/2014     04/13/2022    ALKPHOS 86 04/22/2022    AST 26 04/22/2022    ALT 15 04/22/2022       Discharge Exam:   See progress note of the day of discharge. Disposition: long term care facility       Medication List      START taking these medications    divalproex 500 MG DR tablet  Commonly known as: Depakote  Take 1 tablet by mouth 2 times daily     OLANZapine 10 MG injection  Commonly known as: ZYPREXA  Inject 5 mg into the muscle every 8 hours as needed for Agitation     torsemide 20 MG tablet  Commonly known as: DEMADEX  Take 1 tablet by mouth daily        CHANGE how you take these medications    metoprolol succinate 100 MG extended release tablet  Commonly known as: TOPROL XL  Take 1 tablet by mouth daily  What changed:   · medication strength  · how much to take  · when to take this        CONTINUE taking these medications    acetaminophen 325 MG tablet  Commonly known as: TYLENOL     * Ventolin  (90 Base) MCG/ACT inhaler  Generic drug: albuterol sulfate HFA     * albuterol (2.5 MG/3ML) 0.083% nebulizer solution  Commonly known as: PROVENTIL  Take 3 mLs by nebulization every 6 hours as needed for Wheezing     escitalopram 10 MG tablet  Commonly known as: Lexapro  Take 1 tablet by mouth daily     famotidine 20 MG tablet  Commonly known as: PEPCID  Take 1 tablet by mouth 2 times daily     guaiFENesin 600 MG extended release tablet  Commonly known as: MUCINEX     levothyroxine 175 MCG tablet  Commonly known as: SYNTHROID  Take 1 tablet by mouth Daily     magnesium hydroxide 400 MG/5ML suspension  Commonly known as: MILK OF MAGNESIA  Take 5 mLs by mouth daily as needed for Constipation     Xopenex 0.63 MG/3ML nebulization  Generic drug: levalbuterol         * This list has 2 medication(s) that are the same as other medications prescribed for you. Read the directions carefully, and ask your doctor or other care provider to review them with you.             STOP taking these medications    ascorbic acid 500 MG tablet  Commonly known as: VITAMIN C     b complex vitamins capsule     fluticasone 50 MCG/ACT nasal spray  Commonly known as: FLONASE     furosemide 40 MG tablet  Commonly known as: LASIX     lisinopril 5 MG tablet  Commonly known as: PRINIVIL;ZESTRIL     loperamide 2 MG capsule  Commonly known as: IMODIUM     meclizine 25 MG tablet  Commonly known as: ANTIVERT     vitamin D 25 MCG (1000 UT) Tabs tablet  Commonly known as: CHOLECALCIFEROL           Where to Get Your Medications      These medications were sent to [facility] [address]    Phone: [phone]   · divalproex 500 MG DR tablet  · metoprolol succinate 100 MG extended release tablet  · OLANZapine 10 MG injection  · torsemide 20 MG tablet           Patient Instructions: Activity: activity as tolerated  Diet: As tolerated        Follow up:   No future appointments. Behzad Bean MD  [address] [facility]  [phone]    Call today  to schedule hospital follow up appointment, within 5-7 days    Anjelica Cope DO  [address]  [address]  [address] [place] [address],  [address]  [address] [address] [phone]          [place] [address]  [phone]             Signed:  Garry Meredith MD, MMARISA. PGY2  5/5/2022  3:49 PM      This note was dictated with [address].

## 2022-05-09 ENCOUNTER — CARE COORDINATION (OUTPATIENT)
Dept: CASE MANAGEMENT | Age: 87
End: 2022-05-09

## 2022-05-09 NOTE — PROGRESS NOTES
Physician Progress Note      PATIENTDes Zacarias  CSN #:                  [id number]  :                       1935  ADMIT DATE:       2022 12:09 PM  [address] DATE:        2022 9:11 PM  RESPONDING  PROVIDER #:        Immanuel Lopez MD          QUERY TEXT:    Dear Dr. Jassi Covington,    Pt admitted with abnormal labs. Pt noted to have pneumonia. If possible,   please document in the progress notes and discharge summary if you are   evaluating and /or treating any of the following: The medical record reflects the following:  Risk Factors: Advance age  Clinical Indicators: Pt admitted with Na 151/152 found to have pneumonia,   Prior to admission reported repeated falls, Pt agitated with increased   confusion and found to have metabolic encephalopathy, Pt suffered   hypoxic/hypercapnic respiratory failure, COVID test was (+) but previous query   was unable to be determined on presence of active COVID disease process's  Treatment: Rocephin, Doxycycline, IVF, Close ot monitoring, serial labs    Thank you,  Magdy ALLISON, RN  Clinical Documentation Improvement  [email or web address]  Cell phone: [phone]  Options provided:  -- Sepsis, present on admission  -- Pneumonia without Sepsis  -- Sepsis was ruled out  -- Other - I will add my own diagnosis  -- Disagree - Not applicable / Not valid  -- Disagree - Clinically unable to determine / Unknown  -- Refer to Clinical Documentation Reviewer    PROVIDER RESPONSE TEXT:    Provider is clinically unable to determine a response to this query.     Query created by: Carole Childers on 2022 11:20 AM      Electronically signed by:  Immanuel Lopez MD 2022 11:24 AM

## 2022-05-09 NOTE — CARE COORDINATION
[facility] Transitions Initial Follow Up Call    Call within 2 business days of discharge: Yes    Patient: Jeaneth Valdez Patient : 1935   MRN: <[id number]>  Reason for Admission: Hypernatremia  Discharge Date: 22 RARS: Readmission Risk Score: 17.9 ( )      Last Discharge [facility]       Complaint Diagnosis Description Type Department Provider    22 Abnormal Lab Hypernatremia . .. ED to Hosp-Admission (Discharged) (ADMITTED) SEJAVIER 8WE Ameena Hernández DO; Sam Flowers. .. Acute Care Course: Patient admitted to [address] [place] [address] from - for Hypernatremia. She discharged to [facility] at the [facility] until . HFU made: Multiple falls, COVID 19, AMS      Sig Hx:     DME:     Conversation: Attempted to contact patient for initial transitional care call. Unable to reach patient or leave a  d/t mailbox full. Follow up plan: Will re-attempt    Non-face-to-face services provided: Follow Up  No future appointments.     Sylwia Toro RN

## 2022-05-10 ENCOUNTER — CARE COORDINATION (OUTPATIENT)
Dept: CASE MANAGEMENT | Age: 87
End: 2022-05-10

## 2022-05-10 NOTE — CARE COORDINATION
[facility] Transitions Follow Up Call    5/10/2022    Patient: Gilbert Xiao  Patient : 1935   MRN: <[id number]>  Reason for Admission:   Discharge Date: 22 RARS: Readmission Risk Score: 17.9 ( )    Called [facility] and was told pt           Care Transitions Subsequent and Final Call    Subsequent and Final Calls  Care Transitions Interventions  Other Interventions: Follow Up  No future appointments.     BUFFY LopezN, RN   [address] [place] [address] Transition Nurse  [phone]

## 2025-01-06 NOTE — ED PROVIDER NOTES
ATTENDING PROVIDER ATTESTATION:     Tuan Gee presented to the emergency department for evaluation of Altered Mental Status (pt complains of left arm weakness, LKW 0100, +covid)   and was initially evaluated by the Medical Resident. See Original ED Note for H&P and ED course above. I have reviewed and discussed the case, including pertinent history (medical, surgical, family and social) and exam findings with the Medical Resident assigned to Tuan Gee. I have personally performed and/or participated in the history, exam, medical decision making, and procedures and agree with all pertinent clinical information and any additional changes or corrections are noted below in my assessment and plan. I have discussed this patient in detail with the resident, and provided the instruction and education,       I have reviewed my findings and recommendations with the assigned Medical Resident, Tuan Gee and members of family present at the time of disposition. I have performed a history and physical examination of this patient and directly supervised the resident caring for this patient      History of Present Illness:    Presents to the ED for altered metal status, beginning sometime after 1am.  The complaint has been constant, severe in severity, and worsened by nothing. Here for evaluation of altered mental status. Confusion starting around 1am. ? left arm weakness at that time. The reported weakness resolved but she is still confused. She is here for further evaluation. She is oriented to person. Otherwise she moves all extremities but is confused. She is on coumadin. Her last known well is over 4.5 hrs prior to arrival. She denies any pain. No chest pain or shortness of breath.         Review of Systems:   A complete review of systems was performed and pertinent positives and negatives are stated within HPI, all other systems reviewed and are negative.    --------------------------------------------- PAST HISTORY ---------------------------------------------  Past Medical History:  has a past medical history of Anticoagulated on Coumadin, Atrial fibrillation ([facility] Basal cell carcinoma of neck, CAD (coronary artery disease), CHF (congestive heart failure) ([facility] Depression, DJD (degenerative joint disease), GERD (gastroesophageal reflux disease), History of cardiovascular stress test, Hyperparathyroidism ([facility] Hypertension, Hypothyroidism, Mild mitral regurgitation, Osteoarthritis, Pulmonary hypertension ([facility] Renal calculi, Tricuspid regurgitation, and Urinary incontinence. Past Surgical History:  has a past surgical history that includes Thyroidectomy (1960); Kidney stone surgery (1972); Cholecystectomy (1972); Kidney stone surgery (1981); Cataract removal (6/2005); Incontinence surgery (5/2005); Thyroid surgery (5/2005); parathyroidectomy; Knee Arthroplasty (Bilateral, 9/2005); Total shoulder arthroplasty (Left, 2009); fracture surgery (Left); Carpal tunnel release; Finger surgery (7/2011); Total shoulder arthroplasty (Left); Inner ear surgery (2011); Cataract removal with implant (Bilateral); Colonoscopy; and Cardiac catheterization (3/22/14). Social History:  reports that she has never smoked. She has never used smokeless tobacco. She reports previous alcohol use. She reports that she does not use drugs. Family History: family history includes Arthritis in her mother; Cancer in her brother and son; Diabetes in her father; Heart Attack in her mother; Heart Disease in her brother, brother, father, mother, and sister; Heart Surgery in her brother; Mental Illness in her father. Unless otherwise noted, family history is non contributory    The patients home medications have been reviewed.     Allergies: Codeine, Penicillins, Vicodin [hydrocodone-acetaminophen], and Adhesive tape    EKG Interpretation  Interpreted by emergency department physician, Dr. Pita Swift     1/11/22  Time: 1120    Rhythm: atrial fibrillation - controlled  Rate: normal  Axis: normal  Conduction: normal  ST Segments: slight ST depression in the high lateral leads  T Waves: no acute change    Clinical Impression: atrial fibrillation, slight ST depression in the high lateral leads. No ST elevation. Comparison to Old EKG  Slight changes from prior      Physical Exam:  Constitutional/General: Alert and oriented x2  Head: Normocephalic and atraumatic  Eyes: PERRL, EOMI, sclera non icteric  ENT: Oropharynx clear, handling secretions  Neck: Supple, full ROM, no stridor, no meningeal signs  Respiratory: Lungs clear to auscultation bilaterally, no wheezes, rales, or rhonchi. Not in respiratory distress  Cardiovascular:  Irregularly irregular. No murmurs, no gallops, no rubs. 2+ distal pulses. Equal extremity pulses. GI:  Abdomen Soft, Non tender, Non distended. No rebound, guarding, or rigidity. No pulsatile masses. Musculoskeletal: Moves all extremities x 4. Warm and well perfused,  no clubbing, no cyanosis, no edema. Palpable peripheral pulses  Integument: skin warm and dry. No rashes. Neurologic: GCS 15, no focal deficits. No unilateral weakness. No focal deficits. Able to lift both arms bilaterally. Symmetric strength in both legs. Able to move and lift both legs. NIH 1 (disoriented to month)      I directly supervised any procedures performed by the resident and was present for the procedure including all critical portions of the procedure      The cardiac monitor revealed atrial fibrillation with a heart rate in the 100s as interpreted by me. The cardiac monitor was ordered secondary to the patient's confusion and to monitor the patient for dysrhythmia.    CPT [id number]      I, Dr. Pita Swift, am the primary provider of record    My Medical Decision Making:         Altered mental status  Family reports this is not her baseline  No obvious signs of stroke, no LVO  INR 3.2  Medicine consulted for admission  No meningeal signs  No signs of CNS infection         1.  Altered mental status, unspecified altered mental status type           Francisco Gomez MD  01/11/22 [id number] independent

## 2025-03-24 NOTE — PROGRESS NOTES
Problem: Nutrition Deficit:  Goal: Optimize nutritional status  Flowsheets (Taken 3/24/2025 1420)  Nutrient intake appropriate for improving, restoring, or maintaining nutritional needs:   Assess nutritional status and recommend course of action   Monitor oral intake, labs, and treatment plans   Recommend appropriate diets, oral nutritional supplements, and vitamin/mineral supplements      [facility]  FAMILY MEDICINE RESIDENCY PROGRAM   OFFICE PROGRESS NOTE  DATE OF VISIT : 2020    Patient : Edgardo García   Sex : female   Age : 81 y.o.  : 1935   MRN : [id number]            Chief Complaint :   Chief Complaint   Patient presents with    Care Management     transitional care management visit       HPI:   Edgardo García comes to clinic today for     F/U of chronic problem(s) and new or recent complaint of frequent falls     Chronic problems reviewed today include:     Hypertension, Obesity, Congestive Heart Failure, Osteoarthritis, Hypothyroidism, Depression and atrial fibrillation    Current status of this/these condition(s):  stable    Tolerating meds: Yes    Additional history: Patient was recently released after hospitalization followed by rehab stay. Originally went to ER because of a fall, but admitted when she was found to have CHF. After hospitalization, it was determined that she needed therapy for ambulation and strengthening. She is now able to walk with a cane or walker, but has fallen several times since going home. She is accompanied by her granddaughter, who states that she has 24-7 coverage, but are still unable to prevent all of her falls.        Patient Active Problem List   Diagnosis    GERD (gastroesophageal reflux disease)    Depression    Hypothyroidism    Nonischemic cardiomyopathy ([address]    Permanent atrial fibrillation    Astigmatism    History of artificial lens replacement    Presbyopia    Tear film insufficiency    Essential hypertension    Pulmonary hypertension (HCC)    Class 2 severe obesity due to excess calories with serious comorbidity and body mass index (BMI) of 39.0 to 39.9 in [facility])    Tricuspid regurgitation    Dizziness    Greater trochanteric bursitis    Leg length discrepancy    Chronic anticoagulation    Chronic obstructive pulmonary disease (HCC)    Mixed stress and urge urinary incontinence    Primary osteoarthritis involving multiple joints    Fuchs' endothelial dystrophy    Pseudophakia, both eyes    Chronic diastolic (congestive) heart failure (HCC)    Pure hypercholesterolemia    Spinal stenosis of lumbar region with neurogenic claudication    VHD (valvular heart disease)    Patent foramen ovale    Coronary arteriosclerosis in native artery    Fall at home, initial encounter    Dizziness of unknown cause    Closed head injury       Past Medical History:   Diagnosis Date    Anticoagulated on Coumadin     on Coumadin for this Dr. Rui Romo controls    Atrial fibrillation ([facility]     Basal cell carcinoma of neck     CAD (coronary artery disease)     History of luminal irregularities of the coronary artery, stable.     CHF (congestive heart failure) ([facility])     stage I diastolic dysfunction on 7/79/01    Depression 11/16/2010    DJD (degenerative joint disease)     SEVERE IN RIGHT HAND, IN MULTIPLE OTHER JOINTS    GERD (gastroesophageal reflux disease) 11/16/2010    History of cardiovascular stress test 03/18/2014    lexiscan    Hyperparathyroidism ([facility] 11/16/2010    Had parathyroidectomy    Hypertension 11/16/2010    Hypothyroidism 11/16/2010    Mild mitral regurgitation 7/10/14    Osteoarthritis 11/16/2010    Pulmonary hypertension ([facility] 7/10/14    mild    Renal calculi     x3    Tricuspid regurgitation 7/10/14    mild-moderate    Urinary incontinence 11/16/2010       Current Outpatient Medications on File Prior to Visit   Medication Sig Dispense Refill    warfarin (COUMADIN) 4 MG tablet Take 2 tablets by mouth daily 60 tablet 5    metoprolol succinate (TOPROL XL) 50 MG extended release tablet Take 1 tablet by mouth daily 30 tablet 0    furosemide (LASIX) 40 MG tablet Take 1 tablet by mouth daily 60 tablet 3    Ascorbic Acid (VITAMIN C) 250 MG tablet Take 250 mg by mouth daily      PROAIR  (90 Base) MCG/ACT inhaler USE 2 PUFFS EVERY 6 HOURS  AS NEEDED FOR WHEEZING 34 g 2  lisinopril (PRINIVIL;ZESTRIL) 10 MG tablet TAKE 1 TABLET BY MOUTH  DAILY 90 tablet 3    levothyroxine (SYNTHROID) 125 MCG tablet TAKE 1 TABLET BY MOUTH  EVERY DAY 90 tablet 1    potassium chloride (KLOR-CON M) 20 MEQ extended release tablet TAKE 1 TABLET BY MOUTH  EVERY DAY 90 tablet 3    b complex vitamins capsule Take 1 capsule by mouth daily 90 capsule 1    acetaminophen (TYLENOL) 325 MG tablet Take 650 mg by mouth every 6 hours as needed for Pain or Fever       No current facility-administered medications on file prior to visit. Allergies   Allergen Reactions    Codeine Other (See Comments)     Hallucination    Penicillins Hives    Vicodin [Hydrocodone-Acetaminophen] Other (See Comments)     Hallucination      Adhesive Tape Rash       Family History   Problem Relation Age of Onset    Heart Disease Mother     Heart Attack Mother     Arthritis Mother     Heart Disease Father     Mental Illness Father     Diabetes Father     Heart Disease Brother     Cancer Brother     Cancer Son     Heart Disease Sister     Heart Disease Brother     Heart Surgery Brother        Past Surgical History:   Procedure Laterality Date    CARDIAC CATHETERIZATION  3/22/14    CARPAL TUNNEL RELEASE      Right hand.  CATARACT REMOVAL  6/2005    right, bilateral cataract surgery.  CATARACT REMOVAL WITH IMPLANT Bilateral     CHOLECYSTECTOMY  1972    COLONOSCOPY      FINGER SURGERY  7/2011    S/P removal of cyst from right index finger.  FRACTURE SURGERY Left     arm fractured-casted, Bilateral feet fractures-casted.  INCONTINENCE SURGERY  5/2005    Urinary incontinence, S/P bladder suspension.  INNER EAR SURGERY  2011    tube place in right ear   [address]    KIDNEY STONE SURGERY  1981    KNEE ARTHROPLASTY Bilateral 9/2005    PARATHYROIDECTOMY      Three times    SHOULDER ARTHROPLASTY Left 2009    SHOULDER ARTHROPLASTY Left      shoulder total replacement.     THYROID risks of medication(s) also explained. Patient and/or caregiver was instructed to call if any new symptoms develop prior to next visit. Health risk factors discussed and addressed. I have personally reviewed labs and/or imaging (if any).       Signed by : José Antonio Dinh M.D.

## 2025-05-21 NOTE — PROGRESS NOTES
[facility]  [facility]   OFFICE PROGRESS NOTE  DATE OF VISIT : 1/4/2021         Chief Complaint :   Chief Complaint   Patient presents with    Shortness of Breath    Fatigue     sleeps all the time    Blurred Vision     pt saw eye doctor and was prescribed eye drops and lost them    Ear Fullness     pt states they feel swollen       HPI:   [place] comes to clinic today for     F/U of chronic problem(s) and new or recent complaint of see CC     Chronic problems reviewed today include:     Hypertension, Hyperlipidemia, COPD, Congestive Heart Failure, Osteoarthritis, Hypothyroidism and atrial fibrillation    Current status of this/these condition(s):  unstable    Tolerating meds: Yes    Additional history: Complains of chronic but worsening fatigue and dyspnea. Short of breath with minimal activity (walking from bed to bathroom). Also says that she has been gaining weight over past several weeks (10 pounds). She was advised to increase Lasix for several days last week, but not seeing noticeable difference. Not more swollen than usual.  Has urinated more recently. Also complaining of ear fullness, blurred vision. Unable to hear, only has one aid (other one was broken). Objective:    VS:  Blood pressure (!) 148/93, pulse 95, temperature 97.8 °F (36.6 °C), temperature source Temporal, resp. rate 22, height 5' 6\" (1.676 m), weight 251 lb (113.9 kg), SpO2 96 %, not currently breastfeeding. General Appearance: Well developed, awake, alert, oriented, no acute distress  HEENT: Normocephalic,atraumatic. PERRL, EOM's intact, EAC without erythema or swelling, no pallor or icterus. Neck: Supple, symmetrical, trachea midline. No JVD. Thyroid smooth, not enlarged. Chest wall/Lung: Clear to auscultation bilaterally,  respirations unlabored. No rhonchi/wheezing/rales  Heart[de-identified]  Irregular rate and rhythm, S1and S2 normal, No murmur  Abdomen: Soft, nontender. Home Care the diagnosis and importance of compliance with the treatment plan. RTO in in 3 month(s) or sooner for any persistent, new, or worsening symptoms. Please see Patient Instructions for further counseling and information given. Advised to be adherent to the treatment plans discussed today, and please call with any questions or concerns, letting the office know of any reasons that the plans may not be followed. The risks of untreated conditions include worsening illness, injury, disability, and possibly, death. Please call if symptoms change in any way, worsen, or fail to completely resolve, as this could necessitate a change to treatment plans. Patient and/or caregiver expressed understanding. Indications and proper use of medication(s) reviewed. Potential side-effects and risks of medication(s) also explained. Patient and/or caregiver was instructed to call if any new symptoms develop prior to next visit. Health risk factors discussed and addressed.     Signed by : Asuncion Bond M.D.